# Patient Record
Sex: FEMALE | Race: WHITE | NOT HISPANIC OR LATINO | Employment: FULL TIME | URBAN - NONMETROPOLITAN AREA
[De-identification: names, ages, dates, MRNs, and addresses within clinical notes are randomized per-mention and may not be internally consistent; named-entity substitution may affect disease eponyms.]

---

## 2018-12-06 ENCOUNTER — TRANSCRIBE ORDERS (OUTPATIENT)
Dept: ADMINISTRATIVE | Facility: HOSPITAL | Age: 48
End: 2018-12-06

## 2018-12-06 DIAGNOSIS — M25.511 RIGHT SHOULDER PAIN, UNSPECIFIED CHRONICITY: Primary | ICD-10-CM

## 2018-12-06 DIAGNOSIS — Z12.39 SCREENING BREAST EXAMINATION: Primary | ICD-10-CM

## 2018-12-10 ENCOUNTER — APPOINTMENT (OUTPATIENT)
Dept: MRI IMAGING | Facility: HOSPITAL | Age: 48
End: 2018-12-10

## 2019-01-07 DIAGNOSIS — M25.511 ARTHRALGIA OF RIGHT SHOULDER REGION: Primary | ICD-10-CM

## 2019-01-08 ENCOUNTER — OFFICE VISIT (OUTPATIENT)
Dept: ORTHOPEDIC SURGERY | Facility: CLINIC | Age: 49
End: 2019-01-08

## 2019-01-08 VITALS — WEIGHT: 192 LBS | BODY MASS INDEX: 35.33 KG/M2 | HEIGHT: 62 IN | RESPIRATION RATE: 18 BRPM

## 2019-01-08 DIAGNOSIS — IMO0002 BURSITIS/TENDONITIS, SHOULDER: Primary | ICD-10-CM

## 2019-01-08 PROCEDURE — 99203 OFFICE O/P NEW LOW 30 MIN: CPT | Performed by: ORTHOPAEDIC SURGERY

## 2019-01-08 PROCEDURE — 20610 DRAIN/INJ JOINT/BURSA W/O US: CPT | Performed by: ORTHOPAEDIC SURGERY

## 2019-01-08 RX ORDER — METHYLPREDNISOLONE ACETATE 40 MG/ML
40 INJECTION, SUSPENSION INTRA-ARTICULAR; INTRALESIONAL; INTRAMUSCULAR; SOFT TISSUE
Status: COMPLETED | OUTPATIENT
Start: 2019-01-08 | End: 2019-01-08

## 2019-01-08 RX ORDER — LIDOCAINE HYDROCHLORIDE 10 MG/ML
2 INJECTION, SOLUTION EPIDURAL; INFILTRATION; INTRACAUDAL; PERINEURAL
Status: COMPLETED | OUTPATIENT
Start: 2019-01-08 | End: 2019-01-08

## 2019-01-08 RX ADMIN — LIDOCAINE HYDROCHLORIDE 2 ML: 10 INJECTION, SOLUTION EPIDURAL; INFILTRATION; INTRACAUDAL; PERINEURAL at 13:44

## 2019-01-08 RX ADMIN — METHYLPREDNISOLONE ACETATE 40 MG: 40 INJECTION, SUSPENSION INTRA-ARTICULAR; INTRALESIONAL; INTRAMUSCULAR; SOFT TISSUE at 13:44

## 2019-01-08 NOTE — PROGRESS NOTES
Subjective   Patient ID: Amber Feng is a 48 y.o. female  Pain of the Right Shoulder (Patient denies any injury to the right shoulder, she states her  had a fall in October and she was helping pull to him then her shoulder started hurting.)             History of Present Illness  Right dominant shoulder pain since October when her  fell off a roof she had to help him with repetitive reaching lifting activities, has pain anterolateral right shoulder limited motion pain laying on the side at night denies neck pain paresthesias, has no history of prior shoulder injections or injuries, not improved with chiropractic care.  Has taken Naprosyn for more than a month with no improvement.      Review of Systems   Constitutional: Negative for fever.   HENT: Negative for voice change.    Eyes: Negative for visual disturbance.   Respiratory: Negative for shortness of breath.    Cardiovascular: Negative for chest pain.   Gastrointestinal: Negative for abdominal distention and abdominal pain.   Genitourinary: Negative for dysuria.   Musculoskeletal: Positive for arthralgias. Negative for gait problem and joint swelling.   Skin: Negative for rash.   Neurological: Negative for speech difficulty.   Hematological: Does not bruise/bleed easily.   Psychiatric/Behavioral: Negative for confusion.       History reviewed. No pertinent past medical history.     History reviewed. No pertinent surgical history.    History reviewed. No pertinent family history.    Social History     Socioeconomic History   • Marital status:      Spouse name: Not on file   • Number of children: Not on file   • Years of education: Not on file   • Highest education level: Not on file   Social Needs   • Financial resource strain: Not on file   • Food insecurity - worry: Not on file   • Food insecurity - inability: Not on file   • Transportation needs - medical: Not on file   • Transportation needs - non-medical: Not on file  "  Occupational History   • Not on file   Tobacco Use   • Smoking status: Never Smoker   • Smokeless tobacco: Never Used   Substance and Sexual Activity   • Alcohol use: Yes     Frequency: Never   • Drug use: Defer   • Sexual activity: Defer   Other Topics Concern   • Not on file   Social History Narrative   • Not on file       I have reviewed all of the above social hx, family hx, surgical hx, medications, allergies & ROS and confirm that it is accurate.    No Known Allergies    No current outpatient medications on file.    Objective   Resp 18   Ht 157.5 cm (62\")   Wt 87.1 kg (192 lb)   BMI 35.12 kg/m²    Physical Exam  Constitutional: Patient is oriented to person, place, and time. Patient appears well-developed and well-nourished.   HENT:Head: Normocephalic and atraumatic.   Eyes: EOM are normal. Pupils are equal, round, and reactive to light.   Neck: Normal range of motion. Neck supple.   Cardiovascular: Normal rate.    Pulmonary/Chest: Effort normal and breath sounds normal.   Abdominal: Soft.   Neurological: Patient is alert and oriented to person, place, and time.   Skin: Skin is warm and dry.   Psychiatric: Patient has a normal mood and affect.   Nursing note and vitals reviewed.       [unfilled]   Right shoulder with positive impingement sign, no atrophy skin appears normal, forward elevation limited to 120, abduction limited to 130, internal rotation limited to thumb to greater trochanter with pain.  Positive full can sign and with anterior shoulder pain.    Assessment/Plan   Review of Radiographic Studies:    Radiographic images today of affected area I personally viewed and showed no sign of acute fracture or dislocation.      Large Joint Arthrocentesis: R subacromial bursa  Date/Time: 1/8/2019 1:44 PM  Consent given by: patient  Site marked: site marked  Timeout: Immediately prior to procedure a time out was called to verify the correct patient, procedure, equipment, support staff and site/side " marked as required   Supporting Documentation  Indications: pain   Procedure Details  Location: shoulder - R subacromial bursa  Preparation: Patient was prepped and draped in the usual sterile fashion  Needle size: 22 G  Medications administered: 2 mL lidocaine PF 1% 1 %; 40 mg methylPREDNISolone acetate 40 MG/ML  Patient tolerance: patient tolerated the procedure well with no immediate complications           Amber was seen today for pain.    Diagnoses and all orders for this visit:    Bursitis/tendonitis, shoulder  -     Large Joint Arthrocentesis: R subacromial bursa  -     MRI Shoulder Right Without Contrast; Future       Orthopedic activities reviewed and patient expressed appreciation and Physical therapy referral given      Recommendations/Plan:   Work/Activity Status: May perform usual activities as tolerated    Patient agreeable to call or return sooner for any concerns.             Impression:  Bursitis tendinitis right shoulder with early adhesive capsulitis rule out rotator cuff tear  Plan:  Therapy referral, MRI right shoulder, review MR next visit

## 2019-01-17 ENCOUNTER — TREATMENT (OUTPATIENT)
Dept: PHYSICAL THERAPY | Facility: CLINIC | Age: 49
End: 2019-01-17

## 2019-01-17 DIAGNOSIS — M25.511 CHRONIC RIGHT SHOULDER PAIN: Primary | ICD-10-CM

## 2019-01-17 DIAGNOSIS — G89.29 CHRONIC RIGHT SHOULDER PAIN: Primary | ICD-10-CM

## 2019-01-17 PROCEDURE — 97162 PT EVAL MOD COMPLEX 30 MIN: CPT | Performed by: PHYSICAL THERAPIST

## 2019-01-17 NOTE — PROGRESS NOTES
"  Physical Therapy Initial Evaluation and Plan of Care      Patient: Amber eFng   : 1970  Diagnosis/ICD-10 Code:  Acute pain of right shoulder [M25.511]  Referring practitioner: Saleem Lyman MD    Subjective Evaluation    History of Present Illness  Mechanism of injury: Patient states her right shoulder has gradually been hurting more and more since her  fell off the roof in early October and was unable to walk. States she has had to pull and tug on him. Reports catching in shoulder, sometimes trouble lifting, pain going into the arm, trouble gripping. Pain is intermittent.     Has been going to the chiropractor which has helped some, mostly did estim.     Had a cortisone injection but it may have made it worse.     Work lets her take her time and she has not been lifting at work.       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   Quality: sharp, throbbing, dull ache and burning (\"catches\")  Aggravating factors: sleeping, lifting, overhead activity, movement and outstretched reach    Hand dominance: right    Treatments  Previous treatment: chiropractic  Patient Goals  Patient goals for therapy: decreased edema, improved balance, decreased pain, increased motion, return to sport/leisure activities, increased strength and independence with ADLs/IADLs  Patient goal: Hair, makeup            Objective       Postural Observations  Seated posture: poor  Standing posture: poor        Palpation     Right Tenderness of the anterior deltoid, biceps, infraspinatus, levator scapulae, pectoralis major, pectoralis minor, posterior deltoid, rhomboids, supraspinatus, teres major, teres minor and upper trapezius.     Tenderness     Right Shoulder  Tenderness in the acromion, biceps tendon (proximal), " bicipital groove, coracoid process, infraspinatus tendon, medial scapula, scapular spine and supraspinatus tendon.     Cervical/Thoracic Screen   Cervical range of motion within normal limits    Neurological Testing     Reflexes   Left   Biceps (C5/C6): normal (2+)  Brachioradialis (C6): normal (2+)  Triceps (C7): normal (2+)    Right   Biceps (C5/C6): normal (2+)  Brachioradialis (C6): normal (2+)  Triceps (C7): normal (2+)    Active Range of Motion   Left Shoulder   Flexion: 145 degrees   Abduction: 175 degrees     Right Shoulder   Flexion: 90 degrees with pain  Abduction: 75 degrees with pain    Passive Range of Motion     Right Shoulder   Flexion: 90 degrees with pain  Abduction: 85 degrees with pain  External rotation 45°: 40 degrees with pain  Internal rotation 45°: 50 degrees with pain    Scapular Mobility     Right Shoulder   Scapular mobility: poor    Strength/Myotome Testing     Left Shoulder   Normal muscle strength    Right Shoulder     Planes of Motion   Flexion: 2   Abduction: 2   External rotation at 0°: 2   Internal rotation at 0°: 2     Left Wrist/Hand      (2nd hand position)     Trial 1: 49.4 lbs    Right Wrist/Hand      (2nd hand position)     Trial 1: 22.7 lbs    Tests     Right Shoulder   Positive crossover, drop arm, empty can, full can, Hawkin's and Neer's.          Assessment & Plan     Assessment  Impairments: abnormal coordination, abnormal muscle firing, abnormal muscle tone, abnormal or restricted ROM, activity intolerance, impaired physical strength, lacks appropriate home exercise program and pain with function  Assessment details: Patient is a 48 year old female presenting with right shoulder pain Since early October 2018 following an injury of her  which required her to lift and pull him repeatedly since then. Patient states pain has been progressive and intense and has trouble lifting pulling, holding items, and raising arm. Patient presents with severely decreased  shoulder mobility actively and passively, decreased  strength, gross tenderness throughout shoulder and scapular complex, and occasional numbness down the arm past elbow. Cannot rule out bursitis, impingement, or rotator cuff tear at this time. Patient is appropriate for further imaging to address tissue structure. Patient is appropriate for physical therapy to address the above issues.   Prognosis: fair  Prognosis details: Short Term Goals (3 weeks):  1. Patient will be independent with home exercise program.  2. Patient will demonstrate improved shoulder mobility by 50%.  3. Patient will demonstrate improved  strength by 50%    Long Term Goals (8 weeks):  1. Patient will be able to lift at least 5 lbs overhead with shoulder pain no greater than 2/10.   2. Patient will demonstrate shoulder mobility of WFL.   3. Patient will be able to return to full work duty with shoulder pain no greater than 2/10.    Functional Limitations: carrying objects, lifting, sleeping, pulling, pushing, uncomfortable because of pain, reaching behind back and reaching overhead  Plan  Therapy options: will be seen for skilled physical therapy services  Planned modality interventions: ultrasound, traction, thermotherapy (hydrocollator packs), TENS, iontophoresis, high voltage pulsed current (spasm management), high voltage pulsed current (pain management), electrical stimulation/Russian stimulation and cryotherapy  Planned therapy interventions: therapeutic activities, stretching, strengthening, soft tissue mobilization, spinal/joint mobilization, joint mobilization, postural training, neuromuscular re-education, motor coordination training, manual therapy, abdominal trunk stabilization, ADL retraining, balance/weight-bearing training, body mechanics training, fine motor coordination training, flexibility, functional ROM exercises, home exercise program and IADL retraining  Frequency: 2-3x/week.  Duration in visits: 18  Treatment  plan discussed with: patient        Manual Therapy:         mins  57099;  Therapeutic Exercise:    25nc     mins  54291;     Neuromuscular Cecily:        mins  79220;    Therapeutic Activity:          mins  10215;     Gait Training:           mins  28593;     Ultrasound:         mins  05180;    Electrical Stimulation:    15 nc      mins  83385 ( );  Dry Needling          mins self-pay    Timed Treatment:   25 nc   mins   Total Treatment:      62    mins    PT SIGNATURE: Onelia Chacon, PT   DATE TREATMENT INITIATED: 1/17/2019    Initial Certification  Certification Period: 4/17/2019  I certify that the therapy services are furnished while this patient is under my care.  The services outlined above are required by this patient, and will be reviewed every 90 days.     PHYSICIAN: Saleem Lyman MD      DATE:     Please sign and return via fax to [phone].. Thank you, [facility].

## 2019-01-21 ENCOUNTER — TREATMENT (OUTPATIENT)
Dept: PHYSICAL THERAPY | Facility: CLINIC | Age: 49
End: 2019-01-21

## 2019-01-21 DIAGNOSIS — M25.511 CHRONIC RIGHT SHOULDER PAIN: Primary | ICD-10-CM

## 2019-01-21 DIAGNOSIS — G89.29 CHRONIC RIGHT SHOULDER PAIN: Primary | ICD-10-CM

## 2019-01-21 PROCEDURE — 97014 ELECTRIC STIMULATION THERAPY: CPT | Performed by: PHYSICAL THERAPIST

## 2019-01-21 PROCEDURE — 97140 MANUAL THERAPY 1/> REGIONS: CPT | Performed by: PHYSICAL THERAPIST

## 2019-01-21 PROCEDURE — 97110 THERAPEUTIC EXERCISES: CPT | Performed by: PHYSICAL THERAPIST

## 2019-01-21 NOTE — PROGRESS NOTES
Subjective   Amber Feng reports: 8-9/10 pain at rest. States the shoulder and her sinuses felt bad all weekend and she did not do exercises. States this morning in made a loud and painful pop, not sure what she was doing.     Objective   Pain with passive flexion at approx. 80 deg, patient demo's muscle guarding.     See Exercise, Manual, and Modality Logs for complete treatment.       Assessment/Plan  Will continue working on PROM and pain free ranges of motion. Will continue estim for pain relief at this time.   Progress per Plan of Care           Manual Therapy:    9     mins  38665;  Therapeutic Exercise:    30     mins  79385;     Neuromuscular Cecily:        mins  70370;    Therapeutic Activity:          mins  10938;     Gait Training:           mins  49418;     Ultrasound:          mins  91599;   Iontophoresis          mins  29346   Electrical Stimulation:    20     mins  12454 ( );  Dry Needling          mins self-pay  Fluidotherapy          mins [id number]    Timed Treatment:   39   mins   Total Treatment:     59   mins    Onelia Chacon, PT  Physical Therapist

## 2019-01-23 ENCOUNTER — TREATMENT (OUTPATIENT)
Dept: PHYSICAL THERAPY | Facility: CLINIC | Age: 49
End: 2019-01-23

## 2019-01-23 DIAGNOSIS — M25.511 CHRONIC RIGHT SHOULDER PAIN: Primary | ICD-10-CM

## 2019-01-23 DIAGNOSIS — G89.29 CHRONIC RIGHT SHOULDER PAIN: Primary | ICD-10-CM

## 2019-01-23 PROCEDURE — 97140 MANUAL THERAPY 1/> REGIONS: CPT | Performed by: PHYSICAL THERAPIST

## 2019-01-23 PROCEDURE — 97014 ELECTRIC STIMULATION THERAPY: CPT | Performed by: PHYSICAL THERAPIST

## 2019-01-23 PROCEDURE — 97110 THERAPEUTIC EXERCISES: CPT | Performed by: PHYSICAL THERAPIST

## 2019-01-23 NOTE — PROGRESS NOTES
"        Subjective   Amber Feng reports: Reports \"12\"/10 pain, denies need to go to urgent care. States arm will drop when trying to lift it to tie up hair.     Objective   See Exercise, Manual, and Modality Logs for complete treatment.       Assessment/Plan  Great amount of pain today with PROM, patient had difficulty relaxing arm. Will continue trial with therapy into next week but if no improvements will likely refer for imaging with suspicion of rotator cuff tear.   Progress per Plan of Care           Manual Therapy:    8     mins  91441;  Therapeutic Exercise:    26     mins  92818;     Neuromuscular Cecily:        mins  03525;    Therapeutic Activity:          mins  89867;     Gait Training:           mins  62974;     Ultrasound:          mins  53638;   Iontophoresis          mins  21180   Electrical Stimulation:    20     mins  75336 ( );  Dry Needling          mins self-pay  Fluidotherapy          mins 35665    Timed Treatment:   34   mins   Total Treatment:     54   mins    Onelia Chacon, PT  Physical Therapist  "

## 2019-01-28 ENCOUNTER — TREATMENT (OUTPATIENT)
Dept: PHYSICAL THERAPY | Facility: CLINIC | Age: 49
End: 2019-01-28

## 2019-01-28 PROCEDURE — 97014 ELECTRIC STIMULATION THERAPY: CPT | Performed by: PHYSICAL THERAPIST

## 2019-01-28 PROCEDURE — 97140 MANUAL THERAPY 1/> REGIONS: CPT | Performed by: PHYSICAL THERAPIST

## 2019-01-28 NOTE — PROGRESS NOTES
Physical Therapy Daily Progress Note        Amber Feng reports 8-9/10 pain today at rest.  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 Pt reports that she sleeps on L shoulder but rolls onto right during the night which wakes her up.         Objective Pt present to PT today with no distress at rest.     Pt with increased pain with PROM of shoulder and could not reach 90 degrees with flexion or extension due to pain.     Pt with some relief with estim and ice on shoulder.       See Exercise, Manual, and Modality Logs for complete treatment.     Assessment/Plan  Pt continues to have pain with active movement of shoulder. Pt shoulder seems to lock up and is very painful when trying to elevate arm even passively.       Progress per Plan of Care  Assess pt pain           Manual Therapy:    5     mins  92994;  Therapeutic Exercise:         mins  51556;     Neuromuscular Cecily:        mins  18144;    Therapeutic Activity:          mins  10178;     Gait Training:        ___  mins  80178;     Ultrasound:          mins  92323;    Electrical Stimulation:    20     mins  92959 ( );  Dry Needling          mins self-pay    Timed Treatment:   25   mins   Total Treatment:     59   mins    David Will, PT  Physical Therapist

## 2019-01-30 ENCOUNTER — TREATMENT (OUTPATIENT)
Dept: PHYSICAL THERAPY | Facility: CLINIC | Age: 49
End: 2019-01-30

## 2019-01-30 PROCEDURE — 97110 THERAPEUTIC EXERCISES: CPT | Performed by: PHYSICAL THERAPIST

## 2019-01-30 PROCEDURE — 97014 ELECTRIC STIMULATION THERAPY: CPT | Performed by: PHYSICAL THERAPIST

## 2019-01-30 NOTE — PROGRESS NOTES
Subjective   Amber Feng reports: 8-9/10 pain at rest. States the cold has made it a little worse feeling.     Objective   Spasms down the arm today after using heat.     AAROM unable to raise arm with left handed assist about approx. 85 deg    See Exercise, Manual, and Modality Logs for complete treatment.       Assessment/Plan  Patient has not made any improvements so far in symptoms, mobility, or strength so far. Still highly suspecting rotator cuff tear. Likely refer back to physician next visit if no improvements made.   Progress per Plan of Care           Manual Therapy:         mins  43691;  Therapeutic Exercise:    35     mins  41711;     Neuromuscular Cecily:        mins  91032;    Therapeutic Activity:          mins  33340;     Gait Training:           mins  68354;     Ultrasound:          mins  43666;   Iontophoresis          mins  31964   Electrical Stimulation:    20     mins  15695 ( );  Dry Needling          mins self-pay  Fluidotherapy          mins 08979    Timed Treatment:   55   mins   Total Treatment:     55   mins    Onelia Chacon, PT  Physical Therapist

## 2019-02-05 ENCOUNTER — TREATMENT (OUTPATIENT)
Dept: PHYSICAL THERAPY | Facility: CLINIC | Age: 49
End: 2019-02-05

## 2019-02-05 DIAGNOSIS — G89.29 CHRONIC RIGHT SHOULDER PAIN: Primary | ICD-10-CM

## 2019-02-05 DIAGNOSIS — M25.511 CHRONIC RIGHT SHOULDER PAIN: Primary | ICD-10-CM

## 2019-02-05 PROCEDURE — 97140 MANUAL THERAPY 1/> REGIONS: CPT | Performed by: PHYSICAL THERAPIST

## 2019-02-05 PROCEDURE — 97014 ELECTRIC STIMULATION THERAPY: CPT | Performed by: PHYSICAL THERAPIST

## 2019-02-05 PROCEDURE — 97110 THERAPEUTIC EXERCISES: CPT | Performed by: PHYSICAL THERAPIST

## 2019-02-05 NOTE — PROGRESS NOTES
Subjective   Amber Feng reports: 8-9/10 shoulder pain at rest. Still cannot reach or lift arm without excruciating pain.     Objective       Active Range of Motion     Right Shoulder   Flexion: 86 degrees with pain  Abduction: 62 degrees with pain  External rotation 0°: 10 degrees with pain  Internal rotation 0°: 38 degrees with pain    Passive Range of Motion     Right Shoulder   Flexion: 123 degrees with pain  Abduction: 120 degrees with pain  External rotation 0°: 20 degrees with pain  Internal rotation 0°: 40 degrees with pain    Strength/Myotome Testing     Right Shoulder     Planes of Motion   Flexion: 2   Extension: 2+   Abduction: 2   External rotation at 0°: 2   Internal rotation at 0°: 2     Left Wrist/Hand      (2nd hand position)     Trial 1: 53.9 lbs    Right Wrist/Hand      (2nd hand position)     Trial 1: 36.2 lbs    Tests     Right Shoulder   Positive crossover, drop arm, full can and Hawkin's.      See Exercise, Manual, and Modality Logs for complete treatment.       Assessment/Plan  Patient has made limited progress with  strength, has not made progress with active or passive shoulder mobility. Patient will be put on hold for therapy at this time due to lack of progress. Further imaging is suggested at this time to assess for suspected rotator cuff tear. Discussed continuing home exercises to maintain current shoulder mobility.   Other  Therapy on hold until further testing or imaging can be acquired.          Manual Therapy:    10     mins  64865;  Therapeutic Exercise:    31     mins  56201;     Neuromuscular Cecily:        mins  13674;    Therapeutic Activity:          mins  04101;     Gait Training:           mins  63417;     Ultrasound:          mins  83375;   Iontophoresis          mins  51723   Electrical Stimulation:    20     mins  75079 ( );  Dry Needling          mins self-pay  Fluidotherapy          mins 93738    Timed Treatment:   41   mins   Total Treatment:      61   mins    Onelia Chacon, PT  Physical Therapist

## 2022-09-02 ENCOUNTER — HOSPITAL ENCOUNTER (OUTPATIENT)
Dept: GENERAL RADIOLOGY | Facility: HOSPITAL | Age: 52
Discharge: HOME | End: 2022-09-02
Admitting: CHIROPRACTOR

## 2022-09-02 ENCOUNTER — TRANSCRIBE ORDERS (OUTPATIENT)
Dept: GENERAL RADIOLOGY | Facility: HOSPITAL | Age: 52
End: 2022-09-02

## 2022-09-02 DIAGNOSIS — R06.89 ACUTE RESPIRATORY INSUFFICIENCY: Primary | ICD-10-CM

## 2022-09-02 DIAGNOSIS — R06.89 ACUTE RESPIRATORY INSUFFICIENCY: ICD-10-CM

## 2022-09-02 PROCEDURE — 71101 X-RAY EXAM UNILAT RIBS/CHEST: CPT

## 2022-10-17 ENCOUNTER — APPOINTMENT (OUTPATIENT)
Dept: CT IMAGING | Facility: HOSPITAL | Age: 52
End: 2022-10-17

## 2022-10-17 ENCOUNTER — HOSPITAL ENCOUNTER (EMERGENCY)
Facility: HOSPITAL | Age: 52
Discharge: HOME | End: 2022-10-17
Attending: EMERGENCY MEDICINE | Admitting: EMERGENCY MEDICINE

## 2022-10-17 VITALS
SYSTOLIC BLOOD PRESSURE: 153 MMHG | TEMPERATURE: 97.8 F | RESPIRATION RATE: 19 BRPM | DIASTOLIC BLOOD PRESSURE: 93 MMHG | HEART RATE: 101 BPM | WEIGHT: 158.2 LBS | HEIGHT: 62 IN | BODY MASS INDEX: 29.11 KG/M2 | OXYGEN SATURATION: 98 %

## 2022-10-17 DIAGNOSIS — K80.80 BILIARY CALCULUS OF OTHER SITE WITHOUT OBSTRUCTION: Primary | ICD-10-CM

## 2022-10-17 LAB
ALBUMIN SERPL-MCNC: 4 G/DL (ref 3.5–5.2)
ALBUMIN/GLOB SERPL: 0.8 G/DL
ALP SERPL-CCNC: 258 U/L (ref 39–117)
ALT SERPL W P-5'-P-CCNC: 45 U/L (ref 1–33)
ANION GAP SERPL CALCULATED.3IONS-SCNC: 12.9 MMOL/L (ref 5–15)
AST SERPL-CCNC: 69 U/L (ref 1–32)
BACTERIA UR QL AUTO: ABNORMAL /HPF
BASOPHILS # BLD AUTO: 0.11 10*3/MM3 (ref 0–0.2)
BASOPHILS NFR BLD AUTO: 1.2 % (ref 0–1.5)
BILIRUB SERPL-MCNC: 0.7 MG/DL (ref 0–1.2)
BILIRUB UR QL STRIP: NEGATIVE
BUN SERPL-MCNC: 6 MG/DL (ref 6–20)
BUN/CREAT SERPL: 7.5 (ref 7–25)
CALCIUM SPEC-SCNC: 9.5 MG/DL (ref 8.6–10.5)
CHLORIDE SERPL-SCNC: 99 MMOL/L (ref 98–107)
CLARITY UR: ABNORMAL
CO2 SERPL-SCNC: 27.1 MMOL/L (ref 22–29)
COLOR UR: ABNORMAL
CREAT SERPL-MCNC: 0.8 MG/DL (ref 0.57–1)
D-LACTATE SERPL-SCNC: 0.8 MMOL/L (ref 0.5–2)
DEPRECATED RDW RBC AUTO: 42.5 FL (ref 37–54)
EGFRCR SERPLBLD CKD-EPI 2021: 88.8 ML/MIN/1.73
EOSINOPHIL # BLD AUTO: 0.93 10*3/MM3 (ref 0–0.4)
EOSINOPHIL NFR BLD AUTO: 9.8 % (ref 0.3–6.2)
ERYTHROCYTE [DISTWIDTH] IN BLOOD BY AUTOMATED COUNT: 14.6 % (ref 12.3–15.4)
GLOBULIN UR ELPH-MCNC: 4.9 GM/DL
GLUCOSE SERPL-MCNC: 97 MG/DL (ref 65–99)
GLUCOSE UR STRIP-MCNC: NEGATIVE MG/DL
HCT VFR BLD AUTO: 36.7 % (ref 34–46.6)
HGB BLD-MCNC: 11.6 G/DL (ref 12–15.9)
HGB UR QL STRIP.AUTO: NEGATIVE
HOLD SPECIMEN: NORMAL
HOLD SPECIMEN: NORMAL
HYALINE CASTS UR QL AUTO: ABNORMAL /LPF
IMM GRANULOCYTES # BLD AUTO: 0.02 10*3/MM3 (ref 0–0.05)
IMM GRANULOCYTES NFR BLD AUTO: 0.2 % (ref 0–0.5)
KETONES UR QL STRIP: NEGATIVE
LEUKOCYTE ESTERASE UR QL STRIP.AUTO: ABNORMAL
LIPASE SERPL-CCNC: 23 U/L (ref 13–60)
LYMPHOCYTES # BLD AUTO: 1.97 10*3/MM3 (ref 0.7–3.1)
LYMPHOCYTES NFR BLD AUTO: 20.8 % (ref 19.6–45.3)
MCH RBC QN AUTO: 25.6 PG (ref 26.6–33)
MCHC RBC AUTO-ENTMCNC: 31.6 G/DL (ref 31.5–35.7)
MCV RBC AUTO: 80.8 FL (ref 79–97)
MONOCYTES # BLD AUTO: 0.77 10*3/MM3 (ref 0.1–0.9)
MONOCYTES NFR BLD AUTO: 8.1 % (ref 5–12)
MUCOUS THREADS URNS QL MICRO: ABNORMAL /HPF
NEUTROPHILS NFR BLD AUTO: 5.69 10*3/MM3 (ref 1.7–7)
NEUTROPHILS NFR BLD AUTO: 59.9 % (ref 42.7–76)
NITRITE UR QL STRIP: NEGATIVE
NRBC BLD AUTO-RTO: 0 /100 WBC (ref 0–0.2)
PH UR STRIP.AUTO: 6 [PH] (ref 5–8)
PLATELET # BLD AUTO: 498 10*3/MM3 (ref 140–450)
PMV BLD AUTO: 10 FL (ref 6–12)
POTASSIUM SERPL-SCNC: 3.2 MMOL/L (ref 3.5–5.2)
PROT SERPL-MCNC: 8.9 G/DL (ref 6–8.5)
PROT UR QL STRIP: ABNORMAL
RBC # BLD AUTO: 4.54 10*6/MM3 (ref 3.77–5.28)
RBC # UR STRIP: ABNORMAL /HPF
REF LAB TEST METHOD: ABNORMAL
SODIUM SERPL-SCNC: 139 MMOL/L (ref 136–145)
SP GR UR STRIP: 1.02 (ref 1–1.03)
SQUAMOUS #/AREA URNS HPF: ABNORMAL /HPF
UROBILINOGEN UR QL STRIP: ABNORMAL
WBC # UR STRIP: ABNORMAL /HPF
WBC CASTS #/AREA URNS LPF: ABNORMAL /LPF
WBC NRBC COR # BLD: 9.49 10*3/MM3 (ref 3.4–10.8)
WHOLE BLOOD HOLD COAG: NORMAL
WHOLE BLOOD HOLD SPECIMEN: NORMAL

## 2022-10-17 PROCEDURE — 99283 EMERGENCY DEPT VISIT LOW MDM: CPT

## 2022-10-17 PROCEDURE — 81001 URINALYSIS AUTO W/SCOPE: CPT

## 2022-10-17 PROCEDURE — 25010000002 ONDANSETRON PER 1 MG: Performed by: EMERGENCY MEDICINE

## 2022-10-17 PROCEDURE — 85025 COMPLETE CBC W/AUTO DIFF WBC: CPT

## 2022-10-17 PROCEDURE — 74176 CT ABD & PELVIS W/O CONTRAST: CPT

## 2022-10-17 PROCEDURE — 80053 COMPREHEN METABOLIC PANEL: CPT

## 2022-10-17 PROCEDURE — 96374 THER/PROPH/DIAG INJ IV PUSH: CPT

## 2022-10-17 PROCEDURE — 83690 ASSAY OF LIPASE: CPT

## 2022-10-17 PROCEDURE — 83605 ASSAY OF LACTIC ACID: CPT

## 2022-10-17 PROCEDURE — 96375 TX/PRO/DX INJ NEW DRUG ADDON: CPT

## 2022-10-17 PROCEDURE — 25010000002 KETOROLAC TROMETHAMINE PER 15 MG: Performed by: EMERGENCY MEDICINE

## 2022-10-17 RX ORDER — KETOROLAC TROMETHAMINE 10 MG/1
10 TABLET, FILM COATED ORAL EVERY 6 HOURS PRN
Qty: 15 TABLET | Refills: 0 | Status: SHIPPED | OUTPATIENT
Start: 2022-10-17 | End: 2022-12-12

## 2022-10-17 RX ORDER — KETOROLAC TROMETHAMINE 30 MG/ML
30 INJECTION, SOLUTION INTRAMUSCULAR; INTRAVENOUS ONCE
Status: COMPLETED | OUTPATIENT
Start: 2022-10-17 | End: 2022-10-17

## 2022-10-17 RX ORDER — ONDANSETRON 4 MG/1
4 TABLET, ORALLY DISINTEGRATING ORAL 4 TIMES DAILY PRN
Qty: 20 TABLET | Refills: 0 | Status: SHIPPED | OUTPATIENT
Start: 2022-10-17 | End: 2022-10-28 | Stop reason: SDUPTHER

## 2022-10-17 RX ORDER — SODIUM CHLORIDE 0.9 % (FLUSH) 0.9 %
10 SYRINGE (ML) INJECTION AS NEEDED
Status: DISCONTINUED | OUTPATIENT
Start: 2022-10-17 | End: 2022-10-17 | Stop reason: HOSPADM

## 2022-10-17 RX ORDER — ONDANSETRON 2 MG/ML
4 INJECTION INTRAMUSCULAR; INTRAVENOUS ONCE
Status: COMPLETED | OUTPATIENT
Start: 2022-10-17 | End: 2022-10-17

## 2022-10-17 RX ADMIN — KETOROLAC TROMETHAMINE 30 MG: 30 INJECTION, SOLUTION INTRAMUSCULAR; INTRAVENOUS at 15:28

## 2022-10-17 RX ADMIN — ONDANSETRON 4 MG: 2 INJECTION INTRAMUSCULAR; INTRAVENOUS at 15:27

## 2022-10-17 NOTE — ED PROVIDER NOTES
Subjective   History of Present Illness  52-year-old female presents to the ED with a chief complaint of abdominal pain.  The patient is complaining of epigastric and right upper quadrant abdominal pain that radiates around to the right side of her back.  She states that the pain has been pretty constant but worse over the last few weeks.  She states that the pain is worse with eating.  She states that sometimes she has nausea and vomiting associated with the pain.  She denies fever or chills.  No chest pain or shortness of breath.  No cough or wheeze.  No prior treatments or limiting factors.  No prior evaluations.  No other complaints at this time.        Review of Systems   Constitutional: Negative for fatigue and fever.   Gastrointestinal: Positive for abdominal pain, nausea and vomiting.   All other systems reviewed and are negative.      Past Medical History:   Diagnosis Date   • Anxiety        No Known Allergies    Past Surgical History:   Procedure Laterality Date   • POSTPARTUM TUBAL LIGATION         History reviewed. No pertinent family history.    Social History     Socioeconomic History   • Marital status:    Tobacco Use   • Smoking status: Never   • Smokeless tobacco: Never   Vaping Use   • Vaping Use: Never used   Substance and Sexual Activity   • Alcohol use: Yes   • Drug use: Defer   • Sexual activity: Defer           Objective   Physical Exam  Vitals and nursing note reviewed.   Constitutional:       General: She is not in acute distress.     Appearance: She is well-developed. She is not diaphoretic.   HENT:      Head: Normocephalic and atraumatic.      Nose: Nose normal.   Eyes:      Conjunctiva/sclera: Conjunctivae normal.   Cardiovascular:      Rate and Rhythm: Normal rate and regular rhythm.   Pulmonary:      Effort: Pulmonary effort is normal. No respiratory distress.      Breath sounds: Normal breath sounds.   Abdominal:      General: There is no distension.      Palpations: Abdomen is  soft.      Tenderness: There is abdominal tenderness in the right upper quadrant and epigastric area. There is no guarding.   Musculoskeletal:         General: No deformity.   Neurological:      Mental Status: She is alert and oriented to person, place, and time.      Cranial Nerves: No cranial nerve deficit.         Procedures           ED Course                                           MDM  CT Abdomen Pelvis Without Contrast    Result Date: 10/17/2022  PROCEDURE: CT ABDOMEN PELVIS WO CONTRAST-  HISTORY: epigastric, RUQ abd pain  COMPARISON:None  Note: Noncontrast exam is less sensitive for assessment of the bowel and solid abdominal organs  TECHNIQUE: Noncontrast exam  CT ABDOMEN:  Liver has a somewhat heterogeneous appearance. Liver is also mildly enlarged. This could be due to focal fatty infiltration although underlying liver disease including infiltrative mass or metastatic disease is not excluded. Remaining solid organs are unremarkable. The bowel shows no evidence of obstruction. There is no free air or free fluid within the abdomen.  Cholelithiasis is present without acute cholecystitis. There is no evidence of biliary obstruction.  CT PELVIS: No bowel dilatation is seen. There is no free fluid. Appendix is normal. Minimal sigmoid diverticulosis is noted. Uterus and ovaries are unremarkable.      Impression: 1. Heterogeneous appearance of mildly enlarged liver. Recommend further assessment with a contrast enhanced study to evaluate for possible underlying mass or infiltrative process. 2. No evidence of bowel obstruction 3. No inflammatory changes. 4. Cholelithiasis without evidence of acute cholecystitis or biliary obstruction.  This study was performed with techniques to keep radiation doses as low as reasonably achievable (ALARA). Individualized dose reduction techniques using automated exposure control or adjustment of vA and/or kV according to the patient size were employed.  This report was signed and  finalized on 10/17/2022 4:17 PM by Davi Weiss MD.    Lab Results (last 24 hours)     Procedure Component Value Units Date/Time    CBC & Differential [[id number]]  (Abnormal) Collected: 10/17/22 1503    Specimen: Blood Updated: 10/17/22 1514    Narrative:      The following orders were created for panel order CBC & Differential.  Procedure                               Abnormality         Status                     ---------                               -----------         ------                     CBC Auto Differential[[id number]]        Abnormal            Final result                 Please view results for these tests on the individual orders.    Comprehensive Metabolic Panel [[id number]]  (Abnormal) Collected: 10/17/22 1503    Specimen: Blood Updated: 10/17/22 1535     Glucose 97 mg/dL      BUN 6 mg/dL      Creatinine 0.80 mg/dL      Sodium 139 mmol/L      Potassium 3.2 mmol/L      Chloride 99 mmol/L      CO2 27.1 mmol/L      Calcium 9.5 mg/dL      Total Protein 8.9 g/dL      Albumin 4.00 g/dL      ALT (SGPT) 45 U/L      AST (SGOT) 69 U/L      Alkaline Phosphatase 258 U/L      Total Bilirubin 0.7 mg/dL      Globulin 4.9 gm/dL      A/G Ratio 0.8 g/dL      BUN/Creatinine Ratio 7.5     Anion Gap 12.9 mmol/L      eGFR 88.8 mL/min/1.73      Comment: National Kidney Foundation and American Society of Nephrology (ASN) Task Force recommended calculation based on the Chronic Kidney Disease Epidemiology Collaboration (CKD-EPI) equation refit without adjustment for race.       Narrative:      GFR Normal >60  Chronic Kidney Disease <60  Kidney Failure <15      Lipase [[id number]]  (Normal) Collected: 10/17/22 1503    Specimen: Blood Updated: 10/17/22 1535     Lipase 23 U/L     Urinalysis With Microscopic If Indicated (No Culture) - Urine, Clean Catch [[id number]]  (Abnormal) Collected: 10/17/22 1503    Specimen: Urine, Clean Catch Updated: 10/17/22 1522     Color, UA Dark Yellow     Appearance, UA Cloudy     pH, UA 6.0      Specific Gravity, UA 1.016     Glucose, UA Negative     Ketones, UA Negative     Bilirubin, UA Negative     Blood, UA Negative     Protein, UA 30 mg/dL (1+)     Leuk Esterase, UA Small (1+)     Nitrite, UA Negative     Urobilinogen, UA 1.0 E.U./dL    Lactic Acid, Plasma [[id number]]  (Normal) Collected: 10/17/22 1503    Specimen: Blood Updated: 10/17/22 1533     Lactate 0.8 mmol/L     CBC Auto Differential [[id number]]  (Abnormal) Collected: 10/17/22 1503    Specimen: Blood Updated: 10/17/22 1514     WBC 9.49 10*3/mm3      RBC 4.54 10*6/mm3      Hemoglobin 11.6 g/dL      Hematocrit 36.7 %      MCV 80.8 fL      MCH 25.6 pg      MCHC 31.6 g/dL      RDW 14.6 %      RDW-SD 42.5 fl      MPV 10.0 fL      Platelets 498 10*3/mm3      Neutrophil % 59.9 %      Lymphocyte % 20.8 %      Monocyte % 8.1 %      Eosinophil % 9.8 %      Basophil % 1.2 %      Immature Grans % 0.2 %      Neutrophils, Absolute 5.69 10*3/mm3      Lymphocytes, Absolute 1.97 10*3/mm3      Monocytes, Absolute 0.77 10*3/mm3      Eosinophils, Absolute 0.93 10*3/mm3      Basophils, Absolute 0.11 10*3/mm3      Immature Grans, Absolute 0.02 10*3/mm3      nRBC 0.0 /100 WBC     Urinalysis, Microscopic Only - Urine, Clean Catch [[id number]]  (Abnormal) Collected: 10/17/22 1503    Specimen: Urine, Clean Catch Updated: 10/17/22 1530     RBC, UA None Seen /HPF      WBC, UA 6-12 /HPF      Bacteria, UA 1+ /HPF      Squamous Epithelial Cells, UA 3-6 /HPF      Hyaline Casts, UA 3-6 /LPF      WBC Casts 0-2 /LPF      Mucus, UA Small/1+ /HPF      Methodology Manual Light Microscopy            52-year-old female presents to the ED with abdominal pain for approximately 2 months.  Worse with eating.  Epigastric and right upper quadrant radiates around to her back.  Labs are reassuring.  Minimal elevation in LFTs.  CT confirms cholelithiasis without signs of obstruction or acute cholecystitis.  Patient is appropriate for discharge and follow-up with general surgery.  Patient is  agreeable to this plan.        Final diagnoses:   Biliary calculus of other site without obstruction       ED Disposition  ED Disposition     ED Disposition   Discharge    Condition   Stable    Comment   --             Devon Freire MD  [address] [place] [address] [phone]          Sea Valentin MD  [address] [address]  [place] [id number] [phone]    Schedule an appointment as soon as possible for a visit            Medication List      New Prescriptions    ketorolac 10 MG tablet  Commonly known as: TORADOL  Take 1 tablet by mouth Every 6 (Six) Hours As Needed for Moderate Pain.     ondansetron ODT 4 MG disintegrating tablet  Commonly known as: ZOFRAN-ODT  Place 1 tablet on the tongue 4 (Four) Times a Day As Needed for Nausea.           Where to Get Your Medications      These medications were sent to [facility] - [place], [place] - [address] [facility] - [phone]  - [phone]   [address] [facility] [address]    Phone: [phone]   · ketorolac 10 MG tablet  · ondansetron ODT 4 MG disintegrating tablet          Peter Roa,   10/17/22 1800

## 2022-10-27 ENCOUNTER — HOSPITAL ENCOUNTER (EMERGENCY)
Facility: HOSPITAL | Age: 52
Discharge: HOME | End: 2022-10-28
Attending: EMERGENCY MEDICINE | Admitting: EMERGENCY MEDICINE

## 2022-10-27 DIAGNOSIS — K80.20 CALCULUS OF GALLBLADDER WITHOUT CHOLECYSTITIS WITHOUT OBSTRUCTION: ICD-10-CM

## 2022-10-27 DIAGNOSIS — K80.50 BILIARY COLIC: Primary | ICD-10-CM

## 2022-10-27 LAB
ALBUMIN SERPL-MCNC: 3.6 G/DL (ref 3.5–5.2)
ALBUMIN/GLOB SERPL: 0.8 G/DL
ALP SERPL-CCNC: 299 U/L (ref 39–117)
ALT SERPL W P-5'-P-CCNC: 48 U/L (ref 1–33)
AMORPH URATE CRY URNS QL MICRO: ABNORMAL /HPF
ANION GAP SERPL CALCULATED.3IONS-SCNC: 9.4 MMOL/L (ref 5–15)
AST SERPL-CCNC: 80 U/L (ref 1–32)
BACTERIA UR QL AUTO: ABNORMAL /HPF
BASOPHILS # BLD AUTO: 0.1 10*3/MM3 (ref 0–0.2)
BASOPHILS NFR BLD AUTO: 1.1 % (ref 0–1.5)
BILIRUB SERPL-MCNC: 0.7 MG/DL (ref 0–1.2)
BILIRUB UR QL STRIP: NEGATIVE
BUN SERPL-MCNC: 6 MG/DL (ref 6–20)
BUN/CREAT SERPL: 6.8 (ref 7–25)
CALCIUM SPEC-SCNC: 8.4 MG/DL (ref 8.6–10.5)
CHLORIDE SERPL-SCNC: 102 MMOL/L (ref 98–107)
CLARITY UR: CLEAR
CO2 SERPL-SCNC: 26.6 MMOL/L (ref 22–29)
COLOR UR: YELLOW
CREAT SERPL-MCNC: 0.88 MG/DL (ref 0.57–1)
DEPRECATED RDW RBC AUTO: 43.8 FL (ref 37–54)
EGFRCR SERPLBLD CKD-EPI 2021: 79.2 ML/MIN/1.73
EOSINOPHIL # BLD AUTO: 1.31 10*3/MM3 (ref 0–0.4)
EOSINOPHIL NFR BLD AUTO: 14.3 % (ref 0.3–6.2)
ERYTHROCYTE [DISTWIDTH] IN BLOOD BY AUTOMATED COUNT: 15 % (ref 12.3–15.4)
GLOBULIN UR ELPH-MCNC: 4.4 GM/DL
GLUCOSE SERPL-MCNC: 137 MG/DL (ref 65–99)
GLUCOSE UR STRIP-MCNC: NEGATIVE MG/DL
HCT VFR BLD AUTO: 31.8 % (ref 34–46.6)
HGB BLD-MCNC: 10.2 G/DL (ref 12–15.9)
HGB UR QL STRIP.AUTO: NEGATIVE
HYALINE CASTS UR QL AUTO: ABNORMAL /LPF
IMM GRANULOCYTES # BLD AUTO: 0.03 10*3/MM3 (ref 0–0.05)
IMM GRANULOCYTES NFR BLD AUTO: 0.3 % (ref 0–0.5)
KETONES UR QL STRIP: NEGATIVE
LEUKOCYTE ESTERASE UR QL STRIP.AUTO: ABNORMAL
LIPASE SERPL-CCNC: 41 U/L (ref 13–60)
LYMPHOCYTES # BLD AUTO: 1.71 10*3/MM3 (ref 0.7–3.1)
LYMPHOCYTES NFR BLD AUTO: 18.6 % (ref 19.6–45.3)
MCH RBC QN AUTO: 25.7 PG (ref 26.6–33)
MCHC RBC AUTO-ENTMCNC: 32.1 G/DL (ref 31.5–35.7)
MCV RBC AUTO: 80.1 FL (ref 79–97)
MONOCYTES # BLD AUTO: 0.86 10*3/MM3 (ref 0.1–0.9)
MONOCYTES NFR BLD AUTO: 9.4 % (ref 5–12)
NEUTROPHILS NFR BLD AUTO: 5.18 10*3/MM3 (ref 1.7–7)
NEUTROPHILS NFR BLD AUTO: 56.3 % (ref 42.7–76)
NITRITE UR QL STRIP: NEGATIVE
NRBC BLD AUTO-RTO: 0 /100 WBC (ref 0–0.2)
PH UR STRIP.AUTO: 6.5 [PH] (ref 5–8)
PLATELET # BLD AUTO: 417 10*3/MM3 (ref 140–450)
PMV BLD AUTO: 10.1 FL (ref 6–12)
POTASSIUM SERPL-SCNC: 3.3 MMOL/L (ref 3.5–5.2)
PROT SERPL-MCNC: 8 G/DL (ref 6–8.5)
PROT UR QL STRIP: NEGATIVE
RBC # BLD AUTO: 3.97 10*6/MM3 (ref 3.77–5.28)
RBC # UR STRIP: ABNORMAL /HPF
REF LAB TEST METHOD: ABNORMAL
SODIUM SERPL-SCNC: 138 MMOL/L (ref 136–145)
SP GR UR STRIP: <=1.005 (ref 1–1.03)
SQUAMOUS #/AREA URNS HPF: ABNORMAL /HPF
UROBILINOGEN UR QL STRIP: ABNORMAL
WBC # UR STRIP: ABNORMAL /HPF
WBC NRBC COR # BLD: 9.19 10*3/MM3 (ref 3.4–10.8)

## 2022-10-27 PROCEDURE — 25010000002 ONDANSETRON PER 1 MG: Performed by: EMERGENCY MEDICINE

## 2022-10-27 PROCEDURE — 83690 ASSAY OF LIPASE: CPT | Performed by: EMERGENCY MEDICINE

## 2022-10-27 PROCEDURE — 96361 HYDRATE IV INFUSION ADD-ON: CPT

## 2022-10-27 PROCEDURE — 96374 THER/PROPH/DIAG INJ IV PUSH: CPT

## 2022-10-27 PROCEDURE — 80053 COMPREHEN METABOLIC PANEL: CPT | Performed by: EMERGENCY MEDICINE

## 2022-10-27 PROCEDURE — 99283 EMERGENCY DEPT VISIT LOW MDM: CPT

## 2022-10-27 PROCEDURE — 25010000002 MORPHINE PER 10 MG: Performed by: EMERGENCY MEDICINE

## 2022-10-27 PROCEDURE — 96375 TX/PRO/DX INJ NEW DRUG ADDON: CPT

## 2022-10-27 PROCEDURE — 85025 COMPLETE CBC W/AUTO DIFF WBC: CPT | Performed by: EMERGENCY MEDICINE

## 2022-10-27 PROCEDURE — 81001 URINALYSIS AUTO W/SCOPE: CPT | Performed by: EMERGENCY MEDICINE

## 2022-10-27 RX ORDER — SODIUM CHLORIDE 0.9 % (FLUSH) 0.9 %
10 SYRINGE (ML) INJECTION AS NEEDED
Status: DISCONTINUED | OUTPATIENT
Start: 2022-10-27 | End: 2022-10-28 | Stop reason: HOSPADM

## 2022-10-27 RX ORDER — ONDANSETRON 2 MG/ML
4 INJECTION INTRAMUSCULAR; INTRAVENOUS ONCE
Status: COMPLETED | OUTPATIENT
Start: 2022-10-27 | End: 2022-10-27

## 2022-10-27 RX ADMIN — SODIUM CHLORIDE 1000 ML: 9 INJECTION, SOLUTION INTRAVENOUS at 23:20

## 2022-10-27 RX ADMIN — MORPHINE SULFATE 4 MG: 4 INJECTION, SOLUTION INTRAMUSCULAR; INTRAVENOUS at 23:20

## 2022-10-27 RX ADMIN — ONDANSETRON HYDROCHLORIDE 4 MG: 2 INJECTION, SOLUTION INTRAMUSCULAR; INTRAVENOUS at 23:19

## 2022-10-28 VITALS
DIASTOLIC BLOOD PRESSURE: 87 MMHG | HEIGHT: 62 IN | WEIGHT: 156.2 LBS | RESPIRATION RATE: 16 BRPM | HEART RATE: 88 BPM | OXYGEN SATURATION: 99 % | BODY MASS INDEX: 28.74 KG/M2 | SYSTOLIC BLOOD PRESSURE: 112 MMHG | TEMPERATURE: 98.3 F

## 2022-10-28 LAB
HOLD SPECIMEN: NORMAL
HOLD SPECIMEN: NORMAL
WHOLE BLOOD HOLD COAG: NORMAL
WHOLE BLOOD HOLD SPECIMEN: NORMAL

## 2022-10-28 PROCEDURE — 25010000002 KETOROLAC TROMETHAMINE PER 15 MG: Performed by: EMERGENCY MEDICINE

## 2022-10-28 PROCEDURE — 96375 TX/PRO/DX INJ NEW DRUG ADDON: CPT

## 2022-10-28 RX ORDER — ONDANSETRON 4 MG/1
4 TABLET, ORALLY DISINTEGRATING ORAL 4 TIMES DAILY PRN
Qty: 20 TABLET | Refills: 0 | Status: SHIPPED | OUTPATIENT
Start: 2022-10-28 | End: 2022-11-03 | Stop reason: SDUPTHER

## 2022-10-28 RX ORDER — HYDROCODONE BITARTRATE AND ACETAMINOPHEN 10; 325 MG/1; MG/1
1 TABLET ORAL ONCE
Status: COMPLETED | OUTPATIENT
Start: 2022-10-28 | End: 2022-10-28

## 2022-10-28 RX ORDER — HYDROCODONE BITARTRATE AND ACETAMINOPHEN 5; 325 MG/1; MG/1
1 TABLET ORAL EVERY 6 HOURS PRN
Qty: 12 TABLET | Refills: 0 | Status: SHIPPED | OUTPATIENT
Start: 2022-10-28 | End: 2022-12-12

## 2022-10-28 RX ORDER — KETOROLAC TROMETHAMINE 30 MG/ML
30 INJECTION, SOLUTION INTRAMUSCULAR; INTRAVENOUS ONCE
Status: COMPLETED | OUTPATIENT
Start: 2022-10-28 | End: 2022-10-28

## 2022-10-28 RX ADMIN — HYDROCODONE BITARTRATE AND ACETAMINOPHEN 1 TABLET: 10; 325 TABLET ORAL at 01:29

## 2022-10-28 RX ADMIN — KETOROLAC TROMETHAMINE 30 MG: 30 INJECTION, SOLUTION INTRAMUSCULAR; INTRAVENOUS at 01:16

## 2022-11-01 NOTE — PROGRESS NOTES
Patient: Amber Feng    YOB: 1970    Date: 11/03/2022    Primary Care Provider: Evon Bryant APRN    Chief Complaint   Patient presents with   • Abdominal Pain     Abdominal pain/ [facility] on 10/17/2022 and on 10/27/2022       SUBJECTIVE:    History of present illness: Patient states that ever since August she has had worsening upper abdominal pain that involves the epigastrium and right upper quadrant.  Associated with nausea and vomiting.  Pain now is daily as is the nausea and vomiting.  No lower GI complaints.  She was found to have gallstones on CT scan in the emergency room.  She states that she has had significant weight loss during this time.     The following portions of the patient's history were reviewed and updated as appropriate: allergies, current medications, past family history, past medical history, past social history, past surgical history and problem list.      Review of Systems   Constitutional: Positive for appetite change. Negative for fatigue and fever.   HENT: Negative for congestion, nosebleeds and postnasal drip.    Eyes: Negative for photophobia.   Respiratory: Negative for cough, choking, chest tightness and shortness of breath.    Cardiovascular: Negative for chest pain, palpitations and leg swelling.   Gastrointestinal: Positive for abdominal pain (right upper quadrant / epigastric pain), nausea and vomiting.   Endocrine: Negative for cold intolerance and heat intolerance.   Genitourinary: Negative for flank pain and frequency.   Musculoskeletal: Negative for arthralgias.   Neurological: Negative for seizures, light-headedness, numbness and headaches.   Psychiatric/Behavioral: Negative for agitation, behavioral problems, confusion and hallucinations.       Allergies:  Allergies   Allergen Reactions   • Valium [Diazepam] Anaphylaxis       Medications:    Current Outpatient Medications:   •  HYDROcodone-acetaminophen (NORCO) 5-325 MG per tablet, Take 1 tablet by  "mouth Every 6 (Six) Hours As Needed for Severe Pain., Disp: 12 tablet, Rfl: 0  •  ondansetron ODT (ZOFRAN-ODT) 4 MG disintegrating tablet, Place 1 tablet on the tongue 4 (Four) Times a Day As Needed for Nausea., Disp: 20 tablet, Rfl: 0  •  promethazine-dextromethorphan (PROMETHAZINE-DM) 6.25-15 MG/5ML syrup, Take 5 mL by mouth At Night As Needed for Cough., Disp: 118 mL, Rfl: 0  •  albuterol sulfate  (90 Base) MCG/ACT inhaler, Inhale 2 puffs Every 4 (Four) Hours As Needed for Shortness of Air (chest tightness)., Disp: 8 g, Rfl: 0  •  fluticasone (FLONASE) 50 MCG/ACT nasal spray, 2 sprays into the nostril(s) as directed by provider Daily for 7 days., Disp: 9.9 mL, Rfl: 0  •  ketorolac (TORADOL) 10 MG tablet, Take 1 tablet by mouth Every 6 (Six) Hours As Needed for Moderate Pain., Disp: 15 tablet, Rfl: 0    History:  Past Medical History:   Diagnosis Date   • Anxiety        Past Surgical History:   Procedure Laterality Date   • POSTPARTUM TUBAL LIGATION         History reviewed. No pertinent family history.    Social History     Tobacco Use   • Smoking status: Never   • Smokeless tobacco: Never   Vaping Use   • Vaping Use: Never used   Substance Use Topics   • Alcohol use: Yes   • Drug use: Defer        OBJECTIVE:    Vital Signs:   Vitals:    11/03/22 1506   BP: 132/74   Pulse: 104   SpO2: 99%   Weight: 68 kg (150 lb)   Height: 157.5 cm (62\")       Physical Exam:   General Appearance:    Alert, cooperative, in no acute distress   Head:    Normocephalic, without obvious abnormality, atraumatic   Eyes:            Lids and lashes normal, conjunctivae and sclerae normal, no   icterus, no pallor, corneas clear, PERRLA   Ears:    Ears appear intact with no abnormalities noted   Throat:   No oral lesions, no thrush, oral mucosa moist   Neck:   No adenopathy, supple, trachea midline, no thyromegaly, no   carotid bruit, no JVD   Lungs:     Clear to auscultation,respirations regular, even and                  unlabored "    Heart:    Regular rhythm and normal rate, normal S1 and S2, no            murmur, no gallop, no rub, no click   Chest Wall:    No abnormalities observed   Abdomen:     Normal bowel sounds, no masses, no organomegaly, soft        non-tender, non-distended, no guarding, no rebound                tenderness   Extremities:   Moves all extremities well, no edema, no cyanosis, no             redness   Pulses:   Pulses palpable and equal bilaterally   Skin:   No bleeding, bruising or rash   Lymph nodes:   No palpable adenopathy   Neurologic:   Cranial nerves 2 - 12 grossly intact, sensation intact, DTR       present and equal bilaterally     Results Review:   I reviewed the patient's new clinical results.  I reviewed the CT scan including films personally and I agree with the interpretation.    Review of Systems was reviewed and confirmed as accurate as documented by the MA.    ASSESSMENT/PLAN:    1. Cholelithiasis without cholecystitis    2. Abnormal CT of liver        Unfortunately on ultrasound today there are suspicious findings involving the liver as evidenced on CT scan.  Although gallstones certainly may be the cause of her symptoms liver metastasis can be causing her these issues and would also explain her weight loss.    If the CT scan with IV contrast is normal we can consider proceeding with a laparoscopic cholecystectomy.    I explained this procedure to them in detail and they understand the procedure.  They also understand the risks of bleeding, infection, bile leak, ductal injury, bowel injury, the possibility of converting to an open procedure etc. They understand all of these risks and I have answered all of their questions and they wish to proceed pending the findings of the CT.    Addendum: I reviewed the CT scan with the radiologist.  Differential is listed and includes benign causes as well as carcinoma.  Although we will proceed with MRI this will still not definitively give us a diagnosis and I  recommend a percutaneous liver biopsy.  If this is a malignancy that can be the cause of her complaints.  I discussed this with her.    Electronically signed by Toni Suarez MD  11/03/22

## 2022-11-03 ENCOUNTER — OFFICE VISIT (OUTPATIENT)
Dept: SURGERY | Facility: CLINIC | Age: 52
End: 2022-11-03

## 2022-11-03 ENCOUNTER — HOSPITAL ENCOUNTER (OUTPATIENT)
Dept: CT IMAGING | Facility: HOSPITAL | Age: 52
Discharge: HOME | End: 2022-11-03
Admitting: SURGERY

## 2022-11-03 VITALS
HEART RATE: 104 BPM | BODY MASS INDEX: 27.6 KG/M2 | HEIGHT: 62 IN | DIASTOLIC BLOOD PRESSURE: 74 MMHG | SYSTOLIC BLOOD PRESSURE: 132 MMHG | OXYGEN SATURATION: 99 % | WEIGHT: 150 LBS

## 2022-11-03 DIAGNOSIS — R93.2 ABNORMAL CT OF LIVER: ICD-10-CM

## 2022-11-03 DIAGNOSIS — K80.20 CHOLELITHIASIS WITHOUT CHOLECYSTITIS: Primary | ICD-10-CM

## 2022-11-03 DIAGNOSIS — K80.20 CHOLELITHIASIS WITHOUT CHOLECYSTITIS: ICD-10-CM

## 2022-11-03 PROCEDURE — 25010000002 IOPAMIDOL 61 % SOLUTION: Performed by: SURGERY

## 2022-11-03 PROCEDURE — 74178 CT ABD&PLV WO CNTR FLWD CNTR: CPT

## 2022-11-03 PROCEDURE — 99204 OFFICE O/P NEW MOD 45 MIN: CPT | Performed by: SURGERY

## 2022-11-03 RX ORDER — SODIUM CHLORIDE 9 MG/ML
100 INJECTION, SOLUTION INTRAVENOUS CONTINUOUS
Status: CANCELLED | OUTPATIENT
Start: 2022-11-03

## 2022-11-03 RX ORDER — ONDANSETRON 4 MG/1
4 TABLET, ORALLY DISINTEGRATING ORAL 4 TIMES DAILY PRN
Qty: 20 TABLET | Refills: 0 | Status: SHIPPED | OUTPATIENT
Start: 2022-11-03 | End: 2022-11-25 | Stop reason: SDUPTHER

## 2022-11-03 RX ADMIN — IOPAMIDOL 100 ML: 612 INJECTION, SOLUTION INTRAVENOUS at 17:21

## 2022-11-04 ENCOUNTER — TRANSCRIBE ORDERS (OUTPATIENT)
Dept: GENERAL RADIOLOGY | Facility: HOSPITAL | Age: 52
End: 2022-11-04

## 2022-11-04 DIAGNOSIS — R16.0 LIVER MASS: Primary | ICD-10-CM

## 2022-11-07 ENCOUNTER — OFFICE VISIT (OUTPATIENT)
Dept: INTERNAL MEDICINE | Facility: CLINIC | Age: 52
End: 2022-11-07

## 2022-11-07 VITALS
WEIGHT: 151 LBS | TEMPERATURE: 98.2 F | HEIGHT: 62 IN | OXYGEN SATURATION: 98 % | HEART RATE: 94 BPM | DIASTOLIC BLOOD PRESSURE: 70 MMHG | BODY MASS INDEX: 27.79 KG/M2 | SYSTOLIC BLOOD PRESSURE: 124 MMHG

## 2022-11-07 DIAGNOSIS — R93.2 ABNORMAL CT OF LIVER: ICD-10-CM

## 2022-11-07 DIAGNOSIS — R63.1 EXCESSIVE THIRST: ICD-10-CM

## 2022-11-07 DIAGNOSIS — I10 PRIMARY HYPERTENSION: ICD-10-CM

## 2022-11-07 DIAGNOSIS — K80.20 CHOLELITHIASIS WITHOUT CHOLECYSTITIS: ICD-10-CM

## 2022-11-07 DIAGNOSIS — Z23 INFLUENZA VACCINE NEEDED: ICD-10-CM

## 2022-11-07 DIAGNOSIS — R63.4 UNINTENTIONAL WEIGHT LOSS: ICD-10-CM

## 2022-11-07 DIAGNOSIS — Z76.89 ENCOUNTER TO ESTABLISH CARE: Primary | ICD-10-CM

## 2022-11-07 LAB
EXPIRATION DATE: NORMAL
HBA1C MFR BLD: 5.3 %
Lab: NORMAL

## 2022-11-07 PROCEDURE — 90471 IMMUNIZATION ADMIN: CPT | Performed by: NURSE PRACTITIONER

## 2022-11-07 PROCEDURE — 90686 IIV4 VACC NO PRSV 0.5 ML IM: CPT | Performed by: NURSE PRACTITIONER

## 2022-11-07 PROCEDURE — 83036 HEMOGLOBIN GLYCOSYLATED A1C: CPT | Performed by: NURSE PRACTITIONER

## 2022-11-07 PROCEDURE — 99214 OFFICE O/P EST MOD 30 MIN: CPT | Performed by: NURSE PRACTITIONER

## 2022-11-07 RX ORDER — LOSARTAN POTASSIUM 25 MG/1
25 TABLET ORAL DAILY
Qty: 30 TABLET | Refills: 0 | Status: SHIPPED | OUTPATIENT
Start: 2022-11-07 | End: 2022-12-12

## 2022-11-07 NOTE — PROGRESS NOTES
Chief Complaint   Patient presents with   • Establish Care     Abdominal pain RUQ; dry heaves, vomiting, has been to general surgery, liver biopsy scheduled; elevated BP     Subjective   Amber Feng is a 52 y.o. female.     History of Present Illness      Patient presents to [facility].  She was recently diagnosed with gallstones and solid masses on her liver via ultrasound/CT scans.  She is following general surgery for this. Pending biopsy of liver with general surgeon on 11/22/2022.  She is also dealing with unintentional weight loss, so there are concerns for malignancy.  She has nausea, vomiting, dry heaving depending on the foods that she eats.  She does okay on low-fat foods.  She has been prescribed Zofran and Norco to take as needed.  Patient states her blood pressure has been very elevated in the mornings.  She brought her machine in today that was checked, it was only mildly elevated compared to in office manual reading when checked for accuracy.  Blood pressure this morning was 170s over 110.  States blood pressure readings improve as the day goes on, but does increase with pain and anxiety.  Reviewed patient's readings on her blood pressure machine and the majority are greater than 140/90.  Patient states when her blood pressure is elevated, she can notice it is hard to focus with her eyes and she will have a headache at times.  Denies needing treatment for anxiety.  Patient states at times she will have excessive thirst, she eats ice a lot and constantly feels thirsty.  She had an elevated glucose in the hospital.  She has checked it at home and its been in the 90s.    The following portions of the patient's history were reviewed and updated as appropriate: allergies, current medications, past family history, past medical history, past social history, past surgical history and problem list.    Review of Systems   Constitutional: Negative for chills and fever.   Eyes: Positive for visual  "disturbance.   Respiratory: Negative.    Cardiovascular: Negative.    Gastrointestinal: Positive for abdominal pain, nausea and vomiting. Negative for abdominal distention, blood in stool and diarrhea.   Musculoskeletal: Negative.    Neurological: Positive for headaches. Negative for dizziness, syncope, facial asymmetry, speech difficulty, weakness, light-headedness and numbness.   All other systems reviewed and are negative.      Objective   /70   Pulse 94   Temp 98.2 °F (36.8 °C) (Temporal)   Ht 157.5 cm (62\")   Wt 68.5 kg (151 lb)   SpO2 98%   BMI 27.62 kg/m²   Body mass index is 27.62 kg/m².  Physical Exam  Vitals and nursing note reviewed.   Constitutional:       General: She is not in acute distress.     Appearance: Normal appearance. She is not diaphoretic.   HENT:      Head: Normocephalic and atraumatic.      Right Ear: External ear normal.      Left Ear: External ear normal.      Nose: Nose normal.      Mouth/Throat:      Mouth: Mucous membranes are moist.   Eyes:      Extraocular Movements: Extraocular movements intact.      Conjunctiva/sclera: Conjunctivae normal.      Pupils: Pupils are equal, round, and reactive to light.   Cardiovascular:      Rate and Rhythm: Normal rate and regular rhythm.   Pulmonary:      Effort: Pulmonary effort is normal.      Breath sounds: Normal breath sounds.   Abdominal:      General: Abdomen is flat. Bowel sounds are normal. There is no distension.      Palpations: Abdomen is soft.   Musculoskeletal:         General: Normal range of motion.      Cervical back: Normal range of motion and neck supple.   Lymphadenopathy:      Cervical: No cervical adenopathy.   Skin:     General: Skin is dry.   Neurological:      Mental Status: She is alert and oriented to person, place, and time.   Psychiatric:         Mood and Affect: Mood normal.         Behavior: Behavior normal.         Labs:  Results for orders placed or performed in visit on 11/07/22   POC Glycosylated " Hemoglobin (Hb A1C)    Specimen: Blood   Result Value Ref Range    Hemoglobin A1C 5.3 %    Lot Number [id number]     Expiration Date 3,807,381        Assessment & Plan   Amber Feng is here today and the following problems have been addressed:      Diagnoses and all orders for this visit:    1. Encounter to establish care (Primary)    2. Cholelithiasis without cholecystitis    3. Abnormal CT of liver    4. Unintentional weight loss    5. Excessive thirst  -     POC Glycosylated Hemoglobin (Hb A1C)    6. Primary hypertension  -     losartan (Cozaar) 25 MG tablet; Take 1 tablet by mouth Daily.  Dispense: 30 tablet; Refill: 0    7. Influenza vaccine needed  -     FluLaval/Fluzone >6 mos ([id number])    Continue following general surgery related to cholelithiasis without cholecystitis and abnormal CT of liver.  Biopsy of liver scheduled on 11/22/2022.  Continue bland diet, Zofran as needed, push fluids.  To ER with any severe abdominal pain, or if unable to keep down food or drink.  A1c is 5.3%, this rules out diabetes as a cause for excessive thirst.  Patient drinks soda and not enough water.  Encouraged to drink plenty of fluids daily.  Blood pressure in office today is normal.  Blood pressure machine was checked for accuracy compared to manual and was less than 10 points of each other on systolic/diastolic.  Home blood pressure readings are averaging greater than 140/90s and more elevated in mornings. Will initiate losartan 25 mg daily for blood pressure.  If readings are higher in the morning, take at night before bed.  DASH diet, less than 1500 mg of sodium daily, light walking 30 minutes 5 days a week.  To ER if blood pressures ever 180/110 with or without symptoms.  Red flag symptoms are severe headache, visual disturbance, numbness or tingling on one side of the body, slurred speech.    Patient request flu vaccination.  Administered, tolerated well.  VIS given.  Patient is going to return in 4 weeks for  physical and to get up-to-date on other cancer screenings due to unintentional weight loss.  Currently under the process of ruling malignancy of the liver.      I spent 50 minutes caring for Amber Feng on this date of service. This time includes time spent by me in the following activities: preparing for the visit, reviewing tests, performing a medically appropriate examination and/or evaluation, counseling and educating the patient/family/caregiver, ordering medications, tests, or procedures and documenting information in the medical record.      Follow Up   Return in about 4 weeks (around 12/5/2022) for Annual.  Patient was given instructions and counseling regarding her condition or for health maintenance advice. Please see specific information pulled into the AVS if appropriate.     Evon FOSS  [facility] [place]

## 2022-11-14 RX ORDER — ONDANSETRON 4 MG/1
4 TABLET, ORALLY DISINTEGRATING ORAL 4 TIMES DAILY PRN
Qty: 20 TABLET | Refills: 0 | OUTPATIENT
Start: 2022-11-14

## 2022-11-14 NOTE — TELEPHONE ENCOUNTER
Caller: Amber Feng    Relationship: Self    Best call back number: [phone]    Requested Prescriptions:   Requested Prescriptions     Pending Prescriptions Disp Refills   • ondansetron ODT (ZOFRAN-ODT) 4 MG disintegrating tablet 20 tablet 0     Sig: Place 1 tablet on the tongue 4 (Four) Times a Day As Needed for Nausea.      Pharmacy where request should be sent: [facility] [address] [place] [address] [phone] [facility] [phone]      Additional details provided by patient: 3 PILLS ON HAND SO CAN'T TAKE NONE TODAY IF WE CAN'T FILL THE REQUEST     Does the patient have less than a 3 day supply:  [x] Yes  [] No    Fred Rodriguez Rep   11/14/22 10:20 EST

## 2022-11-22 ENCOUNTER — HOSPITAL ENCOUNTER (OUTPATIENT)
Dept: ULTRASOUND IMAGING | Facility: HOSPITAL | Age: 52
Discharge: HOME | End: 2022-11-22

## 2022-11-22 ENCOUNTER — HOSPITAL ENCOUNTER (OUTPATIENT)
Dept: CT IMAGING | Facility: HOSPITAL | Age: 52
Discharge: HOME | End: 2022-11-22

## 2022-11-22 VITALS
RESPIRATION RATE: 16 BRPM | DIASTOLIC BLOOD PRESSURE: 94 MMHG | OXYGEN SATURATION: 98 % | TEMPERATURE: 98.5 F | SYSTOLIC BLOOD PRESSURE: 159 MMHG | HEART RATE: 80 BPM

## 2022-11-22 VITALS
DIASTOLIC BLOOD PRESSURE: 94 MMHG | OXYGEN SATURATION: 98 % | HEART RATE: 85 BPM | SYSTOLIC BLOOD PRESSURE: 148 MMHG | RESPIRATION RATE: 18 BRPM | TEMPERATURE: 97 F

## 2022-11-22 DIAGNOSIS — R16.0 LIVER MASS: ICD-10-CM

## 2022-11-22 LAB
APTT PPP: 28.1 SECONDS (ref 23.5–35.5)
DEPRECATED RDW RBC AUTO: 52.2 FL (ref 37–54)
ERYTHROCYTE [DISTWIDTH] IN BLOOD BY AUTOMATED COUNT: 18.6 % (ref 12.3–15.4)
HCT VFR BLD AUTO: 33.6 % (ref 34–46.6)
HGB BLD-MCNC: 10.6 G/DL (ref 12–15.9)
INR PPP: 0.98 (ref 0.9–1.1)
MCH RBC QN AUTO: 25.3 PG (ref 26.6–33)
MCHC RBC AUTO-ENTMCNC: 31.5 G/DL (ref 31.5–35.7)
MCV RBC AUTO: 80.2 FL (ref 79–97)
PLATELET # BLD AUTO: 466 10*3/MM3 (ref 140–450)
PMV BLD AUTO: 10.2 FL (ref 6–12)
PROTHROMBIN TIME: 13.3 SECONDS (ref 12.5–14.5)
RBC # BLD AUTO: 4.19 10*6/MM3 (ref 3.77–5.28)
WBC NRBC COR # BLD: 10.94 10*3/MM3 (ref 3.4–10.8)

## 2022-11-22 PROCEDURE — 25010000002 FENTANYL CITRATE (PF) 50 MCG/ML SOLUTION: Performed by: RADIOLOGY

## 2022-11-22 PROCEDURE — 76942 ECHO GUIDE FOR BIOPSY: CPT

## 2022-11-22 PROCEDURE — 85027 COMPLETE CBC AUTOMATED: CPT | Performed by: RADIOLOGY

## 2022-11-22 PROCEDURE — 85610 PROTHROMBIN TIME: CPT | Performed by: RADIOLOGY

## 2022-11-22 PROCEDURE — 85730 THROMBOPLASTIN TIME PARTIAL: CPT | Performed by: RADIOLOGY

## 2022-11-22 RX ORDER — FENTANYL CITRATE 50 UG/ML
INJECTION, SOLUTION INTRAMUSCULAR; INTRAVENOUS AS NEEDED
Status: COMPLETED | OUTPATIENT
Start: 2022-11-22 | End: 2022-11-22

## 2022-11-22 RX ORDER — SODIUM CHLORIDE 9 MG/ML
30 INJECTION, SOLUTION INTRAVENOUS CONTINUOUS
Status: DISCONTINUED | OUTPATIENT
Start: 2022-11-22 | End: 2022-11-23 | Stop reason: HOSPADM

## 2022-11-22 RX ADMIN — FENTANYL CITRATE 50 MCG: 50 INJECTION INTRAMUSCULAR; INTRAVENOUS at 09:08

## 2022-11-22 RX ADMIN — SODIUM CHLORIDE 30 ML/HR: 9 INJECTION, SOLUTION INTRAVENOUS at 07:33

## 2022-11-22 NOTE — DISCHARGE INSTRUCTIONS

## 2022-11-24 ENCOUNTER — HOSPITAL ENCOUNTER (EMERGENCY)
Facility: HOSPITAL | Age: 52
Discharge: HOME | End: 2022-11-25
Attending: EMERGENCY MEDICINE | Admitting: EMERGENCY MEDICINE

## 2022-11-24 ENCOUNTER — APPOINTMENT (OUTPATIENT)
Dept: CT IMAGING | Facility: HOSPITAL | Age: 52
End: 2022-11-24

## 2022-11-24 DIAGNOSIS — G89.18 POSTOPERATIVE PAIN: Primary | ICD-10-CM

## 2022-11-24 LAB
ALBUMIN SERPL-MCNC: 3.1 G/DL (ref 3.5–5.2)
ALBUMIN/GLOB SERPL: 0.7 G/DL
ALP SERPL-CCNC: 444 U/L (ref 39–117)
ALT SERPL W P-5'-P-CCNC: 47 U/L (ref 1–33)
ANION GAP SERPL CALCULATED.3IONS-SCNC: 11.9 MMOL/L (ref 5–15)
AST SERPL-CCNC: 90 U/L (ref 1–32)
BASOPHILS # BLD AUTO: 0.08 10*3/MM3 (ref 0–0.2)
BASOPHILS NFR BLD AUTO: 0.8 % (ref 0–1.5)
BILIRUB SERPL-MCNC: 3.6 MG/DL (ref 0–1.2)
BUN SERPL-MCNC: 6 MG/DL (ref 6–20)
BUN/CREAT SERPL: 8.5 (ref 7–25)
CALCIUM SPEC-SCNC: 8.7 MG/DL (ref 8.6–10.5)
CHLORIDE SERPL-SCNC: 96 MMOL/L (ref 98–107)
CO2 SERPL-SCNC: 25.1 MMOL/L (ref 22–29)
CREAT SERPL-MCNC: 0.71 MG/DL (ref 0.57–1)
DEPRECATED RDW RBC AUTO: 52.5 FL (ref 37–54)
EGFRCR SERPLBLD CKD-EPI 2021: 102.5 ML/MIN/1.73
EOSINOPHIL # BLD AUTO: 1.08 10*3/MM3 (ref 0–0.4)
EOSINOPHIL NFR BLD AUTO: 10.2 % (ref 0.3–6.2)
ERYTHROCYTE [DISTWIDTH] IN BLOOD BY AUTOMATED COUNT: 18.6 % (ref 12.3–15.4)
GLOBULIN UR ELPH-MCNC: 4.5 GM/DL
GLUCOSE SERPL-MCNC: 116 MG/DL (ref 65–99)
HCT VFR BLD AUTO: 30.9 % (ref 34–46.6)
HGB BLD-MCNC: 9.9 G/DL (ref 12–15.9)
IMM GRANULOCYTES # BLD AUTO: 0.06 10*3/MM3 (ref 0–0.05)
IMM GRANULOCYTES NFR BLD AUTO: 0.6 % (ref 0–0.5)
LIPASE SERPL-CCNC: 32 U/L (ref 13–60)
LYMPHOCYTES # BLD AUTO: 1.65 10*3/MM3 (ref 0.7–3.1)
LYMPHOCYTES NFR BLD AUTO: 15.5 % (ref 19.6–45.3)
MAGNESIUM SERPL-MCNC: 2.1 MG/DL (ref 1.6–2.6)
MCH RBC QN AUTO: 25.1 PG (ref 26.6–33)
MCHC RBC AUTO-ENTMCNC: 32 G/DL (ref 31.5–35.7)
MCV RBC AUTO: 78.4 FL (ref 79–97)
MONOCYTES # BLD AUTO: 1.11 10*3/MM3 (ref 0.1–0.9)
MONOCYTES NFR BLD AUTO: 10.5 % (ref 5–12)
NEUTROPHILS NFR BLD AUTO: 6.64 10*3/MM3 (ref 1.7–7)
NEUTROPHILS NFR BLD AUTO: 62.4 % (ref 42.7–76)
NRBC BLD AUTO-RTO: 0 /100 WBC (ref 0–0.2)
PLATELET # BLD AUTO: 451 10*3/MM3 (ref 140–450)
PMV BLD AUTO: 10.8 FL (ref 6–12)
POTASSIUM SERPL-SCNC: 3 MMOL/L (ref 3.5–5.2)
PROT SERPL-MCNC: 7.6 G/DL (ref 6–8.5)
RBC # BLD AUTO: 3.94 10*6/MM3 (ref 3.77–5.28)
SODIUM SERPL-SCNC: 133 MMOL/L (ref 136–145)
WBC NRBC COR # BLD: 10.62 10*3/MM3 (ref 3.4–10.8)

## 2022-11-24 PROCEDURE — 99284 EMERGENCY DEPT VISIT MOD MDM: CPT

## 2022-11-24 PROCEDURE — 25010000002 ONDANSETRON PER 1 MG: Performed by: EMERGENCY MEDICINE

## 2022-11-24 PROCEDURE — 80053 COMPREHEN METABOLIC PANEL: CPT | Performed by: EMERGENCY MEDICINE

## 2022-11-24 PROCEDURE — 83690 ASSAY OF LIPASE: CPT | Performed by: EMERGENCY MEDICINE

## 2022-11-24 PROCEDURE — 96374 THER/PROPH/DIAG INJ IV PUSH: CPT

## 2022-11-24 PROCEDURE — 85025 COMPLETE CBC W/AUTO DIFF WBC: CPT | Performed by: EMERGENCY MEDICINE

## 2022-11-24 PROCEDURE — 96375 TX/PRO/DX INJ NEW DRUG ADDON: CPT

## 2022-11-24 PROCEDURE — 83735 ASSAY OF MAGNESIUM: CPT | Performed by: EMERGENCY MEDICINE

## 2022-11-24 PROCEDURE — 25010000002 HYDROMORPHONE 1 MG/ML SOLUTION: Performed by: EMERGENCY MEDICINE

## 2022-11-24 PROCEDURE — 74177 CT ABD & PELVIS W/CONTRAST: CPT

## 2022-11-24 PROCEDURE — 25010000002 IOPAMIDOL 61 % SOLUTION: Performed by: EMERGENCY MEDICINE

## 2022-11-24 RX ORDER — ONDANSETRON 2 MG/ML
4 INJECTION INTRAMUSCULAR; INTRAVENOUS ONCE
Status: COMPLETED | OUTPATIENT
Start: 2022-11-24 | End: 2022-11-24

## 2022-11-24 RX ORDER — POTASSIUM CHLORIDE 750 MG/1
40 CAPSULE, EXTENDED RELEASE ORAL ONCE
Status: COMPLETED | OUTPATIENT
Start: 2022-11-24 | End: 2022-11-24

## 2022-11-24 RX ADMIN — POTASSIUM CHLORIDE 40 MEQ: 10 CAPSULE, COATED, EXTENDED RELEASE ORAL at 23:09

## 2022-11-24 RX ADMIN — HYDROMORPHONE HYDROCHLORIDE 1 MG: 1 INJECTION, SOLUTION INTRAMUSCULAR; INTRAVENOUS; SUBCUTANEOUS at 21:52

## 2022-11-24 RX ADMIN — ONDANSETRON 4 MG: 2 INJECTION INTRAMUSCULAR; INTRAVENOUS at 21:52

## 2022-11-24 RX ADMIN — IOPAMIDOL 100 ML: 612 INJECTION, SOLUTION INTRAVENOUS at 22:43

## 2022-11-24 RX ADMIN — SODIUM CHLORIDE 1000 ML: 9 INJECTION, SOLUTION INTRAVENOUS at 21:51

## 2022-11-25 VITALS
WEIGHT: 152 LBS | BODY MASS INDEX: 27.97 KG/M2 | RESPIRATION RATE: 20 BRPM | TEMPERATURE: 98.1 F | DIASTOLIC BLOOD PRESSURE: 98 MMHG | HEART RATE: 91 BPM | OXYGEN SATURATION: 96 % | SYSTOLIC BLOOD PRESSURE: 156 MMHG | HEIGHT: 62 IN

## 2022-11-25 RX ORDER — ONDANSETRON 4 MG/1
4 TABLET, ORALLY DISINTEGRATING ORAL 4 TIMES DAILY PRN
Qty: 20 TABLET | Refills: 0 | Status: SHIPPED | OUTPATIENT
Start: 2022-11-25 | End: 2022-12-05 | Stop reason: DRUGHIGH

## 2022-11-25 RX ORDER — OXYCODONE HYDROCHLORIDE 5 MG/1
5 TABLET ORAL EVERY 4 HOURS PRN
Qty: 16 TABLET | Refills: 0 | Status: SHIPPED | OUTPATIENT
Start: 2022-11-25 | End: 2022-12-05 | Stop reason: DRUGHIGH

## 2022-11-25 NOTE — ED PROVIDER NOTES
Subjective   History of Present Illness  52-year-old female presenting with abdominal pain.  She states that 2 days ago she had CT-guided biopsy of her liver due to a mass.  This was arranged by her general surgeon who is working her up for cholecystectomy.  Since being home she has had increasing pain in the right upper quadrant.  This is now severe.  There are no alleviating or aggravating factors.  It does not radiate.  It is associated with nausea, vomiting and dry heaves.  She denies any fevers or other complaints.  She notes that she is currently out of pain medication and nausea medication.        Review of Systems   Constitutional: Negative.    HENT: Negative.    Eyes: Negative.    Respiratory: Negative.    Cardiovascular: Negative.    Gastrointestinal: Positive for abdominal pain, nausea and vomiting.   Genitourinary: Negative.    Musculoskeletal: Negative.    Skin: Negative.    Neurological: Negative.    Psychiatric/Behavioral: Negative.        Past Medical History:   Diagnosis Date   • Anxiety        Allergies   Allergen Reactions   • Valium [Diazepam] Anaphylaxis       Past Surgical History:   Procedure Laterality Date   • POSTPARTUM TUBAL LIGATION         History reviewed. No pertinent family history.    Social History     Socioeconomic History   • Marital status:    Tobacco Use   • Smoking status: Never   • Smokeless tobacco: Never   Vaping Use   • Vaping Use: Never used   Substance and Sexual Activity   • Alcohol use: Yes   • Drug use: Defer   • Sexual activity: Defer           Objective   Physical Exam  Constitutional:       General: She is not in acute distress.     Appearance: Normal appearance. She is not ill-appearing, toxic-appearing or diaphoretic.   HENT:      Head: Normocephalic and atraumatic.      Right Ear: External ear normal.      Left Ear: External ear normal.      Nose: Nose normal.   Eyes:      Extraocular Movements: Extraocular movements intact.   Cardiovascular:      Rate and  Rhythm: Normal rate and regular rhythm.      Pulses: Normal pulses.      Heart sounds: Normal heart sounds.   Pulmonary:      Effort: Pulmonary effort is normal. No respiratory distress.      Breath sounds: Normal breath sounds.   Abdominal:      General: Bowel sounds are normal. There is no distension.      Comments: Mild diffuse tenderness worse in the right upper quadrant   Musculoskeletal:         General: No swelling, tenderness or deformity. Normal range of motion.      Cervical back: Normal range of motion.   Skin:     General: Skin is warm and dry.      Capillary Refill: Capillary refill takes less than 2 seconds.      Findings: No rash.      Comments: Tiny incision to the right side with no surrounding erythema, drainage or tenderness   Neurological:      General: No focal deficit present.      Mental Status: She is alert and oriented to person, place, and time.   Psychiatric:         Mood and Affect: Mood normal.         Behavior: Behavior normal.         Procedures           ED Course                                           MDM  Number of Diagnoses or Management Options  Postoperative pain  Diagnosis management comments: 52 year old female with abdominal pain and nausea/vomiting after liver biopsy.  Well-developed, well-nourished, nontoxic lady in no distress with exam as above.  Her vital signs are normal.  Her exam is notable for some right upper quadrant tenderness.  Will obtain labs and repeat imaging.  Will give symptomatic treatment.  Disposition pending.    DDx: Postoperative complication, biliary disease    Labs reveal no significant abnormality.  CT scan reveals no acute findings.  She is resting comfortably.  I do feel she is safe for discharge home at this time.  We discussed outpatient follow-up and return precautions.  Patient and significant other are comfortable with and understand the plan.       Amount and/or Complexity of Data Reviewed  Decide to obtain previous medical records or to  obtain history from someone other than the patient: yes        Final diagnoses:   Postoperative pain          Ezequiel Frazier MD  11/25/22 [id number]

## 2022-11-29 DIAGNOSIS — C18.9 METASTATIC COLON CANCER IN FEMALE: Primary | ICD-10-CM

## 2022-12-05 ENCOUNTER — PATIENT OUTREACH (OUTPATIENT)
Dept: ONCOLOGY | Facility: CLINIC | Age: 52
End: 2022-12-05

## 2022-12-05 ENCOUNTER — NURSE NAVIGATOR (OUTPATIENT)
Dept: ONCOLOGY | Facility: CLINIC | Age: 52
End: 2022-12-05

## 2022-12-05 ENCOUNTER — LAB (OUTPATIENT)
Dept: LAB | Facility: HOSPITAL | Age: 52
End: 2022-12-05

## 2022-12-05 ENCOUNTER — CONSULT (OUTPATIENT)
Dept: ONCOLOGY | Facility: CLINIC | Age: 52
End: 2022-12-05

## 2022-12-05 VITALS
SYSTOLIC BLOOD PRESSURE: 169 MMHG | OXYGEN SATURATION: 98 % | BODY MASS INDEX: 27.42 KG/M2 | TEMPERATURE: 97.7 F | WEIGHT: 149 LBS | RESPIRATION RATE: 16 BRPM | HEART RATE: 103 BPM | HEIGHT: 62 IN | DIASTOLIC BLOOD PRESSURE: 85 MMHG

## 2022-12-05 DIAGNOSIS — C78.7 METASTATIC COLON CANCER TO LIVER: Primary | ICD-10-CM

## 2022-12-05 DIAGNOSIS — C78.7 METASTATIC COLON CANCER TO LIVER: ICD-10-CM

## 2022-12-05 DIAGNOSIS — C18.9 METASTATIC COLON CANCER TO LIVER: ICD-10-CM

## 2022-12-05 DIAGNOSIS — C18.9 METASTATIC COLON CANCER TO LIVER: Primary | ICD-10-CM

## 2022-12-05 DIAGNOSIS — C18.2 CANCER OF RIGHT COLON: Primary | ICD-10-CM

## 2022-12-05 DIAGNOSIS — C18.2 CANCER OF RIGHT COLON: ICD-10-CM

## 2022-12-05 LAB
ALBUMIN SERPL-MCNC: 3.1 G/DL (ref 3.5–5.2)
ALBUMIN/GLOB SERPL: 0.6 G/DL
ALP SERPL-CCNC: 585 U/L (ref 39–117)
ALT SERPL W P-5'-P-CCNC: 59 U/L (ref 1–33)
ANION GAP SERPL CALCULATED.3IONS-SCNC: 11 MMOL/L (ref 5–15)
AST SERPL-CCNC: 149 U/L (ref 1–32)
BASOPHILS # BLD AUTO: 0.05 10*3/MM3 (ref 0–0.2)
BASOPHILS NFR BLD AUTO: 0.4 % (ref 0–1.5)
BILIRUB SERPL-MCNC: 7.5 MG/DL (ref 0–1.2)
BUN SERPL-MCNC: 6 MG/DL (ref 6–20)
BUN/CREAT SERPL: 9.1 (ref 7–25)
CALCIUM SPEC-SCNC: 9.2 MG/DL (ref 8.6–10.5)
CEA SERPL-MCNC: 353 NG/ML
CHLORIDE SERPL-SCNC: 97 MMOL/L (ref 98–107)
CO2 SERPL-SCNC: 23 MMOL/L (ref 22–29)
CREAT SERPL-MCNC: 0.66 MG/DL (ref 0.57–1)
DEPRECATED RDW RBC AUTO: 60 FL (ref 37–54)
EGFRCR SERPLBLD CKD-EPI 2021: 105.7 ML/MIN/1.73
EOSINOPHIL # BLD AUTO: 1.23 10*3/MM3 (ref 0–0.4)
EOSINOPHIL NFR BLD AUTO: 9.9 % (ref 0.3–6.2)
ERYTHROCYTE [DISTWIDTH] IN BLOOD BY AUTOMATED COUNT: 21 % (ref 12.3–15.4)
FERRITIN SERPL-MCNC: 917.3 NG/ML (ref 13–150)
GLOBULIN UR ELPH-MCNC: 4.9 GM/DL
GLUCOSE SERPL-MCNC: 95 MG/DL (ref 65–99)
HCT VFR BLD AUTO: 31.7 % (ref 34–46.6)
HGB BLD-MCNC: 10.2 G/DL (ref 12–15.9)
IMM GRANULOCYTES # BLD AUTO: 0.05 10*3/MM3 (ref 0–0.05)
IMM GRANULOCYTES NFR BLD AUTO: 0.4 % (ref 0–0.5)
IRON 24H UR-MRATE: 41 MCG/DL (ref 37–145)
IRON SATN MFR SERPL: 15 % (ref 20–50)
LYMPHOCYTES # BLD AUTO: 1.67 10*3/MM3 (ref 0.7–3.1)
LYMPHOCYTES NFR BLD AUTO: 13.4 % (ref 19.6–45.3)
MCH RBC QN AUTO: 25.4 PG (ref 26.6–33)
MCHC RBC AUTO-ENTMCNC: 32.2 G/DL (ref 31.5–35.7)
MCV RBC AUTO: 78.9 FL (ref 79–97)
MONOCYTES # BLD AUTO: 1.08 10*3/MM3 (ref 0.1–0.9)
MONOCYTES NFR BLD AUTO: 8.7 % (ref 5–12)
NEUTROPHILS NFR BLD AUTO: 67.2 % (ref 42.7–76)
NEUTROPHILS NFR BLD AUTO: 8.36 10*3/MM3 (ref 1.7–7)
PLATELET # BLD AUTO: 539 10*3/MM3 (ref 140–450)
PMV BLD AUTO: 9.8 FL (ref 6–12)
POTASSIUM SERPL-SCNC: 3.2 MMOL/L (ref 3.5–5.2)
PROT SERPL-MCNC: 8 G/DL (ref 6–8.5)
RBC # BLD AUTO: 4.02 10*6/MM3 (ref 3.77–5.28)
SODIUM SERPL-SCNC: 131 MMOL/L (ref 136–145)
TIBC SERPL-MCNC: 270 MCG/DL (ref 298–536)
TRANSFERRIN SERPL-MCNC: 181 MG/DL (ref 200–360)
WBC NRBC COR # BLD: 12.44 10*3/MM3 (ref 3.4–10.8)

## 2022-12-05 PROCEDURE — 82378 CARCINOEMBRYONIC ANTIGEN: CPT

## 2022-12-05 PROCEDURE — 99205 OFFICE O/P NEW HI 60 MIN: CPT | Performed by: INTERNAL MEDICINE

## 2022-12-05 PROCEDURE — 82728 ASSAY OF FERRITIN: CPT

## 2022-12-05 PROCEDURE — 83540 ASSAY OF IRON: CPT

## 2022-12-05 PROCEDURE — 85025 COMPLETE CBC W/AUTO DIFF WBC: CPT

## 2022-12-05 PROCEDURE — 80053 COMPREHEN METABOLIC PANEL: CPT

## 2022-12-05 PROCEDURE — 84466 ASSAY OF TRANSFERRIN: CPT

## 2022-12-05 PROCEDURE — 36415 COLL VENOUS BLD VENIPUNCTURE: CPT

## 2022-12-05 RX ORDER — LIDOCAINE AND PRILOCAINE 25; 25 MG/G; MG/G
1 CREAM TOPICAL AS NEEDED
Qty: 30 G | Refills: 3 | Status: SHIPPED | OUTPATIENT
Start: 2022-12-05

## 2022-12-05 RX ORDER — ONDANSETRON 8 MG/1
8 TABLET, ORALLY DISINTEGRATING ORAL EVERY 8 HOURS PRN
Qty: 30 TABLET | Refills: 3 | Status: SHIPPED | OUTPATIENT
Start: 2022-12-05 | End: 2022-12-12

## 2022-12-05 RX ORDER — OXYCODONE HYDROCHLORIDE 5 MG/1
5 TABLET ORAL 2 TIMES DAILY PRN
Qty: 60 TABLET | Refills: 0 | Status: SHIPPED | OUTPATIENT
Start: 2022-12-05

## 2022-12-05 RX ORDER — LORAZEPAM 1 MG/1
1 TABLET ORAL
Qty: 30 TABLET | Refills: 0 | Status: SHIPPED | OUTPATIENT
Start: 2022-12-05

## 2022-12-05 NOTE — PROGRESS NOTES
Met with patient during her consultation with Dr. Lambert today. Explained my role in her care and provided her with resources in the [facility] along with a folder that contained a booklet regarding Colorectal Cancer and my contact card. Walked patient/spouse out to the lab. They will call for navigational needs. CRISTHIAN

## 2022-12-05 NOTE — PROGRESS NOTES
DATE OF CONSULTATION: 2022    REFERRING PHYSICIAN: Evon Bryant APRN    Dear Evon Lima, PIPO  Thank you for asking for my medical advice on this patient. I saw her in the  [place] office on 2022    REASON FOR CONSULTATION: Metastatic colon cancer TX NX M1 a stage Perry    PROBLEM LIST:   1.  Metastatic colon cancer TX NX M1 a stage Perry:  A.  Presented with abdominal pain and jaundice  B.  CT abdomen pelvis done 2022 confirmed diffuse liver metastases.  C.  Diagnosed after CT-guided liver biopsy done on 2022 confirming adenocarcinoma CK20 positive CK7 negative.  2.  Elevated liver enzymes:  A.  Induced by metastatic disease  3.  Cancer-related pain  4.  Anxiety  5.  Hypertension    HISTORY OF PRESENT ILLNESS: The patient is a very pleasant 52 y.o.  female   who was in her usual state of health until 1 month ago.  Patient presented with abdominal pain.  Right upper quadrant.  Radiates to the back.  4-6 out of 10 severity.  Get worse with food improves with bowel rest.  Comes and goes.  Has been getting gradually worse.  The patient has been noted that his wife has started to turn yellow.  The patient went to the emergency room 2022.  CT abdomen pelvis confirmed bulky liver metastases without any other abnormalities.  CT-guided biopsy done on 2022 confirmed adenocarcinoma of colon primary.  The patient was referred to me for further recommendations.    SUBJECTIVE: When I saw the patient today she is here with her .  She is anxious at today's visit.  Never had a colonoscopy before but never had cancer before.  No family history of malignancy except her mom  of GBM.  The patient does not smoke.  She drinks alcohol very randomly.    Review of Systems   Constitutional: Positive for fatigue.   HENT: Negative.    Gastrointestinal: Positive for abdominal pain.   Endocrine: Negative.    Genitourinary: Negative.    Musculoskeletal: Negative.   "  Skin: Positive for color change.   Allergic/Immunologic: Negative.    Neurological: Negative.    Psychiatric/Behavioral: The patient is nervous/anxious.        Past Medical History:   Diagnosis Date   • Anxiety        Social History     Socioeconomic History   • Marital status:    Tobacco Use   • Smoking status: Never   • Smokeless tobacco: Never   Vaping Use   • Vaping Use: Never used   Substance and Sexual Activity   • Alcohol use: Yes   • Drug use: Defer   • Sexual activity: Defer       No family history on file.    Past Surgical History:   Procedure Laterality Date   • POSTPARTUM TUBAL LIGATION         Allergies   Allergen Reactions   • Valium [Diazepam] Anaphylaxis          Current Outpatient Medications:   •  albuterol sulfate  (90 Base) MCG/ACT inhaler, Inhale 2 puffs Every 4 (Four) Hours As Needed for Shortness of Air (chest tightness)., Disp: 8 g, Rfl: 0  •  HYDROcodone-acetaminophen (NORCO) 5-325 MG per tablet, Take 1 tablet by mouth Every 6 (Six) Hours As Needed for Severe Pain., Disp: 12 tablet, Rfl: 0  •  ketorolac (TORADOL) 10 MG tablet, Take 1 tablet by mouth Every 6 (Six) Hours As Needed for Moderate Pain., Disp: 15 tablet, Rfl: 0  •  losartan (Cozaar) 25 MG tablet, Take 1 tablet by mouth Daily., Disp: 30 tablet, Rfl: 0  •  promethazine-dextromethorphan (PROMETHAZINE-DM) 6.25-15 MG/5ML syrup, Take 5 mL by mouth At Night As Needed for Cough., Disp: 118 mL, Rfl: 0    PHYSICAL EXAMINATION:   /85   Pulse 103   Temp 97.7 °F (36.5 °C) (Infrared)   Resp 16   Ht 157.5 cm (62.01\")   Wt 67.6 kg (149 lb)   SpO2 98%   BMI 27.25 kg/m²   Pain Score    12/05/22 0816   PainSc:   8   PainLoc: Back      ECOG score: 0            ECOG Performance Status: 1 - Symptomatic but completely ambulatory  General Appearance:  alert, cooperative, no apparent distress and appears stated age   Neurologic/Psychiatric: A&O x 3, gait steady, appropriate affect, strength 5/5 in all muscle groups   HEENT:  " Normocephalic, without obvious abnormality, mucous membranes moist   Neck: Supple, symmetrical, trachea midline, no adenopathy;  No thyromegaly, masses, or tenderness   Lungs:   Clear to auscultation bilaterally; respirations regular, even, and unlabored bilaterally   Heart:  Regular rate and rhythm, no murmurs appreciated   Abdomen:   Soft, non-tender, non-distended and no organomegaly   Lymph nodes: No cervical, supraclavicular, inguinal or axillary adenopathy noted   Extremities: Normal, atraumatic; no clubbing, cyanosis, or edema    Skin: No rashes, ulcers, or suspicious lesions noted       Admission on 11/24/2022, Discharged on 11/25/2022   Component Date Value Ref Range Status   • Glucose 11/24/2022 116 (H)  65 - 99 mg/dL Final   • BUN 11/24/2022 6  6 - 20 mg/dL Final   • Creatinine 11/24/2022 0.71  0.57 - 1.00 mg/dL Final   • Sodium 11/24/2022 133 (L)  136 - 145 mmol/L Final   • Potassium 11/24/2022 3.0 (L)  3.5 - 5.2 mmol/L Final    Slight hemolysis detected by analyzer. Results may be affected.   • Chloride 11/24/2022 96 (L)  98 - 107 mmol/L Final   • CO2 11/24/2022 25.1  22.0 - 29.0 mmol/L Final   • Calcium 11/24/2022 8.7  8.6 - 10.5 mg/dL Final   • Total Protein 11/24/2022 7.6  6.0 - 8.5 g/dL Final   • Albumin 11/24/2022 3.10 (L)  3.50 - 5.20 g/dL Final   • ALT (SGPT) 11/24/2022 47 (H)  1 - 33 U/L Final   • AST (SGOT) 11/24/2022 90 (H)  1 - 32 U/L Final    Slight hemolysis detected by analyzer. Results may be affected.   • Alkaline Phosphatase 11/24/2022 444 (H)  39 - 117 U/L Final   • Total Bilirubin 11/24/2022 3.6 (H)  0.0 - 1.2 mg/dL Final   • Globulin 11/24/2022 4.5  gm/dL Final   • A/G Ratio 11/24/2022 0.7  g/dL Final   • BUN/Creatinine Ratio 11/24/2022 8.5  7.0 - 25.0 Final   • Anion Gap 11/24/2022 11.9  5.0 - 15.0 mmol/L Final   • eGFR 11/24/2022 102.5  >60.0 mL/min/1.73 Final    National Kidney Foundation and American Society of Nephrology (ASN) Task Force recommended calculation based on the  Chronic Kidney Disease Epidemiology Collaboration (CKD-EPI) equation refit without adjustment for race.   • Lipase 11/24/2022 32  13 - 60 U/L Final   • WBC 11/24/2022 10.62  3.40 - 10.80 10*3/mm3 Final   • RBC 11/24/2022 3.94  3.77 - 5.28 10*6/mm3 Final   • Hemoglobin 11/24/2022 9.9 (L)  12.0 - 15.9 g/dL Final   • Hematocrit 11/24/2022 30.9 (L)  34.0 - 46.6 % Final   • MCV 11/24/2022 78.4 (L)  79.0 - 97.0 fL Final   • MCH 11/24/2022 25.1 (L)  26.6 - 33.0 pg Final   • MCHC 11/24/2022 32.0  31.5 - 35.7 g/dL Final   • RDW 11/24/2022 18.6 (H)  12.3 - 15.4 % Final   • RDW-SD 11/24/2022 52.5  37.0 - 54.0 fl Final   • MPV 11/24/2022 10.8  6.0 - 12.0 fL Final   • Platelets 11/24/2022 451 (H)  140 - 450 10*3/mm3 Final   • Neutrophil % 11/24/2022 62.4  42.7 - 76.0 % Final   • Lymphocyte % 11/24/2022 15.5 (L)  19.6 - 45.3 % Final   • Monocyte % 11/24/2022 10.5  5.0 - 12.0 % Final   • Eosinophil % 11/24/2022 10.2 (H)  0.3 - 6.2 % Final   • Basophil % 11/24/2022 0.8  0.0 - 1.5 % Final   • Immature Grans % 11/24/2022 0.6 (H)  0.0 - 0.5 % Final   • Neutrophils, Absolute 11/24/2022 6.64  1.70 - 7.00 10*3/mm3 Final   • Lymphocytes, Absolute 11/24/2022 1.65  0.70 - 3.10 10*3/mm3 Final   • Monocytes, Absolute 11/24/2022 1.11 (H)  0.10 - 0.90 10*3/mm3 Final   • Eosinophils, Absolute 11/24/2022 1.08 (H)  0.00 - 0.40 10*3/mm3 Final   • Basophils, Absolute 11/24/2022 0.08  0.00 - 0.20 10*3/mm3 Final   • Immature Grans, Absolute 11/24/2022 0.06 (H)  0.00 - 0.05 10*3/mm3 Final   • nRBC 11/24/2022 0.0  0.0 - 0.2 /100 WBC Final   • Magnesium 11/24/2022 2.1  1.6 - 2.6 mg/dL Final   Hospital Outpatient Visit on 11/22/2022   Component Date Value Ref Range Status   • PTT 11/22/2022 28.1  23.5 - 35.5 seconds Final   • Protime 11/22/2022 13.3  12.5 - 14.5 Seconds Final   • INR 11/22/2022 0.98  0.90 - 1.10 Final   • WBC 11/22/2022 10.94 (H)  3.40 - 10.80 10*3/mm3 Final   • RBC 11/22/2022 4.19  3.77 - 5.28 10*6/mm3 Final   • Hemoglobin 11/22/2022  10.6 (L)  12.0 - 15.9 g/dL Final   • Hematocrit 11/22/2022 33.6 (L)  34.0 - 46.6 % Final   • MCV 11/22/2022 80.2  79.0 - 97.0 fL Final   • MCH 11/22/2022 25.3 (L)  26.6 - 33.0 pg Final   • MCHC 11/22/2022 31.5  31.5 - 35.7 g/dL Final   • RDW 11/22/2022 18.6 (H)  12.3 - 15.4 % Final   • RDW-SD 11/22/2022 52.2  37.0 - 54.0 fl Final   • MPV 11/22/2022 10.2  6.0 - 12.0 fL Final   • Platelets 11/22/2022 466 (H)  140 - 450 10*3/mm3 Final        CT Abdomen Pelvis With Contrast    Result Date: 11/24/2022  Narrative: FINAL REPORT TECHNIQUE: Routine axial images through the abdomen and pelvis were obtained following IV contrast administration. CLINICAL HISTORY: RUQ pain, s/p CT guided liver biopsy two days ago COMPARISON: November 3, 2022 FINDINGS: Abdomen: Again seen are several large metastases in both the right and left lobes of the liver, unchanged from the most recent examination.  The solid abdominal organs and ureters are otherwise unremarkable.  The GI tract is unremarkable.  Pelvis: The uterus, ovaries and urinary bladder are normal.  There is a small amount of ascites in the abdomen and pelvis.There is no pelvic or abdominal adenopathy or acute osseous abnormality.     Impression: No change in large liver metastases of uncertain primary.  No acute abnormality otherwise. Authenticated and Electronically Signed by Raman Bill M.D. on 11/24/2022 11:24:18 PM    US Guided Liver Biopsy    Result Date: 11/22/2022  Narrative: ULTRASOUND GUIDED BIOPSY OF RIGHT HEMILIVER LESION.  CLINICAL HISTORY/INDICATION: 52-year-old female with multiple liver lesions, uncertain etiology, concerning for metastasis, unknown primary, consulted us for imaged guided biopsy.  MEDICATION: IV fentanyl 50 mcg once. 1% lidocaine administered subcutaneously for local anesthetic.  Continuous vital sign monitoring was used throughout the procedure. Continuous cardiopulmonary/physiologic activity was monitored by a highly qualified health care  professional/registered nurse during the entire procedure under direct supervision.  TECHNIQUE: After discussion of risks and benefits, informed written consent was obtained.  Appropriate time out was performed to confirm patient identity and planned procedure and side. Limited right upper quadrant ultrasound was performed to identify the planned biopsy site.  The right upper quadrant was prepped in a routine sterile fashion and locally anesthetized with 1% lidocaine. Intravenous fentanyl 50 mcg once was administered by nursing under direct supervision. Using real-time ultrasound guidance, a 19-gauge guide needle was directed toward the right hemiliver lesion. The needle was positioned within the outer periphery of the lesion. Coaxial technique was utilized and a biopsy was performed with the 18-gauge biopsy needle. A total of 7 passes were made through the lesion. Tract embolization was performed with Gelfoam. Positive cytological material was confirmed by the cytopathologist present.  Limited postbiopsy ultrasound showed no immediate complications. Procedure was well tolerated.      Impression: Technically successful ultrasound-guided biopsy of right hemiliver lesion.  Pathology result is pending.    Images personally reviewed, interpreted and dictated by WILMA Forbes.  This report was signed and finalized on 11/22/2022 11:32 AM by WILMA Forbes.        DIAGNOSTIC DATA:   1. Radiology: As above  2. Dr. Suarez's note from #2022 reviewed by me and documented in the  chart.   3. Pathology report: Liver biopsy positive for colon adenocarcinoma  4. Laboratory data: As above    ASSESSMENT: The patient is a very pleasant 52 y.o.  female  with metastatic colon cancer    PLAN:     1.  Metastatic colon cancer stage Perry:  A.  I had a long discussion today with the patient and her  about her  new diagnosis of colon cancer. I did go over the final pathology report in  detail with both of them. I  reviewed the patient's documents including refereing provider's notes, lab results, imaging, and path report.  B.  I reviewed the patient's CT scan films myself and I went over the pictures with her and her .  C.  I discussed the case with Dr. Suarez today over the phone to coordinate patient's care.  D.  I will go ahead and order colonoscopy to see if we can find a primary site.  I explained to the patient treatment is different for right-sided versus left-sided colon cancers.  E.  I will arrange for port placement with Dr. Suarez.  F.  I will order CT chest with contrast to complete her staging work-up.  G.  I will check blood work today including CBC CMP iron profile and I will get a baseline serum CEA.  H.  I will check biopsy for  mutations.  I.  We will send baseline circulating tumor DNA down the road.  J.  I explained to the patient goal of treatment is palliative.  I think her diffuse and bulky liver metastases might exclude her from resection however we will consider sending the patient down the road to Dr. Green at  if she has good response to chemotherapy.  K.  The patient will be treated with FOLFIRINOX if she has no  mutations on the other hand if she has MMR she will be a candidate for chemotherapy plus immunotherapy on a clinical trial also if she has HER2/nazario amplification she will also be a candidate for first-line treatment with the addition of anti-HER2 treatment.    2.  Cancer-related pain:  A.  I will start the patient oxycodone 5 mg twice a day.  I will give the patient prescription today.    3.  Anxiety:  A.  I will start the patient on Ativan 1 mg nightly as needed.  I will give a new prescription today.    4.  Hypertension:  A.  She will continue losartan.  B.  I explained to the patient this will interfere with our management since chemotherapy typically can cause hypotension we will have to monitor blood pressure closely.    5.  Elevated liver enzymes:  A.  Induced by  liver metastases.  B.  We may have to adjust her chemotherapy doses at least for cycle #1 according to her liver function tests.      FOLLOW UP: 2 weeks to go over the results or sooner if we have results available.    Iban Lambert MD  12/5/2022

## 2022-12-06 ENCOUNTER — PREP FOR SURGERY (OUTPATIENT)
Dept: OTHER | Facility: HOSPITAL | Age: 52
End: 2022-12-06

## 2022-12-06 ENCOUNTER — DOCUMENTATION (OUTPATIENT)
Dept: NUTRITION | Facility: HOSPITAL | Age: 52
End: 2022-12-06

## 2022-12-06 DIAGNOSIS — C79.9 METASTATIC CANCER: Primary | ICD-10-CM

## 2022-12-06 RX ORDER — CEFAZOLIN SODIUM 2 G/50ML
2 SOLUTION INTRAVENOUS ONCE
Status: CANCELLED | OUTPATIENT
Start: 2022-12-06 | End: 2022-12-06

## 2022-12-06 NOTE — PROGRESS NOTES
"Outpatient Oncology Nutrition     Reason for Visit:     Oncology Nutrition Screening and Patient Education    Patient Name:  Amber Feng    :  1970    MRN:  [id number]    Date of Encounter: 2022    Nutrition Assessment     Diagnosis:   Metastatic colon cancer TX NX M1 a stage Zoe / CT abdomen pelvis done 2022 confirmed diffuse liver metastases / diagnosed after CT-guided liver biopsy done on 2022 confirming adenocarcinoma CK20 positive CK7 negative    Palliative Chemotherapy: 6 cycles OP COLORECTAL FOLFOXIRI (Fluorouracil Continuous Infusion / Leucovorin / OXALIplatin / Irinotecan)    Patient Active Problem List   Diagnosis   • Cancer of right colon (HCC)   • Metastatic colon cancer to liver (HCC)       Food / Nutrition Related History       Hydration Status     Goal: 75 ounces    Enteral Feeding     NA  Anthropometric Measurements     Height:    Ht Readings from Last 1 Encounters:   22 157.5 cm (62.01\")       Weight:    Wt Readings from Last 1 Encounters:   22 67.6 kg (149 lb)       BMI:  27.25 / overweight    Weight Change: Decrease of 9 lbs from standing scale weight of 158 lbs 10/17/22 - 5.6% weight loss    Review of Lab Data (Time Frame - 1 month / 2 month)     Component   Ref Range & Units 1 d ago   (22) 12 d ago   (22) 1 mo ago   (10/27/22) 1 mo ago   (10/17/22)   Glucose   65 - 99 mg/dL 95  116 High   137 High   97    BUN   6 - 20 mg/dL 6  6  6  6    Creatinine   0.57 - 1.00 mg/dL 0.66  0.71  0.88  0.80    Sodium   136 - 145 mmol/L 131 Low   133 Low   138  139    Potassium   3.5 - 5.2 mmol/L 3.2 Low   3.0 Low  CM  3.3 Low   3.2 Low     Chloride   98 - 107 mmol/L 97 Low   96 Low   102  99    CO2   22.0 - 29.0 mmol/L 23.0  25.1  26.6  27.1    Calcium   8.6 - 10.5 mg/dL 9.2  8.7  8.4 Low   9.5    Total Protein   6.0 - 8.5 g/dL 8.0  7.6  8.0  8.9 High     Albumin   3.50 - 5.20 g/dL 3.10 Low   3.10 Low   3.60  4.00    ALT (SGPT)   1 - 33 U/L " 59 High   47 High   48 High   45 High     AST (SGOT)   1 - 32 U/L 149 High   90 High  CM  80 High   69 High     Alkaline Phosphatase   39 - 117 U/L 585 High   444 High   299 High   258 High     Total Bilirubin   0.0 - 1.2 mg/dL 7.5 High   3.6 High   0.7  0.7    Globulin   gm/dL 4.9  4.5  4.4  4.9    Comment: Calculated Result   A/G Ratio   g/dL 0.6  0.7  0.8  0.8    BUN/Creatinine Ratio   7.0 - 25.0 9.1  8.5  6.8 Low   7.5    Anion Gap   5.0 - 15.0 mmol/L 11.0  11.9  9.4  12.9    eGFR   >60.0 mL/min/1.73         Component   Ref Range & Units 1 d ago    CEA   ng/mL 353.00          Medication Review   Reviewed noting pain medication    Nutrition Focused Physical Findings       Nutrition Impact Symptoms   Fatigue      Physical Activity      Not my normal self, but able to be up and about with fairly normal activities    Current Nutritional Intake     Oral diet:  Regular    Oral nutritional supplements:    Intake:    Malnutrition Risk Assessment     Recent weight loss over the past 6 months:  Yes    How much weight loss:  1 = 2-13 lbs    Eating poorly because of a decreased appetite:  1 = Yes    Malnutrition Screening Score:     MST = 2 more Patient at risk for malnutrition     Nutrition Diagnosis     Problem    Etiology    Signs / Symptoms      Nutrition Intervention     Briefly talked with patient via phone.  She is scheduled to have a CT scan, colonoscopy and port placed tomorrow.  All future appointments are scheduled in [place]; informed patient that I would reach out to the RD staff in [place] and transfer the referral to their staff as it would provide for continuity of care in [place].      Patient appreciated the phone call; she has my name and phone contact for any supportive needs.  Goal       Monitoring / Evaluation

## 2022-12-07 ENCOUNTER — NURSE NAVIGATOR (OUTPATIENT)
Dept: ONCOLOGY | Facility: CLINIC | Age: 52
End: 2022-12-07

## 2022-12-07 DIAGNOSIS — C18.9 METASTATIC COLON CANCER TO LIVER: Primary | ICD-10-CM

## 2022-12-07 DIAGNOSIS — C78.7 METASTATIC COLON CANCER TO LIVER: Primary | ICD-10-CM

## 2022-12-07 RX ORDER — POLYETHYLENE GLYCOL 3350 17 G/17G
POWDER, FOR SOLUTION ORAL
Qty: 238 G | Refills: 0 | Status: SHIPPED | OUTPATIENT
Start: 2022-12-07 | End: 2022-12-12

## 2022-12-07 RX ORDER — DEXTROSE MONOHYDRATE 50 MG/ML
250 INJECTION, SOLUTION INTRAVENOUS ONCE
Status: CANCELLED | OUTPATIENT
Start: 2022-12-27

## 2022-12-07 RX ORDER — PALONOSETRON 0.05 MG/ML
0.25 INJECTION, SOLUTION INTRAVENOUS ONCE
Status: CANCELLED | OUTPATIENT
Start: 2022-12-27

## 2022-12-07 RX ORDER — ATROPINE SULFATE 1 MG/ML
0.25 INJECTION, SOLUTION INTRAMUSCULAR; INTRAVENOUS; SUBCUTANEOUS
Status: CANCELLED | OUTPATIENT
Start: 2022-12-27

## 2022-12-07 RX ORDER — FAMOTIDINE 10 MG/ML
20 INJECTION, SOLUTION INTRAVENOUS AS NEEDED
Status: CANCELLED | OUTPATIENT
Start: 2022-12-27

## 2022-12-07 RX ORDER — DEXTROSE MONOHYDRATE 50 MG/ML
250 INJECTION, SOLUTION INTRAVENOUS ONCE
Status: CANCELLED | OUTPATIENT
Start: 2022-12-13

## 2022-12-07 RX ORDER — DIPHENHYDRAMINE HYDROCHLORIDE 50 MG/ML
50 INJECTION INTRAMUSCULAR; INTRAVENOUS AS NEEDED
Status: CANCELLED | OUTPATIENT
Start: 2022-12-27

## 2022-12-07 RX ORDER — BISACODYL 5 MG
TABLET, DELAYED RELEASE (ENTERIC COATED) ORAL
Qty: 4 TABLET | Refills: 0 | Status: SHIPPED | OUTPATIENT
Start: 2022-12-07 | End: 2022-12-12

## 2022-12-07 RX ORDER — PALONOSETRON 0.05 MG/ML
0.25 INJECTION, SOLUTION INTRAVENOUS ONCE
Status: CANCELLED | OUTPATIENT
Start: 2022-12-13

## 2022-12-07 RX ORDER — DIPHENHYDRAMINE HYDROCHLORIDE 50 MG/ML
50 INJECTION INTRAMUSCULAR; INTRAVENOUS AS NEEDED
Status: CANCELLED | OUTPATIENT
Start: 2022-12-13

## 2022-12-07 RX ORDER — IBUPROFEN 200 MG
600 TABLET ORAL AS NEEDED
COMMUNITY
End: 2022-12-12

## 2022-12-07 RX ORDER — FAMOTIDINE 10 MG/ML
20 INJECTION, SOLUTION INTRAVENOUS AS NEEDED
Status: CANCELLED | OUTPATIENT
Start: 2022-12-13

## 2022-12-07 RX ORDER — MULTIPLE VITAMINS W/ MINERALS TAB 9MG-400MCG
1 TAB ORAL DAILY
COMMUNITY

## 2022-12-07 RX ORDER — ATROPINE SULFATE 1 MG/ML
0.25 INJECTION, SOLUTION INTRAMUSCULAR; INTRAVENOUS; SUBCUTANEOUS
Status: CANCELLED | OUTPATIENT
Start: 2022-12-13

## 2022-12-07 NOTE — PRE-PROCEDURE INSTRUCTIONS
PAT phone history completed with pt for upcoming procedure on  12/8/22. Pt instructed to follow up with Dr. Valentin's office regarding bowel prep and pre-procedure medications.     PAT PASS GIVEN/REVIEWED WITH PT.  VERBALIZED UNDERSTANDING OF THE FOLLOWING:  DO NOT EAT, DRINK, SMOKE, USE SMOKELESS TOBACCO OR CHEW GUM AFTER MIDNIGHT THE NIGHT BEFORE SURGERY.  THIS ALSO INCLUDES HARD CANDIES AND MINTS.    DO NOT SHAVE THE AREA TO BE OPERATED ON AT LEAST 48 HOURS PRIOR TO THE PROCEDURE.  DO NOT WEAR MAKE UP OR NAIL POLISH.  DO NOT LEAVE IN ANY PIERCING OR WEAR JEWELRY THE DAY OF SURGERY.      DO NOT USE ADHESIVES IF YOU WEAR DENTURES.    DO NOT WEAR EYE CONTACTS; BRING IN YOUR GLASSES.    ONLY TAKE MEDICATION THE MORNING OF YOUR PROCEDURE IF INSTRUCTED BY YOUR SURGEON WITH ENOUGH WATER TO SWALLOW THE MEDICATION.  IF YOUR SURGEON DID NOT SPECIFY WHICH MEDICATIONS TO TAKE, YOU WILL NEED TO CALL THEIR OFFICE FOR FURTHER INSTRUCTIONS AND DO AS THEY INSTRUCT.    LEAVE ANYTHING YOU CONSIDER VALUABLE AT HOME.    YOU WILL NEED TO ARRANGE FOR SOMEONE TO DRIVE YOU HOME AFTER SURGERY.  IT IS RECOMMENDED THAT YOU DO NOT DRIVE, WORK, DRINK ALCOHOL OR MAKE MAJOR DECISIONS FOR AT LEAST 24 HOURS AFTER YOUR PROCEDURE IS COMPLETE.      THE DAY OF YOUR PROCEDURE, BRING IN THE FOLLOWING IF APPLICABLE:   PICTURE ID AND INSURANCE/MEDICARE OR MEDICAID CARDS/ANY CO-PAY THAT MAY BE DUE   COPY OF ADVANCED DIRECTIVE/LIVING WILL/POWER OR    CPAP/BIPAP/INHALERS   SKIN PREP SHEET   YOUR PREADMISSION TESTING PASS (IF NOT A PHONE HISTORY)

## 2022-12-07 NOTE — PROGRESS NOTES
Monse Rodriguez, Oncology Dietitian, came to me and asked if patient could get her CT before her port and colonoscopy tomorrow. I called CT at [facility] and they said that patient could not have the CT before the colonoscopy due to patient having contrast before the colonoscopy. I called both Monse and the patient to let them know. Patient verbalized understanding. Patient will call for navigational needs. AG

## 2022-12-08 ENCOUNTER — ANESTHESIA (OUTPATIENT)
Dept: PERIOP | Facility: HOSPITAL | Age: 52
End: 2022-12-08

## 2022-12-08 ENCOUNTER — APPOINTMENT (OUTPATIENT)
Dept: GENERAL RADIOLOGY | Facility: HOSPITAL | Age: 52
End: 2022-12-08

## 2022-12-08 ENCOUNTER — HOSPITAL ENCOUNTER (OUTPATIENT)
Facility: HOSPITAL | Age: 52
Setting detail: HOSPITAL OUTPATIENT SURGERY
Discharge: HOME | End: 2022-12-08
Attending: SURGERY | Admitting: SURGERY

## 2022-12-08 ENCOUNTER — ANESTHESIA EVENT (OUTPATIENT)
Dept: PERIOP | Facility: HOSPITAL | Age: 52
End: 2022-12-08

## 2022-12-08 ENCOUNTER — HOSPITAL ENCOUNTER (OUTPATIENT)
Dept: CT IMAGING | Facility: HOSPITAL | Age: 52
Discharge: HOME | End: 2022-12-08
Admitting: INTERNAL MEDICINE

## 2022-12-08 VITALS
RESPIRATION RATE: 16 BRPM | DIASTOLIC BLOOD PRESSURE: 93 MMHG | TEMPERATURE: 97 F | HEART RATE: 90 BPM | HEIGHT: 62 IN | BODY MASS INDEX: 27.42 KG/M2 | WEIGHT: 149 LBS | OXYGEN SATURATION: 97 % | SYSTOLIC BLOOD PRESSURE: 142 MMHG

## 2022-12-08 DIAGNOSIS — C79.9 METASTATIC CANCER: ICD-10-CM

## 2022-12-08 DIAGNOSIS — C18.9 METASTATIC COLON CANCER TO LIVER: ICD-10-CM

## 2022-12-08 DIAGNOSIS — C18.9 METASTATIC COLON CANCER TO LIVER: Primary | ICD-10-CM

## 2022-12-08 DIAGNOSIS — C78.7 METASTATIC COLON CANCER TO LIVER: ICD-10-CM

## 2022-12-08 DIAGNOSIS — C78.7 METASTATIC COLON CANCER TO LIVER: Primary | ICD-10-CM

## 2022-12-08 LAB
B-HCG UR QL: NEGATIVE
EXPIRATION DATE: NORMAL
INTERNAL NEGATIVE CONTROL: NORMAL
INTERNAL POSITIVE CONTROL: NORMAL
Lab: NORMAL

## 2022-12-08 PROCEDURE — 25010000002 FENTANYL CITRATE (PF) 100 MCG/2ML SOLUTION: Performed by: NURSE ANESTHETIST, CERTIFIED REGISTERED

## 2022-12-08 PROCEDURE — 25010000002 MIDAZOLAM PER 1MG: Performed by: NURSE ANESTHETIST, CERTIFIED REGISTERED

## 2022-12-08 PROCEDURE — 71045 X-RAY EXAM CHEST 1 VIEW: CPT

## 2022-12-08 PROCEDURE — 0 LIDOCAINE 1 % SOLUTION: Performed by: SURGERY

## 2022-12-08 PROCEDURE — 81025 URINE PREGNANCY TEST: CPT | Performed by: SURGERY

## 2022-12-08 PROCEDURE — 25010000002 PROPOFOL 1000 MG/100ML EMULSION: Performed by: NURSE ANESTHETIST, CERTIFIED REGISTERED

## 2022-12-08 PROCEDURE — 77001 FLUOROGUIDE FOR VEIN DEVICE: CPT | Performed by: SURGERY

## 2022-12-08 PROCEDURE — 76000 FLUOROSCOPY <1 HR PHYS/QHP: CPT

## 2022-12-08 PROCEDURE — S0260 H&P FOR SURGERY: HCPCS | Performed by: SURGERY

## 2022-12-08 PROCEDURE — 36561 INSERT TUNNELED CV CATH: CPT | Performed by: SURGERY

## 2022-12-08 PROCEDURE — 0 CEFAZOLIN SODIUM-DEXTROSE 2-3 GM-%(50ML) RECONSTITUTED SOLUTION: Performed by: SURGERY

## 2022-12-08 PROCEDURE — C1788 PORT, INDWELLING, IMP: HCPCS | Performed by: SURGERY

## 2022-12-08 PROCEDURE — 25010000002 IOPAMIDOL 61 % SOLUTION: Performed by: INTERNAL MEDICINE

## 2022-12-08 PROCEDURE — 71260 CT THORAX DX C+: CPT

## 2022-12-08 DEVICE — PRT INTRO VASC/INTERV VORTEX FILL/HL DETACH/POLYURET/CATH 8F: Type: IMPLANTABLE DEVICE | Site: CHEST | Status: FUNCTIONAL

## 2022-12-08 RX ORDER — PROPOFOL 10 MG/ML
INJECTION, EMULSION INTRAVENOUS AS NEEDED
Status: DISCONTINUED | OUTPATIENT
Start: 2022-12-08 | End: 2022-12-08 | Stop reason: SURG

## 2022-12-08 RX ORDER — CEFAZOLIN SODIUM 2 G/50ML
2 SOLUTION INTRAVENOUS ONCE
Status: COMPLETED | OUTPATIENT
Start: 2022-12-08 | End: 2022-12-08

## 2022-12-08 RX ORDER — FENTANYL CITRATE 50 UG/ML
INJECTION, SOLUTION INTRAMUSCULAR; INTRAVENOUS AS NEEDED
Status: DISCONTINUED | OUTPATIENT
Start: 2022-12-08 | End: 2022-12-08 | Stop reason: SURG

## 2022-12-08 RX ORDER — LIDOCAINE HYDROCHLORIDE 20 MG/ML
INJECTION, SOLUTION INTRAVENOUS AS NEEDED
Status: DISCONTINUED | OUTPATIENT
Start: 2022-12-08 | End: 2022-12-08 | Stop reason: SURG

## 2022-12-08 RX ORDER — HYDROCODONE BITARTRATE AND ACETAMINOPHEN 5; 325 MG/1; MG/1
1-2 TABLET ORAL EVERY 4 HOURS PRN
Qty: 12 TABLET | Refills: 0 | Status: SHIPPED | OUTPATIENT
Start: 2022-12-08

## 2022-12-08 RX ORDER — MAGNESIUM HYDROXIDE 1200 MG/15ML
LIQUID ORAL AS NEEDED
Status: DISCONTINUED | OUTPATIENT
Start: 2022-12-08 | End: 2022-12-08 | Stop reason: HOSPADM

## 2022-12-08 RX ORDER — LIDOCAINE HYDROCHLORIDE 10 MG/ML
INJECTION, SOLUTION INFILTRATION; PERINEURAL AS NEEDED
Status: DISCONTINUED | OUTPATIENT
Start: 2022-12-08 | End: 2022-12-08 | Stop reason: HOSPADM

## 2022-12-08 RX ORDER — MIDAZOLAM HYDROCHLORIDE 2 MG/2ML
INJECTION, SOLUTION INTRAMUSCULAR; INTRAVENOUS AS NEEDED
Status: DISCONTINUED | OUTPATIENT
Start: 2022-12-08 | End: 2022-12-08 | Stop reason: SURG

## 2022-12-08 RX ORDER — SODIUM CHLORIDE, SODIUM LACTATE, POTASSIUM CHLORIDE, CALCIUM CHLORIDE 600; 310; 30; 20 MG/100ML; MG/100ML; MG/100ML; MG/100ML
1000 INJECTION, SOLUTION INTRAVENOUS CONTINUOUS
Status: DISCONTINUED | OUTPATIENT
Start: 2022-12-08 | End: 2022-12-08 | Stop reason: HOSPADM

## 2022-12-08 RX ADMIN — IOPAMIDOL 95 ML: 612 INJECTION, SOLUTION INTRAVENOUS at 16:13

## 2022-12-08 RX ADMIN — FENTANYL CITRATE 100 MCG: 50 INJECTION INTRAMUSCULAR; INTRAVENOUS at 12:21

## 2022-12-08 RX ADMIN — MIDAZOLAM HYDROCHLORIDE 2 MG: 1 INJECTION, SOLUTION INTRAMUSCULAR; INTRAVENOUS at 12:21

## 2022-12-08 RX ADMIN — PROPOFOL 140 MCG/KG/MIN: 10 INJECTION, EMULSION INTRAVENOUS at 12:21

## 2022-12-08 RX ADMIN — SODIUM CHLORIDE, POTASSIUM CHLORIDE, SODIUM LACTATE AND CALCIUM CHLORIDE 1000 ML: 600; 310; 30; 20 INJECTION, SOLUTION INTRAVENOUS at 11:44

## 2022-12-08 RX ADMIN — LIDOCAINE HYDROCHLORIDE 60 MG: 20 INJECTION, SOLUTION INTRAVENOUS at 12:21

## 2022-12-08 RX ADMIN — CEFAZOLIN SODIUM 2 G: 2 SOLUTION INTRAVENOUS at 12:20

## 2022-12-08 NOTE — ANESTHESIA PREPROCEDURE EVALUATION
Anesthesia Evaluation     Patient summary reviewed and Nursing notes reviewed   NPO Solid Status: > 8 hours  NPO Liquid Status: > 8 hours           Airway   Mallampati: II  TM distance: >3 FB  Possible difficult intubation  Dental      Pulmonary    Cardiovascular     (+) hypertension,       Neuro/Psych  (+) psychiatric history,    GI/Hepatic/Renal/Endo    (+)   liver disease,     Musculoskeletal     Abdominal    Substance History      OB/GYN          Other      history of cancer                    Anesthesia Plan    ASA 2     MAC     intravenous induction     Anesthetic plan, risks, benefits, and alternatives have been provided, discussed and informed consent has been obtained with: patient.  Pre-procedure education provided      CODE STATUS:

## 2022-12-08 NOTE — OP NOTE
PATIENT:    Amber Feng    DATE OF SURGERY:  12/8/2022    PHYSICIAN:    Sea Valentin MD    REFERRING PHYSICIAN:  Sea Valentin MD    YOB: 1970    PREOPERATIVE DIAGNOSIS:  Need for chemotherapy    POSTOPERATIVE DIAGNOSIS:  Need for chemotherapy    PROCEDURE:  Right subclavian port-a-cath placement    EBL:  Less than 50 cc    COMPLICATIONS:  None    Specimen-none    OPERATIVE PROCEDURE:  The patient was taken to the operating room in the normal manner.  I did speak with the patient today and marked them accordingly.  An appropriate timeout was performed by the nursing staff intraoperatively prior to the incision.  Preoperative IV antibiotics were given.  The patient was prepped and draped in a normal sterile fashion after being placed in the prone position.     The subclavian vein on the corresponding side was accessed via Seldinger technique after the use of lidocaine for anesthesia.  The wire was guided under fluoroscopy and then a pocket was created.  The dilator was placed and then the catheter was placed via the dilator sheath in good position utilizing fluoroscopy.  The catheter was then connected to the port itself which was then sutured in the pocket with a 3-0 silk suture.    Fluoroscopy was then used to confirm that the catheter was in good position.  3-0 Vicryl was used to close the wound and then 4-0 Vicryl was used to close the skin.  Steri-Strips were placed without difficulty.  The port was accessed via Martinez needle and flushed with heparinized saline, final flush was performed without difficulty.    The patient was stable at this point in time and transferred to the recovery room in stable condition where CXR will be obtained.     Sea Valentin MD  12/8/2022  13:21 EST

## 2022-12-08 NOTE — H&P
[facility]   HISTORY AND PHYSICAL      Name:  Amber Feng   Age:  52 y.o.  Sex:  female  :  1970  MRN:  [id number]   Visit Number:  [id number]  Admission Date:  2022  Date Of Service:  22  Primary Care Physician:  Evon Bryant APRN    Chief Complaint:     Metastatic colon cancer    History Of Presenting Illness:      Patient here for evaluation of liver metastasis and possibility of colon cancer.  She is here for placement of the Port-A-Cath and evaluation of colon to be sure the diagnosis correct that she had a primary in the colon.  Patient with anemia occasional jaundice and discomfort in right upper quadrant.    Review Of Systems:     General ROS: Patient denies any fevers, chills or loss of consciousness.  No complaints of generalized weakness  Psychological ROS: Denies any hallucinations and delusions.  Ophthalmic ROS: no transient loss of vision.  ENT ROS: Denies sore throat, nasal congestion or ear pain.   Allergy and Immunology ROS: Denies rash or itching.  Hematological and Lymphatic ROS: Denies neck swelling or easy bleeding.  Endocrine ROS: Denies any recent unintentional weight gain or loss.  Breast ROS: Denies any pain or swelling.  Respiratory ROS: Denies cough or shortness of breath.   Cardiovascular ROS: Denies chest pain or palpitations. No history of exertional chest pain.   Gastrointestinal ROS: Denies nausea and vomiting. Denies any abdominal pain. No diarrhea.   Genito-Urinary ROS: Denies dysuria or hematuria.  Musculoskeletal ROS: no back pain. No muscle pain. No calf pain.   Neurological ROS: Denies any focal weakness. No loss of consciousness. Denies any numbness.   Dermatological ROS: Denies any redness or pruritis.     Past Medical History:    Past Medical History:   Diagnosis Date   • Anxiety    • Ear piercing    • Gallstones    • History of irritable bowel syndrome    • Hypertension     pt states secondary to pain   • Seasonal  "allergies    • Tattoos    • Wears glasses        Past Surgical history:    Past Surgical History:   Procedure Laterality Date   • DILATATION AND CURETTAGE  2013   • POSTPARTUM TUBAL LIGATION     • TUMOR REMOVAL  2013    pt reports \"tumor removed from uterus\" - states non-cancerous; unsure if fibroid; unsure if removed laparoscopically       Social History:    Social History     Socioeconomic History   • Marital status:    Tobacco Use   • Smoking status: Never   • Smokeless tobacco: Never   Vaping Use   • Vaping Use: Never used   Substance and Sexual Activity   • Alcohol use: Yes     Comment: rarely   • Drug use: Never   • Sexual activity: Defer       Family History:    History reviewed. No pertinent family history.    Allergies:      Losartan, Valium [diazepam], and Citrus    Home Medications:    Prior to Admission Medications     Prescriptions Last Dose Informant Patient Reported? Taking?    albuterol sulfate  (90 Base) MCG/ACT inhaler Not Taking  No No    Inhale 2 puffs Every 4 (Four) Hours As Needed for Shortness of Air (chest tightness).    Patient not taking:  Reported on 12/7/2022    bisacodyl 5 MG EC tablet 12/7/2022  No Yes    Use as directed.    HYDROcodone-acetaminophen (NORCO) 5-325 MG per tablet Not Taking Self No No    Take 1 tablet by mouth Every 6 (Six) Hours As Needed for Severe Pain.    Patient not taking:  Reported on 12/7/2022    ibuprofen (ADVIL,MOTRIN) 200 MG tablet Past Week Self Yes Yes    Take 600 mg by mouth As Needed for Mild Pain (pt states usually takes 400-600mg).    ketorolac (TORADOL) 10 MG tablet Not Taking Self No No    Take 1 tablet by mouth Every 6 (Six) Hours As Needed for Moderate Pain.    Patient not taking:  Reported on 12/7/2022    lidocaine-prilocaine (EMLA) 2.5-2.5 % cream Past Week  No Yes    Apply 1 application topically to the appropriate area as directed As Needed (45-60 minutes prior to port access.  Cover with saran/plastic wrap.).    LORazepam (Ativan) 1 " MG tablet Not Taking Self No No    Take 1 tablet by mouth every night at bedtime.    Patient not taking:  Reported on 12/7/2022    losartan (Cozaar) 25 MG tablet Not Taking Self No No    Take 1 tablet by mouth Daily.    Patient not taking:  Reported on 12/7/2022    multivitamin with minerals tablet tablet Past Week Self Yes Yes    Take 1 tablet by mouth Daily.    ondansetron ODT (ZOFRAN-ODT) 8 MG disintegrating tablet Not Taking Self No No    Place 1 tablet on the tongue Every 8 (Eight) Hours As Needed for Nausea or Vomiting.    Patient not taking:  Reported on 12/7/2022    oxyCODONE (ROXICODONE) 5 MG immediate release tablet Not Taking Self No No    Take 1 tablet by mouth 2 (Two) Times a Day As Needed for Moderate Pain.    Patient not taking:  Reported on 12/7/2022    polyethylene glycol (MIRALAX) 17 GM/SCOOP powder 12/7/2022  No Yes    Mix 238g powder with 64 oz of clear liquid. Use as directed.    promethazine-dextromethorphan (PROMETHAZINE-DM) 6.25-15 MG/5ML syrup Not Taking Self No No    Take 5 mL by mouth At Night As Needed for Cough.    Patient not taking:  Reported on 12/7/2022             ED Medications:    Medications   lactated ringers infusion 1,000 mL (1,000 mL Intravenous New Bag 12/8/22 1144)   ceFAZolin Sodium-Dextrose (ANCEF) IVPB (duplex) 2 g (has no administration in time range)       Vital Signs:    Temp:  [97.8 °F (36.6 °C)] 97.8 °F (36.6 °C)  Heart Rate:  [] 97  Resp:  [18] 18  BP: (134-154)/() 134/84        12/07/22  1008   Weight: 67.6 kg (149 lb)       Body mass index is 27.25 kg/m².    Physical Exam:      General Appearance:  Alert and cooperative, not in any acute distress.   Head:  Atraumatic and normocephalic, without obvious abnormality.   Eyes:          PERRLA, conjunctivae and sclerae normal, no Icterus. No pallor. Extraocular movements are within normal limits.   Ears:  Ears appear intact with no abnormalities noted.   Throat: No oral lesions, no thrush, oral mucosa  moist.   Neck: Supple, trachea midline, no thyromegaly, no carotid bruit.   Back:   No kyphoscoliosis present. No tenderness to palpation,   range of motion normal.   Respiratory/Lungs:   Breath sounds heard bilaterally equally.  No crackles or wheezing. No Pleural rub or bronchial breathing. Normal respiratory effort.    Cardiovascular/Heart:  Normal S1 and S2, no murmur. No edema   GI/Abdomen:   No masses, no hepatosplenomegaly. Soft, non-tender, non-distended, no hernia                 Musculoskeletal/ Extremities:   Moves all extremities well   Pulses: Pulses palpable and equal bilaterally   Skin: No bleeding, bruising or rash, no induration   Lymph nodes: No palpable adenopathy   Psychiatric : Alert and oriented ×3.  No depression or anxiety    Neurologic: Cranial nerves 2 - 12 grossly intact, sensation intact, Motor power is normal and equal bilaterally.       EKG:      Negative    Labs:    Lab Results (last 24 hours)     Procedure Component Value Units Date/Time    POC Pregnancy, Urine [[id number]] Collected: 12/08/22 1152    Specimen: Urine Updated: 12/08/22 1153     HCG, Urine, QL Negative     Lot Number [id number]     Internal Positive Control Passed     Internal Negative Control Passed     Expiration Date 03/31/2024          Radiology:    Imaging Results (Last 72 Hours)     ** No results found for the last 72 hours. **          Assessment:    Metastatic cancer of the liver, possibility of colon primary.    Plan:     Plans for colonoscopy and Port-A-Cath placement.  Patient bleeding, infection and pneumothorax discussed and patient and  agreeable.    Sea Valentin MD  12/08/22  12:04 EST

## 2022-12-08 NOTE — ANESTHESIA POSTPROCEDURE EVALUATION
Patient: Amber Feng    Procedure Summary     Date: 12/08/22 [facility] / [facility]:  [facility]    Anesthesia Start: 1219 Anesthesia Stop: 1320    Procedures:       INSERTION OF PORTACATH      COLONOSCOPY (Anus) Diagnosis:       Metastatic cancer (HCC)      (Metastatic cancer (HCC) [C79.9])    Surgeons: Sea Valentin MD Provider: Deandre Easton CRNA    Anesthesia Type: MAC ASA Status: 2          Anesthesia Type: MAC    Vitals  No vitals data found for the desired time range.          Post Anesthesia Care and Evaluation    Patient location during evaluation: bedside  Patient participation: complete - patient participated  Level of consciousness: awake and alert  Pain score: 0  Pain management: adequate    Airway patency: patent  Anesthetic complications: No anesthetic complications  PONV Status: none  Cardiovascular status: acceptable  Respiratory status: acceptable  Hydration status: acceptable

## 2022-12-08 NOTE — DISCHARGE INSTRUCTIONS
No pushing, pulling, tugging,  heavy lifting, or strenuous activity.  No major decision making, driving, or drinking alcoholic beverages for 24 hours. ( due to the medications you have  received)  Always use good hand hygiene/washing techniques.  NO driving while taking pain medications.    * if you have an incision:  Check your incision area every day for signs of infection.   Check for:  * more redness, swelling, or pain  *more fluid or blood  *warmth  *pus or bad smell To assist you in voiding:  Drink plenty of fluids  Listen to running water while attempting to void.    If you are unable to urinate and you have an uncomfortable urge to void or it has been   6 hours since you were discharged, return to the Emergency Room

## 2022-12-08 NOTE — NURSING NOTE
I called and spoke with radiology at [facility] about patients I.V. They asked that I leave the I.V in for them to use. See is going striaght to have a CT scan. They said, they would pull the I.v.

## 2022-12-09 ENCOUNTER — PATIENT ROUNDING (BHMG ONLY) (OUTPATIENT)
Dept: GYNECOLOGIC ONCOLOGY | Facility: CLINIC | Age: 52
End: 2022-12-09

## 2022-12-09 DIAGNOSIS — C18.9 METASTATIC COLON CANCER TO LIVER: Primary | ICD-10-CM

## 2022-12-09 DIAGNOSIS — C78.7 METASTATIC COLON CANCER TO LIVER: Primary | ICD-10-CM

## 2022-12-09 RX ORDER — ONDANSETRON HYDROCHLORIDE 8 MG/1
8 TABLET, FILM COATED ORAL 3 TIMES DAILY PRN
Qty: 30 TABLET | Refills: 5 | Status: SHIPPED | OUTPATIENT
Start: 2022-12-09 | End: 2023-02-22

## 2022-12-09 NOTE — PROGRESS NOTES
A My-Chart message has been sent to the patient for [place] with [facility]

## 2022-12-10 LAB — REF LAB TEST METHOD: NORMAL

## 2022-12-12 ENCOUNTER — OFFICE VISIT (OUTPATIENT)
Dept: ONCOLOGY | Facility: CLINIC | Age: 52
End: 2022-12-12

## 2022-12-12 VITALS
HEART RATE: 106 BPM | DIASTOLIC BLOOD PRESSURE: 75 MMHG | WEIGHT: 146 LBS | SYSTOLIC BLOOD PRESSURE: 139 MMHG | BODY MASS INDEX: 26.87 KG/M2 | HEIGHT: 62 IN | TEMPERATURE: 97.7 F | RESPIRATION RATE: 16 BRPM | OXYGEN SATURATION: 99 %

## 2022-12-12 DIAGNOSIS — C18.2 CANCER OF RIGHT COLON: Primary | ICD-10-CM

## 2022-12-12 DIAGNOSIS — C78.7 METASTATIC COLON CANCER TO LIVER: ICD-10-CM

## 2022-12-12 DIAGNOSIS — C18.9 METASTATIC COLON CANCER TO LIVER: ICD-10-CM

## 2022-12-12 PROCEDURE — 99215 OFFICE O/P EST HI 40 MIN: CPT | Performed by: NURSE PRACTITIONER

## 2022-12-12 NOTE — PROGRESS NOTES
CHEMOTHERAPY PREPARATION    Amber Feng  [id number]  1970    Subjective   Chief Complaint: Treatment Preparation and Needs Assessment    History of present illness:  Amber Feng is a 52 y.o. year old female who is here today for chemotherapy preparation and needs assessment. The patient has been diagnosed with stage Perry metastatic colon cancer and is scheduled to begin  IV treatment with oxaliplatin, irinotecan, leucovorin, and 5-FU.     Oncology History:    Oncology/Hematology History   Cancer of right colon (HCC)   11/22/2022 Cancer Staged    Staging form: Colon And Rectum, AJCC 8th Edition  - Clinical stage from 11/22/2022: Stage PERRY (cTX, cNX, cM1a) - Signed by Iban Lambert MD on 12/5/2022 12/5/2022 Initial Diagnosis    Cancer of right colon (HCC)     Metastatic colon cancer to liver (HCC)   12/5/2022 Initial Diagnosis    Metastatic colon cancer to liver (HCC)     12/13/2022 -  Chemotherapy    OP COLORECTAL FOLFOXIRI (Fluorouracil Continuous Infusion / Leucovorin / OXALIplatin / Irinotecan)         The current medication list and allergy list were reviewed and reconciled.     Past Medical History, Past Surgical History, Social History, Family History have been reviewed and are without significant changes except as mentioned.      Review of Systems   Constitutional: Positive for fatigue.   HENT: Negative.    Gastrointestinal: Positive for constipation.   Genitourinary: Negative.    Musculoskeletal: Positive for arthralgias.   Neurological: Negative.    Hematological: Negative.    Psychiatric/Behavioral: Positive for sleep disturbance.       Objective   Physical Exam  Vital Signs: LMP  (LMP Unknown) Comment: pt has not had a period following d&c 2013 but unsure if she is postmenopausal   General Appearance:  alert, cooperative, no apparent distress and appears stated age   Neurologic/Psychiatric: A&O x 3, gait steady, appropriate affect   HEENT:  Normocephalic, mucous membranes moist,  scleral icterus   Lungs:   Clear to auscultation bilaterally; respirations regular, even, and unlabored bilaterally   Heart:  Regular rate and rhythm, no murmurs appreciated   Extremities: Normal, atraumatic; no clubbing, cyanosis, or edema    Skin: No rashes, lesions, or abnormal coloration noted     ECOG Performance Status: 1 - Symptomatic but completely ambulatory            NEEDS ASSESSMENTS    Genetics  The patient's new diagnosis and family history have been reviewed for genetic counseling needs. A genetic referral is not recommended.     Psychosocial  The patient has completed a PHQ-9 Depression Screening and the Distress Thermometer (DT) today.   PHQ-9 results show 5-9 (Mild Depression). The patient scored their distress today as 1 on a scale of 0-10 with 0 being no distress and 10 being extreme distress.   Problems marked as being an issue for her within the last week include physical problems.   Results were reviewed along with psychosocial resources offered by our cancer center. Our oncology social worker will be flagged for a DT score of 4 or above, and a same day call will be made for a score of 9 or 10. A mental health referral is not recommended at this time. The patient is not accepting of a referral to [facility].   Copies of patient's questionnaires will be scanned into EMR for details and further reference.    Barriers to care  A barriers form was also completed by the patient today. We discussed services offered by our facility to help her have adequate access to care. The patient was given the name and card for our Oncology Social Worker. Based upon barriers assessment today, the patient will not require a follow-up call from the  to further discuss needs.   A copy of the barriers form will also be scanned into EMR for details and further reference.     VAD Assessment  The patient and I discussed planned intervenous chemotherapy as well as other IV treatments that are often  "needed throughout the course of treatment. These may include, but are not limited to blood transfusions, antibiotics, and IV hydration. The patient's vasculature does not appear to be adequate for multiple peripheral IVs throughout their treatment course. Discussed risks and benefits of VADs. The patient would like to pursue Port-A-Cath insertion prior to initiation of treatment.       Advance Care Planning   The patient and I discussed advanced care planning, \"Conversations that Matter\".   This service was offered, free of charge, for development of advance directives with a certified ACP facilitator.  The patient does not have an up-to-date advanced directive. This document is not on file with our office. The patient is not interested in an appointment with one of our facilitators to create or update their advanced directives.         Palliative Care  The patient and I discussed palliative care services. Palliative care is not the same as Hospice care. This is specialized medical care for people living with serious illness with the goal of improving quality of life for the patient and their family. [facility] has partnered with [facility]ators to offer our patients outpatient palliative care early along with their treatment to assist in coordination of care, symptom management, pain management, and medical decision making.  Oncology criteria for palliative care referral is met at this time. The patient is not interested in a palliative care consultation.     Additional Referral needs  Nutrition      IV CHEMOTHERAPY EDUCATION    Booklets Given: Chemotherapy and You [x]  Eating Hints [x]    Sexuality/Fertility Books []      Chemotherapy/Biotherapy Education Sheets: (list all that apply)  nausea management, acid reflux management, diarrhea management, Cancer resourse contacts information, skin and mouth care and vaccination information                                                                             "                                                                                     Chemotherapy Regimen:   Treatment Plans     Name Type Plan Dates Plan Provider         Active    OP COLORECTAL FOLFOXIRI (Fluorouracil Continuous Infusion / Leucovorin / OXALIplatin / Irinotecan) ONCOLOGY TREATMENT  12/9/2022 - Present Iban Lambert MD                    Chemotherapy education comprehension reviewed. Questions answered and additional information discussed on topics including:  Anemia, Thrombocytopenia, Neutropenia, Nutrition and appetite changes, Constipation, Diarrhea, Nausea & vomiting, Mouth sores, Alopecia, Infertility & sexuality, Nervous system changes, Pain, Skin & nail changes, Organ toxicities, Survivorship, Home care and Vaccinations       TOPICS COMMENTS   Anemia: role of RBC, cause, s/s, ways to manage, role of transfusion Reviewed the role of RBC and the use of transfusions if hemoglobin decreases too much.  Patient to notify us if they experience shortness of breath, dizziness, or palpitations.  Also let patient know they could feel more tired than usual and to try to stay active, but rest if they need to.    Thrombocytopenia: role of platelet, cause, s/s, ways to prevent bleeding, things to avoid, when to seek help Reviewed the role of platelets in blood clotting and when to call clinic (bloody nose that bleeds for 5 mins despite pressure, a cut that won't stop bleeding despite pressure, gums that bleed excessively with brushing or flossing). Recommended using an electric razor, soft bristle toothbrush, and blowing your nose gently.    Neutropenia: role of WBC, cause, infection precautions, s/s of infection, when to call MD Reviewed the role of WBC, good infection prevention practices, and when to call the clinic (temperature 100.4F, sore throat, burning urination, etc)  · COVID Vaccines: Unknown  · Flu Vaccine: Unknown   Nutrition and Appetite Changes:  importance of maintaining healthy diet &  weight, ways to manage to improve intake, dietary consult, exercise regimen Discussed risk of decreased appetite, reviewed plan for small more frequent meals.    Diarrhea: causes, s/s of dehydration, ways to manage, dietary changes, when to call MD Chemotherapy - Discussed risk of diarrhea. Instructed patient that they can use OTC loperamide at first presentation of diarrhea, but call MD if 4-6 episodes in 24 hours not relieved by OTC loperamide.   Constipation: causes, ways to manage, dietary changes, when to call MD Provided supplementary handout with instructions for use of docusate and other OTC therapies to manage constipation.  Instructed to call us if medications aren't working.   Nausea/Vomiting: cause, use of antiemetics, dietary changes, when to call MD · Emetic risk: Moderate  · Scheduled meds: none  · PRN home meds: Ondansetron  · Pharmacy home meds sent to: Patient has at home.     Instructed the patient to take a dose of the PRN medication at the first onset of nausea and if it's not working to call us for additional medications.  Also provided non-drug measures to mitigate nausea.   Mouth Sores: causes, oral care, ways to manage Mouth sores can be prevented by making a mouth wash mixture of salt, baking soda, and water. The patient was instructed to swish and spit four times daily after meals and before bedtime.  Use of a soft bristle toothbrush was recommended.  The patient was instructed to avoid alcohol-containing OTC mouthwashes.    Alopecia: cause, ways to manage, resources Discussed the possibility of hair loss with the patient. Informed patient that they could request a prescription for a wig if desired and most of the cost is usually covered by insurance.   Infertility and Sexuality:  causes, fertility preservation options, sexuality changes, ways to manage, importance of birth control Discussed safe sex practices.   Nervous System Changes: causes, s/s, neuropathies, cognitive changes, ways to  manage discussed the adverse effect of peripheral neuropathy and signs/symptoms associated with this adverse effect. Instructed patient to call if symptoms become more frequent or worsen.  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   Pain: causes, ways to manage Chemo - Discussed muscle and joint aches/pains with chemotherapy, and recommended the use of OTC pain relief with ibuprofen or acetaminophen if needed.      Patient has a port. Discussed how to use Emla cream.   Skin/Nail Changes: cause, s/s, ways to manage  s/s, prevention measures, and treatment measures. A supplementary handout with this information was also given to the patient.          Organ Toxicities: cause, s/s, need for diagnostic tests, labs, when to notify MD Discussed potential effects on organ systems, monitoring, diagnostic tests, labs, and when to notify their MD. Discussed the signs/symptoms of the following: hepatotoxicity, nephrotoxicity, cardiotoxicity, eye changes and skin changes   Survivorship: distress, distress assessment, secondary malignancies, early/late effects, follow-up, social issues, social support     Home Care: how to manage bodily fluids Counseled on management of soiled linens and proper flush technique.  Discussed how to manage all the side effects at home and advised when to contact the MD office   Infusion-Related Reactions: Cause, s/s, anaphylaxis, vesicant, etc. Discussed risks of infusion related reactions, s/s, management plan   Miscellaneous · Lab Draws: Patient will have labs with Day 1 of each cycle              Assessment and Plan:    There are no diagnoses linked to this encounter.    This was a 60 minute face-to-face visit with 55 minutes spent in  counseling and coordination of care as documented above.   The patient and I have reviewed their new cancer diagnosis and scheduled treatment plan. Needs assessment was completed including genetics, psychosocial needs, barriers to  care, VAD evaluation, advanced care planning, and palliative care services. Referrals have been ordered as appropriate based upon our evaluation and patient desires.     Port placed by Dr. Valentin on 12/8/2022.    Nutrition will see patient on day 1 of treatment.  Declines referral to mental health provider, social work, and financial counseling.  Patient will notify us if she changes her mind.    Zofran was sent to pharmacy.  We discussed she can take 1 tablet by mouth every 8 hours as needed for nausea and vomiting.  Emla cream directions discussed with patient.    Patient is experiencing some constipation.  I asked the patient to start Colace 1 tablet twice a day and can increase to 2 tablets twice a day as needed.    IV chemotherapy teaching was also completed today as documented above. Adequate time was given to answer all questions to her satisfaction. Patient and family are aware of their care team members and contact information if they have questions or problems throughout the treatment course. Needs assessments and education has been completed. The patient is adequately prepared to begin treatment as scheduled.     Electronically signed by PIPO Maurer on 12/12/22

## 2022-12-13 NOTE — PROGRESS NOTES
Patient: Amber Feng    YOB: 1970    Date: 12/16/2022    Primary Care Provider: Evon Bryant APRN    Reason for Consultation: Follow-up colonoscopy    Chief Complaint   Patient presents with   • Post-op     Follow-up colonoscopy        History of present illness:  I saw the patient in the office today as a followup from their recent colonoscopy with polypectomy, the pathology report did show tubular adenoma and prominent underlying medium-sized vessels suggestive of vascular malformation. She also had a port placement. They state that they have done well and are having no complaints.  Patient had an obvious carcinoma the distal transverse colon.  Biopsy did not verify that.  Patient appears to have metastatic disease and elevated CEA level consistent with a metastatic colon cancer to the liver.  Patient started chemotherapy and tolerating well.    The following portions of the patient's history were reviewed and updated as appropriate: allergies, current medications, past family history, past medical history, past social history, past surgical history and problem list.    Review of Systems   Constitutional: Negative for chills, fever and unexpected weight change.   HENT: Negative for hearing loss, trouble swallowing and voice change.    Eyes: Negative for visual disturbance.   Respiratory: Negative for apnea, cough, chest tightness, shortness of breath and wheezing.    Cardiovascular: Negative for chest pain, palpitations and leg swelling.   Gastrointestinal: Negative for abdominal distention, abdominal pain, anal bleeding, blood in stool, constipation, diarrhea, nausea, rectal pain and vomiting.   Endocrine: Negative for cold intolerance and heat intolerance.   Genitourinary: Negative for difficulty urinating, dysuria and flank pain.   Musculoskeletal: Negative for back pain and gait problem.   Skin: Negative for color change, rash and wound.   Neurological: Negative for dizziness,  "syncope, speech difficulty, weakness, light-headedness, numbness and headaches.   Hematological: Negative for adenopathy. Does not bruise/bleed easily.   Psychiatric/Behavioral: Negative for confusion. The patient is not nervous/anxious.        Allergies:  Allergies   Allergen Reactions   • Losartan Nausea Only, Other (See Comments) and Headache     Pt states within an hour after use blood pressure would \"skyrocket\" also nausea - states diastolic pressures in 100's   • Valium [Diazepam] Anaphylaxis   • Citrus GI Intolerance     Especially oranges       Medications:    Current Outpatient Medications:   •  albuterol sulfate  (90 Base) MCG/ACT inhaler, Inhale 2 puffs Every 4 (Four) Hours As Needed for Shortness of Air (chest tightness)., Disp: 8 g, Rfl: 0  •  HYDROcodone-acetaminophen (NORCO) 5-325 MG per tablet, Take 1-2 tablets by mouth Every 4 (Four) Hours As Needed For Pain, Disp: 12 tablet, Rfl: 0  •  lidocaine-prilocaine (EMLA) 2.5-2.5 % cream, Apply 1 application topically to the appropriate area as directed As Needed (45-60 minutes prior to port access.  Cover with saran/plastic wrap.)., Disp: 30 g, Rfl: 3  •  LORazepam (Ativan) 1 MG tablet, Take 1 tablet by mouth every night at bedtime., Disp: 30 tablet, Rfl: 0  •  multivitamin with minerals tablet tablet, Take 1 tablet by mouth Daily., Disp: , Rfl:   •  ondansetron (ZOFRAN) 8 MG tablet, Take 1 tablet by mouth 3 (Three) Times a Day As Needed for Nausea or Vomiting., Disp: 30 tablet, Rfl: 5  •  oxyCODONE (ROXICODONE) 5 MG immediate release tablet, Take 1 tablet by mouth 2 (Two) Times a Day As Needed for Moderate Pain., Disp: 60 tablet, Rfl: 0  •  prochlorperazine (COMPAZINE) 10 MG tablet, Take 1 tablet by mouth Every 6 (Six) Hours As Needed for Nausea or Vomiting., Disp: 30 tablet, Rfl: 2  No current facility-administered medications for this visit.    Facility-Administered Medications Ordered in Other Visits:   •  heparin injection 500 Units, 500 Units, " "Intravenous, PRN, Iban Lambert MD, 500 Units at 12/16/22 1427  •  sodium chloride 0.9 % flush 10 mL, 10 mL, Intravenous, CAMERONN, Iban Lambert MD, 10 mL at 12/16/22 1427    Vital Signs:  Vitals:    12/16/22 1441   BP: 110/70   Pulse: 112   SpO2: 97%   Weight: 65.3 kg (144 lb)   Height: 157.5 cm (62\")       Physical Exam:   General Appearance:    Alert, cooperative, in no acute distress   Abdomen:     no masses, no organomegaly, soft and tender right upper quadrant.  No rebound or guarding.  Abdominal distention enlarged liver.   Chest:      Clear to ausculation            Cor:     Regular rate and rhythm    Results Review:   I reviewed the patient's new clinical results.    Assessment / Plan:    1. Malignant neoplasm of splenic flexure (HCC)    2. Adenomatous polyp of sigmoid colon        I did discuss the situation with the patient today in the office and they have done well from their recent colonoscopy with polypectomy.  Patient appears to have metastatic colon cancer to liver.  She is undergoing chemotherapy.  Patient will follow with me after completion of therapy if she needs resection of the colon cancer.    Electronically signed by Sea Valentin MD  12/16/22                "

## 2022-12-14 ENCOUNTER — NURSE NAVIGATOR (OUTPATIENT)
Dept: ONCOLOGY | Facility: HOSPITAL | Age: 52
End: 2022-12-14

## 2022-12-14 ENCOUNTER — INFUSION (OUTPATIENT)
Dept: ONCOLOGY | Facility: HOSPITAL | Age: 52
End: 2022-12-14

## 2022-12-14 VITALS
DIASTOLIC BLOOD PRESSURE: 83 MMHG | WEIGHT: 149.1 LBS | TEMPERATURE: 97.8 F | HEART RATE: 87 BPM | BODY MASS INDEX: 27.27 KG/M2 | SYSTOLIC BLOOD PRESSURE: 133 MMHG

## 2022-12-14 DIAGNOSIS — C18.9 METASTATIC COLON CANCER TO LIVER: Primary | ICD-10-CM

## 2022-12-14 DIAGNOSIS — C78.7 METASTATIC COLON CANCER TO LIVER: Primary | ICD-10-CM

## 2022-12-14 LAB
ALBUMIN SERPL-MCNC: 2.8 G/DL (ref 3.5–5.2)
ALBUMIN/GLOB SERPL: 0.6 G/DL
ALP SERPL-CCNC: 663 U/L (ref 39–117)
ALT SERPL W P-5'-P-CCNC: 63 U/L (ref 1–33)
ANION GAP SERPL CALCULATED.3IONS-SCNC: 8.3 MMOL/L (ref 5–15)
ANISOCYTOSIS BLD QL: NORMAL
AST SERPL-CCNC: 184 U/L (ref 1–32)
BASOPHILS # BLD AUTO: 0.08 10*3/MM3 (ref 0–0.2)
BASOPHILS NFR BLD AUTO: 0.7 % (ref 0–1.5)
BILIRUB SERPL-MCNC: 7.6 MG/DL (ref 0–1.2)
BUN SERPL-MCNC: 6 MG/DL (ref 6–20)
BUN/CREAT SERPL: 9.5 (ref 7–25)
CALCIUM SPEC-SCNC: 8.4 MG/DL (ref 8.6–10.5)
CEA SERPL-MCNC: 242 NG/ML
CHLORIDE SERPL-SCNC: 102 MMOL/L (ref 98–107)
CO2 SERPL-SCNC: 24.7 MMOL/L (ref 22–29)
CREAT SERPL-MCNC: 0.63 MG/DL (ref 0.57–1)
DEPRECATED RDW RBC AUTO: 66.2 FL (ref 37–54)
EGFRCR SERPLBLD CKD-EPI 2021: 106.9 ML/MIN/1.73
EOSINOPHIL # BLD AUTO: 0.95 10*3/MM3 (ref 0–0.4)
EOSINOPHIL NFR BLD AUTO: 8.6 % (ref 0.3–6.2)
ERYTHROCYTE [DISTWIDTH] IN BLOOD BY AUTOMATED COUNT: 23.5 % (ref 12.3–15.4)
GLOBULIN UR ELPH-MCNC: 4.5 GM/DL
GLUCOSE SERPL-MCNC: 123 MG/DL (ref 65–99)
HCT VFR BLD AUTO: 26.7 % (ref 34–46.6)
HGB BLD-MCNC: 8.7 G/DL (ref 12–15.9)
HYPOCHROMIA BLD QL: NORMAL
IMM GRANULOCYTES # BLD AUTO: 0.05 10*3/MM3 (ref 0–0.05)
IMM GRANULOCYTES NFR BLD AUTO: 0.5 % (ref 0–0.5)
LYMPHOCYTES # BLD AUTO: 1.68 10*3/MM3 (ref 0.7–3.1)
LYMPHOCYTES NFR BLD AUTO: 15.2 % (ref 19.6–45.3)
MCH RBC QN AUTO: 25.7 PG (ref 26.6–33)
MCHC RBC AUTO-ENTMCNC: 32.6 G/DL (ref 31.5–35.7)
MCV RBC AUTO: 79 FL (ref 79–97)
MICROCYTES BLD QL: NORMAL
MONOCYTES # BLD AUTO: 0.99 10*3/MM3 (ref 0.1–0.9)
MONOCYTES NFR BLD AUTO: 8.9 % (ref 5–12)
NEUTROPHILS NFR BLD AUTO: 66.1 % (ref 42.7–76)
NEUTROPHILS NFR BLD AUTO: 7.33 10*3/MM3 (ref 1.7–7)
NRBC BLD AUTO-RTO: 0 /100 WBC (ref 0–0.2)
PLATELET # BLD AUTO: 484 10*3/MM3 (ref 140–450)
PMV BLD AUTO: 9.7 FL (ref 6–12)
POIKILOCYTOSIS BLD QL SMEAR: NORMAL
POTASSIUM SERPL-SCNC: 3.4 MMOL/L (ref 3.5–5.2)
PROT SERPL-MCNC: 7.3 G/DL (ref 6–8.5)
RBC # BLD AUTO: 3.38 10*6/MM3 (ref 3.77–5.28)
SMALL PLATELETS BLD QL SMEAR: NORMAL
SODIUM SERPL-SCNC: 135 MMOL/L (ref 136–145)
WBC MORPH BLD: NORMAL
WBC NRBC COR # BLD: 11.08 10*3/MM3 (ref 3.4–10.8)

## 2022-12-14 PROCEDURE — 82378 CARCINOEMBRYONIC ANTIGEN: CPT

## 2022-12-14 PROCEDURE — 85007 BL SMEAR W/DIFF WBC COUNT: CPT

## 2022-12-14 PROCEDURE — 96417 CHEMO IV INFUS EACH ADDL SEQ: CPT

## 2022-12-14 PROCEDURE — 25010000002 OXALIPLATIN PER 0.5 MG: Performed by: INTERNAL MEDICINE

## 2022-12-14 PROCEDURE — 25010000002 LEUCOVORIN CALCIUM PER 50MG: Performed by: INTERNAL MEDICINE

## 2022-12-14 PROCEDURE — 96416 CHEMO PROLONG INFUSE W/PUMP: CPT

## 2022-12-14 PROCEDURE — 96375 TX/PRO/DX INJ NEW DRUG ADDON: CPT

## 2022-12-14 PROCEDURE — 96376 TX/PRO/DX INJ SAME DRUG ADON: CPT

## 2022-12-14 PROCEDURE — 96415 CHEMO IV INFUSION ADDL HR: CPT

## 2022-12-14 PROCEDURE — 25010000002 DEXAMETHASONE SODIUM PHOSPHATE 20 MG/5ML SOLUTION: Performed by: INTERNAL MEDICINE

## 2022-12-14 PROCEDURE — 96413 CHEMO IV INFUSION 1 HR: CPT

## 2022-12-14 PROCEDURE — 25010000002 PALONOSETRON 0.25 MG/5ML SOLUTION PREFILLED SYRINGE: Performed by: INTERNAL MEDICINE

## 2022-12-14 PROCEDURE — 85025 COMPLETE CBC W/AUTO DIFF WBC: CPT

## 2022-12-14 PROCEDURE — 96368 THER/DIAG CONCURRENT INF: CPT

## 2022-12-14 PROCEDURE — G0498 CHEMO EXTEND IV INFUS W/PUMP: HCPCS

## 2022-12-14 PROCEDURE — 96367 TX/PROPH/DG ADDL SEQ IV INF: CPT

## 2022-12-14 PROCEDURE — 25010000002 IRINOTECAN PER 20 MG: Performed by: INTERNAL MEDICINE

## 2022-12-14 PROCEDURE — 80053 COMPREHEN METABOLIC PANEL: CPT

## 2022-12-14 PROCEDURE — 25010000002 FLUOROURACIL PER 500 MG: Performed by: INTERNAL MEDICINE

## 2022-12-14 RX ORDER — DIPHENHYDRAMINE HYDROCHLORIDE 50 MG/ML
50 INJECTION INTRAMUSCULAR; INTRAVENOUS AS NEEDED
Status: DISCONTINUED | OUTPATIENT
Start: 2022-12-14 | End: 2022-12-14 | Stop reason: HOSPADM

## 2022-12-14 RX ORDER — DEXTROSE MONOHYDRATE 50 MG/ML
250 INJECTION, SOLUTION INTRAVENOUS ONCE
Status: DISCONTINUED | OUTPATIENT
Start: 2022-12-14 | End: 2022-12-14 | Stop reason: HOSPADM

## 2022-12-14 RX ORDER — SODIUM CHLORIDE 0.9 % (FLUSH) 0.9 %
10 SYRINGE (ML) INJECTION AS NEEDED
Status: CANCELLED | OUTPATIENT
Start: 2022-12-14

## 2022-12-14 RX ORDER — FAMOTIDINE 10 MG/ML
20 INJECTION, SOLUTION INTRAVENOUS AS NEEDED
Status: DISCONTINUED | OUTPATIENT
Start: 2022-12-14 | End: 2022-12-14 | Stop reason: HOSPADM

## 2022-12-14 RX ORDER — HEPARIN SODIUM (PORCINE) LOCK FLUSH IV SOLN 100 UNIT/ML 100 UNIT/ML
500 SOLUTION INTRAVENOUS AS NEEDED
Status: CANCELLED | OUTPATIENT
Start: 2022-12-14

## 2022-12-14 RX ORDER — PALONOSETRON 0.05 MG/ML
0.25 INJECTION, SOLUTION INTRAVENOUS ONCE
Status: COMPLETED | OUTPATIENT
Start: 2022-12-14 | End: 2022-12-14

## 2022-12-14 RX ADMIN — FLUOROURACIL 3000 MG: 50 INJECTION, SOLUTION INTRAVENOUS at 13:50

## 2022-12-14 RX ADMIN — ATROPINE SULFATE 0.25 MG: 0.4 INJECTION, SOLUTION INTRAVENOUS at 10:06

## 2022-12-14 RX ADMIN — LEUCOVORIN CALCIUM 500 MG: 10 INJECTION INTRAMUSCULAR; INTRAVENOUS at 10:10

## 2022-12-14 RX ADMIN — DEXAMETHASONE SODIUM PHOSPHATE 12 MG: 4 INJECTION, SOLUTION INTRAMUSCULAR; INTRAVENOUS at 09:48

## 2022-12-14 RX ADMIN — OXALIPLATIN 110 MG: 5 INJECTION, SOLUTION INTRAVENOUS at 11:49

## 2022-12-14 RX ADMIN — ATROPINE SULFATE 0.25 MG: 0.4 INJECTION, SOLUTION INTRAVENOUS at 11:47

## 2022-12-14 RX ADMIN — PALONOSETRON 0.25 MG: 0.25 INJECTION, SOLUTION INTRAVENOUS at 09:47

## 2022-12-14 RX ADMIN — IRINOTECAN HYDROCHLORIDE 210 MG: 20 INJECTION, SOLUTION INTRAVENOUS at 10:10

## 2022-12-14 NOTE — PROGRESS NOTES
Met with patient today to introduce nurse navigator role. Patient denies any needs at this time. Patient was provided a quilt.

## 2022-12-15 ENCOUNTER — TELEPHONE (OUTPATIENT)
Dept: ONCOLOGY | Facility: CLINIC | Age: 52
End: 2022-12-15

## 2022-12-15 DIAGNOSIS — C78.7 METASTATIC COLON CANCER TO LIVER: Primary | ICD-10-CM

## 2022-12-15 DIAGNOSIS — C18.9 METASTATIC COLON CANCER TO LIVER: Primary | ICD-10-CM

## 2022-12-15 RX ORDER — ONDANSETRON 2 MG/ML
8 INJECTION INTRAMUSCULAR; INTRAVENOUS EVERY 6 HOURS PRN
Status: CANCELLED
Start: 2022-12-15

## 2022-12-15 RX ORDER — PROCHLORPERAZINE MALEATE 10 MG
10 TABLET ORAL EVERY 6 HOURS PRN
Qty: 30 TABLET | Refills: 2 | Status: SHIPPED | OUTPATIENT
Start: 2022-12-15

## 2022-12-15 NOTE — TELEPHONE ENCOUNTER
Returned call and spoke with patient and Aranza and she advised patient started shaking in her hands late last night.  It has happened off and on.  She is staying nauseated and not helping with just the zofran. She is also having dizziness when going from sitting to standing position. Last bowel movement was 2 days ago and starting to have abdominal discomfort.  Discussed with PIPO Reardon who advised to send in compazine for her to alternate with zofran and explained to patient to alternate the two.  Advised to monitor the shaking, but we will give her iv fluids, zofran/dex tomorrow with her unhook. Advised to take the colace she is using twice a day, add in miralax daily and go to the drug store to get milk of mag, do it now and repeat in a few hours if still not having a bowel movement.  I advised to let us know tomorrow if still no bowel movement when she comes in for infusion.  Advised to go to ER for severe abdominal pain.  Discussed on phone with PIPO Reardon who agreed with all.

## 2022-12-15 NOTE — TELEPHONE ENCOUNTER
Caller: Aranza Khan    Relationship: Emergency Contact    Best call back number: [phone]    What is the best time to reach you: ANYTIME      Who are you requesting to speak with (clinical staff, provider,  specific staff member): CLINICAL      What was the call regarding: DAUGHTER CALLED, HER MOM STARTED CHEMO 12/14 AND HAS NAUSEA, CONSTIPATION, SHAKING IN HER HANDS,   WAS PRESCRIBED ZOFRAN AND STILL HAVING NAUSEA, ALSO WANTS TO KNOW IF SHE CAN HAVE A FULL DOSE OF MIRALAX FOR CONSTIPATION    Do you require a callback: YES

## 2022-12-16 ENCOUNTER — INFUSION (OUTPATIENT)
Dept: ONCOLOGY | Facility: HOSPITAL | Age: 52
End: 2022-12-16

## 2022-12-16 ENCOUNTER — OFFICE VISIT (OUTPATIENT)
Dept: SURGERY | Facility: CLINIC | Age: 52
End: 2022-12-16

## 2022-12-16 VITALS
BODY MASS INDEX: 26.5 KG/M2 | HEART RATE: 112 BPM | SYSTOLIC BLOOD PRESSURE: 110 MMHG | DIASTOLIC BLOOD PRESSURE: 70 MMHG | HEIGHT: 62 IN | WEIGHT: 144 LBS | OXYGEN SATURATION: 97 %

## 2022-12-16 VITALS
HEART RATE: 100 BPM | RESPIRATION RATE: 14 BRPM | DIASTOLIC BLOOD PRESSURE: 61 MMHG | SYSTOLIC BLOOD PRESSURE: 139 MMHG | WEIGHT: 144.2 LBS | TEMPERATURE: 98.7 F | BODY MASS INDEX: 26.37 KG/M2

## 2022-12-16 DIAGNOSIS — C78.7 METASTATIC COLON CANCER TO LIVER: ICD-10-CM

## 2022-12-16 DIAGNOSIS — C18.9 METASTATIC COLON CANCER TO LIVER: ICD-10-CM

## 2022-12-16 DIAGNOSIS — C18.5 MALIGNANT NEOPLASM OF SPLENIC FLEXURE: Primary | ICD-10-CM

## 2022-12-16 DIAGNOSIS — C18.2 CANCER OF RIGHT COLON: Primary | ICD-10-CM

## 2022-12-16 DIAGNOSIS — D12.5 ADENOMATOUS POLYP OF SIGMOID COLON: ICD-10-CM

## 2022-12-16 PROCEDURE — 25010000002 ONDANSETRON PER 1 MG: Performed by: NURSE PRACTITIONER

## 2022-12-16 PROCEDURE — 96361 HYDRATE IV INFUSION ADD-ON: CPT

## 2022-12-16 PROCEDURE — 25010000002 DEXAMETHASONE PER 1 MG: Performed by: NURSE PRACTITIONER

## 2022-12-16 PROCEDURE — 96360 HYDRATION IV INFUSION INIT: CPT

## 2022-12-16 PROCEDURE — 96375 TX/PRO/DX INJ NEW DRUG ADDON: CPT

## 2022-12-16 PROCEDURE — 96374 THER/PROPH/DIAG INJ IV PUSH: CPT

## 2022-12-16 PROCEDURE — 25010000002 HEPARIN LOCK FLUSH PER 10 UNITS: Performed by: INTERNAL MEDICINE

## 2022-12-16 PROCEDURE — 99212 OFFICE O/P EST SF 10 MIN: CPT | Performed by: SURGERY

## 2022-12-16 RX ORDER — DEXAMETHASONE SODIUM PHOSPHATE 4 MG/ML
4 INJECTION, SOLUTION INTRA-ARTICULAR; INTRALESIONAL; INTRAMUSCULAR; INTRAVENOUS; SOFT TISSUE ONCE
Status: DISCONTINUED | OUTPATIENT
Start: 2022-12-16 | End: 2022-12-16

## 2022-12-16 RX ORDER — SODIUM CHLORIDE 0.9 % (FLUSH) 0.9 %
10 SYRINGE (ML) INJECTION AS NEEDED
Status: CANCELLED | OUTPATIENT
Start: 2022-12-16

## 2022-12-16 RX ORDER — SODIUM CHLORIDE 0.9 % (FLUSH) 0.9 %
10 SYRINGE (ML) INJECTION AS NEEDED
Status: DISCONTINUED | OUTPATIENT
Start: 2022-12-16 | End: 2022-12-16 | Stop reason: HOSPADM

## 2022-12-16 RX ORDER — HEPARIN SODIUM (PORCINE) LOCK FLUSH IV SOLN 100 UNIT/ML 100 UNIT/ML
500 SOLUTION INTRAVENOUS AS NEEDED
Status: CANCELLED | OUTPATIENT
Start: 2022-12-16

## 2022-12-16 RX ORDER — HEPARIN SODIUM (PORCINE) LOCK FLUSH IV SOLN 100 UNIT/ML 100 UNIT/ML
500 SOLUTION INTRAVENOUS AS NEEDED
Status: DISCONTINUED | OUTPATIENT
Start: 2022-12-16 | End: 2022-12-16 | Stop reason: HOSPADM

## 2022-12-16 RX ORDER — ONDANSETRON 2 MG/ML
8 INJECTION INTRAMUSCULAR; INTRAVENOUS EVERY 6 HOURS PRN
Status: DISCONTINUED | OUTPATIENT
Start: 2022-12-16 | End: 2022-12-16

## 2022-12-16 RX ADMIN — SODIUM CHLORIDE 1000 ML: 9 INJECTION, SOLUTION INTRAVENOUS at 13:18

## 2022-12-16 RX ADMIN — DEXAMETHASONE SODIUM PHOSPHATE: 4 INJECTION, SOLUTION INTRA-ARTICULAR; INTRALESIONAL; INTRAMUSCULAR; INTRAVENOUS; SOFT TISSUE at 13:55

## 2022-12-16 RX ADMIN — Medication 10 ML: at 14:27

## 2022-12-16 RX ADMIN — HEPARIN 500 UNITS: 100 SYRINGE at 14:27

## 2022-12-21 ENCOUNTER — APPOINTMENT (OUTPATIENT)
Dept: INFUSION THERAPY | Facility: HOSPITAL | Age: 52
End: 2022-12-21

## 2022-12-21 ENCOUNTER — OFFICE VISIT (OUTPATIENT)
Dept: ONCOLOGY | Facility: CLINIC | Age: 52
End: 2022-12-21

## 2022-12-21 VITALS
DIASTOLIC BLOOD PRESSURE: 83 MMHG | OXYGEN SATURATION: 10 % | TEMPERATURE: 97.3 F | BODY MASS INDEX: 24.66 KG/M2 | HEIGHT: 62 IN | WEIGHT: 134 LBS | RESPIRATION RATE: 12 BRPM | SYSTOLIC BLOOD PRESSURE: 133 MMHG | HEART RATE: 106 BPM

## 2022-12-21 DIAGNOSIS — C78.7 METASTATIC COLON CANCER TO LIVER: ICD-10-CM

## 2022-12-21 DIAGNOSIS — C18.9 METASTATIC COLON CANCER TO LIVER: ICD-10-CM

## 2022-12-21 DIAGNOSIS — C18.2 CANCER OF RIGHT COLON: Primary | ICD-10-CM

## 2022-12-21 PROCEDURE — 99215 OFFICE O/P EST HI 40 MIN: CPT | Performed by: INTERNAL MEDICINE

## 2022-12-21 RX ORDER — PALONOSETRON 0.05 MG/ML
0.25 INJECTION, SOLUTION INTRAVENOUS ONCE
Status: CANCELLED | OUTPATIENT
Start: 2023-01-10

## 2022-12-21 RX ORDER — DIPHENHYDRAMINE HYDROCHLORIDE 50 MG/ML
50 INJECTION INTRAMUSCULAR; INTRAVENOUS AS NEEDED
Status: CANCELLED | OUTPATIENT
Start: 2023-01-10

## 2022-12-21 RX ORDER — DEXTROSE MONOHYDRATE 50 MG/ML
250 INJECTION, SOLUTION INTRAVENOUS ONCE
Status: CANCELLED | OUTPATIENT
Start: 2023-01-10

## 2022-12-21 RX ORDER — FAMOTIDINE 10 MG/ML
20 INJECTION, SOLUTION INTRAVENOUS AS NEEDED
Status: CANCELLED | OUTPATIENT
Start: 2023-01-10

## 2022-12-21 RX ORDER — ATROPINE SULFATE 1 MG/ML
0.25 INJECTION, SOLUTION INTRAMUSCULAR; INTRAVENOUS; SUBCUTANEOUS
Status: CANCELLED | OUTPATIENT
Start: 2023-01-10

## 2022-12-21 NOTE — PROGRESS NOTES
DATE OF VISIT: 12/21/2022    REASON FOR VISIT: Followup for metastatic right-sided colon cancer     PROBLEM LIST:  1. Metastatic colon cancer TX NX M1 a stage Perry:  A.  Presented with abdominal pain and jaundice  B.  CT abdomen pelvis done November 04, 2022 confirmed diffuse liver metastases.  C.  Diagnosed after CT-guided liver biopsy done on November 22, 2022 confirming adenocarcinoma CK20 positive CK7 negative.  D.  Colonoscopy done December 08, 2020 to confirm transverse colon mass however biopsy revealed adenoma.  E.  Started palliative chemotherapy with dose adjusted FOLFOXIRI December 14, 2022, status post 1 cycle  2.  Elevated liver enzymes:  A.  Induced by metastatic disease  3.  Cancer-related pain  4.  Anxiety  5.  Hypertension    HISTORY OF PRESENT ILLNESS: The patient is a very pleasant 52 y.o. female  with past medical history significant for metastatic right-sided colon cancer diagnosed November 22, 2022.  Started on palliative chemotherapy with dose adjusted FOLFOXIRI. The patient is here today for scheduled follow-up visit.    SUBJECTIVE: Amber is here today with her .  Her pain is significantly improved after starting chemotherapy.  She is having diarrhea.  She is also having nausea.  She was started for 3 days.    Past History:  Medical History: has a past medical history of Anxiety, Cancer (HCC), Ear piercing, Gallstones, History of irritable bowel syndrome, Hypertension, Seasonal allergies, Tattoos, and Wears glasses.   Surgical History: has a past surgical history that includes postpartum tubal ligation; Dilation and curettage of uterus (2013); Tumor removal (2013); Portacath placement (N/A, 12/8/2022); and Colonoscopy (N/A, 12/8/2022).   Family History: family history is not on file.   Social History: reports that she has never smoked. She has never used smokeless tobacco. She reports current alcohol use. She reports that she does not use drugs.    (Not in a hospital admission)    "  Allergies: Losartan, Valium [diazepam], and Citrus     Review of Systems   Constitutional: Positive for fatigue.   Respiratory: Negative.    Gastrointestinal: Positive for abdominal pain, diarrhea and nausea.   Musculoskeletal: Positive for arthralgias.   Psychiatric/Behavioral: The patient is nervous/anxious.        PHYSICAL EXAMINATION:   /83   Pulse 106   Temp 97.3 °F (36.3 °C) (Temporal)   Resp 12   Ht 157.5 cm (62\")   Wt 60.8 kg (134 lb)   LMP  (LMP Unknown) Comment: pt has not had a period following d&c 2013 but unsure if she is postmenopausal  SpO2 (!) 10%   BMI 24.51 kg/m²    Pain Score    12/21/22 1421   PainSc: 0-No pain                     ECOG Performance Status: 1 - Symptomatic but completely ambulatory      General Appearance:      alert, cooperative, no apparent distress and appears stated age   Lungs:   Clear to auscultation bilaterally; respirations regular, even, and unlabored bilaterally   Heart:  Regular rate and rhythm, no murmurs appreciated   Abdomen:   Soft, non-tender, non-distended and no organomegaly                 Infusion on 12/14/2022   Component Date Value Ref Range Status   • CEA 12/14/2022 242.00  ng/mL Final   • Glucose 12/14/2022 123 (H)  65 - 99 mg/dL Final   • BUN 12/14/2022 6  6 - 20 mg/dL Final   • Creatinine 12/14/2022 0.63  0.57 - 1.00 mg/dL Final   • Sodium 12/14/2022 135 (L)  136 - 145 mmol/L Final   • Potassium 12/14/2022 3.4 (L)  3.5 - 5.2 mmol/L Final   • Chloride 12/14/2022 102  98 - 107 mmol/L Final   • CO2 12/14/2022 24.7  22.0 - 29.0 mmol/L Final   • Calcium 12/14/2022 8.4 (L)  8.6 - 10.5 mg/dL Final   • Total Protein 12/14/2022 7.3  6.0 - 8.5 g/dL Final   • Albumin 12/14/2022 2.80 (L)  3.50 - 5.20 g/dL Final   • ALT (SGPT) 12/14/2022 63 (H)  1 - 33 U/L Final   • AST (SGOT) 12/14/2022 184 (H)  1 - 32 U/L Final   • Alkaline Phosphatase 12/14/2022 663 (H)  39 - 117 U/L Final   • Total Bilirubin 12/14/2022 7.6 (H)  0.0 - 1.2 mg/dL Final   • Globulin " 12/14/2022 4.5  gm/dL Final   • A/G Ratio 12/14/2022 0.6  g/dL Final   • BUN/Creatinine Ratio 12/14/2022 9.5  7.0 - 25.0 Final   • Anion Gap 12/14/2022 8.3  5.0 - 15.0 mmol/L Final   • eGFR 12/14/2022 106.9  >60.0 mL/min/1.73 Final    National Kidney Foundation and American Society of Nephrology (ASN) Task Force recommended calculation based on the Chronic Kidney Disease Epidemiology Collaboration (CKD-EPI) equation refit without adjustment for race.   • WBC 12/14/2022 11.08 (H)  3.40 - 10.80 10*3/mm3 Final   • RBC 12/14/2022 3.38 (L)  3.77 - 5.28 10*6/mm3 Final   • Hemoglobin 12/14/2022 8.7 (L)  12.0 - 15.9 g/dL Final   • Hematocrit 12/14/2022 26.7 (L)  34.0 - 46.6 % Final   • MCV 12/14/2022 79.0  79.0 - 97.0 fL Final   • MCH 12/14/2022 25.7 (L)  26.6 - 33.0 pg Final   • MCHC 12/14/2022 32.6  31.5 - 35.7 g/dL Final   • RDW 12/14/2022 23.5 (H)  12.3 - 15.4 % Final   • RDW-SD 12/14/2022 66.2 (H)  37.0 - 54.0 fl Final   • MPV 12/14/2022 9.7  6.0 - 12.0 fL Final   • Platelets 12/14/2022 484 (H)  140 - 450 10*3/mm3 Final   • Neutrophil % 12/14/2022 66.1  42.7 - 76.0 % Final   • Lymphocyte % 12/14/2022 15.2 (L)  19.6 - 45.3 % Final   • Monocyte % 12/14/2022 8.9  5.0 - 12.0 % Final   • Eosinophil % 12/14/2022 8.6 (H)  0.3 - 6.2 % Final   • Basophil % 12/14/2022 0.7  0.0 - 1.5 % Final   • Immature Grans % 12/14/2022 0.5  0.0 - 0.5 % Final   • Neutrophils, Absolute 12/14/2022 7.33 (H)  1.70 - 7.00 10*3/mm3 Final   • Lymphocytes, Absolute 12/14/2022 1.68  0.70 - 3.10 10*3/mm3 Final   • Monocytes, Absolute 12/14/2022 0.99 (H)  0.10 - 0.90 10*3/mm3 Final   • Eosinophils, Absolute 12/14/2022 0.95 (H)  0.00 - 0.40 10*3/mm3 Final   • Basophils, Absolute 12/14/2022 0.08  0.00 - 0.20 10*3/mm3 Final   • Immature Grans, Absolute 12/14/2022 0.05  0.00 - 0.05 10*3/mm3 Final   • nRBC 12/14/2022 0.0  0.0 - 0.2 /100 WBC Final   • Anisocytosis 12/14/2022 Slight/1+  None Seen Final   • Hypochromia 12/14/2022 Slight/1+  None Seen Final   •  Microcytes 2022 Slight/1+  None Seen Final   • Poikilocytes 2022 Slight/1+  None Seen Final   • WBC Morphology 2022 Normal  Normal Final   • Platelet Estimate 2022 Increased  Normal Final   Admission on 2022, Discharged on 2022   Component Date Value Ref Range Status   • HCG, Urine, QL 2022 Negative  Negative Final   • Lot Number 2022 [id number]   Final   • Internal Positive Control 2022 Passed  Positive, Passed Final   • Internal Negative Control 2022 Passed  Negative, Passed Final   • Expiration Date 2022   Final   • Reference Lab Report 2022    Final                    Value:[facility]  [address] [place] [address]  Phone: [phone] or [phone]  Fax: [phone]  Don Stone M.D., Medical Director    PATIENT NAME                           LABORATORY NO.  DEANDRE RODRIGUEZ.                   [id number]  [id number]                         AGE              SEX  SSN           CLIENT REF #  [place] [id number]      1970  F    [id number]   [id number]    [address]                REQUESTING M.D.     ATTENDING M.D.     COPY TO.  [address]                        [place] [address]                 DATE COLLECTED      DATE RECEIVED      DATE REPORTED  2022          2022         12/10/2022    DIAGNOSIS:  A.   MID TRANSVERSE COLON, BIOPSY:  Tubular adenoma  Prominent underlying medium-sized vessels suggestive of vascular malformation  B.   SIGMOID COLON, POLYPS, BIOPSIES:  Tubular adenomas    FLP    CLINICAL HISTORY:  Metastatic                           cancer    SPECIMENS RECEIVED:  A.  MID TRANSVERSE COLON, BIOPSY  B.  SIGMOID COLON, POLYPS    MICROSCOPIC DESCRIPTION:  Sections of the first specimen labeled mid transverse colon biopsy show  fragments of colonic mucosa with architectural distortion in  "the form of tortuous  branching and budding crypts within a fibrotic lamina propria with admixed  fascicles of smooth muscle and reduced but normal complement of mononuclear  cells.  The submucosa cells show prominent thick-walled focally ectatic vessels  which extend into the lamina propria.  Focally the mucosa exhibits a tubular  proliferation of crypts lined by adenomatous epithelium.  There is no evidence of  high-grade dysplasia or carcinoma.    Sections of the second specimen labeled as sigmoid colon polyp show multiple  fragments of colonic mucosa exhibiting a tubular proliferation of crypts lined by  adenomatous epithelium.  There is no evidence of high-grade dysplasia or  carcinoma.    Professional interpretation rendered by                           SHANE Cifuentes Jr., M.D., JOHNACHIQUITA at  [facility], [address] [place] [address].    PREVIOUS PATIENT HISTORY IN FILES:  [id number], DateCollected: 11/22/2022  LIVER MASS, NEEDLE CORE BIOPSIES: Malignant; metastatic  adenocarcinoma of colorectal primary    GROSS DESCRIPTION:  A.  Specimen is received in 1 formalin filled container \"mid transverse colon  biopsy\" and consists of a single piece of tan soft tissue measuring 0.8 x 0.5  x 0.4 cm.  Specimen is trisected and submitted sequentially entirely in 1  cassette.  B.  Specimen is received in 1 formalin filled container \"polypectomy x5\" and  consists of multiple pieces of tan-pink soft tissue measuring 2.7 x 1.7 x 0.5  cm.  Specimen is submitted entirely in 1 cassette.  MM    REVIEWED, DIAGNOSED AND ELECTRONICALLY  SIGNED BY:    SHANE Cifuentes Jr., M.D., F.C.A.P.  CPT CODES:  88305x2          CT Chest With Contrast Diagnostic    Result Date: 12/9/2022  Narrative: DATE OF EXAM: 12/8/2022 4:05 PM  PROCEDURE: CT CHEST W CONTRAST DIAGNOSTIC-  INDICATIONS: staging scans, new colon cancer; C18.9-Malignant neoplasm of colon, unspecified; C78.7-Secondary malignant neoplasm of liver and intrahepatic " bile duct  COMPARISON: No comparisons available.  TECHNIQUE: Routine transaxial slices were obtained through the chest after the intravenous administration of 95 mL of Isovue 300. Reconstructed coronal and sagittal images were also obtained. Automated exposure control and iterative construction methods were used.  The radiation dose reduction device was turned on for each scan per the ALARA (As Low as Reasonably Achievable) protocol.  FINDINGS: There is no pathologic axillary adenopathy or other worrisome body wall soft tissue finding in the chest. Findings are present in the upper abdomen consistent with provided history, including inability characterize extensive hepatic lesions, reportedly suspected metastatic. There is no pleural or pericardial effusion. There is no pathologic mediastinal adenopathy. Nonaneurysmal thoracic aorta. Evaluation of the osseous structures demonstrates no evidence of acute fracture or aggressive osseous lesion, with multilevel thoracic spondylosis change present. Extensive pulmonary micronodularity is present in a pattern which appears centrilobular and somewhat atypical predominant. No suspicious focal pulmonary nodules are otherwise present. There is otherwise no evidence of acute infectious or inflammatory process.       Impression: Diffuse pattern of somewhat atypical predominant centrilobular groundglass pulmonary nodules noted. Appearance would be atypical for metastatic involvement and is otherwise somewhat nonspecific, potentially reflecting sequela of hypersensitivity pneumonitis. There is otherwise no evidence of metastatic disease in the chest.  This report was finalized on 12/9/2022 1:47 PM by Wilberto Davis.      CT Abdomen Pelvis With Contrast    Result Date: 11/24/2022  Narrative: FINAL REPORT TECHNIQUE: Routine axial images through the abdomen and pelvis were obtained following IV contrast administration. CLINICAL HISTORY: RUQ pain, s/p CT guided liver biopsy two days  ago COMPARISON: November 3, 2022 FINDINGS: Abdomen: Again seen are several large metastases in both the right and left lobes of the liver, unchanged from the most recent examination.  The solid abdominal organs and ureters are otherwise unremarkable.  The GI tract is unremarkable.  Pelvis: The uterus, ovaries and urinary bladder are normal.  There is a small amount of ascites in the abdomen and pelvis.There is no pelvic or abdominal adenopathy or acute osseous abnormality.     Impression: No change in large liver metastases of uncertain primary.  No acute abnormality otherwise. Authenticated and Electronically Signed by Raman Bill M.D. on 11/24/2022 11:24:18 PM    US Guided Liver Biopsy    Result Date: 11/22/2022  Narrative: ULTRASOUND GUIDED BIOPSY OF RIGHT HEMILIVER LESION.  CLINICAL HISTORY/INDICATION: 52-year-old female with multiple liver lesions, uncertain etiology, concerning for metastasis, unknown primary, consulted us for imaged guided biopsy.  MEDICATION: IV fentanyl 50 mcg once. 1% lidocaine administered subcutaneously for local anesthetic.  Continuous vital sign monitoring was used throughout the procedure. Continuous cardiopulmonary/physiologic activity was monitored by a highly qualified health care professional/registered nurse during the entire procedure under direct supervision.  TECHNIQUE: After discussion of risks and benefits, informed written consent was obtained.  Appropriate time out was performed to confirm patient identity and planned procedure and side. Limited right upper quadrant ultrasound was performed to identify the planned biopsy site.  The right upper quadrant was prepped in a routine sterile fashion and locally anesthetized with 1% lidocaine. Intravenous fentanyl 50 mcg once was administered by nursing under direct supervision. Using real-time ultrasound guidance, a 19-gauge guide needle was directed toward the right hemiliver lesion. The needle was positioned within the outer  periphery of the lesion. Coaxial technique was utilized and a biopsy was performed with the 18-gauge biopsy needle. A total of 7 passes were made through the lesion. Tract embolization was performed with Gelfoam. Positive cytological material was confirmed by the cytopathologist present.  Limited postbiopsy ultrasound showed no immediate complications. Procedure was well tolerated.      Impression: Technically successful ultrasound-guided biopsy of right hemiliver lesion.  Pathology result is pending.    Images personally reviewed, interpreted and dictated by WILMA Forbes.  This report was signed and finalized on 11/22/2022 11:32 AM by WILMA Forbes.    XR Chest 1 View    Result Date: 12/8/2022  Narrative: PROCEDURE: XR CHEST 1 VW-  HISTORY: port a cath placement; C18.9-Malignant neoplasm of colon, unspecified; C78.7-Secondary malignant neoplasm of liver and intrahepatic bile duct; C79.9-Secondary malignant neoplasm of unspecified site  COMPARISON:  09/02/2022  FINDINGS:  Portable view of the chest demonstrates the lungs to be grossly clear. There is no evidence of effusion, pneumothorax or other significant pleural disease. The mediastinum is unremarkable.  The heart size is normal.  Right chest port is noted with tip in the SVC.      Impression: Right Port-A-Cath placement without evidence of pneumothorax   This report was signed and finalized on 12/8/2022 3:10 PM by Davi Weiss MD.    FL C Arm During Surgery    Result Date: 12/8/2022  Narrative: This procedure was auto-finalized with no dictation required.      ASSESSMENT: The patient is a very pleasant 52 y.o. female  with right-sided metastatic colon cancer      PLAN:    1.  Right-sided metastatic colon cancer:  A.  I will proceed with chemotherapy as scheduled FOLFOXIRI cycle #2.  B.  The patient will follow-up with us for cycle #3.  C.  I will repeat the patient's scans after cycle #6 per  D.  I will continue 25% dose reduction secondary to  elevated baseline liver enzymes.  E.  Today I discussed with the patient her blood results from December 14, 2022.  Her CEA decreased to 242.  I will continue to monitor this prior to each infusion.  F.  I did go over her molecular analysis results with the patient and explained to her her K-glenroy came back wild type, no NRAS or BRAF mutations, TMB low and microsatellite stable pattern.  While the patient may not benefit from immunotherapy she will be a candidate for EGFR inhibitors down the road.    2.  Elevated liver enzymes:  A.  Induced by liver metastases  B.  I will continue 25% dose reduction of chemo drugs per  C.  I will repeat labs before next infusion.    3.  Chemotherapy-induced nausea:  A.  I will start the patient on Zofran as needed for    4.  Chemotherapy-induced diarrhea:  A.  I will start the patient on Imodium as needed    5. Cancer-related pain:  A.  I will continue the patient oxycodone 5 mg twice a day.  I will give the patient prescription today.     6.  Anxiety:  A.  I will continue the patient on Ativan 1 mg nightly as needed.  I will give a new prescription today.     7.  Hypertension:  A.  She will continue losartan.  B.  I explained to the patient this will interfere with our management since chemotherapy typically can cause hypotension we will have to monitor blood pressure closely.    8.  Dehydration:  A.  I will add 1 L of IV fluid on day 3 of each chemotherapy cycle.    FOLLOW UP: With cycle #3.    Iban Lambert MD  12/21/2022

## 2022-12-27 ENCOUNTER — INFUSION (OUTPATIENT)
Dept: ONCOLOGY | Facility: HOSPITAL | Age: 52
End: 2022-12-27

## 2022-12-27 VITALS
WEIGHT: 133 LBS | TEMPERATURE: 98.4 F | RESPIRATION RATE: 16 BRPM | SYSTOLIC BLOOD PRESSURE: 133 MMHG | HEART RATE: 88 BPM | BODY MASS INDEX: 24.33 KG/M2 | DIASTOLIC BLOOD PRESSURE: 72 MMHG

## 2022-12-27 DIAGNOSIS — C18.9 METASTATIC COLON CANCER TO LIVER: Primary | ICD-10-CM

## 2022-12-27 DIAGNOSIS — K21.9 GASTROESOPHAGEAL REFLUX DISEASE WITHOUT ESOPHAGITIS: Primary | ICD-10-CM

## 2022-12-27 DIAGNOSIS — C78.7 METASTATIC COLON CANCER TO LIVER: Primary | ICD-10-CM

## 2022-12-27 LAB
ALBUMIN SERPL-MCNC: 3.1 G/DL (ref 3.5–5.2)
ALBUMIN/GLOB SERPL: 0.7 G/DL
ALP SERPL-CCNC: 439 U/L (ref 39–117)
ALT SERPL W P-5'-P-CCNC: 48 U/L (ref 1–33)
ANION GAP SERPL CALCULATED.3IONS-SCNC: 12.8 MMOL/L (ref 5–15)
ANISOCYTOSIS BLD QL: ABNORMAL
AST SERPL-CCNC: 90 U/L (ref 1–32)
BASOPHILS # BLD MANUAL: 0.04 10*3/MM3 (ref 0–0.2)
BASOPHILS NFR BLD MANUAL: 1 % (ref 0–1.5)
BILIRUB SERPL-MCNC: 3.5 MG/DL (ref 0–1.2)
BUN SERPL-MCNC: 9 MG/DL (ref 6–20)
BUN/CREAT SERPL: 11.5 (ref 7–25)
CALCIUM SPEC-SCNC: 9.4 MG/DL (ref 8.6–10.5)
CEA SERPL-MCNC: 251 NG/ML
CHLORIDE SERPL-SCNC: 102 MMOL/L (ref 98–107)
CLUMPED PLATELETS: PRESENT
CO2 SERPL-SCNC: 21.2 MMOL/L (ref 22–29)
CREAT SERPL-MCNC: 0.78 MG/DL (ref 0.57–1)
DEPRECATED RDW RBC AUTO: 59.3 FL (ref 37–54)
EGFRCR SERPLBLD CKD-EPI 2021: 91.5 ML/MIN/1.73
EOSINOPHIL # BLD MANUAL: 0.51 10*3/MM3 (ref 0–0.4)
EOSINOPHIL NFR BLD MANUAL: 12 % (ref 0.3–6.2)
ERYTHROCYTE [DISTWIDTH] IN BLOOD BY AUTOMATED COUNT: 20 % (ref 12.3–15.4)
GLOBULIN UR ELPH-MCNC: 4.6 GM/DL
GLUCOSE SERPL-MCNC: 97 MG/DL (ref 65–99)
HCT VFR BLD AUTO: 25.8 % (ref 34–46.6)
HGB BLD-MCNC: 8.3 G/DL (ref 12–15.9)
HYPOCHROMIA BLD QL: ABNORMAL
LYMPHOCYTES # BLD MANUAL: 1.48 10*3/MM3 (ref 0.7–3.1)
LYMPHOCYTES NFR BLD MANUAL: 20 % (ref 5–12)
MCH RBC QN AUTO: 26.9 PG (ref 26.6–33)
MCHC RBC AUTO-ENTMCNC: 32.2 G/DL (ref 31.5–35.7)
MCV RBC AUTO: 83.5 FL (ref 79–97)
MONOCYTES # BLD: 0.85 10*3/MM3 (ref 0.1–0.9)
NEUTROPHILS # BLD AUTO: 1.35 10*3/MM3 (ref 1.7–7)
NEUTROPHILS NFR BLD MANUAL: 32 % (ref 42.7–76)
PLATELET # BLD AUTO: 793 10*3/MM3 (ref 140–450)
PMV BLD AUTO: 9.2 FL (ref 6–12)
POTASSIUM SERPL-SCNC: 3.3 MMOL/L (ref 3.5–5.2)
PROT SERPL-MCNC: 7.7 G/DL (ref 6–8.5)
RBC # BLD AUTO: 3.09 10*6/MM3 (ref 3.77–5.28)
SCAN SLIDE: NORMAL
SMALL PLATELETS BLD QL SMEAR: ABNORMAL
SODIUM SERPL-SCNC: 136 MMOL/L (ref 136–145)
VARIANT LYMPHS NFR BLD MANUAL: 35 % (ref 19.6–45.3)
WBC MORPH BLD: NORMAL
WBC NRBC COR # BLD: 4.23 10*3/MM3 (ref 3.4–10.8)

## 2022-12-27 PROCEDURE — 85007 BL SMEAR W/DIFF WBC COUNT: CPT

## 2022-12-27 PROCEDURE — 96375 TX/PRO/DX INJ NEW DRUG ADDON: CPT

## 2022-12-27 PROCEDURE — 85025 COMPLETE CBC W/AUTO DIFF WBC: CPT

## 2022-12-27 PROCEDURE — G0498 CHEMO EXTEND IV INFUS W/PUMP: HCPCS

## 2022-12-27 PROCEDURE — 96417 CHEMO IV INFUS EACH ADDL SEQ: CPT

## 2022-12-27 PROCEDURE — 80053 COMPREHEN METABOLIC PANEL: CPT

## 2022-12-27 PROCEDURE — 25010000002 DEXAMETHASONE SODIUM PHOSPHATE 20 MG/5ML SOLUTION: Performed by: INTERNAL MEDICINE

## 2022-12-27 PROCEDURE — 96376 TX/PRO/DX INJ SAME DRUG ADON: CPT

## 2022-12-27 PROCEDURE — 25010000002 PALONOSETRON 0.25 MG/5ML SOLUTION PREFILLED SYRINGE: Performed by: INTERNAL MEDICINE

## 2022-12-27 PROCEDURE — 25010000002 FLUOROURACIL PER 500 MG: Performed by: INTERNAL MEDICINE

## 2022-12-27 PROCEDURE — 96365 THER/PROPH/DIAG IV INF INIT: CPT

## 2022-12-27 PROCEDURE — 96415 CHEMO IV INFUSION ADDL HR: CPT

## 2022-12-27 PROCEDURE — 82378 CARCINOEMBRYONIC ANTIGEN: CPT

## 2022-12-27 PROCEDURE — 25010000002 LEUCOVORIN CALCIUM PER 50MG: Performed by: INTERNAL MEDICINE

## 2022-12-27 PROCEDURE — 25010000002 IRINOTECAN PER 20 MG: Performed by: INTERNAL MEDICINE

## 2022-12-27 PROCEDURE — 96413 CHEMO IV INFUSION 1 HR: CPT

## 2022-12-27 PROCEDURE — 96416 CHEMO PROLONG INFUSE W/PUMP: CPT

## 2022-12-27 PROCEDURE — 96367 TX/PROPH/DG ADDL SEQ IV INF: CPT

## 2022-12-27 PROCEDURE — 96411 CHEMO IV PUSH ADDL DRUG: CPT

## 2022-12-27 PROCEDURE — 25010000002 OXALIPLATIN PER 0.5 MG: Performed by: INTERNAL MEDICINE

## 2022-12-27 PROCEDURE — 96368 THER/DIAG CONCURRENT INF: CPT

## 2022-12-27 RX ORDER — PALONOSETRON 0.05 MG/ML
0.25 INJECTION, SOLUTION INTRAVENOUS ONCE
Status: COMPLETED | OUTPATIENT
Start: 2022-12-27 | End: 2022-12-27

## 2022-12-27 RX ORDER — DEXTROSE MONOHYDRATE 50 MG/ML
250 INJECTION, SOLUTION INTRAVENOUS ONCE
Status: DISCONTINUED | OUTPATIENT
Start: 2022-12-27 | End: 2022-12-27 | Stop reason: HOSPADM

## 2022-12-27 RX ORDER — FAMOTIDINE 10 MG/ML
20 INJECTION, SOLUTION INTRAVENOUS AS NEEDED
Status: DISCONTINUED | OUTPATIENT
Start: 2022-12-27 | End: 2022-12-27 | Stop reason: HOSPADM

## 2022-12-27 RX ORDER — DIPHENHYDRAMINE HYDROCHLORIDE 50 MG/ML
50 INJECTION INTRAMUSCULAR; INTRAVENOUS AS NEEDED
Status: DISCONTINUED | OUTPATIENT
Start: 2022-12-27 | End: 2022-12-27 | Stop reason: HOSPADM

## 2022-12-27 RX ORDER — OMEPRAZOLE 40 MG/1
40 CAPSULE, DELAYED RELEASE ORAL DAILY
Qty: 30 CAPSULE | Refills: 2 | Status: SHIPPED | OUTPATIENT
Start: 2022-12-27 | End: 2023-02-09

## 2022-12-27 RX ORDER — SODIUM CHLORIDE 0.9 % (FLUSH) 0.9 %
10 SYRINGE (ML) INJECTION AS NEEDED
Status: CANCELLED | OUTPATIENT
Start: 2022-12-27

## 2022-12-27 RX ORDER — HEPARIN SODIUM (PORCINE) LOCK FLUSH IV SOLN 100 UNIT/ML 100 UNIT/ML
500 SOLUTION INTRAVENOUS AS NEEDED
Status: CANCELLED | OUTPATIENT
Start: 2022-12-27

## 2022-12-27 RX ADMIN — DEXTROSE MONOHYDRATE 200 MG: 50 INJECTION, SOLUTION INTRAVENOUS at 10:45

## 2022-12-27 RX ADMIN — OXALIPLATIN 105 MG: 5 INJECTION, SOLUTION INTRAVENOUS at 12:21

## 2022-12-27 RX ADMIN — ATROPINE SULFATE 0.25 MG: 0.4 INJECTION, SOLUTION INTRAVENOUS at 12:22

## 2022-12-27 RX ADMIN — FLUOROURACIL 2900 MG: 50 INJECTION, SOLUTION INTRAVENOUS at 14:31

## 2022-12-27 RX ADMIN — LEUCOVORIN CALCIUM 480 MG: 10 INJECTION INTRAMUSCULAR; INTRAVENOUS at 12:21

## 2022-12-27 RX ADMIN — ATROPINE SULFATE 0.25 MG: 0.4 INJECTION, SOLUTION INTRAVENOUS at 10:43

## 2022-12-27 RX ADMIN — PALONOSETRON 0.25 MG: 0.25 INJECTION, SOLUTION INTRAVENOUS at 10:08

## 2022-12-27 RX ADMIN — DEXAMETHASONE SODIUM PHOSPHATE 12 MG: 4 INJECTION, SOLUTION INTRAMUSCULAR; INTRAVENOUS at 09:44

## 2022-12-27 NOTE — TELEPHONE ENCOUNTER
----- Message from Wili Campbell RN sent at 12/27/2022 12:27 PM EST -----  Regarding: Heartburn  Pt reports frequent heartburn since beginning treatment OTC meds are not helping. Can she have something called in to [facility] in [place]?

## 2022-12-27 NOTE — TELEPHONE ENCOUNTER
Discussed with Dr. Lambert.  Patient is already taking pepcid over the counter daily.  Discussed with Dr. Lambert who advised to send her in prilosec 40mg daily for patient. Notified Wili in infusion.

## 2022-12-29 ENCOUNTER — INFUSION (OUTPATIENT)
Dept: ONCOLOGY | Facility: HOSPITAL | Age: 52
End: 2022-12-29

## 2022-12-29 VITALS
RESPIRATION RATE: 16 BRPM | TEMPERATURE: 97.8 F | HEART RATE: 84 BPM | DIASTOLIC BLOOD PRESSURE: 70 MMHG | SYSTOLIC BLOOD PRESSURE: 121 MMHG | OXYGEN SATURATION: 97 %

## 2022-12-29 DIAGNOSIS — C18.2 CANCER OF RIGHT COLON: Primary | ICD-10-CM

## 2022-12-29 DIAGNOSIS — C18.9 METASTATIC COLON CANCER TO LIVER: ICD-10-CM

## 2022-12-29 DIAGNOSIS — C78.7 METASTATIC COLON CANCER TO LIVER: ICD-10-CM

## 2022-12-29 PROCEDURE — 25010000002 HEPARIN LOCK FLUSH PER 10 UNITS: Performed by: INTERNAL MEDICINE

## 2022-12-29 RX ORDER — HEPARIN SODIUM (PORCINE) LOCK FLUSH IV SOLN 100 UNIT/ML 100 UNIT/ML
500 SOLUTION INTRAVENOUS AS NEEDED
Status: DISCONTINUED | OUTPATIENT
Start: 2022-12-29 | End: 2022-12-29 | Stop reason: HOSPADM

## 2022-12-29 RX ORDER — SODIUM CHLORIDE 0.9 % (FLUSH) 0.9 %
10 SYRINGE (ML) INJECTION AS NEEDED
Status: DISCONTINUED | OUTPATIENT
Start: 2022-12-29 | End: 2022-12-29 | Stop reason: HOSPADM

## 2022-12-29 RX ORDER — HEPARIN SODIUM (PORCINE) LOCK FLUSH IV SOLN 100 UNIT/ML 100 UNIT/ML
500 SOLUTION INTRAVENOUS AS NEEDED
Status: CANCELLED | OUTPATIENT
Start: 2022-12-29

## 2022-12-29 RX ORDER — SODIUM CHLORIDE 0.9 % (FLUSH) 0.9 %
10 SYRINGE (ML) INJECTION AS NEEDED
Status: CANCELLED | OUTPATIENT
Start: 2022-12-29

## 2022-12-29 RX ADMIN — Medication 10 ML: at 13:31

## 2022-12-29 RX ADMIN — HEPARIN 500 UNITS: 100 SYRINGE at 13:31

## 2022-12-30 ENCOUNTER — INFUSION (OUTPATIENT)
Dept: ONCOLOGY | Facility: HOSPITAL | Age: 52
End: 2022-12-30

## 2022-12-30 VITALS
BODY MASS INDEX: 24.05 KG/M2 | WEIGHT: 131.5 LBS | HEART RATE: 81 BPM | SYSTOLIC BLOOD PRESSURE: 106 MMHG | DIASTOLIC BLOOD PRESSURE: 69 MMHG | TEMPERATURE: 98.4 F

## 2022-12-30 DIAGNOSIS — K21.9 GASTROESOPHAGEAL REFLUX DISEASE WITHOUT ESOPHAGITIS: ICD-10-CM

## 2022-12-30 DIAGNOSIS — C18.2 CANCER OF RIGHT COLON: Primary | ICD-10-CM

## 2022-12-30 PROCEDURE — 96374 THER/PROPH/DIAG INJ IV PUSH: CPT

## 2022-12-30 PROCEDURE — 96361 HYDRATE IV INFUSION ADD-ON: CPT

## 2022-12-30 PROCEDURE — 25010000002 HEPARIN LOCK FLUSH PER 10 UNITS: Performed by: INTERNAL MEDICINE

## 2022-12-30 PROCEDURE — 96375 TX/PRO/DX INJ NEW DRUG ADDON: CPT

## 2022-12-30 PROCEDURE — 96360 HYDRATION IV INFUSION INIT: CPT

## 2022-12-30 RX ORDER — PANTOPRAZOLE SODIUM 40 MG/10ML
40 INJECTION, POWDER, LYOPHILIZED, FOR SOLUTION INTRAVENOUS ONCE
Status: CANCELLED
Start: 2022-12-30 | End: 2022-12-30

## 2022-12-30 RX ORDER — SODIUM CHLORIDE 0.9 % (FLUSH) 0.9 %
10 SYRINGE (ML) INJECTION AS NEEDED
Status: DISCONTINUED | OUTPATIENT
Start: 2022-12-30 | End: 2022-12-30 | Stop reason: HOSPADM

## 2022-12-30 RX ORDER — SUCRALFATE ORAL 1 G/10ML
1 SUSPENSION ORAL 4 TIMES DAILY
Qty: 414 ML | Refills: 0 | Status: SHIPPED | OUTPATIENT
Start: 2022-12-30

## 2022-12-30 RX ORDER — HEPARIN SODIUM (PORCINE) LOCK FLUSH IV SOLN 100 UNIT/ML 100 UNIT/ML
500 SOLUTION INTRAVENOUS AS NEEDED
Status: DISCONTINUED | OUTPATIENT
Start: 2022-12-30 | End: 2022-12-30 | Stop reason: HOSPADM

## 2022-12-30 RX ORDER — HEPARIN SODIUM (PORCINE) LOCK FLUSH IV SOLN 100 UNIT/ML 100 UNIT/ML
500 SOLUTION INTRAVENOUS AS NEEDED
Status: CANCELLED | OUTPATIENT
Start: 2022-12-30

## 2022-12-30 RX ORDER — PANTOPRAZOLE SODIUM 40 MG/10ML
40 INJECTION, POWDER, LYOPHILIZED, FOR SOLUTION INTRAVENOUS ONCE
Status: COMPLETED | OUTPATIENT
Start: 2022-12-30 | End: 2022-12-30

## 2022-12-30 RX ORDER — SODIUM CHLORIDE 0.9 % (FLUSH) 0.9 %
10 SYRINGE (ML) INJECTION AS NEEDED
Status: CANCELLED | OUTPATIENT
Start: 2022-12-30

## 2022-12-30 RX ADMIN — SODIUM CHLORIDE 1000 ML: 9 INJECTION, SOLUTION INTRAVENOUS at 13:14

## 2022-12-30 RX ADMIN — Medication 10 ML: at 14:12

## 2022-12-30 RX ADMIN — PANTOPRAZOLE SODIUM 40 MG: 40 INJECTION, POWDER, FOR SOLUTION INTRAVENOUS at 13:15

## 2022-12-30 RX ADMIN — HEPARIN 500 UNITS: 100 SYRINGE at 14:12

## 2022-12-30 NOTE — TELEPHONE ENCOUNTER
Caller: Aranza Khan    Relationship: Emergency Contact    Best call back number: [phone]    Who are you requesting to speak with (clinical staff, provider,  specific staff member): CLINICAL      What was the call regarding:PATIENT IS HAVING OVER WHELMING HEARTBURN AND HAVING DIFFICULTY EATING OR DRINKING ANYTHING.    REQUESTING POSSIBLE TREATMENT/ MEDICATION FOR RELIEF.     Do you require a callback: YES

## 2022-12-30 NOTE — TELEPHONE ENCOUNTER
I returned Aranza's phone call.  She advised that patients acid reflux is unbearable and she is crying because of it.  She has been taking the omeprazole 40mg daily and it only lasts for a few hours.  She is not having mouth sores, but can't eat or drink at all.  Discussed with Dr. Lambert who advised to send in Carafate for her 1gm 4 x day and bring her in for 1 liter NS and 40mg iv protonix.

## 2023-01-10 NOTE — PROGRESS NOTES
DATE OF VISIT: 1/11/2023    REASON FOR VISIT: Followup for metastatic right-sided colon cancer     PROBLEM LIST:  1. Metastatic colon cancer TX NX M1 a stage Perry:  A.  Presented with abdominal pain and jaundice  B.  CT abdomen pelvis done November 04, 2022 confirmed diffuse liver metastases.  C.  Diagnosed after CT-guided liver biopsy done on November 22, 2022 confirming adenocarcinoma CK20 positive CK7 negative.  D.  Colonoscopy done December 08, 2020 to confirm transverse colon mass however biopsy revealed adenoma.  E.  Started palliative chemotherapy with dose adjusted FOLFOXIRI December 14, 2022, status post 2 cycles  2.  Elevated liver enzymes:  A.  Induced by metastatic disease  3.  Cancer-related pain  4.  Anxiety  5.  Hypertension    HISTORY OF PRESENT ILLNESS: The patient is a very pleasant 52 y.o. female  with past medical history significant for metastatic right-sided colon cancer diagnosed November 22, 2022.  Started on palliative chemotherapy with dose adjusted FOLFOXIRI. The patient is here today for scheduled follow-up visit with treatment cycle number 3.    SUBJECTIVE: Amber is here today with her .  Her abdominal pain has significantly improved.  She is complaining of severe heartburn.  Prilosec is not helping.  She is having nausea that gets better with Zofran.    Past History:  Medical History: has a past medical history of Anxiety, Cancer (HCC), Ear piercing, Gallstones, History of irritable bowel syndrome, Hypertension, Seasonal allergies, Tattoos, and Wears glasses.   Surgical History: has a past surgical history that includes postpartum tubal ligation; Dilation and curettage of uterus (2013); Tumor removal (2013); Portacath placement (N/A, 12/8/2022); and Colonoscopy (N/A, 12/8/2022).   Family History: family history is not on file.   Social History: reports that she has never smoked. She has never used smokeless tobacco. She reports current alcohol use. She reports that she does not  "use drugs.    (Not in a hospital admission)     Allergies: Losartan, Valium [diazepam], and Citrus     Review of Systems   Constitutional: Positive for fatigue.   Respiratory: Negative.    Gastrointestinal: Positive for abdominal pain, diarrhea and nausea.   Genitourinary: Negative.    Musculoskeletal: Positive for arthralgias.   Psychiatric/Behavioral: The patient is nervous/anxious.        PHYSICAL EXAMINATION:   /73   Pulse 85   Temp 97.9 °F (36.6 °C) (Temporal)   Resp 12   Ht 157.5 cm (62\")   Wt 59.4 kg (131 lb)   SpO2 94%   BMI 23.96 kg/m²    Pain Score    01/11/23 0837   PainSc: 0-No pain                     ECOG Performance Status: 1 - Symptomatic but completely ambulatory      General Appearance:      alert, cooperative, no apparent distress and appears stated age   Lungs:   Clear to auscultation bilaterally; respirations regular, even, and unlabored bilaterally   Heart:  Regular rate and rhythm, no murmurs appreciated   Abdomen:   Soft, non-tender, non-distended and no organomegaly                 No visits with results within 2 Week(s) from this visit.   Latest known visit with results is:   Infusion on 12/27/2022   Component Date Value Ref Range Status   • CEA 12/27/2022 251.00  ng/mL Final   • Glucose 12/27/2022 97  65 - 99 mg/dL Final   • BUN 12/27/2022 9  6 - 20 mg/dL Final   • Creatinine 12/27/2022 0.78  0.57 - 1.00 mg/dL Final   • Sodium 12/27/2022 136  136 - 145 mmol/L Final   • Potassium 12/27/2022 3.3 (L)  3.5 - 5.2 mmol/L Final   • Chloride 12/27/2022 102  98 - 107 mmol/L Final   • CO2 12/27/2022 21.2 (L)  22.0 - 29.0 mmol/L Final   • Calcium 12/27/2022 9.4  8.6 - 10.5 mg/dL Final   • Total Protein 12/27/2022 7.7  6.0 - 8.5 g/dL Final   • Albumin 12/27/2022 3.1 (L)  3.5 - 5.2 g/dL Final   • ALT (SGPT) 12/27/2022 48 (H)  1 - 33 U/L Final   • AST (SGOT) 12/27/2022 90 (H)  1 - 32 U/L Final   • Alkaline Phosphatase 12/27/2022 439 (H)  39 - 117 U/L Final   • Total Bilirubin 12/27/2022 3.5 " (H)  0.0 - 1.2 mg/dL Final   • Globulin 12/27/2022 4.6  gm/dL Final   • A/G Ratio 12/27/2022 0.7  g/dL Final   • BUN/Creatinine Ratio 12/27/2022 11.5  7.0 - 25.0 Final   • Anion Gap 12/27/2022 12.8  5.0 - 15.0 mmol/L Final   • eGFR 12/27/2022 91.5  >60.0 mL/min/1.73 Final    National Kidney Foundation and American Society of Nephrology (ASN) Task Force recommended calculation based on the Chronic Kidney Disease Epidemiology Collaboration (CKD-EPI) equation refit without adjustment for race.   • WBC 12/27/2022 4.23  3.40 - 10.80 10*3/mm3 Final   • RBC 12/27/2022 3.09 (L)  3.77 - 5.28 10*6/mm3 Final   • Hemoglobin 12/27/2022 8.3 (L)  12.0 - 15.9 g/dL Final   • Hematocrit 12/27/2022 25.8 (L)  34.0 - 46.6 % Final   • MCV 12/27/2022 83.5  79.0 - 97.0 fL Final   • MCH 12/27/2022 26.9  26.6 - 33.0 pg Final   • MCHC 12/27/2022 32.2  31.5 - 35.7 g/dL Final   • RDW 12/27/2022 20.0 (H)  12.3 - 15.4 % Final   • RDW-SD 12/27/2022 59.3 (H)  37.0 - 54.0 fl Final   • MPV 12/27/2022 9.2  6.0 - 12.0 fL Final   • Platelets 12/27/2022 793 (H)  140 - 450 10*3/mm3 Final   • Scan Slide 12/27/2022    Final    See Manual Differential Results   • Neutrophil % 12/27/2022 32.0 (L)  42.7 - 76.0 % Final   • Lymphocyte % 12/27/2022 35.0  19.6 - 45.3 % Final   • Monocyte % 12/27/2022 20.0 (H)  5.0 - 12.0 % Final   • Eosinophil % 12/27/2022 12.0 (H)  0.3 - 6.2 % Final   • Basophil % 12/27/2022 1.0  0.0 - 1.5 % Final   • Neutrophils Absolute 12/27/2022 1.35 (L)  1.70 - 7.00 10*3/mm3 Final   • Lymphocytes Absolute 12/27/2022 1.48  0.70 - 3.10 10*3/mm3 Final   • Monocytes Absolute 12/27/2022 0.85  0.10 - 0.90 10*3/mm3 Final   • Eosinophils Absolute 12/27/2022 0.51 (H)  0.00 - 0.40 10*3/mm3 Final   • Basophils Absolute 12/27/2022 0.04  0.00 - 0.20 10*3/mm3 Final   • Anisocytosis 12/27/2022 Mod/2+  None Seen Final   • Hypochromia 12/27/2022 Slight/1+  None Seen Final   • WBC Morphology 12/27/2022 Normal  Normal Final   • Platelet Estimate 12/27/2022  Increased  Normal Final   • Clumped Platelets 12/27/2022 Present  None Seen Final        No results found.    ASSESSMENT: The patient is a very pleasant 52 y.o. female  with right-sided metastatic colon cancer      PLAN:    1.  Right-sided metastatic colon cancer:  A.  I will proceed with chemotherapy as scheduled FOLFOXIRI cycle #3.  B.  The patient will follow-up with us for cycle #3.  C.  I will repeat the patient's scans after cycle #6.  D.  I will continue 25% dose reduction secondary to elevated baseline liver enzymes.  E.  I will continue to monitor the patient blood work including blood counts kidney function liver function and electrolytes.  F.  I did go over her molecular analysis results with the patient and explained to her her K-glenroy came back wild type, no NRAS or BRAF mutations, TMB low and microsatellite stable pattern.  While the patient may not benefit from immunotherapy she will be a candidate for EGFR inhibitors down the road.  G.  Today I discussed with the patient her blood work results from December 27, 2022.  I reassured the patient her liver enzymes had improved significantly her total bilirubin has dropped from 7.6-3.5.  She has multiple chemotherapy related toxicities including anemia hemoglobin down to 8.3 potassium down 3.3.    2.  Elevated liver enzymes:  A.  Induced by liver metastases  B.  I will continue 25% dose reduction of chemo drugs per  C.  I will repeat labs before next infusion.    3.  Chemotherapy-induced nausea:  A.  I will start the patient on Zofran as needed for    4.  Chemotherapy-induced diarrhea:  A.  I will start the patient on Imodium as needed    5. Cancer-related pain:  A.  I will continue the patient oxycodone 5 mg twice a day.  I will give the patient prescription today.     6.  Anxiety:  A.  I will continue the patient on Ativan 1 mg nightly as needed.  I will give a new prescription today.     7.  Hypertension:  A.  She will continue losartan.  B.  I explained  to the patient this will interfere with our management since chemotherapy typically can cause hypotension we will have to monitor blood pressure closely.    8.  Dehydration:  A.  I will add 1 L of IV fluid on day 3 of each chemotherapy cycle.    9.  Chemotherapy-induced anemia:  A.  I explained the patient hemoglobin is 8.3 on December 27, 2022.  B.  I will continue monitor her CBC prior to each infusion.  CP we will transfuse as needed for hemoglobin less than 8.    10.  Chemotherapy-induced heartburn:  A.  Continue Prilosec 40 mg twice a day  B.  I will add Carafate 1 g every 6 hours    FOLLOW UP: 2 weeks with cycle #4.    Iban Lambert MD  1/11/2023

## 2023-01-11 ENCOUNTER — OFFICE VISIT (OUTPATIENT)
Dept: ONCOLOGY | Facility: CLINIC | Age: 53
End: 2023-01-11
Payer: MEDICAID

## 2023-01-11 ENCOUNTER — INFUSION (OUTPATIENT)
Dept: ONCOLOGY | Facility: HOSPITAL | Age: 53
End: 2023-01-11
Payer: MEDICAID

## 2023-01-11 VITALS
OXYGEN SATURATION: 94 % | HEART RATE: 85 BPM | BODY MASS INDEX: 24.11 KG/M2 | HEIGHT: 62 IN | TEMPERATURE: 97.9 F | RESPIRATION RATE: 12 BRPM | WEIGHT: 131 LBS | SYSTOLIC BLOOD PRESSURE: 158 MMHG | DIASTOLIC BLOOD PRESSURE: 73 MMHG

## 2023-01-11 DIAGNOSIS — C18.9 METASTATIC COLON CANCER TO LIVER: ICD-10-CM

## 2023-01-11 DIAGNOSIS — C18.2 CANCER OF RIGHT COLON: ICD-10-CM

## 2023-01-11 DIAGNOSIS — C78.7 METASTATIC COLON CANCER TO LIVER: Primary | ICD-10-CM

## 2023-01-11 DIAGNOSIS — C18.2 CANCER OF RIGHT COLON: Primary | ICD-10-CM

## 2023-01-11 DIAGNOSIS — C78.7 METASTATIC COLON CANCER TO LIVER: ICD-10-CM

## 2023-01-11 DIAGNOSIS — C18.9 METASTATIC COLON CANCER TO LIVER: Primary | ICD-10-CM

## 2023-01-11 LAB
ALBUMIN SERPL-MCNC: 3.4 G/DL (ref 3.5–5.2)
ALBUMIN/GLOB SERPL: 0.9 G/DL
ALP SERPL-CCNC: 232 U/L (ref 39–117)
ALT SERPL W P-5'-P-CCNC: 46 U/L (ref 1–33)
ANION GAP SERPL CALCULATED.3IONS-SCNC: 9.9 MMOL/L (ref 5–15)
AST SERPL-CCNC: 75 U/L (ref 1–32)
BASOPHILS # BLD AUTO: 0.04 10*3/MM3 (ref 0–0.2)
BASOPHILS NFR BLD AUTO: 1.3 % (ref 0–1.5)
BILIRUB SERPL-MCNC: 1.6 MG/DL (ref 0–1.2)
BUN SERPL-MCNC: 8 MG/DL (ref 6–20)
BUN/CREAT SERPL: 13.3 (ref 7–25)
CALCIUM SPEC-SCNC: 9.2 MG/DL (ref 8.6–10.5)
CEA SERPL-MCNC: 21.9 NG/ML
CHLORIDE SERPL-SCNC: 106 MMOL/L (ref 98–107)
CO2 SERPL-SCNC: 25.1 MMOL/L (ref 22–29)
CREAT SERPL-MCNC: 0.6 MG/DL (ref 0.57–1)
DEPRECATED RDW RBC AUTO: 58.4 FL (ref 37–54)
EGFRCR SERPLBLD CKD-EPI 2021: 108.2 ML/MIN/1.73
EOSINOPHIL # BLD AUTO: 0.35 10*3/MM3 (ref 0–0.4)
EOSINOPHIL NFR BLD AUTO: 11.1 % (ref 0.3–6.2)
ERYTHROCYTE [DISTWIDTH] IN BLOOD BY AUTOMATED COUNT: 19 % (ref 12.3–15.4)
GLOBULIN UR ELPH-MCNC: 3.9 GM/DL
GLUCOSE SERPL-MCNC: 94 MG/DL (ref 65–99)
HCT VFR BLD AUTO: 29.9 % (ref 34–46.6)
HGB BLD-MCNC: 9.6 G/DL (ref 12–15.9)
IMM GRANULOCYTES # BLD AUTO: 0.01 10*3/MM3 (ref 0–0.05)
IMM GRANULOCYTES NFR BLD AUTO: 0.3 % (ref 0–0.5)
LYMPHOCYTES # BLD AUTO: 1.34 10*3/MM3 (ref 0.7–3.1)
LYMPHOCYTES NFR BLD AUTO: 42.4 % (ref 19.6–45.3)
MCH RBC QN AUTO: 27.1 PG (ref 26.6–33)
MCHC RBC AUTO-ENTMCNC: 32.1 G/DL (ref 31.5–35.7)
MCV RBC AUTO: 84.5 FL (ref 79–97)
MONOCYTES # BLD AUTO: 0.51 10*3/MM3 (ref 0.1–0.9)
MONOCYTES NFR BLD AUTO: 16.1 % (ref 5–12)
NEUTROPHILS NFR BLD AUTO: 0.91 10*3/MM3 (ref 1.7–7)
NEUTROPHILS NFR BLD AUTO: 28.8 % (ref 42.7–76)
NRBC BLD AUTO-RTO: 0 /100 WBC (ref 0–0.2)
PLATELET # BLD AUTO: 268 10*3/MM3 (ref 140–450)
PMV BLD AUTO: 9.1 FL (ref 6–12)
POTASSIUM SERPL-SCNC: 3.3 MMOL/L (ref 3.5–5.2)
PROT SERPL-MCNC: 7.3 G/DL (ref 6–8.5)
RBC # BLD AUTO: 3.54 10*6/MM3 (ref 3.77–5.28)
SODIUM SERPL-SCNC: 141 MMOL/L (ref 136–145)
WBC NRBC COR # BLD: 3.16 10*3/MM3 (ref 3.4–10.8)

## 2023-01-11 PROCEDURE — 96417 CHEMO IV INFUS EACH ADDL SEQ: CPT

## 2023-01-11 PROCEDURE — 25010000002 DEXAMETHASONE SODIUM PHOSPHATE 20 MG/5ML SOLUTION: Performed by: INTERNAL MEDICINE

## 2023-01-11 PROCEDURE — 25010000002 PALONOSETRON 0.25 MG/5ML SOLUTION PREFILLED SYRINGE: Performed by: INTERNAL MEDICINE

## 2023-01-11 PROCEDURE — 25010000002 FLUOROURACIL PER 500 MG: Performed by: INTERNAL MEDICINE

## 2023-01-11 PROCEDURE — 25010000002 IRINOTECAN PER 20 MG: Performed by: INTERNAL MEDICINE

## 2023-01-11 PROCEDURE — 80053 COMPREHEN METABOLIC PANEL: CPT

## 2023-01-11 PROCEDURE — 96368 THER/DIAG CONCURRENT INF: CPT

## 2023-01-11 PROCEDURE — 96416 CHEMO PROLONG INFUSE W/PUMP: CPT

## 2023-01-11 PROCEDURE — 25010000002 OXALIPLATIN PER 0.5 MG: Performed by: INTERNAL MEDICINE

## 2023-01-11 PROCEDURE — 99215 OFFICE O/P EST HI 40 MIN: CPT | Performed by: INTERNAL MEDICINE

## 2023-01-11 PROCEDURE — G0498 CHEMO EXTEND IV INFUS W/PUMP: HCPCS

## 2023-01-11 PROCEDURE — 82378 CARCINOEMBRYONIC ANTIGEN: CPT

## 2023-01-11 PROCEDURE — 96413 CHEMO IV INFUSION 1 HR: CPT

## 2023-01-11 PROCEDURE — 96415 CHEMO IV INFUSION ADDL HR: CPT

## 2023-01-11 PROCEDURE — 25010000002 LEUCOVORIN 500 MG RECONSTITUTED SOLUTION 1 EACH VIAL: Performed by: INTERNAL MEDICINE

## 2023-01-11 PROCEDURE — 85025 COMPLETE CBC W/AUTO DIFF WBC: CPT

## 2023-01-11 PROCEDURE — 96376 TX/PRO/DX INJ SAME DRUG ADON: CPT

## 2023-01-11 PROCEDURE — 96549 UNLISTED CHEMOTHERAPY PX: CPT

## 2023-01-11 PROCEDURE — 96375 TX/PRO/DX INJ NEW DRUG ADDON: CPT

## 2023-01-11 RX ORDER — DIPHENHYDRAMINE HYDROCHLORIDE 50 MG/ML
50 INJECTION INTRAMUSCULAR; INTRAVENOUS AS NEEDED
Status: CANCELLED | OUTPATIENT
Start: 2023-01-24

## 2023-01-11 RX ORDER — ATROPINE SULFATE 1 MG/ML
0.25 INJECTION, SOLUTION INTRAMUSCULAR; INTRAVENOUS; SUBCUTANEOUS
Status: CANCELLED | OUTPATIENT
Start: 2023-01-24

## 2023-01-11 RX ORDER — DEXTROSE MONOHYDRATE 50 MG/ML
250 INJECTION, SOLUTION INTRAVENOUS ONCE
Status: DISCONTINUED | OUTPATIENT
Start: 2023-01-11 | End: 2023-01-11 | Stop reason: HOSPADM

## 2023-01-11 RX ORDER — HEPARIN SODIUM (PORCINE) LOCK FLUSH IV SOLN 100 UNIT/ML 100 UNIT/ML
500 SOLUTION INTRAVENOUS AS NEEDED
Status: DISCONTINUED | OUTPATIENT
Start: 2023-01-11 | End: 2023-01-11 | Stop reason: HOSPADM

## 2023-01-11 RX ORDER — SODIUM CHLORIDE 0.9 % (FLUSH) 0.9 %
10 SYRINGE (ML) INJECTION AS NEEDED
Status: DISCONTINUED | OUTPATIENT
Start: 2023-01-11 | End: 2023-01-11 | Stop reason: HOSPADM

## 2023-01-11 RX ORDER — PALONOSETRON 0.05 MG/ML
0.25 INJECTION, SOLUTION INTRAVENOUS ONCE
Status: COMPLETED | OUTPATIENT
Start: 2023-01-11 | End: 2023-01-11

## 2023-01-11 RX ORDER — HEPARIN SODIUM (PORCINE) LOCK FLUSH IV SOLN 100 UNIT/ML 100 UNIT/ML
500 SOLUTION INTRAVENOUS AS NEEDED
Status: CANCELLED | OUTPATIENT
Start: 2023-01-11

## 2023-01-11 RX ORDER — PALONOSETRON 0.05 MG/ML
0.25 INJECTION, SOLUTION INTRAVENOUS ONCE
Status: CANCELLED | OUTPATIENT
Start: 2023-01-24

## 2023-01-11 RX ORDER — FAMOTIDINE 10 MG/ML
20 INJECTION, SOLUTION INTRAVENOUS AS NEEDED
Status: CANCELLED | OUTPATIENT
Start: 2023-01-24

## 2023-01-11 RX ORDER — DEXTROSE MONOHYDRATE 50 MG/ML
250 INJECTION, SOLUTION INTRAVENOUS ONCE
Status: CANCELLED | OUTPATIENT
Start: 2023-01-24

## 2023-01-11 RX ORDER — SODIUM CHLORIDE 0.9 % (FLUSH) 0.9 %
10 SYRINGE (ML) INJECTION AS NEEDED
Status: CANCELLED | OUTPATIENT
Start: 2023-01-11

## 2023-01-11 RX ADMIN — ATROPINE SULFATE 0.25 MG: 0.4 INJECTION, SOLUTION INTRAVENOUS at 11:02

## 2023-01-11 RX ADMIN — OXALIPLATIN 105 MG: 5 INJECTION, SOLUTION INTRAVENOUS at 12:41

## 2023-01-11 RX ADMIN — ATROPINE SULFATE 0.25 MG: 0.4 INJECTION, SOLUTION INTRAVENOUS at 12:39

## 2023-01-11 RX ADMIN — PALONOSETRON 0.25 MG: 0.25 INJECTION, SOLUTION INTRAVENOUS at 10:41

## 2023-01-11 RX ADMIN — DEXAMETHASONE SODIUM PHOSPHATE 12 MG: 4 INJECTION, SOLUTION INTRAMUSCULAR; INTRAVENOUS at 10:41

## 2023-01-11 RX ADMIN — FLUOROURACIL 2900 MG: 50 INJECTION, SOLUTION INTRAVENOUS at 14:46

## 2023-01-11 RX ADMIN — LEUCOVORIN CALCIUM 480 MG: 500 INJECTION, POWDER, LYOPHILIZED, FOR SOLUTION INTRAMUSCULAR; INTRAVENOUS at 12:41

## 2023-01-11 RX ADMIN — DEXTROSE MONOHYDRATE 200 MG: 50 INJECTION, SOLUTION INTRAVENOUS at 11:05

## 2023-01-13 ENCOUNTER — INFUSION (OUTPATIENT)
Dept: ONCOLOGY | Facility: HOSPITAL | Age: 53
End: 2023-01-13
Payer: MEDICAID

## 2023-01-13 DIAGNOSIS — C18.2 CANCER OF RIGHT COLON: ICD-10-CM

## 2023-01-13 DIAGNOSIS — C78.7 METASTATIC COLON CANCER TO LIVER: Primary | ICD-10-CM

## 2023-01-13 DIAGNOSIS — C18.9 METASTATIC COLON CANCER TO LIVER: Primary | ICD-10-CM

## 2023-01-13 PROCEDURE — 96374 THER/PROPH/DIAG INJ IV PUSH: CPT

## 2023-01-13 PROCEDURE — 25010000002 HEPARIN LOCK FLUSH PER 10 UNITS: Performed by: INTERNAL MEDICINE

## 2023-01-13 PROCEDURE — 96375 TX/PRO/DX INJ NEW DRUG ADDON: CPT

## 2023-01-13 PROCEDURE — 96360 HYDRATION IV INFUSION INIT: CPT

## 2023-01-13 PROCEDURE — 96361 HYDRATE IV INFUSION ADD-ON: CPT

## 2023-01-13 RX ORDER — SODIUM CHLORIDE 0.9 % (FLUSH) 0.9 %
10 SYRINGE (ML) INJECTION AS NEEDED
Status: DISCONTINUED | OUTPATIENT
Start: 2023-01-13 | End: 2023-01-13 | Stop reason: HOSPADM

## 2023-01-13 RX ORDER — HEPARIN SODIUM (PORCINE) LOCK FLUSH IV SOLN 100 UNIT/ML 100 UNIT/ML
500 SOLUTION INTRAVENOUS AS NEEDED
Status: DISCONTINUED | OUTPATIENT
Start: 2023-01-13 | End: 2023-01-13 | Stop reason: HOSPADM

## 2023-01-13 RX ORDER — HEPARIN SODIUM (PORCINE) LOCK FLUSH IV SOLN 100 UNIT/ML 100 UNIT/ML
500 SOLUTION INTRAVENOUS AS NEEDED
Status: CANCELLED | OUTPATIENT
Start: 2023-01-13

## 2023-01-13 RX ORDER — SODIUM CHLORIDE 0.9 % (FLUSH) 0.9 %
10 SYRINGE (ML) INJECTION AS NEEDED
Status: CANCELLED | OUTPATIENT
Start: 2023-01-13

## 2023-01-13 RX ORDER — FAMOTIDINE 10 MG/ML
20 INJECTION, SOLUTION INTRAVENOUS 2 TIMES DAILY
Status: DISCONTINUED | OUTPATIENT
Start: 2023-01-13 | End: 2023-01-13 | Stop reason: HOSPADM

## 2023-01-13 RX ADMIN — SODIUM CHLORIDE 1000 ML: 9 INJECTION, SOLUTION INTRAVENOUS at 13:53

## 2023-01-13 RX ADMIN — HEPARIN 500 UNITS: 100 SYRINGE at 15:00

## 2023-01-13 RX ADMIN — FAMOTIDINE 20 MG: 10 INJECTION INTRAVENOUS at 14:21

## 2023-01-13 RX ADMIN — Medication 10 ML: at 15:00

## 2023-01-24 NOTE — PROGRESS NOTES
DATE OF VISIT: 1/25/2023    REASON FOR VISIT: Followup for metastatic right-sided colon cancer     PROBLEM LIST:  1. Metastatic colon cancer TX NX M1 a stage Perry:  A.  Presented with abdominal pain and jaundice  B.  CT abdomen pelvis done November 04, 2022 confirmed diffuse liver metastases.  C.  Diagnosed after CT-guided liver biopsy done on November 22, 2022 confirming adenocarcinoma CK20 positive CK7 negative.  D.  Colonoscopy done December 08, 2020 to confirm transverse colon mass however biopsy revealed adenoma.  E.  Started palliative chemotherapy with dose adjusted FOLFOXIRI December 14, 2022, status post 3 cycles  2.  Elevated liver enzymes:  A.  Induced by metastatic disease  3.  Cancer-related pain  4.  Anxiety  5.  Hypertension    HISTORY OF PRESENT ILLNESS: The patient is a very pleasant 52 y.o. female  with past medical history significant for metastatic right-sided colon cancer diagnosed November 22, 2022.  Started on palliative chemotherapy with dose adjusted FOLFOXIRI. The patient is here today for scheduled follow-up visit with treatment cycle number 4.    SUBJECTIVE: Amber is here today with her .  Her abdominal pain has improved.  She is doing fairly well.  She said her energy level is pretty good.  She says she can notice a difference in her jaundice in her eyes and skin.  She had some heartburn last cycle but it was better than the first cycle.  She is using Prilosec and Carafate.  She uses Zofran and Compazine for nausea and that is managing her nausea.        Past History:  Medical History: has a past medical history of Anxiety, Cancer (HCC), Ear piercing, Gallstones, History of irritable bowel syndrome, Hypertension, Seasonal allergies, Tattoos, and Wears glasses.   Surgical History: has a past surgical history that includes postpartum tubal ligation; Dilation and curettage of uterus (2013); Tumor removal (2013); Portacath placement (N/A, 12/8/2022); and Colonoscopy (N/A, 12/8/2022).  "  Family History: family history is not on file.   Social History: reports that she has never smoked. She has never used smokeless tobacco. She reports current alcohol use. She reports that she does not use drugs.    (Not in a hospital admission)     Allergies: Losartan, Valium [diazepam], and Citrus     Review of Systems   Constitutional: Positive for fatigue.   Respiratory: Negative.    Gastrointestinal: Positive for abdominal pain, diarrhea and nausea.   Genitourinary: Negative.    Musculoskeletal: Positive for arthralgias.   Psychiatric/Behavioral: The patient is nervous/anxious.        PHYSICAL EXAMINATION:   /67   Pulse 77   Temp 97.1 °F (36.2 °C) (Temporal)   Resp 12   Ht 157.5 cm (62\")   Wt 59.4 kg (131 lb)   SpO2 99%   BMI 23.96 kg/m²    Pain Score    01/25/23 0831   PainSc: 0-No pain                     ECOG Performance Status: 1 - Symptomatic but completely ambulatory      General Appearance:      alert, cooperative, no apparent distress and appears stated age   Lungs:   Clear to auscultation bilaterally; respirations regular, even, and unlabored bilaterally   Heart:  Regular rate and rhythm, no murmurs appreciated   Abdomen:   Soft, non-tender, non-distended and no organomegaly                 No visits with results within 2 Week(s) from this visit.   Latest known visit with results is:   Infusion on 01/11/2023   Component Date Value Ref Range Status   • CEA 01/11/2023 21.90  ng/mL Final   • Glucose 01/11/2023 94  65 - 99 mg/dL Final   • BUN 01/11/2023 8  6 - 20 mg/dL Final   • Creatinine 01/11/2023 0.60  0.57 - 1.00 mg/dL Final   • Sodium 01/11/2023 141  136 - 145 mmol/L Final   • Potassium 01/11/2023 3.3 (L)  3.5 - 5.2 mmol/L Final   • Chloride 01/11/2023 106  98 - 107 mmol/L Final   • CO2 01/11/2023 25.1  22.0 - 29.0 mmol/L Final   • Calcium 01/11/2023 9.2  8.6 - 10.5 mg/dL Final   • Total Protein 01/11/2023 7.3  6.0 - 8.5 g/dL Final   • Albumin 01/11/2023 3.4 (L)  3.5 - 5.2 g/dL Final "   • ALT (SGPT) 01/11/2023 46 (H)  1 - 33 U/L Final   • AST (SGOT) 01/11/2023 75 (H)  1 - 32 U/L Final   • Alkaline Phosphatase 01/11/2023 232 (H)  39 - 117 U/L Final   • Total Bilirubin 01/11/2023 1.6 (H)  0.0 - 1.2 mg/dL Final   • Globulin 01/11/2023 3.9  gm/dL Final   • A/G Ratio 01/11/2023 0.9  g/dL Final   • BUN/Creatinine Ratio 01/11/2023 13.3  7.0 - 25.0 Final   • Anion Gap 01/11/2023 9.9  5.0 - 15.0 mmol/L Final   • eGFR 01/11/2023 108.2  >60.0 mL/min/1.73 Final    National Kidney Foundation and American Society of Nephrology (ASN) Task Force recommended calculation based on the Chronic Kidney Disease Epidemiology Collaboration (CKD-EPI) equation refit without adjustment for race.   • WBC 01/11/2023 3.16 (L)  3.40 - 10.80 10*3/mm3 Final   • RBC 01/11/2023 3.54 (L)  3.77 - 5.28 10*6/mm3 Final   • Hemoglobin 01/11/2023 9.6 (L)  12.0 - 15.9 g/dL Final   • Hematocrit 01/11/2023 29.9 (L)  34.0 - 46.6 % Final   • MCV 01/11/2023 84.5  79.0 - 97.0 fL Final   • MCH 01/11/2023 27.1  26.6 - 33.0 pg Final   • MCHC 01/11/2023 32.1  31.5 - 35.7 g/dL Final   • RDW 01/11/2023 19.0 (H)  12.3 - 15.4 % Final   • RDW-SD 01/11/2023 58.4 (H)  37.0 - 54.0 fl Final   • MPV 01/11/2023 9.1  6.0 - 12.0 fL Final   • Platelets 01/11/2023 268  140 - 450 10*3/mm3 Final   • Neutrophil % 01/11/2023 28.8 (L)  42.7 - 76.0 % Final   • Lymphocyte % 01/11/2023 42.4  19.6 - 45.3 % Final   • Monocyte % 01/11/2023 16.1 (H)  5.0 - 12.0 % Final   • Eosinophil % 01/11/2023 11.1 (H)  0.3 - 6.2 % Final   • Basophil % 01/11/2023 1.3  0.0 - 1.5 % Final   • Immature Grans % 01/11/2023 0.3  0.0 - 0.5 % Final   • Neutrophils, Absolute 01/11/2023 0.91 (L)  1.70 - 7.00 10*3/mm3 Final   • Lymphocytes, Absolute 01/11/2023 1.34  0.70 - 3.10 10*3/mm3 Final   • Monocytes, Absolute 01/11/2023 0.51  0.10 - 0.90 10*3/mm3 Final   • Eosinophils, Absolute 01/11/2023 0.35  0.00 - 0.40 10*3/mm3 Final   • Basophils, Absolute 01/11/2023 0.04  0.00 - 0.20 10*3/mm3 Final   •  Immature Grans, Absolute 01/11/2023 0.01  0.00 - 0.05 10*3/mm3 Final   • nRBC 01/11/2023 0.0  0.0 - 0.2 /100 WBC Final        No results found.    ASSESSMENT: The patient is a very pleasant 52 y.o. female  with right-sided metastatic colon cancer      PLAN:    1.  Right-sided metastatic colon cancer:  A.  I will proceed with chemotherapy as scheduled FOLFOXIRI cycle #4.  B.  The patient will follow-up with us for cycle #4.  C.  I will repeat the patient's scans after cycle #6.  D.  I will continue 25% dose reduction secondary to elevated baseline liver enzymes.  E.  I will continue to monitor the patient blood work including blood counts kidney function liver function and electrolytes.  F.  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  While the patient may not benefit from immunotherapy she will be a candidate for EGFR inhibitors down the road.  G.  Today I discussed with the patient her blood work results from January 11, 2023.  I reassured the patient her liver enzymes had improved significantly her total bilirubin has dropped from 7.6-1.6.  She has multiple chemotherapy related toxicities including anemia hemoglobin down to 8.3 potassium down 3.3.    2.  Elevated liver enzymes:  A.  Induced by liver metastases  B.  I will continue 25% dose reduction of chemo drugs per  C.  I will repeat labs before next infusion.    3.  Chemotherapy-induced nausea:  A.  I will continue the patient on Zofran as needed for    4.  Chemotherapy-induced diarrhea:  A.  I will continue the patient on Imodium as needed    5. Cancer-related pain:  A.  I will continue the patient oxycodone 5 mg twice a day.  I will give the patient prescription today.     6.  Anxiety:  A.  I will continue the patient on Ativan 1 mg nightly as needed.       7.  Hypertension:  A.  She will continue losartan.  B.  I reviewed with the patient this will interfere with our  management since chemotherapy typically can cause hypotension we will have to monitor blood pressure closely.    8.  Dehydration:  A.  I will continue 1 L of IV fluid on day 3 of each chemotherapy cycle.    9.  Chemotherapy-induced anemia:  A.  I explained the patient hemoglobin is 9.6 on January 11, 2023.  B.  I will continue monitor her CBC prior to each infusion.  CP we will transfuse as needed for hemoglobin less than 8.    10.  Chemotherapy-induced heartburn:  A.  Continue Prilosec 40 mg twice a day  B.  I will continue Carafate 1 g every 6 hours    FOLLOW UP: 2 weeks with cycle #5.    Ana Cristina Cano, APRN  1/25/2023

## 2023-01-25 ENCOUNTER — INFUSION (OUTPATIENT)
Dept: ONCOLOGY | Facility: HOSPITAL | Age: 53
End: 2023-01-25
Payer: MEDICAID

## 2023-01-25 ENCOUNTER — OFFICE VISIT (OUTPATIENT)
Dept: ONCOLOGY | Facility: CLINIC | Age: 53
End: 2023-01-25
Payer: MEDICAID

## 2023-01-25 VITALS
HEIGHT: 62 IN | RESPIRATION RATE: 12 BRPM | TEMPERATURE: 97.1 F | BODY MASS INDEX: 24.11 KG/M2 | HEART RATE: 77 BPM | WEIGHT: 131 LBS | OXYGEN SATURATION: 99 % | SYSTOLIC BLOOD PRESSURE: 146 MMHG | DIASTOLIC BLOOD PRESSURE: 67 MMHG

## 2023-01-25 DIAGNOSIS — C78.7 METASTATIC COLON CANCER TO LIVER: Primary | ICD-10-CM

## 2023-01-25 DIAGNOSIS — C18.9 METASTATIC COLON CANCER TO LIVER: Primary | ICD-10-CM

## 2023-01-25 LAB
ALBUMIN SERPL-MCNC: 3.7 G/DL (ref 3.5–5.2)
ALBUMIN/GLOB SERPL: 1 G/DL
ALP SERPL-CCNC: 257 U/L (ref 39–117)
ALT SERPL W P-5'-P-CCNC: 51 U/L (ref 1–33)
ANION GAP SERPL CALCULATED.3IONS-SCNC: 8.2 MMOL/L (ref 5–15)
AST SERPL-CCNC: 75 U/L (ref 1–32)
BASOPHILS # BLD AUTO: 0.01 10*3/MM3 (ref 0–0.2)
BASOPHILS NFR BLD AUTO: 0.2 % (ref 0–1.5)
BILIRUB SERPL-MCNC: 1.2 MG/DL (ref 0–1.2)
BUN SERPL-MCNC: 7 MG/DL (ref 6–20)
BUN/CREAT SERPL: 10.3 (ref 7–25)
CALCIUM SPEC-SCNC: 9.5 MG/DL (ref 8.6–10.5)
CEA SERPL-MCNC: 8.42 NG/ML
CHLORIDE SERPL-SCNC: 105 MMOL/L (ref 98–107)
CO2 SERPL-SCNC: 27.8 MMOL/L (ref 22–29)
CREAT SERPL-MCNC: 0.68 MG/DL (ref 0.57–1)
DEPRECATED RDW RBC AUTO: 58.6 FL (ref 37–54)
EGFRCR SERPLBLD CKD-EPI 2021: 104.9 ML/MIN/1.73
EOSINOPHIL # BLD AUTO: 0.27 10*3/MM3 (ref 0–0.4)
EOSINOPHIL NFR BLD AUTO: 6.6 % (ref 0.3–6.2)
ERYTHROCYTE [DISTWIDTH] IN BLOOD BY AUTOMATED COUNT: 19.8 % (ref 12.3–15.4)
GLOBULIN UR ELPH-MCNC: 3.7 GM/DL
GLUCOSE SERPL-MCNC: 94 MG/DL (ref 65–99)
HCT VFR BLD AUTO: 29.2 % (ref 34–46.6)
HGB BLD-MCNC: 9.5 G/DL (ref 12–15.9)
IMM GRANULOCYTES # BLD AUTO: 0 10*3/MM3 (ref 0–0.05)
IMM GRANULOCYTES NFR BLD AUTO: 0 % (ref 0–0.5)
LYMPHOCYTES # BLD AUTO: 1.59 10*3/MM3 (ref 0.7–3.1)
LYMPHOCYTES NFR BLD AUTO: 38.9 % (ref 19.6–45.3)
MCH RBC QN AUTO: 27.5 PG (ref 26.6–33)
MCHC RBC AUTO-ENTMCNC: 32.5 G/DL (ref 31.5–35.7)
MCV RBC AUTO: 84.6 FL (ref 79–97)
MONOCYTES # BLD AUTO: 0.46 10*3/MM3 (ref 0.1–0.9)
MONOCYTES NFR BLD AUTO: 11.2 % (ref 5–12)
NEUTROPHILS NFR BLD AUTO: 1.76 10*3/MM3 (ref 1.7–7)
NEUTROPHILS NFR BLD AUTO: 43.1 % (ref 42.7–76)
NRBC BLD AUTO-RTO: 0 /100 WBC (ref 0–0.2)
PLATELET # BLD AUTO: 217 10*3/MM3 (ref 140–450)
PMV BLD AUTO: 9.8 FL (ref 6–12)
POTASSIUM SERPL-SCNC: 3.4 MMOL/L (ref 3.5–5.2)
PROT SERPL-MCNC: 7.4 G/DL (ref 6–8.5)
RBC # BLD AUTO: 3.45 10*6/MM3 (ref 3.77–5.28)
SODIUM SERPL-SCNC: 141 MMOL/L (ref 136–145)
WBC NRBC COR # BLD: 4.09 10*3/MM3 (ref 3.4–10.8)

## 2023-01-25 PROCEDURE — 80053 COMPREHEN METABOLIC PANEL: CPT

## 2023-01-25 PROCEDURE — G0498 CHEMO EXTEND IV INFUS W/PUMP: HCPCS

## 2023-01-25 PROCEDURE — 25010000002 DEXAMETHASONE SODIUM PHOSPHATE 20 MG/5ML SOLUTION: Performed by: INTERNAL MEDICINE

## 2023-01-25 PROCEDURE — 96417 CHEMO IV INFUS EACH ADDL SEQ: CPT

## 2023-01-25 PROCEDURE — 96415 CHEMO IV INFUSION ADDL HR: CPT

## 2023-01-25 PROCEDURE — 96376 TX/PRO/DX INJ SAME DRUG ADON: CPT

## 2023-01-25 PROCEDURE — 25010000002 FLUOROURACIL PER 500 MG: Performed by: INTERNAL MEDICINE

## 2023-01-25 PROCEDURE — 85025 COMPLETE CBC W/AUTO DIFF WBC: CPT

## 2023-01-25 PROCEDURE — 25010000002 PALONOSETRON 0.25 MG/5ML SOLUTION PREFILLED SYRINGE: Performed by: INTERNAL MEDICINE

## 2023-01-25 PROCEDURE — 25010000002 OXALIPLATIN PER 0.5 MG: Performed by: INTERNAL MEDICINE

## 2023-01-25 PROCEDURE — 82378 CARCINOEMBRYONIC ANTIGEN: CPT

## 2023-01-25 PROCEDURE — 96375 TX/PRO/DX INJ NEW DRUG ADDON: CPT

## 2023-01-25 PROCEDURE — 96368 THER/DIAG CONCURRENT INF: CPT

## 2023-01-25 PROCEDURE — 96416 CHEMO PROLONG INFUSE W/PUMP: CPT

## 2023-01-25 PROCEDURE — 25010000002 IRINOTECAN PER 20 MG: Performed by: INTERNAL MEDICINE

## 2023-01-25 PROCEDURE — 99214 OFFICE O/P EST MOD 30 MIN: CPT | Performed by: NURSE PRACTITIONER

## 2023-01-25 PROCEDURE — 25010000002 LEUCOVORIN CALCIUM PER 50MG: Performed by: INTERNAL MEDICINE

## 2023-01-25 PROCEDURE — 96413 CHEMO IV INFUSION 1 HR: CPT

## 2023-01-25 RX ORDER — PALONOSETRON 0.05 MG/ML
0.25 INJECTION, SOLUTION INTRAVENOUS ONCE
Status: COMPLETED | OUTPATIENT
Start: 2023-01-25 | End: 2023-01-25

## 2023-01-25 RX ORDER — ONDANSETRON 8 MG/1
TABLET, ORALLY DISINTEGRATING ORAL
COMMUNITY
Start: 2023-01-23 | End: 2023-02-22 | Stop reason: SDUPTHER

## 2023-01-25 RX ORDER — DEXTROSE MONOHYDRATE 50 MG/ML
250 INJECTION, SOLUTION INTRAVENOUS ONCE
Status: DISCONTINUED | OUTPATIENT
Start: 2023-01-25 | End: 2023-01-25 | Stop reason: HOSPADM

## 2023-01-25 RX ADMIN — ATROPINE SULFATE 0.25 MG: 0.4 INJECTION, SOLUTION INTRAVENOUS at 10:36

## 2023-01-25 RX ADMIN — ATROPINE SULFATE 0.25 MG: 0.4 INJECTION, SOLUTION INTRAVENOUS at 11:27

## 2023-01-25 RX ADMIN — DEXTROSE MONOHYDRATE 200 MG: 50 INJECTION, SOLUTION INTRAVENOUS at 10:39

## 2023-01-25 RX ADMIN — OXALIPLATIN 105 MG: 5 INJECTION, SOLUTION INTRAVENOUS at 12:11

## 2023-01-25 RX ADMIN — ATROPINE SULFATE 0.25 MG: 0.4 INJECTION, SOLUTION INTRAVENOUS at 14:12

## 2023-01-25 RX ADMIN — LEUCOVORIN CALCIUM 480 MG: 10 INJECTION INTRAMUSCULAR; INTRAVENOUS at 12:11

## 2023-01-25 RX ADMIN — PALONOSETRON 0.25 MG: 0.25 INJECTION, SOLUTION INTRAVENOUS at 10:19

## 2023-01-25 RX ADMIN — FLUOROURACIL 2900 MG: 50 INJECTION, SOLUTION INTRAVENOUS at 14:17

## 2023-01-25 RX ADMIN — DEXAMETHASONE SODIUM PHOSPHATE 12 MG: 4 INJECTION, SOLUTION INTRAMUSCULAR; INTRAVENOUS at 10:21

## 2023-01-27 ENCOUNTER — INFUSION (OUTPATIENT)
Dept: ONCOLOGY | Facility: HOSPITAL | Age: 53
End: 2023-01-27
Payer: MEDICAID

## 2023-01-27 DIAGNOSIS — C78.7 METASTATIC COLON CANCER TO LIVER: ICD-10-CM

## 2023-01-27 DIAGNOSIS — C18.9 METASTATIC COLON CANCER TO LIVER: ICD-10-CM

## 2023-01-27 DIAGNOSIS — C18.2 CANCER OF RIGHT COLON: Primary | ICD-10-CM

## 2023-01-27 PROCEDURE — 96360 HYDRATION IV INFUSION INIT: CPT

## 2023-01-27 PROCEDURE — 96361 HYDRATE IV INFUSION ADD-ON: CPT

## 2023-01-27 PROCEDURE — 25010000002 HEPARIN LOCK FLUSH PER 10 UNITS: Performed by: INTERNAL MEDICINE

## 2023-01-27 PROCEDURE — 96374 THER/PROPH/DIAG INJ IV PUSH: CPT

## 2023-01-27 RX ORDER — HEPARIN SODIUM (PORCINE) LOCK FLUSH IV SOLN 100 UNIT/ML 100 UNIT/ML
500 SOLUTION INTRAVENOUS AS NEEDED
Status: CANCELLED | OUTPATIENT
Start: 2023-01-27

## 2023-01-27 RX ORDER — SODIUM CHLORIDE 0.9 % (FLUSH) 0.9 %
10 SYRINGE (ML) INJECTION AS NEEDED
Status: CANCELLED | OUTPATIENT
Start: 2023-01-27

## 2023-01-27 RX ORDER — SODIUM CHLORIDE 0.9 % (FLUSH) 0.9 %
10 SYRINGE (ML) INJECTION AS NEEDED
Status: DISCONTINUED | OUTPATIENT
Start: 2023-01-27 | End: 2023-01-27 | Stop reason: HOSPADM

## 2023-01-27 RX ORDER — FAMOTIDINE 10 MG/ML
20 INJECTION, SOLUTION INTRAVENOUS ONCE
Status: COMPLETED | OUTPATIENT
Start: 2023-01-27 | End: 2023-01-27

## 2023-01-27 RX ORDER — HEPARIN SODIUM (PORCINE) LOCK FLUSH IV SOLN 100 UNIT/ML 100 UNIT/ML
500 SOLUTION INTRAVENOUS AS NEEDED
Status: DISCONTINUED | OUTPATIENT
Start: 2023-01-27 | End: 2023-01-27 | Stop reason: HOSPADM

## 2023-01-27 RX ADMIN — Medication 500 UNITS: at 14:55

## 2023-01-27 RX ADMIN — FAMOTIDINE 20 MG: 10 INJECTION INTRAVENOUS at 14:01

## 2023-01-27 RX ADMIN — SODIUM CHLORIDE 1000 ML: 9 INJECTION, SOLUTION INTRAVENOUS at 13:55

## 2023-01-27 RX ADMIN — Medication 10 ML: at 14:55

## 2023-01-30 ENCOUNTER — TELEPHONE (OUTPATIENT)
Dept: ONCOLOGY | Facility: CLINIC | Age: 53
End: 2023-01-30
Payer: MEDICAID

## 2023-01-30 NOTE — TELEPHONE ENCOUNTER
Received FMLA form from [employer] via fax.  Completed and faxed with records to:  [facility] [phone]  Case #[id number]

## 2023-02-08 ENCOUNTER — OFFICE VISIT (OUTPATIENT)
Dept: ONCOLOGY | Facility: CLINIC | Age: 53
End: 2023-02-08
Payer: MEDICAID

## 2023-02-08 ENCOUNTER — INFUSION (OUTPATIENT)
Dept: ONCOLOGY | Facility: HOSPITAL | Age: 53
End: 2023-02-08
Payer: MEDICAID

## 2023-02-08 VITALS
DIASTOLIC BLOOD PRESSURE: 85 MMHG | OXYGEN SATURATION: 99 % | HEIGHT: 62 IN | TEMPERATURE: 97.8 F | RESPIRATION RATE: 16 BRPM | BODY MASS INDEX: 23.55 KG/M2 | HEART RATE: 80 BPM | WEIGHT: 128 LBS | SYSTOLIC BLOOD PRESSURE: 180 MMHG

## 2023-02-08 DIAGNOSIS — C78.7 METASTATIC COLON CANCER TO LIVER: Primary | ICD-10-CM

## 2023-02-08 DIAGNOSIS — C18.2 CANCER OF RIGHT COLON: Primary | ICD-10-CM

## 2023-02-08 DIAGNOSIS — E87.6 HYPOKALEMIA: Primary | ICD-10-CM

## 2023-02-08 DIAGNOSIS — C18.9 METASTATIC COLON CANCER TO LIVER: ICD-10-CM

## 2023-02-08 DIAGNOSIS — C18.9 METASTATIC COLON CANCER TO LIVER: Primary | ICD-10-CM

## 2023-02-08 DIAGNOSIS — C78.7 METASTATIC COLON CANCER TO LIVER: ICD-10-CM

## 2023-02-08 LAB
ALBUMIN SERPL-MCNC: 3.7 G/DL (ref 3.5–5.2)
ALBUMIN/GLOB SERPL: 1.1 G/DL
ALP SERPL-CCNC: 249 U/L (ref 39–117)
ALT SERPL W P-5'-P-CCNC: 44 U/L (ref 1–33)
ANION GAP SERPL CALCULATED.3IONS-SCNC: 10.6 MMOL/L (ref 5–15)
AST SERPL-CCNC: 63 U/L (ref 1–32)
BASOPHILS # BLD AUTO: 0.02 10*3/MM3 (ref 0–0.2)
BASOPHILS NFR BLD AUTO: 0.5 % (ref 0–1.5)
BILIRUB SERPL-MCNC: 0.8 MG/DL (ref 0–1.2)
BUN SERPL-MCNC: 6 MG/DL (ref 6–20)
BUN/CREAT SERPL: 9.7 (ref 7–25)
CALCIUM SPEC-SCNC: 9.2 MG/DL (ref 8.6–10.5)
CEA SERPL-MCNC: 5.94 NG/ML
CHLORIDE SERPL-SCNC: 105 MMOL/L (ref 98–107)
CO2 SERPL-SCNC: 24.4 MMOL/L (ref 22–29)
CREAT SERPL-MCNC: 0.62 MG/DL (ref 0.57–1)
DEPRECATED RDW RBC AUTO: 56.7 FL (ref 37–54)
EGFRCR SERPLBLD CKD-EPI 2021: 107.3 ML/MIN/1.73
EOSINOPHIL # BLD AUTO: 0.1 10*3/MM3 (ref 0–0.4)
EOSINOPHIL NFR BLD AUTO: 2.7 % (ref 0.3–6.2)
ERYTHROCYTE [DISTWIDTH] IN BLOOD BY AUTOMATED COUNT: 18.8 % (ref 12.3–15.4)
GLOBULIN UR ELPH-MCNC: 3.3 GM/DL
GLUCOSE SERPL-MCNC: 108 MG/DL (ref 65–99)
HCT VFR BLD AUTO: 30.7 % (ref 34–46.6)
HGB BLD-MCNC: 10.3 G/DL (ref 12–15.9)
IMM GRANULOCYTES # BLD AUTO: 0.01 10*3/MM3 (ref 0–0.05)
IMM GRANULOCYTES NFR BLD AUTO: 0.3 % (ref 0–0.5)
LYMPHOCYTES # BLD AUTO: 1.01 10*3/MM3 (ref 0.7–3.1)
LYMPHOCYTES NFR BLD AUTO: 26.9 % (ref 19.6–45.3)
MCH RBC QN AUTO: 28.1 PG (ref 26.6–33)
MCHC RBC AUTO-ENTMCNC: 33.6 G/DL (ref 31.5–35.7)
MCV RBC AUTO: 83.9 FL (ref 79–97)
MONOCYTES # BLD AUTO: 0.4 10*3/MM3 (ref 0.1–0.9)
MONOCYTES NFR BLD AUTO: 10.6 % (ref 5–12)
NEUTROPHILS NFR BLD AUTO: 2.22 10*3/MM3 (ref 1.7–7)
NEUTROPHILS NFR BLD AUTO: 59 % (ref 42.7–76)
NRBC BLD AUTO-RTO: 0 /100 WBC (ref 0–0.2)
PLATELET # BLD AUTO: 203 10*3/MM3 (ref 140–450)
PMV BLD AUTO: 9.4 FL (ref 6–12)
POTASSIUM SERPL-SCNC: 3.2 MMOL/L (ref 3.5–5.2)
PROT SERPL-MCNC: 7 G/DL (ref 6–8.5)
RBC # BLD AUTO: 3.66 10*6/MM3 (ref 3.77–5.28)
SODIUM SERPL-SCNC: 140 MMOL/L (ref 136–145)
WBC NRBC COR # BLD: 3.76 10*3/MM3 (ref 3.4–10.8)

## 2023-02-08 PROCEDURE — 85025 COMPLETE CBC W/AUTO DIFF WBC: CPT

## 2023-02-08 PROCEDURE — 96415 CHEMO IV INFUSION ADDL HR: CPT

## 2023-02-08 PROCEDURE — 96375 TX/PRO/DX INJ NEW DRUG ADDON: CPT

## 2023-02-08 PROCEDURE — 80053 COMPREHEN METABOLIC PANEL: CPT

## 2023-02-08 PROCEDURE — 25010000002 LEUCOVORIN 500 MG RECONSTITUTED SOLUTION 1 EACH VIAL: Performed by: INTERNAL MEDICINE

## 2023-02-08 PROCEDURE — 25010000002 PROMETHAZINE PER 50 MG: Performed by: INTERNAL MEDICINE

## 2023-02-08 PROCEDURE — 96413 CHEMO IV INFUSION 1 HR: CPT

## 2023-02-08 PROCEDURE — 25010000002 IRINOTECAN PER 20 MG: Performed by: INTERNAL MEDICINE

## 2023-02-08 PROCEDURE — 25010000002 PALONOSETRON 0.25 MG/5ML SOLUTION PREFILLED SYRINGE: Performed by: INTERNAL MEDICINE

## 2023-02-08 PROCEDURE — 25010000002 OXALIPLATIN PER 0.5 MG: Performed by: INTERNAL MEDICINE

## 2023-02-08 PROCEDURE — 25010000002 DEXAMETHASONE SODIUM PHOSPHATE 20 MG/5ML SOLUTION: Performed by: INTERNAL MEDICINE

## 2023-02-08 PROCEDURE — 99215 OFFICE O/P EST HI 40 MIN: CPT | Performed by: INTERNAL MEDICINE

## 2023-02-08 PROCEDURE — 96549 UNLISTED CHEMOTHERAPY PX: CPT

## 2023-02-08 PROCEDURE — 82378 CARCINOEMBRYONIC ANTIGEN: CPT

## 2023-02-08 PROCEDURE — 96376 TX/PRO/DX INJ SAME DRUG ADON: CPT

## 2023-02-08 PROCEDURE — G0498 CHEMO EXTEND IV INFUS W/PUMP: HCPCS

## 2023-02-08 PROCEDURE — 25010000002 FLUOROURACIL PER 500 MG: Performed by: INTERNAL MEDICINE

## 2023-02-08 PROCEDURE — 96416 CHEMO PROLONG INFUSE W/PUMP: CPT

## 2023-02-08 PROCEDURE — 96417 CHEMO IV INFUS EACH ADDL SEQ: CPT

## 2023-02-08 RX ORDER — DEXTROSE MONOHYDRATE 50 MG/ML
250 INJECTION, SOLUTION INTRAVENOUS ONCE
Status: CANCELLED | OUTPATIENT
Start: 2023-02-08

## 2023-02-08 RX ORDER — ATROPINE SULFATE 1 MG/ML
0.25 INJECTION, SOLUTION INTRAMUSCULAR; INTRAVENOUS; SUBCUTANEOUS
Status: CANCELLED | OUTPATIENT
Start: 2023-02-08

## 2023-02-08 RX ORDER — PALONOSETRON 0.05 MG/ML
0.25 INJECTION, SOLUTION INTRAVENOUS ONCE
Status: CANCELLED | OUTPATIENT
Start: 2023-02-08

## 2023-02-08 RX ORDER — FAMOTIDINE 10 MG/ML
20 INJECTION, SOLUTION INTRAVENOUS AS NEEDED
Status: CANCELLED | OUTPATIENT
Start: 2023-02-08

## 2023-02-08 RX ORDER — PROMETHAZINE HYDROCHLORIDE 25 MG/ML
12.5 INJECTION, SOLUTION INTRAMUSCULAR; INTRAVENOUS ONCE
Status: DISCONTINUED | OUTPATIENT
Start: 2023-02-08 | End: 2023-02-08

## 2023-02-08 RX ORDER — DIPHENHYDRAMINE HYDROCHLORIDE 50 MG/ML
50 INJECTION INTRAMUSCULAR; INTRAVENOUS AS NEEDED
Status: CANCELLED | OUTPATIENT
Start: 2023-02-08

## 2023-02-08 RX ORDER — POTASSIUM CHLORIDE 750 MG/1
20 TABLET, EXTENDED RELEASE ORAL DAILY
Qty: 60 TABLET | Refills: 0 | Status: SHIPPED | OUTPATIENT
Start: 2023-02-08

## 2023-02-08 RX ORDER — PALONOSETRON 0.05 MG/ML
0.25 INJECTION, SOLUTION INTRAVENOUS ONCE
Status: COMPLETED | OUTPATIENT
Start: 2023-02-08 | End: 2023-02-08

## 2023-02-08 RX ORDER — DEXTROSE MONOHYDRATE 50 MG/ML
250 INJECTION, SOLUTION INTRAVENOUS ONCE
Status: DISCONTINUED | OUTPATIENT
Start: 2023-02-08 | End: 2023-02-08 | Stop reason: HOSPADM

## 2023-02-08 RX ADMIN — DEXTROSE MONOHYDRATE 200 MG: 50 INJECTION, SOLUTION INTRAVENOUS at 11:34

## 2023-02-08 RX ADMIN — FLUOROURACIL 2900 MG: 50 INJECTION, SOLUTION INTRAVENOUS at 15:35

## 2023-02-08 RX ADMIN — ATROPINE SULFATE 0.25 MG: 0.4 INJECTION, SOLUTION INTRAVENOUS at 13:10

## 2023-02-08 RX ADMIN — ATROPINE SULFATE 0.25 MG: 0.4 INJECTION, SOLUTION INTRAVENOUS at 11:31

## 2023-02-08 RX ADMIN — LEUCOVORIN CALCIUM 480 MG: 500 INJECTION, POWDER, LYOPHILIZED, FOR SOLUTION INTRAMUSCULAR; INTRAVENOUS at 13:11

## 2023-02-08 RX ADMIN — DEXAMETHASONE SODIUM PHOSPHATE 12 MG: 4 INJECTION, SOLUTION INTRAMUSCULAR; INTRAVENOUS at 11:12

## 2023-02-08 RX ADMIN — PROMETHAZINE HYDROCHLORIDE 12.5 MG: 25 INJECTION INTRAMUSCULAR; INTRAVENOUS at 15:19

## 2023-02-08 RX ADMIN — PALONOSETRON 0.25 MG: 0.25 INJECTION, SOLUTION INTRAVENOUS at 11:10

## 2023-02-08 RX ADMIN — OXALIPLATIN 105 MG: 5 INJECTION, SOLUTION INTRAVENOUS at 13:11

## 2023-02-08 NOTE — PROGRESS NOTES
DATE OF VISIT: 2/8/2023    REASON FOR VISIT: Followup for metastatic right-sided colon cancer     PROBLEM LIST:  1. Metastatic colon cancer TX NX M1 a stage Perry:  A.  Presented with abdominal pain and jaundice  B.  CT abdomen pelvis done November 04, 2022 confirmed diffuse liver metastases.  C.  Diagnosed after CT-guided liver biopsy done on November 22, 2022 confirming adenocarcinoma CK20 positive CK7 negative.  D.  Colonoscopy done December 08, 2020 to confirm transverse colon mass however biopsy revealed adenoma.  E.  Started palliative chemotherapy with dose adjusted FOLFOXIRI December 14, 2022, status post 4 cycles  2.  Elevated liver enzymes:  A.  Induced by metastatic disease  3.  Cancer-related pain  4.  Anxiety  5.  Hypertension    HISTORY OF PRESENT ILLNESS: The patient is a very pleasant 52 y.o. female  with past medical history significant for metastatic right-sided colon cancer diagnosed November 22, 2022.  Started on palliative chemotherapy with dose adjusted FOLFOXIRI. The patient is here today for scheduled follow-up visit with treatment cycle number 5.    SUBJECTIVE: Amber is here today with her .  She is able to start chemotherapy with multiple side effects which is complaining of heartburn.  It does improve with Carafate as well as Protonix twice a day.  She is having poor appetite usually last for a week after each infusion.  IV fluid on day 3 has been helping.    Past History:  Medical History: has a past medical history of Anxiety, Cancer (HCC), Ear piercing, Gallstones, History of irritable bowel syndrome, Hypertension, Seasonal allergies, Tattoos, and Wears glasses.   Surgical History: has a past surgical history that includes postpartum tubal ligation; Dilation and curettage of uterus (2013); Tumor removal (2013); Portacath placement (N/A, 12/8/2022); and Colonoscopy (N/A, 12/8/2022).   Family History: family history is not on file.   Social History: reports that she has never smoked.  She has never used smokeless tobacco. She reports current alcohol use. She reports that she does not use drugs.    (Not in a hospital admission)     Allergies: Losartan, Valium [diazepam], and Citrus     Review of Systems   Constitutional: Positive for fatigue.   Respiratory: Negative.    Gastrointestinal: Positive for abdominal pain, diarrhea and nausea.   Musculoskeletal: Positive for arthralgias.   Psychiatric/Behavioral: The patient is nervous/anxious.        PHYSICAL EXAMINATION:   There were no vitals taken for this visit.   There were no vitals filed for this visit.                  ECOG Performance Status: 1 - Symptomatic but completely ambulatory      General Appearance:      alert, cooperative, no apparent distress and appears stated age   Lungs:   Clear to auscultation bilaterally; respirations regular, even, and unlabored bilaterally   Heart:  Regular rate and rhythm, no murmurs appreciated   Abdomen:   Soft, non-tender, non-distended and no organomegaly                 No visits with results within 2 Week(s) from this visit.   Latest known visit with results is:   Infusion on 01/25/2023   Component Date Value Ref Range Status   • CEA 01/25/2023 8.42  ng/mL Final   • Glucose 01/25/2023 94  65 - 99 mg/dL Final   • BUN 01/25/2023 7  6 - 20 mg/dL Final   • Creatinine 01/25/2023 0.68  0.57 - 1.00 mg/dL Final   • Sodium 01/25/2023 141  136 - 145 mmol/L Final   • Potassium 01/25/2023 3.4 (L)  3.5 - 5.2 mmol/L Final   • Chloride 01/25/2023 105  98 - 107 mmol/L Final   • CO2 01/25/2023 27.8  22.0 - 29.0 mmol/L Final   • Calcium 01/25/2023 9.5  8.6 - 10.5 mg/dL Final   • Total Protein 01/25/2023 7.4  6.0 - 8.5 g/dL Final   • Albumin 01/25/2023 3.7  3.5 - 5.2 g/dL Final   • ALT (SGPT) 01/25/2023 51 (H)  1 - 33 U/L Final   • AST (SGOT) 01/25/2023 75 (H)  1 - 32 U/L Final   • Alkaline Phosphatase 01/25/2023 257 (H)  39 - 117 U/L Final   • Total Bilirubin 01/25/2023 1.2  0.0 - 1.2 mg/dL Final   • Globulin 01/25/2023  3.7  gm/dL Final   • A/G Ratio 01/25/2023 1.0  g/dL Final   • BUN/Creatinine Ratio 01/25/2023 10.3  7.0 - 25.0 Final   • Anion Gap 01/25/2023 8.2  5.0 - 15.0 mmol/L Final   • eGFR 01/25/2023 104.9  >60.0 mL/min/1.73 Final   • WBC 01/25/2023 4.09  3.40 - 10.80 10*3/mm3 Final   • RBC 01/25/2023 3.45 (L)  3.77 - 5.28 10*6/mm3 Final   • Hemoglobin 01/25/2023 9.5 (L)  12.0 - 15.9 g/dL Final   • Hematocrit 01/25/2023 29.2 (L)  34.0 - 46.6 % Final   • MCV 01/25/2023 84.6  79.0 - 97.0 fL Final   • MCH 01/25/2023 27.5  26.6 - 33.0 pg Final   • MCHC 01/25/2023 32.5  31.5 - 35.7 g/dL Final   • RDW 01/25/2023 19.8 (H)  12.3 - 15.4 % Final   • RDW-SD 01/25/2023 58.6 (H)  37.0 - 54.0 fl Final   • MPV 01/25/2023 9.8  6.0 - 12.0 fL Final   • Platelets 01/25/2023 217  140 - 450 10*3/mm3 Final   • Neutrophil % 01/25/2023 43.1  42.7 - 76.0 % Final   • Lymphocyte % 01/25/2023 38.9  19.6 - 45.3 % Final   • Monocyte % 01/25/2023 11.2  5.0 - 12.0 % Final   • Eosinophil % 01/25/2023 6.6 (H)  0.3 - 6.2 % Final   • Basophil % 01/25/2023 0.2  0.0 - 1.5 % Final   • Immature Grans % 01/25/2023 0.0  0.0 - 0.5 % Final   • Neutrophils, Absolute 01/25/2023 1.76  1.70 - 7.00 10*3/mm3 Final   • Lymphocytes, Absolute 01/25/2023 1.59  0.70 - 3.10 10*3/mm3 Final   • Monocytes, Absolute 01/25/2023 0.46  0.10 - 0.90 10*3/mm3 Final   • Eosinophils, Absolute 01/25/2023 0.27  0.00 - 0.40 10*3/mm3 Final   • Basophils, Absolute 01/25/2023 0.01  0.00 - 0.20 10*3/mm3 Final   • Immature Grans, Absolute 01/25/2023 0.00  0.00 - 0.05 10*3/mm3 Final   • nRBC 01/25/2023 0.0  0.0 - 0.2 /100 WBC Final        No results found.    ASSESSMENT: The patient is a very pleasant 52 y.o. female  with right-sided metastatic colon cancer      PLAN:    1.  Right-sided metastatic colon cancer:  A.  I will proceed with chemotherapy as scheduled FOLFOXIRI cycle #5.  B.  The patient will follow-up with us for cycle #6.  C.  I will repeat the patient's scans after cycle #6.  D.  I will  continue 25% dose reduction secondary to elevated baseline liver enzymes.  E.  I will continue to monitor the patient blood work including blood counts kidney function liver function and electrolytes.  F.  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  While the patient may not benefit from immunotherapy however she will be a candidate for EGFR inhibitors down the road.  JENNIFER  I did go over the blood the results with the patient from January 25, 2023.  I explained to her that she is having multiple treatment-related toxicities.  Her hemoglobin is down to 9.5.  Her liver enzymes slightly worsened ALT up to 51 AST of 75 and alkaline phosphatase 257.  Her CEA continues to improve level of 8.4.  H.  I reassured the patient her circulating tumor DNA did improve significantly.  We will continue to monitor every other cycle.    2.  Elevated liver enzymes:  A.  Induced by liver metastases  B.  I will continue 25% dose reduction of chemo drugs per  C.  I will repeat labs before next infusion.    3.  Chemotherapy-induced nausea:  A.  I will continue the patient on Zofran as needed for    4.  Chemotherapy-induced diarrhea:  A.  I will continue the patient on Imodium as needed    5. Cancer-related pain:  A.  I will continue the patient oxycodone 5 mg twice a day.  I will give the patient prescription today.     6.  Anxiety:  A.  I will continue the patient on Ativan 1 mg nightly as needed.       7.  Hypertension:  A.  She will continue losartan.  B.  I reviewed with the patient this will interfere with our management since chemotherapy typically can cause hypotension we will have to monitor blood pressure closely.    8.  Dehydration:  A.  I will continue 1 L of IV fluid on day 3 of each chemotherapy cycle.    9.  Chemotherapy-induced anemia:  A.  I reassured the patient her hemoglobin is down but stable level of 9.5 on July 25, 2023.  B.  I will  continue monitor her CBC prior to each infusion.  C.  I will transfuse as needed for hemoglobin less than 8.  D.  I will continue 25% dose reduction on chemotherapy.    10.  Chemotherapy-induced heartburn:  A.  Continue Prilosec 40 mg twice a day  B.  I will continue Carafate 1 g every 6 hours    FOLLOW UP: 2 weeks with cycle #6.    Total time of patient care including preparation of patient chart prior to arrival, face-to-face with patient and following visit spent in reviewing records, lab results, imaging studies, discussion with patient, and documentation/charting was 40 minutes       Iban Lambert MD  2/8/2023

## 2023-02-09 ENCOUNTER — HOSPITAL ENCOUNTER (EMERGENCY)
Facility: HOSPITAL | Age: 53
Discharge: HOME | End: 2023-02-10
Attending: EMERGENCY MEDICINE | Admitting: EMERGENCY MEDICINE
Payer: MEDICAID

## 2023-02-09 ENCOUNTER — APPOINTMENT (OUTPATIENT)
Dept: CT IMAGING | Facility: HOSPITAL | Age: 53
End: 2023-02-09
Payer: MEDICAID

## 2023-02-09 DIAGNOSIS — R10.84 GENERALIZED ABDOMINAL PAIN: Primary | ICD-10-CM

## 2023-02-09 LAB
ALBUMIN SERPL-MCNC: 3.7 G/DL (ref 3.5–5.2)
ALBUMIN/GLOB SERPL: 1 G/DL
ALP SERPL-CCNC: 221 U/L (ref 39–117)
ALT SERPL W P-5'-P-CCNC: 50 U/L (ref 1–33)
ANION GAP SERPL CALCULATED.3IONS-SCNC: 12.8 MMOL/L (ref 5–15)
AST SERPL-CCNC: 86 U/L (ref 1–32)
BASOPHILS # BLD AUTO: 0.01 10*3/MM3 (ref 0–0.2)
BASOPHILS NFR BLD AUTO: 0.2 % (ref 0–1.5)
BILIRUB SERPL-MCNC: 1.1 MG/DL (ref 0–1.2)
BUN SERPL-MCNC: 10 MG/DL (ref 6–20)
BUN/CREAT SERPL: 13.7 (ref 7–25)
CALCIUM SPEC-SCNC: 9.2 MG/DL (ref 8.6–10.5)
CHLORIDE SERPL-SCNC: 104 MMOL/L (ref 98–107)
CO2 SERPL-SCNC: 25.2 MMOL/L (ref 22–29)
CREAT SERPL-MCNC: 0.73 MG/DL (ref 0.57–1)
DEPRECATED RDW RBC AUTO: 55.8 FL (ref 37–54)
EGFRCR SERPLBLD CKD-EPI 2021: 99.1 ML/MIN/1.73
EOSINOPHIL # BLD AUTO: 0 10*3/MM3 (ref 0–0.4)
EOSINOPHIL NFR BLD AUTO: 0 % (ref 0.3–6.2)
ERYTHROCYTE [DISTWIDTH] IN BLOOD BY AUTOMATED COUNT: 18.5 % (ref 12.3–15.4)
GLOBULIN UR ELPH-MCNC: 3.7 GM/DL
GLUCOSE SERPL-MCNC: 115 MG/DL (ref 65–99)
HCT VFR BLD AUTO: 33.6 % (ref 34–46.6)
HGB BLD-MCNC: 11.2 G/DL (ref 12–15.9)
IMM GRANULOCYTES # BLD AUTO: 0.02 10*3/MM3 (ref 0–0.05)
IMM GRANULOCYTES NFR BLD AUTO: 0.4 % (ref 0–0.5)
LIPASE SERPL-CCNC: 69 U/L (ref 13–60)
LYMPHOCYTES # BLD AUTO: 1.33 10*3/MM3 (ref 0.7–3.1)
LYMPHOCYTES NFR BLD AUTO: 28.6 % (ref 19.6–45.3)
MCH RBC QN AUTO: 27.5 PG (ref 26.6–33)
MCHC RBC AUTO-ENTMCNC: 33.3 G/DL (ref 31.5–35.7)
MCV RBC AUTO: 82.6 FL (ref 79–97)
MONOCYTES # BLD AUTO: 0.51 10*3/MM3 (ref 0.1–0.9)
MONOCYTES NFR BLD AUTO: 11 % (ref 5–12)
NEUTROPHILS NFR BLD AUTO: 2.78 10*3/MM3 (ref 1.7–7)
NEUTROPHILS NFR BLD AUTO: 59.8 % (ref 42.7–76)
NRBC BLD AUTO-RTO: 0 /100 WBC (ref 0–0.2)
PLATELET # BLD AUTO: 268 10*3/MM3 (ref 140–450)
PMV BLD AUTO: 10.5 FL (ref 6–12)
POTASSIUM SERPL-SCNC: 3.4 MMOL/L (ref 3.5–5.2)
PROT SERPL-MCNC: 7.4 G/DL (ref 6–8.5)
RBC # BLD AUTO: 4.07 10*6/MM3 (ref 3.77–5.28)
SODIUM SERPL-SCNC: 142 MMOL/L (ref 136–145)
WBC NRBC COR # BLD: 4.65 10*3/MM3 (ref 3.4–10.8)

## 2023-02-09 PROCEDURE — 99283 EMERGENCY DEPT VISIT LOW MDM: CPT

## 2023-02-09 PROCEDURE — 96375 TX/PRO/DX INJ NEW DRUG ADDON: CPT

## 2023-02-09 PROCEDURE — 25010000002 MORPHINE PER 10 MG: Performed by: EMERGENCY MEDICINE

## 2023-02-09 PROCEDURE — 96374 THER/PROPH/DIAG INJ IV PUSH: CPT

## 2023-02-09 PROCEDURE — 83690 ASSAY OF LIPASE: CPT | Performed by: EMERGENCY MEDICINE

## 2023-02-09 PROCEDURE — 25010000002 ONDANSETRON PER 1 MG: Performed by: EMERGENCY MEDICINE

## 2023-02-09 PROCEDURE — 85025 COMPLETE CBC W/AUTO DIFF WBC: CPT | Performed by: EMERGENCY MEDICINE

## 2023-02-09 PROCEDURE — 74176 CT ABD & PELVIS W/O CONTRAST: CPT

## 2023-02-09 PROCEDURE — 80053 COMPREHEN METABOLIC PANEL: CPT | Performed by: EMERGENCY MEDICINE

## 2023-02-09 RX ORDER — ONDANSETRON 2 MG/ML
INJECTION INTRAMUSCULAR; INTRAVENOUS
Status: DISPENSED
Start: 2023-02-09 | End: 2023-02-10

## 2023-02-09 RX ORDER — SODIUM CHLORIDE 0.9 % (FLUSH) 0.9 %
10 SYRINGE (ML) INJECTION AS NEEDED
Status: DISCONTINUED | OUTPATIENT
Start: 2023-02-09 | End: 2023-02-10 | Stop reason: HOSPADM

## 2023-02-09 RX ORDER — PANTOPRAZOLE SODIUM 40 MG/10ML
80 INJECTION, POWDER, LYOPHILIZED, FOR SOLUTION INTRAVENOUS ONCE
Status: COMPLETED | OUTPATIENT
Start: 2023-02-09 | End: 2023-02-09

## 2023-02-09 RX ORDER — PANTOPRAZOLE SODIUM 40 MG/1
40 TABLET, DELAYED RELEASE ORAL DAILY
Qty: 30 TABLET | Refills: 0 | Status: SHIPPED | OUTPATIENT
Start: 2023-02-09 | End: 2023-03-08 | Stop reason: SDUPTHER

## 2023-02-09 RX ORDER — ONDANSETRON 2 MG/ML
4 INJECTION INTRAMUSCULAR; INTRAVENOUS ONCE
Status: COMPLETED | OUTPATIENT
Start: 2023-02-09 | End: 2023-02-09

## 2023-02-09 RX ADMIN — MORPHINE SULFATE 4 MG: 4 INJECTION, SOLUTION INTRAMUSCULAR; INTRAVENOUS at 22:41

## 2023-02-09 RX ADMIN — PANTOPRAZOLE SODIUM 80 MG: 40 INJECTION, POWDER, FOR SOLUTION INTRAVENOUS at 22:41

## 2023-02-09 RX ADMIN — SODIUM CHLORIDE 1000 ML: 9 INJECTION, SOLUTION INTRAVENOUS at 22:59

## 2023-02-09 RX ADMIN — ONDANSETRON 4 MG: 2 INJECTION INTRAMUSCULAR; INTRAVENOUS at 22:58

## 2023-02-10 ENCOUNTER — INFUSION (OUTPATIENT)
Dept: ONCOLOGY | Facility: HOSPITAL | Age: 53
End: 2023-02-10
Payer: MEDICAID

## 2023-02-10 VITALS
WEIGHT: 128.4 LBS | DIASTOLIC BLOOD PRESSURE: 78 MMHG | SYSTOLIC BLOOD PRESSURE: 135 MMHG | OXYGEN SATURATION: 98 % | RESPIRATION RATE: 16 BRPM | BODY MASS INDEX: 23.48 KG/M2 | HEART RATE: 106 BPM

## 2023-02-10 VITALS
SYSTOLIC BLOOD PRESSURE: 159 MMHG | BODY MASS INDEX: 23.55 KG/M2 | OXYGEN SATURATION: 97 % | RESPIRATION RATE: 18 BRPM | HEART RATE: 102 BPM | HEIGHT: 62 IN | DIASTOLIC BLOOD PRESSURE: 90 MMHG | TEMPERATURE: 98.4 F | WEIGHT: 128 LBS

## 2023-02-10 DIAGNOSIS — C18.9 METASTATIC COLON CANCER TO LIVER: Primary | ICD-10-CM

## 2023-02-10 DIAGNOSIS — C18.2 CANCER OF RIGHT COLON: ICD-10-CM

## 2023-02-10 DIAGNOSIS — C78.7 METASTATIC COLON CANCER TO LIVER: Primary | ICD-10-CM

## 2023-02-10 PROCEDURE — 25010000002 HEPARIN LOCK FLUSH PER 10 UNITS: Performed by: INTERNAL MEDICINE

## 2023-02-10 PROCEDURE — 96360 HYDRATION IV INFUSION INIT: CPT

## 2023-02-10 RX ORDER — HEPARIN SODIUM (PORCINE) LOCK FLUSH IV SOLN 100 UNIT/ML 100 UNIT/ML
500 SOLUTION INTRAVENOUS AS NEEDED
Status: DISCONTINUED | OUTPATIENT
Start: 2023-02-10 | End: 2023-02-10 | Stop reason: HOSPADM

## 2023-02-10 RX ORDER — SODIUM CHLORIDE 0.9 % (FLUSH) 0.9 %
10 SYRINGE (ML) INJECTION AS NEEDED
Status: DISCONTINUED | OUTPATIENT
Start: 2023-02-10 | End: 2023-02-10 | Stop reason: HOSPADM

## 2023-02-10 RX ORDER — SODIUM CHLORIDE 0.9 % (FLUSH) 0.9 %
10 SYRINGE (ML) INJECTION AS NEEDED
Status: CANCELLED | OUTPATIENT
Start: 2023-02-10

## 2023-02-10 RX ORDER — HEPARIN SODIUM (PORCINE) LOCK FLUSH IV SOLN 100 UNIT/ML 100 UNIT/ML
500 SOLUTION INTRAVENOUS AS NEEDED
Status: CANCELLED | OUTPATIENT
Start: 2023-02-10

## 2023-02-10 RX ADMIN — SODIUM CHLORIDE 1000 ML: 9 INJECTION, SOLUTION INTRAVENOUS at 13:45

## 2023-02-10 RX ADMIN — HEPARIN 500 UNITS: 100 SYRINGE at 14:45

## 2023-02-10 RX ADMIN — ALUMINUM HYDROXIDE, MAGNESIUM HYDROXIDE, AND DIMETHICONE: 400; 400; 40 SUSPENSION ORAL at 00:22

## 2023-02-10 RX ADMIN — Medication 10 ML: at 14:45

## 2023-02-10 NOTE — ED PROVIDER NOTES
"Subjective   History of Present Illness  52-year-old female presents to ED with chief complaint of abdominal pain.  Patient complaining of epigastric abdominal pain and discomfort that started earlier today.  Patient is currently undergoing treatment for metastatic colon cancer.  She has some mild nausea.  No vomiting or diarrhea.  No fever or chills.  No other complaints at this time.        Review of Systems   Constitutional: Negative for fatigue and fever.   Respiratory: Negative for shortness of breath.    Gastrointestinal: Positive for abdominal pain and nausea. Negative for blood in stool, diarrhea and vomiting.   All other systems reviewed and are negative.      Past Medical History:   Diagnosis Date   • Anxiety    • Cancer (HCC)    • Ear piercing    • Gallstones    • History of irritable bowel syndrome    • Hypertension     pt states secondary to pain   • Seasonal allergies    • Tattoos    • Wears glasses        Allergies   Allergen Reactions   • Losartan Nausea Only, Other (See Comments) and Headache     Pt states within an hour after use blood pressure would \"skyrocket\" also nausea - states diastolic pressures in 100's   • Valium [Diazepam] Anaphylaxis   • Citrus GI Intolerance     Especially oranges       Past Surgical History:   Procedure Laterality Date   • COLONOSCOPY N/A 12/8/2022    Procedure: COLONOSCOPY WITH BIOPSY AND POLYPECTOMY;  Surgeon: Sea Valentin MD;  Location: [facility] OR;  Service: General;  Laterality: N/A;   • DILATATION AND CURETTAGE  2013   • PORTACATH PLACEMENT N/A 12/8/2022    Procedure: INSERTION OF PORTACATH;  Surgeon: Sea Valentin MD;  Location: [facility] OR;  Service: General;  Laterality: N/A;   • POSTPARTUM TUBAL LIGATION     • TUMOR REMOVAL  2013    pt reports \"tumor removed from uterus\" - states non-cancerous; unsure if fibroid; unsure if removed laparoscopically       History reviewed. No pertinent family history.    Social History     Socioeconomic History   • Marital " status:    Tobacco Use   • Smoking status: Never   • Smokeless tobacco: Never   Vaping Use   • Vaping Use: Never used   Substance and Sexual Activity   • Alcohol use: Yes     Comment: rarely   • Drug use: Never   • Sexual activity: Defer           Objective   Physical Exam  Vitals and nursing note reviewed.   Constitutional:       General: She is not in acute distress.     Appearance: She is well-developed. She is not diaphoretic.   HENT:      Head: Normocephalic and atraumatic.      Nose: Nose normal.   Eyes:      Conjunctiva/sclera: Conjunctivae normal.   Cardiovascular:      Rate and Rhythm: Normal rate and regular rhythm.   Pulmonary:      Effort: Pulmonary effort is normal. No respiratory distress.      Breath sounds: Normal breath sounds.   Abdominal:      General: There is no distension.      Palpations: Abdomen is soft.      Tenderness: There is abdominal tenderness in the epigastric area. There is no guarding.      Comments: Minimal epigastric tenderness to palpation   Musculoskeletal:         General: No deformity.   Neurological:      Mental Status: She is alert and oriented to person, place, and time.      Cranial Nerves: No cranial nerve deficit.         Procedures           ED Course                                           MDM  Chief complaint: Abdominal pain    Initial impression of presenting illness: 52-year-old female with history of metastatic colon cancer presenting with epigastric abdominal pain.    Comorbidities requiring consideration and/or management:    Differential diagnosis includes but not limited to: Pancreatitis, diverticulitis, gastritis, GERD, bowel obstruction, spread of cancer,    Patient arrives hemodynamically stable, afebrile, without respiratory distress with initial vitals interpreted by myself.  Pertinent initial vitals include temp of 98.4, /109, heart rate 102, pulse ox 99% on room air    Pertinent features from physical exam: Minimal epigastric tenderness to  palpation, no guarding or rigidity, no distention    Initial diagnostic plan: CBC, CMP, lipase, urinalysis, CT abdomen/pelvis    Results from initial plan were reviewed and interpreted by myself and include: Labs are reassuring.      Diagnostic information from other sources includes: Review of previous visits, prior labs, prior imaging, available notes from prior evaluations or visits with specialists, medication list, allergies, past medical history, past surgical history    Interventions in the ED included: Morphine, Protonix    Reevaluation: Resting comfortably, pain improved    Results/clinical rationale were discussed with patient and  at bedside    Consultations and discussions of results with other physicians: N/AA    Discussion of results/management/plan: Suspect likely gastritis given chemotherapy/immunotherapy.  Resting comfortably after symptomatic treatment.  Appropriate for discharge follow-up outpatient as needed.  Patient is agreeable to this plan.    Disposition plan: Discharged with Rx for Protonix.      Final diagnoses:   Generalized abdominal pain       ED Disposition  ED Disposition     ED Disposition   Discharge    Condition   Stable    Comment   --             Evon Bryant, APRN  [address]  [facility] [address] [phone]               Medication List      New Prescriptions    pantoprazole 40 MG EC tablet  Commonly known as: PROTONIX  Take 1 tablet by mouth Daily.           Where to Get Your Medications      These medications [facility] to [facility] [address] [place] [address] - [address] [facility] - [phone]  - [phone]   [address] [facility] [address]    Phone: [phone]   · pantoprazole 40 MG EC tablet          Peter Roa,   02/13/23 0886

## 2023-02-20 NOTE — PROGRESS NOTES
DATE OF VISIT: 2/22/2023    REASON FOR VISIT: Followup for metastatic right-sided colon cancer     PROBLEM LIST:  1. Metastatic colon cancer TX NX M1 a stage [place]:  A.  Presented with abdominal pain and jaundice  B.  CT abdomen pelvis done November 04, 2022 confirmed diffuse liver metastases.  C.  Diagnosed after CT-guided liver biopsy done on November 22, 2022 confirming adenocarcinoma CK20 positive CK7 negative.  D.  Colonoscopy done December 08, 2020 to confirm transverse colon mass however biopsy revealed adenoma.  E.  Started palliative chemotherapy with dose adjusted FOLFOXIRI December 14, 2022, status post 4 cycles  2.  Elevated liver enzymes:  A.  Induced by metastatic disease  3.  Cancer-related pain  4.  Anxiety  5.  Hypertension    HISTORY OF PRESENT ILLNESS: The patient is a very pleasant 52 y.o. female  with past medical history significant for metastatic right-sided colon cancer diagnosed November 22, 2022.  Started on palliative chemotherapy with dose adjusted FOLFOXIRI. The patient is here today for scheduled follow-up visit with treatment cycle number 6.    SUBJECTIVE: Amber is here today with her .  She continues to tolerate chemotherapy with multiple side effects.  She does report some heartburn but that has continued to improve with Carafate as well as Protonix twice daily.  She continues to have poor appetite that last for about a week after each treatment.  Day 3 IV fluids has been making a big difference.  She reports some treatment related diarrhea she reports that she is also been taking Imodium as directed.  She said this is not helping.      Past History:  Medical History: has a past medical history of Anxiety, Cancer (HCC), Ear piercing, Gallstones, History of irritable bowel syndrome, Hypertension, Seasonal allergies, Tattoos, and Wears glasses.   Surgical History: has a past surgical history that includes postpartum tubal ligation; Dilation and curettage of uterus (2013); Tumor  "removal (2013); Portacath placement (N/A, 12/8/2022); and Colonoscopy (N/A, 12/8/2022).   Family History: family history is not on file.   Social History: reports that she has never smoked. She has never used smokeless tobacco. She reports current alcohol use. She reports that she does not use drugs.    (Not in a hospital admission)     Allergies: Losartan, Valium [diazepam], and Citrus     Review of Systems   Constitutional: Positive for fatigue.   Respiratory: Negative.    Gastrointestinal: Positive for abdominal pain, diarrhea and nausea.   Genitourinary: Negative.    Musculoskeletal: Positive for arthralgias.   Neurological: Negative.    Psychiatric/Behavioral: The patient is nervous/anxious.    All other systems reviewed and are negative.      PHYSICAL EXAMINATION:   /74   Pulse 81   Temp 97.3 °F (36.3 °C) (Temporal)   Resp 16   Ht 157.5 cm (62\")   Wt 59 kg (130 lb)   SpO2 98%   BMI 23.78 kg/m²    Pain Score    02/22/23 0906   PainSc: 0-No pain                     ECOG Performance Status: 1 - Symptomatic but completely ambulatory      General Appearance:      alert, cooperative, no apparent distress and appears stated age   Lungs:   Clear to auscultation bilaterally; respirations regular, even, and unlabored bilaterally   Heart:  Regular rate and rhythm, no murmurs appreciated   Abdomen:   Soft, non-tender, non-distended and no organomegaly                 Admission on 02/09/2023, Discharged on 02/10/2023   Component Date Value Ref Range Status   • Glucose 02/09/2023 115 (H)  65 - 99 mg/dL Final   • BUN 02/09/2023 10  6 - 20 mg/dL Final   • Creatinine 02/09/2023 0.73  0.57 - 1.00 mg/dL Final   • Sodium 02/09/2023 142  136 - 145 mmol/L Final   • Potassium 02/09/2023 3.4 (L)  3.5 - 5.2 mmol/L Final    Slight hemolysis detected by analyzer. Results may be affected.   • Chloride 02/09/2023 104  98 - 107 mmol/L Final   • CO2 02/09/2023 25.2  22.0 - 29.0 mmol/L Final   • Calcium 02/09/2023 9.2  8.6 - " 10.5 mg/dL Final   • Total Protein 02/09/2023 7.4  6.0 - 8.5 g/dL Final   • Albumin 02/09/2023 3.7  3.5 - 5.2 g/dL Final   • ALT (SGPT) 02/09/2023 50 (H)  1 - 33 U/L Final   • AST (SGOT) 02/09/2023 86 (H)  1 - 32 U/L Final    Slight hemolysis detected by analyzer. Results may be affected.   • Alkaline Phosphatase 02/09/2023 221 (H)  39 - 117 U/L Final   • Total Bilirubin 02/09/2023 1.1  0.0 - 1.2 mg/dL Final   • Globulin 02/09/2023 3.7  gm/dL Final   • A/G Ratio 02/09/2023 1.0  g/dL Final   • BUN/Creatinine Ratio 02/09/2023 13.7  7.0 - 25.0 Final   • Anion Gap 02/09/2023 12.8  5.0 - 15.0 mmol/L Final   • eGFR 02/09/2023 99.1  >60.0 mL/min/1.73 Final   • Lipase 02/09/2023 69 (H)  13 - 60 U/L Final   • WBC 02/09/2023 4.65  3.40 - 10.80 10*3/mm3 Final   • RBC 02/09/2023 4.07  3.77 - 5.28 10*6/mm3 Final   • Hemoglobin 02/09/2023 11.2 (L)  12.0 - 15.9 g/dL Final   • Hematocrit 02/09/2023 33.6 (L)  34.0 - 46.6 % Final   • MCV 02/09/2023 82.6  79.0 - 97.0 fL Final   • MCH 02/09/2023 27.5  26.6 - 33.0 pg Final   • MCHC 02/09/2023 33.3  31.5 - 35.7 g/dL Final   • RDW 02/09/2023 18.5 (H)  12.3 - 15.4 % Final   • RDW-SD 02/09/2023 55.8 (H)  37.0 - 54.0 fl Final   • MPV 02/09/2023 10.5  6.0 - 12.0 fL Final   • Platelets 02/09/2023 268  140 - 450 10*3/mm3 Final   • Neutrophil % 02/09/2023 59.8  42.7 - 76.0 % Final   • Lymphocyte % 02/09/2023 28.6  19.6 - 45.3 % Final   • Monocyte % 02/09/2023 11.0  5.0 - 12.0 % Final   • Eosinophil % 02/09/2023 0.0 (L)  0.3 - 6.2 % Final   • Basophil % 02/09/2023 0.2  0.0 - 1.5 % Final   • Immature Grans % 02/09/2023 0.4  0.0 - 0.5 % Final   • Neutrophils, Absolute 02/09/2023 2.78  1.70 - 7.00 10*3/mm3 Final   • Lymphocytes, Absolute 02/09/2023 1.33  0.70 - 3.10 10*3/mm3 Final   • Monocytes, Absolute 02/09/2023 0.51  0.10 - 0.90 10*3/mm3 Final   • Eosinophils, Absolute 02/09/2023 0.00  0.00 - 0.40 10*3/mm3 Final   • Basophils, Absolute 02/09/2023 0.01  0.00 - 0.20 10*3/mm3 Final   • Immature  Grans, Absolute 02/09/2023 0.02  0.00 - 0.05 10*3/mm3 Final   • nRBC 02/09/2023 0.0  0.0 - 0.2 /100 WBC Final        CT Abdomen Pelvis Without Contrast    Result Date: 2/9/2023  Narrative: FINAL REPORT TECHNIQUE: Axial CT images were performed from the lung bases through the symphysis pubis without IV contrast.  This study was performed with techniques to keep radiation doses as low as reasonably achievable (ALARA). Individualized dose reduction techniques using automated exposure control or adjustment of mA and/or kV according to the patient's size were employed. CLINICAL HISTORY: Epigastric abdominal pain, hx of liver and colon ca. currently in treatment. COMPARISON: 11/24/2022 FINDINGS: Lack of intravenous contrast limits evaluation of solid abdominal and pelvic organs.  LOWER CHEST: The heart is normal size.  The lung bases are clear.  ABDOMEN/PELVIS:  Liver, gallbladder and bile ducts: Multiple hepatic metastases have significantly decreased in size compared to the prior exam of 11/24/2022 and now demonstrate internal calcification likely related to treatment response.  The gallbladder is distended and the gallbladder wall is thickened.  There are gallstones present within the gallbladder and there appears to be a stone in the cystic duct.  There is no definite biliary duct dilatation. Adrenal glands: The adrenal glands are morphologically unremarkable without suspicious lesion.  Kidneys, ureter and urinary bladder: No nephrolithiasis.  No hydronephrosis. Urinary bladder is unremarkable.  Spleen: The spleen is normal size.  Pancreas: The pancreas is grossly unremarkable.  GI systems and mesentery: No evidence of bowel obstruction.  The appendix is visualized and unremarkable in appearance.  No significant mesenteric inflammation.  There is liquid stool throughout the colon with mild colonic wall thickening.  Lymph nodes: No definite pathologically enlarged abdominal or pelvic lymph nodes present within the  limits of this noncontrast enhanced exam.  Vessels: The abdominal aorta is normal in caliber.  The inferior vena cava is unremarkable.  Peritoneum: No free intraperitoneal fluid or pneumoperitoneum.  Pelvic viscera: No acute findings.  Body wall: No body wall contusion. Bones: No acute fracture.     Impression: No hydronephrosis or nephrolithiasis. No acute findings in the abdomen and pelvis to account for patient's symptoms. Authenticated and Electronically Signed by Callum Galicia MD on 02/09/2023 11:24:07 PM      ASSESSMENT: The patient is a very pleasant 52 y.o. female  with right-sided metastatic colon cancer      PLAN:    1.  Right-sided metastatic colon cancer:  A.  I will proceed with chemotherapy as scheduled FOLFOXIRI cycle #6.  B.  The patient will follow-up with us for cycle #7.  C.  I will repeat the patient's scans after cycle #6.  I will order scans prior to return  D.  I will continue 25% dose reduction secondary to elevated baseline liver enzymes.  E.  I will continue to monitor the patient blood work including blood counts kidney function liver function and electrolytes.  F.  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  While the patient may not benefit from immunotherapy however she will be a candidate for EGFR inhibitors down the road.  G.  I did go over the blood the results with the patient from February 9 , 2023.  I explained to her that she is having multiple treatment-related toxicities.  Her hemoglobin has improved to 11.2.  Her liver enzymes slightly worsened ALT up to 50 AST of 86 and alkaline phosphatase 221.  Her CEA continues to improve level of 5.94.  H.  I reassured the patient her circulating tumor DNA did improve significantly.  We will continue to monitor every other cycle.    2.  Elevated liver enzymes:  A.  Induced by liver metastases  B.  I will continue 25% dose reduction of chemo drugs  per  C.  I will repeat labs before next infusion.    3.  Chemotherapy-induced nausea:  A.  I will continue the patient on Zofran as needed for    4.  Chemotherapy-induced diarrhea:  A.  I will continue the patient on Imodium as needed    5. Cancer-related pain:  A.  I will continue the patient oxycodone 5 mg twice a day.  I will give the patient prescription today.     6.  Anxiety:  A.  I will continue the patient on Ativan 1 mg nightly as needed.       7.  Hypertension:  A.  She will continue losartan.  B.  I reviewed with the patient this will interfere with our management since chemotherapy typically can cause hypotension we will have to monitor blood pressure closely.    8.  Dehydration:  A.  I will continue 1 L of IV fluid on day 3 of each chemotherapy cycle.    9.  Chemotherapy-induced anemia:  A.  I reassured the patient her hemoglobin has improved to 11.2 on February 9, 2023   B.  I will continue monitor her CBC prior to each infusion.  C.  I will transfuse as needed for hemoglobin less than 8.  D.  I will continue 25% dose reduction on chemotherapy.    10.  Chemotherapy-induced heartburn:  A.  Continue Prilosec 40 mg twice a day  B.  I will continue Carafate 1 g every 6 hours    11.  Treatment related diarrhea  A.  Continue Imodium as directed.  We will also add Lomotil to her regimen.  Education sheet was provided to the patient.    FOLLOW UP: 2 weeks with cycle #7 and scans.    Total time of patient care including time prior to, face to face with patient, and following visit spent in reviewing records, lab results, imaging studies, discussion with patient, and documentation/charting was > 40 minutes      Ana Cristina Cano, APRN  2/22/2023

## 2023-02-22 ENCOUNTER — INFUSION (OUTPATIENT)
Dept: ONCOLOGY | Facility: HOSPITAL | Age: 53
End: 2023-02-22
Payer: MEDICAID

## 2023-02-22 ENCOUNTER — OFFICE VISIT (OUTPATIENT)
Dept: ONCOLOGY | Facility: CLINIC | Age: 53
End: 2023-02-22
Payer: MEDICAID

## 2023-02-22 VITALS
SYSTOLIC BLOOD PRESSURE: 172 MMHG | RESPIRATION RATE: 16 BRPM | WEIGHT: 130 LBS | TEMPERATURE: 97.3 F | DIASTOLIC BLOOD PRESSURE: 74 MMHG | HEART RATE: 81 BPM | BODY MASS INDEX: 23.92 KG/M2 | HEIGHT: 62 IN | OXYGEN SATURATION: 98 %

## 2023-02-22 DIAGNOSIS — K52.1 DIARRHEA DUE TO DRUG: ICD-10-CM

## 2023-02-22 DIAGNOSIS — C18.9 METASTATIC COLON CANCER TO LIVER: Primary | ICD-10-CM

## 2023-02-22 DIAGNOSIS — C18.9 METASTATIC COLON CANCER TO LIVER: ICD-10-CM

## 2023-02-22 DIAGNOSIS — C78.7 METASTATIC COLON CANCER TO LIVER: ICD-10-CM

## 2023-02-22 DIAGNOSIS — R11.2 NAUSEA AND VOMITING, UNSPECIFIED VOMITING TYPE: Primary | ICD-10-CM

## 2023-02-22 DIAGNOSIS — C18.2 CANCER OF RIGHT COLON: ICD-10-CM

## 2023-02-22 DIAGNOSIS — C78.7 METASTATIC COLON CANCER TO LIVER: Primary | ICD-10-CM

## 2023-02-22 LAB
ALBUMIN SERPL-MCNC: 3.6 G/DL (ref 3.5–5.2)
ALBUMIN/GLOB SERPL: 1.1 G/DL
ALP SERPL-CCNC: 294 U/L (ref 39–117)
ALT SERPL W P-5'-P-CCNC: 52 U/L (ref 1–33)
ANION GAP SERPL CALCULATED.3IONS-SCNC: 8.8 MMOL/L (ref 5–15)
AST SERPL-CCNC: 78 U/L (ref 1–32)
BASOPHILS # BLD AUTO: 0.01 10*3/MM3 (ref 0–0.2)
BASOPHILS NFR BLD AUTO: 0.2 % (ref 0–1.5)
BILIRUB SERPL-MCNC: 0.6 MG/DL (ref 0–1.2)
BUN SERPL-MCNC: 7 MG/DL (ref 6–20)
BUN/CREAT SERPL: 9.1 (ref 7–25)
CALCIUM SPEC-SCNC: 9.3 MG/DL (ref 8.6–10.5)
CEA SERPL-MCNC: 5.49 NG/ML
CHLORIDE SERPL-SCNC: 105 MMOL/L (ref 98–107)
CO2 SERPL-SCNC: 25.2 MMOL/L (ref 22–29)
CREAT SERPL-MCNC: 0.77 MG/DL (ref 0.57–1)
DEPRECATED RDW RBC AUTO: 55.3 FL (ref 37–54)
EGFRCR SERPLBLD CKD-EPI 2021: 92.9 ML/MIN/1.73
EOSINOPHIL # BLD AUTO: 0.13 10*3/MM3 (ref 0–0.4)
EOSINOPHIL NFR BLD AUTO: 3.1 % (ref 0.3–6.2)
ERYTHROCYTE [DISTWIDTH] IN BLOOD BY AUTOMATED COUNT: 18.3 % (ref 12.3–15.4)
GLOBULIN UR ELPH-MCNC: 3.2 GM/DL
GLUCOSE SERPL-MCNC: 103 MG/DL (ref 65–99)
HCT VFR BLD AUTO: 29.3 % (ref 34–46.6)
HGB BLD-MCNC: 9.5 G/DL (ref 12–15.9)
IMM GRANULOCYTES # BLD AUTO: 0.01 10*3/MM3 (ref 0–0.05)
IMM GRANULOCYTES NFR BLD AUTO: 0.2 % (ref 0–0.5)
LYMPHOCYTES # BLD AUTO: 1.52 10*3/MM3 (ref 0.7–3.1)
LYMPHOCYTES NFR BLD AUTO: 36.7 % (ref 19.6–45.3)
MCH RBC QN AUTO: 27.7 PG (ref 26.6–33)
MCHC RBC AUTO-ENTMCNC: 32.4 G/DL (ref 31.5–35.7)
MCV RBC AUTO: 85.4 FL (ref 79–97)
MONOCYTES # BLD AUTO: 0.68 10*3/MM3 (ref 0.1–0.9)
MONOCYTES NFR BLD AUTO: 16.4 % (ref 5–12)
NEUTROPHILS NFR BLD AUTO: 1.79 10*3/MM3 (ref 1.7–7)
NEUTROPHILS NFR BLD AUTO: 43.4 % (ref 42.7–76)
NRBC BLD AUTO-RTO: 0 /100 WBC (ref 0–0.2)
PLATELET # BLD AUTO: 247 10*3/MM3 (ref 140–450)
PMV BLD AUTO: 9.4 FL (ref 6–12)
POTASSIUM SERPL-SCNC: 3.4 MMOL/L (ref 3.5–5.2)
PROT SERPL-MCNC: 6.8 G/DL (ref 6–8.5)
RBC # BLD AUTO: 3.43 10*6/MM3 (ref 3.77–5.28)
SODIUM SERPL-SCNC: 139 MMOL/L (ref 136–145)
WBC NRBC COR # BLD: 4.14 10*3/MM3 (ref 3.4–10.8)

## 2023-02-22 PROCEDURE — 25010000002 OXALIPLATIN PER 0.5 MG: Performed by: NURSE PRACTITIONER

## 2023-02-22 PROCEDURE — 25010000002 DEXAMETHASONE SODIUM PHOSPHATE 20 MG/5ML SOLUTION: Performed by: NURSE PRACTITIONER

## 2023-02-22 PROCEDURE — 96417 CHEMO IV INFUS EACH ADDL SEQ: CPT

## 2023-02-22 PROCEDURE — 96375 TX/PRO/DX INJ NEW DRUG ADDON: CPT

## 2023-02-22 PROCEDURE — 25010000002 LEUCOVORIN 500 MG RECONSTITUTED SOLUTION 1 EACH VIAL: Performed by: NURSE PRACTITIONER

## 2023-02-22 PROCEDURE — 96416 CHEMO PROLONG INFUSE W/PUMP: CPT

## 2023-02-22 PROCEDURE — 25010000002 PALONOSETRON 0.25 MG/5ML SOLUTION PREFILLED SYRINGE: Performed by: NURSE PRACTITIONER

## 2023-02-22 PROCEDURE — 96413 CHEMO IV INFUSION 1 HR: CPT

## 2023-02-22 PROCEDURE — 25010000002 FLUOROURACIL PER 500 MG: Performed by: NURSE PRACTITIONER

## 2023-02-22 PROCEDURE — 80053 COMPREHEN METABOLIC PANEL: CPT

## 2023-02-22 PROCEDURE — 96415 CHEMO IV INFUSION ADDL HR: CPT

## 2023-02-22 PROCEDURE — 96376 TX/PRO/DX INJ SAME DRUG ADON: CPT

## 2023-02-22 PROCEDURE — 25010000002 IRINOTECAN PER 20 MG: Performed by: NURSE PRACTITIONER

## 2023-02-22 PROCEDURE — 96368 THER/DIAG CONCURRENT INF: CPT

## 2023-02-22 PROCEDURE — 82378 CARCINOEMBRYONIC ANTIGEN: CPT

## 2023-02-22 PROCEDURE — 99215 OFFICE O/P EST HI 40 MIN: CPT | Performed by: NURSE PRACTITIONER

## 2023-02-22 PROCEDURE — G0498 CHEMO EXTEND IV INFUS W/PUMP: HCPCS

## 2023-02-22 PROCEDURE — 85025 COMPLETE CBC W/AUTO DIFF WBC: CPT

## 2023-02-22 RX ORDER — ATROPINE SULFATE 1 MG/ML
0.25 INJECTION, SOLUTION INTRAMUSCULAR; INTRAVENOUS; SUBCUTANEOUS
Status: CANCELLED | OUTPATIENT
Start: 2023-02-22

## 2023-02-22 RX ORDER — DIPHENHYDRAMINE HYDROCHLORIDE 50 MG/ML
50 INJECTION INTRAMUSCULAR; INTRAVENOUS AS NEEDED
Status: CANCELLED | OUTPATIENT
Start: 2023-02-22

## 2023-02-22 RX ORDER — DEXTROSE MONOHYDRATE 50 MG/ML
250 INJECTION, SOLUTION INTRAVENOUS ONCE
Status: CANCELLED | OUTPATIENT
Start: 2023-02-22

## 2023-02-22 RX ORDER — FAMOTIDINE 10 MG/ML
20 INJECTION, SOLUTION INTRAVENOUS AS NEEDED
Status: CANCELLED | OUTPATIENT
Start: 2023-02-22

## 2023-02-22 RX ORDER — ONDANSETRON 8 MG/1
8 TABLET, ORALLY DISINTEGRATING ORAL EVERY 8 HOURS PRN
Qty: 30 TABLET | Refills: 3 | Status: SHIPPED | OUTPATIENT
Start: 2023-02-22

## 2023-02-22 RX ORDER — PALONOSETRON 0.05 MG/ML
0.25 INJECTION, SOLUTION INTRAVENOUS ONCE
Status: COMPLETED | OUTPATIENT
Start: 2023-02-22 | End: 2023-02-22

## 2023-02-22 RX ORDER — PALONOSETRON 0.05 MG/ML
0.25 INJECTION, SOLUTION INTRAVENOUS ONCE
Status: CANCELLED | OUTPATIENT
Start: 2023-02-22

## 2023-02-22 RX ORDER — DIPHENOXYLATE HYDROCHLORIDE AND ATROPINE SULFATE 2.5; .025 MG/1; MG/1
1 TABLET ORAL 4 TIMES DAILY PRN
Qty: 30 TABLET | Refills: 3 | Status: SHIPPED | OUTPATIENT
Start: 2023-02-22

## 2023-02-22 RX ORDER — DEXTROSE MONOHYDRATE 50 MG/ML
250 INJECTION, SOLUTION INTRAVENOUS ONCE
Status: DISCONTINUED | OUTPATIENT
Start: 2023-02-22 | End: 2023-02-22 | Stop reason: HOSPADM

## 2023-02-22 RX ADMIN — FLUOROURACIL 2900 MG: 50 INJECTION, SOLUTION INTRAVENOUS at 14:49

## 2023-02-22 RX ADMIN — ATROPINE SULFATE 0.25 MG: 0.4 INJECTION, SOLUTION INTRAVENOUS at 11:13

## 2023-02-22 RX ADMIN — DEXAMETHASONE SODIUM PHOSPHATE 12 MG: 4 INJECTION, SOLUTION INTRAMUSCULAR; INTRAVENOUS at 10:55

## 2023-02-22 RX ADMIN — DEXTROSE MONOHYDRATE 200 MG: 50 INJECTION, SOLUTION INTRAVENOUS at 11:13

## 2023-02-22 RX ADMIN — OXALIPLATIN 105 MG: 5 INJECTION, SOLUTION INTRAVENOUS at 12:46

## 2023-02-22 RX ADMIN — PALONOSETRON 0.25 MG: 0.25 INJECTION, SOLUTION INTRAVENOUS at 10:54

## 2023-02-22 RX ADMIN — ATROPINE SULFATE 0.25 MG: 0.4 INJECTION, SOLUTION INTRAVENOUS at 12:45

## 2023-02-22 RX ADMIN — LEUCOVORIN CALCIUM 480 MG: 500 INJECTION, POWDER, LYOPHILIZED, FOR SOLUTION INTRAMUSCULAR; INTRAVENOUS at 12:47

## 2023-02-24 ENCOUNTER — INFUSION (OUTPATIENT)
Dept: ONCOLOGY | Facility: HOSPITAL | Age: 53
End: 2023-02-24
Payer: MEDICAID

## 2023-02-24 DIAGNOSIS — C18.9 METASTATIC COLON CANCER TO LIVER: ICD-10-CM

## 2023-02-24 DIAGNOSIS — C18.2 CANCER OF RIGHT COLON: Primary | ICD-10-CM

## 2023-02-24 DIAGNOSIS — C78.7 METASTATIC COLON CANCER TO LIVER: ICD-10-CM

## 2023-02-24 PROCEDURE — 25010000002 HEPARIN LOCK FLUSH PER 10 UNITS: Performed by: INTERNAL MEDICINE

## 2023-02-24 PROCEDURE — 96360 HYDRATION IV INFUSION INIT: CPT

## 2023-02-24 RX ORDER — HEPARIN SODIUM (PORCINE) LOCK FLUSH IV SOLN 100 UNIT/ML 100 UNIT/ML
500 SOLUTION INTRAVENOUS AS NEEDED
Status: CANCELLED | OUTPATIENT
Start: 2023-02-24

## 2023-02-24 RX ORDER — SODIUM CHLORIDE 0.9 % (FLUSH) 0.9 %
10 SYRINGE (ML) INJECTION AS NEEDED
Status: CANCELLED | OUTPATIENT
Start: 2023-02-24

## 2023-02-24 RX ORDER — SODIUM CHLORIDE 0.9 % (FLUSH) 0.9 %
10 SYRINGE (ML) INJECTION AS NEEDED
Status: DISCONTINUED | OUTPATIENT
Start: 2023-02-24 | End: 2023-02-24 | Stop reason: HOSPADM

## 2023-02-24 RX ORDER — HEPARIN SODIUM (PORCINE) LOCK FLUSH IV SOLN 100 UNIT/ML 100 UNIT/ML
500 SOLUTION INTRAVENOUS AS NEEDED
Status: DISCONTINUED | OUTPATIENT
Start: 2023-02-24 | End: 2023-02-24 | Stop reason: HOSPADM

## 2023-02-24 RX ADMIN — Medication 500 UNITS: at 14:34

## 2023-02-24 RX ADMIN — Medication 10 ML: at 14:34

## 2023-02-24 RX ADMIN — SODIUM CHLORIDE 1000 ML: 9 INJECTION, SOLUTION INTRAVENOUS at 13:29

## 2023-03-06 ENCOUNTER — TELEPHONE (OUTPATIENT)
Dept: ONCOLOGY | Facility: CLINIC | Age: 53
End: 2023-03-06

## 2023-03-06 ENCOUNTER — HOSPITAL ENCOUNTER (OUTPATIENT)
Dept: CT IMAGING | Facility: HOSPITAL | Age: 53
Discharge: HOME | End: 2023-03-06
Payer: MEDICAID

## 2023-03-06 DIAGNOSIS — C18.2 CANCER OF RIGHT COLON: ICD-10-CM

## 2023-03-06 DIAGNOSIS — C78.7 METASTATIC COLON CANCER TO LIVER: ICD-10-CM

## 2023-03-06 DIAGNOSIS — C18.9 METASTATIC COLON CANCER TO LIVER: ICD-10-CM

## 2023-03-06 PROCEDURE — 74177 CT ABD & PELVIS W/CONTRAST: CPT

## 2023-03-06 PROCEDURE — 71260 CT THORAX DX C+: CPT

## 2023-03-06 PROCEDURE — 25510000001 IOPAMIDOL 61 % SOLUTION: Performed by: NURSE PRACTITIONER

## 2023-03-06 RX ADMIN — IOPAMIDOL 100 ML: 612 INJECTION, SOLUTION INTRAVENOUS at 09:27

## 2023-03-06 NOTE — TELEPHONE ENCOUNTER
[facility] received a fax that requires your attention. The document has been indexed to the patient’s chart for your review.      Reason for sending: POSITIVE GINA     Documents Description: COLON PATH     Name of Sender: [employer]     Date Indexed: 2.22.23    Notes (if needed): INDEXED UNDER PATH 2.22.23. PATIENT SEES DR ORTA ON 3.8.23  THANK YOU

## 2023-03-07 NOTE — PROGRESS NOTES
DATE OF VISIT: 3/8/2023    REASON FOR VISIT: Followup for metastatic right-sided colon cancer     PROBLEM LIST:  1. Metastatic colon cancer TX NX M1 a stage Perry:  A.  Presented with abdominal pain and jaundice  B.  CT abdomen pelvis done November 04, 2022 confirmed diffuse liver metastases.  C.  Diagnosed after CT-guided liver biopsy done on November 22, 2022 confirming adenocarcinoma CK20 positive CK7 negative.  D.  Colonoscopy done December 08, 2020 to confirm transverse colon mass however biopsy revealed adenoma.  E.  Started palliative chemotherapy with dose adjusted FOLFOXIRI December 14, 2022, status post 6 cycles  2.  Elevated liver enzymes:  A.  Induced by metastatic disease  3.  Cancer-related pain  4.  Anxiety  5.  Hypertension    HISTORY OF PRESENT ILLNESS: The patient is a very pleasant 52 y.o. female  with past medical history significant for metastatic right-sided colon cancer diagnosed November 22, 2022.  Started on palliative chemotherapy with dose adjusted FOLFOXIRI. The patient is here today for scheduled follow-up visit with treatment cycle number 7.    SUBJECTIVE: Amber is here today with her .  She is complaining of heartburn.  Pantoprazole has been helping.  She requesting refill on it.  She is anxious about the scan results.    Past History:  Medical History: has a past medical history of Anxiety, Cancer (HCC), Ear piercing, Gallstones, History of irritable bowel syndrome, Hypertension, Seasonal allergies, Tattoos, and Wears glasses.   Surgical History: has a past surgical history that includes postpartum tubal ligation; Dilation and curettage of uterus (2013); Tumor removal (2013); Portacath placement (N/A, 12/8/2022); and Colonoscopy (N/A, 12/8/2022).   Family History: family history is not on file.   Social History: reports that she has never smoked. She has never used smokeless tobacco. She reports current alcohol use. She reports that she does not use drugs.    (Not in a hospital  "admission)     Allergies: Losartan, Valium [diazepam], and Citrus     Review of Systems   Constitutional: Positive for fatigue.   Respiratory: Negative.    Gastrointestinal: Positive for diarrhea and nausea.   Musculoskeletal: Positive for arthralgias.   Psychiatric/Behavioral: The patient is nervous/anxious.        PHYSICAL EXAMINATION:   /77   Pulse 77   Temp 98 °F (36.7 °C) (Infrared)   Resp 16   Ht 157.5 cm (62.01\")   Wt 59.4 kg (131 lb)   SpO2 99%   BMI 23.95 kg/m²    Pain Score    03/08/23 1013   PainSc: 0-No pain        ECOG score: 0            ECOG Performance Status: 1 - Symptomatic but completely ambulatory      General Appearance:      alert, cooperative, no apparent distress and appears stated age   Lungs:   Clear to auscultation bilaterally; respirations regular, even, and unlabored bilaterally   Heart:  Regular rate and rhythm, no murmurs appreciated   Abdomen:   Soft, non-tender, non-distended and no organomegaly                 Office Visit on 03/08/2023   Component Date Value Ref Range Status   • Glucose 03/08/2023 101 (H)  65 - 99 mg/dL Final   • BUN 03/08/2023 9  6 - 20 mg/dL Final   • Creatinine 03/08/2023 0.65  0.57 - 1.00 mg/dL Final   • Sodium 03/08/2023 138  136 - 145 mmol/L Final   • Potassium 03/08/2023 4.0  3.5 - 5.2 mmol/L Final   • Chloride 03/08/2023 102  98 - 107 mmol/L Final   • CO2 03/08/2023 26.3  22.0 - 29.0 mmol/L Final   • Calcium 03/08/2023 9.0  8.6 - 10.5 mg/dL Final   • Total Protein 03/08/2023 6.8  6.0 - 8.5 g/dL Final   • Albumin 03/08/2023 3.7  3.5 - 5.2 g/dL Final   • ALT (SGPT) 03/08/2023 93 (H)  1 - 33 U/L Final   • AST (SGOT) 03/08/2023 137 (H)  1 - 32 U/L Final   • Alkaline Phosphatase 03/08/2023 336 (H)  39 - 117 U/L Final   • Total Bilirubin 03/08/2023 0.5  0.0 - 1.2 mg/dL Final   • Globulin 03/08/2023 3.1  gm/dL Final   • A/G Ratio 03/08/2023 1.2  g/dL Final   • BUN/Creatinine Ratio 03/08/2023 13.8  7.0 - 25.0 Final   • Anion Gap 03/08/2023 9.7  5.0 - " 15.0 mmol/L Final   • eGFR 03/08/2023 106.1  >60.0 mL/min/1.73 Final   • WBC 03/08/2023 4.20  3.40 - 10.80 10*3/mm3 Final   • RBC 03/08/2023 3.33 (L)  3.77 - 5.28 10*6/mm3 Final   • Hemoglobin 03/08/2023 9.6 (L)  12.0 - 15.9 g/dL Final   • Hematocrit 03/08/2023 29.3 (L)  34.0 - 46.6 % Final   • MCV 03/08/2023 88.0  79.0 - 97.0 fL Final   • MCH 03/08/2023 28.8  26.6 - 33.0 pg Final   • MCHC 03/08/2023 32.8  31.5 - 35.7 g/dL Final   • RDW 03/08/2023 18.4 (H)  12.3 - 15.4 % Final   • RDW-SD 03/08/2023 59.6 (H)  37.0 - 54.0 fl Final   • MPV 03/08/2023 10.7  6.0 - 12.0 fL Final   • Platelets 03/08/2023 160  140 - 450 10*3/mm3 Final   • Neutrophil % 03/08/2023 43.9  42.7 - 76.0 % Final   • Lymphocyte % 03/08/2023 36.0  19.6 - 45.3 % Final   • Monocyte % 03/08/2023 15.7 (H)  5.0 - 12.0 % Final   • Eosinophil % 03/08/2023 4.0  0.3 - 6.2 % Final   • Basophil % 03/08/2023 0.2  0.0 - 1.5 % Final   • Immature Grans % 03/08/2023 0.2  0.0 - 0.5 % Final   • Neutrophils, Absolute 03/08/2023 1.84  1.70 - 7.00 10*3/mm3 Final   • Lymphocytes, Absolute 03/08/2023 1.51  0.70 - 3.10 10*3/mm3 Final   • Monocytes, Absolute 03/08/2023 0.66  0.10 - 0.90 10*3/mm3 Final   • Eosinophils, Absolute 03/08/2023 0.17  0.00 - 0.40 10*3/mm3 Final   • Basophils, Absolute 03/08/2023 0.01  0.00 - 0.20 10*3/mm3 Final   • Immature Grans, Absolute 03/08/2023 0.01  0.00 - 0.05 10*3/mm3 Final   • nRBC 03/08/2023 0.0  0.0 - 0.2 /100 WBC Final        CT Abdomen Pelvis Without Contrast    Result Date: 2/9/2023  Narrative: FINAL REPORT TECHNIQUE: Axial CT images were performed from the lung bases through the symphysis pubis without IV contrast.  This study was performed with techniques to keep radiation doses as low as reasonably achievable (ALARA). Individualized dose reduction techniques using automated exposure control or adjustment of mA and/or kV according to the patient's size were employed. CLINICAL HISTORY: Epigastric abdominal pain, hx of liver and colon  ca. currently in treatment. COMPARISON: 11/24/2022 FINDINGS: Lack of intravenous contrast limits evaluation of solid abdominal and pelvic organs.  LOWER CHEST: The heart is normal size.  The lung bases are clear.  ABDOMEN/PELVIS:  Liver, gallbladder and bile ducts: Multiple hepatic metastases have significantly decreased in size compared to the prior exam of 11/24/2022 and now demonstrate internal calcification likely related to treatment response.  The gallbladder is distended and the gallbladder wall is thickened.  There are gallstones present within the gallbladder and there appears to be a stone in the cystic duct.  There is no definite biliary duct dilatation. Adrenal glands: The adrenal glands are morphologically unremarkable without suspicious lesion.  Kidneys, ureter and urinary bladder: No nephrolithiasis.  No hydronephrosis. Urinary bladder is unremarkable.  Spleen: The spleen is normal size.  Pancreas: The pancreas is grossly unremarkable.  GI systems and mesentery: No evidence of bowel obstruction.  The appendix is visualized and unremarkable in appearance.  No significant mesenteric inflammation.  There is liquid stool throughout the colon with mild colonic wall thickening.  Lymph nodes: No definite pathologically enlarged abdominal or pelvic lymph nodes present within the limits of this noncontrast enhanced exam.  Vessels: The abdominal aorta is normal in caliber.  The inferior vena cava is unremarkable.  Peritoneum: No free intraperitoneal fluid or pneumoperitoneum.  Pelvic viscera: No acute findings.  Body wall: No body wall contusion. Bones: No acute fracture.     Impression: No hydronephrosis or nephrolithiasis. No acute findings in the abdomen and pelvis to account for patient's symptoms. Authenticated and Electronically Signed by Callum Galicia MD on 02/09/2023 11:24:07 PM    CT Abdomen Pelvis With Contrast, CT Chest With Contrast Diagnostic    Result Date: 3/6/2023  Narrative: PROCEDURE: CT CHEST  W CONTRAST DIAGNOSTIC-, CT ABDOMEN PELVIS W CONTRAST-  HISTORY: Follow-up metastatic colon cancer; C18.9-Malignant neoplasm of colon, unspecified; C78.7-Secondary malignant neoplasm of liver and intrahepatic bile duct; C18.2-Malignant neoplasm of ascending colon  COMPARISON: February 9, 2023, and November 24, 2022 comparison for size is based on the November 2022 exam.  PROCEDURE: Axial images were obtained from the thoracic inlet through the pubic symphysis following the administration of Isovue 300 contrast.   FINDINGS:  CHEST: There is no evidence of mediastinal or axillary adenopathy. Heart size is normal. There are no pleural or pericardial effusions. Lung windows reveal no focal opacities or suspicious pulmonary nodules. Bone windows reveal no lytic or destructive lesions.  ABDOMEN: Multiple liver lesions are again noted now associated with high density calcification from treatment response or high density material associated with transarterial chemoembolization if patient has received such procedure. Overall, liver lesions show marked improvement in size. A lesion in the anterior right lobe of the liver measures 3.7 x 3.8 cm, previously 8.3 x 7.0 cm. A second lesion measures 5.1 x 5.2 cm in the posterior right liver, previously 8.8 x 9.4 cm. No new liver lesions are identified. Remaining solid abdominal organs appear unremarkable. There is mild nonspecific lymphadenopathy along the celiac axis which is new from the prior exam. Abdominal bowel loops appear unremarkable.  PELVIS: The appendix is normal. The uterus and ovaries appear unremarkable. The urinary bladder is unremarkable. There is no pelvic lymphadenopathy or free fluid.      Impression: 1. Marked improvement in liver metastases. 2. interval development of mild nonspecific celiac axis lymphadenopathy. 3. Unremarkable appearance of the pelvis.  This study was performed with techniques to keep radiation doses as low as reasonably achievable (ALARA).  Individualized dose reduction techniques using automated exposure control or adjustment of mA and/or kV according to the patient size were employed.   690.90 mGy.cm     Images were reviewed, interpreted, and dictated by Dr. Davi Weiss MD Transcribed by Alee Fischer PA-C.  This report was signed and finalized on 3/6/2023 11:37 AM by Davi Weiss MD.      ASSESSMENT: The patient is a very pleasant 52 y.o. female  with right-sided metastatic colon cancer      PLAN:    1.  Right-sided metastatic colon cancer:  A.  I will proceed with chemotherapy as scheduled FOLFOXIRI cycle #7.  B.  The patient will follow-up with us for cycle #8.  C.  I did go over the scan results with the patient from March 6, 2023 and reassured she is having excellent response to treatment with significant decrease in her disease burden.  Her liver masses headstrong by more than 50%.  She does have nonspecific para-aortic lymphadenopathy which we will watch carefully.  I will repeat her scans after cycle #12.  I will consider maintenance treatment at that point.  D.  I will continue 25% dose reduction secondary to elevated baseline liver enzymes.  E.  I will continue to monitor the patient blood work including blood counts kidney function liver function and electrolytes.  F.  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  While the patient may not benefit from immunotherapy however she will be a candidate for EGFR inhibitors down the road.  G.  I did go over the blood work results with the patient from February 22, 2023.  Her CMP revealed elevated liver enzymes alkaline phosphatase is high at 300.  ALT and AST are elevated but essentially stable.  Total bilirubin is back to normal.  H.  I reassured the patient her circulating tumor DNA did improve significantly.  We will continue to monitor every other cycle.    2.  Elevated liver enzymes:  A.  Induced by  liver metastases  B.  I will continue 25% dose reduction of chemo drugs per  C.  I will repeat labs before next infusion.    3.  Chemotherapy-induced nausea:  A.  I will continue the patient on Zofran as needed for    4.  Chemotherapy-induced diarrhea:  A.  I will continue the patient on Imodium as needed    5. Cancer-related pain:  A.  I will continue the patient oxycodone 5 mg twice a day.  I will give the patient prescription today.     6.  Anxiety:  A.  I will continue the patient on Ativan 1 mg nightly as needed.       7.  Hypertension:  A.  She will continue losartan.  B.  I reviewed with the patient this will interfere with our management since chemotherapy typically can cause hypotension we will have to monitor blood pressure closely.    8.  Dehydration:  A.  I will continue 1 L of IV fluid on day 3 of each chemotherapy cycle.    9.  Chemotherapy-induced anemia:  A.  I explained to the patient her hemoglobin is dropped to 9.5 on February 22, 2023 which is direct treatment related toxicity.  B.  I will continue monitor her CBC prior to each infusion.  C.  I will transfuse as needed for hemoglobin less than 8.  D.  I will continue 25% dose reduction on chemotherapy.    10.  Chemotherapy-induced heartburn:  A.  I will change her to pantoprazole 40 mg twice a day.  I will give new prescription today.  B.  I will continue Carafate 1 g every 6 hours    11.  Treatment related diarrhea  A.  Continue Imodium as directed.  We will also add Lomotil to her regimen.  Education sheet was provided to the patient.    FOLLOW UP: 2 weeks with cycle #8.    Total time of patient care including preparation of patient chart prior to arrival, face-to-face with patient and following visit spent in reviewing records, lab results, personally reviewing and interpreting imaging studies, discussion with patient, and documentation/charting was 40 minutes           Iban Lambert MD  3/8/2023

## 2023-03-08 ENCOUNTER — INFUSION (OUTPATIENT)
Dept: ONCOLOGY | Facility: HOSPITAL | Age: 53
End: 2023-03-08
Payer: MEDICAID

## 2023-03-08 ENCOUNTER — OFFICE VISIT (OUTPATIENT)
Dept: ONCOLOGY | Facility: CLINIC | Age: 53
End: 2023-03-08
Payer: MEDICAID

## 2023-03-08 VITALS
RESPIRATION RATE: 16 BRPM | TEMPERATURE: 98 F | SYSTOLIC BLOOD PRESSURE: 158 MMHG | WEIGHT: 131 LBS | BODY MASS INDEX: 24.11 KG/M2 | DIASTOLIC BLOOD PRESSURE: 77 MMHG | OXYGEN SATURATION: 99 % | HEART RATE: 77 BPM | HEIGHT: 62 IN

## 2023-03-08 DIAGNOSIS — C78.7 METASTATIC COLON CANCER TO LIVER: ICD-10-CM

## 2023-03-08 DIAGNOSIS — C18.9 METASTATIC COLON CANCER TO LIVER: ICD-10-CM

## 2023-03-08 DIAGNOSIS — C78.7 METASTATIC COLON CANCER TO LIVER: Primary | ICD-10-CM

## 2023-03-08 DIAGNOSIS — C18.2 CANCER OF RIGHT COLON: Primary | ICD-10-CM

## 2023-03-08 DIAGNOSIS — K21.9 GASTROESOPHAGEAL REFLUX DISEASE WITHOUT ESOPHAGITIS: ICD-10-CM

## 2023-03-08 DIAGNOSIS — C18.9 METASTATIC COLON CANCER TO LIVER: Primary | ICD-10-CM

## 2023-03-08 LAB
ALBUMIN SERPL-MCNC: 3.7 G/DL (ref 3.5–5.2)
ALBUMIN/GLOB SERPL: 1.2 G/DL
ALP SERPL-CCNC: 336 U/L (ref 39–117)
ALT SERPL W P-5'-P-CCNC: 93 U/L (ref 1–33)
ANION GAP SERPL CALCULATED.3IONS-SCNC: 9.7 MMOL/L (ref 5–15)
AST SERPL-CCNC: 137 U/L (ref 1–32)
BASOPHILS # BLD AUTO: 0.01 10*3/MM3 (ref 0–0.2)
BASOPHILS NFR BLD AUTO: 0.2 % (ref 0–1.5)
BILIRUB SERPL-MCNC: 0.5 MG/DL (ref 0–1.2)
BUN SERPL-MCNC: 9 MG/DL (ref 6–20)
BUN/CREAT SERPL: 13.8 (ref 7–25)
CALCIUM SPEC-SCNC: 9 MG/DL (ref 8.6–10.5)
CEA SERPL-MCNC: 5.88 NG/ML
CHLORIDE SERPL-SCNC: 102 MMOL/L (ref 98–107)
CO2 SERPL-SCNC: 26.3 MMOL/L (ref 22–29)
CREAT SERPL-MCNC: 0.65 MG/DL (ref 0.57–1)
DEPRECATED RDW RBC AUTO: 59.6 FL (ref 37–54)
EGFRCR SERPLBLD CKD-EPI 2021: 106.1 ML/MIN/1.73
EOSINOPHIL # BLD AUTO: 0.17 10*3/MM3 (ref 0–0.4)
EOSINOPHIL NFR BLD AUTO: 4 % (ref 0.3–6.2)
ERYTHROCYTE [DISTWIDTH] IN BLOOD BY AUTOMATED COUNT: 18.4 % (ref 12.3–15.4)
GLOBULIN UR ELPH-MCNC: 3.1 GM/DL
GLUCOSE SERPL-MCNC: 101 MG/DL (ref 65–99)
HCT VFR BLD AUTO: 29.3 % (ref 34–46.6)
HGB BLD-MCNC: 9.6 G/DL (ref 12–15.9)
IMM GRANULOCYTES # BLD AUTO: 0.01 10*3/MM3 (ref 0–0.05)
IMM GRANULOCYTES NFR BLD AUTO: 0.2 % (ref 0–0.5)
LYMPHOCYTES # BLD AUTO: 1.51 10*3/MM3 (ref 0.7–3.1)
LYMPHOCYTES NFR BLD AUTO: 36 % (ref 19.6–45.3)
MCH RBC QN AUTO: 28.8 PG (ref 26.6–33)
MCHC RBC AUTO-ENTMCNC: 32.8 G/DL (ref 31.5–35.7)
MCV RBC AUTO: 88 FL (ref 79–97)
MONOCYTES # BLD AUTO: 0.66 10*3/MM3 (ref 0.1–0.9)
MONOCYTES NFR BLD AUTO: 15.7 % (ref 5–12)
NEUTROPHILS NFR BLD AUTO: 1.84 10*3/MM3 (ref 1.7–7)
NEUTROPHILS NFR BLD AUTO: 43.9 % (ref 42.7–76)
NRBC BLD AUTO-RTO: 0 /100 WBC (ref 0–0.2)
PLATELET # BLD AUTO: 160 10*3/MM3 (ref 140–450)
PMV BLD AUTO: 10.7 FL (ref 6–12)
POTASSIUM SERPL-SCNC: 4 MMOL/L (ref 3.5–5.2)
PROT SERPL-MCNC: 6.8 G/DL (ref 6–8.5)
RBC # BLD AUTO: 3.33 10*6/MM3 (ref 3.77–5.28)
SODIUM SERPL-SCNC: 138 MMOL/L (ref 136–145)
WBC NRBC COR # BLD: 4.2 10*3/MM3 (ref 3.4–10.8)

## 2023-03-08 PROCEDURE — G0498 CHEMO EXTEND IV INFUS W/PUMP: HCPCS

## 2023-03-08 PROCEDURE — 0 ATROPINE PER 0.01 MG: Performed by: INTERNAL MEDICINE

## 2023-03-08 PROCEDURE — 96549 UNLISTED CHEMOTHERAPY PX: CPT

## 2023-03-08 PROCEDURE — 25010000002 LEUCOVORIN 500 MG RECONSTITUTED SOLUTION 1 EACH VIAL: Performed by: INTERNAL MEDICINE

## 2023-03-08 PROCEDURE — 80053 COMPREHEN METABOLIC PANEL: CPT | Performed by: NURSE PRACTITIONER

## 2023-03-08 PROCEDURE — 96368 THER/DIAG CONCURRENT INF: CPT

## 2023-03-08 PROCEDURE — 96413 CHEMO IV INFUSION 1 HR: CPT

## 2023-03-08 PROCEDURE — 25010000002 IRINOTECAN PER 20 MG: Performed by: INTERNAL MEDICINE

## 2023-03-08 PROCEDURE — 96415 CHEMO IV INFUSION ADDL HR: CPT

## 2023-03-08 PROCEDURE — 1126F AMNT PAIN NOTED NONE PRSNT: CPT | Performed by: INTERNAL MEDICINE

## 2023-03-08 PROCEDURE — 96416 CHEMO PROLONG INFUSE W/PUMP: CPT

## 2023-03-08 PROCEDURE — 99215 OFFICE O/P EST HI 40 MIN: CPT | Performed by: INTERNAL MEDICINE

## 2023-03-08 PROCEDURE — 96417 CHEMO IV INFUS EACH ADDL SEQ: CPT

## 2023-03-08 PROCEDURE — 82378 CARCINOEMBRYONIC ANTIGEN: CPT | Performed by: NURSE PRACTITIONER

## 2023-03-08 PROCEDURE — 96375 TX/PRO/DX INJ NEW DRUG ADDON: CPT

## 2023-03-08 PROCEDURE — 85025 COMPLETE CBC W/AUTO DIFF WBC: CPT | Performed by: NURSE PRACTITIONER

## 2023-03-08 PROCEDURE — 96376 TX/PRO/DX INJ SAME DRUG ADON: CPT

## 2023-03-08 PROCEDURE — 25010000002 OXALIPLATIN PER 0.5 MG: Performed by: INTERNAL MEDICINE

## 2023-03-08 PROCEDURE — 25010000002 DEXAMETHASONE SODIUM PHOSPHATE 20 MG/5ML SOLUTION: Performed by: INTERNAL MEDICINE

## 2023-03-08 PROCEDURE — 25010000002 FLUOROURACIL PER 500 MG: Performed by: INTERNAL MEDICINE

## 2023-03-08 PROCEDURE — 25010000002 PALONOSETRON 0.25 MG/5ML SOLUTION PREFILLED SYRINGE: Performed by: INTERNAL MEDICINE

## 2023-03-08 RX ORDER — DEXTROSE MONOHYDRATE 50 MG/ML
250 INJECTION, SOLUTION INTRAVENOUS ONCE
Status: DISCONTINUED | OUTPATIENT
Start: 2023-03-08 | End: 2023-03-08 | Stop reason: HOSPADM

## 2023-03-08 RX ORDER — DIPHENHYDRAMINE HYDROCHLORIDE 50 MG/ML
50 INJECTION INTRAMUSCULAR; INTRAVENOUS AS NEEDED
Status: CANCELLED | OUTPATIENT
Start: 2023-03-08

## 2023-03-08 RX ORDER — PANTOPRAZOLE SODIUM 40 MG/1
40 TABLET, DELAYED RELEASE ORAL DAILY
Qty: 30 TABLET | Refills: 0 | Status: SHIPPED | OUTPATIENT
Start: 2023-03-08

## 2023-03-08 RX ORDER — PALONOSETRON 0.05 MG/ML
0.25 INJECTION, SOLUTION INTRAVENOUS ONCE
Status: COMPLETED | OUTPATIENT
Start: 2023-03-08 | End: 2023-03-08

## 2023-03-08 RX ORDER — DEXTROSE MONOHYDRATE 50 MG/ML
250 INJECTION, SOLUTION INTRAVENOUS ONCE
Status: CANCELLED | OUTPATIENT
Start: 2023-03-08

## 2023-03-08 RX ORDER — FAMOTIDINE 10 MG/ML
20 INJECTION, SOLUTION INTRAVENOUS AS NEEDED
Status: CANCELLED | OUTPATIENT
Start: 2023-03-08

## 2023-03-08 RX ORDER — PALONOSETRON 0.05 MG/ML
0.25 INJECTION, SOLUTION INTRAVENOUS ONCE
Status: CANCELLED | OUTPATIENT
Start: 2023-03-08

## 2023-03-08 RX ADMIN — ATROPINE SULFATE 0.25 MG: 0.4 INJECTION, SOLUTION INTRAMUSCULAR; INTRAVENOUS; SUBCUTANEOUS at 12:53

## 2023-03-08 RX ADMIN — ATROPINE SULFATE 0.25 MG: 0.4 INJECTION, SOLUTION INTRAMUSCULAR; INTRAVENOUS; SUBCUTANEOUS at 11:15

## 2023-03-08 RX ADMIN — DEXAMETHASONE SODIUM PHOSPHATE 12 MG: 4 INJECTION, SOLUTION INTRAMUSCULAR; INTRAVENOUS at 10:59

## 2023-03-08 RX ADMIN — PALONOSETRON 0.25 MG: 0.25 INJECTION, SOLUTION INTRAVENOUS at 10:57

## 2023-03-08 RX ADMIN — OXALIPLATIN 105 MG: 5 INJECTION, SOLUTION INTRAVENOUS at 12:54

## 2023-03-08 RX ADMIN — FLUOROURACIL 2900 MG: 50 INJECTION, SOLUTION INTRAVENOUS at 14:58

## 2023-03-08 RX ADMIN — LEUCOVORIN CALCIUM 480 MG: 500 INJECTION, POWDER, LYOPHILIZED, FOR SOLUTION INTRAMUSCULAR; INTRAVENOUS at 12:54

## 2023-03-08 RX ADMIN — DEXTROSE MONOHYDRATE 200 MG: 50 INJECTION, SOLUTION INTRAVENOUS at 11:16

## 2023-03-10 ENCOUNTER — INFUSION (OUTPATIENT)
Dept: ONCOLOGY | Facility: HOSPITAL | Age: 53
End: 2023-03-10
Payer: MEDICAID

## 2023-03-10 VITALS — SYSTOLIC BLOOD PRESSURE: 135 MMHG | HEART RATE: 80 BPM | TEMPERATURE: 98.4 F | DIASTOLIC BLOOD PRESSURE: 85 MMHG

## 2023-03-10 DIAGNOSIS — C18.2 CANCER OF RIGHT COLON: ICD-10-CM

## 2023-03-10 DIAGNOSIS — C78.7 METASTATIC COLON CANCER TO LIVER: Primary | ICD-10-CM

## 2023-03-10 DIAGNOSIS — C18.9 METASTATIC COLON CANCER TO LIVER: Primary | ICD-10-CM

## 2023-03-10 PROCEDURE — 96360 HYDRATION IV INFUSION INIT: CPT

## 2023-03-10 PROCEDURE — 25010000002 HEPARIN LOCK FLUSH PER 10 UNITS: Performed by: INTERNAL MEDICINE

## 2023-03-10 PROCEDURE — 96361 HYDRATE IV INFUSION ADD-ON: CPT

## 2023-03-10 RX ORDER — HEPARIN SODIUM (PORCINE) LOCK FLUSH IV SOLN 100 UNIT/ML 100 UNIT/ML
500 SOLUTION INTRAVENOUS AS NEEDED
Status: CANCELLED | OUTPATIENT
Start: 2023-03-10

## 2023-03-10 RX ORDER — SODIUM CHLORIDE 0.9 % (FLUSH) 0.9 %
10 SYRINGE (ML) INJECTION AS NEEDED
Status: CANCELLED | OUTPATIENT
Start: 2023-03-10

## 2023-03-10 RX ORDER — SODIUM CHLORIDE 0.9 % (FLUSH) 0.9 %
10 SYRINGE (ML) INJECTION AS NEEDED
Status: DISCONTINUED | OUTPATIENT
Start: 2023-03-10 | End: 2023-03-10 | Stop reason: HOSPADM

## 2023-03-10 RX ORDER — HEPARIN SODIUM (PORCINE) LOCK FLUSH IV SOLN 100 UNIT/ML 100 UNIT/ML
500 SOLUTION INTRAVENOUS AS NEEDED
Status: DISCONTINUED | OUTPATIENT
Start: 2023-03-10 | End: 2023-03-10 | Stop reason: HOSPADM

## 2023-03-10 RX ADMIN — SODIUM CHLORIDE 1000 ML: 9 INJECTION, SOLUTION INTRAVENOUS at 13:50

## 2023-03-10 RX ADMIN — Medication 500 UNITS: at 14:54

## 2023-03-10 RX ADMIN — Medication 10 ML: at 14:54

## 2023-03-20 NOTE — PROGRESS NOTES
DATE OF VISIT: 3/21/2023    REASON FOR VISIT: Followup for metastatic right-sided colon cancer     PROBLEM LIST:  1. Metastatic colon cancer TX NX M1 a stage Perry:  A.  Presented with abdominal pain and jaundice  B.  CT abdomen pelvis done November 04, 2022 confirmed diffuse liver metastases.  C.  Diagnosed after CT-guided liver biopsy done on November 22, 2022 confirming adenocarcinoma CK20 positive CK7 negative.  D.  Colonoscopy done December 08, 2020 to confirm transverse colon mass however biopsy revealed adenoma.  E.  Started palliative chemotherapy with dose adjusted FOLFOXIRI December 14, 2022, status post 6 cycles  2.  Elevated liver enzymes:  A.  Induced by metastatic disease  3.  Cancer-related pain  4.  Anxiety  5.  Hypertension    HISTORY OF PRESENT ILLNESS: The patient is a very pleasant 52 y.o. female  with past medical history significant for metastatic right-sided colon cancer diagnosed November 22, 2022.  Started on palliative chemotherapy with dose adjusted FOLFOXIRI. The patient is here today for scheduled follow-up visit with treatment cycle number 8.    SUBJECTIVE: Amber is here today with her .  Heartburn is stable overall. She continues on protonix. Overall, she is feeling fairly well. She is tolerating treatment well with a few side effects. She has some fatigue that is stable overall. Diarrhea and nausea is controlled.  She has noted some hot flashes.  She is wondering if this could be menopause.    Past History:  Medical History: has a past medical history of Anxiety, Cancer (HCC), Ear piercing, Gallstones, History of irritable bowel syndrome, Hypertension, Seasonal allergies, Tattoos, and Wears glasses.   Surgical History: has a past surgical history that includes postpartum tubal ligation; Dilation and curettage of uterus (2013); Tumor removal (2013); Portacath placement (N/A, 12/8/2022); and Colonoscopy (N/A, 12/8/2022).   Family History: family history is not on file.   Social  "History: reports that she has never smoked. She has never used smokeless tobacco. She reports current alcohol use. She reports that she does not use drugs.    (Not in a hospital admission)     Allergies: Losartan, Valium [diazepam], and Citrus     Review of Systems   Constitutional: Positive for fatigue.   Respiratory: Negative.    Gastrointestinal: Positive for diarrhea and nausea.   Musculoskeletal: Positive for arthralgias.   Psychiatric/Behavioral: The patient is nervous/anxious.        PHYSICAL EXAMINATION:   /77   Pulse 80   Temp 97.8 °F (36.6 °C) (Temporal)   Resp 16   Ht 157.5 cm (62\")   Wt 59.4 kg (131 lb)   SpO2 99%   BMI 23.96 kg/m²    Pain Score    03/21/23 0826   PainSc: 0-No pain                     ECOG Performance Status: 1 - Symptomatic but completely ambulatory      General Appearance:      alert, cooperative, no apparent distress and appears stated age   Lungs:   Clear to auscultation bilaterally; respirations regular, even, and unlabored bilaterally   Heart:  Regular rate and rhythm, no murmurs appreciated   Abdomen:   Soft, non-tender, non-distended and no organomegaly                 Office Visit on 03/08/2023   Component Date Value Ref Range Status   • Glucose 03/08/2023 101 (H)  65 - 99 mg/dL Final   • BUN 03/08/2023 9  6 - 20 mg/dL Final   • Creatinine 03/08/2023 0.65  0.57 - 1.00 mg/dL Final   • Sodium 03/08/2023 138  136 - 145 mmol/L Final   • Potassium 03/08/2023 4.0  3.5 - 5.2 mmol/L Final   • Chloride 03/08/2023 102  98 - 107 mmol/L Final   • CO2 03/08/2023 26.3  22.0 - 29.0 mmol/L Final   • Calcium 03/08/2023 9.0  8.6 - 10.5 mg/dL Final   • Total Protein 03/08/2023 6.8  6.0 - 8.5 g/dL Final   • Albumin 03/08/2023 3.7  3.5 - 5.2 g/dL Final   • ALT (SGPT) 03/08/2023 93 (H)  1 - 33 U/L Final   • AST (SGOT) 03/08/2023 137 (H)  1 - 32 U/L Final   • Alkaline Phosphatase 03/08/2023 336 (H)  39 - 117 U/L Final   • Total Bilirubin 03/08/2023 0.5  0.0 - 1.2 mg/dL Final   • Globulin " 03/08/2023 3.1  gm/dL Final   • A/G Ratio 03/08/2023 1.2  g/dL Final   • BUN/Creatinine Ratio 03/08/2023 13.8  7.0 - 25.0 Final   • Anion Gap 03/08/2023 9.7  5.0 - 15.0 mmol/L Final   • eGFR 03/08/2023 106.1  >60.0 mL/min/1.73 Final   • CEA 03/08/2023 5.88  ng/mL Final   • WBC 03/08/2023 4.20  3.40 - 10.80 10*3/mm3 Final   • RBC 03/08/2023 3.33 (L)  3.77 - 5.28 10*6/mm3 Final   • Hemoglobin 03/08/2023 9.6 (L)  12.0 - 15.9 g/dL Final   • Hematocrit 03/08/2023 29.3 (L)  34.0 - 46.6 % Final   • MCV 03/08/2023 88.0  79.0 - 97.0 fL Final   • MCH 03/08/2023 28.8  26.6 - 33.0 pg Final   • MCHC 03/08/2023 32.8  31.5 - 35.7 g/dL Final   • RDW 03/08/2023 18.4 (H)  12.3 - 15.4 % Final   • RDW-SD 03/08/2023 59.6 (H)  37.0 - 54.0 fl Final   • MPV 03/08/2023 10.7  6.0 - 12.0 fL Final   • Platelets 03/08/2023 160  140 - 450 10*3/mm3 Final   • Neutrophil % 03/08/2023 43.9  42.7 - 76.0 % Final   • Lymphocyte % 03/08/2023 36.0  19.6 - 45.3 % Final   • Monocyte % 03/08/2023 15.7 (H)  5.0 - 12.0 % Final   • Eosinophil % 03/08/2023 4.0  0.3 - 6.2 % Final   • Basophil % 03/08/2023 0.2  0.0 - 1.5 % Final   • Immature Grans % 03/08/2023 0.2  0.0 - 0.5 % Final   • Neutrophils, Absolute 03/08/2023 1.84  1.70 - 7.00 10*3/mm3 Final   • Lymphocytes, Absolute 03/08/2023 1.51  0.70 - 3.10 10*3/mm3 Final   • Monocytes, Absolute 03/08/2023 0.66  0.10 - 0.90 10*3/mm3 Final   • Eosinophils, Absolute 03/08/2023 0.17  0.00 - 0.40 10*3/mm3 Final   • Basophils, Absolute 03/08/2023 0.01  0.00 - 0.20 10*3/mm3 Final   • Immature Grans, Absolute 03/08/2023 0.01  0.00 - 0.05 10*3/mm3 Final   • nRBC 03/08/2023 0.0  0.0 - 0.2 /100 WBC Final        CT Abdomen Pelvis With Contrast, CT Chest With Contrast Diagnostic    Result Date: 3/6/2023  Narrative: PROCEDURE: CT CHEST W CONTRAST DIAGNOSTIC-, CT ABDOMEN PELVIS W CONTRAST-  HISTORY: Follow-up metastatic colon cancer; C18.9-Malignant neoplasm of colon, unspecified; C78.7-Secondary malignant neoplasm of liver and  intrahepatic bile duct; C18.2-Malignant neoplasm of ascending colon  COMPARISON: February 9, 2023, and November 24, 2022 comparison for size is based on the November 2022 exam.  PROCEDURE: Axial images were obtained from the thoracic inlet through the pubic symphysis following the administration of Isovue 300 contrast.   FINDINGS:  CHEST: There is no evidence of mediastinal or axillary adenopathy. Heart size is normal. There are no pleural or pericardial effusions. Lung windows reveal no focal opacities or suspicious pulmonary nodules. Bone windows reveal no lytic or destructive lesions.  ABDOMEN: Multiple liver lesions are again noted now associated with high density calcification from treatment response or high density material associated with transarterial chemoembolization if patient has received such procedure. Overall, liver lesions show marked improvement in size. A lesion in the anterior right lobe of the liver measures 3.7 x 3.8 cm, previously 8.3 x 7.0 cm. A second lesion measures 5.1 x 5.2 cm in the posterior right liver, previously 8.8 x 9.4 cm. No new liver lesions are identified. Remaining solid abdominal organs appear unremarkable. There is mild nonspecific lymphadenopathy along the celiac axis which is new from the prior exam. Abdominal bowel loops appear unremarkable.  PELVIS: The appendix is normal. The uterus and ovaries appear unremarkable. The urinary bladder is unremarkable. There is no pelvic lymphadenopathy or free fluid.      Impression: 1. Marked improvement in liver metastases. 2. interval development of mild nonspecific celiac axis lymphadenopathy. 3. Unremarkable appearance of the pelvis.  This study was performed with techniques to keep radiation doses as low as reasonably achievable (ALARA). Individualized dose reduction techniques using automated exposure control or adjustment of mA and/or kV according to the patient size were employed.   690.90 mGy.cm     Images were reviewed,  interpreted, and dictated by Dr. Davi Weiss MD Transcribed by Aele Fischer PA-C.  This report was signed and finalized on 3/6/2023 11:37 AM by Davi Weiss MD.      ASSESSMENT: The patient is a very pleasant 52 y.o. female  with right-sided metastatic colon cancer      PLAN:    1.  Right-sided metastatic colon cancer:  A.  I will proceed with chemotherapy as scheduled FOLFOXIRI cycle #8.  B.  The patient will follow-up with us for cycle #9.  C. We will repeat her scans after cycle #12.  I will consider maintenance treatment at that point.  D.  I will continue 25% dose reduction secondary to elevated baseline liver enzymes.  E.  I will continue to monitor the patient blood work including blood counts kidney function liver function and electrolytes.  F.  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  While the patient may not benefit from immunotherapy however she will be a candidate for EGFR inhibitors down the road.  G.  I did go over the blood work results with the patient from March 8, 2023.  Her CMP revealed elevated liver enzymes alkaline phosphatase is high at 336.  ALT and AST are elevated at ALT 93 .  Total bilirubin is back to normal.  H.  At last check, her circulating tumor DNA did improve significantly.  We will continue to monitor every other cycle. This is due today.     2.  Elevated liver enzymes:  A.  Induced by liver metastases  B.  I will continue 25% dose reduction of chemo drugs per  C.  I will repeat labs before next infusion.    3.  Chemotherapy-induced nausea:  A.  I will continue the patient on Zofran as needed for    4.  Chemotherapy-induced diarrhea:  A.  I will continue the patient on Imodium as needed    5. Cancer-related pain:  A.  I will continue the patient oxycodone 5 mg twice a day.     6.  Anxiety:  A.  I will continue the patient on Ativan 1 mg nightly as needed.       7.   Hypertension:  A.  She will continue losartan.  B.  I reviewed with the patient this will interfere with our management since chemotherapy typically can cause hypotension we will have to monitor blood pressure closely.    8.  Dehydration:  A.  I will continue 1 L of IV fluid on day 3 of each chemotherapy cycle.    9.  Chemotherapy-induced anemia:  A.  I explained to the patient hemoglobin stable at 9.6 on March 8 , 2023 which is direct treatment related toxicity.  B.  I will continue monitor her CBC prior to each infusion.  C.  I will transfuse as needed for hemoglobin less than 8.  D.  I will continue 25% dose reduction on chemotherapy.    10.  Chemotherapy-induced heartburn:  A.  I will change her to pantoprazole 40 mg twice a day.  I will give new prescription today.  B.  I will continue Carafate 1 g every 6 hours    11.  Treatment related diarrhea  A.  Continue Imodium as directed.  We will also add Lomotil to her regimen.  Education sheet was provided to the patient.    12.  Hot flashes  A.  We discussed this can cause menopause in women.  Patient reports she has never been through menopause up to this point.  We will continue to monitor for now.  Hot flashes are manageable.    FOLLOW UP: 2 weeks with cycle #9.    Total time of patient care including time prior to, face to face with patient, and following visit spent in reviewing records, lab results, imaging studies, discussion with patient, and documentation/charting was > 35 minutes            Ana Cristina Cano, APRN  3/21/2023

## 2023-03-21 ENCOUNTER — INFUSION (OUTPATIENT)
Dept: ONCOLOGY | Facility: HOSPITAL | Age: 53
End: 2023-03-21
Payer: MEDICAID

## 2023-03-21 ENCOUNTER — OFFICE VISIT (OUTPATIENT)
Dept: ONCOLOGY | Facility: CLINIC | Age: 53
End: 2023-03-21
Payer: MEDICAID

## 2023-03-21 VITALS
TEMPERATURE: 97.8 F | OXYGEN SATURATION: 99 % | RESPIRATION RATE: 16 BRPM | BODY MASS INDEX: 24.11 KG/M2 | WEIGHT: 131 LBS | DIASTOLIC BLOOD PRESSURE: 77 MMHG | SYSTOLIC BLOOD PRESSURE: 130 MMHG | HEIGHT: 62 IN | HEART RATE: 80 BPM

## 2023-03-21 DIAGNOSIS — C18.9 METASTATIC COLON CANCER TO LIVER: Primary | ICD-10-CM

## 2023-03-21 DIAGNOSIS — C78.7 METASTATIC COLON CANCER TO LIVER: Primary | ICD-10-CM

## 2023-03-21 LAB
ALBUMIN SERPL-MCNC: 3.3 G/DL (ref 3.5–5.2)
ALBUMIN/GLOB SERPL: 1 G/DL
ALP SERPL-CCNC: 316 U/L (ref 39–117)
ALT SERPL W P-5'-P-CCNC: 83 U/L (ref 1–33)
ANION GAP SERPL CALCULATED.3IONS-SCNC: 7.7 MMOL/L (ref 5–15)
AST SERPL-CCNC: 83 U/L (ref 1–32)
BASOPHILS # BLD AUTO: 0.01 10*3/MM3 (ref 0–0.2)
BASOPHILS NFR BLD AUTO: 0.3 % (ref 0–1.5)
BILIRUB SERPL-MCNC: 0.4 MG/DL (ref 0–1.2)
BUN SERPL-MCNC: 10 MG/DL (ref 6–20)
BUN/CREAT SERPL: 15.6 (ref 7–25)
CALCIUM SPEC-SCNC: 9.5 MG/DL (ref 8.6–10.5)
CEA SERPL-MCNC: 4.85 NG/ML
CHLORIDE SERPL-SCNC: 106 MMOL/L (ref 98–107)
CO2 SERPL-SCNC: 25.3 MMOL/L (ref 22–29)
CREAT SERPL-MCNC: 0.64 MG/DL (ref 0.57–1)
DEPRECATED RDW RBC AUTO: 57.1 FL (ref 37–54)
EGFRCR SERPLBLD CKD-EPI 2021: 106.5 ML/MIN/1.73
EOSINOPHIL # BLD AUTO: 0.26 10*3/MM3 (ref 0–0.4)
EOSINOPHIL NFR BLD AUTO: 8 % (ref 0.3–6.2)
ERYTHROCYTE [DISTWIDTH] IN BLOOD BY AUTOMATED COUNT: 17.9 % (ref 12.3–15.4)
GLOBULIN UR ELPH-MCNC: 3.4 GM/DL
GLUCOSE SERPL-MCNC: 96 MG/DL (ref 65–99)
HCT VFR BLD AUTO: 29.4 % (ref 34–46.6)
HGB BLD-MCNC: 9.6 G/DL (ref 12–15.9)
IMM GRANULOCYTES # BLD AUTO: 0.01 10*3/MM3 (ref 0–0.05)
IMM GRANULOCYTES NFR BLD AUTO: 0.3 % (ref 0–0.5)
LYMPHOCYTES # BLD AUTO: 1.27 10*3/MM3 (ref 0.7–3.1)
LYMPHOCYTES NFR BLD AUTO: 39.3 % (ref 19.6–45.3)
MCH RBC QN AUTO: 28.9 PG (ref 26.6–33)
MCHC RBC AUTO-ENTMCNC: 32.7 G/DL (ref 31.5–35.7)
MCV RBC AUTO: 88.6 FL (ref 79–97)
MONOCYTES # BLD AUTO: 0.38 10*3/MM3 (ref 0.1–0.9)
MONOCYTES NFR BLD AUTO: 11.8 % (ref 5–12)
NEUTROPHILS NFR BLD AUTO: 1.3 10*3/MM3 (ref 1.7–7)
NEUTROPHILS NFR BLD AUTO: 40.3 % (ref 42.7–76)
NRBC BLD AUTO-RTO: 0 /100 WBC (ref 0–0.2)
PLATELET # BLD AUTO: 141 10*3/MM3 (ref 140–450)
PMV BLD AUTO: 10.3 FL (ref 6–12)
POTASSIUM SERPL-SCNC: 3.8 MMOL/L (ref 3.5–5.2)
PROT SERPL-MCNC: 6.7 G/DL (ref 6–8.5)
RBC # BLD AUTO: 3.32 10*6/MM3 (ref 3.77–5.28)
SODIUM SERPL-SCNC: 139 MMOL/L (ref 136–145)
WBC NRBC COR # BLD: 3.23 10*3/MM3 (ref 3.4–10.8)

## 2023-03-21 PROCEDURE — 25010000002 DEXAMETHASONE SODIUM PHOSPHATE 20 MG/5ML SOLUTION: Performed by: NURSE PRACTITIONER

## 2023-03-21 PROCEDURE — 96375 TX/PRO/DX INJ NEW DRUG ADDON: CPT

## 2023-03-21 PROCEDURE — 1160F RVW MEDS BY RX/DR IN RCRD: CPT | Performed by: NURSE PRACTITIONER

## 2023-03-21 PROCEDURE — 85025 COMPLETE CBC W/AUTO DIFF WBC: CPT

## 2023-03-21 PROCEDURE — 25010000002 LEUCOVORIN CALCIUM PER 50MG: Performed by: NURSE PRACTITIONER

## 2023-03-21 PROCEDURE — 96416 CHEMO PROLONG INFUSE W/PUMP: CPT

## 2023-03-21 PROCEDURE — 96368 THER/DIAG CONCURRENT INF: CPT

## 2023-03-21 PROCEDURE — 25010000002 IRINOTECAN PER 20 MG: Performed by: NURSE PRACTITIONER

## 2023-03-21 PROCEDURE — 25010000002 OXALIPLATIN PER 0.5 MG: Performed by: NURSE PRACTITIONER

## 2023-03-21 PROCEDURE — 25010000002 FLUOROURACIL PER 500 MG: Performed by: NURSE PRACTITIONER

## 2023-03-21 PROCEDURE — 82378 CARCINOEMBRYONIC ANTIGEN: CPT

## 2023-03-21 PROCEDURE — G0498 CHEMO EXTEND IV INFUS W/PUMP: HCPCS

## 2023-03-21 PROCEDURE — 80053 COMPREHEN METABOLIC PANEL: CPT

## 2023-03-21 PROCEDURE — 25010000002 PALONOSETRON 0.25 MG/5ML SOLUTION PREFILLED SYRINGE: Performed by: NURSE PRACTITIONER

## 2023-03-21 PROCEDURE — 1126F AMNT PAIN NOTED NONE PRSNT: CPT | Performed by: NURSE PRACTITIONER

## 2023-03-21 PROCEDURE — 96413 CHEMO IV INFUSION 1 HR: CPT

## 2023-03-21 PROCEDURE — 99214 OFFICE O/P EST MOD 30 MIN: CPT | Performed by: NURSE PRACTITIONER

## 2023-03-21 PROCEDURE — 96415 CHEMO IV INFUSION ADDL HR: CPT

## 2023-03-21 PROCEDURE — 1159F MED LIST DOCD IN RCRD: CPT | Performed by: NURSE PRACTITIONER

## 2023-03-21 PROCEDURE — 96417 CHEMO IV INFUS EACH ADDL SEQ: CPT

## 2023-03-21 RX ORDER — DEXTROSE MONOHYDRATE 50 MG/ML
250 INJECTION, SOLUTION INTRAVENOUS ONCE
Status: DISCONTINUED | OUTPATIENT
Start: 2023-03-21 | End: 2023-03-21 | Stop reason: HOSPADM

## 2023-03-21 RX ORDER — PALONOSETRON 0.05 MG/ML
0.25 INJECTION, SOLUTION INTRAVENOUS ONCE
Status: CANCELLED | OUTPATIENT
Start: 2023-03-21

## 2023-03-21 RX ORDER — PALONOSETRON 0.05 MG/ML
0.25 INJECTION, SOLUTION INTRAVENOUS ONCE
Status: COMPLETED | OUTPATIENT
Start: 2023-03-21 | End: 2023-03-21

## 2023-03-21 RX ORDER — DEXTROSE MONOHYDRATE 50 MG/ML
250 INJECTION, SOLUTION INTRAVENOUS ONCE
Status: CANCELLED | OUTPATIENT
Start: 2023-03-21

## 2023-03-21 RX ORDER — DIPHENHYDRAMINE HYDROCHLORIDE 50 MG/ML
50 INJECTION INTRAMUSCULAR; INTRAVENOUS AS NEEDED
Status: CANCELLED | OUTPATIENT
Start: 2023-03-21

## 2023-03-21 RX ORDER — FAMOTIDINE 10 MG/ML
20 INJECTION, SOLUTION INTRAVENOUS AS NEEDED
Status: CANCELLED | OUTPATIENT
Start: 2023-03-21

## 2023-03-21 RX ADMIN — ATROPINE SULFATE 0.25 MG: 0.4 INJECTION, SOLUTION INTRAVENOUS at 10:55

## 2023-03-21 RX ADMIN — IRINOTECAN HYDROCHLORIDE 200 MG: 20 INJECTION, SOLUTION INTRAVENOUS at 10:57

## 2023-03-21 RX ADMIN — OXALIPLATIN 105 MG: 5 INJECTION, SOLUTION INTRAVENOUS at 12:27

## 2023-03-21 RX ADMIN — DEXAMETHASONE SODIUM PHOSPHATE 12 MG: 4 INJECTION, SOLUTION INTRAMUSCULAR; INTRAVENOUS at 10:19

## 2023-03-21 RX ADMIN — FLUOROURACIL 2900 MG: 50 INJECTION, SOLUTION INTRAVENOUS at 14:34

## 2023-03-21 RX ADMIN — LEUCOVORIN CALCIUM 480 MG: 10 INJECTION INTRAMUSCULAR; INTRAVENOUS at 12:28

## 2023-03-21 RX ADMIN — PALONOSETRON 0.25 MG: 0.25 INJECTION, SOLUTION INTRAVENOUS at 10:36

## 2023-03-23 ENCOUNTER — INFUSION (OUTPATIENT)
Dept: ONCOLOGY | Facility: HOSPITAL | Age: 53
End: 2023-03-23
Payer: MEDICAID

## 2023-03-23 VITALS
TEMPERATURE: 97.3 F | HEART RATE: 76 BPM | RESPIRATION RATE: 12 BRPM | SYSTOLIC BLOOD PRESSURE: 149 MMHG | DIASTOLIC BLOOD PRESSURE: 71 MMHG

## 2023-03-23 DIAGNOSIS — C18.2 CANCER OF RIGHT COLON: ICD-10-CM

## 2023-03-23 DIAGNOSIS — C78.7 METASTATIC COLON CANCER TO LIVER: Primary | ICD-10-CM

## 2023-03-23 DIAGNOSIS — C18.9 METASTATIC COLON CANCER TO LIVER: Primary | ICD-10-CM

## 2023-03-23 PROCEDURE — 25010000002 HEPARIN LOCK FLUSH PER 10 UNITS: Performed by: INTERNAL MEDICINE

## 2023-03-23 PROCEDURE — 96360 HYDRATION IV INFUSION INIT: CPT

## 2023-03-23 RX ORDER — SODIUM CHLORIDE 0.9 % (FLUSH) 0.9 %
10 SYRINGE (ML) INJECTION AS NEEDED
Status: CANCELLED | OUTPATIENT
Start: 2023-03-23

## 2023-03-23 RX ORDER — HEPARIN SODIUM (PORCINE) LOCK FLUSH IV SOLN 100 UNIT/ML 100 UNIT/ML
500 SOLUTION INTRAVENOUS AS NEEDED
Status: DISCONTINUED | OUTPATIENT
Start: 2023-03-23 | End: 2023-03-23 | Stop reason: HOSPADM

## 2023-03-23 RX ORDER — SODIUM CHLORIDE 0.9 % (FLUSH) 0.9 %
10 SYRINGE (ML) INJECTION AS NEEDED
Status: DISCONTINUED | OUTPATIENT
Start: 2023-03-23 | End: 2023-03-23 | Stop reason: HOSPADM

## 2023-03-23 RX ORDER — HEPARIN SODIUM (PORCINE) LOCK FLUSH IV SOLN 100 UNIT/ML 100 UNIT/ML
500 SOLUTION INTRAVENOUS AS NEEDED
Status: CANCELLED | OUTPATIENT
Start: 2023-03-23

## 2023-03-23 RX ADMIN — Medication 500 UNITS: at 15:15

## 2023-03-23 RX ADMIN — Medication 10 ML: at 15:15

## 2023-03-23 RX ADMIN — SODIUM CHLORIDE 1000 ML: 9 INJECTION, SOLUTION INTRAVENOUS at 14:08

## 2023-04-05 ENCOUNTER — INFUSION (OUTPATIENT)
Dept: ONCOLOGY | Facility: HOSPITAL | Age: 53
End: 2023-04-05
Payer: MEDICAID

## 2023-04-05 ENCOUNTER — OFFICE VISIT (OUTPATIENT)
Dept: ONCOLOGY | Facility: CLINIC | Age: 53
End: 2023-04-05
Payer: MEDICAID

## 2023-04-05 VITALS
TEMPERATURE: 97.3 F | RESPIRATION RATE: 12 BRPM | HEART RATE: 82 BPM | WEIGHT: 130 LBS | OXYGEN SATURATION: 99 % | DIASTOLIC BLOOD PRESSURE: 74 MMHG | SYSTOLIC BLOOD PRESSURE: 121 MMHG | BODY MASS INDEX: 23.92 KG/M2 | HEIGHT: 62 IN

## 2023-04-05 DIAGNOSIS — C18.9 METASTATIC COLON CANCER TO LIVER: Primary | ICD-10-CM

## 2023-04-05 DIAGNOSIS — C78.7 METASTATIC COLON CANCER TO LIVER: Primary | ICD-10-CM

## 2023-04-05 DIAGNOSIS — C18.9 METASTATIC COLON CANCER TO LIVER: ICD-10-CM

## 2023-04-05 DIAGNOSIS — C78.7 METASTATIC COLON CANCER TO LIVER: ICD-10-CM

## 2023-04-05 DIAGNOSIS — C18.2 CANCER OF RIGHT COLON: Primary | ICD-10-CM

## 2023-04-05 LAB
ALBUMIN SERPL-MCNC: 3.3 G/DL (ref 3.5–5.2)
ALBUMIN/GLOB SERPL: 1.1 G/DL
ALP SERPL-CCNC: 287 U/L (ref 39–117)
ALT SERPL W P-5'-P-CCNC: 57 U/L (ref 1–33)
ANION GAP SERPL CALCULATED.3IONS-SCNC: 11.1 MMOL/L (ref 5–15)
AST SERPL-CCNC: 72 U/L (ref 1–32)
BASOPHILS # BLD AUTO: 0.02 10*3/MM3 (ref 0–0.2)
BASOPHILS NFR BLD AUTO: 0.7 % (ref 0–1.5)
BILIRUB SERPL-MCNC: 0.5 MG/DL (ref 0–1.2)
BUN SERPL-MCNC: 6 MG/DL (ref 6–20)
BUN/CREAT SERPL: 9 (ref 7–25)
CALCIUM SPEC-SCNC: 8.9 MG/DL (ref 8.6–10.5)
CEA SERPL-MCNC: 4.27 NG/ML
CHLORIDE SERPL-SCNC: 106 MMOL/L (ref 98–107)
CO2 SERPL-SCNC: 21.9 MMOL/L (ref 22–29)
CREAT SERPL-MCNC: 0.67 MG/DL (ref 0.57–1)
DEPRECATED RDW RBC AUTO: 59.3 FL (ref 37–54)
EGFRCR SERPLBLD CKD-EPI 2021: 105.3 ML/MIN/1.73
EOSINOPHIL # BLD AUTO: 0.17 10*3/MM3 (ref 0–0.4)
EOSINOPHIL NFR BLD AUTO: 5.8 % (ref 0.3–6.2)
ERYTHROCYTE [DISTWIDTH] IN BLOOD BY AUTOMATED COUNT: 17.7 % (ref 12.3–15.4)
GLOBULIN UR ELPH-MCNC: 3.1 GM/DL
GLUCOSE SERPL-MCNC: 118 MG/DL (ref 65–99)
HCT VFR BLD AUTO: 29 % (ref 34–46.6)
HGB BLD-MCNC: 9.4 G/DL (ref 12–15.9)
IMM GRANULOCYTES # BLD AUTO: 0.01 10*3/MM3 (ref 0–0.05)
IMM GRANULOCYTES NFR BLD AUTO: 0.3 % (ref 0–0.5)
LYMPHOCYTES # BLD AUTO: 1.33 10*3/MM3 (ref 0.7–3.1)
LYMPHOCYTES NFR BLD AUTO: 45.2 % (ref 19.6–45.3)
MCH RBC QN AUTO: 29.7 PG (ref 26.6–33)
MCHC RBC AUTO-ENTMCNC: 32.4 G/DL (ref 31.5–35.7)
MCV RBC AUTO: 91.5 FL (ref 79–97)
MONOCYTES # BLD AUTO: 0.48 10*3/MM3 (ref 0.1–0.9)
MONOCYTES NFR BLD AUTO: 16.3 % (ref 5–12)
NEUTROPHILS NFR BLD AUTO: 0.93 10*3/MM3 (ref 1.7–7)
NEUTROPHILS NFR BLD AUTO: 31.7 % (ref 42.7–76)
NRBC BLD AUTO-RTO: 0 /100 WBC (ref 0–0.2)
PLATELET # BLD AUTO: 149 10*3/MM3 (ref 140–450)
PMV BLD AUTO: 10.4 FL (ref 6–12)
POTASSIUM SERPL-SCNC: 3.6 MMOL/L (ref 3.5–5.2)
PROT SERPL-MCNC: 6.4 G/DL (ref 6–8.5)
RBC # BLD AUTO: 3.17 10*6/MM3 (ref 3.77–5.28)
SODIUM SERPL-SCNC: 139 MMOL/L (ref 136–145)
WBC NRBC COR # BLD: 2.94 10*3/MM3 (ref 3.4–10.8)

## 2023-04-05 PROCEDURE — 1126F AMNT PAIN NOTED NONE PRSNT: CPT | Performed by: INTERNAL MEDICINE

## 2023-04-05 PROCEDURE — 25010000002 LEUCOVORIN 500 MG RECONSTITUTED SOLUTION 1 EACH VIAL: Performed by: INTERNAL MEDICINE

## 2023-04-05 PROCEDURE — 85025 COMPLETE CBC W/AUTO DIFF WBC: CPT

## 2023-04-05 PROCEDURE — 25010000002 IRINOTECAN PER 20 MG: Performed by: INTERNAL MEDICINE

## 2023-04-05 PROCEDURE — 96416 CHEMO PROLONG INFUSE W/PUMP: CPT

## 2023-04-05 PROCEDURE — 96417 CHEMO IV INFUS EACH ADDL SEQ: CPT

## 2023-04-05 PROCEDURE — 96415 CHEMO IV INFUSION ADDL HR: CPT

## 2023-04-05 PROCEDURE — 82378 CARCINOEMBRYONIC ANTIGEN: CPT

## 2023-04-05 PROCEDURE — 96368 THER/DIAG CONCURRENT INF: CPT

## 2023-04-05 PROCEDURE — 96375 TX/PRO/DX INJ NEW DRUG ADDON: CPT

## 2023-04-05 PROCEDURE — 25010000002 FLUOROURACIL PER 500 MG: Performed by: INTERNAL MEDICINE

## 2023-04-05 PROCEDURE — 80053 COMPREHEN METABOLIC PANEL: CPT

## 2023-04-05 PROCEDURE — 96549 UNLISTED CHEMOTHERAPY PX: CPT

## 2023-04-05 PROCEDURE — 25010000002 OXALIPLATIN PER 0.5 MG: Performed by: INTERNAL MEDICINE

## 2023-04-05 PROCEDURE — 99215 OFFICE O/P EST HI 40 MIN: CPT | Performed by: INTERNAL MEDICINE

## 2023-04-05 PROCEDURE — 96413 CHEMO IV INFUSION 1 HR: CPT

## 2023-04-05 PROCEDURE — G0498 CHEMO EXTEND IV INFUS W/PUMP: HCPCS

## 2023-04-05 PROCEDURE — 25010000002 DEXAMETHASONE SODIUM PHOSPHATE 20 MG/5ML SOLUTION: Performed by: INTERNAL MEDICINE

## 2023-04-05 PROCEDURE — 25010000002 PALONOSETRON PER 25 MCG: Performed by: INTERNAL MEDICINE

## 2023-04-05 RX ORDER — DEXTROSE MONOHYDRATE 50 MG/ML
250 INJECTION, SOLUTION INTRAVENOUS ONCE
Status: CANCELLED | OUTPATIENT
Start: 2023-04-05

## 2023-04-05 RX ORDER — PALONOSETRON 0.05 MG/ML
0.25 INJECTION, SOLUTION INTRAVENOUS ONCE
Status: COMPLETED | OUTPATIENT
Start: 2023-04-05 | End: 2023-04-05

## 2023-04-05 RX ORDER — OMEPRAZOLE 40 MG/1
40 CAPSULE, DELAYED RELEASE ORAL DAILY
COMMUNITY

## 2023-04-05 RX ORDER — DIPHENHYDRAMINE HYDROCHLORIDE 50 MG/ML
50 INJECTION INTRAMUSCULAR; INTRAVENOUS AS NEEDED
Status: CANCELLED | OUTPATIENT
Start: 2023-04-05

## 2023-04-05 RX ORDER — FAMOTIDINE 10 MG/ML
20 INJECTION, SOLUTION INTRAVENOUS AS NEEDED
Status: CANCELLED | OUTPATIENT
Start: 2023-04-05

## 2023-04-05 RX ORDER — DEXTROSE MONOHYDRATE 50 MG/ML
250 INJECTION, SOLUTION INTRAVENOUS ONCE
Status: DISCONTINUED | OUTPATIENT
Start: 2023-04-05 | End: 2023-04-05 | Stop reason: HOSPADM

## 2023-04-05 RX ORDER — PALONOSETRON 0.05 MG/ML
0.25 INJECTION, SOLUTION INTRAVENOUS ONCE
Status: CANCELLED | OUTPATIENT
Start: 2023-04-05

## 2023-04-05 RX ADMIN — OXALIPLATIN 105 MG: 5 INJECTION, SOLUTION INTRAVENOUS at 13:20

## 2023-04-05 RX ADMIN — DEXTROSE MONOHYDRATE 200 MG: 50 INJECTION, SOLUTION INTRAVENOUS at 11:46

## 2023-04-05 RX ADMIN — ATROPINE SULFATE 0.25 MG: 0.4 INJECTION, SOLUTION INTRAVENOUS at 11:42

## 2023-04-05 RX ADMIN — PALONOSETRON 0.25 MG: 0.25 INJECTION, SOLUTION INTRAVENOUS at 11:14

## 2023-04-05 RX ADMIN — FLUOROURACIL 2900 MG: 50 INJECTION, SOLUTION INTRAVENOUS at 15:22

## 2023-04-05 RX ADMIN — LEUCOVORIN CALCIUM 480 MG: 500 INJECTION, POWDER, LYOPHILIZED, FOR SOLUTION INTRAMUSCULAR; INTRAVENOUS at 13:20

## 2023-04-05 RX ADMIN — DEXAMETHASONE SODIUM PHOSPHATE 12 MG: 4 INJECTION, SOLUTION INTRAMUSCULAR; INTRAVENOUS at 11:16

## 2023-04-05 NOTE — PROGRESS NOTES
DATE OF VISIT: 4/5/2023    REASON FOR VISIT: Followup for metastatic right-sided colon cancer     PROBLEM LIST:  1. Metastatic colon cancer TX NX M1 a stage Perry:  A.  Presented with abdominal pain and jaundice  B.  CT abdomen pelvis done November 04, 2022 confirmed diffuse liver metastases.  C.  Diagnosed after CT-guided liver biopsy done on November 22, 2022 confirming adenocarcinoma CK20 positive CK7 negative.  D.  Colonoscopy done December 08, 2020 to confirm transverse colon mass however biopsy revealed adenoma.  E.  Started palliative chemotherapy with dose adjusted FOLFOXIRI December 14, 2022, status post 8 cycles  2.  Elevated liver enzymes:  A.  Induced by metastatic disease  3.  Cancer-related pain  4.  Anxiety  5.  Hypertension    HISTORY OF PRESENT ILLNESS: The patient is a very pleasant 52 y.o. female  with past medical history significant for metastatic right-sided colon cancer diagnosed November 22, 2022.  Started on palliative chemotherapy with dose adjusted FOLFOXIRI. The patient is here today for scheduled follow-up visit with treatment cycle number 9.    SUBJECTIVE: Amber is here today with her .  She is complaining of cold sensitivity.  She is anxious to be done with chemotherapy.  She still has diarrhea.  Heartburn is better.    Past History:  Medical History: has a past medical history of Anxiety, Cancer, Ear piercing, Gallstones, History of irritable bowel syndrome, Hypertension, Seasonal allergies, Tattoos, and Wears glasses.   Surgical History: has a past surgical history that includes postpartum tubal ligation; Dilation and curettage of uterus (2013); Tumor removal (2013); Portacath placement (N/A, 12/8/2022); and Colonoscopy (N/A, 12/8/2022).   Family History: family history is not on file.   Social History: reports that she has never smoked. She has never used smokeless tobacco. She reports current alcohol use. She reports that she does not use drugs.    (Not in a hospital  "admission)     Allergies: Losartan, Valium [diazepam], and Citrus     Review of Systems   Constitutional: Positive for fatigue.   Respiratory: Negative.    Gastrointestinal: Positive for diarrhea and nausea.   Musculoskeletal: Positive for arthralgias.   Psychiatric/Behavioral: The patient is nervous/anxious.        PHYSICAL EXAMINATION:   /74   Pulse 82   Temp 97.3 °F (36.3 °C) (Temporal)   Resp 12   Ht 157.5 cm (62\")   Wt 59 kg (130 lb)   SpO2 99%   BMI 23.78 kg/m²    Pain Score    04/05/23 0934   PainSc: 0-No pain                     ECOG Performance Status: 1 - Symptomatic but completely ambulatory      General Appearance:      alert, cooperative, no apparent distress and appears stated age   Lungs:   Clear to auscultation bilaterally; respirations regular, even, and unlabored bilaterally   Heart:  Regular rate and rhythm, no murmurs appreciated   Abdomen:   Soft, non-tender, non-distended and no organomegaly                 No visits with results within 2 Week(s) from this visit.   Latest known visit with results is:   Infusion on 03/21/2023   Component Date Value Ref Range Status   • CEA 03/21/2023 4.85  ng/mL Final   • Glucose 03/21/2023 96  65 - 99 mg/dL Final   • BUN 03/21/2023 10  6 - 20 mg/dL Final   • Creatinine 03/21/2023 0.64  0.57 - 1.00 mg/dL Final   • Sodium 03/21/2023 139  136 - 145 mmol/L Final   • Potassium 03/21/2023 3.8  3.5 - 5.2 mmol/L Final   • Chloride 03/21/2023 106  98 - 107 mmol/L Final   • CO2 03/21/2023 25.3  22.0 - 29.0 mmol/L Final   • Calcium 03/21/2023 9.5  8.6 - 10.5 mg/dL Final   • Total Protein 03/21/2023 6.7  6.0 - 8.5 g/dL Final   • Albumin 03/21/2023 3.3 (L)  3.5 - 5.2 g/dL Final   • ALT (SGPT) 03/21/2023 83 (H)  1 - 33 U/L Final   • AST (SGOT) 03/21/2023 83 (H)  1 - 32 U/L Final   • Alkaline Phosphatase 03/21/2023 316 (H)  39 - 117 U/L Final   • Total Bilirubin 03/21/2023 0.4  0.0 - 1.2 mg/dL Final   • Globulin 03/21/2023 3.4  gm/dL Final   • A/G Ratio " 03/21/2023 1.0  g/dL Final   • BUN/Creatinine Ratio 03/21/2023 15.6  7.0 - 25.0 Final   • Anion Gap 03/21/2023 7.7  5.0 - 15.0 mmol/L Final   • eGFR 03/21/2023 106.5  >60.0 mL/min/1.73 Final   • WBC 03/21/2023 3.23 (L)  3.40 - 10.80 10*3/mm3 Final   • RBC 03/21/2023 3.32 (L)  3.77 - 5.28 10*6/mm3 Final   • Hemoglobin 03/21/2023 9.6 (L)  12.0 - 15.9 g/dL Final   • Hematocrit 03/21/2023 29.4 (L)  34.0 - 46.6 % Final   • MCV 03/21/2023 88.6  79.0 - 97.0 fL Final   • MCH 03/21/2023 28.9  26.6 - 33.0 pg Final   • MCHC 03/21/2023 32.7  31.5 - 35.7 g/dL Final   • RDW 03/21/2023 17.9 (H)  12.3 - 15.4 % Final   • RDW-SD 03/21/2023 57.1 (H)  37.0 - 54.0 fl Final   • MPV 03/21/2023 10.3  6.0 - 12.0 fL Final   • Platelets 03/21/2023 141  140 - 450 10*3/mm3 Final   • Neutrophil % 03/21/2023 40.3 (L)  42.7 - 76.0 % Final   • Lymphocyte % 03/21/2023 39.3  19.6 - 45.3 % Final   • Monocyte % 03/21/2023 11.8  5.0 - 12.0 % Final   • Eosinophil % 03/21/2023 8.0 (H)  0.3 - 6.2 % Final   • Basophil % 03/21/2023 0.3  0.0 - 1.5 % Final   • Immature Grans % 03/21/2023 0.3  0.0 - 0.5 % Final   • Neutrophils, Absolute 03/21/2023 1.30 (L)  1.70 - 7.00 10*3/mm3 Final   • Lymphocytes, Absolute 03/21/2023 1.27  0.70 - 3.10 10*3/mm3 Final   • Monocytes, Absolute 03/21/2023 0.38  0.10 - 0.90 10*3/mm3 Final   • Eosinophils, Absolute 03/21/2023 0.26  0.00 - 0.40 10*3/mm3 Final   • Basophils, Absolute 03/21/2023 0.01  0.00 - 0.20 10*3/mm3 Final   • Immature Grans, Absolute 03/21/2023 0.01  0.00 - 0.05 10*3/mm3 Final   • nRBC 03/21/2023 0.0  0.0 - 0.2 /100 WBC Final        No results found.    ASSESSMENT: The patient is a very pleasant 52 y.o. female  with right-sided metastatic colon cancer      PLAN:    1.  Right-sided metastatic colon cancer:  A.  I will proceed with chemotherapy as scheduled FOLFOXIRI cycle # 9.  B.  The patient will follow-up with us for cycle # 10.  C. We will repeat her scans after cycle #12.  I will consider maintenance  treatment at that point.  D.  I will continue 25% dose reduction secondary to multiple side effects including elevated liver enzymes diarrhea and heartburn.  E.  I will continue to monitor the patient blood work including blood counts kidney function liver function and electrolytes.  F.  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  While the patient may not benefit from immunotherapy however she will be a candidate for EGFR inhibitors down the road.  G.  I did go over her cellular tumor DNA assay from March 21, 2023 and reassured the level dropped to 1.65.  H.  I did go over the blood the results with the patient from March 21, 2023.  She is having multiple chemotherapy toxicities.  White cells and hemoglobin are down.  Alkaline phosphatase elevated at 316.    2.  Elevated liver enzymes:  A.  Induced by liver metastases  B.  I will continue 25% dose reduction of chemo drugs per  C.  I will repeat labs before next infusion.    3.  Chemotherapy-induced nausea:  A.  I will continue the patient on Zofran as needed for    4.  Chemotherapy-induced diarrhea:  A.  I will continue the patient on Imodium as needed    5. Cancer-related pain:  A.  I will continue the patient oxycodone 5 mg twice a day.     6.  Anxiety:  A.  I will continue the patient on Ativan 1 mg nightly as needed.       7.  Hypertension:  A.  She will continue losartan.  B.  I reviewed with the patient this will interfere with our management since chemotherapy typically can cause hypotension we will have to monitor blood pressure closely.    8.  Dehydration:  A.  I will continue 1 L of IV fluid on day 3 of each chemotherapy cycle.    9.  Chemotherapy-induced anemia:  A.  I explained the patient hemoglobin down to 9.6 on March 21, 2023.  B.  I will continue monitor her CBC prior to each infusion.  C.  I will transfuse as needed for hemoglobin less than 8.  D.  I will  continue 25% dose reduction on chemotherapy.    10.  Chemotherapy-induced heartburn:  A.  I will change her to pantoprazole 40 mg twice a day.  I will give new prescription today.  B.  I will continue Carafate 1 g every 6 hours    11.  Treatment related diarrhea  A.  Continue Imodium as directed.  We will also add Lomotil to her regimen.  Education sheet was provided to the patient.    12.  Hot flashes  A.  We discussed this can cause menopause in women.  Patient reports she has never been through menopause up to this point.  We will continue to monitor for now.  Hot flashes are manageable.    FOLLOW UP: 2 weeks with cycle # 10.    Total time of patient care including preparation of patient chart prior to arrival, face-to-face with patient and following visit spent in reviewing records, lab results, imaging studies, discussion with patient, and documentation/charting was 40 minutes          Iban Lambert MD  4/5/2023

## 2023-04-07 ENCOUNTER — INFUSION (OUTPATIENT)
Dept: ONCOLOGY | Facility: HOSPITAL | Age: 53
End: 2023-04-07
Payer: MEDICAID

## 2023-04-07 VITALS
TEMPERATURE: 97.2 F | WEIGHT: 131.4 LBS | BODY MASS INDEX: 24.03 KG/M2 | DIASTOLIC BLOOD PRESSURE: 87 MMHG | SYSTOLIC BLOOD PRESSURE: 158 MMHG | HEART RATE: 91 BPM

## 2023-04-07 DIAGNOSIS — C18.2 CANCER OF RIGHT COLON: ICD-10-CM

## 2023-04-07 DIAGNOSIS — C18.9 METASTATIC COLON CANCER TO LIVER: Primary | ICD-10-CM

## 2023-04-07 DIAGNOSIS — C78.7 METASTATIC COLON CANCER TO LIVER: Primary | ICD-10-CM

## 2023-04-07 PROCEDURE — 25010000002 HEPARIN LOCK FLUSH PER 10 UNITS: Performed by: INTERNAL MEDICINE

## 2023-04-07 PROCEDURE — 96360 HYDRATION IV INFUSION INIT: CPT

## 2023-04-07 RX ORDER — HEPARIN SODIUM (PORCINE) LOCK FLUSH IV SOLN 100 UNIT/ML 100 UNIT/ML
500 SOLUTION INTRAVENOUS AS NEEDED
Status: DISCONTINUED | OUTPATIENT
Start: 2023-04-07 | End: 2023-04-07 | Stop reason: HOSPADM

## 2023-04-07 RX ORDER — HEPARIN SODIUM (PORCINE) LOCK FLUSH IV SOLN 100 UNIT/ML 100 UNIT/ML
500 SOLUTION INTRAVENOUS AS NEEDED
Status: CANCELLED | OUTPATIENT
Start: 2023-04-07

## 2023-04-07 RX ORDER — SODIUM CHLORIDE 0.9 % (FLUSH) 0.9 %
10 SYRINGE (ML) INJECTION AS NEEDED
Status: CANCELLED | OUTPATIENT
Start: 2023-04-07

## 2023-04-07 RX ORDER — SODIUM CHLORIDE 0.9 % (FLUSH) 0.9 %
10 SYRINGE (ML) INJECTION AS NEEDED
Status: DISCONTINUED | OUTPATIENT
Start: 2023-04-07 | End: 2023-04-07 | Stop reason: HOSPADM

## 2023-04-07 RX ADMIN — Medication 500 UNITS: at 14:02

## 2023-04-07 RX ADMIN — Medication 10 ML: at 14:02

## 2023-04-07 RX ADMIN — SODIUM CHLORIDE 1000 ML: 9 INJECTION, SOLUTION INTRAVENOUS at 12:50

## 2023-04-17 NOTE — PROGRESS NOTES
DATE OF VISIT: 4/18/2023    REASON FOR VISIT: Followup for metastatic right-sided colon cancer     PROBLEM LIST:  1. Metastatic colon cancer TX NX M1 a stage Perry:  A.  Presented with abdominal pain and jaundice  B.  CT abdomen pelvis done November 04, 2022 confirmed diffuse liver metastases.  C.  Diagnosed after CT-guided liver biopsy done on November 22, 2022 confirming adenocarcinoma CK20 positive CK7 negative.  D.  Colonoscopy done December 08, 2020 to confirm transverse colon mass however biopsy revealed adenoma.  E.  Started palliative chemotherapy with dose adjusted FOLFOXIRI December 14, 2022, status post 9 cycles  2.  Elevated liver enzymes:  A.  Induced by metastatic disease  3.  Cancer-related pain  4.  Anxiety  5.  Hypertension    HISTORY OF PRESENT ILLNESS: The patient is a very pleasant 52 y.o. female with past medical history significant for metastatic right-sided colon cancer diagnosed November 22, 2022.  Started on palliative chemotherapy with dose adjusted FOLFOXIRI. The patient is here today for scheduled follow-up visit with treatment cycle number 10.    SUBJECTIVE: Amber is here today with her .  She continues to have cold sensitivity.  She also reports that her tongue feels sore on 1 side.  And also she feels like it is thicker than normal.  She does have a new tremor in her right leg.  She continues to be anxious concerning completing chemotherapy and hopefully onto maintenance therapy.  Continues to have diarrhea that is controlled.  Heartburn is better with Protonix and is requesting a refill today.         Past History:  Medical History: has a past medical history of Anxiety, Cancer, Ear piercing, Gallstones, History of irritable bowel syndrome, Hypertension, Seasonal allergies, Tattoos, and Wears glasses.   Surgical History: has a past surgical history that includes postpartum tubal ligation; Dilation and curettage of uterus (2013); Tumor removal (2013); Portacath placement (N/A,  "12/8/2022); and Colonoscopy (N/A, 12/8/2022).   Family History: family history is not on file.   Social History: reports that she has never smoked. She has never used smokeless tobacco. She reports current alcohol use. She reports that she does not use drugs.    (Not in a hospital admission)     Allergies: Losartan, Valium [diazepam], and Citrus     Review of Systems   Constitutional: Positive for fatigue.   HENT: Positive for mouth sores.    Respiratory: Negative.    Gastrointestinal: Positive for diarrhea and nausea.   Musculoskeletal: Positive for arthralgias.   Neurological: Positive for weakness.        Right foot tremor   Psychiatric/Behavioral: The patient is nervous/anxious.        PHYSICAL EXAMINATION:   /85   Pulse 77   Temp 97.8 °F (36.6 °C)   Ht 157.5 cm (62\")   Wt 59.9 kg (132 lb 1.6 oz)   SpO2 99%   BMI 24.16 kg/m²    Pain Score    04/18/23 0846   PainSc: 0-No pain                     ECOG Performance Status: 1 - Symptomatic but completely ambulatory      General Appearance:      alert, cooperative, no apparent distress and appears stated age   Lungs:   Clear to auscultation bilaterally; respirations regular, even, and unlabored bilaterally   Heart:  Regular rate and rhythm, no murmurs appreciated   Abdomen:   Soft, non-tender, non-distended and no organomegaly                 Infusion on 04/18/2023   Component Date Value Ref Range Status   • Glucose 04/18/2023 99  65 - 99 mg/dL Final   • BUN 04/18/2023 5 (L)  6 - 20 mg/dL Final   • Creatinine 04/18/2023 0.61  0.57 - 1.00 mg/dL Final   • Sodium 04/18/2023 141  136 - 145 mmol/L Final   • Potassium 04/18/2023 3.8  3.5 - 5.2 mmol/L Final   • Chloride 04/18/2023 107  98 - 107 mmol/L Final   • CO2 04/18/2023 23.6  22.0 - 29.0 mmol/L Final   • Calcium 04/18/2023 9.2  8.6 - 10.5 mg/dL Final   • Total Protein 04/18/2023 6.9  6.0 - 8.5 g/dL Final   • Albumin 04/18/2023 3.8  3.5 - 5.2 g/dL Final   • ALT (SGPT) 04/18/2023 60 (H)  1 - 33 U/L Final   • " AST (SGOT) 04/18/2023 83 (H)  1 - 32 U/L Final   • Alkaline Phosphatase 04/18/2023 328 (H)  39 - 117 U/L Final   • Total Bilirubin 04/18/2023 0.6  0.0 - 1.2 mg/dL Final   • Globulin 04/18/2023 3.1  gm/dL Final   • A/G Ratio 04/18/2023 1.2  g/dL Final   • BUN/Creatinine Ratio 04/18/2023 8.2  7.0 - 25.0 Final   • Anion Gap 04/18/2023 10.4  5.0 - 15.0 mmol/L Final   • eGFR 04/18/2023 107.7  >60.0 mL/min/1.73 Final   • WBC 04/18/2023 3.44  3.40 - 10.80 10*3/mm3 Final   • RBC 04/18/2023 3.16 (L)  3.77 - 5.28 10*6/mm3 Final   • Hemoglobin 04/18/2023 9.5 (L)  12.0 - 15.9 g/dL Final   • Hematocrit 04/18/2023 28.8 (L)  34.0 - 46.6 % Final   • MCV 04/18/2023 91.1  79.0 - 97.0 fL Final   • MCH 04/18/2023 30.1  26.6 - 33.0 pg Final   • MCHC 04/18/2023 33.0  31.5 - 35.7 g/dL Final   • RDW 04/18/2023 17.7 (H)  12.3 - 15.4 % Final   • RDW-SD 04/18/2023 58.4 (H)  37.0 - 54.0 fl Final   • MPV 04/18/2023 10.2  6.0 - 12.0 fL Final   • Platelets 04/18/2023 157  140 - 450 10*3/mm3 Final   • Neutrophil % 04/18/2023 38.9 (L)  42.7 - 76.0 % Final   • Lymphocyte % 04/18/2023 41.9  19.6 - 45.3 % Final   • Monocyte % 04/18/2023 13.1 (H)  5.0 - 12.0 % Final   • Eosinophil % 04/18/2023 5.5  0.3 - 6.2 % Final   • Basophil % 04/18/2023 0.6  0.0 - 1.5 % Final   • Immature Grans % 04/18/2023 0.0  0.0 - 0.5 % Final   • Neutrophils, Absolute 04/18/2023 1.34 (L)  1.70 - 7.00 10*3/mm3 Final   • Lymphocytes, Absolute 04/18/2023 1.44  0.70 - 3.10 10*3/mm3 Final   • Monocytes, Absolute 04/18/2023 0.45  0.10 - 0.90 10*3/mm3 Final   • Eosinophils, Absolute 04/18/2023 0.19  0.00 - 0.40 10*3/mm3 Final   • Basophils, Absolute 04/18/2023 0.02  0.00 - 0.20 10*3/mm3 Final   • Immature Grans, Absolute 04/18/2023 0.00  0.00 - 0.05 10*3/mm3 Final   • nRBC 04/18/2023 0.0  0.0 - 0.2 /100 WBC Final   Infusion on 04/05/2023   Component Date Value Ref Range Status   • CEA 04/05/2023 4.27  ng/mL Final   • Glucose 04/05/2023 118 (H)  65 - 99 mg/dL Final   • BUN 04/05/2023  6  6 - 20 mg/dL Final   • Creatinine 04/05/2023 0.67  0.57 - 1.00 mg/dL Final   • Sodium 04/05/2023 139  136 - 145 mmol/L Final   • Potassium 04/05/2023 3.6  3.5 - 5.2 mmol/L Final   • Chloride 04/05/2023 106  98 - 107 mmol/L Final   • CO2 04/05/2023 21.9 (L)  22.0 - 29.0 mmol/L Final   • Calcium 04/05/2023 8.9  8.6 - 10.5 mg/dL Final   • Total Protein 04/05/2023 6.4  6.0 - 8.5 g/dL Final   • Albumin 04/05/2023 3.3 (L)  3.5 - 5.2 g/dL Final   • ALT (SGPT) 04/05/2023 57 (H)  1 - 33 U/L Final   • AST (SGOT) 04/05/2023 72 (H)  1 - 32 U/L Final   • Alkaline Phosphatase 04/05/2023 287 (H)  39 - 117 U/L Final   • Total Bilirubin 04/05/2023 0.5  0.0 - 1.2 mg/dL Final   • Globulin 04/05/2023 3.1  gm/dL Final   • A/G Ratio 04/05/2023 1.1  g/dL Final   • BUN/Creatinine Ratio 04/05/2023 9.0  7.0 - 25.0 Final   • Anion Gap 04/05/2023 11.1  5.0 - 15.0 mmol/L Final   • eGFR 04/05/2023 105.3  >60.0 mL/min/1.73 Final   • WBC 04/05/2023 2.94 (L)  3.40 - 10.80 10*3/mm3 Final   • RBC 04/05/2023 3.17 (L)  3.77 - 5.28 10*6/mm3 Final   • Hemoglobin 04/05/2023 9.4 (L)  12.0 - 15.9 g/dL Final   • Hematocrit 04/05/2023 29.0 (L)  34.0 - 46.6 % Final   • MCV 04/05/2023 91.5  79.0 - 97.0 fL Final   • MCH 04/05/2023 29.7  26.6 - 33.0 pg Final   • MCHC 04/05/2023 32.4  31.5 - 35.7 g/dL Final   • RDW 04/05/2023 17.7 (H)  12.3 - 15.4 % Final   • RDW-SD 04/05/2023 59.3 (H)  37.0 - 54.0 fl Final   • MPV 04/05/2023 10.4  6.0 - 12.0 fL Final   • Platelets 04/05/2023 149  140 - 450 10*3/mm3 Final   • Neutrophil % 04/05/2023 31.7 (L)  42.7 - 76.0 % Final   • Lymphocyte % 04/05/2023 45.2  19.6 - 45.3 % Final   • Monocyte % 04/05/2023 16.3 (H)  5.0 - 12.0 % Final   • Eosinophil % 04/05/2023 5.8  0.3 - 6.2 % Final   • Basophil % 04/05/2023 0.7  0.0 - 1.5 % Final   • Immature Grans % 04/05/2023 0.3  0.0 - 0.5 % Final   • Neutrophils, Absolute 04/05/2023 0.93 (L)  1.70 - 7.00 10*3/mm3 Final   • Lymphocytes, Absolute 04/05/2023 1.33  0.70 - 3.10 10*3/mm3  Final   • Monocytes, Absolute 04/05/2023 0.48  0.10 - 0.90 10*3/mm3 Final   • Eosinophils, Absolute 04/05/2023 0.17  0.00 - 0.40 10*3/mm3 Final   • Basophils, Absolute 04/05/2023 0.02  0.00 - 0.20 10*3/mm3 Final   • Immature Grans, Absolute 04/05/2023 0.01  0.00 - 0.05 10*3/mm3 Final   • nRBC 04/05/2023 0.0  0.0 - 0.2 /100 WBC Final        No results found.    ASSESSMENT: The patient is a very pleasant 52 y.o. female  with right-sided metastatic colon cancer      PLAN:    1.  Right-sided metastatic colon cancer:  A.  I will proceed with chemotherapy as scheduled FOLFOXIRI cycle # 10.  B.  The patient will follow-up with us for cycle # 11.  C. We will repeat her scans after cycle #12.  I will consider maintenance treatment at that point.  D.  I will continue 25% dose reduction secondary to multiple side effects including elevated liver enzymes diarrhea and heartburn.  E.  I will continue to monitor the patient blood work including blood counts kidney function liver function and electrolytes.  F.  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  While the patient may not benefit from immunotherapy however she will be a candidate for EGFR inhibitors down the road.  G. Cellular tumor DNA assay from March 21, 2023 level dropped to 1.65.  We will repeat today.  H.  I did go over the blood the results with the patient from April 5 , 2023.  She continues to have multiple chemotherapy toxicities.  White cells and hemoglobin are down.  Alkaline phosphatase elevated but decreased at 287.     2.  Elevated liver enzymes:  A.  Induced by liver metastases  B.  I will continue 25% dose reduction of chemo drugs per  C.  I will repeat labs before next infusion.    3.  Chemotherapy-induced nausea:  A.  I will continue the patient on Zofran as needed for    4.  Chemotherapy-induced diarrhea:  A.  I will continue the patient on Imodium as needed    5.  Cancer-related pain:  A.  I will continue the patient oxycodone 5 mg twice a day.     6.  Anxiety:  A.  I will continue the patient on Ativan 1 mg nightly as needed.       7.  Hypertension:  A.  She will continue losartan.  B.  I reviewed with the patient this will interfere with our management since chemotherapy typically can cause hypotension we will have to monitor blood pressure closely.    8.  Dehydration:  A.  I will continue 1 L of IV fluid on day 3 of each chemotherapy cycle.    9.  Chemotherapy-induced anemia:  A.  I explained the patient hemoglobin down to 9.4 on April for , 2023.  B.  I will continue monitor her CBC prior to each infusion.  C.  I will transfuse as needed for hemoglobin less than 8.  D.  I will continue 25% dose reduction on chemotherapy.    10.  Chemotherapy-induced heartburn:  A.  I will continue pantoprazole 40 mg twice a day.  I will refill today.  B.  I will continue Carafate 1 g every 6 hours    11.  Treatment related diarrhea  A.  Continue Imodium as directed.  We will also add Lomotil to her regimen.  Education sheet was provided to the patient.    12.  Hot flashes  A.  We discussed this can cause menopause in women.  Patient reports she has never been through menopause up to this point.  We will continue to monitor for now.  Hot flashes are manageable.    13.  Vaginal dryness  A.  We discussed that she can try over-the-counter lubricants.  She has not tried this at this point.  She will notify us for any further recommendations.    14.  Tongue erythema.  A.  I will send in Magic mouthwash for management.    FOLLOW UP: 2 weeks with cycle # 11.    Total time of patient care including time prior to, face to face with patient, and following visit spent in reviewing records, lab results, imaging studies, discussion with patient, and documentation/charting was > 45 minutes           Ana Cristina Cano, APRN  4/18/2023

## 2023-04-18 ENCOUNTER — INFUSION (OUTPATIENT)
Dept: ONCOLOGY | Facility: HOSPITAL | Age: 53
End: 2023-04-18
Payer: MEDICAID

## 2023-04-18 ENCOUNTER — OFFICE VISIT (OUTPATIENT)
Dept: ONCOLOGY | Facility: CLINIC | Age: 53
End: 2023-04-18
Payer: MEDICAID

## 2023-04-18 VITALS
TEMPERATURE: 97.8 F | SYSTOLIC BLOOD PRESSURE: 162 MMHG | WEIGHT: 132.1 LBS | DIASTOLIC BLOOD PRESSURE: 85 MMHG | BODY MASS INDEX: 24.31 KG/M2 | HEART RATE: 77 BPM | OXYGEN SATURATION: 99 % | HEIGHT: 62 IN

## 2023-04-18 DIAGNOSIS — C18.9 METASTATIC COLON CANCER TO LIVER: Primary | ICD-10-CM

## 2023-04-18 DIAGNOSIS — C78.7 METASTATIC COLON CANCER TO LIVER: Primary | ICD-10-CM

## 2023-04-18 DIAGNOSIS — C18.2 CANCER OF RIGHT COLON: ICD-10-CM

## 2023-04-18 DIAGNOSIS — K21.9 GASTROESOPHAGEAL REFLUX DISEASE WITHOUT ESOPHAGITIS: ICD-10-CM

## 2023-04-18 LAB
ALBUMIN SERPL-MCNC: 3.8 G/DL (ref 3.5–5.2)
ALBUMIN/GLOB SERPL: 1.2 G/DL
ALP SERPL-CCNC: 328 U/L (ref 39–117)
ALT SERPL W P-5'-P-CCNC: 60 U/L (ref 1–33)
ANION GAP SERPL CALCULATED.3IONS-SCNC: 10.4 MMOL/L (ref 5–15)
AST SERPL-CCNC: 83 U/L (ref 1–32)
BASOPHILS # BLD AUTO: 0.02 10*3/MM3 (ref 0–0.2)
BASOPHILS NFR BLD AUTO: 0.6 % (ref 0–1.5)
BILIRUB SERPL-MCNC: 0.6 MG/DL (ref 0–1.2)
BUN SERPL-MCNC: 5 MG/DL (ref 6–20)
BUN/CREAT SERPL: 8.2 (ref 7–25)
CALCIUM SPEC-SCNC: 9.2 MG/DL (ref 8.6–10.5)
CEA SERPL-MCNC: 4.47 NG/ML
CHLORIDE SERPL-SCNC: 107 MMOL/L (ref 98–107)
CO2 SERPL-SCNC: 23.6 MMOL/L (ref 22–29)
CREAT SERPL-MCNC: 0.61 MG/DL (ref 0.57–1)
DEPRECATED RDW RBC AUTO: 58.4 FL (ref 37–54)
EGFRCR SERPLBLD CKD-EPI 2021: 107.7 ML/MIN/1.73
EOSINOPHIL # BLD AUTO: 0.19 10*3/MM3 (ref 0–0.4)
EOSINOPHIL NFR BLD AUTO: 5.5 % (ref 0.3–6.2)
ERYTHROCYTE [DISTWIDTH] IN BLOOD BY AUTOMATED COUNT: 17.7 % (ref 12.3–15.4)
GLOBULIN UR ELPH-MCNC: 3.1 GM/DL
GLUCOSE SERPL-MCNC: 99 MG/DL (ref 65–99)
HCT VFR BLD AUTO: 28.8 % (ref 34–46.6)
HGB BLD-MCNC: 9.5 G/DL (ref 12–15.9)
IMM GRANULOCYTES # BLD AUTO: 0 10*3/MM3 (ref 0–0.05)
IMM GRANULOCYTES NFR BLD AUTO: 0 % (ref 0–0.5)
LYMPHOCYTES # BLD AUTO: 1.44 10*3/MM3 (ref 0.7–3.1)
LYMPHOCYTES NFR BLD AUTO: 41.9 % (ref 19.6–45.3)
MCH RBC QN AUTO: 30.1 PG (ref 26.6–33)
MCHC RBC AUTO-ENTMCNC: 33 G/DL (ref 31.5–35.7)
MCV RBC AUTO: 91.1 FL (ref 79–97)
MONOCYTES # BLD AUTO: 0.45 10*3/MM3 (ref 0.1–0.9)
MONOCYTES NFR BLD AUTO: 13.1 % (ref 5–12)
NEUTROPHILS NFR BLD AUTO: 1.34 10*3/MM3 (ref 1.7–7)
NEUTROPHILS NFR BLD AUTO: 38.9 % (ref 42.7–76)
NRBC BLD AUTO-RTO: 0 /100 WBC (ref 0–0.2)
PLATELET # BLD AUTO: 157 10*3/MM3 (ref 140–450)
PMV BLD AUTO: 10.2 FL (ref 6–12)
POTASSIUM SERPL-SCNC: 3.8 MMOL/L (ref 3.5–5.2)
PROT SERPL-MCNC: 6.9 G/DL (ref 6–8.5)
RBC # BLD AUTO: 3.16 10*6/MM3 (ref 3.77–5.28)
SODIUM SERPL-SCNC: 141 MMOL/L (ref 136–145)
WBC NRBC COR # BLD: 3.44 10*3/MM3 (ref 3.4–10.8)

## 2023-04-18 PROCEDURE — 96375 TX/PRO/DX INJ NEW DRUG ADDON: CPT

## 2023-04-18 PROCEDURE — 25010000002 PALONOSETRON PER 25 MCG: Performed by: NURSE PRACTITIONER

## 2023-04-18 PROCEDURE — G0498 CHEMO EXTEND IV INFUS W/PUMP: HCPCS

## 2023-04-18 PROCEDURE — 96415 CHEMO IV INFUSION ADDL HR: CPT

## 2023-04-18 PROCEDURE — 25010000002 LEUCOVORIN CALCIUM PER 50MG: Performed by: NURSE PRACTITIONER

## 2023-04-18 PROCEDURE — 96416 CHEMO PROLONG INFUSE W/PUMP: CPT

## 2023-04-18 PROCEDURE — 96368 THER/DIAG CONCURRENT INF: CPT

## 2023-04-18 PROCEDURE — 25010000002 DEXAMETHASONE SODIUM PHOSPHATE 20 MG/5ML SOLUTION: Performed by: NURSE PRACTITIONER

## 2023-04-18 PROCEDURE — 85025 COMPLETE CBC W/AUTO DIFF WBC: CPT

## 2023-04-18 PROCEDURE — 96376 TX/PRO/DX INJ SAME DRUG ADON: CPT

## 2023-04-18 PROCEDURE — 25010000002 IRINOTECAN PER 20 MG: Performed by: NURSE PRACTITIONER

## 2023-04-18 PROCEDURE — 25010000002 OXALIPLATIN PER 0.5 MG: Performed by: NURSE PRACTITIONER

## 2023-04-18 PROCEDURE — 80053 COMPREHEN METABOLIC PANEL: CPT

## 2023-04-18 PROCEDURE — 25010000002 FLUOROURACIL PER 500 MG: Performed by: NURSE PRACTITIONER

## 2023-04-18 PROCEDURE — 96549 UNLISTED CHEMOTHERAPY PX: CPT

## 2023-04-18 PROCEDURE — 96417 CHEMO IV INFUS EACH ADDL SEQ: CPT

## 2023-04-18 PROCEDURE — 82378 CARCINOEMBRYONIC ANTIGEN: CPT

## 2023-04-18 PROCEDURE — 96413 CHEMO IV INFUSION 1 HR: CPT

## 2023-04-18 RX ORDER — PALONOSETRON 0.05 MG/ML
0.25 INJECTION, SOLUTION INTRAVENOUS ONCE
Status: CANCELLED | OUTPATIENT
Start: 2023-04-18

## 2023-04-18 RX ORDER — PANTOPRAZOLE SODIUM 40 MG/1
40 TABLET, DELAYED RELEASE ORAL DAILY
Qty: 30 TABLET | Refills: 0 | Status: SHIPPED | OUTPATIENT
Start: 2023-04-18

## 2023-04-18 RX ORDER — DIPHENHYDRAMINE HYDROCHLORIDE 50 MG/ML
50 INJECTION INTRAMUSCULAR; INTRAVENOUS AS NEEDED
Status: CANCELLED | OUTPATIENT
Start: 2023-04-18

## 2023-04-18 RX ORDER — DEXTROSE MONOHYDRATE 50 MG/ML
250 INJECTION, SOLUTION INTRAVENOUS ONCE
Status: CANCELLED | OUTPATIENT
Start: 2023-04-18

## 2023-04-18 RX ORDER — PALONOSETRON 0.05 MG/ML
0.25 INJECTION, SOLUTION INTRAVENOUS ONCE
Status: COMPLETED | OUTPATIENT
Start: 2023-04-18 | End: 2023-04-18

## 2023-04-18 RX ORDER — FAMOTIDINE 10 MG/ML
20 INJECTION, SOLUTION INTRAVENOUS AS NEEDED
Status: CANCELLED | OUTPATIENT
Start: 2023-04-18

## 2023-04-18 RX ORDER — DEXTROSE MONOHYDRATE 50 MG/ML
250 INJECTION, SOLUTION INTRAVENOUS ONCE
Status: DISCONTINUED | OUTPATIENT
Start: 2023-04-18 | End: 2023-04-18 | Stop reason: HOSPADM

## 2023-04-18 RX ADMIN — DEXAMETHASONE SODIUM PHOSPHATE 12 MG: 4 INJECTION, SOLUTION INTRAMUSCULAR; INTRAVENOUS at 11:01

## 2023-04-18 RX ADMIN — PALONOSETRON 0.25 MG: 0.25 INJECTION, SOLUTION INTRAVENOUS at 10:58

## 2023-04-18 RX ADMIN — OXALIPLATIN 105 MG: 5 INJECTION, SOLUTION INTRAVENOUS at 12:58

## 2023-04-18 RX ADMIN — ATROPINE SULFATE 0.25 MG: 0.4 INJECTION, SOLUTION INTRAVENOUS at 11:16

## 2023-04-18 RX ADMIN — DEXTROSE MONOHYDRATE 200 MG: 50 INJECTION, SOLUTION INTRAVENOUS at 11:18

## 2023-04-18 RX ADMIN — LEUCOVORIN CALCIUM 480 MG: 10 INJECTION INTRAMUSCULAR; INTRAVENOUS at 12:58

## 2023-04-18 RX ADMIN — FLUOROURACIL 2900 MG: 50 INJECTION, SOLUTION INTRAVENOUS at 14:56

## 2023-04-18 RX ADMIN — ATROPINE SULFATE 0.25 MG: 0.4 INJECTION, SOLUTION INTRAVENOUS at 12:44

## 2023-04-19 DIAGNOSIS — E87.6 HYPOKALEMIA: ICD-10-CM

## 2023-04-19 RX ORDER — POTASSIUM CHLORIDE 750 MG/1
TABLET, EXTENDED RELEASE ORAL
Qty: 60 TABLET | Refills: 0 | Status: SHIPPED | OUTPATIENT
Start: 2023-04-19

## 2023-04-20 ENCOUNTER — INFUSION (OUTPATIENT)
Dept: ONCOLOGY | Facility: HOSPITAL | Age: 53
End: 2023-04-20
Payer: MEDICAID

## 2023-04-20 VITALS — DIASTOLIC BLOOD PRESSURE: 84 MMHG | TEMPERATURE: 98.1 F | SYSTOLIC BLOOD PRESSURE: 132 MMHG | HEART RATE: 93 BPM

## 2023-04-20 DIAGNOSIS — C18.2 CANCER OF RIGHT COLON: ICD-10-CM

## 2023-04-20 DIAGNOSIS — C78.7 METASTATIC COLON CANCER TO LIVER: Primary | ICD-10-CM

## 2023-04-20 DIAGNOSIS — C18.9 METASTATIC COLON CANCER TO LIVER: Primary | ICD-10-CM

## 2023-04-20 PROCEDURE — 96361 HYDRATE IV INFUSION ADD-ON: CPT

## 2023-04-20 PROCEDURE — 96360 HYDRATION IV INFUSION INIT: CPT

## 2023-04-20 PROCEDURE — 25010000002 HEPARIN LOCK FLUSH PER 10 UNITS: Performed by: INTERNAL MEDICINE

## 2023-04-20 RX ORDER — HEPARIN SODIUM (PORCINE) LOCK FLUSH IV SOLN 100 UNIT/ML 100 UNIT/ML
500 SOLUTION INTRAVENOUS AS NEEDED
Status: DISCONTINUED | OUTPATIENT
Start: 2023-04-20 | End: 2023-04-20 | Stop reason: HOSPADM

## 2023-04-20 RX ORDER — HEPARIN SODIUM (PORCINE) LOCK FLUSH IV SOLN 100 UNIT/ML 100 UNIT/ML
500 SOLUTION INTRAVENOUS AS NEEDED
OUTPATIENT
Start: 2023-04-20

## 2023-04-20 RX ORDER — SODIUM CHLORIDE 0.9 % (FLUSH) 0.9 %
10 SYRINGE (ML) INJECTION AS NEEDED
OUTPATIENT
Start: 2023-04-20

## 2023-04-20 RX ORDER — SODIUM CHLORIDE 0.9 % (FLUSH) 0.9 %
10 SYRINGE (ML) INJECTION AS NEEDED
Status: DISCONTINUED | OUTPATIENT
Start: 2023-04-20 | End: 2023-04-20 | Stop reason: HOSPADM

## 2023-04-20 RX ADMIN — SODIUM CHLORIDE 1000 ML: 9 INJECTION, SOLUTION INTRAVENOUS at 14:56

## 2023-04-20 RX ADMIN — Medication 500 UNITS: at 16:04

## 2023-04-20 RX ADMIN — Medication 10 ML: at 16:04

## 2023-04-27 ENCOUNTER — TELEPHONE (OUTPATIENT)
Dept: ONCOLOGY | Facility: CLINIC | Age: 53
End: 2023-04-27

## 2023-04-27 NOTE — TELEPHONE ENCOUNTER
[facility] received a fax that requires your attention. The document has been indexed to the patient’s chart for your review.      Reason for sending: POSITIVE GINA     Documents Description: COLON PATH     Name of Sender: SIGNATERA     Date Indexed: 04-18-23    Notes (if needed): INDEXED UNDER PATH 4.18.23. NEXT APPT WITH DR. ORTA IS 5.3.23.   THANK YOU

## 2023-05-03 ENCOUNTER — OFFICE VISIT (OUTPATIENT)
Dept: ONCOLOGY | Facility: CLINIC | Age: 53
End: 2023-05-03
Payer: MEDICAID

## 2023-05-03 ENCOUNTER — INFUSION (OUTPATIENT)
Dept: ONCOLOGY | Facility: HOSPITAL | Age: 53
End: 2023-05-03
Payer: MEDICAID

## 2023-05-03 VITALS
DIASTOLIC BLOOD PRESSURE: 68 MMHG | HEIGHT: 62 IN | WEIGHT: 129 LBS | OXYGEN SATURATION: 99 % | HEART RATE: 81 BPM | RESPIRATION RATE: 12 BRPM | SYSTOLIC BLOOD PRESSURE: 141 MMHG | BODY MASS INDEX: 23.74 KG/M2 | TEMPERATURE: 97.3 F

## 2023-05-03 DIAGNOSIS — R42 DIZZINESS: ICD-10-CM

## 2023-05-03 DIAGNOSIS — C18.9 METASTATIC COLON CANCER TO LIVER: ICD-10-CM

## 2023-05-03 DIAGNOSIS — C18.9 METASTATIC COLON CANCER TO LIVER: Primary | ICD-10-CM

## 2023-05-03 DIAGNOSIS — C78.7 METASTATIC COLON CANCER TO LIVER: ICD-10-CM

## 2023-05-03 DIAGNOSIS — R51.9 ACUTE NONINTRACTABLE HEADACHE, UNSPECIFIED HEADACHE TYPE: ICD-10-CM

## 2023-05-03 DIAGNOSIS — R25.1 TREMORS OF NERVOUS SYSTEM: ICD-10-CM

## 2023-05-03 DIAGNOSIS — C18.2 CANCER OF RIGHT COLON: Primary | ICD-10-CM

## 2023-05-03 DIAGNOSIS — C78.7 METASTATIC COLON CANCER TO LIVER: Primary | ICD-10-CM

## 2023-05-03 LAB
ALBUMIN SERPL-MCNC: 3.5 G/DL (ref 3.5–5.2)
ALBUMIN/GLOB SERPL: 1.1 G/DL
ALP SERPL-CCNC: 349 U/L (ref 39–117)
ALT SERPL W P-5'-P-CCNC: 79 U/L (ref 1–33)
ANION GAP SERPL CALCULATED.3IONS-SCNC: 8.7 MMOL/L (ref 5–15)
AST SERPL-CCNC: 112 U/L (ref 1–32)
BASOPHILS # BLD AUTO: 0.01 10*3/MM3 (ref 0–0.2)
BASOPHILS NFR BLD AUTO: 0.3 % (ref 0–1.5)
BILIRUB SERPL-MCNC: 0.6 MG/DL (ref 0–1.2)
BUN SERPL-MCNC: 9 MG/DL (ref 6–20)
BUN/CREAT SERPL: 13.2 (ref 7–25)
CALCIUM SPEC-SCNC: 9 MG/DL (ref 8.6–10.5)
CEA SERPL-MCNC: 4.74 NG/ML
CHLORIDE SERPL-SCNC: 108 MMOL/L (ref 98–107)
CO2 SERPL-SCNC: 24.3 MMOL/L (ref 22–29)
CREAT SERPL-MCNC: 0.68 MG/DL (ref 0.57–1)
DEPRECATED RDW RBC AUTO: 61.8 FL (ref 37–54)
EGFRCR SERPLBLD CKD-EPI 2021: 104.9 ML/MIN/1.73
EOSINOPHIL # BLD AUTO: 0.25 10*3/MM3 (ref 0–0.4)
EOSINOPHIL NFR BLD AUTO: 8.7 % (ref 0.3–6.2)
ERYTHROCYTE [DISTWIDTH] IN BLOOD BY AUTOMATED COUNT: 17.9 % (ref 12.3–15.4)
GLOBULIN UR ELPH-MCNC: 3.1 GM/DL
GLUCOSE SERPL-MCNC: 108 MG/DL (ref 65–99)
HCT VFR BLD AUTO: 28.1 % (ref 34–46.6)
HGB BLD-MCNC: 9.1 G/DL (ref 12–15.9)
IMM GRANULOCYTES # BLD AUTO: 0.02 10*3/MM3 (ref 0–0.05)
IMM GRANULOCYTES NFR BLD AUTO: 0.7 % (ref 0–0.5)
LYMPHOCYTES # BLD AUTO: 1.19 10*3/MM3 (ref 0.7–3.1)
LYMPHOCYTES NFR BLD AUTO: 41.2 % (ref 19.6–45.3)
MCH RBC QN AUTO: 30.5 PG (ref 26.6–33)
MCHC RBC AUTO-ENTMCNC: 32.4 G/DL (ref 31.5–35.7)
MCV RBC AUTO: 94.3 FL (ref 79–97)
MONOCYTES # BLD AUTO: 0.41 10*3/MM3 (ref 0.1–0.9)
MONOCYTES NFR BLD AUTO: 14.2 % (ref 5–12)
NEUTROPHILS NFR BLD AUTO: 1.01 10*3/MM3 (ref 1.7–7)
NEUTROPHILS NFR BLD AUTO: 34.9 % (ref 42.7–76)
NRBC BLD AUTO-RTO: 0 /100 WBC (ref 0–0.2)
PLATELET # BLD AUTO: 148 10*3/MM3 (ref 140–450)
PMV BLD AUTO: 10.5 FL (ref 6–12)
POTASSIUM SERPL-SCNC: 3.9 MMOL/L (ref 3.5–5.2)
PROT SERPL-MCNC: 6.6 G/DL (ref 6–8.5)
RBC # BLD AUTO: 2.98 10*6/MM3 (ref 3.77–5.28)
SODIUM SERPL-SCNC: 141 MMOL/L (ref 136–145)
TSH SERPL DL<=0.05 MIU/L-ACNC: 0.57 UIU/ML (ref 0.27–4.2)
WBC NRBC COR # BLD: 2.89 10*3/MM3 (ref 3.4–10.8)

## 2023-05-03 PROCEDURE — 25010000002 LEUCOVORIN CALCIUM PER 50MG: Performed by: INTERNAL MEDICINE

## 2023-05-03 PROCEDURE — 25010000002 OXALIPLATIN PER 0.5 MG: Performed by: INTERNAL MEDICINE

## 2023-05-03 PROCEDURE — G0498 CHEMO EXTEND IV INFUS W/PUMP: HCPCS

## 2023-05-03 PROCEDURE — 99214 OFFICE O/P EST MOD 30 MIN: CPT | Performed by: INTERNAL MEDICINE

## 2023-05-03 PROCEDURE — 80050 GENERAL HEALTH PANEL: CPT

## 2023-05-03 PROCEDURE — 1126F AMNT PAIN NOTED NONE PRSNT: CPT | Performed by: INTERNAL MEDICINE

## 2023-05-03 PROCEDURE — 96415 CHEMO IV INFUSION ADDL HR: CPT

## 2023-05-03 PROCEDURE — 96375 TX/PRO/DX INJ NEW DRUG ADDON: CPT

## 2023-05-03 PROCEDURE — 96413 CHEMO IV INFUSION 1 HR: CPT

## 2023-05-03 PROCEDURE — 25010000002 FLUOROURACIL PER 500 MG: Performed by: INTERNAL MEDICINE

## 2023-05-03 PROCEDURE — 82378 CARCINOEMBRYONIC ANTIGEN: CPT

## 2023-05-03 PROCEDURE — 25010000002 DEXAMETHASONE SODIUM PHOSPHATE 20 MG/5ML SOLUTION: Performed by: INTERNAL MEDICINE

## 2023-05-03 PROCEDURE — 96416 CHEMO PROLONG INFUSE W/PUMP: CPT

## 2023-05-03 PROCEDURE — 96417 CHEMO IV INFUS EACH ADDL SEQ: CPT

## 2023-05-03 PROCEDURE — 25010000002 IRINOTECAN PER 20 MG: Performed by: INTERNAL MEDICINE

## 2023-05-03 PROCEDURE — 25010000002 PALONOSETRON PER 25 MCG: Performed by: INTERNAL MEDICINE

## 2023-05-03 PROCEDURE — 96368 THER/DIAG CONCURRENT INF: CPT

## 2023-05-03 RX ORDER — PALONOSETRON 0.05 MG/ML
0.25 INJECTION, SOLUTION INTRAVENOUS ONCE
Status: CANCELLED | OUTPATIENT
Start: 2023-05-03

## 2023-05-03 RX ORDER — PALONOSETRON 0.05 MG/ML
0.25 INJECTION, SOLUTION INTRAVENOUS ONCE
OUTPATIENT
Start: 2023-05-16

## 2023-05-03 RX ORDER — FAMOTIDINE 10 MG/ML
20 INJECTION, SOLUTION INTRAVENOUS AS NEEDED
OUTPATIENT
Start: 2023-05-16

## 2023-05-03 RX ORDER — DEXTROSE MONOHYDRATE 50 MG/ML
250 INJECTION, SOLUTION INTRAVENOUS ONCE
Status: DISCONTINUED | OUTPATIENT
Start: 2023-05-03 | End: 2023-05-03 | Stop reason: HOSPADM

## 2023-05-03 RX ORDER — DEXTROSE MONOHYDRATE 50 MG/ML
250 INJECTION, SOLUTION INTRAVENOUS ONCE
Status: CANCELLED | OUTPATIENT
Start: 2023-05-03

## 2023-05-03 RX ORDER — PALONOSETRON 0.05 MG/ML
0.25 INJECTION, SOLUTION INTRAVENOUS ONCE
Status: COMPLETED | OUTPATIENT
Start: 2023-05-03 | End: 2023-05-03

## 2023-05-03 RX ORDER — DIPHENHYDRAMINE HYDROCHLORIDE 50 MG/ML
50 INJECTION INTRAMUSCULAR; INTRAVENOUS AS NEEDED
OUTPATIENT
Start: 2023-05-16

## 2023-05-03 RX ORDER — ATROPINE SULFATE 0.4 MG/ML
0.25 AMPUL (ML) INJECTION ONCE
Status: COMPLETED | OUTPATIENT
Start: 2023-05-03 | End: 2023-05-03

## 2023-05-03 RX ORDER — DEXTROSE MONOHYDRATE 50 MG/ML
250 INJECTION, SOLUTION INTRAVENOUS ONCE
OUTPATIENT
Start: 2023-05-16

## 2023-05-03 RX ORDER — FAMOTIDINE 10 MG/ML
20 INJECTION, SOLUTION INTRAVENOUS AS NEEDED
Status: CANCELLED | OUTPATIENT
Start: 2023-05-03

## 2023-05-03 RX ORDER — DIPHENHYDRAMINE HYDROCHLORIDE 50 MG/ML
50 INJECTION INTRAMUSCULAR; INTRAVENOUS AS NEEDED
Status: CANCELLED | OUTPATIENT
Start: 2023-05-03

## 2023-05-03 RX ADMIN — ATROPINE SULFATE 0.25 MG: 0.4 INJECTION, SOLUTION INTRAVENOUS at 14:39

## 2023-05-03 RX ADMIN — ATROPINE SULFATE 0.25 MG: 0.4 INJECTION, SOLUTION INTRAVENOUS at 10:49

## 2023-05-03 RX ADMIN — LEUCOVORIN CALCIUM 480 MG: 10 INJECTION INTRAMUSCULAR; INTRAVENOUS at 12:38

## 2023-05-03 RX ADMIN — ATROPINE SULFATE 0.25 MG: 0.4 INJECTION, SOLUTION INTRAVENOUS at 12:37

## 2023-05-03 RX ADMIN — FLUOROURACIL 2900 MG: 50 INJECTION, SOLUTION INTRAVENOUS at 14:41

## 2023-05-03 RX ADMIN — DEXAMETHASONE SODIUM PHOSPHATE 12 MG: 4 INJECTION, SOLUTION INTRAMUSCULAR; INTRAVENOUS at 10:32

## 2023-05-03 RX ADMIN — OXALIPLATIN 105 MG: 5 INJECTION, SOLUTION INTRAVENOUS at 12:38

## 2023-05-03 RX ADMIN — PALONOSETRON 0.25 MG: 0.05 INJECTION, SOLUTION INTRAVENOUS at 10:17

## 2023-05-03 RX ADMIN — DEXTROSE MONOHYDRATE 200 MG: 50 INJECTION, SOLUTION INTRAVENOUS at 10:52

## 2023-05-03 NOTE — PROGRESS NOTES
DATE OF VISIT: 5/3/2023    REASON FOR VISIT: Followup for metastatic right-sided colon cancer     PROBLEM LIST:  1. Metastatic colon cancer [place] NX M1 a stage Perry:  A.  Presented with abdominal pain and jaundice  B.  CT abdomen pelvis done November 04, 2022 confirmed diffuse liver metastases.  C.  Diagnosed after CT-guided liver biopsy done on November 22, 2022 confirming adenocarcinoma CK20 positive CK7 negative.  D.  Colonoscopy done December 08, 2020 to confirm transverse colon mass however biopsy revealed adenoma.  E.  Started palliative chemotherapy with dose adjusted FOLFOXIRI December 14, 2022, status post 10 cycles  2.  Elevated liver enzymes:  A.  Induced by metastatic disease  3.  Cancer-related pain  4.  Anxiety  5.  Hypertension    HISTORY OF PRESENT ILLNESS: The patient is a very pleasant 52 y.o. female with past medical history significant for metastatic right-sided colon cancer diagnosed November 22, 2022.  Started on palliative chemotherapy with dose adjusted FOLFOXIRI. The patient is here today for scheduled follow-up visit with treatment cycle number 11.    SUBJECTIVE: Amber is here today with her .  She is complaining of right leg weakness which has been having some involuntary jerks.  No headaches.  She still has fatigue.  It is taking longer to recover between treatments for    Past History:  Medical History: has a past medical history of Anxiety, Cancer, Ear piercing, Gallstones, History of irritable bowel syndrome, Hypertension, Seasonal allergies, Tattoos, and Wears glasses.   Surgical History: has a past surgical history that includes postpartum tubal ligation; Dilation and curettage of uterus (2013); Tumor removal (2013); Portacath placement (N/A, 12/8/2022); and Colonoscopy (N/A, 12/8/2022).   Family History: family history is not on file.   Social History: reports that she has never smoked. She has never used smokeless tobacco. She reports current alcohol use. She reports that she  "does not use drugs.    (Not in a hospital admission)     Allergies: Losartan, Valium [diazepam], and Citrus     Review of Systems   Constitutional: Positive for fatigue.   Gastrointestinal: Positive for diarrhea and nausea.   Musculoskeletal: Positive for arthralgias.   Neurological: Positive for tremors.   Psychiatric/Behavioral: The patient is nervous/anxious.        PHYSICAL EXAMINATION:   /68   Pulse 81   Temp 97.3 °F (36.3 °C) (Temporal)   Resp 12   Ht 157.5 cm (62\")   Wt 58.5 kg (129 lb)   SpO2 99%   BMI 23.59 kg/m²    Pain Score    05/03/23 0843   PainSc: 0-No pain                     ECOG Performance Status: 1 - Symptomatic but completely ambulatory      General Appearance:      alert, cooperative, no apparent distress and appears stated age   Lungs:   Clear to auscultation bilaterally; respirations regular, even, and unlabored bilaterally   Heart:  Regular rate and rhythm, no murmurs appreciated   Abdomen:   Soft, non-tender, non-distended and no organomegaly                 No visits with results within 2 Week(s) from this visit.   Latest known visit with results is:   Infusion on 04/18/2023   Component Date Value Ref Range Status   • CEA 04/18/2023 4.47  ng/mL Final   • Glucose 04/18/2023 99  65 - 99 mg/dL Final   • BUN 04/18/2023 5 (L)  6 - 20 mg/dL Final   • Creatinine 04/18/2023 0.61  0.57 - 1.00 mg/dL Final   • Sodium 04/18/2023 141  136 - 145 mmol/L Final   • Potassium 04/18/2023 3.8  3.5 - 5.2 mmol/L Final   • Chloride 04/18/2023 107  98 - 107 mmol/L Final   • CO2 04/18/2023 23.6  22.0 - 29.0 mmol/L Final   • Calcium 04/18/2023 9.2  8.6 - 10.5 mg/dL Final   • Total Protein 04/18/2023 6.9  6.0 - 8.5 g/dL Final   • Albumin 04/18/2023 3.8  3.5 - 5.2 g/dL Final   • ALT (SGPT) 04/18/2023 60 (H)  1 - 33 U/L Final   • AST (SGOT) 04/18/2023 83 (H)  1 - 32 U/L Final   • Alkaline Phosphatase 04/18/2023 328 (H)  39 - 117 U/L Final   • Total Bilirubin 04/18/2023 0.6  0.0 - 1.2 mg/dL Final   • " Globulin 04/18/2023 3.1  gm/dL Final   • A/G Ratio 04/18/2023 1.2  g/dL Final   • BUN/Creatinine Ratio 04/18/2023 8.2  7.0 - 25.0 Final   • Anion Gap 04/18/2023 10.4  5.0 - 15.0 mmol/L Final   • eGFR 04/18/2023 107.7  >60.0 mL/min/1.73 Final   • WBC 04/18/2023 3.44  3.40 - 10.80 10*3/mm3 Final   • RBC 04/18/2023 3.16 (L)  3.77 - 5.28 10*6/mm3 Final   • Hemoglobin 04/18/2023 9.5 (L)  12.0 - 15.9 g/dL Final   • Hematocrit 04/18/2023 28.8 (L)  34.0 - 46.6 % Final   • MCV 04/18/2023 91.1  79.0 - 97.0 fL Final   • MCH 04/18/2023 30.1  26.6 - 33.0 pg Final   • MCHC 04/18/2023 33.0  31.5 - 35.7 g/dL Final   • RDW 04/18/2023 17.7 (H)  12.3 - 15.4 % Final   • RDW-SD 04/18/2023 58.4 (H)  37.0 - 54.0 fl Final   • MPV 04/18/2023 10.2  6.0 - 12.0 fL Final   • Platelets 04/18/2023 157  140 - 450 10*3/mm3 Final   • Neutrophil % 04/18/2023 38.9 (L)  42.7 - 76.0 % Final   • Lymphocyte % 04/18/2023 41.9  19.6 - 45.3 % Final   • Monocyte % 04/18/2023 13.1 (H)  5.0 - 12.0 % Final   • Eosinophil % 04/18/2023 5.5  0.3 - 6.2 % Final   • Basophil % 04/18/2023 0.6  0.0 - 1.5 % Final   • Immature Grans % 04/18/2023 0.0  0.0 - 0.5 % Final   • Neutrophils, Absolute 04/18/2023 1.34 (L)  1.70 - 7.00 10*3/mm3 Final   • Lymphocytes, Absolute 04/18/2023 1.44  0.70 - 3.10 10*3/mm3 Final   • Monocytes, Absolute 04/18/2023 0.45  0.10 - 0.90 10*3/mm3 Final   • Eosinophils, Absolute 04/18/2023 0.19  0.00 - 0.40 10*3/mm3 Final   • Basophils, Absolute 04/18/2023 0.02  0.00 - 0.20 10*3/mm3 Final   • Immature Grans, Absolute 04/18/2023 0.00  0.00 - 0.05 10*3/mm3 Final   • nRBC 04/18/2023 0.0  0.0 - 0.2 /100 WBC Final        No results found.    ASSESSMENT: The patient is a very pleasant 52 y.o. female  with right-sided metastatic colon cancer      PLAN:    1.  Right-sided metastatic colon cancer:  A.  I will proceed with chemotherapy as scheduled FOLFOXIRI cycle # 11.  B.  The patient will follow-up with us for cycle # 12.  C. We will repeat her scans after  cycle #12.  I will consider maintenance treatment at that point.  D.  I will continue 25% dose reduction secondary to multiple side effects including elevated liver enzymes diarrhea and heartburn.  E.  I will continue to monitor the patient blood work including blood counts kidney function liver function and electrolytes.  F.  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  While the patient may not benefit from immunotherapy however she will be a candidate for EGFR inhibitors down the road.  G.  I did go over her cervical tumor DNA assay from April 18, 2023.  Continues to decline now its down to 0.79.  This should be repeated again after cycle #12.  H.  I did go over the blood the results with the patient from April 5 , 2023.  She continues to have multiple chemotherapy toxicities.  White cells and hemoglobin are down.  Alkaline phosphatase elevated but decreased at 287.     2.  Elevated liver enzymes:  A.  Induced by liver metastases  B.  I will continue 25% dose reduction of chemo drugs per  C.  I will repeat labs before next infusion.    3.  Chemotherapy-induced nausea:  A.  I will continue the patient on Zofran as needed for    4.  Chemotherapy-induced diarrhea:  A.  I will continue the patient on Imodium as needed    5. Cancer-related pain:  A.  I will continue the patient oxycodone 5 mg twice a day.     6.  Anxiety:  A.  I will continue the patient on Ativan 1 mg nightly as needed.       7.  Hypertension:  A.  She will continue losartan.  B.  I reviewed with the patient this will interfere with our management since chemotherapy typically can cause hypotension we will have to monitor blood pressure closely.    8.  Dehydration:  A.  I will continue 1 L of IV fluid on day 3 of each chemotherapy cycle.    9.  Chemotherapy-induced anemia:  A.  I explained the patient hemoglobin down to 9.5 on April 18, 2023.  B.  I will continue  monitor her CBC prior to each infusion.  C.  I will transfuse as needed for hemoglobin less than 8.  D.  I will continue 25% dose reduction on chemotherapy.    10.  Chemotherapy-induced heartburn:  A.  I will continue pantoprazole 40 mg twice a day.  I will refill today.  B.  I will continue Carafate 1 g every 6 hours    11.  Treatment related diarrhea  A.  Continue Imodium as directed.  We will also add Lomotil to her regimen.  Education sheet was provided to the patient.    12.  Hot flashes  A.  We discussed this can cause menopause in women.  Patient reports she has never been through menopause up to this point.  We will continue to monitor for now.  Hot flashes are manageable.    13.  Vaginal dryness  A.  We discussed that she can try over-the-counter lubricants.  She has not tried this at this point.  She will notify us for any further recommendations.    14.  Tongue erythema.  A.  I will continue Magic mouthwash for management.    15.  Right leg weakness with tremor:  A.  I will order MRI brain prior to return.    FOLLOW UP: 2 weeks with cycle # 12.    Iban Lambert MD  5/3/2023

## 2023-05-05 ENCOUNTER — HOSPITAL ENCOUNTER (OUTPATIENT)
Dept: MRI IMAGING | Facility: HOSPITAL | Age: 53
Discharge: HOME | End: 2023-05-05
Payer: MEDICAID

## 2023-05-05 ENCOUNTER — INFUSION (OUTPATIENT)
Dept: ONCOLOGY | Facility: HOSPITAL | Age: 53
End: 2023-05-05
Payer: MEDICAID

## 2023-05-05 DIAGNOSIS — C18.9 METASTATIC COLON CANCER TO LIVER: Primary | ICD-10-CM

## 2023-05-05 DIAGNOSIS — C18.2 CANCER OF RIGHT COLON: ICD-10-CM

## 2023-05-05 DIAGNOSIS — R25.1 TREMORS OF NERVOUS SYSTEM: ICD-10-CM

## 2023-05-05 DIAGNOSIS — R42 DIZZINESS: ICD-10-CM

## 2023-05-05 DIAGNOSIS — C78.7 METASTATIC COLON CANCER TO LIVER: Primary | ICD-10-CM

## 2023-05-05 DIAGNOSIS — R51.9 ACUTE NONINTRACTABLE HEADACHE, UNSPECIFIED HEADACHE TYPE: ICD-10-CM

## 2023-05-05 DIAGNOSIS — C18.9 METASTATIC COLON CANCER TO LIVER: ICD-10-CM

## 2023-05-05 DIAGNOSIS — C78.7 METASTATIC COLON CANCER TO LIVER: ICD-10-CM

## 2023-05-05 PROCEDURE — 96360 HYDRATION IV INFUSION INIT: CPT

## 2023-05-05 PROCEDURE — 70553 MRI BRAIN STEM W/O & W/DYE: CPT

## 2023-05-05 PROCEDURE — 0 GADOBENATE DIMEGLUMINE 529 MG/ML SOLUTION: Performed by: INTERNAL MEDICINE

## 2023-05-05 PROCEDURE — A9577 INJ MULTIHANCE: HCPCS | Performed by: INTERNAL MEDICINE

## 2023-05-05 PROCEDURE — 25010000002 HEPARIN LOCK FLUSH PER 10 UNITS: Performed by: RADIOLOGY

## 2023-05-05 RX ORDER — SODIUM CHLORIDE 0.9 % (FLUSH) 0.9 %
10 SYRINGE (ML) INJECTION EVERY 12 HOURS SCHEDULED
Status: DISCONTINUED | OUTPATIENT
Start: 2023-05-05 | End: 2023-05-06 | Stop reason: HOSPADM

## 2023-05-05 RX ORDER — HEPARIN SODIUM (PORCINE) LOCK FLUSH IV SOLN 100 UNIT/ML 100 UNIT/ML
500 SOLUTION INTRAVENOUS AS NEEDED
Status: DISCONTINUED | OUTPATIENT
Start: 2023-05-05 | End: 2023-05-05 | Stop reason: HOSPADM

## 2023-05-05 RX ORDER — SODIUM CHLORIDE 0.9 % (FLUSH) 0.9 %
10 SYRINGE (ML) INJECTION AS NEEDED
OUTPATIENT
Start: 2023-05-05

## 2023-05-05 RX ORDER — HEPARIN SODIUM (PORCINE) LOCK FLUSH IV SOLN 100 UNIT/ML 100 UNIT/ML
5 SOLUTION INTRAVENOUS AS NEEDED
Status: DISCONTINUED | OUTPATIENT
Start: 2023-05-05 | End: 2023-05-06 | Stop reason: HOSPADM

## 2023-05-05 RX ORDER — SODIUM CHLORIDE 0.9 % (FLUSH) 0.9 %
10 SYRINGE (ML) INJECTION AS NEEDED
Status: DISCONTINUED | OUTPATIENT
Start: 2023-05-05 | End: 2023-05-06 | Stop reason: HOSPADM

## 2023-05-05 RX ORDER — SODIUM CHLORIDE 0.9 % (FLUSH) 0.9 %
10 SYRINGE (ML) INJECTION AS NEEDED
Status: DISCONTINUED | OUTPATIENT
Start: 2023-05-05 | End: 2023-05-05 | Stop reason: HOSPADM

## 2023-05-05 RX ORDER — HEPARIN SODIUM (PORCINE) LOCK FLUSH IV SOLN 100 UNIT/ML 100 UNIT/ML
500 SOLUTION INTRAVENOUS AS NEEDED
OUTPATIENT
Start: 2023-05-05

## 2023-05-05 RX ADMIN — SODIUM CHLORIDE 1000 ML: 9 INJECTION, SOLUTION INTRAVENOUS at 15:15

## 2023-05-05 RX ADMIN — GADOBENATE DIMEGLUMINE 11 ML: 529 INJECTION, SOLUTION INTRAVENOUS at 17:29

## 2023-05-05 RX ADMIN — SODIUM CHLORIDE, PRESERVATIVE FREE 500 UNITS: 5 INJECTION INTRAVENOUS at 18:19

## 2023-05-09 NOTE — PROGRESS NOTES
DATE OF VISIT: 5/17/2023    REASON FOR VISIT: Followup for metastatic right-sided colon cancer     PROBLEM LIST:  1. Metastatic colon cancer TX NX M1 a stage Perry:  A.  Presented with abdominal pain and jaundice  B.  CT abdomen pelvis done November 04, 2022 confirmed diffuse liver metastases.  C.  Diagnosed after CT-guided liver biopsy done on November 22, 2022 confirming adenocarcinoma CK20 positive CK7 negative.  D.  Colonoscopy done December 08, 2020 to confirm transverse colon mass however biopsy revealed adenoma.  E.  Started palliative chemotherapy with dose adjusted FOLFOXIRI December 14, 2022, status post 10 cycles  2.  Elevated liver enzymes:  A.  Induced by metastatic disease  3.  Cancer-related pain  4.  Anxiety  5.  Hypertension    HISTORY OF PRESENT ILLNESS: The patient is a very pleasant 52 y.o. female with past medical history significant for metastatic right-sided colon cancer diagnosed November 22, 2022.  Started on palliative chemotherapy with dose adjusted FOLFOXIRI. The patient is here today for scheduled follow-up visit with treatment cycle number 12.    SUBJECTIVE: Amber is here today with her .  Overall, she has been doing fairly well.  She continues to have involuntary tremors in her right leg with some weakness.  This is unchanged compared to last visit.  MRI was unremarkable which made her feel a lot better she said.  Denies any headaches.  She continues to have fatigue but is stable overall.  She stopped taking her vitamins and that has actually made her feel much better.  She says she has had more energy.       Past History:  Medical History: has a past medical history of Anxiety, Cancer, Ear piercing, Gallstones, History of irritable bowel syndrome, Hypertension, Seasonal allergies, Tattoos, and Wears glasses.   Surgical History: has a past surgical history that includes postpartum tubal ligation; Dilation and curettage of uterus (2013); Tumor removal (2013); Portacath placement  "(N/A, 12/8/2022); and Colonoscopy (N/A, 12/8/2022).   Family History: family history is not on file.   Social History: reports that she has never smoked. She has never used smokeless tobacco. She reports current alcohol use. She reports that she does not use drugs.    (Not in a hospital admission)     Allergies: Losartan, Valium [diazepam], and Citrus     Review of Systems   Constitutional: Positive for fatigue.   Gastrointestinal: Positive for diarrhea.   Musculoskeletal: Positive for arthralgias.   Neurological: Positive for tremors.   Psychiatric/Behavioral: The patient is nervous/anxious.    All other systems reviewed and are negative.      PHYSICAL EXAMINATION:   /70   Pulse 75   Temp 97.3 °F (36.3 °C) (Temporal)   Resp 16   Ht 157.5 cm (62\")   Wt 58.5 kg (129 lb)   SpO2 99%   BMI 23.59 kg/m²    Pain Score    05/17/23 0822   PainSc: 0-No pain                     ECOG Performance Status: 1 - Symptomatic but completely ambulatory      General Appearance:      alert, cooperative, no apparent distress and appears stated age   Lungs:   Clear to auscultation bilaterally; respirations regular, even, and unlabored bilaterally   Heart:  Regular rate and rhythm, no murmurs appreciated   Abdomen:   Soft, non-tender, non-distended and no organomegaly                 Infusion on 05/17/2023   Component Date Value Ref Range Status   • Glucose 05/17/2023 102 (H)  65 - 99 mg/dL Final   • BUN 05/17/2023 6  6 - 20 mg/dL Final   • Creatinine 05/17/2023 0.60  0.57 - 1.00 mg/dL Final   • Sodium 05/17/2023 141  136 - 145 mmol/L Final   • Potassium 05/17/2023 3.4 (L)  3.5 - 5.2 mmol/L Final   • Chloride 05/17/2023 107  98 - 107 mmol/L Final   • CO2 05/17/2023 23.6  22.0 - 29.0 mmol/L Final   • Calcium 05/17/2023 9.1  8.6 - 10.5 mg/dL Final   • Total Protein 05/17/2023 6.8  6.0 - 8.5 g/dL Final   • Albumin 05/17/2023 3.5  3.5 - 5.2 g/dL Final   • ALT (SGPT) 05/17/2023 72 (H)  1 - 33 U/L Final   • AST (SGOT) 05/17/2023 98 " (H)  1 - 32 U/L Final   • Alkaline Phosphatase 05/17/2023 369 (H)  39 - 117 U/L Final   • Total Bilirubin 05/17/2023 0.6  0.0 - 1.2 mg/dL Final   • Globulin 05/17/2023 3.3  gm/dL Final   • A/G Ratio 05/17/2023 1.1  g/dL Final   • BUN/Creatinine Ratio 05/17/2023 10.0  7.0 - 25.0 Final   • Anion Gap 05/17/2023 10.4  5.0 - 15.0 mmol/L Final   • eGFR 05/17/2023 108.2  >60.0 mL/min/1.73 Final   • WBC 05/17/2023 2.53 (L)  3.40 - 10.80 10*3/mm3 Final   • RBC 05/17/2023 2.93 (L)  3.77 - 5.28 10*6/mm3 Final   • Hemoglobin 05/17/2023 9.2 (L)  12.0 - 15.9 g/dL Final   • Hematocrit 05/17/2023 28.1 (L)  34.0 - 46.6 % Final   • MCV 05/17/2023 95.9  79.0 - 97.0 fL Final   • MCH 05/17/2023 31.4  26.6 - 33.0 pg Final   • MCHC 05/17/2023 32.7  31.5 - 35.7 g/dL Final   • RDW 05/17/2023 17.4 (H)  12.3 - 15.4 % Final   • RDW-SD 05/17/2023 60.9 (H)  37.0 - 54.0 fl Final   • MPV 05/17/2023 10.5  6.0 - 12.0 fL Final   • Platelets 05/17/2023 116 (L)  140 - 450 10*3/mm3 Final   • Neutrophil % 05/17/2023 33.7 (L)  42.7 - 76.0 % Final   • Lymphocyte % 05/17/2023 45.8 (H)  19.6 - 45.3 % Final   • Monocyte % 05/17/2023 15.0 (H)  5.0 - 12.0 % Final   • Eosinophil % 05/17/2023 5.1  0.3 - 6.2 % Final   • Basophil % 05/17/2023 0.0  0.0 - 1.5 % Final   • Immature Grans % 05/17/2023 0.4  0.0 - 0.5 % Final   • Neutrophils, Absolute 05/17/2023 0.85 (L)  1.70 - 7.00 10*3/mm3 Final   • Lymphocytes, Absolute 05/17/2023 1.16  0.70 - 3.10 10*3/mm3 Final   • Monocytes, Absolute 05/17/2023 0.38  0.10 - 0.90 10*3/mm3 Final   • Eosinophils, Absolute 05/17/2023 0.13  0.00 - 0.40 10*3/mm3 Final   • Basophils, Absolute 05/17/2023 0.00  0.00 - 0.20 10*3/mm3 Final   • Immature Grans, Absolute 05/17/2023 0.01  0.00 - 0.05 10*3/mm3 Final   • nRBC 05/17/2023 0.0  0.0 - 0.2 /100 WBC Final        MRI Brain With & Without Contrast    Result Date: 5/5/2023  Narrative: FINAL REPORT TECHNIQUE: Multiplanar imaging was performed of the brain with and without gadolinium  infusion. CLINICAL HISTORY: new onset tremors, HAS HX OF COLON CANCER CURRENTLY TAKING CHEMO PER PT STARTED HAVING RIGHT LEG TREMORS AND UNCONTROLABLE SHAKING. FINDINGS: Diffusion-weighted images show no evidence of acute infarction. The ventricles are normal. There is normal brain signal. There is no mass or shift of midline structures. There is no evidence of intracranial hemorrhage. Appropriate signal flow voids are seen in the major intracranial vessels on T2-weighted images. No abnormal enhancement is seen on postcontrast images.     Impression: Unremarkable. Authenticated and Electronically Signed by Raman Bill M.D. on 05/05/2023 06:51:23 PM      ASSESSMENT: The patient is a very pleasant 52 y.o. female  with right-sided metastatic colon cancer      PLAN:    1.  Right-sided metastatic colon cancer:  A.  I will proceed with chemotherapy as scheduled FOLFOXIRI cycle # 12.  B.  The patient will follow-up with us in 2 weeks for possible maintenance therapy. .  C. We will repeat her scans after cycle #12. I will order prior to return.  I will consider maintenance treatment at that point.  D.  I will continue 25% dose reduction secondary to multiple side effects including elevated liver enzymes diarrhea and heartburn.  E.  I will continue to monitor the patient blood work including blood counts kidney function liver function and electrolytes.  F.  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  While the patient may not benefit from immunotherapy however she will be a candidate for EGFR inhibitors down the road.  G.  I did go over her cervical tumor DNA assay from April 18, 2023.  Continues to decline now its down to 0.79.  We will repeat today.   H.  I did go over the blood the results with the patient from May 3 , 2023.  She continues to have multiple chemotherapy toxicities.  White cells and hemoglobin are down.  Alkaline  phosphatase elevated at 349.     2.  Elevated liver enzymes:  A.  Induced by liver metastases  B.  I will continue 25% dose reduction of chemo drugs per  C.  I will repeat labs before next infusion.    3.  Chemotherapy-induced nausea:  A.  I will continue the patient on Zofran as needed for    4.  Chemotherapy-induced diarrhea:  A.  I will continue the patient on Imodium as needed    5. Cancer-related pain:  A.  I will continue the patient oxycodone 5 mg twice a day.     6.  Anxiety:  A.  I will continue the patient on Ativan 1 mg nightly as needed.       7.  Hypertension:  A.  She will continue losartan.  B.  I reviewed with the patient this will interfere with our management since chemotherapy typically can cause hypotension we will have to monitor blood pressure closely.    8.  Dehydration:  A.  I will continue 1 L of IV fluid on day 3 of each chemotherapy cycle.    9.  Chemotherapy-induced anemia:  A.  I explained the patient hemoglobin down to 9.1 on May 3, 2023.  B.  I will continue monitor her CBC prior to each infusion.  C.  I will transfuse as needed for hemoglobin less than 8.  D.  I will continue 25% dose reduction on chemotherapy.    10.  Chemotherapy-induced heartburn:  A.  I will continue pantoprazole 40 mg twice a day.  I will refill today.  B.  I will continue Carafate 1 g every 6 hours    11.  Treatment related diarrhea  A.  Continue Imodium as directed.  We will also add Lomotil to her regimen.  Education sheet was provided to the patient.    12.  Hot flashes  A.  We discussed this can cause menopause in women.  Patient reports she has never been through menopause up to this point.  We will continue to monitor for now.  Hot flashes are manageable.    13.  Vaginal dryness  A.  We discussed that she can try over-the-counter lubricants.  She has not tried this at this point.  She will notify us for any further recommendations.    14.  Tongue erythema.  A.  Improved.  I will continue Magic mouthwash  for management.    15.  Right leg weakness with tremor:  A.   MRI of the brain was unremarkable.  B.  I did offer a referral to neurology for further evaluation.  Patient will let us know if she changes her mind.    FOLLOW UP: 2 weeks with maintenance and scan results.    Addendum.  Patient ANC is 0.85 today.  I discussed the case with Dr. Lambert.  We will dose reduce her by another 25% which would be a total of 50% reduction.  We will have her follow-up as scheduled in 2 weeks for scan results, DNA assay, and possible maintenance therapy.    Ana Cristina Cano, APRN  5/17/2023

## 2023-05-12 DIAGNOSIS — C18.9 METASTATIC COLON CANCER TO LIVER: ICD-10-CM

## 2023-05-12 DIAGNOSIS — C78.7 METASTATIC COLON CANCER TO LIVER: ICD-10-CM

## 2023-05-12 RX ORDER — LIDOCAINE AND PRILOCAINE 25; 25 MG/G; MG/G
1 CREAM TOPICAL AS NEEDED
Qty: 30 G | Refills: 3 | Status: SHIPPED | OUTPATIENT
Start: 2023-05-12

## 2023-05-12 NOTE — TELEPHONE ENCOUNTER
Caller: Amber Feng    Relationship: Self    Best call back number: [phone]    Requested Prescriptions:   Requested Prescriptions     Pending Prescriptions Disp Refills   • lidocaine-prilocaine (EMLA) 2.5-2.5 % cream 30 g 3     Sig: Apply 1 application topically to the appropriate area as directed As Needed (45-60 minutes prior to port access.  Cover with saran/plastic wrap.).        Pharmacy where request should be sent:  [facility]    Last office visit with prescribing clinician: 5/3/2023   Last telemedicine visit with prescribing clinician: 4/27/2023   Next office visit with prescribing clinician: Visit date not found     Additional details provided by patient: THE CREAMS SHE HAD WAS ON RECALL AND SHE HAD TO RETURN THAT MEDICINE, SHE IS NEEDING REFILL SINCE SHE HAS NO MORE REFILLS ON RX    Does the patient have less than a 3 day supply:  [x] Yes  [] No    Would you like a call back once the refill request has been completed: [x] Yes [] No    If the office needs to give you a call back, can they leave a voicemail: [x] Yes [] No    Fred Goodman Rep   05/12/23 14:10 EDT

## 2023-05-17 ENCOUNTER — INFUSION (OUTPATIENT)
Dept: ONCOLOGY | Facility: HOSPITAL | Age: 53
End: 2023-05-17
Payer: MEDICAID

## 2023-05-17 ENCOUNTER — OFFICE VISIT (OUTPATIENT)
Dept: ONCOLOGY | Facility: CLINIC | Age: 53
End: 2023-05-17
Payer: MEDICAID

## 2023-05-17 VITALS
DIASTOLIC BLOOD PRESSURE: 70 MMHG | RESPIRATION RATE: 16 BRPM | WEIGHT: 129 LBS | SYSTOLIC BLOOD PRESSURE: 133 MMHG | HEIGHT: 62 IN | BODY MASS INDEX: 23.74 KG/M2 | TEMPERATURE: 97.3 F | OXYGEN SATURATION: 99 % | HEART RATE: 75 BPM

## 2023-05-17 DIAGNOSIS — C78.7 METASTATIC COLON CANCER TO LIVER: Primary | ICD-10-CM

## 2023-05-17 DIAGNOSIS — C18.9 METASTATIC COLON CANCER TO LIVER: Primary | ICD-10-CM

## 2023-05-17 LAB
ALBUMIN SERPL-MCNC: 3.5 G/DL (ref 3.5–5.2)
ALBUMIN/GLOB SERPL: 1.1 G/DL
ALP SERPL-CCNC: 369 U/L (ref 39–117)
ALT SERPL W P-5'-P-CCNC: 72 U/L (ref 1–33)
ANION GAP SERPL CALCULATED.3IONS-SCNC: 10.4 MMOL/L (ref 5–15)
AST SERPL-CCNC: 98 U/L (ref 1–32)
BASOPHILS # BLD AUTO: 0 10*3/MM3 (ref 0–0.2)
BASOPHILS NFR BLD AUTO: 0 % (ref 0–1.5)
BILIRUB SERPL-MCNC: 0.6 MG/DL (ref 0–1.2)
BUN SERPL-MCNC: 6 MG/DL (ref 6–20)
BUN/CREAT SERPL: 10 (ref 7–25)
CALCIUM SPEC-SCNC: 9.1 MG/DL (ref 8.6–10.5)
CEA SERPL-MCNC: 4.15 NG/ML
CHLORIDE SERPL-SCNC: 107 MMOL/L (ref 98–107)
CO2 SERPL-SCNC: 23.6 MMOL/L (ref 22–29)
CREAT SERPL-MCNC: 0.6 MG/DL (ref 0.57–1)
DEPRECATED RDW RBC AUTO: 60.9 FL (ref 37–54)
EGFRCR SERPLBLD CKD-EPI 2021: 108.2 ML/MIN/1.73
EOSINOPHIL # BLD AUTO: 0.13 10*3/MM3 (ref 0–0.4)
EOSINOPHIL NFR BLD AUTO: 5.1 % (ref 0.3–6.2)
ERYTHROCYTE [DISTWIDTH] IN BLOOD BY AUTOMATED COUNT: 17.4 % (ref 12.3–15.4)
GLOBULIN UR ELPH-MCNC: 3.3 GM/DL
GLUCOSE SERPL-MCNC: 102 MG/DL (ref 65–99)
HCT VFR BLD AUTO: 28.1 % (ref 34–46.6)
HGB BLD-MCNC: 9.2 G/DL (ref 12–15.9)
IMM GRANULOCYTES # BLD AUTO: 0.01 10*3/MM3 (ref 0–0.05)
IMM GRANULOCYTES NFR BLD AUTO: 0.4 % (ref 0–0.5)
LYMPHOCYTES # BLD AUTO: 1.16 10*3/MM3 (ref 0.7–3.1)
LYMPHOCYTES NFR BLD AUTO: 45.8 % (ref 19.6–45.3)
MCH RBC QN AUTO: 31.4 PG (ref 26.6–33)
MCHC RBC AUTO-ENTMCNC: 32.7 G/DL (ref 31.5–35.7)
MCV RBC AUTO: 95.9 FL (ref 79–97)
MONOCYTES # BLD AUTO: 0.38 10*3/MM3 (ref 0.1–0.9)
MONOCYTES NFR BLD AUTO: 15 % (ref 5–12)
NEUTROPHILS NFR BLD AUTO: 0.85 10*3/MM3 (ref 1.7–7)
NEUTROPHILS NFR BLD AUTO: 33.7 % (ref 42.7–76)
NRBC BLD AUTO-RTO: 0 /100 WBC (ref 0–0.2)
PLATELET # BLD AUTO: 116 10*3/MM3 (ref 140–450)
PMV BLD AUTO: 10.5 FL (ref 6–12)
POTASSIUM SERPL-SCNC: 3.4 MMOL/L (ref 3.5–5.2)
PROT SERPL-MCNC: 6.8 G/DL (ref 6–8.5)
RBC # BLD AUTO: 2.93 10*6/MM3 (ref 3.77–5.28)
SODIUM SERPL-SCNC: 141 MMOL/L (ref 136–145)
WBC NRBC COR # BLD: 2.53 10*3/MM3 (ref 3.4–10.8)

## 2023-05-17 PROCEDURE — 25010000002 OXALIPLATIN PER 0.5 MG: Performed by: NURSE PRACTITIONER

## 2023-05-17 PROCEDURE — 96416 CHEMO PROLONG INFUSE W/PUMP: CPT

## 2023-05-17 PROCEDURE — 1160F RVW MEDS BY RX/DR IN RCRD: CPT | Performed by: NURSE PRACTITIONER

## 2023-05-17 PROCEDURE — 80053 COMPREHEN METABOLIC PANEL: CPT

## 2023-05-17 PROCEDURE — 25010000002 FLUOROURACIL PER 500 MG: Performed by: NURSE PRACTITIONER

## 2023-05-17 PROCEDURE — 96413 CHEMO IV INFUSION 1 HR: CPT

## 2023-05-17 PROCEDURE — 1159F MED LIST DOCD IN RCRD: CPT | Performed by: NURSE PRACTITIONER

## 2023-05-17 PROCEDURE — 25010000002 LEUCOVORIN 200 MG RECONSTITUTED SOLUTION 200 MG VIAL: Performed by: NURSE PRACTITIONER

## 2023-05-17 PROCEDURE — 96368 THER/DIAG CONCURRENT INF: CPT

## 2023-05-17 PROCEDURE — 96375 TX/PRO/DX INJ NEW DRUG ADDON: CPT

## 2023-05-17 PROCEDURE — 99214 OFFICE O/P EST MOD 30 MIN: CPT | Performed by: NURSE PRACTITIONER

## 2023-05-17 PROCEDURE — 96376 TX/PRO/DX INJ SAME DRUG ADON: CPT

## 2023-05-17 PROCEDURE — 85025 COMPLETE CBC W/AUTO DIFF WBC: CPT

## 2023-05-17 PROCEDURE — 25010000002 PALONOSETRON PER 25 MCG: Performed by: INTERNAL MEDICINE

## 2023-05-17 PROCEDURE — 25010000002 DEXAMETHASONE SODIUM PHOSPHATE 20 MG/5ML SOLUTION: Performed by: INTERNAL MEDICINE

## 2023-05-17 PROCEDURE — 82378 CARCINOEMBRYONIC ANTIGEN: CPT

## 2023-05-17 PROCEDURE — 1126F AMNT PAIN NOTED NONE PRSNT: CPT | Performed by: NURSE PRACTITIONER

## 2023-05-17 PROCEDURE — G0498 CHEMO EXTEND IV INFUS W/PUMP: HCPCS

## 2023-05-17 PROCEDURE — 96415 CHEMO IV INFUSION ADDL HR: CPT

## 2023-05-17 PROCEDURE — 25010000002 IRINOTECAN PER 20 MG: Performed by: NURSE PRACTITIONER

## 2023-05-17 PROCEDURE — 25010000002 LEUCOVORIN CALCIUM PER 50 MG: Performed by: NURSE PRACTITIONER

## 2023-05-17 PROCEDURE — 96417 CHEMO IV INFUS EACH ADDL SEQ: CPT

## 2023-05-17 RX ORDER — DEXTROSE MONOHYDRATE 50 MG/ML
250 INJECTION, SOLUTION INTRAVENOUS ONCE
Status: DISCONTINUED | OUTPATIENT
Start: 2023-05-17 | End: 2023-05-17 | Stop reason: HOSPADM

## 2023-05-17 RX ORDER — PALONOSETRON 0.05 MG/ML
0.25 INJECTION, SOLUTION INTRAVENOUS ONCE
Status: COMPLETED | OUTPATIENT
Start: 2023-05-17 | End: 2023-05-17

## 2023-05-17 RX ADMIN — LEUCOVORIN CALCIUM 240 MG: 200 INJECTION, POWDER, LYOPHILIZED, FOR SOLUTION INTRAMUSCULAR; INTRAVENOUS at 12:36

## 2023-05-17 RX ADMIN — OXALIPLATIN 70 MG: 5 INJECTION, SOLUTION INTRAVENOUS at 12:36

## 2023-05-17 RX ADMIN — PALONOSETRON 0.25 MG: 0.05 INJECTION, SOLUTION INTRAVENOUS at 10:49

## 2023-05-17 RX ADMIN — ATROPINE SULFATE 0.25 MG: 0.4 INJECTION, SOLUTION INTRAVENOUS at 12:53

## 2023-05-17 RX ADMIN — DEXAMETHASONE SODIUM PHOSPHATE 12 MG: 4 INJECTION, SOLUTION INTRAMUSCULAR; INTRAVENOUS at 10:29

## 2023-05-17 RX ADMIN — ATROPINE SULFATE 0.25 MG: 0.4 INJECTION, SOLUTION INTRAVENOUS at 10:49

## 2023-05-17 RX ADMIN — IRINOTECAN HYDROCHLORIDE 135 MG: 20 INJECTION, SOLUTION INTRAVENOUS at 10:56

## 2023-05-17 RX ADMIN — FLUOROURACIL 1930 MG: 50 INJECTION, SOLUTION INTRAVENOUS at 14:45

## 2023-05-19 ENCOUNTER — INFUSION (OUTPATIENT)
Dept: ONCOLOGY | Facility: HOSPITAL | Age: 53
End: 2023-05-19
Payer: MEDICAID

## 2023-05-19 VITALS
RESPIRATION RATE: 18 BRPM | TEMPERATURE: 98.4 F | SYSTOLIC BLOOD PRESSURE: 146 MMHG | DIASTOLIC BLOOD PRESSURE: 75 MMHG | HEART RATE: 79 BPM

## 2023-05-19 DIAGNOSIS — C78.7 METASTATIC COLON CANCER TO LIVER: Primary | ICD-10-CM

## 2023-05-19 DIAGNOSIS — C18.9 METASTATIC COLON CANCER TO LIVER: Primary | ICD-10-CM

## 2023-05-19 DIAGNOSIS — C18.2 CANCER OF RIGHT COLON: ICD-10-CM

## 2023-05-19 PROCEDURE — 25010000002 HEPARIN LOCK FLUSH PER 10 UNITS: Performed by: INTERNAL MEDICINE

## 2023-05-19 PROCEDURE — 96365 THER/PROPH/DIAG IV INF INIT: CPT

## 2023-05-19 RX ORDER — SODIUM CHLORIDE 0.9 % (FLUSH) 0.9 %
10 SYRINGE (ML) INJECTION AS NEEDED
OUTPATIENT
Start: 2023-05-19

## 2023-05-19 RX ORDER — HEPARIN SODIUM (PORCINE) LOCK FLUSH IV SOLN 100 UNIT/ML 100 UNIT/ML
500 SOLUTION INTRAVENOUS AS NEEDED
OUTPATIENT
Start: 2023-05-19

## 2023-05-19 RX ORDER — SODIUM CHLORIDE 0.9 % (FLUSH) 0.9 %
10 SYRINGE (ML) INJECTION AS NEEDED
Status: DISCONTINUED | OUTPATIENT
Start: 2023-05-19 | End: 2023-05-19 | Stop reason: HOSPADM

## 2023-05-19 RX ORDER — HEPARIN SODIUM (PORCINE) LOCK FLUSH IV SOLN 100 UNIT/ML 100 UNIT/ML
500 SOLUTION INTRAVENOUS AS NEEDED
Status: DISCONTINUED | OUTPATIENT
Start: 2023-05-19 | End: 2023-05-19 | Stop reason: HOSPADM

## 2023-05-19 RX ADMIN — Medication 10 ML: at 14:57

## 2023-05-19 RX ADMIN — SODIUM CHLORIDE 1000 ML: 9 INJECTION, SOLUTION INTRAVENOUS at 13:51

## 2023-05-19 RX ADMIN — HEPARIN 500 UNITS: 100 SYRINGE at 14:58

## 2023-05-25 ENCOUNTER — TELEPHONE (OUTPATIENT)
Dept: ONCOLOGY | Facility: CLINIC | Age: 53
End: 2023-05-25

## 2023-05-25 NOTE — TELEPHONE ENCOUNTER
Caller: Amber Feng    Relationship: Self    Best call back number: [phone]    What is the best time to reach you: ASAP    Who are you requesting to speak with (clinical staff, provider,  specific staff member): SCHEDULING    What was the call regarding: PT NEEDS TO RESCHEDULE THE FOLLOW UP WITH DR ORTA FOR AFTER THE CT SCAN WHICH IS NOW June 1ST.  ALSO, IS THE INFUSION APPT NEEDED AS WELL?  THE PT THOUGHT SHE WAS FINISHED WITH THOSE.    Do you require a callback: YES PLEASE CALL PT BACK TO ADVISE AND RESCHEDULE.

## 2023-05-26 ENCOUNTER — TELEPHONE (OUTPATIENT)
Dept: ONCOLOGY | Facility: CLINIC | Age: 53
End: 2023-05-26

## 2023-05-26 NOTE — TELEPHONE ENCOUNTER
Caller: Amber Feng    Relationship: Self    Best call back number: [phone]    What is the best time to reach you: ASAP    Who are you requesting to speak with (clinical staff, provider,  specific staff member): SCHEDULING    What was the call regarding: PT CALLED AND IS REQUESTING A CALL BACK TO DISCUSS HER APPOINTMENTS, SHE STATES SHE WAS ONLY TO HAVE 12 INFUSIONS AND HER 12TH ONE WAS ON 5-17, SHE IS SCHEDULED AGAIN FOR INFUSION ON 5-31 AND CT SCAN ON 6-1 PLEASE CALL PT TO CONFIRM IF SHE HAS TO HAVE THE 13TH INFUSION OR IS THIS A PLACE HAY    Do you require a callback: YES

## 2023-05-30 ENCOUNTER — TELEPHONE (OUTPATIENT)
Dept: ONCOLOGY | Facility: CLINIC | Age: 53
End: 2023-05-30

## 2023-05-30 NOTE — TELEPHONE ENCOUNTER
[facility] received a fax that requires your attention. The document has been indexed to the patient’s chart for your review.      Reason for sending: POSITIVE COLON PATH       Name of Sender: SIGNATERA     Date Indexed :05-17-23    Notes (if needed):INDEXED UNDER PATH 5-17-23.    THANK YOU

## 2023-06-01 ENCOUNTER — HOSPITAL ENCOUNTER (OUTPATIENT)
Dept: CT IMAGING | Facility: HOSPITAL | Age: 53
Discharge: HOME | End: 2023-06-01

## 2023-06-01 DIAGNOSIS — C18.9 METASTATIC COLON CANCER TO LIVER: ICD-10-CM

## 2023-06-01 DIAGNOSIS — C78.7 METASTATIC COLON CANCER TO LIVER: ICD-10-CM

## 2023-06-01 PROCEDURE — 71260 CT THORAX DX C+: CPT

## 2023-06-01 PROCEDURE — 25510000001 IOPAMIDOL 61 % SOLUTION: Performed by: NURSE PRACTITIONER

## 2023-06-01 PROCEDURE — 74177 CT ABD & PELVIS W/CONTRAST: CPT

## 2023-06-01 RX ADMIN — IOPAMIDOL 100 ML: 612 INJECTION, SOLUTION INTRAVENOUS at 10:20

## 2023-06-06 NOTE — PROGRESS NOTES
DATE OF VISIT: 6/7/2023    REASON FOR VISIT: Followup for metastatic right-sided colon cancer     PROBLEM LIST:  1. Metastatic colon cancer TX NX M1 a stage [place]:  A.  Presented with abdominal pain and jaundice  B.  CT abdomen pelvis done November 04, 2022 confirmed diffuse liver metastases.  C.  Diagnosed after CT-guided liver biopsy done on November 22, 2022 confirming adenocarcinoma CK20 positive CK7 negative.  D.  Colonoscopy done December 08, 2020 to confirm transverse colon mass however biopsy revealed adenoma.  E.  Started palliative chemotherapy with dose adjusted FOLFOXIRI December 14, 2022, status post 12 cycles completed May 2023.  F.  Started maintenance Xeloda 1000 mg twice daily 1 week on 1 week off June 2023  2.  Elevated liver enzymes:  A.  Induced by metastatic disease  3.  Cancer-related pain  4.  Anxiety  5.  Hypertension    HISTORY OF PRESENT ILLNESS: The patient is a very pleasant 52 y.o. female with past medical history significant for metastatic right-sided colon cancer diagnosed November 22, 2022.  Started on palliative chemotherapy with dose adjusted FOLFOXIRI.  She completed 12 cycles May 2023.  She was started on maintenance Xeloda 1000 mg twice daily June 2023.  The patient is here today for scheduled follow-up visit with maintenance treatment cycle number 1.    SUBJECTIVE: Amber is here today with her .  She is excited to be done with chemotherapy.  She is anxious about starting maintenance treatment.  She is having heartburn.      Past History:  Medical History: has a past medical history of Anxiety, Cancer, Ear piercing, Gallstones, History of irritable bowel syndrome, Hypertension, Seasonal allergies, Tattoos, and Wears glasses.   Surgical History: has a past surgical history that includes postpartum tubal ligation; Dilation and curettage of uterus (2013); Tumor removal (2013); Portacath placement (N/A, 12/8/2022); and Colonoscopy (N/A, 12/8/2022).   Family History: family  "history is not on file.   Social History: reports that she has never smoked. She has never used smokeless tobacco. She reports current alcohol use. She reports that she does not use drugs.    (Not in a hospital admission)     Allergies: Losartan, Valium [diazepam], and Citrus     Review of Systems   Constitutional:  Positive for fatigue.   Gastrointestinal:  Positive for diarrhea.   Musculoskeletal:  Positive for arthralgias.   Neurological:  Positive for tremors.   Psychiatric/Behavioral:  The patient is nervous/anxious.    All other systems reviewed and are negative.    PHYSICAL EXAMINATION:   /80   Pulse 85   Temp 98.9 °F (37.2 °C) (Infrared)   Resp 16   Ht 157.5 cm (62.01\")   Wt 58.1 kg (128 lb)   SpO2 99%   BMI 23.41 kg/m²    Pain Score    06/07/23 1430   PainSc: 0-No pain        ECOG score: 0            ECOG Performance Status: 1 - Symptomatic but completely ambulatory      General Appearance:      alert, cooperative, no apparent distress and appears stated age   Lungs:   Clear to auscultation bilaterally; respirations regular, even, and unlabored bilaterally   Heart:  Regular rate and rhythm, no murmurs appreciated   Abdomen:   Soft, non-tender, non-distended, and no organomegaly                 No visits with results within 2 Week(s) from this visit.   Latest known visit with results is:   Infusion on 05/17/2023   Component Date Value Ref Range Status    CEA 05/17/2023 4.15  ng/mL Final    Glucose 05/17/2023 102 (H)  65 - 99 mg/dL Final    BUN 05/17/2023 6  6 - 20 mg/dL Final    Creatinine 05/17/2023 0.60  0.57 - 1.00 mg/dL Final    Sodium 05/17/2023 141  136 - 145 mmol/L Final    Potassium 05/17/2023 3.4 (L)  3.5 - 5.2 mmol/L Final    Chloride 05/17/2023 107  98 - 107 mmol/L Final    CO2 05/17/2023 23.6  22.0 - 29.0 mmol/L Final    Calcium 05/17/2023 9.1  8.6 - 10.5 mg/dL Final    Total Protein 05/17/2023 6.8  6.0 - 8.5 g/dL Final    Albumin 05/17/2023 3.5  3.5 - 5.2 g/dL Final    ALT (SGPT) " 05/17/2023 72 (H)  1 - 33 U/L Final    AST (SGOT) 05/17/2023 98 (H)  1 - 32 U/L Final    Alkaline Phosphatase 05/17/2023 369 (H)  39 - 117 U/L Final    Total Bilirubin 05/17/2023 0.6  0.0 - 1.2 mg/dL Final    Globulin 05/17/2023 3.3  gm/dL Final    A/G Ratio 05/17/2023 1.1  g/dL Final    BUN/Creatinine Ratio 05/17/2023 10.0  7.0 - 25.0 Final    Anion Gap 05/17/2023 10.4  5.0 - 15.0 mmol/L Final    eGFR 05/17/2023 108.2  >60.0 mL/min/1.73 Final    WBC 05/17/2023 2.53 (L)  3.40 - 10.80 10*3/mm3 Final    RBC 05/17/2023 2.93 (L)  3.77 - 5.28 10*6/mm3 Final    Hemoglobin 05/17/2023 9.2 (L)  12.0 - 15.9 g/dL Final    Hematocrit 05/17/2023 28.1 (L)  34.0 - 46.6 % Final    MCV 05/17/2023 95.9  79.0 - 97.0 fL Final    MCH 05/17/2023 31.4  26.6 - 33.0 pg Final    MCHC 05/17/2023 32.7  31.5 - 35.7 g/dL Final    RDW 05/17/2023 17.4 (H)  12.3 - 15.4 % Final    RDW-SD 05/17/2023 60.9 (H)  37.0 - 54.0 fl Final    MPV 05/17/2023 10.5  6.0 - 12.0 fL Final    Platelets 05/17/2023 116 (L)  140 - 450 10*3/mm3 Final    Neutrophil % 05/17/2023 33.7 (L)  42.7 - 76.0 % Final    Lymphocyte % 05/17/2023 45.8 (H)  19.6 - 45.3 % Final    Monocyte % 05/17/2023 15.0 (H)  5.0 - 12.0 % Final    Eosinophil % 05/17/2023 5.1  0.3 - 6.2 % Final    Basophil % 05/17/2023 0.0  0.0 - 1.5 % Final    Immature Grans % 05/17/2023 0.4  0.0 - 0.5 % Final    Neutrophils, Absolute 05/17/2023 0.85 (L)  1.70 - 7.00 10*3/mm3 Final    Lymphocytes, Absolute 05/17/2023 1.16  0.70 - 3.10 10*3/mm3 Final    Monocytes, Absolute 05/17/2023 0.38  0.10 - 0.90 10*3/mm3 Final    Eosinophils, Absolute 05/17/2023 0.13  0.00 - 0.40 10*3/mm3 Final    Basophils, Absolute 05/17/2023 0.00  0.00 - 0.20 10*3/mm3 Final    Immature Grans, Absolute 05/17/2023 0.01  0.00 - 0.05 10*3/mm3 Final    nRBC 05/17/2023 0.0  0.0 - 0.2 /100 WBC Final        CT Chest With Contrast Diagnostic    Result Date: 6/1/2023  Narrative: PROCEDURE: CT CHEST W CONTRAST DIAGNOSTIC-  HISTORY: f/u scans;  C18.9-Malignant neoplasm of colon, unspecified; C78.7-Secondary malignant neoplasm of liver and intrahepatic bile duct  COMPARISON: None.  PROCEDURE: The patient was injected with IV contrast.  Axial images were obtained from the lung apex to the mid abdomen by computed tomography. This study was performed with techniques to keep radiation doses as low as reasonably achievable, (ALARA). Individualized dose reduction techniques using automated exposure control or adjustment of mA and/or kV according to the patient size were employed.  FINDINGS:  CHEST: There is no suspicious axillary adenopathy. Right subclavian port appears stable from prior exam. There is no suspicious hilar or mediastinal adenopathy.  The heart is proper size. There is no pericardial or pleural effusion.  No suspicious infiltrate or nodule identified.  Limited images of the upper abdomen partially demonstrate hepatic metastases which appear mildly decreased in size compared to the prior.      Impression: No evidence of metastatic disease in chest.    This report was signed and finalized on 6/1/2023 3:43 PM by Christine Dale MD.    CT Abdomen Pelvis With Contrast    Result Date: 6/1/2023  Narrative: PROCEDURE: CT ABDOMEN PELVIS W CONTRAST-  HISTORY: f/u scans; C18.9-Malignant neoplasm of colon, unspecified; C78.7-Secondary malignant neoplasm of liver and intrahepatic bile duct  COMPARISON: 03/06/2023.  PROCEDURE: The patient was injected with IV contrast. Oral contrast was administered. Axial images were obtained from the lung bases to the pubic symphysis by computed tomography. This study was performed with techniques to keep radiation doses as low as reasonably achievable, (ALARA). Individualized dose reduction techniques using automated exposure control or adjustment of mA and/or kV according to the patient size were employed.  FINDINGS:  ABDOMEN: The lung bases are clear. The heart is proper size. The liver again demonstrates lesions of mixed  attenuation. Overall these are mildly decreased in size compared to the prior exam. Gallbladder present with mild wall thickening and infiltration of the surrounding fat, mildly worsened from prior exam. Mild wall thickening of the adjacent duodenum and distal stomach again noted. There is also wall thickening of the adjacent hepatic flexure, new from the prior study. This may represent posttreatment change. The spleen enlarged at 14.3 cm, not significantly changed from the prior exam. No adrenal mass is present. The pancreas is unremarkable. The kidneys are unremarkable, without evidence of mass or hydronephrosis. The aorta is proper caliber. Mildly increased attenuation of the periaortic and central mesenteric fat similar to the prior exam. There is no free fluid or adenopathy.  PELVIS: The GI tract demonstrates no obstruction.  The appendix is identified and appears normal. Uterus is midline. There is a small amount of adjacent free fluid, new from the prior exam. Wall thickening of the sigmoid colon is similar to prior. There is new wall thickening of the descending transverse and distal half of the ascending colon. No suspicious mesenteric adenopathy seen. No suspicious pelvic or groin adenopathy identified. The urinary bladder is unremarkable. There is no free fluid, adenopathy, or inflammatory process. No bony blastic or destructive lesion seen.      Impression: Mild interval improvement in hepatic metastasis compared to the prior.  Stable wall thickening of the sigmoid colon but new involvement of the descending, transverse and distal ascending colon suggesting colitis.  New, minimal pelvic ascites, etiology unclear.  This report was signed and finalized on 6/1/2023 4:33 PM by Christine Dale MD.       ASSESSMENT: The patient is a very pleasant 52 y.o. female  with right-sided metastatic colon cancer      PLAN:    1.  Right-sided metastatic colon cancer:  A.  I we will start maintenance treatment using Xeloda  1000 mg twice a day 1 week on 1 week off.  B.  She will follow-up with us in 4 weeks to evaluate for treatment tolerance.  C.  I will continue to monitor the patient blood work including blood counts kidney function liver function and electrolytes.  D.  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  While the patient may not benefit from immunotherapy however she will be a candidate for EGFR inhibitors down the road.  E.  I did go over her circulating tumor DNA assay from May 17, 2023 and reassured the patient it continues to decline down to 0.44.  I will check this on monthly basis at this point.  F.  I did go over her scan results from June 1, 2023.  I reassured the patient no evidence of new or progressive disease.  Continue to improve liver metastases.  We will do 3 months follow-up study which will be due early September 2023.     2.  Elevated liver enzymes:  A.  Induced by liver metastases  B.  I will continue 25% dose reduction of chemo drugs per  C.  I will repeat labs before next infusion.    3.  Chemotherapy-induced nausea:  A.  I will continue the patient on Zofran as needed for    4.  Chemotherapy-induced diarrhea:  A.  I will continue the patient on Imodium as needed    5. Cancer-related pain:  A.  I will continue the patient oxycodone 5 mg twice a day.     6.  Anxiety:  A.  I will continue the patient on Ativan 1 mg nightly as needed.       7.  Hypertension:  A.  She will continue losartan.  B.  I reviewed with the patient this will interfere with our management since chemotherapy typically can cause hypotension we will have to monitor blood pressure closely.    8.  Dehydration:  A.  I will continue 1 L of IV fluid on day 3 of each chemotherapy cycle.    9.  Chemotherapy-induced anemia:  A.  I explained the patient hemoglobin down to 9.1 on May 3, 2023.  B.  I will continue monitor her CBC prior to each infusion.  C.   I will transfuse as needed for hemoglobin less than 8.  D.  I will continue 25% dose reduction on chemotherapy.    10.  Chemotherapy-induced heartburn:  A.  I will continue pantoprazole 40 mg twice a day.  I will refill today.  B.  I will continue Carafate 1 g every 6 hours    11.  Treatment related diarrhea  A.  Continue Imodium as directed.  We will also add Lomotil to her regimen.  Education sheet was provided to the patient.      FOLLOW UP: 4 weeks with port maintenance and labs.    Total time of patient care including preparation of patient chart prior to arrival, face-to-face with patient and following visit spent in reviewing records, lab results, imaging studies, discussion with patient, and documentation/charting was 40 minutes         Iban Lambert MD  6/7/2023

## 2023-06-07 ENCOUNTER — LAB (OUTPATIENT)
Dept: ONCOLOGY | Facility: HOSPITAL | Age: 53
End: 2023-06-07
Payer: MEDICAID

## 2023-06-07 ENCOUNTER — OFFICE VISIT (OUTPATIENT)
Dept: ONCOLOGY | Facility: CLINIC | Age: 53
End: 2023-06-07
Payer: MEDICAID

## 2023-06-07 VITALS
OXYGEN SATURATION: 99 % | WEIGHT: 128 LBS | RESPIRATION RATE: 16 BRPM | BODY MASS INDEX: 23.55 KG/M2 | SYSTOLIC BLOOD PRESSURE: 172 MMHG | TEMPERATURE: 98.9 F | DIASTOLIC BLOOD PRESSURE: 80 MMHG | HEART RATE: 85 BPM | HEIGHT: 62 IN

## 2023-06-07 DIAGNOSIS — C18.9 METASTATIC COLON CANCER TO LIVER: ICD-10-CM

## 2023-06-07 DIAGNOSIS — C18.2 CANCER OF RIGHT COLON: Primary | ICD-10-CM

## 2023-06-07 DIAGNOSIS — C78.7 METASTATIC COLON CANCER TO LIVER: ICD-10-CM

## 2023-06-07 LAB
ALBUMIN SERPL-MCNC: 3.6 G/DL (ref 3.5–5.2)
ALBUMIN/GLOB SERPL: 1.1 G/DL
ALP SERPL-CCNC: 389 U/L (ref 39–117)
ALT SERPL W P-5'-P-CCNC: 90 U/L (ref 1–33)
ANION GAP SERPL CALCULATED.3IONS-SCNC: 9.3 MMOL/L (ref 5–15)
AST SERPL-CCNC: 124 U/L (ref 1–32)
BASOPHILS # BLD AUTO: 0.02 10*3/MM3 (ref 0–0.2)
BASOPHILS NFR BLD AUTO: 0.6 % (ref 0–1.5)
BILIRUB SERPL-MCNC: 0.6 MG/DL (ref 0–1.2)
BUN SERPL-MCNC: 6 MG/DL (ref 6–20)
BUN/CREAT SERPL: 10.2 (ref 7–25)
CALCIUM SPEC-SCNC: 9.4 MG/DL (ref 8.6–10.5)
CHLORIDE SERPL-SCNC: 106 MMOL/L (ref 98–107)
CO2 SERPL-SCNC: 25.7 MMOL/L (ref 22–29)
CREAT SERPL-MCNC: 0.59 MG/DL (ref 0.57–1)
DEPRECATED RDW RBC AUTO: 54.8 FL (ref 37–54)
EGFRCR SERPLBLD CKD-EPI 2021: 108.6 ML/MIN/1.73
EOSINOPHIL # BLD AUTO: 0.13 10*3/MM3 (ref 0–0.4)
EOSINOPHIL NFR BLD AUTO: 4 % (ref 0.3–6.2)
ERYTHROCYTE [DISTWIDTH] IN BLOOD BY AUTOMATED COUNT: 15.9 % (ref 12.3–15.4)
GLOBULIN UR ELPH-MCNC: 3.4 GM/DL
GLUCOSE SERPL-MCNC: 107 MG/DL (ref 65–99)
HCT VFR BLD AUTO: 28.5 % (ref 34–46.6)
HGB BLD-MCNC: 9.3 G/DL (ref 12–15.9)
IMM GRANULOCYTES # BLD AUTO: 0.01 10*3/MM3 (ref 0–0.05)
IMM GRANULOCYTES NFR BLD AUTO: 0.3 % (ref 0–0.5)
LYMPHOCYTES # BLD AUTO: 1.32 10*3/MM3 (ref 0.7–3.1)
LYMPHOCYTES NFR BLD AUTO: 40.9 % (ref 19.6–45.3)
MCH RBC QN AUTO: 30.4 PG (ref 26.6–33)
MCHC RBC AUTO-ENTMCNC: 32.6 G/DL (ref 31.5–35.7)
MCV RBC AUTO: 93.1 FL (ref 79–97)
MONOCYTES # BLD AUTO: 0.45 10*3/MM3 (ref 0.1–0.9)
MONOCYTES NFR BLD AUTO: 13.9 % (ref 5–12)
NEUTROPHILS NFR BLD AUTO: 1.3 10*3/MM3 (ref 1.7–7)
NEUTROPHILS NFR BLD AUTO: 40.3 % (ref 42.7–76)
NRBC BLD AUTO-RTO: 0 /100 WBC (ref 0–0.2)
PLATELET # BLD AUTO: 165 10*3/MM3 (ref 140–450)
PMV BLD AUTO: 9.2 FL (ref 6–12)
POTASSIUM SERPL-SCNC: 3.1 MMOL/L (ref 3.5–5.2)
PROT SERPL-MCNC: 7 G/DL (ref 6–8.5)
RBC # BLD AUTO: 3.06 10*6/MM3 (ref 3.77–5.28)
SODIUM SERPL-SCNC: 141 MMOL/L (ref 136–145)
WBC NRBC COR # BLD: 3.23 10*3/MM3 (ref 3.4–10.8)

## 2023-06-07 PROCEDURE — 85025 COMPLETE CBC W/AUTO DIFF WBC: CPT

## 2023-06-07 PROCEDURE — 36591 DRAW BLOOD OFF VENOUS DEVICE: CPT

## 2023-06-07 PROCEDURE — 82378 CARCINOEMBRYONIC ANTIGEN: CPT

## 2023-06-07 PROCEDURE — 80053 COMPREHEN METABOLIC PANEL: CPT

## 2023-06-07 PROCEDURE — 25010000002 HEPARIN LOCK FLUSH PER 10 UNITS: Performed by: INTERNAL MEDICINE

## 2023-06-07 RX ORDER — HEPARIN SODIUM (PORCINE) LOCK FLUSH IV SOLN 100 UNIT/ML 100 UNIT/ML
500 SOLUTION INTRAVENOUS AS NEEDED
OUTPATIENT
Start: 2023-06-07

## 2023-06-07 RX ORDER — SODIUM CHLORIDE 0.9 % (FLUSH) 0.9 %
10 SYRINGE (ML) INJECTION AS NEEDED
Status: DISCONTINUED | OUTPATIENT
Start: 2023-06-07 | End: 2023-06-07 | Stop reason: HOSPADM

## 2023-06-07 RX ORDER — ONDANSETRON HYDROCHLORIDE 8 MG/1
8 TABLET, FILM COATED ORAL 3 TIMES DAILY PRN
Qty: 30 TABLET | Refills: 5 | Status: SHIPPED | OUTPATIENT
Start: 2023-06-07

## 2023-06-07 RX ORDER — HEPARIN SODIUM (PORCINE) LOCK FLUSH IV SOLN 100 UNIT/ML 100 UNIT/ML
500 SOLUTION INTRAVENOUS AS NEEDED
Status: DISCONTINUED | OUTPATIENT
Start: 2023-06-07 | End: 2023-06-07 | Stop reason: HOSPADM

## 2023-06-07 RX ORDER — SODIUM CHLORIDE 0.9 % (FLUSH) 0.9 %
10 SYRINGE (ML) INJECTION AS NEEDED
OUTPATIENT
Start: 2023-06-07

## 2023-06-07 RX ADMIN — Medication 10 ML: at 15:25

## 2023-06-07 RX ADMIN — HEPARIN 500 UNITS: 100 SYRINGE at 15:25

## 2023-06-08 ENCOUNTER — SPECIALTY PHARMACY (OUTPATIENT)
Dept: ONCOLOGY | Facility: HOSPITAL | Age: 53
End: 2023-06-08
Payer: MEDICAID

## 2023-06-08 DIAGNOSIS — C78.7 METASTATIC COLON CANCER TO LIVER: Primary | ICD-10-CM

## 2023-06-08 DIAGNOSIS — C18.9 METASTATIC COLON CANCER TO LIVER: Primary | ICD-10-CM

## 2023-06-08 LAB — CEA SERPL-MCNC: 3.45 NG/ML

## 2023-06-08 RX ORDER — CAPECITABINE 500 MG/1
1000 TABLET, FILM COATED ORAL 2 TIMES DAILY
Qty: 56 TABLET | Refills: 5 | Status: SHIPPED | OUTPATIENT
Start: 2023-06-13

## 2023-06-08 NOTE — PROGRESS NOTES
Specialty Pharmacy Note - Double Check    Dose calculation checked.    Drug: capecitabine (Xeloda)  Strength: 500 mg  Directions: Take 2 tablets by mouth twice daily for 7 days on, then 7 days off, then 7 days on, then 7 days off on a 28 day cycle  QTY: 56  RF:5    Released to pharmacy: [facility] - [place]      Completed independent double check on medication order/RX.

## 2023-06-08 NOTE — PROGRESS NOTES
Oral Chemotherapy - New Referral    Received a referral from Dr. Lambert    Treatment Plan: Xeloda (capecitabine)  Start date of treatment planned for: As soon as oral specialty medication is available.  Indication: metastatic colon cancer  Relevant past treatments: FOLFOXIRI x 12 cycles (12/2022 - present)  Is the therapy appropriate based on treatment guidelines and FDA labeling?: yes  Therapeutic Goals: Continue treatment until progression or intolerable toxicity  Patient can self-administer oral medications: Yes    Drug-Drug Interactions: The current medication list was reviewed and there are some relevant drug-drug interactions with the oral specialty medication, including Protonix and Prilosec may decrease therapeutic effect of capecitabine. Patient has been experiencing severe heartburn.  This will be discussed during education.  Medication Allergies: The patient has no relevant allergies as it relates to their oral specialty medication  Review of Labs/Dose Adjustments: The patient's most recent labs were reviewed and all are WNL to start treatment at this dose.   Capecitabine 850mg/m2 x 1.58 = 1343 mg (rounding down to 1000mg)    A prescription was released to [facility] for   Drug: Xeloda (capecitabine)  Strength: 500 mg  Directions: Take 2 tablets by mouth twice a day one week on, one week off, one week on, one week off for a 28-day cycle  Quantity: 56  Refills: 5    Pharmacy education is scheduled for 6/12/23., [facility] and consent will be signed at that time.    Stephani Ortega PharmD, [facility]  Clinical Oncology Pharmacy Specialist  Phone: [phone]    6/8/2023  10:07 EDT

## 2023-06-12 ENCOUNTER — HOSPITAL ENCOUNTER (OUTPATIENT)
Dept: ONCOLOGY | Facility: HOSPITAL | Age: 53
Discharge: HOME | End: 2023-06-12
Payer: MEDICAID

## 2023-06-12 ENCOUNTER — SPECIALTY PHARMACY (OUTPATIENT)
Dept: ONCOLOGY | Facility: HOSPITAL | Age: 53
End: 2023-06-12
Payer: MEDICAID

## 2023-06-12 NOTE — PROGRESS NOTES
Specialty Pharmacy Patient Management Program  Oncology Initial Assessment       Amber Feng is a 52 y.o. female with colon cancer seen by an Oncology provider and enrolled in the Oncology Patient Management program offered by [facility].  An initial outreach was conducted, including assessment of therapy appropriateness and specialty medication education for capecitabine. The patient was introduced to services offered by [facility], including: regular assessments, refill coordination, curbside pick-up or mail order delivery options, prior authorization maintenance, and financial assistance programs as applicable. The patient was also provided with contact information for the pharmacy team.     Regimen: Capecitabine 1000mg PO BID 1 week on, 1 week off, 1 week on, 1 week off for a 28-day cycle    Start date of oral specialty medication:  6/13/2023    Relevant Past Medical History, Comorbidities, and Vaccines  Relevant medical history and concomitant health conditions were discussed with the patient. The patient's chart has been reviewed for relevant past medical history and comorbid health conditions and updated as necessary.  Vaccines are coordinated by the patient's oncologist and primary care provider.  Past Medical History:   Diagnosis Date    Anxiety     Cancer     Ear piercing     Gallstones     History of irritable bowel syndrome     Hypertension     pt states secondary to pain    Seasonal allergies     Tattoos     Wears glasses      Social History     Socioeconomic History    Marital status:    Tobacco Use    Smoking status: Never    Smokeless tobacco: Never   Vaping Use    Vaping Use: Never used   Substance and Sexual Activity    Alcohol use: Yes     Comment: rarely    Drug use: Never    Sexual activity: Defer       Allergies  Known allergies and reactions were discussed with the patient. The patient's chart has been reviewed for allergy  "information and updated as necessary.   Allergies   Allergen Reactions    Losartan Nausea Only, Other (See Comments) and Headache     Pt states within an hour after use blood pressure would \"skyrocket\" also nausea - states diastolic pressures in 100's    Valium [Diazepam] Anaphylaxis    Citrus GI Intolerance     Especially oranges        Current Medication List  This medication list has been reviewed with the patient and evaluated for any interactions or necessary modifications/recommendations, and updated to include all prescription medications, OTC medications, and supplements the patient is currently taking.  This list reflects what is contained in the patient's profile, which has also been marked as reviewed to communicate to other providers it is the most up to date version of the patient's current medication therapy.   Prior to Admission medications    Medication Sig Start Date End Date Taking? Authorizing Provider   albuterol sulfate  (90 Base) MCG/ACT inhaler Inhale 2 puffs Every 4 (Four) Hours As Needed for Shortness of Air (chest tightness). 2/3/22   Kayleigh Mooney PA   capecitabine (XELODA) 500 MG chemo tablet Take 2 tablets by mouth 2 times daily for: 1 week on, 1 week off, 1 week on, 1 week off as directed for a 28-day cycle 6/13/23   Iban Lambert MD   diphenoxylate-atropine (LOMOTIL) 2.5-0.025 MG per tablet Take 1 tablet by mouth 4 (Four) Times a Day As Needed for Diarrhea. 2/22/23   Ana Cristina Cano APRN   HYDROcodone-acetaminophen (NORCO) 5-325 MG per tablet Take 1-2 tablets by mouth Every 4 (Four) Hours As Needed For Pain 12/8/22   Sea Valentin MD   lidocaine-prilocaine (EMLA) 2.5-2.5 % cream Apply 1 application topically to the appropriate area as directed As Needed (45-60 minutes prior to port access.  Cover with saran/plastic wrap.). 5/12/23   Iban Lambert MD   LORazepam (Ativan) 1 MG tablet Take 1 tablet by mouth every night at bedtime. 12/5/22   Iban Lambert MD "   multivitamin with minerals tablet tablet Take 1 tablet by mouth Daily.    ProviderJamari MD   magic mouthwash oral suspension Swish and Spit or Swallow 5-10 mL 4 (Four) Times a Day As Needed. 4/18/23   Ana Cristina Cano APRN   omeprazole (priLOSEC) 40 MG capsule Take 1 capsule by mouth Daily.  Patient not taking: Reported on 4/18/2023    ProviderJamari MD   ondansetron (ZOFRAN) 8 MG tablet Take 1 tablet by mouth 3 (Three) Times a Day As Needed for Nausea or Vomiting. 6/7/23   Iban Lambert MD   ondansetron ODT (ZOFRAN-ODT) 8 MG disintegrating tablet Place 1 tablet on the tongue Every 8 (Eight) Hours As Needed for Nausea or Vomiting. 2/22/23   Ana Cristina Cano APRN   oxyCODONE (ROXICODONE) 5 MG immediate release tablet Take 1 tablet by mouth 2 (Two) Times a Day As Needed for Moderate Pain. 12/5/22   Iban Lambert MD   pantoprazole (PROTONIX) 40 MG EC tablet Take 1 tablet by mouth Daily. 4/18/23   Ana Cristina Cano APRN   potassium chloride (K-DUR,KLOR-CON) 10 MEQ CR tablet Take 2 tablets by mouth once daily 4/19/23   Ana Cristina Cano APRN   prochlorperazine (COMPAZINE) 10 MG tablet Take 1 tablet by mouth Every 6 (Six) Hours As Needed for Nausea or Vomiting. 12/15/22   Ana Cristina Cano APRN   sucralfate (Carafate) 1 GM/10ML suspension Take 10 mL by mouth 4 (Four) Times a Day. 12/30/22   Iban Lambert MD       Drug Interactions  Reviewed concomitant medications, allergies, labs, comorbidities/medical history, quality of life, and immunization history.   Drug-drug interactions noted and discussed during education: no significant drug interactions noted. . Reminded the patient to let us know before making any changes or starting any new prescription or OTC medications so we can first assess drug interactions.  Drug-food interactions noted and discussed during education.     Relevant Laboratory Values  Lab Results   Component Value Date    GLUCOSE 107 (H) 06/07/2023    CALCIUM 9.4 06/07/2023      06/07/2023    K 3.1 (L) 06/07/2023    CO2 25.7 06/07/2023     06/07/2023    BUN 6 06/07/2023    CREATININE 0.59 06/07/2023    BCR 10.2 06/07/2023    ANIONGAP 9.3 06/07/2023     Lab Results   Component Value Date    WBC 3.23 (L) 06/07/2023    RBC 3.06 (L) 06/07/2023    HGB 9.3 (L) 06/07/2023    HCT 28.5 (L) 06/07/2023    MCV 93.1 06/07/2023    MCH 30.4 06/07/2023    MCHC 32.6 06/07/2023    RDW 15.9 (H) 06/07/2023    RDWSD 54.8 (H) 06/07/2023    MPV 9.2 06/07/2023     06/07/2023    NEUTRORELPCT 40.3 (L) 06/07/2023    LYMPHORELPCT 40.9 06/07/2023    MONORELPCT 13.9 (H) 06/07/2023    EOSRELPCT 4.0 06/07/2023    BASORELPCT 0.6 06/07/2023    AUTOIGPER 0.3 06/07/2023    NEUTROABS 1.30 (L) 06/07/2023    LYMPHSABS 1.32 06/07/2023    MONOSABS 0.45 06/07/2023    EOSABS 0.13 06/07/2023    BASOSABS 0.02 06/07/2023    AUTOIGNUM 0.01 06/07/2023    NRBC 0.0 06/07/2023       The above labs have been reviewed. No dose adjustments are needed for the oral specialty medication(s) based on the labs.    Initial Education Provided for Specialty Medication  The patient has been provided with the following education. All questions and concerns have been addressed prior to the patient receiving the medication, and the patient has verbalized understanding of the education and any materials provided.  Additional patient education shall be provided and documented upon request by the patient, provider or payer.      Provided patient with:   Chemo calendar to help improve adherence., Education sheets about the medication, 24-hour clinic phone number and my contact information and instructions to call should additional questions arise.     Medication Education Sheets Provided: (select all that apply)  Oral Specialty Medication: Xeloda (capecitabine)  IV:  none  Steroid: None    Other Education Sheets Provided: (select all that apply)  Adherence, CINV, Diarrhea, Hand-Foot Syndrome, and Symptom Tracker Sheet and CHARLA Information    TOPICS  COMMENTS   Storage and Handling of Oral Specialty Medication Store in the original container, in a dry location out of direct sunlight, and out of reach of children or pets. and Store at room temperature.  Discussed safe handling and what to do with any unused medication.   Administration of Oral Specialty Medication Take with food at the same time(s) each day., Take with a full glass of water., and Do not crush or chew tablets.   Adherence to Oral Specialty Regimen and Handling Missed Doses Patient is likely to have good treatment adherence; reinforced the importance of adherence. Reviewed how to address missed doses and encouraged the patient to let us know of any missed doses.   Anemia: role of RBC, cause, s/s, ways to manage, role of transfusion Reviewed the role of RBC and the use of transfusions if hemoglobin decreases too much.  Patient to notify us if she experiences shortness of breath, dizziness, or palpitations.  Also let patient know that she could feel more tired than usual and to try to stay active, but rest if she needs to.    Thrombocytopenia: role of platelet, cause, s/s, ways to prevent bleeding, things to avoid, when to seek help Reviewed the role of platelets in blood clotting and when to call clinic (bloody nose that bleeds for 5 minutes despite pressure, a cut that won't stop bleeding despite pressure, gums that bleed excessively with brushing or flossing). Recommended using an electric razor, soft bristle toothbrush, and blowing your nose gently.    Neutropenia: role of WBC, cause, infection precautions, s/s of infection, when to call MD Reviewed the role of WBC, good infection prevention practices, and when to call the clinic (temperature 100.4F, sore throat, burning urination, etc).  COVID Vaccines: No doses received  Flu Vaccine: 2022 flu vaccine received   Nutrition and Appetite Changes:  importance of maintaining healthy diet & weight, ways to manage to improve intake, dietary consult,  exercise regimen, electrolyte and/or blood glucose abnormalities Decreased Appetite: Discussed the risk of decreased appetite. Recommended eating smaller, more frequent meals. Instructed the patient to contact the clinic if losing weight or having difficulty eating enough to maintain energy level.   Diarrhea: causes, s/s of dehydration, ways to manage, dietary changes, when to call MD Chemotherapy : Discussed the risk of diarrhea. Instructed patient on use OTC loperamide with diarrhea onset, but emphasized the importance of calling the clinic if 4-6 episodes in 24 hours not relieved by OTC loperamide.   Constipation: causes, ways to manage, dietary changes, when to call MD Provided supplementary handout with instructions for use of docusate and other OTC therapies to manage constipation.  Patient was instructed to call us if medications aren't working.   Nausea/Vomiting: cause, use of antiemetics, dietary changes, when to call MD Emetic risk: Low-Minimal  Premeds: none  Scheduled meds: none  PRN home meds: Ondansetron  Pharmacy home meds sent to: Patient picked up ondansetron from [facility] in [place]    Instructed the patient to take a dose of the PRN medication at the first onset of nausea and if it's not working to call us for additional medications.  Also provided non-drug measures to mitigate nausea.   Mouth Sores: causes, oral care, ways to manage Mouth sores can be prevented by making a mouth wash mixture of salt, baking soda, and water. The patient was instructed to swish and spit four times daily after meals and before bedtime.  Use of a soft bristle toothbrush was recommended.  The patient was instructed to avoid alcohol-containing OTC mouthwashes.    Alopecia: cause, ways to manage, resources Discussed the possibility of hair loss with the patient. Informed patient that she could request a prescription for a wig if desired and most of the cost is usually covered by insurance. Recommended covering the head  with a hat and/or protecting the skin on the head with SPF 30 or higher.    Nervous System Changes: causes, s/s, neuropathies, cognitive changes, ways to manage    Pain: causes, ways to manage Chemo: Discussed muscle and joint aches/pains with chemotherapy, and recommended the use of OTC pain relief with ibuprofen or acetaminophen if needed.   Skin/Nail Changes: cause, s/s, ways to manage Hand-foot syndrome was discussed, including the s/s, prevention measures, and treatment measures. A supplementary handout with this information was also given to the patient.    Organ Toxicities: cause, s/s, need for diagnostic tests, labs, when to notify MD Discussed potential effects on organ systems, monitoring, diagnostic tests, labs, and when to notify the MD. Discussed the signs/symptoms of the following: cardiotoxicity, hepatotoxicity, nephrotoxicity, and skin changes.   Miscellaneous Financial Issues: Patient has a copay of $0 from [employer]. Patient will  after pharmacy education session. Herminia Smith, Patient Care Coordinator, met with patient today.  Lab Draws: On or before day 1 of each cycle, no sooner than 3 days early.   Infertility and Sexuality:  causes, fertility preservation options, sexuality changes, ways to manage, importance of birth control Oral Oncology Therapy: Reviewed safe sex practices and the importance of minimizing exposure to body fluids while on oral oncology therapy., The patient is not of childbearing potential.   Home Care: how to manage bodily fluids Counseled on management of soiled linens and proper flush technique.  Discussed how to manage all the side effects at home and advised when to contact the MD office   Survivorship: distress, distress assessment, secondary malignancies, early/late effects, follow-up, social issues, social support      Adherence and Self-Administration  Barriers to Patient Adherence and/or Self-Administration: no barriers identified  Methods for Supporting  Patient Adherence and/or Self-Administration: dosing calendar  Expected duration of therapy: Until disease progression or intolerable toxicity    Goals of Therapy  Patient Goals of Therapy:   Consistently take medications as prescribed  Patient will adhere to medication regimen  Patient will report any medication side effects to healthcare provider  Clinical Goals:    Goals         Specialty Pharmacy General Goal (pt-stated)       Clinical goal/therapeutic target: disease control              Support patient understanding of medication regimen  Ensure patient knows the pharmacy contact information  Schedule regular follow-up to monitor the treatment serious adverse events  Schedule regular follow-up to confirm medication adherence  Schedule regular follow-up to monitor disease progression or stability    Reassessment Plan & Follow-Up  Pharmacist to perform regular reassessments no more than (6) months from the previous assessment.  Welcome information and patient satisfaction survey to be sent by retail team with patient's initial fill.  Care Coordinator to set up future refill outreaches, coordinate prescription delivery, and escalate clinical questions to pharmacist.     Additional Plans, Therapy Recommendations or Therapy Problems to Be Addressed: no further concerns     Attestation  I attest that the initiated specialty medication(s) are appropriate for the patient based on my assessment.  If the prescribed therapy is at any point deemed not appropriate based on the current or future assessments, a consultation will be initiated with the patient's specialty care provider to determine the best course of action. The revised plan of therapy will be documented along with any additional patient education provided.     Stephani Ortega PharmD, BCOP  Clinical Oncology Pharmacy Specialist  Phone: [phone]      Date and Time: 6/12/2023  09:54 EDT

## 2023-07-25 ENCOUNTER — SPECIALTY PHARMACY (OUTPATIENT)
Dept: ONCOLOGY | Facility: HOSPITAL | Age: 53
End: 2023-07-25
Payer: MEDICAID

## 2023-07-25 NOTE — PROGRESS NOTES
Specialty Pharmacy Refill Coordination Note     Amber is a 52 y.o. female contacted today regarding refills of  capecitabine 1000mg PO BID for 1 week on, then 1 week off, then 1 week on and 1 week off of 28 day cycle  specialty medication(s).      Specialty medication(s) and dose(s) confirmed: yes    Refill Questions      Flowsheet Row Most Recent Value   Changes to allergies? No   Changes to medications? No   New conditions since last clinic visit No   Unplanned office visit, urgent care, ED, or hospital admission in the last 4 weeks  No   How does patient/caregiver feel medication is working? Good   Financial problems or insurance changes  No   Since the previous refill, were any specialty medication doses or scheduled injections missed or delayed?  No   Does this patient require a clinical escalation to a pharmacist? No            Delivery Questions      Flowsheet Row Most Recent Value   Delivery method [employer]   Delivery address correct? Yes   Delivery phone number [phone]   Preferred delivery time? Anytime   Number of medications in delivery 1   Medication being filled and delivered capecitabine   Doses left of specialty medications 3 days left and then off week   Is there any medication that is due not being filled? No   Supplies needed? No supplies needed   Cooler needed? No   Do any medications need mixed or dated? No   Additional comments no copay   Questions or concerns for the pharmacist? No   Explain any questions or concerns for the pharmacist n/a   Are any medications first time fills? No                   Follow-up: 28 day(s)     Herminia Smith, Pharmacy Technician  Specialty Pharmacy Technician

## 2023-08-17 ENCOUNTER — SPECIALTY PHARMACY (OUTPATIENT)
Dept: ONCOLOGY | Facility: HOSPITAL | Age: 53
End: 2023-08-17
Payer: MEDICAID

## 2023-08-17 ENCOUNTER — OFFICE VISIT (OUTPATIENT)
Dept: ONCOLOGY | Facility: CLINIC | Age: 53
End: 2023-08-17
Payer: MEDICAID

## 2023-08-17 ENCOUNTER — LAB (OUTPATIENT)
Dept: ONCOLOGY | Facility: HOSPITAL | Age: 53
End: 2023-08-17
Payer: MEDICAID

## 2023-08-17 VITALS
BODY MASS INDEX: 25.76 KG/M2 | TEMPERATURE: 98.2 F | HEIGHT: 62 IN | SYSTOLIC BLOOD PRESSURE: 145 MMHG | HEART RATE: 80 BPM | WEIGHT: 140 LBS | OXYGEN SATURATION: 99 % | DIASTOLIC BLOOD PRESSURE: 70 MMHG | RESPIRATION RATE: 16 BRPM

## 2023-08-17 DIAGNOSIS — C18.2 CANCER OF RIGHT COLON: Primary | ICD-10-CM

## 2023-08-17 DIAGNOSIS — C78.7 METASTATIC COLON CANCER TO LIVER: Primary | ICD-10-CM

## 2023-08-17 DIAGNOSIS — C18.9 METASTATIC COLON CANCER TO LIVER: Primary | ICD-10-CM

## 2023-08-17 LAB
ALBUMIN SERPL-MCNC: 4.2 G/DL (ref 3.5–5.2)
ALBUMIN/GLOB SERPL: 1.4 G/DL
ALP SERPL-CCNC: 195 U/L (ref 39–117)
ALT SERPL W P-5'-P-CCNC: 24 U/L (ref 1–33)
ANION GAP SERPL CALCULATED.3IONS-SCNC: 9.8 MMOL/L (ref 5–15)
AST SERPL-CCNC: 39 U/L (ref 1–32)
BASOPHILS # BLD AUTO: 0.03 10*3/MM3 (ref 0–0.2)
BASOPHILS NFR BLD AUTO: 0.6 % (ref 0–1.5)
BILIRUB SERPL-MCNC: 0.6 MG/DL (ref 0–1.2)
BUN SERPL-MCNC: 12 MG/DL (ref 6–20)
BUN/CREAT SERPL: 15.8 (ref 7–25)
CALCIUM SPEC-SCNC: 9.4 MG/DL (ref 8.6–10.5)
CHLORIDE SERPL-SCNC: 105 MMOL/L (ref 98–107)
CO2 SERPL-SCNC: 25.2 MMOL/L (ref 22–29)
CREAT SERPL-MCNC: 0.76 MG/DL (ref 0.57–1)
DEPRECATED RDW RBC AUTO: 55.3 FL (ref 37–54)
EGFRCR SERPLBLD CKD-EPI 2021: 94.4 ML/MIN/1.73
EOSINOPHIL # BLD AUTO: 0.14 10*3/MM3 (ref 0–0.4)
EOSINOPHIL NFR BLD AUTO: 2.8 % (ref 0.3–6.2)
ERYTHROCYTE [DISTWIDTH] IN BLOOD BY AUTOMATED COUNT: 16.3 % (ref 12.3–15.4)
GLOBULIN UR ELPH-MCNC: 3.1 GM/DL
GLUCOSE SERPL-MCNC: 93 MG/DL (ref 65–99)
HCT VFR BLD AUTO: 33.2 % (ref 34–46.6)
HGB BLD-MCNC: 11 G/DL (ref 12–15.9)
IMM GRANULOCYTES # BLD AUTO: 0.01 10*3/MM3 (ref 0–0.05)
IMM GRANULOCYTES NFR BLD AUTO: 0.2 % (ref 0–0.5)
LYMPHOCYTES # BLD AUTO: 1.93 10*3/MM3 (ref 0.7–3.1)
LYMPHOCYTES NFR BLD AUTO: 38.6 % (ref 19.6–45.3)
MCH RBC QN AUTO: 31 PG (ref 26.6–33)
MCHC RBC AUTO-ENTMCNC: 33.1 G/DL (ref 31.5–35.7)
MCV RBC AUTO: 93.5 FL (ref 79–97)
MONOCYTES # BLD AUTO: 0.48 10*3/MM3 (ref 0.1–0.9)
MONOCYTES NFR BLD AUTO: 9.6 % (ref 5–12)
NEUTROPHILS NFR BLD AUTO: 2.41 10*3/MM3 (ref 1.7–7)
NEUTROPHILS NFR BLD AUTO: 48.2 % (ref 42.7–76)
NRBC BLD AUTO-RTO: 0 /100 WBC (ref 0–0.2)
PLATELET # BLD AUTO: 146 10*3/MM3 (ref 140–450)
PMV BLD AUTO: 8.7 FL (ref 6–12)
POTASSIUM SERPL-SCNC: 4 MMOL/L (ref 3.5–5.2)
PROT SERPL-MCNC: 7.3 G/DL (ref 6–8.5)
RBC # BLD AUTO: 3.55 10*6/MM3 (ref 3.77–5.28)
SODIUM SERPL-SCNC: 140 MMOL/L (ref 136–145)
WBC NRBC COR # BLD: 5 10*3/MM3 (ref 3.4–10.8)

## 2023-08-17 PROCEDURE — 82378 CARCINOEMBRYONIC ANTIGEN: CPT

## 2023-08-17 PROCEDURE — 1126F AMNT PAIN NOTED NONE PRSNT: CPT | Performed by: NURSE PRACTITIONER

## 2023-08-17 PROCEDURE — 25010000002 HEPARIN LOCK FLUSH PER 10 UNITS: Performed by: INTERNAL MEDICINE

## 2023-08-17 PROCEDURE — 99214 OFFICE O/P EST MOD 30 MIN: CPT | Performed by: NURSE PRACTITIONER

## 2023-08-17 PROCEDURE — 1159F MED LIST DOCD IN RCRD: CPT | Performed by: NURSE PRACTITIONER

## 2023-08-17 PROCEDURE — 1160F RVW MEDS BY RX/DR IN RCRD: CPT | Performed by: NURSE PRACTITIONER

## 2023-08-17 PROCEDURE — 36416 COLLJ CAPILLARY BLOOD SPEC: CPT

## 2023-08-17 PROCEDURE — 80053 COMPREHEN METABOLIC PANEL: CPT | Performed by: NURSE PRACTITIONER

## 2023-08-17 PROCEDURE — 85025 COMPLETE CBC W/AUTO DIFF WBC: CPT | Performed by: NURSE PRACTITIONER

## 2023-08-17 PROCEDURE — 36591 DRAW BLOOD OFF VENOUS DEVICE: CPT

## 2023-08-17 RX ORDER — HEPARIN SODIUM (PORCINE) LOCK FLUSH IV SOLN 100 UNIT/ML 100 UNIT/ML
500 SOLUTION INTRAVENOUS AS NEEDED
OUTPATIENT
Start: 2023-08-17

## 2023-08-17 RX ORDER — HEPARIN SODIUM (PORCINE) LOCK FLUSH IV SOLN 100 UNIT/ML 100 UNIT/ML
500 SOLUTION INTRAVENOUS AS NEEDED
Status: DISCONTINUED | OUTPATIENT
Start: 2023-08-17 | End: 2023-08-17 | Stop reason: HOSPADM

## 2023-08-17 RX ORDER — SODIUM CHLORIDE 0.9 % (FLUSH) 0.9 %
10 SYRINGE (ML) INJECTION AS NEEDED
Status: DISCONTINUED | OUTPATIENT
Start: 2023-08-17 | End: 2023-08-17 | Stop reason: HOSPADM

## 2023-08-17 RX ORDER — SODIUM CHLORIDE 0.9 % (FLUSH) 0.9 %
10 SYRINGE (ML) INJECTION AS NEEDED
OUTPATIENT
Start: 2023-08-17

## 2023-08-17 RX ADMIN — HEPARIN 500 UNITS: 100 SYRINGE at 14:02

## 2023-08-17 RX ADMIN — Medication 10 ML: at 14:02

## 2023-08-17 NOTE — PROGRESS NOTES
DATE OF VISIT: 8/17/2023    REASON FOR VISIT: Followup for metastatic right-sided colon cancer     PROBLEM LIST:  1. Metastatic colon cancer TX NX M1 a stage Perry:  A.  Presented with abdominal pain and jaundice  B.  CT abdomen pelvis done November 04, 2022 confirmed diffuse liver metastases.  C.  Diagnosed after CT-guided liver biopsy done on November 22, 2022 confirming adenocarcinoma CK20 positive CK7 negative.  D.  Colonoscopy done December 08, 2020 to confirm transverse colon mass however biopsy revealed adenoma.  E.  Started palliative chemotherapy with dose adjusted FOLFOXIRI December 14, 2022, status post 12 cycles completed May 2023.  F.  Started maintenance Xeloda 1000 mg twice daily 1 week on 1 week off June 2023  2.  Elevated liver enzymes:  A.  Induced by metastatic disease  3.  Cancer-related pain  4.  Anxiety  5.  Hypertension    HISTORY OF PRESENT ILLNESS: The patient is a very pleasant 52 y.o. female with past medical history significant for metastatic right-sided colon cancer diagnosed November 22, 2022.  Started on palliative chemotherapy with dose adjusted FOLFOXIRI.  She completed 12 cycles May 2023.  She was started on maintenance Xeloda 1000 mg twice daily June 2023.  The patient is here today for scheduled follow-up visit with maintenance treatment cycle number 2.    SUBJECTIVE: Amber is here today with her .  She has been feeling well.  She has become more active.  She continues to have some mild pain in her fingertips and her feet.  Gabapentin has improved the pain in her fingers and it comes and goes at this point.  It is worse with cold.  Her feet are about the same.      Past History:  Medical History: has a past medical history of Anxiety, Cancer, Ear piercing, Gallstones, History of irritable bowel syndrome, Hypertension, Seasonal allergies, Tattoos, and Wears glasses.   Surgical History: has a past surgical history that includes postpartum tubal ligation; Dilation and curettage  "of uterus (2013); Tumor removal (2013); Portacath placement (N/A, 12/8/2022); and Colonoscopy (N/A, 12/8/2022).   Family History: family history is not on file.   Social History: reports that she has never smoked. She has never used smokeless tobacco. She reports current alcohol use. She reports that she does not use drugs.    (Not in a hospital admission)     Allergies: Losartan, Valium [diazepam], and Citrus     Review of Systems   Constitutional:  Positive for fatigue.   Gastrointestinal:  Positive for diarrhea.   Musculoskeletal:  Positive for arthralgias.   Neurological:  Positive for tremors.   Psychiatric/Behavioral:  The patient is nervous/anxious.    All other systems reviewed and are negative.    PHYSICAL EXAMINATION:   /70   Pulse 80   Temp 98.2 øF (36.8 øC) (Temporal)   Resp 16   Ht 157.5 cm (62\")   Wt 63.5 kg (140 lb)   SpO2 99%   BMI 25.61 kg/mý    Pain Score    08/17/23 1307   PainSc: 0-No pain                     ECOG Performance Status: 1 - Symptomatic but completely ambulatory      General Appearance:      alert, cooperative, no apparent distress and appears stated age   Lungs:   Clear to auscultation bilaterally; respirations regular, even, and unlabored bilaterally   Heart:  Regular rate and rhythm, no murmurs appreciated   Abdomen:   Soft, non-tender, and non-distended                 No visits with results within 2 Week(s) from this visit.   Latest known visit with results is:   Lab on 07/19/2023   Component Date Value Ref Range Status    Glucose 07/19/2023 116 (H)  65 - 99 mg/dL Final    BUN 07/19/2023 13  6 - 20 mg/dL Final    Creatinine 07/19/2023 0.78  0.57 - 1.00 mg/dL Final    Sodium 07/19/2023 140  136 - 145 mmol/L Final    Potassium 07/19/2023 3.9  3.5 - 5.2 mmol/L Final    Chloride 07/19/2023 105  98 - 107 mmol/L Final    CO2 07/19/2023 28.3  22.0 - 29.0 mmol/L Final    Calcium 07/19/2023 9.2  8.6 - 10.5 mg/dL Final    Total Protein 07/19/2023 7.0  6.0 - 8.5 g/dL Final    " Albumin 07/19/2023 3.8  3.5 - 5.2 g/dL Final    ALT (SGPT) 07/19/2023 73 (H)  1 - 33 U/L Final    AST (SGOT) 07/19/2023 94 (H)  1 - 32 U/L Final    Alkaline Phosphatase 07/19/2023 277 (H)  39 - 117 U/L Final    Total Bilirubin 07/19/2023 0.6  0.0 - 1.2 mg/dL Final    Globulin 07/19/2023 3.2  gm/dL Final    A/G Ratio 07/19/2023 1.2  g/dL Final    BUN/Creatinine Ratio 07/19/2023 16.7  7.0 - 25.0 Final    Anion Gap 07/19/2023 6.7  5.0 - 15.0 mmol/L Final    eGFR 07/19/2023 91.5  >60.0 mL/min/1.73 Final    CEA 07/19/2023 2.22  ng/mL Final    WBC 07/19/2023 3.86  3.40 - 10.80 10*3/mm3 Final    RBC 07/19/2023 3.35 (L)  3.77 - 5.28 10*6/mm3 Final    Hemoglobin 07/19/2023 10.6 (L)  12.0 - 15.9 g/dL Final    Hematocrit 07/19/2023 31.8 (L)  34.0 - 46.6 % Final    MCV 07/19/2023 94.9  79.0 - 97.0 fL Final    MCH 07/19/2023 31.6  26.6 - 33.0 pg Final    MCHC 07/19/2023 33.3  31.5 - 35.7 g/dL Final    RDW 07/19/2023 15.6 (H)  12.3 - 15.4 % Final    RDW-SD 07/19/2023 51.8  37.0 - 54.0 fl Final    MPV 07/19/2023 10.2  6.0 - 12.0 fL Final    Platelets 07/19/2023 133 (L)  140 - 450 10*3/mm3 Final    Neutrophil % 07/19/2023 49.2  42.7 - 76.0 % Final    Lymphocyte % 07/19/2023 38.6  19.6 - 45.3 % Final    Monocyte % 07/19/2023 8.0  5.0 - 12.0 % Final    Eosinophil % 07/19/2023 3.6  0.3 - 6.2 % Final    Basophil % 07/19/2023 0.3  0.0 - 1.5 % Final    Immature Grans % 07/19/2023 0.3  0.0 - 0.5 % Final    Neutrophils, Absolute 07/19/2023 1.90  1.70 - 7.00 10*3/mm3 Final    Lymphocytes, Absolute 07/19/2023 1.49  0.70 - 3.10 10*3/mm3 Final    Monocytes, Absolute 07/19/2023 0.31  0.10 - 0.90 10*3/mm3 Final    Eosinophils, Absolute 07/19/2023 0.14  0.00 - 0.40 10*3/mm3 Final    Basophils, Absolute 07/19/2023 0.01  0.00 - 0.20 10*3/mm3 Final    Immature Grans, Absolute 07/19/2023 0.01  0.00 - 0.05 10*3/mm3 Final    nRBC 07/19/2023 0.0  0.0 - 0.2 /100 WBC Final        No results found.      ASSESSMENT: The patient is a very pleasant 52 y.o.  female  with right-sided metastatic colon cancer      PLAN:    1.  Right-sided metastatic colon cancer:  A.  I will continue maintenance treatment using Xeloda 1000 mg twice a day 1 week on 1 week off.  B.  She will follow-up with us in 4 weeks to evaluate for treatment tolerance.  C.  I will continue to monitor the patient blood work including blood counts kidney function liver function and electrolytes.  D.  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  While the patient may not benefit from immunotherapy however she will be a candidate for EGFR inhibitors down the road.  E.  I did go over her circulating tumor DNA assay from July 19 , 2023 and discussed with the patient that this is not detected as the number is at 0.  This will be checked again today.  I will check this on monthly basis at this point.  F.  We will do 3 months follow-up study which will be due early September 2023.  I will order scans prior to return.    2.  Elevated liver enzymes:  A.  Induced by liver metastases  B.  I will continue 25% dose reduction of chemo drugs per  C.  I will repeat labs before next infusion.    3.  Chemotherapy-induced nausea:  A.  I will continue the patient on Zofran as needed for    4.  Chemotherapy-induced diarrhea:  A.  I will continue the patient on Imodium as needed    5. Cancer-related pain:  A.  I will continue the patient oxycodone 5 mg twice a day.     6.  Anxiety:  A.  I will continue the patient on Ativan 1 mg nightly as needed.       7.  Hypertension:  A.  She will continue losartan.  B.  I reviewed with the patient this will interfere with our management since chemotherapy typically can cause hypotension we will have to monitor blood pressure closely.    8.  Dehydration:  A.  I will continue 1 L of IV fluid on day 3 of each chemotherapy cycle.    9.  Chemotherapy-induced anemia:  A.  I explained to the patient hemoglobin  has improved to 10.6 on 7/19/2023   B.  I will continue monitor her CBC prior to each infusion.  C.  I will transfuse as needed for hemoglobin less than 8.  D.  I will continue 25% dose reduction on chemotherapy.    10.  Chemotherapy-induced heartburn:  A.  I will continue pantoprazole 40 mg twice a day.    B.  I will continue Carafate 1 g every 6 hours    11.  Treatment related diarrhea  A.  Continue Imodium as directed.  We will also add Lomotil to her regimen.  Education sheet was provided to the patient.      12.  Treatment induced peripheral neuropathy:   A.  We will continue gabapentin 100 mg 3 times daily.    FOLLOW UP: 4 weeks with port maintenance and labs.        Ana Cristina Cano, APRN  8/17/2023

## 2023-08-17 NOTE — PROGRESS NOTES
Patient here for russell labs and monthly lab draw. Patients port accessed using sterile technique with 10cc blood wasted, labs drawn including russell, russell given to ROBERT Taylor, port flushed and heparin locked. Patient discharged and given AVS.  Patient stable at discharge.

## 2023-08-18 ENCOUNTER — SPECIALTY PHARMACY (OUTPATIENT)
Dept: ONCOLOGY | Facility: HOSPITAL | Age: 53
End: 2023-08-18
Payer: MEDICAID

## 2023-08-18 LAB — CEA SERPL-MCNC: 2.09 NG/ML

## 2023-08-18 NOTE — PROGRESS NOTES
Specialty Pharmacy Refill Coordination Note     Amber is a 52 y.o. female contacted today regarding refills of  capecitabine 1000mg PO BID for 1 week, then off 1 week, then on for 1 week, then off 1 week specialty medication(s).    Medication scheduled for delivery on 8/22/23 because of the weekend.     Specialty medication(s) and dose(s) confirmed: yes    Refill Questions      Flowsheet Row Most Recent Value   Changes to allergies? No   Changes to medications? No   New conditions since last clinic visit No   Unplanned office visit, urgent care, ED, or hospital admission in the last 4 weeks  No   How does patient/caregiver feel medication is working? Good   Financial problems or insurance changes  No   Since the previous refill, were any specialty medication doses or scheduled injections missed or delayed?  No   Does this patient require a clinical escalation to a pharmacist? No            Delivery Questions      Flowsheet Row Most Recent Value   Delivery method FedEx   Delivery address correct? Yes   Delivery phone number [phone]   Preferred delivery time? Anytime   Number of medications in delivery 1   Medication being filled and delivered capecitabine   Doses left of specialty medications patient on off week of cycle   Is there any medication that is due not being filled? No   Supplies needed? No supplies needed   Cooler needed? No   Do any medications need mixed or dated? No   Additional comments no copay   Questions or concerns for the pharmacist? No   Explain any questions or concerns for the pharmacist n/a   Are any medications first time fills? No                   Follow-up: 28 day(s)     Herminia Smith, Pharmacy Technician  Specialty Pharmacy Technician

## 2023-09-11 ENCOUNTER — HOSPITAL ENCOUNTER (OUTPATIENT)
Dept: CT IMAGING | Facility: HOSPITAL | Age: 53
Discharge: HOME | End: 2023-09-11
Payer: MEDICAID

## 2023-09-11 DIAGNOSIS — C78.7 METASTATIC COLON CANCER TO LIVER: ICD-10-CM

## 2023-09-11 DIAGNOSIS — C18.9 METASTATIC COLON CANCER TO LIVER: ICD-10-CM

## 2023-09-11 PROCEDURE — 25510000001 IOPAMIDOL 61 % SOLUTION: Performed by: NURSE PRACTITIONER

## 2023-09-11 PROCEDURE — 71260 CT THORAX DX C+: CPT

## 2023-09-11 PROCEDURE — 74177 CT ABD & PELVIS W/CONTRAST: CPT

## 2023-09-11 RX ADMIN — IOPAMIDOL 100 ML: 612 INJECTION, SOLUTION INTRAVENOUS at 14:05

## 2023-09-14 ENCOUNTER — OFFICE VISIT (OUTPATIENT)
Dept: ONCOLOGY | Facility: CLINIC | Age: 53
End: 2023-09-14
Payer: MEDICAID

## 2023-09-14 ENCOUNTER — LAB (OUTPATIENT)
Dept: ONCOLOGY | Facility: HOSPITAL | Age: 53
End: 2023-09-14
Payer: MEDICAID

## 2023-09-14 VITALS
DIASTOLIC BLOOD PRESSURE: 67 MMHG | OXYGEN SATURATION: 99 % | TEMPERATURE: 97.5 F | RESPIRATION RATE: 16 BRPM | HEART RATE: 80 BPM | WEIGHT: 148 LBS | HEIGHT: 62 IN | SYSTOLIC BLOOD PRESSURE: 137 MMHG | BODY MASS INDEX: 27.23 KG/M2

## 2023-09-14 DIAGNOSIS — C78.7 METASTATIC COLON CANCER TO LIVER: ICD-10-CM

## 2023-09-14 DIAGNOSIS — C18.2 CANCER OF RIGHT COLON: Primary | ICD-10-CM

## 2023-09-14 DIAGNOSIS — C18.9 METASTATIC COLON CANCER TO LIVER: ICD-10-CM

## 2023-09-14 LAB
ALBUMIN SERPL-MCNC: 4.3 G/DL (ref 3.5–5.2)
ALBUMIN/GLOB SERPL: 1.4 G/DL
ALP SERPL-CCNC: 196 U/L (ref 39–117)
ALT SERPL W P-5'-P-CCNC: 37 U/L (ref 1–33)
ANION GAP SERPL CALCULATED.3IONS-SCNC: 10.7 MMOL/L (ref 5–15)
AST SERPL-CCNC: 41 U/L (ref 1–32)
BASOPHILS # BLD AUTO: 0.02 10*3/MM3 (ref 0–0.2)
BASOPHILS NFR BLD AUTO: 0.4 % (ref 0–1.5)
BILIRUB SERPL-MCNC: 0.5 MG/DL (ref 0–1.2)
BUN SERPL-MCNC: 13 MG/DL (ref 6–20)
BUN/CREAT SERPL: 17.6 (ref 7–25)
CALCIUM SPEC-SCNC: 9.6 MG/DL (ref 8.6–10.5)
CEA SERPL-MCNC: 2.15 NG/ML
CHLORIDE SERPL-SCNC: 103 MMOL/L (ref 98–107)
CO2 SERPL-SCNC: 25.3 MMOL/L (ref 22–29)
CREAT SERPL-MCNC: 0.74 MG/DL (ref 0.57–1)
DEPRECATED RDW RBC AUTO: 54.9 FL (ref 37–54)
EGFRCR SERPLBLD CKD-EPI 2021: 96.9 ML/MIN/1.73
EOSINOPHIL # BLD AUTO: 0.14 10*3/MM3 (ref 0–0.4)
EOSINOPHIL NFR BLD AUTO: 2.7 % (ref 0.3–6.2)
ERYTHROCYTE [DISTWIDTH] IN BLOOD BY AUTOMATED COUNT: 16.3 % (ref 12.3–15.4)
GLOBULIN UR ELPH-MCNC: 3 GM/DL
GLUCOSE SERPL-MCNC: 92 MG/DL (ref 65–99)
HCT VFR BLD AUTO: 33.3 % (ref 34–46.6)
HGB BLD-MCNC: 11.4 G/DL (ref 12–15.9)
IMM GRANULOCYTES # BLD AUTO: 0.01 10*3/MM3 (ref 0–0.05)
IMM GRANULOCYTES NFR BLD AUTO: 0.2 % (ref 0–0.5)
LYMPHOCYTES # BLD AUTO: 2.02 10*3/MM3 (ref 0.7–3.1)
LYMPHOCYTES NFR BLD AUTO: 38.3 % (ref 19.6–45.3)
MCH RBC QN AUTO: 32.2 PG (ref 26.6–33)
MCHC RBC AUTO-ENTMCNC: 34.2 G/DL (ref 31.5–35.7)
MCV RBC AUTO: 94.1 FL (ref 79–97)
MONOCYTES # BLD AUTO: 0.45 10*3/MM3 (ref 0.1–0.9)
MONOCYTES NFR BLD AUTO: 8.5 % (ref 5–12)
NEUTROPHILS NFR BLD AUTO: 2.63 10*3/MM3 (ref 1.7–7)
NEUTROPHILS NFR BLD AUTO: 49.9 % (ref 42.7–76)
NRBC BLD AUTO-RTO: 0 /100 WBC (ref 0–0.2)
PLATELET # BLD AUTO: 155 10*3/MM3 (ref 140–450)
PMV BLD AUTO: 9.7 FL (ref 6–12)
POTASSIUM SERPL-SCNC: 4.1 MMOL/L (ref 3.5–5.2)
PROT SERPL-MCNC: 7.3 G/DL (ref 6–8.5)
RBC # BLD AUTO: 3.54 10*6/MM3 (ref 3.77–5.28)
SODIUM SERPL-SCNC: 139 MMOL/L (ref 136–145)
WBC NRBC COR # BLD: 5.27 10*3/MM3 (ref 3.4–10.8)

## 2023-09-14 PROCEDURE — 80053 COMPREHEN METABOLIC PANEL: CPT

## 2023-09-14 PROCEDURE — 1126F AMNT PAIN NOTED NONE PRSNT: CPT | Performed by: NURSE PRACTITIONER

## 2023-09-14 PROCEDURE — 1160F RVW MEDS BY RX/DR IN RCRD: CPT | Performed by: NURSE PRACTITIONER

## 2023-09-14 PROCEDURE — 1159F MED LIST DOCD IN RCRD: CPT | Performed by: NURSE PRACTITIONER

## 2023-09-14 PROCEDURE — 82378 CARCINOEMBRYONIC ANTIGEN: CPT

## 2023-09-14 PROCEDURE — 25010000002 HEPARIN LOCK FLUSH PER 10 UNITS: Performed by: INTERNAL MEDICINE

## 2023-09-14 PROCEDURE — 85025 COMPLETE CBC W/AUTO DIFF WBC: CPT

## 2023-09-14 PROCEDURE — 99214 OFFICE O/P EST MOD 30 MIN: CPT | Performed by: NURSE PRACTITIONER

## 2023-09-14 PROCEDURE — 36591 DRAW BLOOD OFF VENOUS DEVICE: CPT

## 2023-09-14 RX ORDER — SODIUM CHLORIDE 0.9 % (FLUSH) 0.9 %
10 SYRINGE (ML) INJECTION AS NEEDED
Status: DISCONTINUED | OUTPATIENT
Start: 2023-09-14 | End: 2023-09-14 | Stop reason: HOSPADM

## 2023-09-14 RX ORDER — SODIUM CHLORIDE 0.9 % (FLUSH) 0.9 %
10 SYRINGE (ML) INJECTION AS NEEDED
OUTPATIENT
Start: 2023-09-14

## 2023-09-14 RX ORDER — HEPARIN SODIUM (PORCINE) LOCK FLUSH IV SOLN 100 UNIT/ML 100 UNIT/ML
500 SOLUTION INTRAVENOUS AS NEEDED
OUTPATIENT
Start: 2023-09-14

## 2023-09-14 RX ORDER — HEPARIN SODIUM (PORCINE) LOCK FLUSH IV SOLN 100 UNIT/ML 100 UNIT/ML
500 SOLUTION INTRAVENOUS AS NEEDED
Status: DISCONTINUED | OUTPATIENT
Start: 2023-09-14 | End: 2023-09-14 | Stop reason: HOSPADM

## 2023-09-14 RX ADMIN — HEPARIN 500 UNITS: 100 SYRINGE at 14:38

## 2023-09-14 RX ADMIN — Medication 10 ML: at 14:38

## 2023-09-14 NOTE — PROGRESS NOTES
DATE OF VISIT: 9/14/2023    REASON FOR VISIT: Followup for metastatic right-sided colon cancer     PROBLEM LIST:  1. Metastatic colon cancer TX NX M1 a stage Perry:  A.  Presented with abdominal pain and jaundice  B.  CT abdomen pelvis done November 04, 2022 confirmed diffuse liver metastases.  C.  Diagnosed after CT-guided liver biopsy done on November 22, 2022 confirming adenocarcinoma CK20 positive CK7 negative.  D.  Colonoscopy done December 08, 2020 to confirm transverse colon mass however biopsy revealed adenoma.  E.  Started palliative chemotherapy with dose adjusted FOLFOXIRI December 14, 2022, status post 12 cycles completed May 2023.  F.  Started maintenance Xeloda 1000 mg twice daily 1 week on 1 week off June 2023  2.  Elevated liver enzymes:  A.  Induced by metastatic disease  3.  Cancer-related pain  4.  Anxiety  5.  Hypertension    HISTORY OF PRESENT ILLNESS: The patient is a very pleasant 53 y.o. female with past medical history significant for metastatic right-sided colon cancer diagnosed November 22, 2022.  Started on palliative chemotherapy with dose adjusted FOLFOXIRI.  She completed 12 cycles May 2023.  She was started on maintenance Xeloda 1000 mg twice daily June 2023.  The patient is here today for scheduled follow-up visit with maintenance treatment cycle number 3.    SUBJECTIVE: Amber is here today with her .  Overall, she is doing fairly well.  She was upset with her recent [facility] numbers.  She was very disappointed.  She is anxious about scans.  She continues to be active.  Numbness and pain in her fingertips and feet have improved.  Gabapentin is helping.      Past History:  Medical History: has a past medical history of Anxiety, Cancer, Ear piercing, Gallstones, History of irritable bowel syndrome, Hypertension, Seasonal allergies, Tattoos, and Wears glasses.   Surgical History: has a past surgical history that includes postpartum tubal ligation; Dilation and curettage of uterus  "(2013); Tumor removal (2013); Portacath placement (N/A, 12/8/2022); and Colonoscopy (N/A, 12/8/2022).   Family History: family history is not on file.   Social History: reports that she has never smoked. She has never used smokeless tobacco. She reports current alcohol use. She reports that she does not use drugs.    (Not in a hospital admission)     Allergies: Losartan, Valium [diazepam], and Citrus     Review of Systems   Constitutional:  Positive for fatigue.   Gastrointestinal:  Positive for diarrhea.   Musculoskeletal:  Positive for arthralgias.   Neurological:  Positive for tremors.   Psychiatric/Behavioral:  The patient is nervous/anxious.    All other systems reviewed and are negative.    PHYSICAL EXAMINATION:   /67   Pulse 80   Temp 97.5 °F (36.4 °C) (Temporal)   Resp 16   Ht 157.5 cm (62\")   Wt 67.1 kg (148 lb)   SpO2 99%   BMI 27.07 kg/m²    Pain Score    09/14/23 1307   PainSc: 0-No pain                     ECOG Performance Status: 1 - Symptomatic but completely ambulatory      General Appearance:      alert, cooperative, no apparent distress and appears stated age   Lungs:   Clear to auscultation bilaterally; respirations regular, even, and unlabored bilaterally   Heart:  Regular rate and rhythm, no murmurs appreciated   Abdomen:   Soft, non-tender, and non-distended                 No visits with results within 2 Week(s) from this visit.   Latest known visit with results is:   Lab on 08/17/2023   Component Date Value Ref Range Status    CEA 08/17/2023 2.09  ng/mL Final        CT Chest With Contrast Diagnostic    Result Date: 9/11/2023  Narrative: PROCEDURE: CT CHEST W CONTRAST DIAGNOSTIC-  HISTORY: Follow-up metastatic colon cancer; C18.9-Malignant neoplasm of colon, unspecified; C78.7-Secondary malignant neoplasm of liver and intrahepatic bile duct  COMPARISON: June 1, 2023  PROCEDURE: The patient was injected with IV contrast.  Axial images were obtained from the lung apex to the mid " abdomen by computed tomography. This study was performed with techniques to keep radiation doses as low as reasonably achievable, (ALARA). Individualized dose reduction techniques using automated exposure control or adjustment of mA and/or kV according to the patient size were employed.  FINDINGS:  CHEST: There is no suspicious axillary adenopathy. There is no suspicious hilar or mediastinal adenopathy.  The heart is proper size. There is no pericardial or pleural effusion.  No suspicious infiltrate or nodule identified.  Limited images of the upper abdomen demonstrate the mixed attenuation hepatic lesions, similar to prior exam. Right subclavian port again identified.      Impression: No metastatic disease identified in the chest.    This report was signed and finalized on 9/11/2023 5:10 PM by Christine Dale MD.      CT Abdomen Pelvis With Contrast    Result Date: 9/11/2023  Narrative: PROCEDURE: CT ABDOMEN PELVIS W CONTRAST-  HISTORY: Follow-up metastatic colon cancer; C18.9-Malignant neoplasm of colon, unspecified; C78.7-Secondary malignant neoplasm of liver and intrahepatic bile duct  COMPARISON: 06/01/2023  PROCEDURE: The patient was injected with IV contrast. Oral contrast was administered. Axial images were obtained from the lung bases to the pubic symphysis by computed tomography. This study was performed with techniques to keep radiation doses as low as reasonably achievable, (ALARA). Individualized dose reduction techniques using automated exposure control or adjustment of mA and/or kV according to the patient size were employed.  FINDINGS:  ABDOMEN: The lung bases are clear. The heart is proper size. The liver again demonstrates several lesions of mixed attenuation consistent with metastases and similar to the prior exam, questionable slight decrease from previous study. Gallbladder is present with mild wall thickening, similar to prior; no CT visible stones identified. Duodenal wall thickening/enhancement  has resolved since the previous study. The spleen is unremarkable. No adrenal mass is present.  The pancreas is unremarkable. The kidneys are unremarkable, without evidence of mass or hydronephrosis. The aorta is proper caliber. There is no free fluid or adenopathy. The increased attenuation seen previously in the central mesenteric fat has resolved.  PELVIS: The GI tract demonstrates wall thickening of the transverse colon and hepatic flexure, mildly improved from prior exam. There is also wall thickening of the descending colon and sigmoid, similar to prior. There is evidence of diverticulosis without evidence of diverticulitis. Minimal ascites seen previously has resolved. Uterus is midline. Small bilateral iliac lymph nodes are stable. No bony destructive lesion seen. The appendix is not identified. The urinary bladder is unremarkable. There is no free fluid, adenopathy, or inflammatory process.      Impression: Stable to minimally decreased hepatic metastasis compared to prior.  Resolution of duodenal wall thickening and enhancement from prior.  Mildly decreased wall thickening of the hepatic flexure and transverse colon with stable wall thickening of the descending and sigmoid colon.  Diverticulosis.  This report was signed and finalized on 9/11/2023 4:19 PM by Christine Dale MD.         ASSESSMENT: The patient is a very pleasant 53 y.o. female  with right-sided metastatic colon cancer      PLAN:    1.  Right-sided metastatic colon cancer:  A.  I will continue maintenance treatment using Xeloda 1000 mg twice a day 1 week on 1 week off.  B.  She will follow-up with us in 4 weeks to evaluate for treatment tolerance.  C.  I will continue to monitor the patient blood work including blood counts kidney function liver function and electrolytes.  D.  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  While the patient may not benefit from  immunotherapy however she will be a candidate for EGFR inhibitors down the road.  E.  I did go over her circulating tumor DNA assay from August 17, 2023 and discussed with the patient that this did show 0.17.  This will be checked again today.  I will check this on monthly basis at this point.  F.  We discussed CT scan showed no evidence of cancer in her chest.  Abdomen pelvis showed stable to minimally decreased hepatic metastasis compared to last scan.  Resolution of duodenal wall thickening and enhancement.  Mildly decreased wall thickening of the hepatic flexure.  We will continue with her current plan.  We will do 3 months follow-up study which will be due mid December 2023.     2.  Elevated liver enzymes:  A.  Induced by liver metastases  B. I will repeat labs before next infusion.    3.  Chemotherapy-induced nausea:  A.  I will continue the patient on Zofran as needed for    4.  Chemotherapy-induced diarrhea:  A.  I will continue the patient on Imodium as needed    5. Cancer-related pain:  A.  I will continue the patient oxycodone 5 mg twice a day.     6.  Anxiety:  A.  I will continue the patient on Ativan 1 mg nightly as needed.       7.  Hypertension:  A.  She will continue losartan.  B.  I reviewed with the patient this will interfere with our management since chemotherapy typically can cause hypotension we will have to monitor blood pressure closely.    8.  Dehydration:  A.  I will continue 1 L of IV fluid as needed    9.  Chemotherapy-induced anemia:  A.  I explained to the patient hemoglobin has improved to 11.4 on 9/14/2023  B.  I will continue monitor her CBC prior to each infusion.  C.  I will transfuse as needed for hemoglobin less than 8.      10.  Chemotherapy-induced heartburn:  A.  I will continue pantoprazole 40 mg twice a day.    B.  I will continue Carafate 1 g every 6 hours    11.  Treatment related diarrhea  A.  Continue Imodium and Lomotil as directed.  Education sheet was provided to the  patient.      12.  Treatment induced peripheral neuropathy:   A.  We will continue gabapentin 100 mg 3 times daily.    FOLLOW UP: 4 weeks with port maintenance and labs.        Ana Cristina Cano, APRN  9/14/2023

## 2023-09-15 ENCOUNTER — SPECIALTY PHARMACY (OUTPATIENT)
Dept: ONCOLOGY | Facility: HOSPITAL | Age: 53
End: 2023-09-15
Payer: MEDICAID

## 2023-09-19 ENCOUNTER — SPECIALTY PHARMACY (OUTPATIENT)
Dept: ONCOLOGY | Facility: HOSPITAL | Age: 53
End: 2023-09-19
Payer: MEDICAID

## 2023-09-19 NOTE — PROGRESS NOTES
Specialty Pharmacy Refill Coordination Note     Amber is a 53 y.o. female contacted today regarding refills of  capecitabine 1000mg PO BID for 7 days, then off 7 days, then on 7 days, then off 7 days for a 28 day cycle specialty medication(s).    Scheduled refill to be mailed out on 9/25/23 per patient request since it will have to be signed for.     Specialty medication(s) and dose(s) confirmed: yes    Refill Questions      Flowsheet Row Most Recent Value   Changes to allergies? No   Changes to medications? No   New conditions since last clinic visit No   Unplanned office visit, urgent care, ED, or hospital admission in the last 4 weeks  No   How does patient/caregiver feel medication is working? Good   Financial problems or insurance changes  No   Since the previous refill, were any specialty medication doses or scheduled injections missed or delayed?  No   Does this patient require a clinical escalation to a pharmacist? No            Delivery Questions      Flowsheet Row Most Recent Value   Delivery method [employer]Ex   Delivery address correct? Yes   Delivery phone number [phone]   Preferred delivery time? Anytime   Number of medications in delivery 1   Medication being filled and delivered capecitabine   Doses left of specialty medications 4 days left   Is there any medication that is due not being filled? No   Supplies needed? No supplies needed   Cooler needed? No   Do any medications need mixed or dated? No   Additional comments no copay   Questions or concerns for the pharmacist? No   Explain any questions or concerns for the pharmacist n/a   Are any medications first time fills? No                   Follow-up: 28 day(s)     Herminia Smith, Pharmacy Technician  Specialty Pharmacy Technician

## 2023-10-16 DIAGNOSIS — C18.9 METASTATIC COLON CANCER TO LIVER: Primary | ICD-10-CM

## 2023-10-16 DIAGNOSIS — C78.7 METASTATIC COLON CANCER TO LIVER: Primary | ICD-10-CM

## 2023-10-18 ENCOUNTER — OFFICE VISIT (OUTPATIENT)
Dept: ONCOLOGY | Facility: CLINIC | Age: 53
End: 2023-10-18
Payer: MEDICAID

## 2023-10-18 ENCOUNTER — SPECIALTY PHARMACY (OUTPATIENT)
Dept: ONCOLOGY | Facility: HOSPITAL | Age: 53
End: 2023-10-18
Payer: MEDICAID

## 2023-10-18 ENCOUNTER — LAB (OUTPATIENT)
Dept: ONCOLOGY | Facility: HOSPITAL | Age: 53
End: 2023-10-18
Payer: MEDICAID

## 2023-10-18 VITALS
DIASTOLIC BLOOD PRESSURE: 90 MMHG | WEIGHT: 158 LBS | HEIGHT: 62 IN | BODY MASS INDEX: 29.08 KG/M2 | RESPIRATION RATE: 16 BRPM | HEART RATE: 91 BPM | OXYGEN SATURATION: 100 % | SYSTOLIC BLOOD PRESSURE: 137 MMHG | TEMPERATURE: 97.3 F

## 2023-10-18 DIAGNOSIS — C18.9 METASTATIC COLON CANCER TO LIVER: ICD-10-CM

## 2023-10-18 DIAGNOSIS — C78.7 METASTATIC COLON CANCER TO LIVER: ICD-10-CM

## 2023-10-18 DIAGNOSIS — C18.2 CANCER OF RIGHT COLON: Primary | ICD-10-CM

## 2023-10-18 LAB
ALBUMIN SERPL-MCNC: 4.1 G/DL (ref 3.5–5.2)
ALBUMIN/GLOB SERPL: 1.2 G/DL
ALP SERPL-CCNC: 234 U/L (ref 39–117)
ALT SERPL W P-5'-P-CCNC: 54 U/L (ref 1–33)
ANION GAP SERPL CALCULATED.3IONS-SCNC: 11.3 MMOL/L (ref 5–15)
AST SERPL-CCNC: 53 U/L (ref 1–32)
BASOPHILS # BLD AUTO: 0.04 10*3/MM3 (ref 0–0.2)
BASOPHILS NFR BLD AUTO: 0.7 % (ref 0–1.5)
BILIRUB SERPL-MCNC: 0.7 MG/DL (ref 0–1.2)
BUN SERPL-MCNC: 22 MG/DL (ref 6–20)
BUN/CREAT SERPL: 26.8 (ref 7–25)
CALCIUM SPEC-SCNC: 9.4 MG/DL (ref 8.6–10.5)
CEA SERPL-MCNC: 2.15 NG/ML
CHLORIDE SERPL-SCNC: 102 MMOL/L (ref 98–107)
CO2 SERPL-SCNC: 23.7 MMOL/L (ref 22–29)
CREAT SERPL-MCNC: 0.82 MG/DL (ref 0.57–1)
DEPRECATED RDW RBC AUTO: 56.7 FL (ref 37–54)
EGFRCR SERPLBLD CKD-EPI 2021: 85.7 ML/MIN/1.73
EOSINOPHIL # BLD AUTO: 0.14 10*3/MM3 (ref 0–0.4)
EOSINOPHIL NFR BLD AUTO: 2.4 % (ref 0.3–6.2)
ERYTHROCYTE [DISTWIDTH] IN BLOOD BY AUTOMATED COUNT: 16.2 % (ref 12.3–15.4)
GLOBULIN UR ELPH-MCNC: 3.5 GM/DL
GLUCOSE SERPL-MCNC: 106 MG/DL (ref 65–99)
HCT VFR BLD AUTO: 35.3 % (ref 34–46.6)
HGB BLD-MCNC: 11.9 G/DL (ref 12–15.9)
IMM GRANULOCYTES # BLD AUTO: 0.02 10*3/MM3 (ref 0–0.05)
IMM GRANULOCYTES NFR BLD AUTO: 0.3 % (ref 0–0.5)
LYMPHOCYTES # BLD AUTO: 1.75 10*3/MM3 (ref 0.7–3.1)
LYMPHOCYTES NFR BLD AUTO: 30.3 % (ref 19.6–45.3)
MCH RBC QN AUTO: 32 PG (ref 26.6–33)
MCHC RBC AUTO-ENTMCNC: 33.7 G/DL (ref 31.5–35.7)
MCV RBC AUTO: 94.9 FL (ref 79–97)
MONOCYTES # BLD AUTO: 0.56 10*3/MM3 (ref 0.1–0.9)
MONOCYTES NFR BLD AUTO: 9.7 % (ref 5–12)
NEUTROPHILS NFR BLD AUTO: 3.26 10*3/MM3 (ref 1.7–7)
NEUTROPHILS NFR BLD AUTO: 56.6 % (ref 42.7–76)
NRBC BLD AUTO-RTO: 0 /100 WBC (ref 0–0.2)
PLATELET # BLD AUTO: 150 10*3/MM3 (ref 140–450)
PMV BLD AUTO: 9.7 FL (ref 6–12)
POTASSIUM SERPL-SCNC: 4.2 MMOL/L (ref 3.5–5.2)
PROT SERPL-MCNC: 7.6 G/DL (ref 6–8.5)
RBC # BLD AUTO: 3.72 10*6/MM3 (ref 3.77–5.28)
SODIUM SERPL-SCNC: 137 MMOL/L (ref 136–145)
WBC NRBC COR # BLD: 5.77 10*3/MM3 (ref 3.4–10.8)

## 2023-10-18 PROCEDURE — 1126F AMNT PAIN NOTED NONE PRSNT: CPT | Performed by: INTERNAL MEDICINE

## 2023-10-18 PROCEDURE — 25010000002 HEPARIN LOCK FLUSH PER 10 UNITS: Performed by: INTERNAL MEDICINE

## 2023-10-18 PROCEDURE — 82378 CARCINOEMBRYONIC ANTIGEN: CPT

## 2023-10-18 PROCEDURE — 80053 COMPREHEN METABOLIC PANEL: CPT

## 2023-10-18 PROCEDURE — 85025 COMPLETE CBC W/AUTO DIFF WBC: CPT

## 2023-10-18 PROCEDURE — 99214 OFFICE O/P EST MOD 30 MIN: CPT | Performed by: INTERNAL MEDICINE

## 2023-10-18 PROCEDURE — 36591 DRAW BLOOD OFF VENOUS DEVICE: CPT

## 2023-10-18 PROCEDURE — 36415 COLL VENOUS BLD VENIPUNCTURE: CPT

## 2023-10-18 RX ORDER — HEPARIN SODIUM (PORCINE) LOCK FLUSH IV SOLN 100 UNIT/ML 100 UNIT/ML
500 SOLUTION INTRAVENOUS AS NEEDED
OUTPATIENT
Start: 2023-10-18

## 2023-10-18 RX ORDER — HEPARIN SODIUM (PORCINE) LOCK FLUSH IV SOLN 100 UNIT/ML 100 UNIT/ML
500 SOLUTION INTRAVENOUS AS NEEDED
Status: DISCONTINUED | OUTPATIENT
Start: 2023-10-18 | End: 2023-10-18 | Stop reason: HOSPADM

## 2023-10-18 RX ORDER — SODIUM CHLORIDE 0.9 % (FLUSH) 0.9 %
10 SYRINGE (ML) INJECTION AS NEEDED
Status: DISCONTINUED | OUTPATIENT
Start: 2023-10-18 | End: 2023-10-18 | Stop reason: HOSPADM

## 2023-10-18 RX ORDER — SODIUM CHLORIDE 0.9 % (FLUSH) 0.9 %
10 SYRINGE (ML) INJECTION AS NEEDED
OUTPATIENT
Start: 2023-10-18

## 2023-10-18 RX ORDER — CAPECITABINE 500 MG/1
1000 TABLET, FILM COATED ORAL 2 TIMES DAILY
Qty: 56 TABLET | Refills: 11 | Status: SHIPPED | OUTPATIENT
Start: 2023-10-18

## 2023-10-18 RX ADMIN — Medication 10 ML: at 09:52

## 2023-10-18 RX ADMIN — HEPARIN 500 UNITS: 100 SYRINGE at 09:52

## 2023-10-18 NOTE — PROGRESS NOTES
Specialty Pharmacy Refill Coordination Note     Amber is a 53 y.o. female contacted today regarding refills of  capecitabine 1000mg PO BID on days 1-14, then off 7 days of a 21 day cycle specialty medication(s).    DOSE CHANGE    Specialty medication(s) and dose(s) confirmed: yes    Refill Questions      Flowsheet Row Most Recent Value   Changes to allergies? No   Changes to medications? No   New conditions since last clinic visit No   Unplanned office visit, urgent care, ED, or hospital admission in the last 4 weeks  No   How does patient/caregiver feel medication is working? Very good   Financial problems or insurance changes  No   Since the previous refill, were any specialty medication doses or scheduled injections missed or delayed?  No   Does this patient require a clinical escalation to a pharmacist? No            Delivery Questions      Flowsheet Row Most Recent Value   Delivery method [employer]Ex   Delivery address correct? Yes   Delivery phone number [phone]   Preferred delivery time? Anytime   Number of medications in delivery 1   Medication(s) being filled and delivered Capecitabine   Doses left of specialty medications patient on OFF WEEK   Is there any medication that is due not being filled? No   Supplies needed? No supplies needed   Cooler needed? No   Do any medications need mixed or dated? No   Questions or concerns for the pharmacist? No   Explain any questions or concerns for the pharmacist n/a   Are any medications first time fills? No                   Follow-up: 21 day(s)     Herminia Smith, Pharmacy Technician  Specialty Pharmacy Technician

## 2023-10-18 NOTE — PROGRESS NOTES
DATE OF VISIT: 10/18/2023    REASON FOR VISIT: Followup for metastatic right-sided colon cancer     PROBLEM LIST:  1. Metastatic colon cancer TX NX M1 a stage [place]:  A.  Presented with abdominal pain and jaundice  B.  CT abdomen pelvis done November 04, 2022 confirmed diffuse liver metastases.  C.  Diagnosed after CT-guided liver biopsy done on November 22, 2022 confirming adenocarcinoma CK20 positive CK7 negative.  D.  Colonoscopy done December 08, 2020 to confirm transverse colon mass however biopsy revealed adenoma.  E.  Started palliative chemotherapy with dose adjusted FOLFOXIRI December 14, 2022, status post 12 cycles completed May 2023.  F.  Started maintenance Xeloda 1000 mg twice daily 1 week on 1 week off June 2023  2.  Elevated liver enzymes:  A.  Induced by metastatic disease  3.  Cancer-related pain  4.  Anxiety  5.  Hypertension    HISTORY OF PRESENT ILLNESS: The patient is a very pleasant 53 y.o. female with past medical history significant for metastatic right-sided colon cancer diagnosed November 22, 2022.  Started on palliative chemotherapy with dose adjusted FOLFOXIRI.  She completed 12 cycles May 2023.  She was started on maintenance Xeloda 1000 mg twice daily June 2023.  The patient is here today for scheduled follow-up visit with maintenance treatment cycle number 3.    SUBJECTIVE: Amber is here today by herself.  Her  joined us over the phone.  She has been doing fairly well.  Denies any fever or chills.  Appetite has improved.    Past History:  Medical History: has a past medical history of Anxiety, Cancer, Ear piercing, Gallstones, History of irritable bowel syndrome, Hypertension, Seasonal allergies, Tattoos, and Wears glasses.   Surgical History: has a past surgical history that includes postpartum tubal ligation; Dilation and curettage of uterus (2013); Tumor removal (2013); Portacath placement (N/A, 12/8/2022); and Colonoscopy (N/A, 12/8/2022).   Family History: family history is  not on file.   Social History: reports that she has never smoked. She has never used smokeless tobacco. She reports current alcohol use. She reports that she does not use drugs.    (Not in a hospital admission)     Allergies: Losartan, Valium [diazepam], and Citrus     Review of Systems   Constitutional:  Positive for fatigue.   Gastrointestinal:  Positive for diarrhea.   Musculoskeletal:  Positive for arthralgias.   Psychiatric/Behavioral:  The patient is nervous/anxious.        PHYSICAL EXAMINATION:   There were no vitals taken for this visit.   There were no vitals filed for this visit.                    ECOG Performance Status: 1 - Symptomatic but completely ambulatory      General Appearance:      alert, cooperative, no apparent distress and appears stated age   Lungs:   Clear to auscultation bilaterally; respirations regular, even, and unlabored bilaterally   Heart:  Regular rate and rhythm, no murmurs appreciated   Abdomen:   Soft, non-tender, and non-distended                 No visits with results within 2 Week(s) from this visit.   Latest known visit with results is:   Lab on 09/14/2023   Component Date Value Ref Range Status    Glucose 09/14/2023 92  65 - 99 mg/dL Final    BUN 09/14/2023 13  6 - 20 mg/dL Final    Creatinine 09/14/2023 0.74  0.57 - 1.00 mg/dL Final    Sodium 09/14/2023 139  136 - 145 mmol/L Final    Potassium 09/14/2023 4.1  3.5 - 5.2 mmol/L Final    Chloride 09/14/2023 103  98 - 107 mmol/L Final    CO2 09/14/2023 25.3  22.0 - 29.0 mmol/L Final    Calcium 09/14/2023 9.6  8.6 - 10.5 mg/dL Final    Total Protein 09/14/2023 7.3  6.0 - 8.5 g/dL Final    Albumin 09/14/2023 4.3  3.5 - 5.2 g/dL Final    ALT (SGPT) 09/14/2023 37 (H)  1 - 33 U/L Final    AST (SGOT) 09/14/2023 41 (H)  1 - 32 U/L Final    Alkaline Phosphatase 09/14/2023 196 (H)  39 - 117 U/L Final    Total Bilirubin 09/14/2023 0.5  0.0 - 1.2 mg/dL Final    Globulin 09/14/2023 3.0  gm/dL Final    A/G Ratio 09/14/2023 1.4  g/dL Final     BUN/Creatinine Ratio 09/14/2023 17.6  7.0 - 25.0 Final    Anion Gap 09/14/2023 10.7  5.0 - 15.0 mmol/L Final    eGFR 09/14/2023 96.9  >60.0 mL/min/1.73 Final    CEA 09/14/2023 2.15  ng/mL Final    WBC 09/14/2023 5.27  3.40 - 10.80 10*3/mm3 Final    RBC 09/14/2023 3.54 (L)  3.77 - 5.28 10*6/mm3 Final    Hemoglobin 09/14/2023 11.4 (L)  12.0 - 15.9 g/dL Final    Hematocrit 09/14/2023 33.3 (L)  34.0 - 46.6 % Final    MCV 09/14/2023 94.1  79.0 - 97.0 fL Final    MCH 09/14/2023 32.2  26.6 - 33.0 pg Final    MCHC 09/14/2023 34.2  31.5 - 35.7 g/dL Final    RDW 09/14/2023 16.3 (H)  12.3 - 15.4 % Final    RDW-SD 09/14/2023 54.9 (H)  37.0 - 54.0 fl Final    MPV 09/14/2023 9.7  6.0 - 12.0 fL Final    Platelets 09/14/2023 155  140 - 450 10*3/mm3 Final    Neutrophil % 09/14/2023 49.9  42.7 - 76.0 % Final    Lymphocyte % 09/14/2023 38.3  19.6 - 45.3 % Final    Monocyte % 09/14/2023 8.5  5.0 - 12.0 % Final    Eosinophil % 09/14/2023 2.7  0.3 - 6.2 % Final    Basophil % 09/14/2023 0.4  0.0 - 1.5 % Final    Immature Grans % 09/14/2023 0.2  0.0 - 0.5 % Final    Neutrophils, Absolute 09/14/2023 2.63  1.70 - 7.00 10*3/mm3 Final    Lymphocytes, Absolute 09/14/2023 2.02  0.70 - 3.10 10*3/mm3 Final    Monocytes, Absolute 09/14/2023 0.45  0.10 - 0.90 10*3/mm3 Final    Eosinophils, Absolute 09/14/2023 0.14  0.00 - 0.40 10*3/mm3 Final    Basophils, Absolute 09/14/2023 0.02  0.00 - 0.20 10*3/mm3 Final    Immature Grans, Absolute 09/14/2023 0.01  0.00 - 0.05 10*3/mm3 Final    nRBC 09/14/2023 0.0  0.0 - 0.2 /100 WBC Final        No results found.      ASSESSMENT: The patient is a very pleasant 53 y.o. female  with right-sided metastatic colon cancer      PLAN:    1.  Right-sided metastatic colon cancer:  A.  I will continue maintenance treatment using Xeloda however I will change the schedule to 1000 mg twice a day 2 weeks on 1 week off.  B.  She will follow-up with us in 5 weeks to evaluate for treatment tolerance.  C.  I will continue to  monitor the patient blood work including blood counts kidney function liver function and electrolytes.  D.  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  While the patient may not benefit from immunotherapy however she will be a candidate for EGFR inhibitors down the road.  BRAYAN.  I did go over her circulating tumor DNA assay from September 14, 2023 unfortunately it did increase slightly level of 0.29.  This was 0.1 7 August 2023.  This will be checked again today.  I will check this on monthly basis at this point.  F.  We will do 3 months follow-up CT scans which will be due mid December 2023.  Those will be ordered prior to return.  G. I did go over the blood work results with the patient from September 14, 2023.  Her CMP revealed normal electrolytes and kidney function with slight elevated liver enzymes alkaline phosphatase 196.    2.  Elevated liver enzymes:  A.  Induced by liver metastases  B. I will repeat labs before next infusion.    3.  Chemotherapy-induced nausea:  A.  I will continue the patient on Zofran as needed for    4.  Chemotherapy-induced diarrhea:  A.  I will continue the patient on Imodium as needed    5. Cancer-related pain:  A.  I will continue the patient oxycodone 5 mg twice a day.     6.  Anxiety:  A.  I will continue the patient on Ativan 1 mg nightly as needed.       7.  Hypertension:  A.  She will continue losartan.  B.  I reviewed with the patient this will interfere with our management since chemotherapy typically can cause hypotension we will have to monitor blood pressure closely.    8.  Dehydration:  A.  I will continue 1 L of IV fluid as needed    9.  Chemotherapy-induced anemia:  A.  I discussed with the patient her CBC result from September 14, 2023 hemoglobin is mild decreased at 11.4.  B.  I will continue monitor her CBC prior to each infusion.  C.  I will transfuse as needed for hemoglobin less  than 8.      10.  Chemotherapy-induced heartburn:  A.  I will continue pantoprazole 40 mg twice a day.    B.  I will continue Carafate 1 g every 6 hours    11.  Treatment related diarrhea  A.  Continue Imodium and Lomotil as directed.  Education sheet was provided to the patient.      12.  Treatment induced peripheral neuropathy:   A.  We will continue gabapentin 100 mg 3 times daily.    FOLLOW UP: 4 weeks with port maintenance and labs.        Iban Lambert MD  10/18/2023

## 2023-10-18 NOTE — PROGRESS NOTES
Specialty Pharmacy Note - Double Check    Drug: Capecitabine  Strength: 500 mg  Directions: Take 2 tablets by mouth twice daily on days 1-14, then off 7 days on a 21 day cycle  QTY: 56  RF:11    Released to pharmacy: [facility] [place]    Completed independent double check on medication order/RX.

## 2023-10-18 NOTE — PROGRESS NOTES
Re: Refills of Oral Specialty Medication - Xeloda (capecitabine)    Drug-Drug Interactions: The current medication list was reviewed and there are no relevant drug-drug interactions with the specialty medication.  Medication Allergies: The patient has no relevant allergies as it relates to their oral specialty medication  Review of Labs/Dose Adjustments: DOSE CHANGE - I reviewed the most recent note and labs. Dr. Lambert is changing the frequency (previously on 1000 mg PO BID 1 week on/1 week off and repeat that in a 28-day cycle).    Drug: Xeloda (capecitabine)  Strength: 500 mg  Directions: Take 2 tablet by mouth twice a day in the AM and PM with food on days 1-14, then off 7 days in a 21-day cycle.  Quantity: 56  Refills: 11  Pharmacy prescription sent to: [facility]    Deepika Diaz, PharmD, [facility]  Oncology Clinical Pharmacist   Phone: [phone]      10/18/2023  12:41 EDT

## 2023-11-03 ENCOUNTER — SPECIALTY PHARMACY (OUTPATIENT)
Dept: ONCOLOGY | Facility: HOSPITAL | Age: 53
End: 2023-11-03
Payer: MEDICAID

## 2023-11-03 NOTE — PROGRESS NOTES
Specialty Pharmacy Refill Coordination Note     Amber is a 53 y.o. female contacted today regarding refills of  capecitabine 1,000 mg PO twice daily for 14 days then off for 7 days (21 day cycle) specialty medication(s).    Patient agreeable to medication being shipped to home address on 11/6 for delivery on 11/7. Signature is required per insurance- patient aware.     Specialty medication(s) and dose(s) confirmed: yes    Refill Questions      Flowsheet Row Most Recent Value   Changes to allergies? No   Changes to medications? No   New conditions since last clinic visit No   Unplanned office visit, urgent care, ED, or hospital admission in the last 4 weeks  No   How does patient/caregiver feel medication is working? Good   Financial problems or insurance changes  No   Since the previous refill, were any specialty medication doses or scheduled injections missed or delayed?  No   Does this patient require a clinical escalation to a pharmacist? No            Delivery Questions      Flowsheet Row Most Recent Value   Delivery method [employer]   Delivery address correct? Yes   Delivery phone number [phone]   Preferred delivery time? Anytime   Number of medications in delivery 1   Medication(s) being filled and delivered Capecitabine   Doses left of specialty medications about 7 days on 11/6   Is there any medication that is due not being filled? No   Supplies needed? No supplies needed   Cooler needed? No   Do any medications need mixed or dated? No   Additional comments verified $0 copay with patient   Questions or concerns for the pharmacist? No   Explain any questions or concerns for the pharmacist n/a   Are any medications first time fills? No                   Follow-up: 21 day(s)     Merlyn Frausto, Pharmacy Technician  Specialty Pharmacy Technician

## 2023-11-14 DIAGNOSIS — R11.2 NAUSEA AND VOMITING, UNSPECIFIED VOMITING TYPE: ICD-10-CM

## 2023-11-14 DIAGNOSIS — C78.7 METASTATIC COLON CANCER TO LIVER: ICD-10-CM

## 2023-11-14 DIAGNOSIS — K52.1 DIARRHEA DUE TO DRUG: ICD-10-CM

## 2023-11-14 DIAGNOSIS — C18.9 METASTATIC COLON CANCER TO LIVER: ICD-10-CM

## 2023-11-14 RX ORDER — ONDANSETRON 8 MG/1
TABLET, ORALLY DISINTEGRATING ORAL
Qty: 30 TABLET | Refills: 5 | Status: SHIPPED | OUTPATIENT
Start: 2023-11-14

## 2023-11-22 ENCOUNTER — HOSPITAL ENCOUNTER (OUTPATIENT)
Dept: CT IMAGING | Facility: HOSPITAL | Age: 53
Discharge: HOME | End: 2023-11-22
Payer: MEDICAID

## 2023-11-22 ENCOUNTER — HOSPITAL ENCOUNTER (OUTPATIENT)
Dept: CT IMAGING | Facility: HOSPITAL | Age: 53
Discharge: HOME | End: 2023-11-22
Admitting: INTERNAL MEDICINE
Payer: MEDICAID

## 2023-11-22 DIAGNOSIS — C18.2 CANCER OF RIGHT COLON: ICD-10-CM

## 2023-11-22 PROCEDURE — 74177 CT ABD & PELVIS W/CONTRAST: CPT

## 2023-11-22 PROCEDURE — 25510000001 IOPAMIDOL 61 % SOLUTION: Performed by: INTERNAL MEDICINE

## 2023-11-22 PROCEDURE — 71260 CT THORAX DX C+: CPT

## 2023-11-22 RX ADMIN — IOPAMIDOL 100 ML: 612 INJECTION, SOLUTION INTRAVENOUS at 17:22

## 2023-11-28 ENCOUNTER — SPECIALTY PHARMACY (OUTPATIENT)
Dept: ONCOLOGY | Facility: HOSPITAL | Age: 53
End: 2023-11-28
Payer: MEDICAID

## 2023-11-28 NOTE — PROGRESS NOTES
Specialty Pharmacy Refill Coordination Note     Amber is a 53 y.o. female contacted today regarding refills of  capecitabine 1000mg PO in the AM and PM with food on days 1-14, then off 7 days of 21 day cycle of specialty medication(s).      Specialty medication(s) and dose(s) confirmed: yes    Refill Questions      Flowsheet Row Most Recent Value   Changes to allergies? No   Changes to medications? No   New conditions since last clinic visit No   Unplanned office visit, urgent care, ED, or hospital admission in the last 4 weeks  No   How does patient/caregiver feel medication is working? Very good   Financial problems or insurance changes  No   Since the previous refill, were any specialty medication doses or scheduled injections missed or delayed?  No   Does this patient require a clinical escalation to a pharmacist? No            Delivery Questions      Flowsheet Row Most Recent Value   Delivery method [employer]   Delivery address correct? Yes   Delivery phone number [phone]   Preferred delivery time? Anytime   Number of medications in delivery 1   Medication(s) being filled and delivered Capecitabine   Doses left of specialty medications 7 days left then off week   Is there any medication that is due not being filled? No   Supplies needed? No supplies needed   Cooler needed? No   Do any medications need mixed or dated? No   Additional comments no copay   Questions or concerns for the pharmacist? No   Explain any questions or concerns for the pharmacist n/a   Are any medications first time fills? No                   Follow-up: 21 day(s)     Herminia Smith, Pharmacy Technician  Specialty Pharmacy Technician         Epidermal Autograft Text: The defect edges were debeveled with a #15 scalpel blade.  Given the location of the defect, shape of the defect and the proximity to free margins an epidermal autograft was deemed most appropriate.  Using a sterile surgical marker, the primary defect shape was transferred to the donor site. The epidermal graft was then harvested.  The skin graft was then placed in the primary defect and oriented appropriately.

## 2023-11-29 ENCOUNTER — OFFICE VISIT (OUTPATIENT)
Dept: ONCOLOGY | Facility: CLINIC | Age: 53
End: 2023-11-29
Payer: MEDICAID

## 2023-11-29 ENCOUNTER — LAB (OUTPATIENT)
Dept: ONCOLOGY | Facility: HOSPITAL | Age: 53
End: 2023-11-29
Payer: MEDICAID

## 2023-11-29 VITALS
SYSTOLIC BLOOD PRESSURE: 135 MMHG | WEIGHT: 161 LBS | HEART RATE: 92 BPM | TEMPERATURE: 97.8 F | HEIGHT: 62 IN | OXYGEN SATURATION: 98 % | BODY MASS INDEX: 29.63 KG/M2 | RESPIRATION RATE: 16 BRPM | DIASTOLIC BLOOD PRESSURE: 86 MMHG

## 2023-11-29 DIAGNOSIS — C18.9 METASTATIC COLON CANCER TO LIVER: ICD-10-CM

## 2023-11-29 DIAGNOSIS — C18.2 CANCER OF RIGHT COLON: Primary | ICD-10-CM

## 2023-11-29 DIAGNOSIS — C78.7 METASTATIC COLON CANCER TO LIVER: ICD-10-CM

## 2023-11-29 LAB
ALBUMIN SERPL-MCNC: 4.1 G/DL (ref 3.5–5.2)
ALBUMIN/GLOB SERPL: 1.2 G/DL
ALP SERPL-CCNC: 211 U/L (ref 39–117)
ALT SERPL W P-5'-P-CCNC: 35 U/L (ref 1–33)
ANION GAP SERPL CALCULATED.3IONS-SCNC: 9.6 MMOL/L (ref 5–15)
AST SERPL-CCNC: 33 U/L (ref 1–32)
BASOPHILS # BLD AUTO: 0.02 10*3/MM3 (ref 0–0.2)
BASOPHILS NFR BLD AUTO: 0.4 % (ref 0–1.5)
BILIRUB SERPL-MCNC: 0.6 MG/DL (ref 0–1.2)
BUN SERPL-MCNC: 11 MG/DL (ref 6–20)
BUN/CREAT SERPL: 12.6 (ref 7–25)
CALCIUM SPEC-SCNC: 9.2 MG/DL (ref 8.6–10.5)
CHLORIDE SERPL-SCNC: 106 MMOL/L (ref 98–107)
CO2 SERPL-SCNC: 25.4 MMOL/L (ref 22–29)
CREAT SERPL-MCNC: 0.87 MG/DL (ref 0.57–1)
DEPRECATED RDW RBC AUTO: 59.2 FL (ref 37–54)
EGFRCR SERPLBLD CKD-EPI 2021: 79.8 ML/MIN/1.73
EOSINOPHIL # BLD AUTO: 0.11 10*3/MM3 (ref 0–0.4)
EOSINOPHIL NFR BLD AUTO: 2.2 % (ref 0.3–6.2)
ERYTHROCYTE [DISTWIDTH] IN BLOOD BY AUTOMATED COUNT: 17.1 % (ref 12.3–15.4)
GLOBULIN UR ELPH-MCNC: 3.4 GM/DL
GLUCOSE SERPL-MCNC: 128 MG/DL (ref 65–99)
HCT VFR BLD AUTO: 35.5 % (ref 34–46.6)
HGB BLD-MCNC: 12 G/DL (ref 12–15.9)
IMM GRANULOCYTES # BLD AUTO: 0.01 10*3/MM3 (ref 0–0.05)
IMM GRANULOCYTES NFR BLD AUTO: 0.2 % (ref 0–0.5)
LYMPHOCYTES # BLD AUTO: 1.67 10*3/MM3 (ref 0.7–3.1)
LYMPHOCYTES NFR BLD AUTO: 33.4 % (ref 19.6–45.3)
MCH RBC QN AUTO: 32 PG (ref 26.6–33)
MCHC RBC AUTO-ENTMCNC: 33.8 G/DL (ref 31.5–35.7)
MCV RBC AUTO: 94.7 FL (ref 79–97)
MONOCYTES # BLD AUTO: 0.49 10*3/MM3 (ref 0.1–0.9)
MONOCYTES NFR BLD AUTO: 9.8 % (ref 5–12)
NEUTROPHILS NFR BLD AUTO: 2.7 10*3/MM3 (ref 1.7–7)
NEUTROPHILS NFR BLD AUTO: 54 % (ref 42.7–76)
NRBC BLD AUTO-RTO: 0 /100 WBC (ref 0–0.2)
PLATELET # BLD AUTO: 154 10*3/MM3 (ref 140–450)
PMV BLD AUTO: 10.3 FL (ref 6–12)
POTASSIUM SERPL-SCNC: 3.8 MMOL/L (ref 3.5–5.2)
PROT SERPL-MCNC: 7.5 G/DL (ref 6–8.5)
RBC # BLD AUTO: 3.75 10*6/MM3 (ref 3.77–5.28)
SODIUM SERPL-SCNC: 141 MMOL/L (ref 136–145)
WBC NRBC COR # BLD AUTO: 5 10*3/MM3 (ref 3.4–10.8)

## 2023-11-29 PROCEDURE — 80053 COMPREHEN METABOLIC PANEL: CPT

## 2023-11-29 PROCEDURE — 96523 IRRIG DRUG DELIVERY DEVICE: CPT

## 2023-11-29 PROCEDURE — 85025 COMPLETE CBC W/AUTO DIFF WBC: CPT

## 2023-11-29 PROCEDURE — 82378 CARCINOEMBRYONIC ANTIGEN: CPT

## 2023-11-29 PROCEDURE — 25010000002 HEPARIN LOCK FLUSH PER 10 UNITS: Performed by: INTERNAL MEDICINE

## 2023-11-29 PROCEDURE — 1126F AMNT PAIN NOTED NONE PRSNT: CPT | Performed by: INTERNAL MEDICINE

## 2023-11-29 PROCEDURE — 99214 OFFICE O/P EST MOD 30 MIN: CPT | Performed by: INTERNAL MEDICINE

## 2023-11-29 PROCEDURE — 36591 DRAW BLOOD OFF VENOUS DEVICE: CPT

## 2023-11-29 RX ORDER — SODIUM CHLORIDE 0.9 % (FLUSH) 0.9 %
10 SYRINGE (ML) INJECTION AS NEEDED
Status: DISCONTINUED | OUTPATIENT
Start: 2023-11-29 | End: 2023-11-29 | Stop reason: HOSPADM

## 2023-11-29 RX ORDER — SODIUM CHLORIDE 0.9 % (FLUSH) 0.9 %
10 SYRINGE (ML) INJECTION AS NEEDED
OUTPATIENT
Start: 2023-11-29

## 2023-11-29 RX ORDER — HEPARIN SODIUM (PORCINE) LOCK FLUSH IV SOLN 100 UNIT/ML 100 UNIT/ML
500 SOLUTION INTRAVENOUS AS NEEDED
Status: DISCONTINUED | OUTPATIENT
Start: 2023-11-29 | End: 2023-11-29 | Stop reason: HOSPADM

## 2023-11-29 RX ORDER — HEPARIN SODIUM (PORCINE) LOCK FLUSH IV SOLN 100 UNIT/ML 100 UNIT/ML
500 SOLUTION INTRAVENOUS AS NEEDED
OUTPATIENT
Start: 2023-11-29

## 2023-11-29 RX ADMIN — HEPARIN 500 UNITS: 100 SYRINGE at 14:47

## 2023-11-29 RX ADMIN — Medication 10 ML: at 14:47

## 2023-11-29 NOTE — PROGRESS NOTES
DATE OF VISIT: 11/29/2023    REASON FOR VISIT: Followup for metastatic right-sided colon cancer     PROBLEM LIST:  1. Metastatic colon cancer TX NX M1 a stage Perry:  A.  Presented with abdominal pain and jaundice  B.  CT abdomen pelvis done November 04, 2022 confirmed diffuse liver metastases.  C.  Diagnosed after CT-guided liver biopsy done on November 22, 2022 confirming adenocarcinoma CK20 positive CK7 negative.  D.  Colonoscopy done December 08, 2020 to confirm transverse colon mass however biopsy revealed adenoma.  E.  Started palliative chemotherapy with dose adjusted FOLFOXIRI December 14, 2022, status post 12 cycles completed May 2023.  F.  Started maintenance Xeloda 1000 mg twice daily 1 week on 1 week off June 2023  2.  Elevated liver enzymes:  A.  Induced by metastatic disease  3.  Cancer-related pain  4.  Anxiety  5.  Hypertension    HISTORY OF PRESENT ILLNESS: The patient is a very pleasant 53 y.o. female with past medical history significant for metastatic right-sided colon cancer diagnosed November 22, 2022.  Started on palliative chemotherapy with dose adjusted FOLFOXIRI.  She completed 12 cycles May 2023.  She was started on maintenance Xeloda 1000 mg twice daily June 2023.  The patient is here today for scheduled follow-up visit with maintenance treatment cycle number 3.    SUBJECTIVE: Amber is here today with her .  She has been able to tolerate her Xeloda fairly well.  She is now on 2 weeks on 1 week off schedule.  She is anxious about the scan result.    Past History:  Medical History: has a past medical history of Anxiety, Cancer, Ear piercing, Gallstones, History of irritable bowel syndrome, Hypertension, Seasonal allergies, Tattoos, and Wears glasses.   Surgical History: has a past surgical history that includes postpartum tubal ligation; Dilation and curettage of uterus (2013); Tumor removal (2013); Portacath placement (N/A, 12/8/2022); and Colonoscopy (N/A, 12/8/2022).   Family  "History: family history is not on file.   Social History: reports that she has never smoked. She has never used smokeless tobacco. She reports current alcohol use. She reports that she does not use drugs.    (Not in a hospital admission)     Allergies: Losartan, Valium [diazepam], and Citrus     Review of Systems   Constitutional:  Positive for fatigue.   Gastrointestinal:  Positive for diarrhea.   Musculoskeletal:  Positive for arthralgias.   Psychiatric/Behavioral:  The patient is nervous/anxious.        PHYSICAL EXAMINATION:   /86   Pulse 92   Temp 97.8 °F (36.6 °C)   Resp 16   Ht 157.5 cm (62\")   Wt 73 kg (161 lb)   SpO2 98%   BMI 29.45 kg/m²    Pain Score    11/29/23 1355   PainSc: 0-No pain                       ECOG Performance Status: 1 - Symptomatic but completely ambulatory      General Appearance:      alert, cooperative, no apparent distress and appears stated age   Lungs:   Clear to auscultation bilaterally; respirations regular, even, and unlabored bilaterally   Heart:  Regular rate and rhythm, no murmurs appreciated   Abdomen:   Soft, non-tender, and non-distended                 Lab on 11/29/2023   Component Date Value Ref Range Status    WBC 11/29/2023 5.00  3.40 - 10.80 10*3/mm3 Final    RBC 11/29/2023 3.75 (L)  3.77 - 5.28 10*6/mm3 Final    Hemoglobin 11/29/2023 12.0  12.0 - 15.9 g/dL Final    Hematocrit 11/29/2023 35.5  34.0 - 46.6 % Final    MCV 11/29/2023 94.7  79.0 - 97.0 fL Final    MCH 11/29/2023 32.0  26.6 - 33.0 pg Final    MCHC 11/29/2023 33.8  31.5 - 35.7 g/dL Final    RDW 11/29/2023 17.1 (H)  12.3 - 15.4 % Final    RDW-SD 11/29/2023 59.2 (H)  37.0 - 54.0 fl Final    MPV 11/29/2023 10.3  6.0 - 12.0 fL Final    Platelets 11/29/2023 154  140 - 450 10*3/mm3 Final    Neutrophil % 11/29/2023 54.0  42.7 - 76.0 % Final    Lymphocyte % 11/29/2023 33.4  19.6 - 45.3 % Final    Monocyte % 11/29/2023 9.8  5.0 - 12.0 % Final    Eosinophil % 11/29/2023 2.2  0.3 - 6.2 % Final    Basophil % " 11/29/2023 0.4  0.0 - 1.5 % Final    Immature Grans % 11/29/2023 0.2  0.0 - 0.5 % Final    Neutrophils, Absolute 11/29/2023 2.70  1.70 - 7.00 10*3/mm3 Final    Lymphocytes, Absolute 11/29/2023 1.67  0.70 - 3.10 10*3/mm3 Final    Monocytes, Absolute 11/29/2023 0.49  0.10 - 0.90 10*3/mm3 Final    Eosinophils, Absolute 11/29/2023 0.11  0.00 - 0.40 10*3/mm3 Final    Basophils, Absolute 11/29/2023 0.02  0.00 - 0.20 10*3/mm3 Final    Immature Grans, Absolute 11/29/2023 0.01  0.00 - 0.05 10*3/mm3 Final    nRBC 11/29/2023 0.0  0.0 - 0.2 /100 WBC Final        CT Chest With Contrast Diagnostic    Result Date: 11/22/2023  Narrative: PROCEDURE: CT CHEST W CONTRAST DIAGNOSTIC-  HISTORY: f/u scans; C18.2-Malignant neoplasm of ascending colon  COMPARISON: September 11, 2023  FINDINGS: Axial CT images of the chest were obtained with contrast. Coronal and sagittal reformatted images were also obtained. This study was performed with techniques to keep radiation doses as low as reasonably achievable, (ALARA). Individualized dose reduction techniques using automated exposure control or adjustment of mA and/or kV according to the patient size were employed.  A right subclavian deep line remains in place. There is no evidence of mediastinal or hilar mass or adenopathy. No axillary mass or adenopathy is identified. On the lung window images, no pulmonary mass or dominant pulmonary nodule is identified. No localized pulmonary inflammatory process is identified. No chest wall abnormality is identified. On the bone window images, stable small sclerotic foci are seen in the T2 vertebral body and mid sternum of uncertain etiology but may represent bone islands. No new bony abnormality is identified.       Impression: Stable appearance of the chest with no new mass or adenopathy identified to suggest neoplastic involvement.      CTDI: 6.97 mGy DLP:478.17 mGy.cm  This report was signed and finalized on 11/22/2023 7:33 PM by Ricardo Menendez MD.       CT Abdomen Pelvis With Contrast    Result Date: 11/22/2023  Narrative: PROCEDURE: CT ABDOMEN PELVIS W CONTRAST-  HISTORY: f/u scans; C18.2-Malignant neoplasm of ascending colon  Comparison: September 11, 2023  FINDINGS: Axial CT images of the abdomen and pelvis were obtained with IV contrast only. Coronal reformatted images were also obtained. This study was performed with techniques to keep radiation doses as low as reasonably achievable, (ALARA). Individualized dose reduction techniques using automated exposure control or adjustment of mA and/or kV according to the patient size were employed.  The lung bases are clear. Several partially calcified hepatic masses are again seen consistent with hepatic metastatic disease. A mass in the anterior segment of the right hepatic lobe measures 3.8 x 3.1 cm in maximum axial dimension and was previously 3.9 x 3.1 cm, stable. A mass in the inferior right hepatic lobe measures approximately 5.7 x 3.9 cm and was previously 5.7 x 3.8 cm. The other masses are visually stable. No new hepatic mass is identified. Probable cholesterol stones are seen in the gallbladder. There is no evidence of biliary ductal dilatation. The pancreas appears normal. The spleen size is within normal limits. There is no evidence of renal mass or hydronephrosis. There is no evidence of adenopathy. No abnormal fluid collection is seen. Stable stranding is seen adjacent to the hepatic flexure of the colon.  Images of the pelvis reveal no evidence of mass or adenopathy. No abnormal fluid collection is seen.      Impression: Stable partially calcified hepatic masses consistent with hepatic metastatic disease.  No new mass or adenopathy identified.  Cholelithiasis.      CTDI: 6.97 mGy DLP:478.17 mGy.cm  This report was signed and finalized on 11/22/2023 7:29 PM by Ricardo Menendez MD.         ASSESSMENT: The patient is a very pleasant 53 y.o. female  with right-sided metastatic colon cancer      PLAN:    1.   Right-sided metastatic colon cancer:  A.  I will continue maintenance treatment using Xeloda however I will change the schedule to 1000 mg twice a day 2 weeks on 1 week off.  B.  She will follow-up with us in 6 weeks to evaluate for treatment tolerance.  C.  I will continue to monitor the patient blood work including blood counts kidney function liver function and electrolytes.  D.  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  While the patient may not benefit from immunotherapy however she will be a candidate for EGFR inhibitors down the road.  E.  I did go over her circulating tumor DNA assay from October 18, 2023 unfortunately it did increase to 0.96.  I will continue monitor this every 6 weeks at this point.  F.  I did go over the scan results with the patient from November 22, 2023 and reassured her no evidence of new or progressive disease but she could have stable liver metastases.  I will do 3 months follow-up scan which should be due end of March 2024.  G. I did go over the blood work results with the patient from September 14, 2023.  Her CMP revealed normal electrolytes and kidney function with slight elevated liver enzymes alkaline phosphatase 196.    2.  Elevated liver enzymes:  A.  Induced by liver metastases  B. I will repeat labs before next infusion.    3.  Chemotherapy-induced nausea:  A.  I will continue the patient on Zofran as needed for    4.  Chemotherapy-induced diarrhea:  A.  I will continue the patient on Imodium as needed    5. Cancer-related pain:  A.  I will continue the patient oxycodone 5 mg twice a day.     6.  Anxiety:  A.  I will continue the patient on Ativan 1 mg nightly as needed.       7.  Hypertension:  A.  She will continue losartan.  B.  I reviewed with the patient this will interfere with our management since chemotherapy typically can cause hypotension we will have to monitor blood  pressure closely.    8.  Dehydration:  A.  I will continue 1 L of IV fluid as needed    9.  Chemotherapy-induced anemia:  A.  I discussed with the patient her CBC result from September 14, 2023 hemoglobin is mild decreased at 11.4.  B.  I will continue monitor her CBC prior to each infusion.  C.  I will transfuse as needed for hemoglobin less than 8.      10.  Chemotherapy-induced heartburn:  A.  I will continue pantoprazole 40 mg twice a day.    B.  I will continue Carafate 1 g every 6 hours    11.  Treatment related diarrhea  A.  Continue Imodium and Lomotil as directed.  Education sheet was provided to the patient.      12.  Treatment induced peripheral neuropathy:   A.  We will continue gabapentin 100 mg 3 times daily.    FOLLOW UP: 6 weeks with port maintenance and labs.        Iban Lambert MD  11/29/2023

## 2023-11-30 ENCOUNTER — SPECIALTY PHARMACY (OUTPATIENT)
Dept: ONCOLOGY | Facility: HOSPITAL | Age: 53
End: 2023-11-30
Payer: MEDICAID

## 2023-11-30 LAB — CEA SERPL-MCNC: 1.9 NG/ML

## 2023-12-01 ENCOUNTER — SPECIALTY PHARMACY (OUTPATIENT)
Dept: ONCOLOGY | Facility: HOSPITAL | Age: 53
End: 2023-12-01
Payer: MEDICAID

## 2023-12-01 NOTE — PROGRESS NOTES
Specialty Pharmacy Patient Management Program  Oncology 6-Month Clinical Assessment       Amber Feng is a 53 y.o. female with metastatic colon cancer seen today to assess adherence and side effects.    Reason for Outreach: Routine medication check-in .    Regimen: Capecitabine 500 mg tablets - Take 2 tablets by mouth twice daily with food on days 1-14, then off 7 days in a 21-day cycle    Specialty Pharmacy Goal-ON TRACK   Goals Addressed Today         Specialty Pharmacy General Goal (pt-stated)       Clinical goal/therapeutic target: stable or decreasing CEA and disease control, per the recent oncology clinic notes and labs. {  CEA   Date Value Ref Range Status   11/29/2023 1.90 ng/mL Final   10/18/2023 2.15 ng/mL Final   09/14/2023 2.15 ng/mL Final   08/17/2023 2.09 ng/mL Final   07/19/2023 2.22 ng/mL Final   06/07/2023 3.45 ng/mL Final   05/17/2023 4.15 ng/mL Final   05/03/2023 4.74 ng/mL Final   04/18/2023 4.47 ng/mL Final   04/05/2023 4.27 ng/mL Final   ]                  Problem List   Problem list reviewed by Suri Diaz [facility] on 12/1/2023 at 12:58 PM  Patient Active Problem List   Diagnosis Code    Cancer of right colon C18.2    Metastatic colon cancer to liver C18.9, C78.7    Metastatic cancer C79.9       Medication Assessment for Specialty Medication(s):  Medication Assessment  Follow Up Clinical Assessment  Linked Medication(s) Assessed: Capecitabine  Therapeutic appropriateness: Appropriate  Medication tolerability: Patient reported common adverse drug reaction  Common ADR(s) experienced: (1) Has diarrhea sometimes (also has IBS) but PRN loperamide works to control this (2) has very persistent nausea but PRN ondansetron and prochlorperazine work to control this (3) she mentions having feelings of gastritis again like she did in the past and forgets what medicine she took that helped (I think it's omeprazole and maybe sucralfate), she wasn't at home to check what medications she had on  hand but [facility] team will F/U with her about this (4) HFS - described her feet as having cracks that go pretty deep (denied bleeding or infection) and were painful.  She notes her feet are still mostly numb d/t prior oxaliplatin, but even despite that she can feel the pain of these cracks.  She said she just cannot wear socks/shoes while at home and has to go barefoot.  I explained that this friction could be contributing to these cracks on her feet.  She's currently using lotion on feet at times when she showers, but I recommended getting a non-scented moisturizing cream and applying gently to hands and feet morning and evening.  I also recommended she watch her feet closely and if the cracks do start bleeding or look infected to call the clinic asap.  She expressed her understanding of this.  Medication plan: Continue therapy with closer monitoring  Quality of Life Improvement Scale: Significantly better  Administration: Patient is taking every day at the same time , with food , and twice daily.   Patient can self administer medications: Yes  Medication Follow-Up Plan: Refill coordination  Lab Review: The labs listed below have been reviewed. No dose adjustments are needed for the oral specialty medication(s) based on the labs.    Lab Results   Component Value Date    GLUCOSE 128 (H) 11/29/2023    CALCIUM 9.2 11/29/2023     11/29/2023    K 3.8 11/29/2023    CO2 25.4 11/29/2023     11/29/2023    BUN 11 11/29/2023    CREATININE 0.87 11/29/2023    BCR 12.6 11/29/2023    ANIONGAP 9.6 11/29/2023     Lab Results   Component Value Date    WBC 5.00 11/29/2023    RBC 3.75 (L) 11/29/2023    HGB 12.0 11/29/2023    HCT 35.5 11/29/2023    MCV 94.7 11/29/2023    MCH 32.0 11/29/2023    MCHC 33.8 11/29/2023    RDW 17.1 (H) 11/29/2023    RDWSD 59.2 (H) 11/29/2023    MPV 10.3 11/29/2023     11/29/2023    NEUTRORELPCT 54.0 11/29/2023    LYMPHORELPCT 33.4 11/29/2023    MONORELPCT 9.8 11/29/2023    EOSRELPCT 2.2  11/29/2023    BASORELPCT 0.4 11/29/2023    AUTOIGPER 0.2 11/29/2023    NEUTROABS 2.70 11/29/2023    LYMPHSABS 1.67 11/29/2023    MONOSABS 0.49 11/29/2023    EOSABS 0.11 11/29/2023    BASOSABS 0.02 11/29/2023    AUTOIGNUM 0.01 11/29/2023    NRBC 0.0 11/29/2023     Drug-drug interactions  Completed medication reconciliation today to assess for drug interactions. Patient denies starting or stopping any medications.    Assessed medication list for interactions, no significant drug interactions noted.   Advised patient to call the clinic if any new medications are started so we can assess for drug-drug interactions.  Drug-food interactions discussed: None  Vaccines are coordinated by the patient's oncologist and primary care provider.    Medications   Medicines reviewed by Suri Diaz [facility] on 12/1/2023 at 12:58 PM  Prior to Admission medications    Medication Sig Start Date End Date Taking? Authorizing Provider   albuterol sulfate  (90 Base) MCG/ACT inhaler Inhale 2 puffs Every 4 (Four) Hours As Needed for Shortness of Air (chest tightness). 2/3/22   Kayleigh Mooney PA   capecitabine (XELODA) 500 MG chemo tablet Take 2 tablets by mouth 2 (Two) Times a Day. in the AM and PM with food on days 1-14, then off 7 days in a 21-day cycle. 10/18/23   Iban Lambert MD   diphenoxylate-atropine (LOMOTIL) 2.5-0.025 MG per tablet Take 1 tablet by mouth 4 (Four) Times a Day As Needed for Diarrhea. 2/22/23   Ana Cristina Cano APRN   gabapentin (NEURONTIN) 100 MG capsule Take 1 capsule by mouth 3 (Three) Times a Day. 7/13/23   Ana Cristina Cano APRN   HYDROcodone-acetaminophen (NORCO) 5-325 MG per tablet Take 1-2 tablets by mouth Every 4 (Four) Hours As Needed For Pain 12/8/22   Sea Valentin MD   lidocaine-prilocaine (EMLA) 2.5-2.5 % cream Apply 1 application topically to the appropriate area as directed As Needed (45-60 minutes prior to port access.  Cover with saran/plastic wrap.). 5/12/23   Iban Lambert MD  "  LORazepam (Ativan) 1 MG tablet Take 1 tablet by mouth every night at bedtime. 12/5/22   Iban Lambert MD   multivitamin with minerals tablet tablet Take 1 tablet by mouth Daily.    Provider, MD Jamari   magic mouthwash oral suspension Swish and Spit or Swallow 5-10 mL 4 (Four) Times a Day As Needed. 4/18/23   Ana Cristina Cano APRN   ondansetron ODT (ZOFRAN-ODT) 8 MG disintegrating tablet DISSOLVE 1 TABLET IN MOUTH EVERY 8 HOURS AS NEEDED FOR NAUSEA FOR VOMITING 11/14/23   Ana Cristina Cano APRN   oxyCODONE (ROXICODONE) 5 MG immediate release tablet Take 1 tablet by mouth 2 (Two) Times a Day As Needed for Moderate Pain. 12/5/22   Iban Lambert MD   potassium chloride (K-DUR,KLOR-CON) 10 MEQ CR tablet Take 2 tablets by mouth once daily 4/19/23   Ana Cristina Cano APRN   prochlorperazine (COMPAZINE) 10 MG tablet Take 1 tablet by mouth Every 6 (Six) Hours As Needed for Nausea or Vomiting. 12/15/22   Ana Cristina Cano APRN   sucralfate (Carafate) 1 GM/10ML suspension Take 10 mL by mouth 4 (Four) Times a Day. 12/30/22   Iban Lambert MD   ondansetron ODT (ZOFRAN-ODT) 8 MG disintegrating tablet Place 1 tablet on the tongue Every 8 (Eight) Hours As Needed for Nausea or Vomiting. 7/13/23 11/14/23  Ana Cristina Cano APRN       Allergies  Known allergies and reactions were discussed with the patient. The patient's chart has been reviewed for allergy information and updated as necessary.   Allergies   Allergen Reactions    Losartan Nausea Only, Other (See Comments) and Headache     Pt states within an hour after use blood pressure would \"skyrocket\" also nausea - states diastolic pressures in 100's    Valium [Diazepam] Anaphylaxis    Citrus GI Intolerance     Especially oranges         Allergies reviewed by Suri Diaz [facility] on 12/1/2023 at 12:58 PM    Hospitalizations and Urgent Care Visits Since Last Assessment:  Unplanned hospitalizations or inpatient admissions: 0  ED Visits: 0  Urgent Office Visits: " 0    Adherence Assessment:  Adherence Questions  Linked Medication(s) Assessed: Capecitabine  On average, how many doses/injections does the patient miss per month?: 0  What are the identified reasons for non-adherence or missed doses? : no problems identified  What is the estimated medication adherence level?: % (%)  Based on the patient/caregiver response and refill history, does this patient require an MTP to track adherence improvements?: no    Quality of Life Assessment:  Quality of Life Assessment  Quality of Life Improvement Scale: Significantly better  -- Quality of Life: 10/10    Financial Assessment:  Medication availability/affordability: Patient has had no issues obtaining medication from pharmacy.    Attestation:  I attest the patient was actively involved in and has agreed to the above plan of care. If the prescribed therapy is at any point deemed not appropriate based on the current or future assessments, a consultation will be initiated with the patient's specialty care provider to determine the best course of action. The revised plan of therapy will be documented along with any required assessments and/or additional patient education provided.       All questions addressed and patient had no additional concerns. Patient has pharmacy contact information.    Deepika Diaz, [place], [facility]  Oncology Clinical Pharmacist   Phone: [phone]      12/1/2023  13:08 EST

## 2023-12-18 ENCOUNTER — SPECIALTY PHARMACY (OUTPATIENT)
Dept: ONCOLOGY | Facility: HOSPITAL | Age: 53
End: 2023-12-18
Payer: MEDICAID

## 2023-12-18 NOTE — PROGRESS NOTES
Specialty Pharmacy Refill Coordination Note     Amber is a 53 y.o. female contacted today regarding refills of  capecitabine 1000mg PO BID in the AM and PM on days 1-14, then off 7 days of 21 days cycle of specialty medication(s).      Specialty medication(s) and dose(s) confirmed: yes    Refill Questions      Flowsheet Row Most Recent Value   Changes to allergies? No   Changes to medications? No   New conditions or infections since last clinic visit No   Unplanned office visit, urgent care, ED, or hospital admission in the last 4 weeks  No   How does patient/caregiver feel medication is working? Good   Financial problems or insurance changes  No   Since the previous refill, were any specialty medication doses or scheduled injections missed or delayed?  No   Does this patient require a clinical escalation to a pharmacist? No            Delivery Questions      Flowsheet Row Most Recent Value   Delivery method [employer]   Delivery address verified with patient/caregiver? Yes   Delivery address Home   Number of medications in delivery 1   Medication(s) being filled and delivered Capecitabine   Doses left of specialty medications Patient on off week of cycle   Copay verified? Yes   Copay amount $0   Copay form of payment No copayment ($0)                   Follow-up: 21 day(s)     Herminia Smith, Pharmacy Technician  Specialty Pharmacy Technician

## 2023-12-21 ENCOUNTER — SPECIALTY PHARMACY (OUTPATIENT)
Dept: ONCOLOGY | Facility: HOSPITAL | Age: 53
End: 2023-12-21
Payer: MEDICAID

## 2023-12-21 ENCOUNTER — TELEPHONE (OUTPATIENT)
Dept: ONCOLOGY | Facility: CLINIC | Age: 53
End: 2023-12-21
Payer: MEDICAID

## 2023-12-21 RX ORDER — CYCLOBENZAPRINE HCL 5 MG
5 TABLET ORAL 3 TIMES DAILY PRN
Qty: 30 TABLET | Refills: 0 | Status: SHIPPED | OUTPATIENT
Start: 2023-12-21 | End: 2024-01-20

## 2023-12-21 NOTE — TELEPHONE ENCOUNTER
Called patient to discuss pain reported.  Patient stated she is having lower back pain on the left side in the kidney area, right about buttock area.  She stated it has been intermittent and has increased and been more intense over the last few days.  She stated she does not like pain medications that she has oxycodone ordered and usually will only take 0.5 tablet at night when needed but yesterday she had to take 0.5 tablet during the day.  She stated it brought her pain from an 8-9 on a 1/10 scale down to a 2-3 on a 0/10 scale.  She stated Dr. Lambert didn't want to prescribe her hydrocodone and she doesn't want it either.  Patient denied fever/chills or any urinary symptoms/UTI symptoms.  Patient just wanted to know what it is that's causing it and wanted it noted.  She stated she doesn't think she lifted anything or twisted any way that would have done it. Dr. Saldaña, on call physician, notified of the above.  Per Dr. Saldaña, patient advised that he reviewed her last scans and don't see anything that would be causing her symptoms, that it may be muscular.  New prescription sent to her pharmacy for Flexeril and she should start with 1 tablet as needed and that it will make her sleepy.  Advised her per MD that she can try this for a couple of day and see if it helps, if not, contact this office. Patient verbalized understanding.

## 2023-12-21 NOTE — TELEPHONE ENCOUNTER
----- Message from Shara Milan RN sent at 12/21/2023  2:05 PM EST -----  Regarding: FW: Please call the patient    ----- Message -----  From: Suri Diaz [facility]  Sent: 12/21/2023   1:28 PM EST  To: Wanda Stringer RN  Subject: Please call the patient                          Dani Muñoz,    She's having some pain and she'd like to discuss this further.    Can you please reach out to her?    Thanks,  Deepika

## 2024-01-03 ENCOUNTER — SPECIALTY PHARMACY (OUTPATIENT)
Dept: ONCOLOGY | Facility: HOSPITAL | Age: 54
End: 2024-01-03
Payer: MEDICAID

## 2024-01-03 NOTE — PROGRESS NOTES
Specialty Pharmacy Refill Coordination Note     Amber is a 53 y.o. female contacted today regarding refills of capecitabine 1,000 mg PO twice daily specialty medication(s).    Signature required upon delivery and patient has been made aware. Delivery to home address scheduled for 1/9/2024 (pt going out of town this weekend).    Specialty medication(s) and dose(s) confirmed: yes    Refill Questions      Flowsheet Row Most Recent Value   Changes to allergies? No   Changes to medications? No   New conditions or infections since last clinic visit No   Unplanned office visit, urgent care, ED, or hospital admission in the last 4 weeks  No   How does patient/caregiver feel medication is working? Good   Financial problems or insurance changes  No   Since the previous refill, were any specialty medication doses or scheduled injections missed or delayed?  No   Does this patient require a clinical escalation to a pharmacist? No            Delivery Questions      Flowsheet Row Most Recent Value   Delivery method [employer]   Delivery address verified with patient/caregiver? Yes   Delivery address Home   Number of medications in delivery 1   Doses left of specialty medications about 8 days left   Copay verified? Yes   Copay amount $0 copay verified with patient   Copay form of payment No copayment ($0)                   Follow-up: 21 day(s)     Merlyn Frausto, Pharmacy Technician  Specialty Pharmacy Technician

## 2024-01-10 ENCOUNTER — OFFICE VISIT (OUTPATIENT)
Dept: ONCOLOGY | Facility: CLINIC | Age: 54
End: 2024-01-10
Payer: MEDICAID

## 2024-01-10 ENCOUNTER — LAB (OUTPATIENT)
Dept: ONCOLOGY | Facility: HOSPITAL | Age: 54
End: 2024-01-10
Payer: MEDICAID

## 2024-01-10 VITALS
WEIGHT: 170 LBS | BODY MASS INDEX: 31.28 KG/M2 | HEIGHT: 62 IN | RESPIRATION RATE: 12 BRPM | DIASTOLIC BLOOD PRESSURE: 80 MMHG | HEART RATE: 82 BPM | SYSTOLIC BLOOD PRESSURE: 132 MMHG | OXYGEN SATURATION: 99 % | TEMPERATURE: 97.5 F

## 2024-01-10 DIAGNOSIS — C18.2 CANCER OF RIGHT COLON: Primary | ICD-10-CM

## 2024-01-10 DIAGNOSIS — C78.7 METASTATIC COLON CANCER TO LIVER: ICD-10-CM

## 2024-01-10 DIAGNOSIS — C18.9 METASTATIC COLON CANCER TO LIVER: ICD-10-CM

## 2024-01-10 LAB
ALBUMIN SERPL-MCNC: 4.2 G/DL (ref 3.5–5.2)
ALBUMIN/GLOB SERPL: 1.1 G/DL
ALP SERPL-CCNC: 204 U/L (ref 39–117)
ALT SERPL W P-5'-P-CCNC: 25 U/L (ref 1–33)
ANION GAP SERPL CALCULATED.3IONS-SCNC: 9.2 MMOL/L (ref 5–15)
AST SERPL-CCNC: 33 U/L (ref 1–32)
BASOPHILS # BLD AUTO: 0.02 10*3/MM3 (ref 0–0.2)
BASOPHILS NFR BLD AUTO: 0.3 % (ref 0–1.5)
BILIRUB SERPL-MCNC: 0.6 MG/DL (ref 0–1.2)
BUN SERPL-MCNC: 16 MG/DL (ref 6–20)
BUN/CREAT SERPL: 20 (ref 7–25)
CALCIUM SPEC-SCNC: 9.2 MG/DL (ref 8.6–10.5)
CHLORIDE SERPL-SCNC: 106 MMOL/L (ref 98–107)
CO2 SERPL-SCNC: 26.8 MMOL/L (ref 22–29)
CREAT SERPL-MCNC: 0.8 MG/DL (ref 0.57–1)
DEPRECATED RDW RBC AUTO: 56.2 FL (ref 37–54)
EGFRCR SERPLBLD CKD-EPI 2021: 88.2 ML/MIN/1.73
EOSINOPHIL # BLD AUTO: 0.11 10*3/MM3 (ref 0–0.4)
EOSINOPHIL NFR BLD AUTO: 1.8 % (ref 0.3–6.2)
ERYTHROCYTE [DISTWIDTH] IN BLOOD BY AUTOMATED COUNT: 16.3 % (ref 12.3–15.4)
GLOBULIN UR ELPH-MCNC: 3.7 GM/DL
GLUCOSE SERPL-MCNC: 98 MG/DL (ref 65–99)
HCT VFR BLD AUTO: 35.3 % (ref 34–46.6)
HGB BLD-MCNC: 12.3 G/DL (ref 12–15.9)
IMM GRANULOCYTES # BLD AUTO: 0.02 10*3/MM3 (ref 0–0.05)
IMM GRANULOCYTES NFR BLD AUTO: 0.3 % (ref 0–0.5)
LYMPHOCYTES # BLD AUTO: 1.86 10*3/MM3 (ref 0.7–3.1)
LYMPHOCYTES NFR BLD AUTO: 30.8 % (ref 19.6–45.3)
MCH RBC QN AUTO: 32.7 PG (ref 26.6–33)
MCHC RBC AUTO-ENTMCNC: 34.8 G/DL (ref 31.5–35.7)
MCV RBC AUTO: 93.9 FL (ref 79–97)
MONOCYTES # BLD AUTO: 0.46 10*3/MM3 (ref 0.1–0.9)
MONOCYTES NFR BLD AUTO: 7.6 % (ref 5–12)
NEUTROPHILS NFR BLD AUTO: 3.57 10*3/MM3 (ref 1.7–7)
NEUTROPHILS NFR BLD AUTO: 59.2 % (ref 42.7–76)
NRBC BLD AUTO-RTO: 0 /100 WBC (ref 0–0.2)
PLATELET # BLD AUTO: 133 10*3/MM3 (ref 140–450)
PMV BLD AUTO: 10.1 FL (ref 6–12)
POTASSIUM SERPL-SCNC: 4.4 MMOL/L (ref 3.5–5.2)
PROT SERPL-MCNC: 7.9 G/DL (ref 6–8.5)
RBC # BLD AUTO: 3.76 10*6/MM3 (ref 3.77–5.28)
SODIUM SERPL-SCNC: 142 MMOL/L (ref 136–145)
WBC NRBC COR # BLD AUTO: 6.04 10*3/MM3 (ref 3.4–10.8)

## 2024-01-10 PROCEDURE — 1160F RVW MEDS BY RX/DR IN RCRD: CPT | Performed by: NURSE PRACTITIONER

## 2024-01-10 PROCEDURE — 1159F MED LIST DOCD IN RCRD: CPT | Performed by: NURSE PRACTITIONER

## 2024-01-10 PROCEDURE — 80053 COMPREHEN METABOLIC PANEL: CPT

## 2024-01-10 PROCEDURE — 25010000002 HEPARIN LOCK FLUSH PER 10 UNITS: Performed by: NURSE PRACTITIONER

## 2024-01-10 PROCEDURE — 99214 OFFICE O/P EST MOD 30 MIN: CPT | Performed by: NURSE PRACTITIONER

## 2024-01-10 PROCEDURE — 1126F AMNT PAIN NOTED NONE PRSNT: CPT | Performed by: NURSE PRACTITIONER

## 2024-01-10 PROCEDURE — 82378 CARCINOEMBRYONIC ANTIGEN: CPT

## 2024-01-10 PROCEDURE — 36415 COLL VENOUS BLD VENIPUNCTURE: CPT

## 2024-01-10 PROCEDURE — 36591 DRAW BLOOD OFF VENOUS DEVICE: CPT

## 2024-01-10 PROCEDURE — 85025 COMPLETE CBC W/AUTO DIFF WBC: CPT

## 2024-01-10 RX ORDER — SODIUM CHLORIDE 0.9 % (FLUSH) 0.9 %
10 SYRINGE (ML) INJECTION AS NEEDED
OUTPATIENT
Start: 2024-01-10

## 2024-01-10 RX ORDER — SODIUM CHLORIDE 0.9 % (FLUSH) 0.9 %
10 SYRINGE (ML) INJECTION AS NEEDED
Status: DISCONTINUED | OUTPATIENT
Start: 2024-01-10 | End: 2024-01-10 | Stop reason: HOSPADM

## 2024-01-10 RX ORDER — HEPARIN SODIUM (PORCINE) LOCK FLUSH IV SOLN 100 UNIT/ML 100 UNIT/ML
500 SOLUTION INTRAVENOUS AS NEEDED
Status: DISCONTINUED | OUTPATIENT
Start: 2024-01-10 | End: 2024-01-10 | Stop reason: HOSPADM

## 2024-01-10 RX ORDER — HEPARIN SODIUM (PORCINE) LOCK FLUSH IV SOLN 100 UNIT/ML 100 UNIT/ML
500 SOLUTION INTRAVENOUS AS NEEDED
OUTPATIENT
Start: 2024-01-10

## 2024-01-10 RX ADMIN — Medication 10 ML: at 15:58

## 2024-01-10 RX ADMIN — HEPARIN 500 UNITS: 100 SYRINGE at 15:57

## 2024-01-10 NOTE — PROGRESS NOTES
DATE OF VISIT: 1/10/2024    REASON FOR VISIT: Followup for metastatic right-sided colon cancer     PROBLEM LIST:  1. Metastatic colon cancer TX NX M1 a stage Perry:  A.  Presented with abdominal pain and jaundice  B.  CT abdomen pelvis done November 04, 2022 confirmed diffuse liver metastases.  C.  Diagnosed after CT-guided liver biopsy done on November 22, 2022 confirming adenocarcinoma CK20 positive CK7 negative.  D.  Colonoscopy done December 08, 2020 to confirm transverse colon mass however biopsy revealed adenoma.  E.  Started palliative chemotherapy with dose adjusted FOLFOXIRI December 14, 2022, status post 12 cycles completed May 2023.  F.  Started maintenance Xeloda 1000 mg twice daily 1 week on 1 week off June 2023  2.  Elevated liver enzymes:  A.  Induced by metastatic disease  3.  Cancer-related pain  4.  Anxiety  5.  Hypertension    HISTORY OF PRESENT ILLNESS: The patient is a very pleasant 53 y.o. female with past medical history significant for metastatic right-sided colon cancer diagnosed November 22, 2022.  Started on palliative chemotherapy with dose adjusted FOLFOXIRI.  She completed 12 cycles May 2023.  She was started on maintenance Xeloda 1000 mg twice daily June 2023.  The patient is here today for scheduled follow-up visit with maintenance treatment cycle number 4.    SUBJECTIVE: Amber is here today with her .  She has been able to tolerate her Xeloda fairly well.  She is now on 2 weeks on 1 week off schedule.  She is anxious about the scan result.    Past History:  Medical History: has a past medical history of Anxiety, Cancer, Ear piercing, Gallstones, History of irritable bowel syndrome, Hypertension, Seasonal allergies, Tattoos, and Wears glasses.   Surgical History: has a past surgical history that includes postpartum tubal ligation; Dilation and curettage of uterus (2013); Tumor removal (2013); Portacath placement (N/A, 12/8/2022); and Colonoscopy (N/A, 12/8/2022).   Family  "History: family history is not on file.   Social History: reports that she has never smoked. She has never used smokeless tobacco. She reports current alcohol use. She reports that she does not use drugs.    (Not in a hospital admission)     Allergies: Losartan, Valium [diazepam], and Citrus     Review of Systems   Constitutional:  Positive for fatigue.   Gastrointestinal:  Positive for diarrhea.   Musculoskeletal:  Positive for arthralgias.   Psychiatric/Behavioral:  The patient is nervous/anxious.        PHYSICAL EXAMINATION:   /80   Pulse 82   Temp 97.5 °F (36.4 °C)   Resp 12   Ht 157.5 cm (62\")   Wt 77.1 kg (170 lb)   SpO2 99%   BMI 31.09 kg/m²    Pain Score    01/10/24 1413   PainSc: 0-No pain                         ECOG Performance Status: 1 - Symptomatic but completely ambulatory      General Appearance:      alert, cooperative, no apparent distress and appears stated age   Lungs:   Clear to auscultation bilaterally; respirations regular, even, and unlabored bilaterally   Heart:  Regular rate and rhythm, no murmurs appreciated   Abdomen:   Soft, non-tender, and non-distended                 No visits with results within 2 Week(s) from this visit.   Latest known visit with results is:   Lab on 11/29/2023   Component Date Value Ref Range Status    Glucose 11/29/2023 128 (H)  65 - 99 mg/dL Final    BUN 11/29/2023 11  6 - 20 mg/dL Final    Creatinine 11/29/2023 0.87  0.57 - 1.00 mg/dL Final    Sodium 11/29/2023 141  136 - 145 mmol/L Final    Potassium 11/29/2023 3.8  3.5 - 5.2 mmol/L Final    Chloride 11/29/2023 106  98 - 107 mmol/L Final    CO2 11/29/2023 25.4  22.0 - 29.0 mmol/L Final    Calcium 11/29/2023 9.2  8.6 - 10.5 mg/dL Final    Total Protein 11/29/2023 7.5  6.0 - 8.5 g/dL Final    Albumin 11/29/2023 4.1  3.5 - 5.2 g/dL Final    ALT (SGPT) 11/29/2023 35 (H)  1 - 33 U/L Final    AST (SGOT) 11/29/2023 33 (H)  1 - 32 U/L Final    Alkaline Phosphatase 11/29/2023 211 (H)  39 - 117 U/L Final    " Total Bilirubin 11/29/2023 0.6  0.0 - 1.2 mg/dL Final    Globulin 11/29/2023 3.4  gm/dL Final    A/G Ratio 11/29/2023 1.2  g/dL Final    BUN/Creatinine Ratio 11/29/2023 12.6  7.0 - 25.0 Final    Anion Gap 11/29/2023 9.6  5.0 - 15.0 mmol/L Final    eGFR 11/29/2023 79.8  >60.0 mL/min/1.73 Final    CEA 11/29/2023 1.90  ng/mL Final    WBC 11/29/2023 5.00  3.40 - 10.80 10*3/mm3 Final    RBC 11/29/2023 3.75 (L)  3.77 - 5.28 10*6/mm3 Final    Hemoglobin 11/29/2023 12.0  12.0 - 15.9 g/dL Final    Hematocrit 11/29/2023 35.5  34.0 - 46.6 % Final    MCV 11/29/2023 94.7  79.0 - 97.0 fL Final    MCH 11/29/2023 32.0  26.6 - 33.0 pg Final    MCHC 11/29/2023 33.8  31.5 - 35.7 g/dL Final    RDW 11/29/2023 17.1 (H)  12.3 - 15.4 % Final    RDW-SD 11/29/2023 59.2 (H)  37.0 - 54.0 fl Final    MPV 11/29/2023 10.3  6.0 - 12.0 fL Final    Platelets 11/29/2023 154  140 - 450 10*3/mm3 Final    Neutrophil % 11/29/2023 54.0  42.7 - 76.0 % Final    Lymphocyte % 11/29/2023 33.4  19.6 - 45.3 % Final    Monocyte % 11/29/2023 9.8  5.0 - 12.0 % Final    Eosinophil % 11/29/2023 2.2  0.3 - 6.2 % Final    Basophil % 11/29/2023 0.4  0.0 - 1.5 % Final    Immature Grans % 11/29/2023 0.2  0.0 - 0.5 % Final    Neutrophils, Absolute 11/29/2023 2.70  1.70 - 7.00 10*3/mm3 Final    Lymphocytes, Absolute 11/29/2023 1.67  0.70 - 3.10 10*3/mm3 Final    Monocytes, Absolute 11/29/2023 0.49  0.10 - 0.90 10*3/mm3 Final    Eosinophils, Absolute 11/29/2023 0.11  0.00 - 0.40 10*3/mm3 Final    Basophils, Absolute 11/29/2023 0.02  0.00 - 0.20 10*3/mm3 Final    Immature Grans, Absolute 11/29/2023 0.01  0.00 - 0.05 10*3/mm3 Final    nRBC 11/29/2023 0.0  0.0 - 0.2 /100 WBC Final        No results found.      ASSESSMENT: The patient is a very pleasant 53 y.o. female  with right-sided metastatic colon cancer      PLAN:    1.  Right-sided metastatic colon cancer:  A.  I will continue maintenance treatment using Xeloda however I will continue with 1000 mg twice a day 2 weeks on 1  week off.  B.  She will follow-up with us in 6 weeks to evaluate for treatment tolerance.  C.  I will continue to monitor the patient blood work including blood counts kidney function liver function and electrolytes.  D.  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  While the patient may not benefit from immunotherapy however she will be a candidate for EGFR inhibitors down the road.  E.  I did go over her circulating tumor DNA assay from November 29 , 2023 unfortunately it did increase to 1.7.  I will continue monitor this every 6 weeks at this point.  F. I will do 3 months follow-up scan which should be due end of February 2024.  I will order prior to return.   G. I did go over the blood work results with the patient from November 29 , 2023.  Her CMP revealed normal electrolytes and kidney function with slight elevated liver enzymes alkaline phosphatase 211.    2.  Elevated liver enzymes:  A.  Induced by liver metastases  B. I will repeat labs before next infusion.    3.  Chemotherapy-induced nausea:  A.  I will continue the patient on Zofran as needed for    4.  Chemotherapy-induced diarrhea:  A.  I will continue the patient on Imodium as needed    5. Cancer-related pain:  A.  I will continue the patient oxycodone 5 mg twice a day.     6.  Anxiety:  A.  I will continue the patient on Ativan 1 mg nightly as needed.       7.  Hypertension:  A.  She will continue losartan.  B.  I reviewed with the patient this will interfere with our management since chemotherapy typically can cause hypotension we will have to monitor blood pressure closely.    8.  Dehydration:  A.  I will continue 1 L of IV fluid as needed    9.  Chemotherapy-induced anemia:  A.  I discussed with the patient her CBC result from November 29 , 2023 hemoglobin is normal at 12  B.  I will continue monitor her CBC prior to each infusion.  C.  I will transfuse as  needed for hemoglobin less than 8.      10.  Chemotherapy-induced heartburn:  A.  I will continue pantoprazole 40 mg twice a day.    B.  I will continue Carafate 1 g every 6 hours    11.  Treatment related diarrhea  A.  Continue Imodium and Lomotil as directed.  Education sheet was provided to the patient.      12.  Treatment induced peripheral neuropathy:   A.  We will continue gabapentin 100 mg 3 times daily.    FOLLOW UP: 6 weeks with port maintenance and labs.        Ana Cristina Cano, APRN  1/10/2024

## 2024-01-11 ENCOUNTER — SPECIALTY PHARMACY (OUTPATIENT)
Dept: ONCOLOGY | Facility: HOSPITAL | Age: 54
End: 2024-01-11
Payer: MEDICAID

## 2024-01-11 LAB — CEA SERPL-MCNC: 2.12 NG/ML

## 2024-01-19 ENCOUNTER — SPECIALTY PHARMACY (OUTPATIENT)
Dept: ONCOLOGY | Facility: HOSPITAL | Age: 54
End: 2024-01-19
Payer: MEDICAID

## 2024-01-19 NOTE — PROGRESS NOTES
Specialty Pharmacy Refill Coordination Note     Amber is a 53 y.o. female contacted today regarding refills of capecitabine 1000mg PO BID in the morning and evening on days 1-14 and off for 7 days of 21 day cycle of  specialty medication(s).      Specialty medication(s) and dose(s) confirmed: yes    Refill Questions      Flowsheet Row Most Recent Value   Changes to allergies? No   Changes to medications? No   New conditions or infections since last clinic visit No   Unplanned office visit, urgent care, ED, or hospital admission in the last 4 weeks  No   How does patient/caregiver feel medication is working? Good   Financial problems or insurance changes  No   Since the previous refill, were any specialty medication doses or scheduled injections missed or delayed?  No   Does this patient require a clinical escalation to a pharmacist? No            Delivery Questions      Flowsheet Row Most Recent Value   Delivery method [employer]   Delivery address verified with patient/caregiver? Yes   Delivery address Home   Number of medications in delivery 1   Medication(s) being filled and delivered Capecitabine   Doses left of specialty medications 10 days left   Copay verified? Yes   Copay amount no copay   Copay form of payment No copayment ($0)                   Follow-up: 21 day(s)     Herminia Smith, Pharmacy Technician  Specialty Pharmacy Technician

## 2024-01-22 ENCOUNTER — TELEPHONE (OUTPATIENT)
Dept: ONCOLOGY | Facility: CLINIC | Age: 54
End: 2024-01-22

## 2024-02-19 ENCOUNTER — SPECIALTY PHARMACY (OUTPATIENT)
Dept: ONCOLOGY | Facility: HOSPITAL | Age: 54
End: 2024-02-19
Payer: MEDICAID

## 2024-02-19 NOTE — PROGRESS NOTES
Specialty Pharmacy Refill Coordination Note     Amber is a 53 y.o. female contacted today regarding refills of capecitabine 1,000 mg PO twice daily specialty medication(s).    Delivery scheduled to home address on file for tomorrow; patient will be home to sign for delivery as required by insurance.     Specialty medication(s) and dose(s) confirmed: yes    Refill Questions      Flowsheet Row Most Recent Value   Changes to allergies? No   Changes to medications? No   New conditions or infections since last clinic visit No   Unplanned office visit, urgent care, ED, or hospital admission in the last 4 weeks  No   How does patient/caregiver feel medication is working? Very good   Financial problems or insurance changes  No   Since the previous refill, were any specialty medication doses or scheduled injections missed or delayed?  No   Does this patient require a clinical escalation to a pharmacist? No            Delivery Questions      Flowsheet Row Most Recent Value   Delivery method [employer]   Delivery address verified with patient/caregiver? Yes   Delivery address Home   Number of medications in delivery 1   Medication(s) being filled and delivered Capecitabine   Doses left of specialty medications about 7-10 days left   Copay verified? Yes   Copay amount $0 copay verified   Copay form of payment No copayment ($0)                   Follow-up: 21 day(s)     Merlyn Frausto, Pharmacy Technician  Specialty Pharmacy Technician

## 2024-02-20 ENCOUNTER — HOSPITAL ENCOUNTER (OUTPATIENT)
Dept: CT IMAGING | Facility: HOSPITAL | Age: 54
Discharge: HOME | End: 2024-02-20
Payer: MEDICAID

## 2024-02-20 DIAGNOSIS — C78.7 METASTATIC COLON CANCER TO LIVER: ICD-10-CM

## 2024-02-20 DIAGNOSIS — C18.9 METASTATIC COLON CANCER TO LIVER: ICD-10-CM

## 2024-02-20 DIAGNOSIS — C18.2 CANCER OF RIGHT COLON: ICD-10-CM

## 2024-02-20 PROCEDURE — 25510000001 IOPAMIDOL 61 % SOLUTION: Performed by: NURSE PRACTITIONER

## 2024-02-20 PROCEDURE — 71260 CT THORAX DX C+: CPT

## 2024-02-20 PROCEDURE — 74177 CT ABD & PELVIS W/CONTRAST: CPT

## 2024-02-20 RX ADMIN — IOPAMIDOL 100 ML: 612 INJECTION, SOLUTION INTRAVENOUS at 08:54

## 2024-02-21 ENCOUNTER — INFUSION (OUTPATIENT)
Dept: ONCOLOGY | Facility: HOSPITAL | Age: 54
End: 2024-02-21
Payer: MEDICAID

## 2024-02-21 ENCOUNTER — OFFICE VISIT (OUTPATIENT)
Dept: ONCOLOGY | Facility: CLINIC | Age: 54
End: 2024-02-21
Payer: MEDICAID

## 2024-02-21 VITALS
WEIGHT: 160 LBS | RESPIRATION RATE: 16 BRPM | BODY MASS INDEX: 29.44 KG/M2 | DIASTOLIC BLOOD PRESSURE: 83 MMHG | OXYGEN SATURATION: 99 % | HEIGHT: 62 IN | TEMPERATURE: 97.8 F | SYSTOLIC BLOOD PRESSURE: 160 MMHG | HEART RATE: 77 BPM

## 2024-02-21 DIAGNOSIS — C18.2 CANCER OF RIGHT COLON: Primary | ICD-10-CM

## 2024-02-21 DIAGNOSIS — C78.7 METASTATIC COLON CANCER TO LIVER: ICD-10-CM

## 2024-02-21 DIAGNOSIS — C18.9 METASTATIC COLON CANCER TO LIVER: ICD-10-CM

## 2024-02-21 DIAGNOSIS — R97.8 ABNORMAL TUMOR MARKERS: ICD-10-CM

## 2024-02-21 PROCEDURE — 36591 DRAW BLOOD OFF VENOUS DEVICE: CPT

## 2024-02-21 PROCEDURE — 25010000002 HEPARIN LOCK FLUSH PER 10 UNITS: Performed by: NURSE PRACTITIONER

## 2024-02-21 RX ORDER — SODIUM CHLORIDE 0.9 % (FLUSH) 0.9 %
10 SYRINGE (ML) INJECTION AS NEEDED
OUTPATIENT
Start: 2024-02-21

## 2024-02-21 RX ORDER — HEPARIN SODIUM (PORCINE) LOCK FLUSH IV SOLN 100 UNIT/ML 100 UNIT/ML
500 SOLUTION INTRAVENOUS AS NEEDED
Status: DISCONTINUED | OUTPATIENT
Start: 2024-02-21 | End: 2024-02-21 | Stop reason: HOSPADM

## 2024-02-21 RX ORDER — SODIUM CHLORIDE 0.9 % (FLUSH) 0.9 %
10 SYRINGE (ML) INJECTION AS NEEDED
Status: DISCONTINUED | OUTPATIENT
Start: 2024-02-21 | End: 2024-02-21 | Stop reason: HOSPADM

## 2024-02-21 RX ORDER — HEPARIN SODIUM (PORCINE) LOCK FLUSH IV SOLN 100 UNIT/ML 100 UNIT/ML
500 SOLUTION INTRAVENOUS AS NEEDED
OUTPATIENT
Start: 2024-02-21

## 2024-02-21 RX ADMIN — HEPARIN 500 UNITS: 100 SYRINGE at 15:15

## 2024-02-21 RX ADMIN — Medication 10 ML: at 15:15

## 2024-02-21 NOTE — PROGRESS NOTES
DATE OF VISIT: 2/21/2024    REASON FOR VISIT: Followup for metastatic right-sided colon cancer     PROBLEM LIST:  1. Metastatic colon cancer TX NX M1 a stage Perry:  A.  Presented with abdominal pain and jaundice  B.  CT abdomen pelvis done November 04, 2022 confirmed diffuse liver metastases.  C.  Diagnosed after CT-guided liver biopsy done on November 22, 2022 confirming adenocarcinoma CK20 positive CK7 negative.  D.  Colonoscopy done December 08, 2020 to confirm transverse colon mass however biopsy revealed adenoma.  E.  Started palliative chemotherapy with dose adjusted FOLFOXIRI December 14, 2022, status post 12 cycles completed May 2023.  F.  Started maintenance Xeloda 1000 mg twice daily 1 week on 1 week off June 2023  2.  Elevated liver enzymes:  A.  Induced by metastatic disease  3.  Cancer-related pain  4.  Anxiety  5.  Hypertension    HISTORY OF PRESENT ILLNESS: The patient is a very pleasant 53 y.o. female with past medical history significant for metastatic right-sided colon cancer diagnosed November 22, 2022.  Started on palliative chemotherapy with dose adjusted FOLFOXIRI.  She completed 12 cycles May 2023.  She was started on maintenance Xeloda 1000 mg twice daily June 2023.  The patient is here today for scheduled follow-up visit with maintenance treatment cycle number 5.    SUBJECTIVE: Amber is here today with her .  She has been able to tolerate Xeloda fairly well.  She has been compliant with her treatment.  She continues on 2 weeks on 1 week off schedule.  They are planning a trip to [facility] in about a month.  She would like to be able to go to on the trip.      Past History:  Medical History: has a past medical history of Anxiety, Cancer, Ear piercing, Gallstones, History of irritable bowel syndrome, Hypertension, Seasonal allergies, Tattoos, and Wears glasses.   Surgical History: has a past surgical history that includes postpartum tubal ligation; Dilation and curettage of uterus  "(2013); Tumor removal (2013); Portacath placement (N/A, 12/8/2022); and Colonoscopy (N/A, 12/8/2022).   Family History: family history is not on file.   Social History: reports that she has never smoked. She has never used smokeless tobacco. She reports current alcohol use. She reports that she does not use drugs.    (Not in a hospital admission)     Allergies: Losartan, Valium [diazepam], and Citrus     Review of Systems   Constitutional:  Positive for fatigue.   Gastrointestinal:  Positive for diarrhea.   Musculoskeletal:  Positive for arthralgias.   Psychiatric/Behavioral:  The patient is nervous/anxious.        PHYSICAL EXAMINATION:   /83   Pulse 77   Temp 97.8 °F (36.6 °C)   Resp 16   Ht 157.5 cm (62\")   Wt 72.6 kg (160 lb)   SpO2 99%   BMI 29.26 kg/m²    Pain Score    02/21/24 1401   PainSc: 0-No pain                         ECOG Performance Status: 1 - Symptomatic but completely ambulatory      General Appearance:      alert, cooperative, no apparent distress and appears stated age   Lungs:   Clear to auscultation bilaterally; respirations regular, even, and unlabored bilaterally   Heart:  Regular rate and rhythm, no murmurs appreciated   Abdomen:   Soft, non-tender, and non-distended                 No visits with results within 2 Week(s) from this visit.   Latest known visit with results is:   Lab on 01/10/2024   Component Date Value Ref Range Status    Glucose 01/10/2024 98  65 - 99 mg/dL Final    BUN 01/10/2024 16  6 - 20 mg/dL Final    Creatinine 01/10/2024 0.80  0.57 - 1.00 mg/dL Final    Sodium 01/10/2024 142  136 - 145 mmol/L Final    Potassium 01/10/2024 4.4  3.5 - 5.2 mmol/L Final    Chloride 01/10/2024 106  98 - 107 mmol/L Final    CO2 01/10/2024 26.8  22.0 - 29.0 mmol/L Final    Calcium 01/10/2024 9.2  8.6 - 10.5 mg/dL Final    Total Protein 01/10/2024 7.9  6.0 - 8.5 g/dL Final    Albumin 01/10/2024 4.2  3.5 - 5.2 g/dL Final    ALT (SGPT) 01/10/2024 25  1 - 33 U/L Final    AST (SGOT) " 01/10/2024 33 (H)  1 - 32 U/L Final    Alkaline Phosphatase 01/10/2024 204 (H)  39 - 117 U/L Final    Total Bilirubin 01/10/2024 0.6  0.0 - 1.2 mg/dL Final    Globulin 01/10/2024 3.7  gm/dL Final    A/G Ratio 01/10/2024 1.1  g/dL Final    BUN/Creatinine Ratio 01/10/2024 20.0  7.0 - 25.0 Final    Anion Gap 01/10/2024 9.2  5.0 - 15.0 mmol/L Final    eGFR 01/10/2024 88.2  >60.0 mL/min/1.73 Final    CEA 01/10/2024 2.12  ng/mL Final    WBC 01/10/2024 6.04  3.40 - 10.80 10*3/mm3 Final    RBC 01/10/2024 3.76 (L)  3.77 - 5.28 10*6/mm3 Final    Hemoglobin 01/10/2024 12.3  12.0 - 15.9 g/dL Final    Hematocrit 01/10/2024 35.3  34.0 - 46.6 % Final    MCV 01/10/2024 93.9  79.0 - 97.0 fL Final    MCH 01/10/2024 32.7  26.6 - 33.0 pg Final    MCHC 01/10/2024 34.8  31.5 - 35.7 g/dL Final    RDW 01/10/2024 16.3 (H)  12.3 - 15.4 % Final    RDW-SD 01/10/2024 56.2 (H)  37.0 - 54.0 fl Final    MPV 01/10/2024 10.1  6.0 - 12.0 fL Final    Platelets 01/10/2024 133 (L)  140 - 450 10*3/mm3 Final    Neutrophil % 01/10/2024 59.2  42.7 - 76.0 % Final    Lymphocyte % 01/10/2024 30.8  19.6 - 45.3 % Final    Monocyte % 01/10/2024 7.6  5.0 - 12.0 % Final    Eosinophil % 01/10/2024 1.8  0.3 - 6.2 % Final    Basophil % 01/10/2024 0.3  0.0 - 1.5 % Final    Immature Grans % 01/10/2024 0.3  0.0 - 0.5 % Final    Neutrophils, Absolute 01/10/2024 3.57  1.70 - 7.00 10*3/mm3 Final    Lymphocytes, Absolute 01/10/2024 1.86  0.70 - 3.10 10*3/mm3 Final    Monocytes, Absolute 01/10/2024 0.46  0.10 - 0.90 10*3/mm3 Final    Eosinophils, Absolute 01/10/2024 0.11  0.00 - 0.40 10*3/mm3 Final    Basophils, Absolute 01/10/2024 0.02  0.00 - 0.20 10*3/mm3 Final    Immature Grans, Absolute 01/10/2024 0.02  0.00 - 0.05 10*3/mm3 Final    nRBC 01/10/2024 0.0  0.0 - 0.2 /100 WBC Final        CT Chest With Contrast Diagnostic    Result Date: 2/20/2024  Narrative: PROCEDURE: CT CHEST W CONTRAST DIAGNOSTIC-  HISTORY: Follow-up metastatic colon cancer; C18.2-Malignant neoplasm of  ascending colon; C18.9-Malignant neoplasm of colon, unspecified; C78.7-Secondary malignant neoplasm of liver and intrahepatic bile duct  COMPARISON: 11/22/2023.  TECHNIQUE: Axial CT with IV contrast administration.  FINDINGS:  No acute lung disease is present.  No pleural or pericardial effusion is seen. No adenopathy or mass lesion is present. Small sclerotic lesions are again seen of the mid sternum and upper thoracic spine vertebral body of questionable significance but overall stable.      Impression: No evidence of metastatic disease.   This study was performed with techniques to keep radiation doses as low as reasonably achievable (ALARA). Individualized dose reduction techniques using automated exposure control or adjustment of vA and/or kV according to the patient size were employed.  This report was signed and finalized on 2/20/2024 11:28 AM by Davi Weiss MD.      CT Abdomen Pelvis With Contrast    Result Date: 2/20/2024  Narrative: PROCEDURE: CT ABDOMEN PELVIS W CONTRAST-  TECHNIQUE: IV contrast enhanced exam  HISTORY: Follow-up metastatic colon cancer; C18.2-Malignant neoplasm of ascending colon; C18.9-Malignant neoplasm of colon, unspecified; C78.7-Secondary malignant neoplasm of liver and intrahepatic bile duct.  COMPARISON: 11/22/2023.  FINDINGS:  ABDOMEN: Multiple liver lesions are again noted. Mixed calcified and hypodense lesion of the anterior liver dome measures 33 x 27 mm, not significantly changed. Densely calcified lesions of the posterior right liver are noted. The more medial lesion measures 29 x 28 mm, unchanged. No new liver lesion is evident. Remaining solid organs are normal. No bowel obstruction is seen. There is no free fluid. No adenopathy is present. No acute gallbladder disease is evident.  PELVIS: There is mild wall thickening of the proximal sigmoid colon over a moderate length segment. Colitis not excluded. Given the lack of focal nature this is considered unlikely to be  neoplastic. Appendix is not visualized. Uterus and ovaries are unremarkable.      Impression: 1. Stable partially calcified liver metastases. 2. Mild/moderate length wall thickening of the proximal sigmoid colon could represent mild colitis.   This study was performed with techniques to keep radiation doses as low as reasonably achievable (ALARA). Individualized dose reduction techniques using automated exposure control or adjustment of vA and/or kV according to the patient size were employed.  This report was signed and finalized on 2/20/2024 11:27 AM by Davi Weiss MD.         ASSESSMENT: The patient is a very pleasant 53 y.o. female  with right-sided metastatic colon cancer      PLAN:    1.  Right-sided metastatic colon cancer:  A.  For now, I will continue maintenance treatment using Xeloda however I will continue with 1000 mg twice a day 2 weeks on 1 week off.  B.  She will follow-up with us in 3 weeks to evaluate for treatment tolerance and PET scan.  C.  I will continue to monitor the patient blood work including blood counts kidney function liver function and electrolytes.  We will repeat blood work today.  D.  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  While the patient may not benefit from immunotherapy however she will be a candidate for EGFR inhibitors down the road.  E.  I did go over her circulating tumor DNA assay from November 29 , 2023 unfortunately it did increase from 1.7-8.66.  I discussed the case with Dr Lambert.  We discussed CT scan showed no evidence of metastatic disease in the chest.  CT abdomen showed stable partially calcified liver metastasis.  She does have some mild colitis.  With the recent increasing Víctor result we will order a PET scan to further evaluate further for recurrent disease.  I will order a PET scan.  I will continue monitor Víctor every  6 weeks at this point.  We will repeat  today.  LALO. I will do 3 months follow-up scan which should be due end of February 2024.  I will order prior to return.   NEW. I did go over the blood work results with the patient from November 29 , 2023.  Her CMP revealed normal electrolytes and kidney function with slight elevated liver enzymes alkaline phosphatase 211.    2.  Elevated liver enzymes:  A.  Induced by liver metastases  B. I will repeat labs today.    3.  Chemotherapy-induced nausea:  A.  I will continue the patient on Zofran as needed for    4.  Chemotherapy-induced diarrhea:  A.  I will continue the patient on Imodium as needed    5. Cancer-related pain:  A.  I will continue the patient oxycodone 5 mg twice a day.     6.  Anxiety:  A.  I will continue the patient on Ativan 1 mg nightly as needed.       7.  Hypertension:  A.  She will continue losartan.  B.  I reviewed with the patient this will interfere with our management since chemotherapy typically can cause hypotension we will have to monitor blood pressure closely.    8.  Dehydration:  A.  I will continue 1 L of IV fluid as needed    9.  Chemotherapy-induced anemia:  A.  I discussed with the patient her CBC result from January 10, 2024 hemoglobin is normal at 12.3  B.  I will continue monitor her CBC prior to each infusion.  C.  I will transfuse as needed for hemoglobin less than 8.      10.  Chemotherapy-induced heartburn:  A.  I will continue pantoprazole 40 mg twice a day.    B.  I will continue Carafate 1 g every 6 hours    11.  Treatment related diarrhea  A.  Continue Imodium and Lomotil as directed.  Education sheet was provided to the patient.      12.  Treatment induced peripheral neuropathy:   A.  We will continue gabapentin 100 mg 3 times daily.    FOLLOW UP: 3 weeks with PET scan        Ana Cristina Cano, PIPO  2/21/2024

## 2024-02-22 ENCOUNTER — SPECIALTY PHARMACY (OUTPATIENT)
Dept: ONCOLOGY | Facility: HOSPITAL | Age: 54
End: 2024-02-22
Payer: MEDICAID

## 2024-02-29 ENCOUNTER — DOCUMENTATION (OUTPATIENT)
Dept: SOCIAL WORK | Facility: HOSPITAL | Age: 54
End: 2024-02-29
Payer: MEDICAID

## 2024-02-29 DIAGNOSIS — C18.9 METASTATIC COLON CANCER TO LIVER: Primary | ICD-10-CM

## 2024-02-29 DIAGNOSIS — Z59.89 INSURANCE COVERAGE PROBLEMS: ICD-10-CM

## 2024-02-29 DIAGNOSIS — C78.7 METASTATIC COLON CANCER TO LIVER: Primary | ICD-10-CM

## 2024-02-29 SDOH — ECONOMIC STABILITY - INCOME SECURITY: OTHER PROBLEMS RELATED TO HOUSING AND ECONOMIC CIRCUMSTANCES: Z59.89

## 2024-02-29 NOTE — PROGRESS NOTES
Case Management/Social Work    Patient Name:  Amber Feng  YOB: 1970  MRN: [id number]  Admit Date:  (Not on file)        NOEL received message from RN at MD Lambert's office regarding pts insurance ending soon. SW contacted pt via telephone. SW provided resources and support during conversation. Pt reports her medicaid is ending at the end of this month due to her  making too much money and them now being over income. Pt recently stopped working and is on disability/social security. She is in the process of trying to see if she is able to be added to her husbands insurance through his employer without it being open enrollment due to hers ending. Pt reports she has been talking with the medicaid office and has a form she is supposed to fill out and send back to office. Pt was tearful on the phone. She is hoping she is able to get assistance to help with chemo treatments. SW mentioned Financial Counseling office for assistance and payment plans in the meantime. SW also provided pt with office number for any other concerns or questions. Pt appreciative.       Electronically signed by:  IRINA Roberto  02/29/24 15:03 EST

## 2024-03-06 ENCOUNTER — SPECIALTY PHARMACY (OUTPATIENT)
Dept: ONCOLOGY | Facility: HOSPITAL | Age: 54
End: 2024-03-06
Payer: MEDICAID

## 2024-03-06 NOTE — PROGRESS NOTES
Specialty Pharmacy Refill Coordination Note     Amber is a 53 y.o. female contacted today regarding refills of  capecitabine 1000mg PO AM and PM with food on days 1-14, then off 7 days in a 21 day cycle of specialty medication(s).    Medication scheduled to be delivered on 3/12/24 per patient request.     Specialty medication(s) and dose(s) confirmed: yes    Refill Questions      Flowsheet Row Most Recent Value   Changes to allergies? No   Changes to medications? No   New conditions or infections since last clinic visit No   Unplanned office visit, urgent care, ED, or hospital admission in the last 4 weeks  No   How does patient/caregiver feel medication is working? Good   Financial problems or insurance changes  No   Since the previous refill, were any specialty medication doses or scheduled injections missed or delayed?  No   Does this patient require a clinical escalation to a pharmacist? No            Delivery Questions      Flowsheet Row Most Recent Value   Delivery method [employer]   Delivery address verified with patient/caregiver? Yes   Delivery address Home   Number of medications in delivery 1   Medication(s) being filled and delivered Capecitabine   Doses left of specialty medications 7 days left   Copay verified? Yes   Copay amount $0   Copay form of payment No copayment ($0)                   Follow-up: 21 day(s)     Herminia Smith, Pharmacy Technician  Specialty Pharmacy Technician

## 2024-03-07 ENCOUNTER — OFFICE VISIT (OUTPATIENT)
Dept: INTERNAL MEDICINE | Facility: CLINIC | Age: 54
End: 2024-03-07
Payer: MEDICAID

## 2024-03-07 ENCOUNTER — HOSPITAL ENCOUNTER (OUTPATIENT)
Dept: GENERAL RADIOLOGY | Facility: HOSPITAL | Age: 54
Discharge: HOME | End: 2024-03-07
Admitting: NURSE PRACTITIONER
Payer: MEDICAID

## 2024-03-07 VITALS
WEIGHT: 161 LBS | BODY MASS INDEX: 29.63 KG/M2 | OXYGEN SATURATION: 99 % | HEART RATE: 88 BPM | SYSTOLIC BLOOD PRESSURE: 134 MMHG | HEIGHT: 62 IN | DIASTOLIC BLOOD PRESSURE: 80 MMHG | TEMPERATURE: 97.7 F

## 2024-03-07 DIAGNOSIS — C78.7 METASTATIC COLON CANCER TO LIVER: ICD-10-CM

## 2024-03-07 DIAGNOSIS — M25.561 ACUTE PAIN OF RIGHT KNEE: Primary | ICD-10-CM

## 2024-03-07 DIAGNOSIS — C18.9 METASTATIC COLON CANCER TO LIVER: ICD-10-CM

## 2024-03-07 DIAGNOSIS — M17.11 ARTHRITIS OF RIGHT KNEE: ICD-10-CM

## 2024-03-07 PROCEDURE — 73560 X-RAY EXAM OF KNEE 1 OR 2: CPT

## 2024-03-07 RX ORDER — MELOXICAM 7.5 MG/1
7.5 TABLET ORAL DAILY PRN
Qty: 20 TABLET | Refills: 1 | Status: SHIPPED | OUTPATIENT
Start: 2024-03-07

## 2024-03-07 NOTE — PROGRESS NOTES
merary    Office Visit      Date: 2024   Patient Name: Amber Feng  : 1970   MRN: [id number]     Chief Complaint:    Chief Complaint   Patient presents with   • Knee Pain     X 1 month, getting worse. Has been using a knee brace    • Joint Swelling     Right knee.        History of Present Illness: Amber Feng is a 53 y.o. female presents for right knee pain. Worse with extension. No known injury. No hx arthritis. Wears sleeve brace. Swelling without redness or warmth. No hx DVT. She has taken ibuprofen without relief. Not been icing it. Trying to rest and elevate it.   Has metastatic colon cancer to liver was told stage 4. Nonoperable. Currently taking oral chemo. Going to lose medicaid and interested in second opinion with [facility] .     Subjective      Review of Systems:   Pertinent ROS noted in HPI.     I have reviewed the patients family history, social history, past medical history, past surgical history and have updated it as appropriate.     Medications:     Current Outpatient Medications:   •  albuterol sulfate  (90 Base) MCG/ACT inhaler, Inhale 2 puffs Every 4 (Four) Hours As Needed for Shortness of Air (chest tightness)., Disp: 8 g, Rfl: 0  •  capecitabine (XELODA) 500 MG chemo tablet, Take 2 tablets by mouth 2 (Two) Times a Day in the AM and PM with food on days 1-14, then off 7 days in a 21-day cycle., Disp: 56 tablet, Rfl: 11  •  diphenoxylate-atropine (LOMOTIL) 2.5-0.025 MG per tablet, Take 1 tablet by mouth 4 (Four) Times a Day As Needed for Diarrhea., Disp: 30 tablet, Rfl: 3  •  gabapentin (NEURONTIN) 100 MG capsule, Take 1 capsule by mouth 3 (Three) Times a Day., Disp: 90 capsule, Rfl: 1  •  lidocaine-prilocaine (EMLA) 2.5-2.5 % cream, Apply 1 application topically to the appropriate area as directed As Needed (45-60 minutes prior to port access.  Cover with saran/plastic wrap.)., Disp: 30 g, Rfl: 3  •  LORazepam (Ativan) 1 MG tablet, Take 1 tablet by  "mouth every night at bedtime., Disp: 30 tablet, Rfl: 0  •  magic mouthwash oral suspension, Swish and Spit or Swallow 5-10 mL 4 (Four) Times a Day As Needed., Disp: 240 mL, Rfl: 3  •  meloxicam (Mobic) 7.5 MG tablet, Take 1 tablet by mouth Daily As Needed for Moderate Pain., Disp: 20 tablet, Rfl: 1  •  multivitamin with minerals tablet tablet, Take 1 tablet by mouth Daily., Disp: , Rfl:   •  ondansetron ODT (ZOFRAN-ODT) 8 MG disintegrating tablet, DISSOLVE 1 TABLET IN MOUTH EVERY 8 HOURS AS NEEDED FOR NAUSEA FOR VOMITING, Disp: 30 tablet, Rfl: 5  •  oxyCODONE (ROXICODONE) 5 MG immediate release tablet, Take 1 tablet by mouth 2 (Two) Times a Day As Needed for Moderate Pain., Disp: 60 tablet, Rfl: 0  •  potassium chloride (K-DUR,KLOR-CON) 10 MEQ CR tablet, Take 2 tablets by mouth once daily, Disp: 60 tablet, Rfl: 0  •  prochlorperazine (COMPAZINE) 10 MG tablet, Take 1 tablet by mouth Every 6 (Six) Hours As Needed for Nausea or Vomiting., Disp: 30 tablet, Rfl: 2  •  sucralfate (Carafate) 1 GM/10ML suspension, Take 10 mL by mouth 4 (Four) Times a Day., Disp: 414 mL, Rfl: 0    Allergies:   Allergies   Allergen Reactions   • Losartan Nausea Only, Other (See Comments) and Headache     Pt states within an hour after use blood pressure would \"skyrocket\" also nausea - states diastolic pressures in 100's   • Valium [Diazepam] Anaphylaxis   • Citrus GI Intolerance     Especially oranges       Objective     Physical Exam  Physical Exam  Vitals and nursing note reviewed.   Constitutional:       General: She is not in acute distress.     Appearance: Normal appearance.   HENT:      Right Ear: External ear normal.      Left Ear: External ear normal.   Eyes:      Extraocular Movements: Extraocular movements intact.   Cardiovascular:      Rate and Rhythm: Normal rate and regular rhythm.   Pulmonary:      Effort: Pulmonary effort is normal.      Breath sounds: Normal breath sounds.   Musculoskeletal:      Cervical back: Normal range of " "motion.      Right knee: Effusion present. No deformity, erythema, ecchymosis, lacerations or crepitus. Decreased range of motion. Tenderness present. No patellar tendon tenderness. Normal pulse.      Comments: Negative kasandra's sign  Pain with flexion   No laxity   Generalized tenderness    Skin:     General: Skin is dry.   Neurological:      Mental Status: She is alert and oriented to person, place, and time.   Psychiatric:         Mood and Affect: Mood normal.       Vital Signs:   Vitals:    03/07/24 0818   BP: 134/80   Pulse: 88   Temp: 97.7 °F (36.5 °C)   SpO2: 99%   Weight: 73 kg (161 lb)   Height: 157.5 cm (62\")   PainSc:   8   PainLoc: Knee     Body mass index is 29.45 kg/m².  BMI is >= 25 and <30. (Overweight) The following options were offered after discussion;: Information on healthy weight added to patient's after visit summary.    Imaging:  XR knee standing right (03/07/2024 09:31)     Assessment / Plan      Assessment/Plan:   Problem List Items Addressed This Visit          Hematology and Neoplasia    Metastatic colon cancer to liver    Relevant Orders    Ambulatory Referral to Oncology (Completed)     Other Visit Diagnoses       Acute pain of right knee    -  Primary    Relevant Medications    meloxicam (Mobic) 7.5 MG tablet    Other Relevant Orders    XR knee standing right    Arthritis of right knee        Relevant Medications    meloxicam (Mobic) 7.5 MG tablet            Knee Pain   Weight bearing XR right knee today   Independently viewed XR as well as consulted with orthopedic provider; appears to have degenerative changes especially in medial compartment, joint space narrowing  Start meloxicam daily PRN x 1-2 weeks, consider PT and/or ortho referral if persists   Recommend ice 15-20 minutes, elevate above heart, rest, compression wrap, voltaren gel prn     Metastatic colon cancer to liver  Patient is requesting second opinion with [facility]  Referral placed to [email or web address]]     Follow Up: "   Return if symptoms worsen or fail to improve.    I spent 40 minutes caring for Amber Feng on this date of service. This time includes time spent by me in the following activities: preparing for the visit, reviewing tests, performing a medically appropriate examination and/or evaluation, counseling and educating the patient/family/caregiver, ordering medications, tests, or procedures and documenting information in the medical record.      Evon FOSS  [facility]

## 2024-03-08 ENCOUNTER — HOSPITAL ENCOUNTER (OUTPATIENT)
Dept: PET IMAGING | Facility: HOSPITAL | Age: 54
Discharge: HOME | End: 2024-03-08
Payer: MEDICAID

## 2024-03-08 DIAGNOSIS — C18.9 METASTATIC COLON CANCER TO LIVER: ICD-10-CM

## 2024-03-08 DIAGNOSIS — R97.8 ABNORMAL TUMOR MARKERS: ICD-10-CM

## 2024-03-08 DIAGNOSIS — C18.2 CANCER OF RIGHT COLON: ICD-10-CM

## 2024-03-08 DIAGNOSIS — C78.7 METASTATIC COLON CANCER TO LIVER: ICD-10-CM

## 2024-03-08 LAB — GLUCOSE BLDC GLUCOMTR-MCNC: 92 MG/DL (ref 70–130)

## 2024-03-08 PROCEDURE — 78815 PET IMAGE W/CT SKULL-THIGH: CPT

## 2024-03-08 PROCEDURE — 0 FLUDEOXYGLUCOSE F18 SOLUTION: Performed by: NURSE PRACTITIONER

## 2024-03-08 PROCEDURE — A9552 F18 FDG: HCPCS | Performed by: NURSE PRACTITIONER

## 2024-03-08 PROCEDURE — 82948 REAGENT STRIP/BLOOD GLUCOSE: CPT

## 2024-03-08 RX ADMIN — FLUDEOXYGLUCOSE F 18 1 DOSE: 200 INJECTION, SOLUTION INTRAVENOUS at 13:33

## 2024-03-13 ENCOUNTER — OFFICE VISIT (OUTPATIENT)
Dept: ONCOLOGY | Facility: CLINIC | Age: 54
End: 2024-03-13
Payer: COMMERCIAL

## 2024-03-13 VITALS
HEART RATE: 78 BPM | RESPIRATION RATE: 16 BRPM | SYSTOLIC BLOOD PRESSURE: 160 MMHG | DIASTOLIC BLOOD PRESSURE: 82 MMHG | TEMPERATURE: 97.9 F | WEIGHT: 183 LBS | BODY MASS INDEX: 33.68 KG/M2 | HEIGHT: 62 IN | OXYGEN SATURATION: 99 %

## 2024-03-13 DIAGNOSIS — C18.2 CANCER OF RIGHT COLON: Primary | ICD-10-CM

## 2024-03-13 PROCEDURE — 99214 OFFICE O/P EST MOD 30 MIN: CPT | Performed by: INTERNAL MEDICINE

## 2024-03-13 NOTE — PROGRESS NOTES
DATE OF VISIT: 3/13/2024    REASON FOR VISIT: Followup for metastatic right-sided colon cancer     PROBLEM LIST:  1. Metastatic colon cancer TX NX M1 a stage Perry:  A.  Presented with abdominal pain and jaundice  B.  CT abdomen pelvis done November 04, 2022 confirmed diffuse liver metastases.  C.  Diagnosed after CT-guided liver biopsy done on November 22, 2022 confirming adenocarcinoma CK20 positive CK7 negative.  D.  Colonoscopy done December 08, 2020 to confirm transverse colon mass however biopsy revealed adenoma.  E.  Started palliative chemotherapy with dose adjusted FOLFOXIRI December 14, 2022, status post 12 cycles completed May 2023.  F.  Started maintenance Xeloda 1000 mg twice daily 1 week on 1 week off June 2023  2.  Elevated liver enzymes:  A.  Induced by metastatic disease  3.  Cancer-related pain  4.  Anxiety  5.  Hypertension    HISTORY OF PRESENT ILLNESS: The patient is a very pleasant 53 y.o. female with past medical history significant for metastatic right-sided colon cancer diagnosed November 22, 2022.  Started on palliative chemotherapy with dose adjusted FOLFOXIRI.  She completed 12 cycles May 2023.  She was started on maintenance Xeloda 1000 mg twice daily June 2023.  The patient is here today for scheduled follow-up visit with maintenance treatment cycle number 5.    SUBJECTIVE: Amber is here today with her .  She is feeling great.  She is excited about a trip to [place] next week.  She is anxious about the PET scan result.      Past History:  Medical History: has a past medical history of Anxiety, Cancer, Ear piercing, Gallstones, History of irritable bowel syndrome, Hypertension, Seasonal allergies, Tattoos, and Wears glasses.   Surgical History: has a past surgical history that includes postpartum tubal ligation; Dilation and curettage of uterus (2013); Tumor removal (2013); Portacath placement (N/A, 12/8/2022); and Colonoscopy (N/A, 12/8/2022).   Family History: family history is  not on file.   Social History: reports that she has never smoked. She has never used smokeless tobacco. She reports current alcohol use. She reports that she does not use drugs.    (Not in a hospital admission)     Allergies: Losartan, Valium [diazepam], and Citrus     Review of Systems   Constitutional:  Positive for fatigue.   Gastrointestinal:  Positive for diarrhea.   Musculoskeletal:  Positive for arthralgias.   Psychiatric/Behavioral:  The patient is nervous/anxious.        PHYSICAL EXAMINATION:   There were no vitals taken for this visit.   There were no vitals filed for this visit.                        ECOG Performance Status: 1 - Symptomatic but completely ambulatory      General Appearance:      alert, cooperative, no apparent distress and appears stated age   Lungs:   Clear to auscultation bilaterally; respirations regular, even, and unlabored bilaterally   Heart:  Regular rate and rhythm, no murmurs appreciated   Abdomen:   Soft, non-tender, and non-distended                 Hospital Outpatient Visit on 03/08/2024   Component Date Value Ref Range Status    Glucose 03/08/2024 92  70 - 130 mg/dL Final        NM PET/CT Skull Base to Mid Thigh    Result Date: 3/11/2024  Narrative: NM PET/CT SKULL BASE TO MID THIGH Date of Exam: 3/8/2024 1:31 PM EST Indication: Restaging colon carcinoma with worsening molecular (russell testing). Comparison: CT of the chest, abdomen, and pelvis performed at [facility] on 2/20/2024. Technique: 12.0 mCi of F-18 FDG was administered intravenously. PET imaging was obtained from skull base to mid-thigh approximately 60 minutes after radiotracer injection. A low dose non contrast CT was obtained for attenuation correction and anatomic localization. Fused PET-CT and 3D MIP reconstructions were utilized for image interpretation.  Fasting blood glucose level: 92 mg/dl. Reference uptake values: Mediastinum: 2.53 SUVmax Liver: 3.35 SUVmax Normalization method: Body Weight  Findings: Head/neck: There is no abnormal metabolic activity. There is physiologic uptake. There are no CT abnormalities. Chest: There is no abnormal metabolic activity. There is physiologic uptake. There is a right-sided port in place. There are no CT abnormalities. Abdomen/pelvis: There are calcified masses consistent with treated hepatic metastatic disease. There are two focal areas of increased uptake within the liver. There is increased metabolic activity in the posterior segment of the right lobe of the liver on image 150 with no corresponding CT abnormality. The maximum SUV measures 5.77. There is increased area of activity at the junction of the right and left lobes of the liver on image 151 with a maximum SUV of 5.54. There is no corresponding CT abnormality. Both metabolically active lesions are suspicious for new hepatic metastatic disease. I would recommend an MRI of the abdomen with and without contrast for further evaluation as these were not readily visible on the outside contrast-enhanced abdominal CT from a few weeks ago. There are no additional areas of abnormal metabolic activity. There is physiologic uptake. There are a few atherosclerotic vascular calcifications in the abdominal aorta. There is colonic diverticulosis. There is suggestion of faint sludge versus gallstones. There is scattered artifact from the patient's navel piercing. Musculoskeletal/extremities: There is no abnormal metabolic activity to indicate osseous metastatic disease. There are no suspicious osteolytic or sclerotic lesions on the CT exam. There are degenerative changes within the spine.     Impression: Impression: 1. Two focal areas of increased metabolic activity in the liver consistent with active metastatic disease. There is no corresponding CT abnormality. An MRI of the abdomen with and without contrast may be of additional value in further characterizing the size and location of the lesions as they were not apparent  on the outside contrast-enhanced CT exam from a few weeks ago. 2. No additional areas of abnormal activity to indicate further metastatic disease. 3. CT findings as noted above. Electronically Signed: Jay Mac MD  3/11/2024 4:40 PM EDT  Workstation ID: [id number]    XR knee standing right    Result Date: 3/8/2024  Narrative: XR KNEE STANDING RIGHT Date of Exam: 3/7/2024 9:26 AM EST Indication: right knee pain x 1 month, swelling Comparison: None available. Findings: There is a small knee joint effusion. No soft tissue swelling. There is joint space narrowing of the medial compartment which otherwise appears grossly unremarkable. The lateral and patellofemoral compartments appear unremarkable. No acute fracture or traumatic malalignment. No focal bony lesion.     Impression: Impression: Moderate joint space narrowing of the medial compartment. Small knee joint effusion. No acute osseous findings. Electronically Signed: José Miguel Ruelas MD  3/8/2024 9:24 AM EST  Workstation ID: [id number]    CT Chest With Contrast Diagnostic    Result Date: 2/20/2024  Narrative: PROCEDURE: CT CHEST W CONTRAST DIAGNOSTIC-  HISTORY: Follow-up metastatic colon cancer; C18.2-Malignant neoplasm of ascending colon; C18.9-Malignant neoplasm of colon, unspecified; C78.7-Secondary malignant neoplasm of liver and intrahepatic bile duct  COMPARISON: 11/22/2023.  TECHNIQUE: Axial CT with IV contrast administration.  FINDINGS:  No acute lung disease is present.  No pleural or pericardial effusion is seen. No adenopathy or mass lesion is present. Small sclerotic lesions are again seen of the mid sternum and upper thoracic spine vertebral body of questionable significance but overall stable.      Impression: No evidence of metastatic disease.   This study was performed with techniques to keep radiation doses as low as reasonably achievable (ALARA). Individualized dose reduction techniques using automated exposure control or adjustment of vA and/or kV  according to the patient size were employed.  This report was signed and finalized on 2/20/2024 11:28 AM by Davi Weiss MD.      CT Abdomen Pelvis With Contrast    Result Date: 2/20/2024  Narrative: PROCEDURE: CT ABDOMEN PELVIS W CONTRAST-  TECHNIQUE: IV contrast enhanced exam  HISTORY: Follow-up metastatic colon cancer; C18.2-Malignant neoplasm of ascending colon; C18.9-Malignant neoplasm of colon, unspecified; C78.7-Secondary malignant neoplasm of liver and intrahepatic bile duct.  COMPARISON: 11/22/2023.  FINDINGS:  ABDOMEN: Multiple liver lesions are again noted. Mixed calcified and hypodense lesion of the anterior liver dome measures 33 x 27 mm, not significantly changed. Densely calcified lesions of the posterior right liver are noted. The more medial lesion measures 29 x 28 mm, unchanged. No new liver lesion is evident. Remaining solid organs are normal. No bowel obstruction is seen. There is no free fluid. No adenopathy is present. No acute gallbladder disease is evident.  PELVIS: There is mild wall thickening of the proximal sigmoid colon over a moderate length segment. Colitis not excluded. Given the lack of focal nature this is considered unlikely to be neoplastic. Appendix is not visualized. Uterus and ovaries are unremarkable.      Impression: 1. Stable partially calcified liver metastases. 2. Mild/moderate length wall thickening of the proximal sigmoid colon could represent mild colitis.   This study was performed with techniques to keep radiation doses as low as reasonably achievable (ALARA). Individualized dose reduction techniques using automated exposure control or adjustment of vA and/or kV according to the patient size were employed.  This report was signed and finalized on 2/20/2024 11:27 AM by Davi Weiss MD.         ASSESSMENT: The patient is a very pleasant 53 y.o. female  with right-sided metastatic colon cancer      PLAN:    1.  Right-sided metastatic colon cancer:  A.  I will continue  maintenance treatment using Xeloda however I will continue with 1000 mg twice a day 2 weeks on 1 week off.  I asked the patient to hold this 2 weeks prior to her planned liver surgery.  B.  I did go over the PET scan result with the patient it did reveal 2 isolated lesions in the liver.  I discussed the case with Dr. Nielson from surgical oncology over the phone who felt lesions are amenable to surgical excision.  I will order MRI abdomen pelvis liver protocol and refer to Dr. Nielson.  C.  I will continue to monitor the patient blood work including blood counts kidney function liver function and electrolytes.  We will repeat blood work today.  D.  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  While the patient may not benefit from immunotherapy however she will be a candidate for EGFR inhibitors down the road.  E.  The patient will follow-up with us 2 weeks after her surgery to go over the final pathology report.    2.  Elevated liver enzymes:  A.  Induced by liver metastases  B. I will repeat labs today.    3.  Chemotherapy-induced nausea:  A.  I will continue the patient on Zofran as needed for    4.  Chemotherapy-induced diarrhea:  A.  I will continue the patient on Imodium as needed    5. Cancer-related pain:  A.  I will continue the patient oxycodone 5 mg twice a day.     6.  Anxiety:  A.  I will continue the patient on Ativan 1 mg nightly as needed.       7.  Hypertension:  A.  She will continue losartan.  B.  I reviewed with the patient this will interfere with our management since chemotherapy typically can cause hypotension we will have to monitor blood pressure closely.    8.  Dehydration:  A.  I will continue 1 L of IV fluid as needed    9.  Chemotherapy-induced anemia:  A.  I discussed with the patient her CBC result from January 10, 2024 hemoglobin is normal at 12.3  B.  I will continue monitor her CBC prior to each  infusion.  C.  I will transfuse as needed for hemoglobin less than 8.      10.  Chemotherapy-induced heartburn:  A.  I will continue pantoprazole 40 mg twice a day.    B.  I will continue Carafate 1 g every 6 hours    11.  Treatment related diarrhea  A.  Continue Imodium and Lomotil as directed.  Education sheet was provided to the patient.      12.  Treatment induced peripheral neuropathy:   A.  We will continue gabapentin 100 mg 3 times daily.    FOLLOW UP: 4 weeks        Iban Lambert MD  3/13/2024

## 2024-03-14 ENCOUNTER — SPECIALTY PHARMACY (OUTPATIENT)
Dept: ONCOLOGY | Facility: HOSPITAL | Age: 54
End: 2024-03-14
Payer: COMMERCIAL

## 2024-03-14 DIAGNOSIS — C18.9 METASTATIC COLON CANCER TO LIVER: ICD-10-CM

## 2024-03-14 DIAGNOSIS — C78.7 METASTATIC COLON CANCER TO LIVER: ICD-10-CM

## 2024-03-14 RX ORDER — PROCHLORPERAZINE MALEATE 10 MG
TABLET ORAL
Qty: 30 TABLET | Refills: 2 | Status: SHIPPED | OUTPATIENT
Start: 2024-03-14

## 2024-03-15 ENCOUNTER — HOSPITAL ENCOUNTER (OUTPATIENT)
Dept: MRI IMAGING | Facility: HOSPITAL | Age: 54
Discharge: HOME | End: 2024-03-15
Admitting: INTERNAL MEDICINE
Payer: COMMERCIAL

## 2024-03-15 PROCEDURE — A9577 INJ MULTIHANCE: HCPCS | Performed by: INTERNAL MEDICINE

## 2024-03-15 PROCEDURE — 0 GADOBENATE DIMEGLUMINE 529 MG/ML SOLUTION: Performed by: INTERNAL MEDICINE

## 2024-03-15 PROCEDURE — 74183 MRI ABD W/O CNTR FLWD CNTR: CPT

## 2024-03-15 RX ADMIN — GADOBENATE DIMEGLUMINE 15 ML: 529 INJECTION, SOLUTION INTRAVENOUS at 17:21

## 2024-03-18 ENCOUNTER — TELEPHONE (OUTPATIENT)
Dept: INTERNAL MEDICINE | Facility: CLINIC | Age: 54
End: 2024-03-18
Payer: COMMERCIAL

## 2024-03-18 DIAGNOSIS — M25.461 PAIN AND SWELLING OF RIGHT KNEE: ICD-10-CM

## 2024-03-18 DIAGNOSIS — M25.561 PAIN AND SWELLING OF RIGHT KNEE: ICD-10-CM

## 2024-03-18 DIAGNOSIS — M17.11 ARTHRITIS OF RIGHT KNEE: Primary | ICD-10-CM

## 2024-03-18 NOTE — TELEPHONE ENCOUNTER
I have placed a referral to orthopedics.  She will be contacted to schedule hopefully this week.  I would like her to stay off of her leg, use a compression wrap, ice 15 to 20 minutes at a time, continue the meloxicam, can actually take 2 daily in the morning with food.  If there is any redness, warmth, severe pain she is to be seen emergently.

## 2024-03-18 NOTE — TELEPHONE ENCOUNTER
Caller: Amber Feng    Relationship: Self    Best call back number: [phone]    What is the best time to reach you: ANYTIME     Who are you requesting to speak with (clinical staff, provider,  specific staff member): CLINICAL STAFF    What was the call regarding: PATIENT IS CALLING TO LET HER PCP KNOW THAT HER KNEE IS SWELLING EVEN MORE. SHE WOULD LIKE TO HAVE A CALL BACK TO DISCUSS WHAT SHE NEEDS TO DO ABOUT THIS.     Is it okay if the provider responds through MyChart: YES

## 2024-03-20 ENCOUNTER — PATIENT OUTREACH (OUTPATIENT)
Dept: ONCOLOGY | Facility: CLINIC | Age: 54
End: 2024-03-20
Payer: COMMERCIAL

## 2024-03-20 DIAGNOSIS — C18.9 METASTATIC COLON CANCER TO LIVER: Primary | ICD-10-CM

## 2024-03-20 DIAGNOSIS — C78.7 METASTATIC COLON CANCER TO LIVER: Primary | ICD-10-CM

## 2024-03-25 ENCOUNTER — SPECIALTY PHARMACY (OUTPATIENT)
Dept: ONCOLOGY | Facility: HOSPITAL | Age: 54
End: 2024-03-25
Payer: COMMERCIAL

## 2024-03-25 NOTE — PROGRESS NOTES
Specialty Pharmacy Refill Coordination Note     Amber is a 53 y.o. female contacted today regarding refills of  capecitabine 1000mg PO AM and PM on days 1-14, then off 7 days of 21 day cycle of specialty medication(s).    Patient currently holding medication until she has surgery on 4/9/2024 per providers recommendations.     Specialty medication(s) and dose(s) confirmed: yes    Refill Questions      Flowsheet Row Most Recent Value   Changes to allergies? No   Changes to medications? No   New conditions or infections since last clinic visit No   Unplanned office visit, urgent care, ED, or hospital admission in the last 4 weeks  No   How does patient/caregiver feel medication is working? Good   Financial problems or insurance changes  No   Since the previous refill, were any specialty medication doses or scheduled injections missed or delayed?  No   Does this patient require a clinical escalation to a pharmacist? Yes  [Patient will be holding medication due to an upcoming surgery]                       Follow-up: 28 day(s)     Herminia Smith, Pharmacy Technician  Specialty Pharmacy Technician

## 2024-03-28 ENCOUNTER — OFFICE VISIT (OUTPATIENT)
Dept: ORTHOPEDIC SURGERY | Facility: CLINIC | Age: 54
End: 2024-03-28
Payer: COMMERCIAL

## 2024-03-28 VITALS — WEIGHT: 178 LBS | BODY MASS INDEX: 32.76 KG/M2 | TEMPERATURE: 98.2 F | HEIGHT: 62 IN

## 2024-03-28 DIAGNOSIS — M17.11 PRIMARY OSTEOARTHRITIS OF RIGHT KNEE: Primary | ICD-10-CM

## 2024-03-28 DIAGNOSIS — M25.561 ARTHRALGIA OF KNEE, RIGHT: ICD-10-CM

## 2024-03-28 PROCEDURE — 99204 OFFICE O/P NEW MOD 45 MIN: CPT | Performed by: PHYSICIAN ASSISTANT

## 2024-03-28 NOTE — PROGRESS NOTES
"Subjective   Patient ID: Amber Feng is a 53 y.o. right hand dominant female is being seen for orthopaedic evaluation today for right knee pain   Pain of the Right Knee (Reports pain for one month with no known injury. Progressively getting worse. Recently the left knee has started hurting) and Pain of the Left Knee         Pain  Associated symptoms include arthralgias (right knee).        Patient presents as a new patient for the evaluation of right knee pain that has been ongoing for at least 1 month.  No injury or trauma.  She has noticed some swelling but seems the pain is worsening.  And recently, she mentions some soreness to the left knee that she believes is due to overcompensation.  Her PCP did order a x-ray of the right knee.  Patient takes meloxicam for pain.      Past Medical History:   Diagnosis Date    Anxiety     Cancer     Ear piercing     Gallstones     History of irritable bowel syndrome     Hypertension     pt states secondary to pain    Seasonal allergies     Tattoos     Wears glasses         Past Surgical History:   Procedure Laterality Date    COLONOSCOPY N/A 12/8/2022    Procedure: COLONOSCOPY WITH BIOPSY AND POLYPECTOMY;  Surgeon: Sea Valentin MD;  Location: [facility] OR;  Service: General;  Laterality: N/A;    DILATATION AND CURETTAGE  2013    PORTACATH PLACEMENT N/A 12/8/2022    Procedure: INSERTION OF PORTACATH;  Surgeon: Sea Valentin MD;  Location: [facility] OR;  Service: General;  Laterality: N/A;    POSTPARTUM TUBAL LIGATION      TUMOR REMOVAL  2013    pt reports \"tumor removed from uterus\" - states non-cancerous; unsure if fibroid; unsure if removed laparoscopically       History reviewed. No pertinent family history.     Social History     Socioeconomic History    Marital status:    Tobacco Use    Smoking status: Never    Smokeless tobacco: Never   Vaping Use    Vaping status: Never Used   Substance and Sexual Activity    Alcohol use: Yes     Comment: rarely    Drug " "use: Never    Sexual activity: Defer       Allergies   Allergen Reactions    Losartan Nausea Only, Other (See Comments) and Headache     Pt states within an hour after use blood pressure would \"skyrocket\" also nausea - states diastolic pressures in 100's    Valium [Diazepam] Anaphylaxis    Citrus GI Intolerance     Especially oranges       Review of Systems   Musculoskeletal:  Positive for arthralgias (right knee).       Objective   Temp 98.2 °F (36.8 °C)   Ht 157.5 cm (62\")   Wt 80.7 kg (178 lb)   BMI 32.56 kg/m²   Physical Exam  Vitals and nursing note reviewed.   Constitutional:       Appearance: Normal appearance.   Pulmonary:      Effort: Pulmonary effort is normal.   Musculoskeletal:      Right knee: Bony tenderness present. No swelling, deformity, erythema or ecchymosis. Tenderness present over the medial joint line and lateral joint line. No MCL or LCL tenderness. No LCL laxity, MCL laxity, ACL laxity or PCL laxity. Normal meniscus.   Neurological:      Mental Status: She is alert and oriented to person, place, and time.       Ortho Exam  Extremity DVT signs are negative on physical exam with negative Evette sign, no calf pain, no palpable cords, and no skin tone change   Neurologic Exam     Mental Status   Oriented to person, place, and time.            Assessment & Plan   Independent Review of Radiographic Studies:    No new imaging done today.  Narrative & Impression   XR KNEE STANDING RIGHT     Date of Exam: 3/7/2024 9:26 AM EST     Indication: right knee pain x 1 month, swelling     Comparison: None available.     Findings:  There is a small knee joint effusion. No soft tissue swelling. There is joint space narrowing of the medial compartment which otherwise appears grossly unremarkable. The lateral and patellofemoral compartments appear unremarkable. No acute fracture or   traumatic malalignment. No focal bony lesion.     IMPRESSION:  Impression:  Moderate joint space narrowing of the medial " compartment. Small knee joint effusion. No acute osseous findings.        Electronically Signed: José Miguel Ruelas MD    3/8/2024 9:24 AM EST    Workstation ID: [id number]     I did review her prior records from her oncologist as well as the plan for her upcoming abdominal laparoscopy    Procedures  [x] No procedures were performed in office today.    Diagnoses and all orders for this visit:    1. Primary osteoarthritis of right knee (Primary)  -     Diclofenac Sodium (VOLTAREN) 1 % gel gel; Apply 4 g topically to the appropriate area as directed 3 (Three) Times a Day.  Dispense: 100 g; Refill: 1    2. Arthralgia of knee, right       Risk, benefits, and merits of treatment alternatives reviewed with the patient and questions answered  Ice, heat, and/or modalities as beneficial    Recommendations/Plan:  Exercise, medications, injections, other patient advice, and return appointment as noted.  Patient is encouraged to call or return for any issues or concerns.  Because she is planning an abdominal surgery on April 9, I do not feel comfortable administering cortisone injection preoperatively.  She will contact our clinic once she has been cleared by her surgeon to proceed with a right knee cortisone injection.    Patient agreeable to call or return sooner for any concerns.    BMI is >= 30 and <35. (Class 1 Obesity). The following options were offered after discussion;: weight loss educational material (shared in after visit summary)      Clinical References    Obesity, Adult  Obesity is having too much body fat. Being obese means that your weight is more than what is healthy for you.   BMI (body mass index) is a number that explains how much body fat you have. If you have a BMI of 30 or more, you are obese.  Obesity can cause serious health problems, such as:  Stroke.  Coronary artery disease (CAD).  Type 2 diabetes.  Some types of cancer.  High blood pressure (hypertension).  High cholesterol.  Gallbladder  stones.  Obesity can also contribute to:  Osteoarthritis.  Sleep apnea.  Infertility problems.  What are the causes?  Eating meals each day that are high in calories, sugar, and fat.  Drinking a lot of drinks that have sugar in them.  Being born with genes that may make you more likely to become obese.  Having a medical condition that causes obesity.  Taking certain medicines.  Sitting a lot (having a sedentary lifestyle).  Not getting enough sleep.  What increases the risk?  Having a family history of obesity.  Living in an area with limited access to:  [place], recreation centers, or sidewalks.  Healthy food choices, such as grocery stores and farmers' markets.  What are the signs or symptoms?  The main sign is having too much body fat.  How is this treated?  Treatment for this condition often includes changing your lifestyle. Treatment may include:  Changing your diet. This may include making a healthy meal plan.  Exercise. This may include activity that causes your heart to beat faster (aerobic exercise) and strength training. Work with your doctor to design a program that works for you.  Medicine to help you lose weight. This may be used if you are not able to lose one pound a week after 6 weeks of healthy eating and more exercise.  Treating conditions that cause the obesity.  Surgery. Options may include gastric banding and gastric bypass. This may be done if:  Other treatments have not helped to improve your condition.  You have a BMI of 40 or higher.  You have life-threatening health problems related to obesity.  Follow these instructions at home:  Eating and drinking  Follow advice from your doctor about what to eat and drink. Your doctor may tell you to:  Limit fast food, sweets, and processed snack foods.  Choose low-fat options. For example, choose low-fat milk instead of whole milk.  Eat five or more servings of fruits or vegetables each day.  Eat at home more often. This gives you more control over what  you eat.  Choose healthy foods when you eat out.  Learn to read food labels. This will help you learn how much food is in one serving.  Keep low-fat snacks available.  Avoid drinks that have a lot of sugar in them. These include soda, fruit juice, iced tea with sugar, and flavored milk.  Drink enough water to keep your pee (urine) pale yellow.  Do not go on fad diets.  Physical activity  Exercise often, as told by your doctor. Most adults should get up to 150 minutes of moderate-intensity exercise every week.Ask your doctor:  What types of exercise are safe for you.  How often you should exercise.  Warm up and stretch before being active.  Do slow stretching after being active (cool down).  Rest between times of being active.  Lifestyle  Work with your doctor and a food expert (dietitian) to set a weight-loss goal that is best for you.  Limit your screen time.  Find ways to reward yourself that do not involve food.  Do not drink alcohol if:  Your doctor tells you not to drink.  You are pregnant, may be pregnant, or are planning to become pregnant.  If you drink alcohol:  Limit how much you have to:  0-1 drink a day for women.  0-2 drinks a day for men.  Know how much alcohol is in your drink. In the [place] one drink equals one 12 oz bottle of beer (355 mL), one 5 oz glass of wine (148 mL), or one 1½ oz glass of hard liquor (44 mL).  General instructions  Keep a weight-loss journal. This can help you keep track of:  The food that you eat.  How much exercise you get.  Take over-the-counter and prescription medicines only as told by your doctor.  Take vitamins and supplements only as told by your doctor.  Think about joining a support group.  Pay attention to your mental health as obesity can lead to depression or self esteem issues.  Keep all follow-up visits.  Contact a doctor if:  You cannot meet your weight-loss goal after you have changed your diet and lifestyle for 6 weeks.  You are having trouble  "breathing.  Summary  Obesity is having too much body fat.  Being obese means that your weight is more than what is healthy for you.  Work with your doctor to set a weight-loss goal.  Get regular exercise as told by your doctor.  This information is not intended to replace advice given to you by your health care provider. Make sure you discuss any questions you have with your health care provider.  Document Revised: 07/26/2022 Document Reviewed: 07/26/2022  [employer] Patient Education © 2023 [employer]     BMI for Adults  Body mass index (BMI) is a number found using a person's weight and height. BMI can help tell how much of a person's weight is made up of fat. BMI does not measure body fat directly. It is used instead of tests that directly measure body fat, which can be difficult and expensive.  What are BMI measurements used for?  BMI is useful to:  Find out if your weight puts you at higher risk for medical problems.  Help recommend changes, such as in diet and exercise. This can help you reach a healthy weight. BMI screening can be done again to see if these changes are working.  How is BMI calculated?  Your height and weight are measured. The BMI is found from those numbers. This can be done with [place] or metric measurements. Note that charts and online BMI calculators are available to help you find your BMI quickly and easily without doing these calculations.  To calculate your BMI in [place] measurements:  Measure your weight in pounds (lb).  Multiply the number of pounds by 703.  So, for an adult who weighs 150 lb, multiply that number by 703: 150 x 703, which equals 105,450.  Measure your height in inches. Then multiply that number by itself to get a measurement called \"inches squared.\"  So, for an adult who is 70 inches tall, the \"inches squared\" measurement is 70 inches x 70 inches, which equals 4,900 inches squared.  Divide the total from step 2 (number of lb x 703) by the total from step 3 (inches " "squared): 105,450 ÷ 4,900 = 21.5. This is your BMI.  To calculate your BMI in metric measurements:  Measure your weight in kilograms (kg).  For this example, the weight is 70 kg.  Measure your height in meters (m). Then multiply that number by itself to get a measurement called \"meters squared.\"  So, for an adult who is 1.75 m tall, the \"meters squared\" measurement is 1.75 m x 1.75 m, which equals 3.1 meters squared.  Divide the number of kilograms (your weight) by the meters squared number. In this example: 70 ÷ 3.1 = 22.6. This is your BMI.  What do the results mean?  BMI charts are used to see if you are underweight, normal weight, overweight, or obese. The following guidelines will be used:  Underweight: BMI less than 18.5.  Normal weight: BMI between 18.5 and 24.9.  Overweight: BMI between 25 and 29.9.  Obese: BMI of 30 or above.  BMI is a tool and cannot diagnose a condition. Talk with your health care provider about what your BMI means for you. Keep these notes in mind:  Weight includes fat and muscle. Someone with a muscular build, such as an athlete, may have a BMI that is higher than 24.9. In cases like these, BMI is not a correct measure of body fat.  If you have a BMI of 25 or higher, your provider may need to do more testing to find out if excess body fat is the cause.  BMI is measured the same way for males and females. Females usually have more body fat than males of the same height and weight.  Where to find more information  For more information about BMI, including tools to quickly find your BMI, go to:  Centers for Disease Control and Prevention: cdc.gov  American Heart Association: heart.org  National Heart, Lung, and Blood [place]: nhlbi.nih.gov  This information is not intended to replace advice given to you by your health care provider. Make sure you discuss any questions you have with your health care provider.  Document Revised: 09/07/2023 Document Reviewed: 08/31/2023  [employer] Patient " Education © 2023 [employer] Inc.

## 2024-04-02 ENCOUNTER — PRE-ADMISSION TESTING (OUTPATIENT)
Dept: PREADMISSION TESTING | Facility: HOSPITAL | Age: 54
End: 2024-04-02
Payer: COMMERCIAL

## 2024-04-02 VITALS — BODY MASS INDEX: 33.19 KG/M2 | HEIGHT: 62 IN | WEIGHT: 180.34 LBS

## 2024-04-02 DIAGNOSIS — M17.11 ARTHRITIS OF RIGHT KNEE: ICD-10-CM

## 2024-04-02 DIAGNOSIS — Z01.89 LABORATORY TEST: Primary | ICD-10-CM

## 2024-04-02 DIAGNOSIS — M25.561 ACUTE PAIN OF RIGHT KNEE: ICD-10-CM

## 2024-04-02 LAB
ABO GROUP BLD: NORMAL
ALBUMIN SERPL-MCNC: 4.3 G/DL (ref 3.5–5.2)
ALBUMIN/GLOB SERPL: 1.3 G/DL
ALP SERPL-CCNC: 199 U/L (ref 39–117)
ALT SERPL W P-5'-P-CCNC: 15 U/L (ref 1–33)
ANION GAP SERPL CALCULATED.3IONS-SCNC: 8 MMOL/L (ref 5–15)
AST SERPL-CCNC: 35 U/L (ref 1–32)
BILIRUB SERPL-MCNC: 0.6 MG/DL (ref 0–1.2)
BUN SERPL-MCNC: 16 MG/DL (ref 6–20)
BUN/CREAT SERPL: 22.5 (ref 7–25)
CALCIUM SPEC-SCNC: 8.7 MG/DL (ref 8.6–10.5)
CHLORIDE SERPL-SCNC: 107 MMOL/L (ref 98–107)
CO2 SERPL-SCNC: 28 MMOL/L (ref 22–29)
CREAT SERPL-MCNC: 0.71 MG/DL (ref 0.57–1)
DEPRECATED RDW RBC AUTO: 55.2 FL (ref 37–54)
EGFRCR SERPLBLD CKD-EPI 2021: 101.8 ML/MIN/1.73
ERYTHROCYTE [DISTWIDTH] IN BLOOD BY AUTOMATED COUNT: 15.7 % (ref 12.3–15.4)
GLOBULIN UR ELPH-MCNC: 3.3 GM/DL
GLUCOSE SERPL-MCNC: 99 MG/DL (ref 65–99)
HBA1C MFR BLD: 4.6 % (ref 4.8–5.6)
HCT VFR BLD AUTO: 34.5 % (ref 34–46.6)
HGB BLD-MCNC: 11.5 G/DL (ref 12–15.9)
INR PPP: 1.08 (ref 0.89–1.12)
MCH RBC QN AUTO: 31.8 PG (ref 26.6–33)
MCHC RBC AUTO-ENTMCNC: 33.3 G/DL (ref 31.5–35.7)
MCV RBC AUTO: 95.3 FL (ref 79–97)
PLATELET # BLD AUTO: 170 10*3/MM3 (ref 140–450)
PMV BLD AUTO: 9.8 FL (ref 6–12)
POTASSIUM SERPL-SCNC: 3.9 MMOL/L (ref 3.5–5.2)
PROT SERPL-MCNC: 7.6 G/DL (ref 6–8.5)
PROTHROMBIN TIME: 14.2 SECONDS (ref 12.2–14.5)
QT INTERVAL: 372 MS
QTC INTERVAL: 398 MS
RBC # BLD AUTO: 3.62 10*6/MM3 (ref 3.77–5.28)
RH BLD: POSITIVE
SODIUM SERPL-SCNC: 143 MMOL/L (ref 136–145)
WBC NRBC COR # BLD AUTO: 4.53 10*3/MM3 (ref 3.4–10.8)

## 2024-04-02 PROCEDURE — 36415 COLL VENOUS BLD VENIPUNCTURE: CPT

## 2024-04-02 PROCEDURE — 83036 HEMOGLOBIN GLYCOSYLATED A1C: CPT

## 2024-04-02 PROCEDURE — 93005 ELECTROCARDIOGRAM TRACING: CPT

## 2024-04-02 PROCEDURE — 85610 PROTHROMBIN TIME: CPT

## 2024-04-02 PROCEDURE — 86901 BLOOD TYPING SEROLOGIC RH(D): CPT

## 2024-04-02 PROCEDURE — 85027 COMPLETE CBC AUTOMATED: CPT

## 2024-04-02 PROCEDURE — 80053 COMPREHEN METABOLIC PANEL: CPT

## 2024-04-02 PROCEDURE — 86900 BLOOD TYPING SEROLOGIC ABO: CPT

## 2024-04-02 NOTE — PAT
An arrival time for procedure was not provided during PAT visit. If patient had any questions or concerns about their arrival time, they were instructed to contact their surgeon/physician.  Additionally, if the patient referred to an arrival time that was acquired from their my chart account, patient was encouraged to verify that time with their surgeon/physician. Arrival times are NOT provided in Pre Admission Testing Department.    Patient to apply Chlorhexadine wipes  to surgical area (as instructed) the night before procedure and the AM of procedure. Wipes provided.    Patient denies any current skin issues.     PATIENT EKG ON CHART AND IN EPIC FROM 04/02/2024    TOO SOON TO OBTAIN PATIENT T&S AT [facility] APPT. PRELIMINARY TUBE COLLETED. ORDERS PLACED TO COLLECT T&S ON DOS.      Patient viewed general PAT education video as instructed in their preoperative information received from their surgeon.  Patient stated the general [facility] education video was viewed in its entirety and survey completed.  Copies of [facility] general education handouts (Incentive Spirometry, Meds to Beds Program, Patient Belongings, Pre-op skin preparation instructions, Blood Glucose testing, Visitor policy, Surgery FAQ, Code H) distributed to patient if not printed. Education related to the PAT pass and skin preparation for surgery (if applicable) completed in PAT as a reinforcement to PAT education video. Patient instructed to return PAT pass provided today as well as completed skin preparation sheet (if applicable) on the day of procedure.     Additionally if patient had not viewed video yet but intended to view it at home or in our waiting area, then referred them to the handout with QR code/link provided during PAT visit.  Instructed patient to complete survey after viewing the video in its entirety.  Encouraged patient/family to read [facility] general education handouts thoroughly and notify PAT staff with any questions or concerns. Patient verbalized  understanding of all information and priority content.

## 2024-04-03 ENCOUNTER — INFUSION (OUTPATIENT)
Dept: ONCOLOGY | Facility: HOSPITAL | Age: 54
End: 2024-04-03
Payer: COMMERCIAL

## 2024-04-03 VITALS — WEIGHT: 180 LBS | BODY MASS INDEX: 32.92 KG/M2

## 2024-04-03 DIAGNOSIS — C18.2 CANCER OF RIGHT COLON: Primary | ICD-10-CM

## 2024-04-03 PROCEDURE — 96523 IRRIG DRUG DELIVERY DEVICE: CPT

## 2024-04-03 PROCEDURE — 25010000002 HEPARIN LOCK FLUSH PER 10 UNITS: Performed by: NURSE PRACTITIONER

## 2024-04-03 RX ORDER — SODIUM CHLORIDE 0.9 % (FLUSH) 0.9 %
10 SYRINGE (ML) INJECTION AS NEEDED
OUTPATIENT
Start: 2024-04-03

## 2024-04-03 RX ORDER — HEPARIN SODIUM (PORCINE) LOCK FLUSH IV SOLN 100 UNIT/ML 100 UNIT/ML
500 SOLUTION INTRAVENOUS AS NEEDED
OUTPATIENT
Start: 2024-04-03

## 2024-04-03 RX ORDER — SODIUM CHLORIDE 0.9 % (FLUSH) 0.9 %
10 SYRINGE (ML) INJECTION AS NEEDED
Status: DISCONTINUED | OUTPATIENT
Start: 2024-04-03 | End: 2024-04-03 | Stop reason: HOSPADM

## 2024-04-03 RX ORDER — HEPARIN SODIUM (PORCINE) LOCK FLUSH IV SOLN 100 UNIT/ML 100 UNIT/ML
500 SOLUTION INTRAVENOUS AS NEEDED
Status: DISCONTINUED | OUTPATIENT
Start: 2024-04-03 | End: 2024-04-03 | Stop reason: HOSPADM

## 2024-04-03 RX ADMIN — Medication 10 ML: at 14:38

## 2024-04-03 RX ADMIN — HEPARIN 500 UNITS: 100 SYRINGE at 14:39

## 2024-04-05 RX ORDER — MELOXICAM 7.5 MG/1
TABLET ORAL
Qty: 20 TABLET | Refills: 0 | OUTPATIENT
Start: 2024-04-05

## 2024-04-07 DIAGNOSIS — C18.9 METASTATIC COLON CANCER TO LIVER: ICD-10-CM

## 2024-04-07 DIAGNOSIS — C78.7 METASTATIC COLON CANCER TO LIVER: ICD-10-CM

## 2024-04-07 DIAGNOSIS — R11.2 NAUSEA AND VOMITING, UNSPECIFIED VOMITING TYPE: ICD-10-CM

## 2024-04-07 DIAGNOSIS — K52.1 DIARRHEA DUE TO DRUG: ICD-10-CM

## 2024-04-08 RX ORDER — ONDANSETRON 8 MG/1
TABLET, ORALLY DISINTEGRATING ORAL
Qty: 30 TABLET | Refills: 0 | Status: SHIPPED | OUTPATIENT
Start: 2024-04-08

## 2024-04-09 ENCOUNTER — HOSPITAL ENCOUNTER (OUTPATIENT)
Facility: HOSPITAL | Age: 54
Setting detail: SURGERY ADMIT
Discharge: HOME | End: 2024-04-09
Attending: STUDENT IN AN ORGANIZED HEALTH CARE EDUCATION/TRAINING PROGRAM | Admitting: STUDENT IN AN ORGANIZED HEALTH CARE EDUCATION/TRAINING PROGRAM
Payer: COMMERCIAL

## 2024-04-09 ENCOUNTER — ANESTHESIA EVENT (OUTPATIENT)
Dept: PERIOP | Facility: HOSPITAL | Age: 54
End: 2024-04-09
Payer: COMMERCIAL

## 2024-04-09 ENCOUNTER — ANESTHESIA (OUTPATIENT)
Dept: PERIOP | Facility: HOSPITAL | Age: 54
End: 2024-04-09
Payer: COMMERCIAL

## 2024-04-09 VITALS
SYSTOLIC BLOOD PRESSURE: 150 MMHG | TEMPERATURE: 98.1 F | BODY MASS INDEX: 33.13 KG/M2 | OXYGEN SATURATION: 98 % | WEIGHT: 180 LBS | HEIGHT: 62 IN | RESPIRATION RATE: 18 BRPM | DIASTOLIC BLOOD PRESSURE: 89 MMHG | HEART RATE: 76 BPM

## 2024-04-09 LAB
ABO GROUP BLD: NORMAL
B-HCG UR QL: NEGATIVE
BLD GP AB SCN SERPL QL: NEGATIVE
EXPIRATION DATE: NORMAL
INTERNAL NEGATIVE CONTROL: NEGATIVE
INTERNAL POSITIVE CONTROL: POSITIVE
Lab: NORMAL
RH BLD: POSITIVE
T&S EXPIRATION DATE: NORMAL

## 2024-04-09 PROCEDURE — 25810000003 LACTATED RINGERS PER 1000 ML: Performed by: ANESTHESIOLOGY

## 2024-04-09 PROCEDURE — 86901 BLOOD TYPING SEROLOGIC RH(D): CPT | Performed by: STUDENT IN AN ORGANIZED HEALTH CARE EDUCATION/TRAINING PROGRAM

## 2024-04-09 PROCEDURE — 81025 URINE PREGNANCY TEST: CPT | Performed by: ANESTHESIOLOGY

## 2024-04-09 PROCEDURE — 86850 RBC ANTIBODY SCREEN: CPT | Performed by: STUDENT IN AN ORGANIZED HEALTH CARE EDUCATION/TRAINING PROGRAM

## 2024-04-09 PROCEDURE — 86923 COMPATIBILITY TEST ELECTRIC: CPT

## 2024-04-09 PROCEDURE — 86900 BLOOD TYPING SEROLOGIC ABO: CPT | Performed by: STUDENT IN AN ORGANIZED HEALTH CARE EDUCATION/TRAINING PROGRAM

## 2024-04-09 RX ORDER — FAMOTIDINE 20 MG/1
20 TABLET, FILM COATED ORAL ONCE
Status: COMPLETED | OUTPATIENT
Start: 2024-04-09 | End: 2024-04-09

## 2024-04-09 RX ORDER — LIDOCAINE HYDROCHLORIDE 10 MG/ML
0.5 INJECTION, SOLUTION EPIDURAL; INFILTRATION; INTRACAUDAL; PERINEURAL ONCE AS NEEDED
Status: COMPLETED | OUTPATIENT
Start: 2024-04-09 | End: 2024-04-09

## 2024-04-09 RX ORDER — SODIUM CHLORIDE 9 MG/ML
40 INJECTION, SOLUTION INTRAVENOUS AS NEEDED
Status: DISCONTINUED | OUTPATIENT
Start: 2024-04-09 | End: 2024-04-11 | Stop reason: HOSPADM

## 2024-04-09 RX ORDER — SODIUM CHLORIDE 0.9 % (FLUSH) 0.9 %
10 SYRINGE (ML) INJECTION AS NEEDED
Status: DISCONTINUED | OUTPATIENT
Start: 2024-04-09 | End: 2024-04-11 | Stop reason: HOSPADM

## 2024-04-09 RX ORDER — SODIUM CHLORIDE, SODIUM LACTATE, POTASSIUM CHLORIDE, CALCIUM CHLORIDE 600; 310; 30; 20 MG/100ML; MG/100ML; MG/100ML; MG/100ML
9 INJECTION, SOLUTION INTRAVENOUS CONTINUOUS
Status: DISCONTINUED | OUTPATIENT
Start: 2024-04-09 | End: 2024-04-11 | Stop reason: HOSPADM

## 2024-04-09 RX ORDER — FAMOTIDINE 10 MG/ML
20 INJECTION, SOLUTION INTRAVENOUS ONCE
Status: CANCELLED | OUTPATIENT
Start: 2024-04-09 | End: 2024-04-09

## 2024-04-09 RX ORDER — SODIUM CHLORIDE 0.9 % (FLUSH) 0.9 %
10 SYRINGE (ML) INJECTION EVERY 12 HOURS SCHEDULED
Status: DISCONTINUED | OUTPATIENT
Start: 2024-04-09 | End: 2024-04-11 | Stop reason: HOSPADM

## 2024-04-09 RX ORDER — MIDAZOLAM HYDROCHLORIDE 1 MG/ML
1 INJECTION INTRAMUSCULAR; INTRAVENOUS
Status: DISCONTINUED | OUTPATIENT
Start: 2024-04-09 | End: 2024-04-11 | Stop reason: HOSPADM

## 2024-04-09 RX ADMIN — FAMOTIDINE 20 MG: 20 TABLET, FILM COATED ORAL at 10:44

## 2024-04-09 RX ADMIN — SODIUM CHLORIDE, POTASSIUM CHLORIDE, SODIUM LACTATE AND CALCIUM CHLORIDE 9 ML/HR: 600; 310; 30; 20 INJECTION, SOLUTION INTRAVENOUS at 10:41

## 2024-04-09 RX ADMIN — LIDOCAINE HYDROCHLORIDE 0.5 ML: 10 INJECTION, SOLUTION EPIDURAL; INFILTRATION; INTRACAUDAL; PERINEURAL at 10:41

## 2024-04-09 NOTE — PERIOPERATIVE NURSING NOTE
Surgery cancelled per Dr. Nielson due to previous surgery taking longer than expected. Pt discharged home with . Surgery to be rescheduled for April 19th.

## 2024-04-09 NOTE — INTERVAL H&P NOTE
"[facility] Pre-op    Full history and physical note from office is attached.    /89 (BP Location: Right arm, Patient Position: Lying)   Pulse 76   Temp 98.1 °F (36.7 °C) (Temporal)   Resp 18   Ht 157.5 cm (62\")   Wt 81.6 kg (180 lb)   SpO2 98%   BMI 32.92 kg/m²     LAB Results:  Lab Results   Component Value Date    WBC 4.53 04/02/2024    HGB 11.5 (L) 04/02/2024    HCT 34.5 04/02/2024    MCV 95.3 04/02/2024     04/02/2024    NEUTROABS 3.57 01/10/2024    GLUCOSE 99 04/02/2024    BUN 16 04/02/2024    CREATININE 0.71 04/02/2024     04/02/2024    K 3.9 04/02/2024     04/02/2024    CO2 28.0 04/02/2024    MG 2.1 11/24/2022    CALCIUM 8.7 04/02/2024    ALBUMIN 4.3 04/02/2024    AST 35 (H) 04/02/2024    ALT 15 04/02/2024    BILITOT 0.6 04/02/2024    PTT 28.1 11/22/2022    INR 1.08 04/02/2024       Cancer Staging (if applicable)  Cancer Patient: __ yes __no __unknown__N/A; If yes, clinical stage T:__ N:__M:__, stage group or __N/A      Impression: Stage IV metastatic colon cancer to liver (TxNxM1)      Plan: DIAGNOSTIC LAPAROSCOPY EXPLORATORY LAPAROTOMY, INTRAOPERATIVE ULTRASOUND; CHOLECYSTECTOMY LAPAROSCOPIC; POSSIBLE ABLATION; PARTIAL HEPATECTOMY       PIPO Gómez   4/9/2024   10:40 EDT  "

## 2024-04-09 NOTE — ANESTHESIA PREPROCEDURE EVALUATION
Anesthesia Evaluation     Patient summary reviewed and Nursing notes reviewed   no history of anesthetic complications:   NPO Solid Status: > 8 hours  NPO Liquid Status: > 8 hours           Airway   Mallampati: II  TM distance: >3 FB  Neck ROM: full  No difficulty expected  Dental      Pulmonary - normal exam   Cardiovascular - normal exam    (+) hypertension      Neuro/Psych  (+) psychiatric history  GI/Hepatic/Renal/Endo    (+) liver disease    Musculoskeletal     Abdominal    Substance History      OB/GYN          Other                    Anesthesia Plan    ASA 2     general     intravenous induction     Anesthetic plan, risks, benefits, and alternatives have been provided, discussed and informed consent has been obtained with: patient.    Plan discussed with CRNA.    CODE STATUS:

## 2024-04-10 ENCOUNTER — TELEPHONE (OUTPATIENT)
Dept: ONCOLOGY | Facility: CLINIC | Age: 54
End: 2024-04-10
Payer: COMMERCIAL

## 2024-04-10 NOTE — TELEPHONE ENCOUNTER
[facility] received a fax that requires your attention. The document has been indexed to the patient’s chart for your review.      Reason for sending: RECEIVED GINA RESULTS    Documents Description: GINA 04/03/24    Name of Sender: SADE    Date Indexed: 04/03/24    Notes (if needed): THANKS!

## 2024-04-10 NOTE — TELEPHONE ENCOUNTER
Called patient back and let her know that she should stay off of her xeloda until her surgery, and do not go back on it until she sees us again on the 24th. Patient v/u and appreciation.

## 2024-04-10 NOTE — TELEPHONE ENCOUNTER
PATIENT CALLED AND STATED THAT SHE IS CURRENTLY NOT TAKING HER CHEMO PILL AS SHE WAS INSTRUCTED TO STOP TAKING FOR UPCOMING SURGERY. PATIENTS SURGERY ENDED UP GETTING MOVED OUT TO 04/19. PATIENT NEEDED INSTRUCTION ON WHETHER TO CONTINUE OR TO STOP TAKING UNTIL THEN OR IF THERE IS A CERTAIN DATE FOR HER TO QUIT TAKING. PLEASE ADVISE.

## 2024-04-11 ENCOUNTER — SPECIALTY PHARMACY (OUTPATIENT)
Dept: ONCOLOGY | Facility: HOSPITAL | Age: 54
End: 2024-04-11
Payer: COMMERCIAL

## 2024-04-11 NOTE — PROGRESS NOTES
Dr. Lambert is aware of the plan surgery and holding of treatment.    Stephani Ortega, PharmD, [facility]  Clinical Oncology Pharmacy Specialist  Phone: [phone]

## 2024-04-11 NOTE — PROGRESS NOTES
Specialty Pharmacy Refill Coordination Note     Amber is a 53 y.o. female contacted today regarding refills of  capecitabine 1000mg PO in the AM and PM on days 1-14, then off 7 days of a 21 day cycle of specialty medication(s).    Patient is waiting to have surgery and it has been rescheduled for 4/19/24. She has been advised to hold medication until 4/24/24 when she sees her provider.     Specialty medication(s) and dose(s) confirmed: yes    Refill Questions      Flowsheet Row Most Recent Value   Changes to allergies? No   Changes to medications? Yes  [still holding capecitabine]   New conditions or infections since last clinic visit No   Unplanned office visit, urgent care, ED, or hospital admission in the last 4 weeks  No   How does patient/caregiver feel medication is working? Good   Financial problems or insurance changes  No   Since the previous refill, were any specialty medication doses or scheduled injections missed or delayed?  Yes   If yes, please provide the amount several   Why were doses missed? Patient currently holding medication until 4/24/24 appt with provider as advised.   Does this patient require a clinical escalation to a pharmacist? Yes                       Follow-up: 21 day(s)     Herminia Smith, Pharmacy Technician  Specialty Pharmacy Technician

## 2024-04-13 LAB
BH BB BLOOD EXPIRATION DATE: NORMAL
BH BB BLOOD EXPIRATION DATE: NORMAL
BH BB BLOOD TYPE BARCODE: 5100
BH BB BLOOD TYPE BARCODE: 5100
BH BB DISPENSE STATUS: NORMAL
BH BB DISPENSE STATUS: NORMAL
BH BB PRODUCT CODE: NORMAL
BH BB PRODUCT CODE: NORMAL
BH BB UNIT NUMBER: NORMAL
BH BB UNIT NUMBER: NORMAL
CROSSMATCH INTERPRETATION: NORMAL
CROSSMATCH INTERPRETATION: NORMAL
UNIT  ABO: NORMAL
UNIT  ABO: NORMAL
UNIT  RH: NORMAL
UNIT  RH: NORMAL

## 2024-04-18 ENCOUNTER — ANESTHESIA EVENT (OUTPATIENT)
Dept: PERIOP | Facility: HOSPITAL | Age: 54
End: 2024-04-18
Payer: COMMERCIAL

## 2024-04-19 ENCOUNTER — HOSPITAL ENCOUNTER (INPATIENT)
Facility: HOSPITAL | Age: 54
LOS: 5 days | Discharge: HOME | End: 2024-04-24
Attending: STUDENT IN AN ORGANIZED HEALTH CARE EDUCATION/TRAINING PROGRAM | Admitting: STUDENT IN AN ORGANIZED HEALTH CARE EDUCATION/TRAINING PROGRAM
Payer: COMMERCIAL

## 2024-04-19 ENCOUNTER — ANESTHESIA EVENT CONVERTED (OUTPATIENT)
Dept: ANESTHESIOLOGY | Facility: HOSPITAL | Age: 54
End: 2024-04-19
Payer: COMMERCIAL

## 2024-04-19 ENCOUNTER — ANESTHESIA (OUTPATIENT)
Dept: PERIOP | Facility: HOSPITAL | Age: 54
End: 2024-04-19
Payer: COMMERCIAL

## 2024-04-19 ENCOUNTER — OFFICE VISIT (OUTPATIENT)
Dept: PERIOP | Facility: HOSPITAL | Age: 54
End: 2024-04-19
Payer: COMMERCIAL

## 2024-04-19 DIAGNOSIS — C78.7 METASTATIC COLON CANCER TO LIVER: ICD-10-CM

## 2024-04-19 DIAGNOSIS — C18.9 METASTATIC COLON CANCER TO LIVER: ICD-10-CM

## 2024-04-19 DIAGNOSIS — R11.2 NAUSEA AND VOMITING, UNSPECIFIED VOMITING TYPE: ICD-10-CM

## 2024-04-19 DIAGNOSIS — K52.1 DIARRHEA DUE TO DRUG: ICD-10-CM

## 2024-04-19 LAB
ABO GROUP BLD: NORMAL
B-HCG UR QL: NEGATIVE
BASE EXCESS BLDA CALC-SCNC: -4 MMOL/L (ref -5–5)
BLD GP AB SCN SERPL QL: NEGATIVE
CA-I BLDA-SCNC: 1.15 MMOL/L (ref 1.2–1.32)
CO2 BLDA-SCNC: 21 MMOL/L (ref 24–29)
EXPIRATION DATE: NORMAL
GLUCOSE BLDC GLUCOMTR-MCNC: 181 MG/DL (ref 70–130)
HCO3 BLDA-SCNC: 20.2 MMOL/L (ref 22–26)
HCT VFR BLDA CALC: 36 % (ref 38–51)
HGB BLDA-MCNC: 12.2 G/DL (ref 12–17)
INTERNAL NEGATIVE CONTROL: NORMAL
INTERNAL POSITIVE CONTROL: NORMAL
Lab: NORMAL
PCO2 BLDA: 30.5 MM HG (ref 35–45)
PH BLDA: 7.43 PH UNITS (ref 7.35–7.6)
PO2 BLDA: 274 MMHG (ref 80–105)
POTASSIUM BLDA-SCNC: 4.4 MMOL/L (ref 3.5–4.9)
RH BLD: POSITIVE
SAO2 % BLDA: 100 % (ref 95–98)
SODIUM BLD-SCNC: 137 MMOL/L (ref 138–146)
T&S EXPIRATION DATE: NORMAL

## 2024-04-19 PROCEDURE — 84295 ASSAY OF SERUM SODIUM: CPT

## 2024-04-19 PROCEDURE — 81025 URINE PREGNANCY TEST: CPT | Performed by: ANESTHESIOLOGY

## 2024-04-19 PROCEDURE — 88307 TISSUE EXAM BY PATHOLOGIST: CPT | Performed by: STUDENT IN AN ORGANIZED HEALTH CARE EDUCATION/TRAINING PROGRAM

## 2024-04-19 PROCEDURE — 25810000003 LACTATED RINGERS PER 1000 ML: Performed by: ANESTHESIOLOGY

## 2024-04-19 PROCEDURE — 25010000002 ONDANSETRON PER 1 MG

## 2024-04-19 PROCEDURE — 82947 ASSAY GLUCOSE BLOOD QUANT: CPT

## 2024-04-19 PROCEDURE — 88304 TISSUE EXAM BY PATHOLOGIST: CPT | Performed by: STUDENT IN AN ORGANIZED HEALTH CARE EDUCATION/TRAINING PROGRAM

## 2024-04-19 PROCEDURE — 85014 HEMATOCRIT: CPT

## 2024-04-19 PROCEDURE — 25810000003 SODIUM CHLORIDE 0.9 % SOLUTION: Performed by: NURSE ANESTHETIST, CERTIFIED REGISTERED

## 2024-04-19 PROCEDURE — 25010000002 BUPIVACAINE (PF) 0.25 % SOLUTION: Performed by: NURSE ANESTHETIST, CERTIFIED REGISTERED

## 2024-04-19 PROCEDURE — 25010000002 ALBUMIN HUMAN 5% PER 50 ML: Performed by: ANESTHESIOLOGY

## 2024-04-19 PROCEDURE — 86901 BLOOD TYPING SEROLOGIC RH(D): CPT | Performed by: STUDENT IN AN ORGANIZED HEALTH CARE EDUCATION/TRAINING PROGRAM

## 2024-04-19 PROCEDURE — 82330 ASSAY OF CALCIUM: CPT

## 2024-04-19 PROCEDURE — 88313 SPECIAL STAINS GROUP 2: CPT | Performed by: STUDENT IN AN ORGANIZED HEALTH CARE EDUCATION/TRAINING PROGRAM

## 2024-04-19 PROCEDURE — 25010000002 FENTANYL CITRATE (PF) 50 MCG/ML SOLUTION

## 2024-04-19 PROCEDURE — P9041 ALBUMIN (HUMAN),5%, 50ML: HCPCS | Performed by: ANESTHESIOLOGY

## 2024-04-19 PROCEDURE — 0FB10ZZ EXCISION OF RIGHT LOBE LIVER, OPEN APPROACH: ICD-10-PCS | Performed by: STUDENT IN AN ORGANIZED HEALTH CARE EDUCATION/TRAINING PROGRAM

## 2024-04-19 PROCEDURE — 86900 BLOOD TYPING SEROLOGIC ABO: CPT | Performed by: STUDENT IN AN ORGANIZED HEALTH CARE EDUCATION/TRAINING PROGRAM

## 2024-04-19 PROCEDURE — 76999 ECHO EXAMINATION PROCEDURE: CPT

## 2024-04-19 PROCEDURE — 84132 ASSAY OF SERUM POTASSIUM: CPT

## 2024-04-19 PROCEDURE — 86850 RBC ANTIBODY SCREEN: CPT | Performed by: STUDENT IN AN ORGANIZED HEALTH CARE EDUCATION/TRAINING PROGRAM

## 2024-04-19 PROCEDURE — 25010000002 CEFAZOLIN PER 500 MG: Performed by: STUDENT IN AN ORGANIZED HEALTH CARE EDUCATION/TRAINING PROGRAM

## 2024-04-19 PROCEDURE — 25010000002 SUGAMMADEX 200 MG/2ML SOLUTION: Performed by: ANESTHESIOLOGY

## 2024-04-19 PROCEDURE — C1751 CATH, INF, PER/CENT/MIDLINE: HCPCS | Performed by: ANESTHESIOLOGY

## 2024-04-19 PROCEDURE — 25010000002 ONDANSETRON PER 1 MG: Performed by: ANESTHESIOLOGY

## 2024-04-19 PROCEDURE — 25010000002 HYDROMORPHONE PER 4 MG: Performed by: NURSE ANESTHETIST, CERTIFIED REGISTERED

## 2024-04-19 PROCEDURE — 82803 BLOOD GASES ANY COMBINATION: CPT

## 2024-04-19 PROCEDURE — 25010000002 PROPOFOL 10 MG/ML EMULSION: Performed by: NURSE ANESTHETIST, CERTIFIED REGISTERED

## 2024-04-19 PROCEDURE — 25010000002 ESMOLOL 100 MG/10ML SOLUTION: Performed by: NURSE ANESTHETIST, CERTIFIED REGISTERED

## 2024-04-19 PROCEDURE — 25010000002 HYDROMORPHONE 1 MG/ML SOLUTION

## 2024-04-19 PROCEDURE — 25810000003 LACTATED RINGERS PER 1000 ML: Performed by: STUDENT IN AN ORGANIZED HEALTH CARE EDUCATION/TRAINING PROGRAM

## 2024-04-19 PROCEDURE — 25010000002 HYDRALAZINE PER 20 MG

## 2024-04-19 PROCEDURE — 25010000002 FENTANYL CITRATE (PF) 100 MCG/2ML SOLUTION: Performed by: NURSE ANESTHETIST, CERTIFIED REGISTERED

## 2024-04-19 PROCEDURE — 0FT40ZZ RESECTION OF GALLBLADDER, OPEN APPROACH: ICD-10-PCS | Performed by: STUDENT IN AN ORGANIZED HEALTH CARE EDUCATION/TRAINING PROGRAM

## 2024-04-19 PROCEDURE — 25010000002 DROPERIDOL PER 5 MG

## 2024-04-19 PROCEDURE — 25010000002 HYDRALAZINE PER 20 MG: Performed by: NURSE ANESTHETIST, CERTIFIED REGISTERED

## 2024-04-19 PROCEDURE — 25010000002 DEXAMETHASONE SODIUM PHOSPHATE 10 MG/ML SOLUTION: Performed by: NURSE ANESTHETIST, CERTIFIED REGISTERED

## 2024-04-19 RX ORDER — ALBUMIN, HUMAN INJ 5% 5 %
SOLUTION INTRAVENOUS CONTINUOUS PRN
Status: DISCONTINUED | OUTPATIENT
Start: 2024-04-19 | End: 2024-04-19 | Stop reason: SURG

## 2024-04-19 RX ORDER — ONDANSETRON 2 MG/ML
4 INJECTION INTRAMUSCULAR; INTRAVENOUS ONCE AS NEEDED
Status: COMPLETED | OUTPATIENT
Start: 2024-04-19 | End: 2024-04-19

## 2024-04-19 RX ORDER — DROPERIDOL 2.5 MG/ML
0.62 INJECTION, SOLUTION INTRAMUSCULAR; INTRAVENOUS ONCE AS NEEDED
Status: COMPLETED | OUTPATIENT
Start: 2024-04-19 | End: 2024-04-19

## 2024-04-19 RX ORDER — SODIUM CHLORIDE 0.9 % (FLUSH) 0.9 %
3 SYRINGE (ML) INJECTION EVERY 12 HOURS SCHEDULED
Status: DISCONTINUED | OUTPATIENT
Start: 2024-04-19 | End: 2024-04-19 | Stop reason: HOSPADM

## 2024-04-19 RX ORDER — PHENYLEPHRINE HCL IN 0.9% NACL 1 MG/10 ML
SYRINGE (ML) INTRAVENOUS AS NEEDED
Status: DISCONTINUED | OUTPATIENT
Start: 2024-04-19 | End: 2024-04-19 | Stop reason: SURG

## 2024-04-19 RX ORDER — LABETALOL HYDROCHLORIDE 5 MG/ML
20 INJECTION, SOLUTION INTRAVENOUS
Status: DISCONTINUED | OUTPATIENT
Start: 2024-04-19 | End: 2024-04-20

## 2024-04-19 RX ORDER — ONDANSETRON 2 MG/ML
4 INJECTION INTRAMUSCULAR; INTRAVENOUS ONCE
Status: COMPLETED | OUTPATIENT
Start: 2024-04-19 | End: 2024-04-19

## 2024-04-19 RX ORDER — MEPERIDINE HYDROCHLORIDE 25 MG/ML
12.5 INJECTION INTRAMUSCULAR; INTRAVENOUS; SUBCUTANEOUS
Status: DISCONTINUED | OUTPATIENT
Start: 2024-04-19 | End: 2024-04-19 | Stop reason: HOSPADM

## 2024-04-19 RX ORDER — HYDROMORPHONE HYDROCHLORIDE 2 MG/ML
INJECTION, SOLUTION INTRAMUSCULAR; INTRAVENOUS; SUBCUTANEOUS AS NEEDED
Status: DISCONTINUED | OUTPATIENT
Start: 2024-04-19 | End: 2024-04-19 | Stop reason: SURG

## 2024-04-19 RX ORDER — IPRATROPIUM BROMIDE AND ALBUTEROL SULFATE 2.5; .5 MG/3ML; MG/3ML
3 SOLUTION RESPIRATORY (INHALATION) ONCE AS NEEDED
Status: DISCONTINUED | OUTPATIENT
Start: 2024-04-19 | End: 2024-04-19 | Stop reason: HOSPADM

## 2024-04-19 RX ORDER — HYDROCODONE BITARTRATE AND ACETAMINOPHEN 5; 325 MG/1; MG/1
1 TABLET ORAL ONCE AS NEEDED
Status: DISCONTINUED | OUTPATIENT
Start: 2024-04-19 | End: 2024-04-19 | Stop reason: HOSPADM

## 2024-04-19 RX ORDER — FENTANYL CITRATE 50 UG/ML
50 INJECTION, SOLUTION INTRAMUSCULAR; INTRAVENOUS
Status: DISCONTINUED | OUTPATIENT
Start: 2024-04-19 | End: 2024-04-19 | Stop reason: HOSPADM

## 2024-04-19 RX ORDER — DEXAMETHASONE SODIUM PHOSPHATE 10 MG/ML
INJECTION, SOLUTION INTRAMUSCULAR; INTRAVENOUS AS NEEDED
Status: DISCONTINUED | OUTPATIENT
Start: 2024-04-19 | End: 2024-04-19 | Stop reason: SURG

## 2024-04-19 RX ORDER — ONDANSETRON 4 MG/1
4 TABLET, ORALLY DISINTEGRATING ORAL EVERY 6 HOURS PRN
Status: DISCONTINUED | OUTPATIENT
Start: 2024-04-19 | End: 2024-04-24 | Stop reason: HOSPADM

## 2024-04-19 RX ORDER — DROPERIDOL 2.5 MG/ML
INJECTION, SOLUTION INTRAMUSCULAR; INTRAVENOUS
Status: COMPLETED
Start: 2024-04-19 | End: 2024-04-19

## 2024-04-19 RX ORDER — BUPIVACAINE HYDROCHLORIDE 2.5 MG/ML
INJECTION, SOLUTION EPIDURAL; INFILTRATION; INTRACAUDAL
Status: COMPLETED | OUTPATIENT
Start: 2024-04-19 | End: 2024-04-19

## 2024-04-19 RX ORDER — LIDOCAINE HYDROCHLORIDE 10 MG/ML
0.5 INJECTION, SOLUTION EPIDURAL; INFILTRATION; INTRACAUDAL; PERINEURAL ONCE AS NEEDED
Status: COMPLETED | OUTPATIENT
Start: 2024-04-19 | End: 2024-04-19

## 2024-04-19 RX ORDER — ESMOLOL HYDROCHLORIDE 10 MG/ML
INJECTION INTRAVENOUS AS NEEDED
Status: DISCONTINUED | OUTPATIENT
Start: 2024-04-19 | End: 2024-04-19 | Stop reason: SURG

## 2024-04-19 RX ORDER — VECURONIUM BROMIDE 1 MG/ML
INJECTION, POWDER, LYOPHILIZED, FOR SOLUTION INTRAVENOUS AS NEEDED
Status: DISCONTINUED | OUTPATIENT
Start: 2024-04-19 | End: 2024-04-19 | Stop reason: SURG

## 2024-04-19 RX ORDER — PROMETHAZINE HYDROCHLORIDE 25 MG/1
25 SUPPOSITORY RECTAL ONCE AS NEEDED
Status: DISCONTINUED | OUTPATIENT
Start: 2024-04-19 | End: 2024-04-19 | Stop reason: HOSPADM

## 2024-04-19 RX ORDER — DEXAMETHASONE SODIUM PHOSPHATE 10 MG/ML
INJECTION, SOLUTION INTRAMUSCULAR; INTRAVENOUS
Status: COMPLETED | OUTPATIENT
Start: 2024-04-19 | End: 2024-04-19

## 2024-04-19 RX ORDER — DROPERIDOL 2.5 MG/ML
0.62 INJECTION, SOLUTION INTRAMUSCULAR; INTRAVENOUS
Status: COMPLETED | OUTPATIENT
Start: 2024-04-19 | End: 2024-04-19

## 2024-04-19 RX ORDER — NALOXONE HCL 0.4 MG/ML
0.4 VIAL (ML) INJECTION AS NEEDED
Status: DISCONTINUED | OUTPATIENT
Start: 2024-04-19 | End: 2024-04-19 | Stop reason: HOSPADM

## 2024-04-19 RX ORDER — LIDOCAINE HYDROCHLORIDE 10 MG/ML
INJECTION, SOLUTION EPIDURAL; INFILTRATION; INTRACAUDAL; PERINEURAL AS NEEDED
Status: DISCONTINUED | OUTPATIENT
Start: 2024-04-19 | End: 2024-04-19 | Stop reason: SURG

## 2024-04-19 RX ORDER — HYDROMORPHONE HCL/0.9% NACL/PF 10 MG/50ML
PATIENT CONTROLLED ANALGESIA SYRINGE INTRAVENOUS CONTINUOUS
Status: DISCONTINUED | OUTPATIENT
Start: 2024-04-19 | End: 2024-04-21

## 2024-04-19 RX ORDER — SODIUM CHLORIDE 9 MG/ML
40 INJECTION, SOLUTION INTRAVENOUS AS NEEDED
Status: DISCONTINUED | OUTPATIENT
Start: 2024-04-19 | End: 2024-04-19 | Stop reason: HOSPADM

## 2024-04-19 RX ORDER — SODIUM CHLORIDE 0.9 % (FLUSH) 0.9 %
3-10 SYRINGE (ML) INJECTION AS NEEDED
Status: DISCONTINUED | OUTPATIENT
Start: 2024-04-19 | End: 2024-04-19 | Stop reason: HOSPADM

## 2024-04-19 RX ORDER — HYDRALAZINE HYDROCHLORIDE 20 MG/ML
5 INJECTION INTRAMUSCULAR; INTRAVENOUS
Status: DISCONTINUED | OUTPATIENT
Start: 2024-04-19 | End: 2024-04-19 | Stop reason: HOSPADM

## 2024-04-19 RX ORDER — MAGNESIUM HYDROXIDE 1200 MG/15ML
LIQUID ORAL AS NEEDED
Status: DISCONTINUED | OUTPATIENT
Start: 2024-04-19 | End: 2024-04-19 | Stop reason: HOSPADM

## 2024-04-19 RX ORDER — LABETALOL HYDROCHLORIDE 5 MG/ML
5 INJECTION, SOLUTION INTRAVENOUS
Status: DISCONTINUED | OUTPATIENT
Start: 2024-04-19 | End: 2024-04-19 | Stop reason: HOSPADM

## 2024-04-19 RX ORDER — ENOXAPARIN SODIUM 100 MG/ML
40 INJECTION SUBCUTANEOUS DAILY
Status: DISCONTINUED | OUTPATIENT
Start: 2024-04-20 | End: 2024-04-24 | Stop reason: HOSPADM

## 2024-04-19 RX ORDER — SODIUM CHLORIDE 9 MG/ML
30 INJECTION, SOLUTION INTRAVENOUS CONTINUOUS PRN
Status: DISCONTINUED | OUTPATIENT
Start: 2024-04-19 | End: 2024-04-23

## 2024-04-19 RX ORDER — PROPOFOL 10 MG/ML
VIAL (ML) INTRAVENOUS AS NEEDED
Status: DISCONTINUED | OUTPATIENT
Start: 2024-04-19 | End: 2024-04-19 | Stop reason: SURG

## 2024-04-19 RX ORDER — CYCLOBENZAPRINE HCL 10 MG
5 TABLET ORAL 3 TIMES DAILY
Status: DISCONTINUED | OUTPATIENT
Start: 2024-04-19 | End: 2024-04-24 | Stop reason: HOSPADM

## 2024-04-19 RX ORDER — SODIUM CHLORIDE, SODIUM LACTATE, POTASSIUM CHLORIDE, CALCIUM CHLORIDE 600; 310; 30; 20 MG/100ML; MG/100ML; MG/100ML; MG/100ML
50 INJECTION, SOLUTION INTRAVENOUS CONTINUOUS
Status: DISCONTINUED | OUTPATIENT
Start: 2024-04-19 | End: 2024-04-23

## 2024-04-19 RX ORDER — FENTANYL CITRATE 50 UG/ML
INJECTION, SOLUTION INTRAMUSCULAR; INTRAVENOUS
Status: COMPLETED
Start: 2024-04-19 | End: 2024-04-19

## 2024-04-19 RX ORDER — HYDROMORPHONE HYDROCHLORIDE 1 MG/ML
0.5 INJECTION, SOLUTION INTRAMUSCULAR; INTRAVENOUS; SUBCUTANEOUS
Status: DISCONTINUED | OUTPATIENT
Start: 2024-04-19 | End: 2024-04-19 | Stop reason: HOSPADM

## 2024-04-19 RX ORDER — PANTOPRAZOLE SODIUM 40 MG/1
40 TABLET, DELAYED RELEASE ORAL
Status: DISCONTINUED | OUTPATIENT
Start: 2024-04-20 | End: 2024-04-24 | Stop reason: HOSPADM

## 2024-04-19 RX ORDER — FAMOTIDINE 10 MG/ML
20 INJECTION, SOLUTION INTRAVENOUS ONCE
Status: DISCONTINUED | OUTPATIENT
Start: 2024-04-19 | End: 2024-04-19 | Stop reason: HOSPADM

## 2024-04-19 RX ORDER — SODIUM CHLORIDE 0.9 % (FLUSH) 0.9 %
10 SYRINGE (ML) INJECTION EVERY 12 HOURS SCHEDULED
Status: DISCONTINUED | OUTPATIENT
Start: 2024-04-19 | End: 2024-04-19 | Stop reason: HOSPADM

## 2024-04-19 RX ORDER — ONDANSETRON 2 MG/ML
INJECTION INTRAMUSCULAR; INTRAVENOUS AS NEEDED
Status: DISCONTINUED | OUTPATIENT
Start: 2024-04-19 | End: 2024-04-19 | Stop reason: SURG

## 2024-04-19 RX ORDER — HYDRALAZINE HYDROCHLORIDE 20 MG/ML
INJECTION INTRAMUSCULAR; INTRAVENOUS
Status: COMPLETED
Start: 2024-04-19 | End: 2024-04-19

## 2024-04-19 RX ORDER — SODIUM CHLORIDE 0.9 % (FLUSH) 0.9 %
10 SYRINGE (ML) INJECTION AS NEEDED
Status: DISCONTINUED | OUTPATIENT
Start: 2024-04-19 | End: 2024-04-19 | Stop reason: HOSPADM

## 2024-04-19 RX ORDER — NALOXONE HCL 0.4 MG/ML
0.1 VIAL (ML) INJECTION
Status: DISCONTINUED | OUTPATIENT
Start: 2024-04-19 | End: 2024-04-24 | Stop reason: HOSPADM

## 2024-04-19 RX ORDER — FAMOTIDINE 20 MG/1
20 TABLET, FILM COATED ORAL ONCE
Status: COMPLETED | OUTPATIENT
Start: 2024-04-19 | End: 2024-04-19

## 2024-04-19 RX ORDER — HYDROMORPHONE HCL/0.9% NACL/PF 10 MG/50ML
PATIENT CONTROLLED ANALGESIA SYRINGE INTRAVENOUS
Status: COMPLETED
Start: 2024-04-19 | End: 2024-04-19

## 2024-04-19 RX ORDER — SODIUM CHLORIDE, SODIUM LACTATE, POTASSIUM CHLORIDE, CALCIUM CHLORIDE 600; 310; 30; 20 MG/100ML; MG/100ML; MG/100ML; MG/100ML
9 INJECTION, SOLUTION INTRAVENOUS CONTINUOUS
Status: DISCONTINUED | OUTPATIENT
Start: 2024-04-19 | End: 2024-04-19

## 2024-04-19 RX ORDER — FENTANYL CITRATE 50 UG/ML
INJECTION, SOLUTION INTRAMUSCULAR; INTRAVENOUS AS NEEDED
Status: DISCONTINUED | OUTPATIENT
Start: 2024-04-19 | End: 2024-04-19 | Stop reason: SURG

## 2024-04-19 RX ORDER — SODIUM CHLORIDE 9 MG/ML
INJECTION, SOLUTION INTRAVENOUS CONTINUOUS PRN
Status: DISCONTINUED | OUTPATIENT
Start: 2024-04-19 | End: 2024-04-19 | Stop reason: SURG

## 2024-04-19 RX ORDER — ACETAMINOPHEN 500 MG
1000 TABLET ORAL EVERY 8 HOURS
Status: DISCONTINUED | OUTPATIENT
Start: 2024-04-19 | End: 2024-04-24 | Stop reason: HOSPADM

## 2024-04-19 RX ORDER — ONDANSETRON 2 MG/ML
INJECTION INTRAMUSCULAR; INTRAVENOUS
Status: COMPLETED
Start: 2024-04-19 | End: 2024-04-19

## 2024-04-19 RX ORDER — ONDANSETRON 2 MG/ML
4 INJECTION INTRAMUSCULAR; INTRAVENOUS EVERY 6 HOURS PRN
Status: DISCONTINUED | OUTPATIENT
Start: 2024-04-19 | End: 2024-04-24 | Stop reason: HOSPADM

## 2024-04-19 RX ORDER — LABETALOL HYDROCHLORIDE 5 MG/ML
INJECTION, SOLUTION INTRAVENOUS
Status: COMPLETED
Start: 2024-04-19 | End: 2024-04-19

## 2024-04-19 RX ORDER — PROMETHAZINE HYDROCHLORIDE 25 MG/1
25 TABLET ORAL ONCE AS NEEDED
Status: DISCONTINUED | OUTPATIENT
Start: 2024-04-19 | End: 2024-04-19 | Stop reason: HOSPADM

## 2024-04-19 RX ADMIN — ACETAMINOPHEN 1000 MG: 500 TABLET ORAL at 23:58

## 2024-04-19 RX ADMIN — CYCLOBENZAPRINE 5 MG: 10 TABLET, FILM COATED ORAL at 23:58

## 2024-04-19 RX ADMIN — SODIUM CHLORIDE, POTASSIUM CHLORIDE, SODIUM LACTATE AND CALCIUM CHLORIDE 9 ML/HR: 600; 310; 30; 20 INJECTION, SOLUTION INTRAVENOUS at 10:38

## 2024-04-19 RX ADMIN — VECURONIUM BROMIDE 14 MG: 1 INJECTION, POWDER, LYOPHILIZED, FOR SOLUTION INTRAVENOUS at 13:28

## 2024-04-19 RX ADMIN — FENTANYL CITRATE 100 MCG: 50 INJECTION, SOLUTION INTRAMUSCULAR; INTRAVENOUS at 13:55

## 2024-04-19 RX ADMIN — SODIUM CHLORIDE 2000 MG: 900 INJECTION INTRAVENOUS at 17:30

## 2024-04-19 RX ADMIN — Medication 5 MG: at 20:17

## 2024-04-19 RX ADMIN — SODIUM CHLORIDE, POTASSIUM CHLORIDE, SODIUM LACTATE AND CALCIUM CHLORIDE 100 ML/HR: 600; 310; 30; 20 INJECTION, SOLUTION INTRAVENOUS at 23:58

## 2024-04-19 RX ADMIN — Medication: at 21:03

## 2024-04-19 RX ADMIN — HYDRALAZINE HYDROCHLORIDE 5 MG: 20 INJECTION INTRAMUSCULAR; INTRAVENOUS at 19:46

## 2024-04-19 RX ADMIN — ONDANSETRON 4 MG: 2 INJECTION INTRAMUSCULAR; INTRAVENOUS at 20:06

## 2024-04-19 RX ADMIN — BUPIVACAINE HYDROCHLORIDE 60 ML: 2.5 INJECTION, SOLUTION EPIDURAL; INFILTRATION; INTRACAUDAL; PERINEURAL at 13:36

## 2024-04-19 RX ADMIN — VECURONIUM BROMIDE 2 MG: 1 INJECTION, POWDER, LYOPHILIZED, FOR SOLUTION INTRAVENOUS at 16:15

## 2024-04-19 RX ADMIN — FAMOTIDINE 20 MG: 20 TABLET, FILM COATED ORAL at 10:38

## 2024-04-19 RX ADMIN — ALBUMIN (HUMAN): 12.5 INJECTION, SOLUTION INTRAVENOUS at 16:58

## 2024-04-19 RX ADMIN — DROPERIDOL 0.62 MG: 2.5 INJECTION, SOLUTION INTRAMUSCULAR; INTRAVENOUS at 20:02

## 2024-04-19 RX ADMIN — SUGAMMADEX 200 MG: 100 INJECTION, SOLUTION INTRAVENOUS at 19:29

## 2024-04-19 RX ADMIN — Medication 100 MCG: at 15:23

## 2024-04-19 RX ADMIN — FENTANYL CITRATE 50 MCG: 50 INJECTION, SOLUTION INTRAMUSCULAR; INTRAVENOUS at 20:05

## 2024-04-19 RX ADMIN — Medication 5 MG: at 21:18

## 2024-04-19 RX ADMIN — LIDOCAINE HYDROCHLORIDE 0.5 ML: 10 INJECTION, SOLUTION EPIDURAL; INFILTRATION; INTRACAUDAL; PERINEURAL at 10:38

## 2024-04-19 RX ADMIN — SODIUM CHLORIDE 2000 MG: 900 INJECTION INTRAVENOUS at 13:31

## 2024-04-19 RX ADMIN — DEXAMETHASONE SODIUM PHOSPHATE 6 MG: 10 INJECTION, SOLUTION INTRAMUSCULAR; INTRAVENOUS at 13:50

## 2024-04-19 RX ADMIN — PROPOFOL 150 MG: 10 INJECTION, EMULSION INTRAVENOUS at 13:28

## 2024-04-19 RX ADMIN — HYDROMORPHONE HYDROCHLORIDE 0.5 MG: 1 INJECTION, SOLUTION INTRAMUSCULAR; INTRAVENOUS; SUBCUTANEOUS at 20:12

## 2024-04-19 RX ADMIN — ONDANSETRON 4 MG: 2 INJECTION INTRAMUSCULAR; INTRAVENOUS at 11:16

## 2024-04-19 RX ADMIN — DEXAMETHASONE SODIUM PHOSPHATE 4 MG: 10 INJECTION, SOLUTION INTRAMUSCULAR; INTRAVENOUS at 13:36

## 2024-04-19 RX ADMIN — SODIUM CHLORIDE: 9 INJECTION, SOLUTION INTRAVENOUS at 13:50

## 2024-04-19 RX ADMIN — DROPERIDOL 0.62 MG: 2.5 INJECTION, SOLUTION INTRAMUSCULAR; INTRAVENOUS at 21:22

## 2024-04-19 RX ADMIN — LIDOCAINE HYDROCHLORIDE 50 MG: 10 INJECTION, SOLUTION EPIDURAL; INFILTRATION; INTRACAUDAL; PERINEURAL at 13:28

## 2024-04-19 RX ADMIN — HYDROMORPHONE HYDROCHLORIDE 2 MG: 2 INJECTION, SOLUTION INTRAMUSCULAR; INTRAVENOUS; SUBCUTANEOUS at 14:01

## 2024-04-19 RX ADMIN — VECURONIUM BROMIDE 4 MG: 1 INJECTION, POWDER, LYOPHILIZED, FOR SOLUTION INTRAVENOUS at 15:19

## 2024-04-19 RX ADMIN — ESMOLOL HYDROCHLORIDE 100 MG: 10 INJECTION, SOLUTION INTRAVENOUS at 13:28

## 2024-04-19 RX ADMIN — ONDANSETRON 4 MG: 2 INJECTION INTRAMUSCULAR; INTRAVENOUS at 19:24

## 2024-04-19 RX ADMIN — HYDRALAZINE HYDROCHLORIDE 5 MG: 20 INJECTION INTRAMUSCULAR; INTRAVENOUS at 20:03

## 2024-04-19 NOTE — INTERVAL H&P NOTE
[facility] Pre-op    Full history and physical note from office is attached.    VS: /103  HR 84  RR 16  T 97.0  Sat 98%RA    Immunizations:  Influenza:  UTD  Pneumococcal:  UTD  Tetanus:  UTD  Covid : No    LAB Results:  Lab Results   Component Value Date    WBC 4.53 04/02/2024    HGB 11.5 (L) 04/02/2024    HCT 34.5 04/02/2024    MCV 95.3 04/02/2024     04/02/2024    NEUTROABS 3.57 01/10/2024    GLUCOSE 99 04/02/2024    BUN 16 04/02/2024    CREATININE 0.71 04/02/2024     04/02/2024    K 3.9 04/02/2024     04/02/2024    CO2 28.0 04/02/2024    MG 2.1 11/24/2022    CALCIUM 8.7 04/02/2024    ALBUMIN 4.3 04/02/2024    AST 35 (H) 04/02/2024    ALT 15 04/02/2024    BILITOT 0.6 04/02/2024    PTT 28.1 11/22/2022    INR 1.08 04/02/2024     Study Result    Narrative & Impression   PROCEDURE: CT ABDOMEN PELVIS W CONTRAST-     TECHNIQUE: IV contrast enhanced exam     HISTORY: Follow-up metastatic colon cancer; C18.2-Malignant neoplasm of  ascending colon; C18.9-Malignant neoplasm of colon, unspecified;  C78.7-Secondary malignant neoplasm of liver and intrahepatic bile duct.     COMPARISON: 11/22/2023.     FINDINGS:     ABDOMEN: Multiple liver lesions are again noted. Mixed calcified and  hypodense lesion of the anterior liver dome measures 33 x 27 mm, not  significantly changed. Densely calcified lesions of the posterior right  liver are noted. The more medial lesion measures 29 x 28 mm, unchanged.  No new liver lesion is evident. Remaining solid organs are normal. No  bowel obstruction is seen. There is no free fluid. No adenopathy is  present. No acute gallbladder disease is evident.     PELVIS: There is mild wall thickening of the proximal sigmoid colon over  a moderate length segment. Colitis not excluded. Given the lack of focal  nature this is considered unlikely to be neoplastic. Appendix is not  visualized. Uterus and ovaries are unremarkable.     IMPRESSION:  1. Stable partially  calcified liver metastases.  2. Mild/moderate length wall thickening of the proximal sigmoid colon  could represent mild colitis.     Cancer Staging (if applicable)  Cancer Patient: __ yes __no __unknown__N/A; If yes, clinical stage T:__ N:__M:__, stage group or __N/A      Impression: Stage IV (Tx, Nx, N1) colorectal cancer with liver metastasis       Plan: DIAGNOSTIC LAPAROSCOPY EXPLORATORY LAPAROTOMY, INTRAOPERATIVE ULTRASOUND; CHOLECYSTECTOMY LAPAROSCOPIC; POSSIBLE ABLATION; PARTIAL HEPATECTOMY       Genevieve Villaseñor, PIPO   4/19/2024   09:56 EDT

## 2024-04-19 NOTE — ANESTHESIA PROCEDURE NOTES
Peripheral Block      Patient reassessed immediately prior to procedure    Patient location during procedure: OR  Stop time: 4/19/2024 1:36 PM  Reason for block: at surgeon's request and post-op pain management  Performed by  CRNA/CAA: Adonis Laguerre CRNA  Preanesthetic Checklist  Completed: patient identified, IV checked, site marked, risks and benefits discussed, surgical consent, monitors and equipment checked, pre-op evaluation and timeout performed  Prep:  Pt Position: supine  Sterile barriers:cap, gloves, mask and washed/disinfected hands  Prep: ChloraPrep  Patient monitoring: blood pressure monitoring, continuous pulse oximetry and EKG  Procedure    Sedation: yes  Performed under: general  Guidance:ultrasound guided    ULTRASOUND INTERPRETATION.  Using ultrasound guidance a 20 G gauge needle was placed in close proximity to the nerve, at which point, under ultrasound guidance anesthetic was injected in the area of the nerve and spread of the anesthesia was seen on ultrasound in close proximity thereto.  There were no abnormalities seen on ultrasound; a digital image was taken; and the patient tolerated the procedure with no complications. Images:still images obtained, printed/placed on chart    Laterality:Bilateral  Block Type:TAP  Injection Technique:single-shot  Needle Type:short-bevel and echogenic  Needle Gauge:20 G  Resistance on Injection: none    Medications Used: dexamethasone sodium phosphate injection - Injection   4 mg - 4/19/2024 1:36:00 PM  bupivacaine PF (MARCAINE) 0.25 % injection - Injection   60 mL - 4/19/2024 1:36:00 PM      Medications  Comment:Block Injection:  LA dose divided between Right and Left block        Post Assessment  Injection Assessment: negative aspiration for heme, incremental injection and no paresthesia on injection  Patient Tolerance:comfortable throughout block  Complications:no  Additional Notes    Subcostal TAPs    A high-frequency linear transducer, with sterile  "cover, was placed sub-xiphoid to identify Linea Alba, right and left Rectus Abdominus Muscles (PRIYANKA). The transducer was moved either right or left subcostally to identify the PRIYANKA and the Transverse Abdominus Muscle (MARSHALL). The insertion site was prepped in sterile fashion and then localized with 2-5 ml of 1% Lidocaine. Using ultrasound-guidance, a 20-gauge B-Allen 4\" Ultraplex 360 non-stimulating echogenic needle was advanced in plane, from medial to lateral, until the tip of the needle was in the fascial plane between the PRIYANKA and MARSHALL. 1-3ml of preservative free normal saline was used to hydro-dissect the fascial planes. After the fascial plane was verified, the local anesthetic (LA) was injected. The procedure was repeated on the opposite side for bilateral coverage. Aspiration every 5 ml to prevent intravascular injection. Injection was completed with negative aspiration of blood and negative intravascular injection. Injection pressures were normal with minimal resistance. The subcostal approach to the TAP nerve block ideally anesthetizes the intercostal nerves T6-T9.     Mid-Axillary/Lateral TAPs    A high-frequency linear transducer, with sterile cover, was placed in the midaxillary line between the ASIS and costal margin. The External Oblique Muscle (EOM), Internal Oblique Muscle (IOM), Transverse Abdominus Muscle (MARSHALL), and Peritoneum were identified. The insertion site was prepped in sterile fashion and then localized with 2-5 ml of 1% Lidocaine. Using ultrasound-guidance, a 20-gauge B-Allen 4\" Ultraplex 360 non-stimulating echogenic needle was advanced in plane, from medial to lateral, until the tip of the needle was in the fascial plane between the IOM and MARSHALL. 1-3ml of preservative free normal saline was used to hydro-dissect the fascial planes. After the fascial plane was verified, the local anesthetic (LA) was injected. The procedure was repeated on the opposite side for bilateral coverage. Aspiration " every 5 ml to prevent intravascular injection. Injection was completed with negative aspiration of blood and negative intravascular injection. Injection pressures were normal with minimal resistance. Midaxillary TAPs should reach intercostal nerves T10- T11 and the subcostal nerve T12.

## 2024-04-19 NOTE — ANESTHESIA PREPROCEDURE EVALUATION
Anesthesia Evaluation                  Airway   Mallampati: I  TM distance: >3 FB  Neck ROM: full  No difficulty expected  Dental      Pulmonary    Cardiovascular     ECG reviewed    (+) hypertension      Neuro/Psych  (+) psychiatric history Anxiety and Depression  GI/Hepatic/Renal/Endo    (+) liver disease    Musculoskeletal     Abdominal    Substance History      OB/GYN          Other      history of cancer                Anesthesia Plan    ASA 2     general     (taps)    Anesthetic plan, risks, benefits, and alternatives have been provided, discussed and informed consent has been obtained with: patient.    Plan discussed with CRNA.    CODE STATUS:

## 2024-04-19 NOTE — ANESTHESIA PROCEDURE NOTES
Airway  Urgency: elective    Date/Time: 4/19/2024 1:30 PM  Airway not difficult    General Information and Staff    Patient location during procedure: OR  CRNA/CAA: Adonis Laguerre CRNA    Indications and Patient Condition  Indications for airway management: airway protection    Preoxygenated: yes  MILS not maintained throughout  Mask difficulty assessment: 1 - vent by mask    Final Airway Details  Final airway type: endotracheal airway      Successful airway: ETT  Cuffed: yes   Successful intubation technique: direct laryngoscopy  Endotracheal tube insertion site: oral  Blade: Goldy  Blade size: 3  ETT size (mm): 7.5  Cormack-Lehane Classification: grade I - full view of glottis  Placement verified by: chest auscultation and capnometry   Measured from: lips  ETT/EBT  to lips (cm): 21  Number of attempts at approach: 1  Assessment: lips, teeth, and gum same as pre-op and atraumatic intubation    Additional Comments  Negative epigastric sounds, Breath sound equal bilaterally with symmetric chest rise and fall

## 2024-04-19 NOTE — ANESTHESIA POSTPROCEDURE EVALUATION
Patient: Amber Feng    Procedure Summary       Date: 04/19/24 [facility] / [facility]    Anesthesia Start: 1324 Anesthesia Stop: 1944    Procedures:       DIAGNOSTIC LAPAROSCOPY EXPLORATORY LAPAROTOMY, INTRAOPERATIVE ULTRASOUND (Abdomen)      OPEN CHOLECYSTECTOMY (Abdomen)      PARTIAL HEPATECTOMY x 2,  WITH LIVER ABLATION X 2 (Left: Abdomen) Diagnosis:     Surgeons: Jacob Nielson MD Provider: John Paul Duckworth MD    Anesthesia Type: general ASA Status: 2            Anesthesia Type: general    Vitals  Vitals Value Taken Time   /112 04/19/24 1941   Temp 100.5 °F (38.1 °C) 04/19/24 1941   Pulse 93 04/19/24 1946   Resp 16 04/19/24 1941   SpO2 100 % 04/19/24 1946   Vitals shown include unfiled device data.        Post Anesthesia Care and Evaluation    Patient location during evaluation: PACU  Patient participation: complete - patient participated  Level of consciousness: awake and alert  Pain management: adequate    Airway patency: patent  Anesthetic complications: No anesthetic complications  PONV Status: none  Cardiovascular status: hemodynamically stable and acceptable  Respiratory status: nonlabored ventilation, acceptable and nasal cannula  Hydration status: acceptable

## 2024-04-19 NOTE — BRIEF OP NOTE
DIAGNOSTIC LAPAROSCOPY EXPLORATORY LAPAROTOMY, CHOLECYSTECTOMY LAPAROSCOPIC, RESECTION LIVER  Progress Note    Amber Feng  4/19/2024    Pre-op Diagnosis:   Colorectal liver metastases       Post-Op Diagnosis Codes:   same    Procedure/CPT® Codes:    Diagnostic laparoscopy  Exploratory laparotomy  Open cholecystectomy  Intraoperative ultrasound  Partial hepatectomy segment 7 and segment 3  Microwave ablation of masses in segment 5/8 and segment 6    Surgeon(s):  Jacob Nielson MD Shirley, Lawrence A, MD    Anesthesia: General with Block    Staff:   Circulator: Ana Cristina Triplett RN; Irina Ocampo, ROBERT; Guera Escoto, ROBERT; Arlene Polo, ROBERT; Mykel Dooley, ROBERT  Physician Assistant: Isabela Mejia PA  Scrub Person: Raquel Mcnulty; Claire Emery; Guevara Smith  Nursing Assistant: Riri Gonzalez CNA  Assistant: Jeffrey Macedo PA-C  Nurse Extern: Melissa Denny RN Extern  Assistant: Jeffrey Macedo PA-C    Estimated Blood Loss: 200ml    Urine Voided: 410 mL    Specimens:                Specimens       ID Source Type Tests Collected By Collected At Frozen?    A Gallbladder Tissue TISSUE PATHOLOGY EXAM   Jacob Nielson MD 4/19/24 1540 No    Description: GALLBLADDER WALL- FOR PERMANENT    B Liver Tissue TISSUE PATHOLOGY EXAM   Jacob Nielson MD 4/19/24 1715 No    Description: SEGMENT SEVEN MASS- PERMANENT    C Liver Tissue TISSUE PATHOLOGY EXAM   Jacob Nielson MD 4/19/24 1722 No    Description: SEGMENT THREE MASS- PERMANENT                  Drains:   Closed/Suction Drain 1 RLQ Bulb 10 Fr. (Active)       Urethral Catheter Silicone 16 Fr. (Active)       Findings: Diseased and stiff liver likely secondary to irinotecan chemotherapy. Masses identified in locations as determined by preoperative MRI:    Segment 5/8 - ablated  Segment 7 - resected  Segment 6 - ablated    Incidentally discovered lesion in segment 3 - resected    Extensive treatment effect and calcifications at sites of prior  disease. Significant adhesions and inflammatory response along the right coronary ligament and infundibulum of the gallbladder. This significantly impacted and added time to the case. No other lesions identified on survey of the liver.     Complications: none apparent    Assistant: Jeffrey Macedo PA-C, Isabela Mejia PA  was responsible for performing the following activities: Retraction, Suction, and Irrigation and their skilled assistance was necessary for the success of this case.    Jacob Nielson MD     Date: 4/19/2024  Time: 19:33 EDT

## 2024-04-19 NOTE — ANESTHESIA PROCEDURE NOTES
Central Line      Patient reassessed immediately prior to procedure    Patient location during procedure: OR  Start time: 4/19/2024 1:35 PM  Stop Time:4/19/2024 1:40 PM  Indications: vascular access  Staff  Anesthesiologist: John Paul Duckworth MD  Preanesthetic Checklist  Completed: patient identified, IV checked, site marked, risks and benefits discussed, surgical consent, monitors and equipment checked, pre-op evaluation and timeout performed  Central Line Prep  Sterile Tech:cap, gloves, gown, mask and sterile barriers  Prep: chloraprep  Patient monitoring: blood pressure monitoring, continuous pulse oximetry and EKG  Central Line Procedure  Laterality:right  Location:internal jugular  Catheter Type:triple lumen  Catheter Size:7.5 Fr  Guidance:ultrasound guided  PROCEDURE NOTE/ULTRASOUND INTERPRETATION.  Using ultrasound guidance the potential vascular sites for insertion of the catheter were visualized to determine the patency of the vessel to be used for vascular access.  After selecting the appropriate site for insertion, the needle was visualized under ultrasound being inserted into the internal jugular vein, followed by ultrasound confirmation of wire and catheter placement. There were no abnormalities seen on ultrasound; an image was taken; and the patient tolerated the procedure with no complications. Images: still images not obtained  Assessment  Post procedure:biopatch applied, line sutured, occlusive dressing applied and secured with tape  Assessement:blood return through all ports, free fluid flow, chest x-ray ordered and Vinny Test  Complications:no  Patient Tolerance:patient tolerated the procedure well with no apparent complications

## 2024-04-20 PROBLEM — K21.9 GERD WITHOUT ESOPHAGITIS: Status: ACTIVE | Noted: 2024-04-20

## 2024-04-20 PROBLEM — I10 ESSENTIAL HYPERTENSION: Status: ACTIVE | Noted: 2024-04-20

## 2024-04-20 LAB
ALBUMIN SERPL-MCNC: 3.7 G/DL (ref 3.5–5.2)
ALBUMIN/GLOB SERPL: 1.2 G/DL
ALP SERPL-CCNC: 162 U/L (ref 39–117)
ALT SERPL W P-5'-P-CCNC: 601 U/L (ref 1–33)
ANION GAP SERPL CALCULATED.3IONS-SCNC: 15 MMOL/L (ref 5–15)
AST SERPL-CCNC: 1374 U/L (ref 1–32)
BASOPHILS # BLD AUTO: 0.01 10*3/MM3 (ref 0–0.2)
BASOPHILS NFR BLD AUTO: 0.1 % (ref 0–1.5)
BILIRUB SERPL-MCNC: 1.6 MG/DL (ref 0–1.2)
BUN SERPL-MCNC: 14 MG/DL (ref 6–20)
BUN/CREAT SERPL: 20.9 (ref 7–25)
CALCIUM SPEC-SCNC: 8.1 MG/DL (ref 8.6–10.5)
CHLORIDE SERPL-SCNC: 105 MMOL/L (ref 98–107)
CO2 SERPL-SCNC: 19 MMOL/L (ref 22–29)
CREAT SERPL-MCNC: 0.67 MG/DL (ref 0.57–1)
DEPRECATED RDW RBC AUTO: 47.6 FL (ref 37–54)
EGFRCR SERPLBLD CKD-EPI 2021: 104.7 ML/MIN/1.73
EOSINOPHIL # BLD AUTO: 0 10*3/MM3 (ref 0–0.4)
EOSINOPHIL NFR BLD AUTO: 0 % (ref 0.3–6.2)
ERYTHROCYTE [DISTWIDTH] IN BLOOD BY AUTOMATED COUNT: 13.9 % (ref 12.3–15.4)
GLOBULIN UR ELPH-MCNC: 3 GM/DL
GLUCOSE SERPL-MCNC: 165 MG/DL (ref 65–99)
HCT VFR BLD AUTO: 36.4 % (ref 34–46.6)
HGB BLD-MCNC: 12.1 G/DL (ref 12–15.9)
IMM GRANULOCYTES # BLD AUTO: 0.05 10*3/MM3 (ref 0–0.05)
IMM GRANULOCYTES NFR BLD AUTO: 0.5 % (ref 0–0.5)
LYMPHOCYTES # BLD AUTO: 0.78 10*3/MM3 (ref 0.7–3.1)
LYMPHOCYTES NFR BLD AUTO: 7.2 % (ref 19.6–45.3)
MAGNESIUM SERPL-MCNC: 1.9 MG/DL (ref 1.6–2.6)
MCH RBC QN AUTO: 31.2 PG (ref 26.6–33)
MCHC RBC AUTO-ENTMCNC: 33.2 G/DL (ref 31.5–35.7)
MCV RBC AUTO: 93.8 FL (ref 79–97)
MONOCYTES # BLD AUTO: 0.66 10*3/MM3 (ref 0.1–0.9)
MONOCYTES NFR BLD AUTO: 6.1 % (ref 5–12)
NEUTROPHILS NFR BLD AUTO: 86.1 % (ref 42.7–76)
NEUTROPHILS NFR BLD AUTO: 9.33 10*3/MM3 (ref 1.7–7)
NRBC BLD AUTO-RTO: 0 /100 WBC (ref 0–0.2)
PHOSPHATE SERPL-MCNC: 3 MG/DL (ref 2.5–4.5)
PLATELET # BLD AUTO: 149 10*3/MM3 (ref 140–450)
PMV BLD AUTO: 11.1 FL (ref 6–12)
POTASSIUM SERPL-SCNC: 3.9 MMOL/L (ref 3.5–5.2)
PROT SERPL-MCNC: 6.7 G/DL (ref 6–8.5)
RBC # BLD AUTO: 3.88 10*6/MM3 (ref 3.77–5.28)
SODIUM SERPL-SCNC: 139 MMOL/L (ref 136–145)
WBC NRBC COR # BLD AUTO: 10.83 10*3/MM3 (ref 3.4–10.8)

## 2024-04-20 PROCEDURE — 99222 1ST HOSP IP/OBS MODERATE 55: CPT | Performed by: NURSE PRACTITIONER

## 2024-04-20 PROCEDURE — 97161 PT EVAL LOW COMPLEX 20 MIN: CPT | Performed by: PHYSICAL THERAPIST

## 2024-04-20 PROCEDURE — 97116 GAIT TRAINING THERAPY: CPT | Performed by: PHYSICAL THERAPIST

## 2024-04-20 PROCEDURE — 25810000003 LACTATED RINGERS PER 1000 ML: Performed by: STUDENT IN AN ORGANIZED HEALTH CARE EDUCATION/TRAINING PROGRAM

## 2024-04-20 PROCEDURE — 84100 ASSAY OF PHOSPHORUS: CPT | Performed by: STUDENT IN AN ORGANIZED HEALTH CARE EDUCATION/TRAINING PROGRAM

## 2024-04-20 PROCEDURE — 25010000002 KETOROLAC TROMETHAMINE PER 15 MG: Performed by: NURSE PRACTITIONER

## 2024-04-20 PROCEDURE — 83735 ASSAY OF MAGNESIUM: CPT | Performed by: STUDENT IN AN ORGANIZED HEALTH CARE EDUCATION/TRAINING PROGRAM

## 2024-04-20 PROCEDURE — 25010000002 ENOXAPARIN PER 10 MG: Performed by: STUDENT IN AN ORGANIZED HEALTH CARE EDUCATION/TRAINING PROGRAM

## 2024-04-20 PROCEDURE — 80053 COMPREHEN METABOLIC PANEL: CPT | Performed by: STUDENT IN AN ORGANIZED HEALTH CARE EDUCATION/TRAINING PROGRAM

## 2024-04-20 PROCEDURE — 85025 COMPLETE CBC W/AUTO DIFF WBC: CPT | Performed by: STUDENT IN AN ORGANIZED HEALTH CARE EDUCATION/TRAINING PROGRAM

## 2024-04-20 RX ORDER — AMLODIPINE BESYLATE 5 MG/1
5 TABLET ORAL
Status: DISCONTINUED | OUTPATIENT
Start: 2024-04-20 | End: 2024-04-23

## 2024-04-20 RX ORDER — LIDOCAINE 4 G/G
1 PATCH TOPICAL
Status: DISCONTINUED | OUTPATIENT
Start: 2024-04-20 | End: 2024-04-24 | Stop reason: HOSPADM

## 2024-04-20 RX ORDER — LABETALOL HYDROCHLORIDE 5 MG/ML
10 INJECTION, SOLUTION INTRAVENOUS EVERY 6 HOURS PRN
Status: DISCONTINUED | OUTPATIENT
Start: 2024-04-20 | End: 2024-04-22 | Stop reason: ALTCHOICE

## 2024-04-20 RX ORDER — KETOROLAC TROMETHAMINE 30 MG/ML
30 INJECTION, SOLUTION INTRAMUSCULAR; INTRAVENOUS EVERY 6 HOURS PRN
Status: DISCONTINUED | OUTPATIENT
Start: 2024-04-20 | End: 2024-04-24 | Stop reason: HOSPADM

## 2024-04-20 RX ADMIN — ACETAMINOPHEN 1000 MG: 500 TABLET ORAL at 08:06

## 2024-04-20 RX ADMIN — CYCLOBENZAPRINE 5 MG: 10 TABLET, FILM COATED ORAL at 15:39

## 2024-04-20 RX ADMIN — AMLODIPINE BESYLATE 5 MG: 5 TABLET ORAL at 12:08

## 2024-04-20 RX ADMIN — Medication 10 MG: at 21:45

## 2024-04-20 RX ADMIN — PANTOPRAZOLE SODIUM 40 MG: 40 TABLET, DELAYED RELEASE ORAL at 06:20

## 2024-04-20 RX ADMIN — KETOROLAC TROMETHAMINE 30 MG: 30 INJECTION, SOLUTION INTRAMUSCULAR; INTRAVENOUS at 12:08

## 2024-04-20 RX ADMIN — ACETAMINOPHEN 1000 MG: 500 TABLET ORAL at 21:44

## 2024-04-20 RX ADMIN — ENOXAPARIN SODIUM 40 MG: 100 INJECTION SUBCUTANEOUS at 08:06

## 2024-04-20 RX ADMIN — LIDOCAINE 1 PATCH: 4 PATCH TOPICAL at 11:58

## 2024-04-20 RX ADMIN — SODIUM CHLORIDE, POTASSIUM CHLORIDE, SODIUM LACTATE AND CALCIUM CHLORIDE 100 ML/HR: 600; 310; 30; 20 INJECTION, SOLUTION INTRAVENOUS at 15:34

## 2024-04-20 RX ADMIN — CYCLOBENZAPRINE 5 MG: 10 TABLET, FILM COATED ORAL at 08:07

## 2024-04-20 RX ADMIN — ACETAMINOPHEN 1000 MG: 500 TABLET ORAL at 15:39

## 2024-04-20 RX ADMIN — CYCLOBENZAPRINE 5 MG: 10 TABLET, FILM COATED ORAL at 21:44

## 2024-04-20 RX ADMIN — Medication 20 MG: at 02:02

## 2024-04-20 NOTE — THERAPY EVALUATION
"Patient Name: Amber Feng  : 1970    MRN: [id number]                              Today's Date: 2024       Admit Date: 2024    Visit Dx:     ICD-10-CM ICD-9-CM   1. Metastatic colon cancer to liver  C18.9 153.9    C78.7 197.7     Patient Active Problem List   Diagnosis    Cancer of right colon    Metastatic colon cancer to liver    Metastatic cancer    Adenocarcinoma of colon metastatic to liver     Past Medical History:   Diagnosis Date    Anxiety     Cancer     COLON STAGE IV    Ear piercing     Gallstones     History of irritable bowel syndrome     Hypertension     pt states secondary to pain    Seasonal allergies     Tattoos     Wears glasses      Past Surgical History:   Procedure Laterality Date    COLONOSCOPY N/A 2022    Procedure: COLONOSCOPY WITH BIOPSY AND POLYPECTOMY;  Surgeon: Sea Valentin MD;  Location: [facility];  Service: General;  Laterality: N/A;    DILATATION AND CURETTAGE      PORTACATH PLACEMENT N/A 2022    Procedure: INSERTION OF PORTACATH;  Surgeon: Sea Valentin MD;  Location: [facility];  Service: General;  Laterality: N/A;    POSTPARTUM TUBAL LIGATION      TUMOR REMOVAL      pt reports \"tumor removed from uterus\" - states non-cancerous; unsure if fibroid; unsure if removed laparoscopically      General Information       Row Name 2423          Physical Therapy Time and Intention    Document Type evaluation  -LM     Mode of Treatment individual therapy;physical therapy  -LM       Row Name 24          General Information    Patient Profile Reviewed yes  -LM     Prior Level of Function independent:;all household mobility;gait;ADL's;home management;driving  -LM     Existing Precautions/Restrictions fall;oxygen therapy device and L/min;other (see comments)  Abd Incision; BERNARDO Drain; PCA Pump  -LM     Barriers to Rehab none identified  -LM       Row Name 24          Living Environment    People in Home spouse  -LM       " Row Name 04/20/24 0923          Home Main Entrance    Number of Stairs, Main Entrance one  -LM     Stair Railings, Main Entrance none  -LM       Row Name 04/20/24 0923          Stairs Within Home, Primary    Number of Stairs, Within Home, Primary none  -LM       Row Name 04/20/24 0923          Cognition    Orientation Status (Cognition) oriented x 4  -LM       Row Name 04/20/24 0923          Safety Issues, Functional Mobility    Safety Issues Affecting Function (Mobility) safety precaution awareness;sequencing abilities  -LM     Impairments Affecting Function (Mobility) endurance/activity tolerance;pain  -LM               User Key  (r) = Recorded By, (t) = Taken By, (c) = Cosigned By      Initials Name Provider Type    LM Christine Pryor PT Physical Therapist                   Mobility       Row Name 04/20/24 0925          Bed Mobility    Bed Mobility supine-sit;sit-supine  -LM     Supine-Sit [place] (Bed Mobility) moderate assist (50% patient effort);1 person assist;verbal cues  -LM     Sit-Supine [place] (Bed Mobility) moderate assist (50% patient effort);1 person assist;verbal cues  -LM     Comment, (Bed Mobility) HOB elevated and BR used with supine-->sit.  HOB flat and no BR used with sit-->supine.  Cues given for sequencing.  -LM       Row Name 04/20/24 0925          Sit-Stand Transfer    Sit-Stand [place] (Transfers) contact guard;1 person assist  -LM     Assistive Device (Sit-Stand Transfers) walker, front-wheeled  -LM     Comment, (Sit-Stand Transfer) Vc's to push up from bed and reach back when sitting.  -LM       Row Name 04/20/24 0925          Gait/Stairs (Locomotion)    [place] Level (Gait) contact guard;1 person assist  -LM     Assistive Device (Gait) walker, front-wheeled  -LM     Distance in Feet (Gait) 120  -LM     Deviations/Abnormal Patterns (Gait) carlos decreased;gait speed decreased;stride length decreased  -LM     Bilateral Gait Deviations forward flexed posture  -LM      Comment, (Gait/Stairs) Pt ambulates at a slower pace, but no unsteadiness noted.  Ambulated on RA - O2 at 94% post gait.  Vc's for upright posture.  -LM               User Key  (r) = Recorded By, (t) = Taken By, (c) = Cosigned By      Initials Name Provider Type    Christine Heard PT Physical Therapist                   Obj/Interventions       Row Name 04/20/24 0927          Range of Motion Comprehensive    General Range of Motion bilateral lower extremity ROM WFL  -LM       Row Name 04/20/24 0927          Strength Comprehensive (MMT)    General Manual Muscle Testing (MMT) Assessment no strength deficits identified  BLEs  -LM       Row Name 04/20/24 0927          Balance    Balance Assessment sitting static balance;standing static balance;standing dynamic balance  -LM     Static Sitting Balance standby assist  -LM     Position, Sitting Balance unsupported;sitting edge of bed  -LM     Static Standing Balance contact guard  -LM     Dynamic Standing Balance contact guard  -LM     Position/Device Used, Standing Balance supported;walker, front-wheeled  -LM       Row Name 04/20/24 0927          Sensory Assessment (Somatosensory)    Sensory Assessment (Somatosensory) LE sensation intact  -LM               User Key  (r) = Recorded By, (t) = Taken By, (c) = Cosigned By      Initials Name Provider Type    Christine Heard PT Physical Therapist                   Goals/Plan       Row Name 04/20/24 0930          Bed Mobility Goal 1 (PT)    Activity/Assistive Device (Bed Mobility Goal 1, PT) sit to supine/supine to sit  -LM     [place] Level/Cues Needed (Bed Mobility Goal 1, PT) independent  -LM     Time Frame (Bed Mobility Goal 1, PT) long term goal (LTG);10 days  -LM       Row Name 04/20/24 0930          Transfer Goal 1 (PT)    Activity/Assistive Device (Transfer Goal 1, PT) bed-to-chair/chair-to-bed  -LM     [place] Level/Cues Needed (Transfer Goal 1, PT) independent;modified independence  -LM     Time Frame  (Transfer Goal 1, PT) long term goal (LTG);10 days  -LM       Row Name 04/20/24 0930          Gait Training Goal 1 (PT)    Activity/Assistive Device (Gait Training Goal 1, PT) gait (walking locomotion);assistive device use  -LM     [place] Level (Gait Training Goal 1, PT) independent;modified independence  -LM     Distance (Gait Training Goal 1, PT) 500 feet  -LM     Time Frame (Gait Training Goal 1, PT) long term goal (LTG);10 days  -LM       Row Name 04/20/24 0930          Therapy Assessment/Plan (PT)    Planned Therapy Interventions (PT) balance training;bed mobility training;gait training;home exercise program;motor coordination training;neuromuscular re-education;patient/family education;postural re-education;ROM (range of motion);stair training;strengthening;stretching;transfer training  -LM               User Key  (r) = Recorded By, (t) = Taken By, (c) = Cosigned By      Initials Name Provider Type    LM Christine Pryor, PT Physical Therapist                   Clinical Impression       Row Name 04/20/24 0927          Pain    Pretreatment Pain Rating 8/10  -LM     Posttreatment Pain Rating 8/10  -LM     Pain Location incisional  -LM     Pain Location - abdomen  -LM     Pain Intervention(s) Repositioned;Ambulation/increased activity  PCA Pump  -LM       Row Name 04/20/24 0927          Plan of Care Review    Plan of Care Reviewed With patient  -LM     Outcome Evaluation PT evaluation completed.  Pt stood and ambulated 120 feet using rw with CGA.  Pt presents below baseline function d/t pain and decreased activity tolerance.  Recommend home with assist at d/c.  -LM       Row Name 04/20/24 0927          Therapy Assessment/Plan (PT)    Rehab Potential (PT) good, to achieve stated therapy goals  -LM     Criteria for Skilled Interventions Met (PT) yes;meets criteria;skilled treatment is necessary  -LM     Therapy Frequency (PT) daily  -LM       Row Name 04/20/24 0927          Vital Signs    Pre Systolic BP Rehab  172  -LM     Pre Treatment Diastolic BP 89  -LM     Post Systolic BP Rehab 174  -LM     Post Treatment Diastolic   -LM     Pretreatment Heart Rate (beats/min) 82  -LM     Posttreatment Heart Rate (beats/min) 85  -LM     Pre SpO2 (%) 98  -LM     O2 Delivery Pre Treatment supplemental O2  -LM     Intra SpO2 (%) 94  -LM     O2 Delivery Intra Treatment room air  -LM     Post SpO2 (%) 94  -LM     O2 Delivery Post Treatment room air  -LM     Pre Patient Position Supine  -LM     Intra Patient Position Sitting  -LM     Post Patient Position Supine  -LM       Row Name 04/20/24 0927          Positioning and Restraints    Pre-Treatment Position in bed  -LM     Post Treatment Position bed  -LM     In Bed supine;call light within reach;encouraged to call for assist;exit alarm on;SCD pump applied;notified nsg  -LM               User Key  (r) = Recorded By, (t) = Taken By, (c) = Cosigned By      Initials Name Provider Type    LM Christine Pryor, PT Physical Therapist                   Outcome Measures       Row Name 04/20/24 0930 04/19/24 2926       How much help from another person do you currently need...    Turning from your back to your side while in flat bed without using bedrails? 2  -LM 2  -MZ    Moving from lying on back to sitting on the side of a flat bed without bedrails? 2  -LM 2  -MZ    Moving to and from a bed to a chair (including a wheelchair)? 3  -LM 2  -MZ    Standing up from a chair using your arms (e.g., wheelchair, bedside chair)? 3  -LM 2  -MZ    Climbing 3-5 steps with a railing? 3  -LM 2  -MZ    To walk in hospital room? 3  -LM 2  -MZ    AM-PAC 6 Clicks Score (PT) 16  -LM 12  -MZ    Highest Level of Mobility Goal 5 --> Static standing  -LM 4 --> Transfer to chair/commode  -MZ      Row Name 04/20/24 0930          Functional Assessment    Outcome Measure Options AM-PAC 6 Clicks Basic Mobility (PT)  -LM               User Key  (r) = Recorded By, (t) = Taken By, (c) = Cosigned By      Initials Name Provider  Type    LM Christine Pryor, PT Physical Therapist    Stephani Garcia, RN Registered Nurse                                 Physical Therapy Education       Title: PT OT SLP Therapies (In Progress)       Topic: Physical Therapy (In Progress)       Point: Mobility training (Done)       Learning Progress Summary             Patient Acceptance, E, VU,DU by  at 4/20/2024 0931                         Point: Home exercise program (Not Started)       Learner Progress:  Not documented in this visit.              Point: Precautions (Done)       Learning Progress Summary             Patient Acceptance, E, VU,DU by  at 4/20/2024 0931                                         User Key       Initials Effective Dates Name Provider Type Discipline     07/11/23 -  Christine Pryor PT Physical Therapist PT                  PT Recommendation and Plan  Planned Therapy Interventions (PT): balance training, bed mobility training, gait training, home exercise program, motor coordination training, neuromuscular re-education, patient/family education, postural re-education, ROM (range of motion), stair training, strengthening, stretching, transfer training  Plan of Care Reviewed With: patient  Outcome Evaluation: PT evaluation completed.  Pt stood and ambulated 120 feet using rw with CGA.  Pt presents below baseline function d/t pain and decreased activity tolerance.  Recommend home with assist at d/c.     Time Calculation:   PT Evaluation Complexity  History, PT Evaluation Complexity: 3 or more personal factors and/or comorbidities  Examination of Body Systems (PT Eval Complexity): total of 3 or more elements  Clinical Presentation (PT Evaluation Complexity): evolving  Clinical Decision Making (PT Evaluation Complexity): moderate complexity  Overall Complexity (PT Evaluation Complexity): moderate complexity     PT Charges       Row Name 04/20/24 0931             Time Calculation    Start Time 0840  -LM      PT Received On 04/20/24  -       PT Goal Re-Cert Due Date 04/30/24  -LM         Timed Charges    92218 - Gait Training Minutes  10  -LM         Untimed Charges    PT Eval/Re-eval Minutes 46  -LM         Total Minutes    Timed Charges Total Minutes 10  -LM      Untimed Charges Total Minutes 46  -LM       Total Minutes 56  -LM                User Key  (r) = Recorded By, (t) = Taken By, (c) = Cosigned By      Initials Name Provider Type    LM Christine Pryor, PT Physical Therapist                  Therapy Charges for Today       Code Description Service Date Service Provider Modifiers Qty    98860097545 HC GAIT TRAINING EA 15 MIN 4/20/2024 Christine Pryor, PT GP 1    97938904677 HC PT EVAL LOW COMPLEXITY 4 4/20/2024 Christine Pryor, PT GP 1            PT G-Codes  Outcome Measure Options: AM-PAC 6 Clicks Basic Mobility (PT)  AM-PAC 6 Clicks Score (PT): 16  PT Discharge Summary  Anticipated Discharge Disposition (PT): home with assist    Christine Pryor PT  4/20/2024

## 2024-04-20 NOTE — NURSING NOTE
Prior to central line removal, order for the removal of catheter was verified, patient was assessed, necessary materials were gathered and patient was educated regarding procedure .    Patient was positioned Trendelenburg to ensure that the insertion site was at or below the level of the heart.    Hands were washed, clean gloves were applied and central line dressing was gently removed. Catheter exit site was not cultured.     A new pair of clean gloves were then applied. Insertion site was cleansed with 2% Chlorhexidine swab using a circular motion beginning at the insertion site and moving outward for 30 seconds and allowed to dry.     Clamp on line was present and clamped.     Patient was instructed to perform Valsalva maneuver as catheter was withdrawn and hold breath.     The central line was grasped at the insertion site and slowly pulled outward parallel to the skin. Resistance was not met.    After central line was completely removed, a sterile 4x4 gauze pad was used to apply light pressure until bleeding stopped. At that time, petroleum-based gauze and a sterile occlusive dressing was applied to exit site.     Patient was instructed to keep dressing in place for at least 24 hours and to remain in a flat or reclining position for 30 minutes post-removal.     Catheter was inspected after removal and was intact. Tip of central line was not sent for culture. Patient tolerated procedure.

## 2024-04-20 NOTE — PLAN OF CARE
Goal Outcome Evaluation:  Plan of Care Reviewed With: patient           Outcome Evaluation: PT evaluation completed.  Pt stood and ambulated 120 feet using rw with CGA.  Pt presents below baseline function d/t pain and decreased activity tolerance.  Recommend home with assist at d/c.      Anticipated Discharge Disposition (PT): home with assist

## 2024-04-20 NOTE — OP NOTE
OPERATIVE NOTE    Patient Name:  Amber Feng  YOB: 1970  [id number]     Date of Surgery:  4/19/2024     Pre-op Diagnosis: Colorectal liver metastases    Post-op Diagnosis: Colorectal liver metastases      Procedure:   Diagnostic laparoscopy  Exploratory laparotomy  Open cholecystectomy  Intraoperative liver ultrasound  Partial hepatectomies of masses in segments 7 and segment 3  Bracketed microwave ablation of masses in segment 5/8 and segment 6 using the Neuwave system,  5 minutes each, 4x4 cm  Pedicled falciform flap    Surgeon: Jacob Nielson MD    Assistant: Assistant: Jeffrey Macedo PA-C, Isabela Mejia PA-C    Anesthesia: General with Block    Estimated Blood Loss: 200 mL    Complications: None apparent    Implants:    Implant Name Type Inv. Item Serial No.  Lot No. LRB No. Used Action   CLIPAPPLR M/ ENDO LIGACLIP ROT 10MM MD/ - [id number] Implant CLIPAPPLR M/ ENDO LIGACLIP ROT 10MM MD/LG  [employer]  DIV OF J AND J   1 Implanted   HEMOST ABS SURGICEL ORIG 4X8IN STRL - [id number] Implant HEMOST ABS SURGICEL ORIG 4X8IN STRL  [employer]  DIV OF J AND J [id number] N/A 1 Implanted   CLIPAPPLR M/ ENDO LIGACLIP 13IN  - [id number] Implant CLIPAPPLR M/ ENDO LIGACLIP 13IN LG  [employer]  DIV OF J AND J [id number] N/A 1 Implanted   CLIPAPPLR M/ ENDO LIGACLIP 20CLP 11IN MD - [id number] Implant CLIPAPPLR M/ ENDO LIGACLIP 20CLP 11IN MD  [employer]  DIV OF J AND J [id number] N/A 1 Implanted   CLIPAPPLR M/ ENDO LIGACLIP 9 3/8IN SM - [id number] Implant CLIPAPPLR M/ ENDO LIGACLIP 9 3/8IN SM  [employer]  DIV OF J AND J [id number] N/A 1 Implanted   STPLR TISS [id number] STD [id number] STRL 1P/U - [id number] Implant STPLR TISS [id number] STD [id number] STRL 1P/U  [employer]  DIV OF J AND J [id number] N/A 1 Implanted   RELOAD ECHELON FLEX GST 3.8MM GLD - [id number] Implant RELOAD ECHELON FLEX GST 3.8MM GLD  [employer]  DIV OF J AND J [id number] N/A 2 Implanted        Specimen:          Specimens       ID Source Type Tests Collected By Collected At Frozen?    A Gallbladder Tissue TISSUE PATHOLOGY EXAM   Jacob Nielson MD 4/19/24 1540 No    Description: GALLBLADDER WALL- FOR PERMANENT    B Liver Tissue TISSUE PATHOLOGY EXAM   Jacob Nielson MD 4/19/24 1715 No    Description: SEGMENT SEVEN MASS- PERMANENT    C Liver Tissue TISSUE PATHOLOGY EXAM   Jacob Nielson MD 4/19/24 1722 No    Description: SEGMENT THREE MASS- PERMANENT              Findings: Diseased and stiff liver likely secondary to irinotecan chemotherapy. Masses identified in locations correlated to as preoperative MRI:     Segment 5/8 - ablated  Segment 7 - resected  Segment 6 - ablated     Incidentally discovered lesion on surface of segment 3 - resected     Extensive treatment effect and calcifications at sites of prior disease. Significant adhesions and inflammatory response along the right coronary ligament and infundibulum of the gallbladder. This significantly impacted and added time to the case. No other lesions identified on survey of the liver.     Indications:  Mrs. Amber Feng is a 53 year old woman who presented with synchronous Stage IV (TxNxM1) colorectal liver metastases from a right-sided/transverse primary in November of 2020. She underwent upfront FOLFOXIRI for 12 cycles with an excellent response and was placed on maintenance chemotherapy. She did well until recently when ctDNA surveillance returned positive, and follow-up CT showed CRC liver metastases in the right lobe of the liver. Due to this, she was taken to the OR for partial resection or ablation of her liver tumors.    The risks and benefits of the procedure were discussed including, but not limited to: bleeding, infection, injury to adjacent structures, need for re-intervention (operative intervention, drain placement, etc.), and medical complications due to the patient's underlying co-morbidities and the risks of general  anesthesia. The risks of bile leak and recurrence were reviewed which are specific to this procedure.     The patient wishes to proceed and consented for surgery. All of the patient's questions were answered.     Description of Procedure:   After informed consent was obtained, patient identification verified, and pre operative checklist completed, the patient was brought to the Operating Room and placed comfortably in the supine position on the operating table.  The patient was given appropriate antimicrobial prophylaxis.    General anesthesia was administered without complication and the patient was intubated without difficulty.  A Chu catheter and orogastric tube was placed. A central line was placed for CVP monitoring. This was maintained at less than 3 mmHg throughout the liver transection. The patient’s abdomen was prepped and draped in the usual sterile fashion. After an appropriate surgical pause and time out was completed, a Veress needle was inserted in the left upper quadrant. Position was confirmed with saline drop test and low opening pressure. The abdomen was insufflated to 15 mmHg. A 5 mm port was inserted at the umbilicus using the Optiview technique. The abdomen was inspected and no iatrogenic injury was identified. There was no evidence of carcinomatosis, thus the surgery was converted to an open exploratory laparotomy.     A subcostal incision was made two fingerbreadths below the right costal margin connecting to a vertical midline incision in the epigastrium. Dissection was carried through the subcutaneous tissue, fascia, and musculature with cautery to enter the abdominal cavity. The falciform ligament was harvested off the fascia and ligated with 2 0 Silk sutures for a future pedicled flap. The falciform was taken down off the anterior abdominal wall and diaphragm to expose the hepatic vein confluence. A limited number of RUQ adhesions were lysed to allow retractor placement. The Shiv shen  placed.     A liver ultrasound was performed with interpretation of the resulting images by myself. The patient had Type A portal anatomy and Type 1 hepatic arterial anatomy with a normal trifurcation of the hepatic veins. The masses identified on MRI were correlated to the liver on ultrasound which included a mass in segment 5/8 of roughly 1.7 cm, a mass in segment 6, and a small, palpable, surface mass in segment 7. An additional palpable mass was found along the surface of segment 3. My attention was then turned toward mobilizing the liver. The left and right triangular ligaments were divided. The right lobe of the liver was mobilized starting at the lateral edge of the hepatic vein. Attention was then turned to the j[place] and performing a cholecystectomy. The gallbladder was small and involuted presumably due to chronic inflammation and the patient's prior chemotherapy. It was tensely distended with stones and intrahepatic with an obliterated cystic plate. The gallbladder was unroofed along its anterior surface and the gallstones extracted in order to identify the cystic duct. This appeared to be obstructed. The gallbladder was taken down off the liver and divided at the infundibulum with cautery. It was reconstituted with a running 2-0 Silk to close the cystic duct/infundibular junction. Dissection was then carried laterally along the right coronary ligament. There were again dense adhesions in this area due to treatment effect of the tumor in segments 5 and 6. Nevertheless, the coronary ligament was divided and this inflammatory tissue was carefully  from the IVC to complete mobilization of the right liver. The j[place] hepatis was then encircled and a Joyce tourniquet was placed in the event a Absaraka was necessary during transection.    With the liver mobilized, attention was turned toward treatment of the identified lesions. The superficial lesion in segment 7 was incised circumferentially after  placing a retaining stitch with a 4-0 Prolene. Dissection was carried through the liver capsule with cautery and the lesion was removed using a harmonic energy device. This was sent as a permanent specimen as segment 7 mass. Attention was then turned to the segment 3 mass. This was near the periphery of segment 3, thus a wedge of liver was marked out in the area. Dissection was carried out in a similar fashion except deep branches of the segment 3 sectoral branch were just deep to the mass and these were divided with a tan load stapler. Hemostasis was difficult during the wedge resection and multiple areas were controlled with suture ligation. Due to the difficulty with this small resection and the friable nature of the patient's liver, it was decided that the remaining two deeper lesions should be treated with microwave ablation rather than with major liver resection. I was also concerned about the patient's final liver volume if large partial resections were conducted along the right anterior and posterior supplies of the portal vein. At this point, my partner Dr. Andrews joined the case to assist with the final two lesions, and he concurred with the intra-operative plan. Therefore, the Tamion system was used to bracket these lesions with 2 probes using ultrasound guidance. This was conducted for 5 minutes each to ablate a 4 x 4 cm area in the area of these tumors in segments 5/8 and 6 which would allow for at least 1 cm of margin in all directions. This was conducted without complication and the needle tracks were cauterized upon removal and were hemostatic. The liver resection beds were again inspected and hemostasis was achieved, and there was no evidence of bile leak after Valsalva. A 19 Fr chiquis drain was inserted along the line of the transverse incision and was laid across the j[place]ica hepatis terminating at the site of the largest liver resection. Tisseal was applied to both cut edges of divided liver. The  pedicled falciform ligament was laid across the left cut liver edge. The fascia was closed in two layers by first approximating the rectus and midline corner with 0 Vicryl sutures. The posterior and anterior fascial layers were each closed with looped #1 PDS. The subcutaneous tissue was irrigated. The wound was re-approximated with dermal 2-0 Vicryl sutures. It was then dressed with 4x4 gauze and Medipore tape.    All sponge and needle counts were correct times two at the completion of the procedure. The patient recovered from anesthesia, was extubated in the operating room and was transported to the PACU in stable condition.    Assistant: Jeffrey Macedo PA-C, Isabela Mejia PA-C  was responsible for performing the following activities: Retraction, Suction, and Irrigation and their skilled assistance was necessary for the success of this case.    Jacob Nielson MD     Date: 4/19/2024  Time: 21:08 EDT

## 2024-04-20 NOTE — CONSULTS
[facility] Medicine Services  CONSULT NOTE      Patient Name: Amber Feng  : 1970  MRN: [id number]    Primary Care Physician: Evon Bryant APRN  Provider requesting consultation: Jacob Nielson MD    Subjective   Subjective     Reason for Consultation:  HTN/ post op management     HPI:  Amber Feng is a 53 y.o. female with pmh of GERD, HTN (no longer on medication), and stage IV synchronous colorectal cancer diagnosed in  s/p Folrfirinox with good response presents to [facility] for scheduled surgery with Dr. Nielson due to cancer recurrence with liver metastasis. Patient has completed surgery without complication. Patient has noted elevation in BP post operatively and Hospitalist asked to help with management.       Review of Systems   Constitutional:  Positive for appetite change.   HENT: Negative.     Eyes: Negative.    Respiratory: Negative.     Cardiovascular: Negative.    Gastrointestinal:  Positive for abdominal pain. Negative for nausea.   Genitourinary: Negative.    Musculoskeletal: Negative.    Neurological: Negative.    Psychiatric/Behavioral: Negative.         All other systems reviewed and are negative.     Personal History     Past Medical History:   Diagnosis Date    Anxiety     Cancer     COLON STAGE IV    Ear piercing     Gallstones     History of irritable bowel syndrome     Hypertension     pt states secondary to pain    Seasonal allergies     Tattoos     Wears glasses        Past Surgical History:   Procedure Laterality Date    COLONOSCOPY N/A 2022    Procedure: COLONOSCOPY WITH BIOPSY AND POLYPECTOMY;  Surgeon: Sea Valentin MD;  Location: [facility] OR;  Service: General;  Laterality: N/A;    DILATATION AND CURETTAGE      PORTACATH PLACEMENT N/A 2022    Procedure: INSERTION OF PORTACATH;  Surgeon: Sea Valentin MD;  Location: [facility] OR;  Service: General;  Laterality: N/A;    POSTPARTUM TUBAL LIGATION      TUMOR REMOVAL       "pt reports \"tumor removed from uterus\" - states non-cancerous; unsure if fibroid; unsure if removed laparoscopically       Family History:  family history is not on file. Otherwise pertinent FHx was reviewed and unremarkable.     Social History:  reports that she has never smoked. She has never used smokeless tobacco. She reports current alcohol use. She reports that she does not use drugs.    Medications:  Diclofenac Sodium, capecitabine, lidocaine-prilocaine, meloxicam, ondansetron ODT, oxyCODONE, and prochlorperazine    Scheduled Meds:acetaminophen, 1,000 mg, Oral, Q8H  amLODIPine, 5 mg, Oral, Q24H  cyclobenzaprine, 5 mg, Oral, TID  enoxaparin, 40 mg, Subcutaneous, Daily  Lidocaine, 1 patch, Transdermal, Q24H  pantoprazole, 40 mg, Oral, Q AM      Continuous Infusions:HYDROmorphone HCl-NaCl,   lactated ringers, 100 mL/hr, Last Rate: 100 mL/hr (04/19/24 4179)  Pharmacy Consult,   sodium chloride, 30 mL/hr      PRN Meds:.  labetalol    naloxone    ondansetron ODT **OR** ondansetron    Pharmacy Consult    sodium chloride    Allergies   Allergen Reactions    Losartan Nausea Only, Other (See Comments) and Headache     Pt states within an hour after use blood pressure would \"skyrocket\" also nausea - states diastolic pressures in 100's    Valium [Diazepam] Anaphylaxis    Citrus GI Intolerance     Especially oranges       Objective   Objective     Vital Signs:   Temp:  [97.4 °F (36.3 °C)-100.5 °F (38.1 °C)] 97.6 °F (36.4 °C)  Heart Rate:  [] 77  Resp:  [14-20] 18  BP: (139-234)/() 157/95  Flow (L/min):  [2-6] 2    Physical Exam  Constitutional: No acute distress, awake, alert, sitting up in bed eating lunch  HENT: NCAT, mucous membranes moist  Respiratory: Clear to auscultation bilaterally, respiratory effort normal   Cardiovascular: RRR, no murmurs, rubs, or gallops  Gastrointestinal: quiet bs, approp tenderness, dressings and BERNARDO drain intact- serosanguinous drainge  Musculoskeletal: No bilateral ankle " edema  Psychiatric: Appropriate affect, cooperative  Neurologic: Oriented x 3, strength symmetric in all extremities, Cranial Nerves grossly intact to confrontation, speech clear  Skin: No rashes         Result Review:  I have personally reviewed the results from the time of admission and agree with these findings:  [x]  Laboratory  [x]  Radiology  []  EKG/Telemetry   []  Pathology  []  Old records  []  Other:  Most notable findings include:     LAB RESULTS:      Lab 04/20/24  0322 04/19/24  1615   WBC 10.83*  --    HEMOGLOBIN 12.1  --    HEMOGLOBIN, POC  --  12.2   HEMATOCRIT 36.4  --    HEMATOCRIT POC  --  36*   PLATELETS 149  --    NEUTROS ABS 9.33*  --    IMMATURE GRANS (ABS) 0.05  --    LYMPHS ABS 0.78  --    MONOS ABS 0.66  --    EOS ABS 0.00  --    MCV 93.8  --          Lab 04/20/24  0322   SODIUM 139   POTASSIUM 3.9   CHLORIDE 105   CO2 19.0*   ANION GAP 15.0   BUN 14   CREATININE 0.67   EGFR 104.7   GLUCOSE 165*   CALCIUM 8.1*   MAGNESIUM 1.9   PHOSPHORUS 3.0         Lab 04/20/24  0322   TOTAL PROTEIN 6.7   ALBUMIN 3.7   GLOBULIN 3.0   ALT (SGPT) 601*   AST (SGOT) 1,374*   BILIRUBIN 1.6*   ALK PHOS 162*                 Lab 04/19/24  1059   ABO TYPING O   RH TYPING Positive   ANTIBODY SCREEN Negative         Lab 04/19/24  1615   PH, ARTERIAL 7.43     Brief Urine Lab Results  (Last result in the past 365 days)        Color   Clarity   Blood   Leuk Est   Nitrite   Protein   CREAT   Urine HCG        04/19/24 1137               Negative             Microbiology Results (last 10 days)       ** No results found for the last 240 hours. **            US Imaging Surgery    Result Date: 4/19/2024  This procedure was auto-finalized with no dictation required.    Central Line    Result Date: 4/19/2024  Adonis Laguerre, VINNIE     4/19/2024  1:45 PM Central Line Patient reassessed immediately prior to procedure Patient location during procedure: OR Start time: 4/19/2024 1:35 PM Stop Time:4/19/2024 1:40 PM Indications:  vascular access Staff Anesthesiologist: John Paul Duckworth MD Preanesthetic Checklist Completed: patient identified, IV checked, site marked, risks and benefits discussed, surgical consent, monitors and equipment checked, pre-op evaluation and timeout performed Central Line Prep Sterile Tech:cap, gloves, gown, mask and sterile barriers Prep: chloraprep Patient monitoring: blood pressure monitoring, continuous pulse oximetry and EKG Central Line Procedure Laterality:right Location:internal jugular Catheter Type:triple lumen Catheter Size:7.5 Fr Guidance:ultrasound guided PROCEDURE NOTE/ULTRASOUND INTERPRETATION.  Using ultrasound guidance the potential vascular sites for insertion of the catheter were visualized to determine the patency of the vessel to be used for vascular access.  After selecting the appropriate site for insertion, the needle was visualized under ultrasound being inserted into the internal jugular vein, followed by ultrasound confirmation of wire and catheter placement. There were no abnormalities seen on ultrasound; an image was taken; and the patient tolerated the procedure with no complications. Images: still images not obtained Assessment Post procedure:biopatch applied, line sutured, occlusive dressing applied and secured with tape Assessement:blood return through all ports, free fluid flow, chest x-ray ordered and Vinny Test Complications:no Patient Tolerance:patient tolerated the procedure well with no apparent complications     Peripheral Block    Result Date: 4/19/2024  Adonis Laguerre CRNA     4/19/2024  1:58 PM Peripheral Block Patient reassessed immediately prior to procedure Patient location during procedure: OR Stop time: 4/19/2024 1:36 PM Reason for block: at surgeon's request and post-op pain management Performed by CRNA/CAA: Adonis Laguerre CRNA Preanesthetic Checklist Completed: patient identified, IV checked, site marked, risks and benefits discussed, surgical consent, monitors  "and equipment checked, pre-op evaluation and timeout performed Prep: Pt Position: supine Sterile barriers:cap, gloves, mask and washed/disinfected hands Prep: ChloraPrep Patient monitoring: blood pressure monitoring, continuous pulse oximetry and EKG Procedure Sedation: yes Performed under: general Guidance:ultrasound guided ULTRASOUND INTERPRETATION.  Using ultrasound guidance a 20 G gauge needle was placed in close proximity to the nerve, at which point, under ultrasound guidance anesthetic was injected in the area of the nerve and spread of the anesthesia was seen on ultrasound in close proximity thereto.  There were no abnormalities seen on ultrasound; a digital image was taken; and the patient tolerated the procedure with no complications. Images:still images obtained, printed/placed on chart Laterality:Bilateral Block Type:TAP Injection Technique:single-shot Needle Type:short-bevel and echogenic Needle Gauge:20 G Resistance on Injection: none Medications Used: dexamethasone sodium phosphate injection - Injection  4 mg - 4/19/2024 1:36:00 PM bupivacaine PF (MARCAINE) 0.25 % injection - Injection  60 mL - 4/19/2024 1:36:00 PM Medications Comment:Block Injection:  LA dose divided between Right and Left block Post Assessment Injection Assessment: negative aspiration for heme, incremental injection and no paresthesia on injection Patient Tolerance:comfortable throughout block Complications:no Additional Notes Subcostal TAPs A high-frequency linear transducer, with sterile cover, was placed sub-xiphoid to identify Linea Alba, right and left Rectus Abdominus Muscles (PRIYANKA). The transducer was moved either right or left subcostally to identify the PRIYANKA and the Transverse Abdominus Muscle (MARSHALL). The insertion site was prepped in sterile fashion and then localized with 2-5 ml of 1% Lidocaine. Using ultrasound-guidance, a 20-gauge B-Allen 4\" Ultraplex 360 non-stimulating echogenic needle was advanced in plane, from medial " "to lateral, until the tip of the needle was in the fascial plane between the PRIYANKA and MARSHALL. 1-3ml of preservative free normal saline was used to hydro-dissect the fascial planes. After the fascial plane was verified, the local anesthetic (LA) was injected. The procedure was repeated on the opposite side for bilateral coverage. Aspiration every 5 ml to prevent intravascular injection. Injection was completed with negative aspiration of blood and negative intravascular injection. Injection pressures were normal with minimal resistance. The subcostal approach to the TAP nerve block ideally anesthetizes the intercostal nerves T6-T9. Mid-Axillary/Lateral TAPs A high-frequency linear transducer, with sterile cover, was placed in the midaxillary line between the ASIS and costal margin. The External Oblique Muscle (EOM), Internal Oblique Muscle (IOM), Transverse Abdominus Muscle (MARSHALL), and Peritoneum were identified. The insertion site was prepped in sterile fashion and then localized with 2-5 ml of 1% Lidocaine. Using ultrasound-guidance, a 20-gauge B-Allen 4\" Ultraplex 360 non-stimulating echogenic needle was advanced in plane, from medial to lateral, until the tip of the needle was in the fascial plane between the IOM and MARSHALL. 1-3ml of preservative free normal saline was used to hydro-dissect the fascial planes. After the fascial plane was verified, the local anesthetic (LA) was injected. The procedure was repeated on the opposite side for bilateral coverage. Aspiration every 5 ml to prevent intravascular injection. Injection was completed with negative aspiration of blood and negative intravascular injection. Injection pressures were normal with minimal resistance. Midaxillary TAPs should reach intercostal nerves T10- T11 and the subcostal nerve T12.           Assessment & Plan   Assessment & Plan     Active Hospital Problems    Diagnosis  POA    **Adenocarcinoma of colon metastatic to liver [C18.9, C78.7]  Yes    GERD " without esophagitis [K21.9]  Unknown    Essential hypertension [I10]  Unknown      Resolved Hospital Problems   No resolved problems to display.       Metastatic Colon Cancer  --dx 2020 with good response to Folfirinox, but has recurrence with mets to the liver  -- s/p exp lap, open CCY, non-anatomic partial hepatectomies x2 (segments 3/7), and microwave ablation x2 (segments 5/8, 6) per Dr. Nielson on 4/19  -- monitor LFTs- elevation today s/p surgery and ablation not unexpected but will  monitor for continue increase  -- continue dilaudid pca, tylenol, add toradol prn  -- BERNARDO drain in place, nunes per surgery  -- mobilize and IS  -- clears as tolerates  -- continue IVF per surgery  -- surgical dressings to be removed 4/21, ok to shower 4/21    Post operative HTN with history of HTN  -- no longer on medication at home  -- increased pain likely attributing. Add toradol  -- low dose amlodipine and prn labetolol, monitor    GERD  -- PPI        Thank you for allowing [facility] Medicine Service to provide consultative care for your patient, we will continue to follow while clinically appropriate.    Ashely Roy, APRN  04/20/24

## 2024-04-20 NOTE — PROGRESS NOTES
"Patient Name:  Amber Feng  YOB: 1970  [id number]    Surgery Progress Note    Date of visit: 4/20/2024    Subjective   Hypertensive in PACU and overnight necessitating multiple IV labetalol treatments with marginal control. SBP >170 this morning. Patient having pain at incisions, but states this is controlled with current regimen and PCA. Ambulated this AM. No gas or bowel function yet. Has only had sips of ice chips. Burping some this morning.       Objective       /89 (BP Location: Right arm, Patient Position: Lying)   Pulse 81   Temp 98 °F (36.7 °C) (Temporal)   Resp 20   Ht 157.5 cm (62\")   Wt 81.6 kg (180 lb)   SpO2 97%   BMI 32.92 kg/m²     Intake/Output Summary (Last 24 hours) at 4/20/2024 0945  Last data filed at 4/20/2024 0800  Gross per 24 hour   Intake 2461.5 ml   Output 1345 ml   Net 1116.5 ml       CV:  Rhythm regular and rate regular, hypertensive  L:  Comfortable on 2 L NC, no acute distress  Abd:  Soft, appropriately tender to palpation, dressing in place over right subcostal incision, drain in place with ss output, non-bilious, non-distended  Ext:  No cyanosis, clubbing, edema    Recent labs and imaging that are back at this time have been reviewed.     Labs:    Results from last 7 days   Lab Units 04/20/24  0322   WBC 10*3/mm3 10.83*   HEMOGLOBIN g/dL 12.1   HEMATOCRIT % 36.4   PLATELETS 10*3/mm3 149     Results from last 7 days   Lab Units 04/20/24  0322   SODIUM mmol/L 139   POTASSIUM mmol/L 3.9   CHLORIDE mmol/L 105   CO2 mmol/L 19.0*   BUN mg/dL 14   CREATININE mg/dL 0.67   CALCIUM mg/dL 8.1*   BILIRUBIN mg/dL 1.6*   ALK PHOS U/L 162*   ALT (SGPT) U/L 601*   AST (SGOT) U/L 1,374*   GLUCOSE mg/dL 165*     Results from last 7 days   Lab Units 04/20/24  0322   SODIUM mmol/L 139   POTASSIUM mmol/L 3.9   CHLORIDE mmol/L 105   CO2 mmol/L 19.0*   BUN mg/dL 14   CREATININE mg/dL 0.67   GLUCOSE mg/dL 165*   CALCIUM mg/dL 8.1*     Lab Results   Lab Value Date/Time    " LIPASE 69 (H) 02/09/2023 2240    LIPASE 32 11/24/2022 2145    LIPASE 41 10/27/2022 2315    LIPASE 23 10/17/2022 1503            Assessment & Plan     Problem List Items Addressed This Visit       Metastatic colon cancer to liver    Relevant Orders    Tissue Pathology Exam       Mrs. Amber Feng is a 53 year old woman with Stage IV (TxNxM1) synchronous colorectal cancer Dx in 2020 s/p excellent response to FOLFIRINOX but with recent recurrence in the liver now s/p exp lap, open cholecystectomy, non-anatomic partial hepatectomies x 2 (segment 3 and segment 7), and microwave ablation x 2 (segment 5/8, segment 6).    - AST/ALT 1374/601 and t bili 1.6 expected post-ablation and resection. Patient's liver is poor quality due to irinotecan. Will continue to observe. If continues to trend up, will plan to get duplex of liver.  - BERNARDO in place is ss without bilious return, monitor for bile leak from left lateral partial hepatectomy  - Pain: Dilaudid PCA, scheduled tylenol and flexeril, toradol okay to add if needed  - Nutrition: Sips of clears today, advance with return of bowel function, keep fluids at LR@100 until tolerating more PO  - Chu: okay to remove today  - PT/OT consults, ambulate at least 4 times daily, IS multiple times q shift  - Wound Care: dressings okay to be removed 4/21, okay to shower then, no baths/submerging incisions x 2 weeks    Post-op HTN   - inpatient hospitalist consult, appreciate recs    Ppx: lovenox  Dispo: pending pain control and return of bowel function      Jacob Nielson MD  4/20/2024  09:45 EDT

## 2024-04-20 NOTE — PLAN OF CARE
Problem: Pain Acute  Goal: Acceptable Pain Control and Functional Ability  Outcome: Ongoing, Progressing  Intervention: Prevent or Manage Pain  Recent Flowsheet Documentation  Taken 4/20/2024 0400 by Stephani Lugo RN  Medication Review/Management: medications reviewed  Taken 4/19/2024 2238 by Stephani Lugo RN  Medication Review/Management: medications reviewed  Intervention: Develop Pain Management Plan  Recent Flowsheet Documentation  Taken 4/19/2024 2238 by Stephani Lugo RN  Pain Management Interventions: pain pump in use  Intervention: Optimize Psychosocial Wellbeing  Recent Flowsheet Documentation  Taken 4/19/2024 2238 by Stephani Lugo RN  Diversional Activities: television   Goal Outcome Evaluation:  Plan of Care Reviewed With: patient        Progress: no change

## 2024-04-21 LAB
ALBUMIN SERPL-MCNC: 3.3 G/DL (ref 3.5–5.2)
ALBUMIN/GLOB SERPL: 1.1 G/DL
ALP SERPL-CCNC: 148 U/L (ref 39–117)
ALT SERPL W P-5'-P-CCNC: 475 U/L (ref 1–33)
ANION GAP SERPL CALCULATED.3IONS-SCNC: 9 MMOL/L (ref 5–15)
AST SERPL-CCNC: 679 U/L (ref 1–32)
BASOPHILS # BLD AUTO: 0.02 10*3/MM3 (ref 0–0.2)
BASOPHILS NFR BLD AUTO: 0.2 % (ref 0–1.5)
BILIRUB SERPL-MCNC: 1.1 MG/DL (ref 0–1.2)
BUN SERPL-MCNC: 14 MG/DL (ref 6–20)
BUN/CREAT SERPL: 22.6 (ref 7–25)
CALCIUM SPEC-SCNC: 8.3 MG/DL (ref 8.6–10.5)
CHLORIDE SERPL-SCNC: 104 MMOL/L (ref 98–107)
CO2 SERPL-SCNC: 25 MMOL/L (ref 22–29)
CREAT SERPL-MCNC: 0.62 MG/DL (ref 0.57–1)
DEPRECATED RDW RBC AUTO: 49.9 FL (ref 37–54)
EGFRCR SERPLBLD CKD-EPI 2021: 106.6 ML/MIN/1.73
EOSINOPHIL # BLD AUTO: 0.02 10*3/MM3 (ref 0–0.4)
EOSINOPHIL NFR BLD AUTO: 0.2 % (ref 0.3–6.2)
ERYTHROCYTE [DISTWIDTH] IN BLOOD BY AUTOMATED COUNT: 14.1 % (ref 12.3–15.4)
GLOBULIN UR ELPH-MCNC: 3 GM/DL
GLUCOSE SERPL-MCNC: 112 MG/DL (ref 65–99)
HCT VFR BLD AUTO: 31.1 % (ref 34–46.6)
HGB BLD-MCNC: 10.1 G/DL (ref 12–15.9)
IMM GRANULOCYTES # BLD AUTO: 0.05 10*3/MM3 (ref 0–0.05)
IMM GRANULOCYTES NFR BLD AUTO: 0.5 % (ref 0–0.5)
LYMPHOCYTES # BLD AUTO: 1.83 10*3/MM3 (ref 0.7–3.1)
LYMPHOCYTES NFR BLD AUTO: 17.3 % (ref 19.6–45.3)
MAGNESIUM SERPL-MCNC: 1.9 MG/DL (ref 1.6–2.6)
MCH RBC QN AUTO: 31.1 PG (ref 26.6–33)
MCHC RBC AUTO-ENTMCNC: 32.5 G/DL (ref 31.5–35.7)
MCV RBC AUTO: 95.7 FL (ref 79–97)
MONOCYTES # BLD AUTO: 0.79 10*3/MM3 (ref 0.1–0.9)
MONOCYTES NFR BLD AUTO: 7.5 % (ref 5–12)
NEUTROPHILS NFR BLD AUTO: 7.85 10*3/MM3 (ref 1.7–7)
NEUTROPHILS NFR BLD AUTO: 74.3 % (ref 42.7–76)
NRBC BLD AUTO-RTO: 0 /100 WBC (ref 0–0.2)
PHOSPHATE SERPL-MCNC: 2.7 MG/DL (ref 2.5–4.5)
PLATELET # BLD AUTO: 142 10*3/MM3 (ref 140–450)
PMV BLD AUTO: 10.7 FL (ref 6–12)
POTASSIUM SERPL-SCNC: 4.1 MMOL/L (ref 3.5–5.2)
PROT SERPL-MCNC: 6.3 G/DL (ref 6–8.5)
RBC # BLD AUTO: 3.25 10*6/MM3 (ref 3.77–5.28)
SODIUM SERPL-SCNC: 138 MMOL/L (ref 136–145)
WBC NRBC COR # BLD AUTO: 10.56 10*3/MM3 (ref 3.4–10.8)

## 2024-04-21 PROCEDURE — 80053 COMPREHEN METABOLIC PANEL: CPT | Performed by: STUDENT IN AN ORGANIZED HEALTH CARE EDUCATION/TRAINING PROGRAM

## 2024-04-21 PROCEDURE — 25010000002 ENOXAPARIN PER 10 MG: Performed by: STUDENT IN AN ORGANIZED HEALTH CARE EDUCATION/TRAINING PROGRAM

## 2024-04-21 PROCEDURE — 85025 COMPLETE CBC W/AUTO DIFF WBC: CPT | Performed by: STUDENT IN AN ORGANIZED HEALTH CARE EDUCATION/TRAINING PROGRAM

## 2024-04-21 PROCEDURE — 25010000002 METOCLOPRAMIDE PER 10 MG: Performed by: SURGERY

## 2024-04-21 PROCEDURE — 84100 ASSAY OF PHOSPHORUS: CPT | Performed by: STUDENT IN AN ORGANIZED HEALTH CARE EDUCATION/TRAINING PROGRAM

## 2024-04-21 PROCEDURE — 99232 SBSQ HOSP IP/OBS MODERATE 35: CPT | Performed by: INTERNAL MEDICINE

## 2024-04-21 PROCEDURE — 25810000003 LACTATED RINGERS PER 1000 ML: Performed by: STUDENT IN AN ORGANIZED HEALTH CARE EDUCATION/TRAINING PROGRAM

## 2024-04-21 PROCEDURE — 83735 ASSAY OF MAGNESIUM: CPT | Performed by: STUDENT IN AN ORGANIZED HEALTH CARE EDUCATION/TRAINING PROGRAM

## 2024-04-21 PROCEDURE — 63710000001 ONDANSETRON ODT 4 MG TABLET DISPERSIBLE: Performed by: STUDENT IN AN ORGANIZED HEALTH CARE EDUCATION/TRAINING PROGRAM

## 2024-04-21 RX ORDER — CALCIUM CARBONATE 500 MG/1
1 TABLET, CHEWABLE ORAL ONCE
Status: COMPLETED | OUTPATIENT
Start: 2024-04-21 | End: 2024-04-21

## 2024-04-21 RX ORDER — DOCUSATE SODIUM 100 MG/1
100 CAPSULE, LIQUID FILLED ORAL 2 TIMES DAILY
Status: DISCONTINUED | OUTPATIENT
Start: 2024-04-21 | End: 2024-04-22

## 2024-04-21 RX ORDER — METOCLOPRAMIDE HYDROCHLORIDE 5 MG/ML
10 INJECTION INTRAMUSCULAR; INTRAVENOUS EVERY 6 HOURS
Status: DISCONTINUED | OUTPATIENT
Start: 2024-04-21 | End: 2024-04-24 | Stop reason: HOSPADM

## 2024-04-21 RX ORDER — BISACODYL 5 MG/1
10 TABLET, DELAYED RELEASE ORAL DAILY
Status: DISCONTINUED | OUTPATIENT
Start: 2024-04-21 | End: 2024-04-22

## 2024-04-21 RX ORDER — HYDROMORPHONE HCL/0.9% NACL/PF 10 MG/50ML
PATIENT CONTROLLED ANALGESIA SYRINGE INTRAVENOUS CONTINUOUS
Status: DISCONTINUED | OUTPATIENT
Start: 2024-04-21 | End: 2024-04-23

## 2024-04-21 RX ADMIN — CYCLOBENZAPRINE 5 MG: 10 TABLET, FILM COATED ORAL at 20:51

## 2024-04-21 RX ADMIN — ENOXAPARIN SODIUM 40 MG: 100 INJECTION SUBCUTANEOUS at 08:23

## 2024-04-21 RX ADMIN — Medication: at 09:43

## 2024-04-21 RX ADMIN — CALCIUM CARBONATE (ANTACID) CHEW TAB 500 MG 1 TABLET: 500 CHEW TAB at 00:27

## 2024-04-21 RX ADMIN — METOCLOPRAMIDE 10 MG: 5 INJECTION, SOLUTION INTRAMUSCULAR; INTRAVENOUS at 09:42

## 2024-04-21 RX ADMIN — ACETAMINOPHEN 1000 MG: 500 TABLET ORAL at 08:22

## 2024-04-21 RX ADMIN — CYCLOBENZAPRINE 5 MG: 10 TABLET, FILM COATED ORAL at 14:30

## 2024-04-21 RX ADMIN — LIDOCAINE 1 PATCH: 4 PATCH TOPICAL at 08:24

## 2024-04-21 RX ADMIN — PANTOPRAZOLE SODIUM 40 MG: 40 TABLET, DELAYED RELEASE ORAL at 05:33

## 2024-04-21 RX ADMIN — SODIUM CHLORIDE, POTASSIUM CHLORIDE, SODIUM LACTATE AND CALCIUM CHLORIDE 100 ML/HR: 600; 310; 30; 20 INJECTION, SOLUTION INTRAVENOUS at 01:12

## 2024-04-21 RX ADMIN — AMLODIPINE BESYLATE 5 MG: 5 TABLET ORAL at 08:22

## 2024-04-21 RX ADMIN — DOCUSATE SODIUM 100 MG: 100 CAPSULE, LIQUID FILLED ORAL at 20:51

## 2024-04-21 RX ADMIN — BISACODYL 10 MG: 5 TABLET, COATED ORAL at 08:23

## 2024-04-21 RX ADMIN — ONDANSETRON 4 MG: 4 TABLET, ORALLY DISINTEGRATING ORAL at 09:50

## 2024-04-21 RX ADMIN — ACETAMINOPHEN 1000 MG: 500 TABLET ORAL at 22:15

## 2024-04-21 RX ADMIN — ACETAMINOPHEN 1000 MG: 500 TABLET ORAL at 14:30

## 2024-04-21 RX ADMIN — METOCLOPRAMIDE 10 MG: 5 INJECTION, SOLUTION INTRAMUSCULAR; INTRAVENOUS at 14:30

## 2024-04-21 RX ADMIN — METOCLOPRAMIDE 10 MG: 5 INJECTION, SOLUTION INTRAMUSCULAR; INTRAVENOUS at 20:51

## 2024-04-21 RX ADMIN — CYCLOBENZAPRINE 5 MG: 10 TABLET, FILM COATED ORAL at 08:23

## 2024-04-21 RX ADMIN — DOCUSATE SODIUM 100 MG: 100 CAPSULE, LIQUID FILLED ORAL at 08:22

## 2024-04-21 NOTE — PROGRESS NOTES
"Patient Name:  Amber Feng  YOB: 1970  [id number]    Surgery Progress Note    Date of visit: 4/21/2024      Subjective: No acute events.  Overall feels tired.  Has some abdominal pain and soreness which is not significantly changed since yesterday.  Ambulated in the hallway 3 times.  She reports repeated episodes of belching and has had some nausea.  No flatus or bowel movement.          Objective:     /89 (BP Location: Right arm, Patient Position: Lying)   Pulse 97   Temp 96.3 °F (35.7 °C) (Oral)   Resp 18   Ht 157.5 cm (62\")   Wt 81.6 kg (180 lb)   SpO2 93%   BMI 32.92 kg/m²     Intake/Output Summary (Last 24 hours) at 4/21/2024 0813  Last data filed at 4/21/2024 0534  Gross per 24 hour   Intake 1349 ml   Output 1415 ml   Net -66 ml       GEN:   Awake, alert, in no acute distress, resting comfortably in bed   CV:   Regular rate and rhythm  L:  Symmetric expansion, not labored on oxygen by nasal cannula  Abd:  Soft, appropriately tender palpation along incision, right upper quadrant incision is clean dry and intact and nicely approximated, BERNARDO drain with small serosanguineous output, not obviously distended  Ext:  No cyanosis, clubbing, or edema    Recent labs that are back at this time have been reviewed.           Assessment/ Plan:    Ms. Feng is a 53-year-old lady with history of colorectal liver metastasis who is admitted after undergoing operative management of colorectal liver metastasis on April 19    # Colorectal liver metastasis  -Postoperative day 2  -Vital signs are stable  -Labs are appropriate.  White blood cell count is 10.5.  Hemoglobin is 10.1.  Transaminases are elevated as expected following surgery but downtrending today.  Bili 1.1  -Having some nausea and belching.  Will keep at clear liquids today  -Add scheduled bowel regimen  -Continue PCA  -Urine output 1375 mL yesterday.  Will decrease IV fluid rate  -Mobilize      Mark Rose MD  4/21/2024  08:13 " EDT

## 2024-04-21 NOTE — CASE MANAGEMENT/SOCIAL WORK
Discharge Planning Assessment  [facility]     Patient Name: Amber Feng  MRN: [id number]  Today's Date: 4/21/2024    Admit Date: 4/19/2024    Plan: Plan home at d/c   Discharge Needs Assessment       Row Name 04/21/24 1501       Living Environment    People in Home spouse    Name(s) of People in Home  Gregory [phone]    Current Living Arrangements home    Potentially Unsafe Housing Conditions none    Primary Care Provided by self;spouse/significant other    Provides Primary Care For no one, unable/limited ability to care for self    Family Caregiver if Needed spouse    Quality of Family Relationships helpful;involved;supportive    Able to Return to Prior Arrangements yes    Living Arrangement Comments Plan is home       Resource/Environmental Concerns    Resource/Environmental Concerns none    Transportation Concerns other (see comments)       Transition Planning    Patient/Family Anticipates Transition to home with family    Patient/Family Anticipated Services at Transition none    Transportation Anticipated family or friend will provide       Discharge Needs Assessment    Equipment Currently Used at Home none    Concerns to be Addressed discharge planning    Anticipated Changes Related to Illness none    Equipment Needed After Discharge none    Discharge Coordination/Progress Plan home                   Discharge Plan       Row Name 04/21/24 1503       Plan    Plan Plan home at d/c    Patient/Family in Agreement with Plan yes    Plan Comments I spoke with patient and  at the . Patient lives in [place] with . Patient is independent, no dme, no home 02 and no HH. Plan is home at discharge and she has transportation.    Final Discharge Disposition Code 01 - home or self-care                  Continued Care and Services - Admitted Since 4/19/2024    No active coordination exists for this encounter.       Selected Continued Care - Episodes Includes continued care and service  providers with selected services from the active episodes listed below      Oncology Episode start date: 6/8/2023   There are no active outsourced providers for this episode.                    Demographic Summary       Row Name 04/21/24 1458       General Information    Admission Type inpatient    Referral Source admission list    Reason for Consult discharge planning    Preferred Language English       Contact Information    Permission Granted to Share Info With     Contact Information Obtained for     Contact Information Comments PCP: PIPO Be                   Functional Status       Row Name 04/21/24 1501       Employment/    Employment Status disabled      Row Name 04/21/24 1458       Functional Status    Usual Activity Tolerance moderate    Current Activity Tolerance moderate       Functional Status, IADL    Medications independent    Meal Preparation assistive person    Housekeeping assistive person    Laundry assistive person    Shopping assistive person    IADL Comments Has coverage for meds       Mental Status Summary    Recent Changes in Mental Status/Cognitive Functioning unable to assess                   Psychosocial    No documentation.                  Abuse/Neglect    No documentation.                  Legal    No documentation.                  Substance Abuse    No documentation.                  Patient Forms    No documentation.                     Alla Uribe RN

## 2024-04-21 NOTE — PROGRESS NOTES
[facility] Medicine Services  PROGRESS NOTE    Patient Name: Amber Feng  : 1970  MRN: [id number]    Date of Admission: 2024  Primary Care Physician: Evon Bryant, PIPO    Subjective   Subjective     CC:  Med management     HPI:  No acute events. States she walked yesterday. Pain fair. + Nausea      Objective   Objective     Vital Signs:   Temp:  [96.3 °F (35.7 °C)-98.2 °F (36.8 °C)] 98.2 °F (36.8 °C)  Heart Rate:  [76-97] 92  Resp:  [17-18] 18  BP: (130-168)/() 131/92     Physical Exam:  Constitutional: No acute distress, awake, alert   HENT: NCAT, mucous membranes moist  Respiratory: Clear to auscultation bilaterally, respiratory effort normal   Cardiovascular: RRR, no murmurs, rubs, or gallops  Gastrointestinal: Positive bowel sounds, soft, tender  Musculoskeletal: No bilateral ankle edema  Psychiatric: Appropriate affect, cooperative  Neurologic: Oriented x 3, strength symmetric in all extremities, Cranial Nerves grossly intact to confrontation, speech clear  Skin: No rashes      Results Reviewed:  LAB RESULTS:      Lab 24  0322 24  1615   WBC 10.56 10.83*  --    HEMOGLOBIN 10.1* 12.1  --    HEMOGLOBIN, POC  --   --  12.2   HEMATOCRIT 31.1* 36.4  --    HEMATOCRIT POC  --   --  36*   PLATELETS 142 149  --    NEUTROS ABS 7.85* 9.33*  --    IMMATURE GRANS (ABS) 0.05 0.05  --    LYMPHS ABS 1.83 0.78  --    MONOS ABS 0.79 0.66  --    EOS ABS 0.02 0.00  --    MCV 95.7 93.8  --          Lab 24  0347 24  0322   SODIUM 138 139   POTASSIUM 4.1 3.9   CHLORIDE 104 105   CO2 25.0 19.0*   ANION GAP 9.0 15.0   BUN 14 14   CREATININE 0.62 0.67   EGFR 106.6 104.7   GLUCOSE 112* 165*   CALCIUM 8.3* 8.1*   MAGNESIUM 1.9 1.9   PHOSPHORUS 2.7 3.0         Lab 247 24  0322   TOTAL PROTEIN 6.3 6.7   ALBUMIN 3.3* 3.7   GLOBULIN 3.0 3.0   ALT (SGPT) 475* 601*   AST (SGOT) 679* 1,374*   BILIRUBIN 1.1 1.6*   ALK PHOS 148* 162*                  Lab 04/19/24  1059   ABO TYPING O   RH TYPING Positive   ANTIBODY SCREEN Negative         Lab 04/19/24  1615   PH, ARTERIAL 7.43     Brief Urine Lab Results  (Last result in the past 365 days)        Color   Clarity   Blood   Leuk Est   Nitrite   Protein   CREAT   Urine HCG        04/19/24 1137               Negative               Microbiology Results Abnormal       None            No radiology results from the last 24 hrs        Current medications:  Scheduled Meds:acetaminophen, 1,000 mg, Oral, Q8H  amLODIPine, 5 mg, Oral, Q24H  bisacodyl, 10 mg, Oral, Daily  cyclobenzaprine, 5 mg, Oral, TID  docusate sodium, 100 mg, Oral, BID  enoxaparin, 40 mg, Subcutaneous, Daily  Lidocaine, 1 patch, Transdermal, Q24H  metoclopramide, 10 mg, Intravenous, Q6H  pantoprazole, 40 mg, Oral, Q AM      Continuous Infusions:HYDROmorphone HCl-NaCl,   lactated ringers, 50 mL/hr, Last Rate: 50 mL/hr (04/21/24 0945)  Pharmacy Consult,   sodium chloride, 30 mL/hr      PRN Meds:.  ketorolac    labetalol    naloxone    ondansetron ODT **OR** ondansetron    Pharmacy Consult    promethazine    sodium chloride    Assessment & Plan   Assessment & Plan     Active Hospital Problems    Diagnosis  POA    **Adenocarcinoma of colon metastatic to liver [C18.9, C78.7]  Yes    GERD without esophagitis [K21.9]  Unknown    Essential hypertension [I10]  Unknown      Resolved Hospital Problems   No resolved problems to display.        Brief Hospital Course to date:  Amber Feng is a 53 y.o. female with pmh of GERD, HTN (no longer on medication), and stage IV synchronous colorectal cancer diagnosed in 2020 s/p Folrfirinox with good response presents to [facility] for scheduled surgery with Dr. Nielson due to cancer recurrence with liver metastasis. Medicine consulted for HTN    Metastatic Colon Cancer  --dx 2020 with good response to Folfirinox, but has recurrence with mets to the liver  -- s/p exp lap, open CCY, non-anatomic partial  hepatectomies x2 (segments 3/7), and microwave ablation x2 (segments 5/8, 6) per Dr. Nielson on 4/19  -- LFTs trending down  -- continue dilaudid pca, tylenol, add toradol prn per surgery  - increase in nausea -- reglan started per surgery  -- BERNARDO drain in place, nunes per surgery  -- mobilize and IS  -- clears as tolerates  -- continue IVF per surgery  -- surgical dressings to be removed 4/21, ok to shower 4/21     Post operative HTN with history of HTN  -- no longer on medication at home  -- increased pain likely attributing  - Improved control on amlodipine 5 mg daily. PRN labetalol      GERD  -- PPI    Expected Discharge Location and Transportation: per primary team  Expected Discharge   Expected discharge date/ time has not been documented.     DVT prophylaxis:  Medical and mechanical DVT prophylaxis orders are present.         AM-PAC 6 Clicks Score (PT): 17 (04/20/24 2000)    CODE STATUS:   Code Status and Medical Interventions:   Ordered at: 04/19/24 8689     Level Of Support Discussed With:    Patient     Code Status (Patient has no pulse and is not breathing):    CPR (Attempt to Resuscitate)     Medical Interventions (Patient has pulse or is breathing):    Full Support       Magda Barrett,   04/21/24

## 2024-04-21 NOTE — PLAN OF CARE
Problem: Pain Acute  Goal: Acceptable Pain Control and Functional Ability  Outcome: Ongoing, Progressing  Intervention: Prevent or Manage Pain  Recent Flowsheet Documentation  Taken 4/21/2024 0400 by Stephani Lugo RN  Medication Review/Management: medications reviewed  Taken 4/20/2024 2000 by Stephani Lugo RN  Medication Review/Management: medications reviewed  Intervention: Optimize Psychosocial Wellbeing  Recent Flowsheet Documentation  Taken 4/20/2024 2000 by Stephani Lugo RN  Diversional Activities: television     Problem: Fall Injury Risk  Goal: Absence of Fall and Fall-Related Injury  Intervention: Promote Injury-Free Environment  Recent Flowsheet Documentation  Taken 4/21/2024 0400 by Stephani Lugo RN  Safety Promotion/Fall Prevention: safety round/check completed  Taken 4/21/2024 0000 by Stephani Lugo RN  Safety Promotion/Fall Prevention:   activity supervised   assistive device/personal items within reach   clutter free environment maintained   fall prevention program maintained   nonskid shoes/slippers when out of bed   safety round/check completed  Taken 4/20/2024 2000 by Stephani Lugo RN  Safety Promotion/Fall Prevention:   activity supervised   assistive device/personal items within reach   clutter free environment maintained   fall prevention program maintained   nonskid shoes/slippers when out of bed   safety round/check completed   Goal Outcome Evaluation:  Plan of Care Reviewed With: patient        Progress: improving

## 2024-04-22 LAB
ALBUMIN SERPL-MCNC: 3.3 G/DL (ref 3.5–5.2)
ALBUMIN/GLOB SERPL: 1 G/DL
ALP SERPL-CCNC: 162 U/L (ref 39–117)
ALT SERPL W P-5'-P-CCNC: 349 U/L (ref 1–33)
ANION GAP SERPL CALCULATED.3IONS-SCNC: 7 MMOL/L (ref 5–15)
AST SERPL-CCNC: 361 U/L (ref 1–32)
BILIRUB SERPL-MCNC: 1.8 MG/DL (ref 0–1.2)
BUN SERPL-MCNC: 9 MG/DL (ref 6–20)
BUN/CREAT SERPL: 14.3 (ref 7–25)
CALCIUM SPEC-SCNC: 8.5 MG/DL (ref 8.6–10.5)
CHLORIDE SERPL-SCNC: 104 MMOL/L (ref 98–107)
CO2 SERPL-SCNC: 29 MMOL/L (ref 22–29)
CREAT SERPL-MCNC: 0.63 MG/DL (ref 0.57–1)
DEPRECATED RDW RBC AUTO: 49 FL (ref 37–54)
EGFRCR SERPLBLD CKD-EPI 2021: 106.2 ML/MIN/1.73
ERYTHROCYTE [DISTWIDTH] IN BLOOD BY AUTOMATED COUNT: 14 % (ref 12.3–15.4)
GLOBULIN UR ELPH-MCNC: 3.2 GM/DL
GLUCOSE SERPL-MCNC: 105 MG/DL (ref 65–99)
HCT VFR BLD AUTO: 29.7 % (ref 34–46.6)
HGB BLD-MCNC: 9.8 G/DL (ref 12–15.9)
MCH RBC QN AUTO: 31.5 PG (ref 26.6–33)
MCHC RBC AUTO-ENTMCNC: 33 G/DL (ref 31.5–35.7)
MCV RBC AUTO: 95.5 FL (ref 79–97)
PLATELET # BLD AUTO: 117 10*3/MM3 (ref 140–450)
PMV BLD AUTO: 10.6 FL (ref 6–12)
POTASSIUM SERPL-SCNC: 4 MMOL/L (ref 3.5–5.2)
PROT SERPL-MCNC: 6.5 G/DL (ref 6–8.5)
RBC # BLD AUTO: 3.11 10*6/MM3 (ref 3.77–5.28)
SODIUM SERPL-SCNC: 140 MMOL/L (ref 136–145)
WBC NRBC COR # BLD AUTO: 7.57 10*3/MM3 (ref 3.4–10.8)

## 2024-04-22 PROCEDURE — 25010000002 ENOXAPARIN PER 10 MG: Performed by: STUDENT IN AN ORGANIZED HEALTH CARE EDUCATION/TRAINING PROGRAM

## 2024-04-22 PROCEDURE — 99232 SBSQ HOSP IP/OBS MODERATE 35: CPT | Performed by: INTERNAL MEDICINE

## 2024-04-22 PROCEDURE — 99222 1ST HOSP IP/OBS MODERATE 55: CPT | Performed by: INTERNAL MEDICINE

## 2024-04-22 PROCEDURE — 25010000002 METOCLOPRAMIDE PER 10 MG: Performed by: SURGERY

## 2024-04-22 PROCEDURE — 85027 COMPLETE CBC AUTOMATED: CPT | Performed by: INTERNAL MEDICINE

## 2024-04-22 PROCEDURE — 80053 COMPREHEN METABOLIC PANEL: CPT | Performed by: INTERNAL MEDICINE

## 2024-04-22 RX ORDER — BISACODYL 10 MG
10 SUPPOSITORY, RECTAL RECTAL DAILY PRN
Status: DISCONTINUED | OUTPATIENT
Start: 2024-04-22 | End: 2024-04-24 | Stop reason: HOSPADM

## 2024-04-22 RX ORDER — AMOXICILLIN 250 MG
1 CAPSULE ORAL 2 TIMES DAILY PRN
Status: DISCONTINUED | OUTPATIENT
Start: 2024-04-22 | End: 2024-04-24 | Stop reason: HOSPADM

## 2024-04-22 RX ORDER — POLYETHYLENE GLYCOL 3350 17 G/17G
17 POWDER, FOR SOLUTION ORAL DAILY
Status: DISCONTINUED | OUTPATIENT
Start: 2024-04-22 | End: 2024-04-24 | Stop reason: HOSPADM

## 2024-04-22 RX ORDER — METOPROLOL TARTRATE 1 MG/ML
2.5 INJECTION, SOLUTION INTRAVENOUS EVERY 6 HOURS PRN
Status: DISCONTINUED | OUTPATIENT
Start: 2024-04-22 | End: 2024-04-24 | Stop reason: HOSPADM

## 2024-04-22 RX ADMIN — ENOXAPARIN SODIUM 40 MG: 100 INJECTION SUBCUTANEOUS at 08:28

## 2024-04-22 RX ADMIN — METOPROLOL TARTRATE 2.5 MG: 5 INJECTION INTRAVENOUS at 14:51

## 2024-04-22 RX ADMIN — AMLODIPINE BESYLATE 5 MG: 5 TABLET ORAL at 08:29

## 2024-04-22 RX ADMIN — POLYETHYLENE GLYCOL 3350 17 G: 17 POWDER, FOR SOLUTION ORAL at 08:29

## 2024-04-22 RX ADMIN — PANTOPRAZOLE SODIUM 40 MG: 40 TABLET, DELAYED RELEASE ORAL at 05:12

## 2024-04-22 RX ADMIN — Medication: at 06:36

## 2024-04-22 RX ADMIN — METOCLOPRAMIDE 10 MG: 5 INJECTION, SOLUTION INTRAMUSCULAR; INTRAVENOUS at 21:01

## 2024-04-22 RX ADMIN — ACETAMINOPHEN 1000 MG: 500 TABLET ORAL at 14:50

## 2024-04-22 RX ADMIN — ACETAMINOPHEN 1000 MG: 500 TABLET ORAL at 08:28

## 2024-04-22 RX ADMIN — CYCLOBENZAPRINE 5 MG: 10 TABLET, FILM COATED ORAL at 21:02

## 2024-04-22 RX ADMIN — METOCLOPRAMIDE 10 MG: 5 INJECTION, SOLUTION INTRAMUSCULAR; INTRAVENOUS at 08:28

## 2024-04-22 RX ADMIN — CYCLOBENZAPRINE 5 MG: 10 TABLET, FILM COATED ORAL at 08:28

## 2024-04-22 RX ADMIN — METOCLOPRAMIDE 10 MG: 5 INJECTION, SOLUTION INTRAMUSCULAR; INTRAVENOUS at 03:07

## 2024-04-22 RX ADMIN — CYCLOBENZAPRINE 5 MG: 10 TABLET, FILM COATED ORAL at 14:50

## 2024-04-22 RX ADMIN — METOCLOPRAMIDE 10 MG: 5 INJECTION, SOLUTION INTRAMUSCULAR; INTRAVENOUS at 14:51

## 2024-04-22 RX ADMIN — LIDOCAINE 1 PATCH: 4 PATCH TOPICAL at 08:29

## 2024-04-22 RX ADMIN — ACETAMINOPHEN 1000 MG: 500 TABLET ORAL at 22:58

## 2024-04-22 RX ADMIN — METOPROLOL TARTRATE 2.5 MG: 5 INJECTION INTRAVENOUS at 23:02

## 2024-04-22 NOTE — PROGRESS NOTES
"Patient Name:  Amber Feng  YOB: 1970  [id number]    Surgery Progress Note    Date of visit: 4/22/2024    Subjective   Nauseated yesterday but improved this morning. Still having significant incisional pain but controlled with PCA. Ambulating around the room but less in the halls. Passed a small amount of gas last night but no BM yet.       Objective       /88 (BP Location: Right arm, Patient Position: Lying)   Pulse 108   Temp 96.8 °F (36 °C) (Oral)   Resp 18   Ht 157.5 cm (62\")   Wt 81.6 kg (180 lb)   SpO2 99%   BMI 32.92 kg/m²     Intake/Output Summary (Last 24 hours) at 4/22/2024 0803  Last data filed at 4/22/2024 0304  Gross per 24 hour   Intake 490.5 ml   Output 20 ml   Net 470.5 ml       CV:  Rhythm regular and rate regular, hypertensive  L:  Comfortable on 2 L NC, no acute distress  Abd:  Soft, appropriately tender to palpation, dressing in place over right subcostal incision, drain in place with ss output, non-bilious, non-distended  Ext:  No cyanosis, clubbing, edema    Recent labs and imaging that are back at this time have been reviewed.     Labs:    Results from last 7 days   Lab Units 04/22/24  0503   WBC 10*3/mm3 7.57   HEMOGLOBIN g/dL 9.8*   HEMATOCRIT % 29.7*   PLATELETS 10*3/mm3 117*     Results from last 7 days   Lab Units 04/22/24  0503   SODIUM mmol/L 140   POTASSIUM mmol/L 4.0   CHLORIDE mmol/L 104   CO2 mmol/L 29.0   BUN mg/dL 9   CREATININE mg/dL 0.63   CALCIUM mg/dL 8.5*   BILIRUBIN mg/dL 1.8*   ALK PHOS U/L 162*   ALT (SGPT) U/L 349*   AST (SGOT) U/L 361*   GLUCOSE mg/dL 105*     Results from last 7 days   Lab Units 04/22/24  0503   SODIUM mmol/L 140   POTASSIUM mmol/L 4.0   CHLORIDE mmol/L 104   CO2 mmol/L 29.0   BUN mg/dL 9   CREATININE mg/dL 0.63   GLUCOSE mg/dL 105*   CALCIUM mg/dL 8.5*     Lab Results   Lab Value Date/Time    LIPASE 69 (H) 02/09/2023 2240    LIPASE 32 11/24/2022 2145    LIPASE 41 10/27/2022 2315    LIPASE 23 10/17/2022 1503        "     Assessment & Plan     Problem List Items Addressed This Visit       Metastatic colon cancer to liver    Relevant Orders    Tissue Pathology Exam       Mrs. Amber Feng is a 53 year old woman with Stage IV (TxNxM1) synchronous colorectal cancer Dx in 2020 s/p excellent response to FOLFIRINOX but with recent recurrence in the liver now s/p exp lap, open cholecystectomy, non-anatomic partial hepatectomies x 2 (segment 3 and segment 7), and microwave ablation x 2 (segment 5/8, segment 6).    - AST//349 and t bili 1.8 expected post-ablation and resection. Patient's liver is poor quality due to irinotecan. Will continue to observe.   - BERNARDO in place is ss without bilious return, monitor for bile leak from left lateral partial hepatectomy  - Pain: Dilaudid PCA, scheduled tylenol and flexeril, toradol okay to add if needed  - Nutrition: CLD, advance with return of bowel function, keep fluids at LR@50 until tolerating more PO  - Started dulcolax suppository, miralax, Senna S today  - PT/OT consults, ambulate at least 4 times daily, IS multiple times q shift  - Wound Care: okay to shower, no baths/submerging incisions x 2 weeks    Post-op HTN   - Amlodipine 5 mg daily with better control  - inpatient hospitalist consult, appreciate recs    Ppx: lovenox  Dispo: pending pain control and return of bowel function      Jacob Nielson MD  4/22/2024  08:03 EDT

## 2024-04-22 NOTE — PROGRESS NOTES
[facility] Medicine Services  PROGRESS NOTE    Patient Name: Amber Feng  : 1970  MRN: [id number]    Date of Admission: 2024  Primary Care Physician: Evon Bryant, PIPO    Subjective   Subjective     CC:  Med management    HPI:  No acute events. Pain control still an issue. Walked today. Tolerated diet.       Objective   Objective     Vital Signs:   Temp:  [96.8 °F (36 °C)-99.4 °F (37.4 °C)] 96.8 °F (36 °C)  Heart Rate:  [100-112] 112  Resp:  [18] 18  BP: (140-163)/() 163/100  Flow (L/min):  [1] 1     Physical Exam:  Constitutional: No acute distress  HENT: NCAT, mucous membranes moist  Respiratory: Clear to auscultation bilaterally, respiratory effort normal   Cardiovascular: RRR, no murmurs, rubs, or gallops  Gastrointestinal: Positive bowel sounds, soft, diffuse tenderness  Musculoskeletal: No bilateral ankle edema  Psychiatric:  flat affect, cooperative  Neurologic: Oriented x 3, strength symmetric in all extremities, Cranial Nerves grossly intact to confrontation, speech clear  Skin: No rashes      Results Reviewed:  LAB RESULTS:      Lab 24  0503 24  0347 24  0322 24  1615   WBC 7.57 10.56 10.83*  --    HEMOGLOBIN 9.8* 10.1* 12.1  --    HEMOGLOBIN, POC  --   --   --  12.2   HEMATOCRIT 29.7* 31.1* 36.4  --    HEMATOCRIT POC  --   --   --  36*   PLATELETS 117* 142 149  --    NEUTROS ABS  --  7.85* 9.33*  --    IMMATURE GRANS (ABS)  --  0.05 0.05  --    LYMPHS ABS  --  1.83 0.78  --    MONOS ABS  --  0.79 0.66  --    EOS ABS  --  0.02 0.00  --    MCV 95.5 95.7 93.8  --          Lab 24  0503 24  0347 24  0322   SODIUM 140 138 139   POTASSIUM 4.0 4.1 3.9   CHLORIDE 104 104 105   CO2 29.0 25.0 19.0*   ANION GAP 7.0 9.0 15.0   BUN 9 14 14   CREATININE 0.63 0.62 0.67   EGFR 106.2 106.6 104.7   GLUCOSE 105* 112* 165*   CALCIUM 8.5* 8.3* 8.1*   MAGNESIUM  --  1.9 1.9   PHOSPHORUS  --  2.7 3.0         Lab 24  0502  04/21/24  0347 04/20/24  0322   TOTAL PROTEIN 6.5 6.3 6.7   ALBUMIN 3.3* 3.3* 3.7   GLOBULIN 3.2 3.0 3.0   ALT (SGPT) 349* 475* 601*   AST (SGOT) 361* 679* 1,374*   BILIRUBIN 1.8* 1.1 1.6*   ALK PHOS 162* 148* 162*                 Lab 04/19/24  1059   ABO TYPING O   RH TYPING Positive   ANTIBODY SCREEN Negative         Lab 04/19/24  1615   PH, ARTERIAL 7.43     Brief Urine Lab Results  (Last result in the past 365 days)        Color   Clarity   Blood   Leuk Est   Nitrite   Protein   CREAT   Urine HCG        04/19/24 1137               Negative               Microbiology Results Abnormal       None            No radiology results from the last 24 hrs        Current medications:  Scheduled Meds:acetaminophen, 1,000 mg, Oral, Q8H  amLODIPine, 5 mg, Oral, Q24H  cyclobenzaprine, 5 mg, Oral, TID  enoxaparin, 40 mg, Subcutaneous, Daily  Lidocaine, 1 patch, Transdermal, Q24H  metoclopramide, 10 mg, Intravenous, Q6H  pantoprazole, 40 mg, Oral, Q AM  polyethylene glycol, 17 g, Oral, Daily      Continuous Infusions:HYDROmorphone HCl-NaCl,   lactated ringers, 50 mL/hr, Last Rate: 50 mL/hr (04/21/24 0945)  Pharmacy Consult,   sodium chloride, 30 mL/hr      PRN Meds:.  bisacodyl    ketorolac    metoprolol tartrate    naloxone    ondansetron ODT **OR** ondansetron    Pharmacy Consult    promethazine    senna-docusate sodium    sodium chloride    Assessment & Plan   Assessment & Plan     Active Hospital Problems    Diagnosis  POA    **Adenocarcinoma of colon metastatic to liver [C18.9, C78.7]  Yes    GERD without esophagitis [K21.9]  Unknown    Essential hypertension [I10]  Unknown      Resolved Hospital Problems   No resolved problems to display.        Brief Hospital Course to date:  Amber Feng is a 53 y.o. female with pmh of GERD, HTN (no longer on medication), and stage IV synchronous colorectal cancer diagnosed in 2020 s/p Folrfirinox with good response presents to [facility] for scheduled surgery with Dr. Nielson due to  cancer recurrence with liver metastasis. Medicine consulted for HTN     Metastatic Colon Cancer  --dx 2020 with good response to Folfirinox, but has recurrence with mets to the liver  -- s/p exp lap, open CCY, non-anatomic partial hepatectomies x2 (segments 3/7), and microwave ablation x2 (segments 5/8, 6) per Dr. Nielson on 4/19  -- LFTs improved   -- dilaudid pca, tylenol, add toradol prn per surgery  - increase in nausea -- reglan started per surgery  -- mobilize and IS     Post operative HTN with history of HTN  -- no longer on medication at home  - Improved control on amlodipine 5 mg daily. PRN metoprolol     GERD  -- PPI    Expected Discharge Location and Transportation: per primary team  Expected Discharge   Expected Discharge Date: 4/23/2024; Expected Discharge Time:      DVT prophylaxis:  Medical and mechanical DVT prophylaxis orders are present.         AM-PAC 6 Clicks Score (PT): 17 (04/22/24 0800)    CODE STATUS:   Code Status and Medical Interventions:   Ordered at: 04/19/24 2720     Level Of Support Discussed With:    Patient     Code Status (Patient has no pulse and is not breathing):    CPR (Attempt to Resuscitate)     Medical Interventions (Patient has pulse or is breathing):    Full Support       Magda Barrett DO  04/22/24

## 2024-04-22 NOTE — PLAN OF CARE
Problem: Adult Inpatient Plan of Care  Goal: Absence of Hospital-Acquired Illness or Injury  Intervention: Prevent and Manage VTE (Venous Thromboembolism) Risk  Recent Flowsheet Documentation  Taken 4/22/2024 0200 by Stephani Lugo RN  Activity Management: activity minimized  Taken 4/22/2024 0000 by Stephani Lugo RN  Activity Management: activity minimized  Taken 4/21/2024 2200 by Stephani Lugo RN  Activity Management: activity minimized  Taken 4/21/2024 2000 by Stephani Lugo RN  Activity Management:   activity encouraged   ambulated to bathroom  VTE Prevention/Management:   bilateral   sequential compression devices off  Range of Motion: active ROM (range of motion) encouraged     Problem: Pain Acute  Goal: Acceptable Pain Control and Functional Ability  Intervention: Prevent or Manage Pain  Recent Flowsheet Documentation  Taken 4/22/2024 0200 by Stephani Lugo RN  Medication Review/Management: medications reviewed  Taken 4/21/2024 2000 by Stephani Lugo RN  Medication Review/Management: medications reviewed   Goal Outcome Evaluation:  Plan of Care Reviewed With: patient        Progress: improving                                   03-Jun-2021 12:59

## 2024-04-22 NOTE — CONSULTS
Subjective     CHIEF COMPLAINT: Metastatic colon cancer    HISTORY OF PRESENT ILLNESS:  The patient is a 53 y.o. female, referred by Jacob Nielson MD for metastatic colon cancer.  The patient was electively admitted to hospital for hepatic metastectomies on April 19, 2024.  The patient had 2 lesions removed #2 lesions ablated.  She had no immediate postoperative complications.  I was consulted to further assist in her care.  When I saw the patient today she is laying comfortable in bed.  She is complaining of abdominal pain.  Denies any fever or chills and not the sweats.      REVIEW OF SYSTEMS:  A 14 point review of systems was performed and is negative except as noted above.    Past Medical History:   Diagnosis Date    Anxiety     Cancer     COLON STAGE IV    Ear piercing     Gallstones     History of irritable bowel syndrome     Hypertension     pt states secondary to pain    Seasonal allergies     Tattoos     Wears glasses        No current facility-administered medications on file prior to encounter.     Current Outpatient Medications on File Prior to Encounter   Medication Sig Dispense Refill    ondansetron ODT (ZOFRAN-ODT) 8 MG disintegrating tablet DISSOLVE 1 TABLET IN MOUTH EVERY 8 HOURS AS NEEDED FOR NAUSEA FOR VOMITING 30 tablet 0    capecitabine (XELODA) 500 MG chemo tablet Take 2 tablets by mouth 2 (Two) Times a Day in the AM and PM with food on days 1-14, then off 7 days in a 21-day cycle. 56 tablet 11    Diclofenac Sodium (VOLTAREN) 1 % gel gel Apply 4 g topically to the appropriate area as directed 3 (Three) Times a Day. 100 g 1    lidocaine-prilocaine (EMLA) 2.5-2.5 % cream Apply 1 application topically to the appropriate area as directed As Needed (45-60 minutes prior to port access.  Cover with saran/plastic wrap.). 30 g 3    meloxicam (Mobic) 7.5 MG tablet Take 1 tablet by mouth Daily As Needed for Moderate Pain. 20 tablet 1    oxyCODONE (ROXICODONE) 5 MG immediate release tablet Take 1 tablet  "by mouth 2 (Two) Times a Day As Needed for Moderate Pain. 60 tablet 0    prochlorperazine (COMPAZINE) 10 MG tablet TAKE 1 TABLET BY MOUTH EVERY 6 HOURS AS NEEDED FOR NAUSEA FOR VOMITING 30 tablet 2       Allergies   Allergen Reactions    Losartan Nausea Only, Other (See Comments) and Headache     Pt states within an hour after use blood pressure would \"skyrocket\" also nausea - states diastolic pressures in 100's    Valium [Diazepam] Anaphylaxis    Citrus GI Intolerance     Especially oranges       Past Surgical History:   Procedure Laterality Date    COLONOSCOPY N/A 12/8/2022    Procedure: COLONOSCOPY WITH BIOPSY AND POLYPECTOMY;  Surgeon: Sea Valentin MD;  Location: [facility] OR;  Service: General;  Laterality: N/A;    DILATATION AND CURETTAGE  2013    PORTACATH PLACEMENT N/A 12/8/2022    Procedure: INSERTION OF PORTACATH;  Surgeon: Sea Valentin MD;  Location: [facility] OR;  Service: General;  Laterality: N/A;    POSTPARTUM TUBAL LIGATION      TUMOR REMOVAL  2013    pt reports \"tumor removed from uterus\" - states non-cancerous; unsure if fibroid; unsure if removed laparoscopically       OB History   No obstetric history on file.       Social History     Socioeconomic History    Marital status:    Tobacco Use    Smoking status: Never    Smokeless tobacco: Never   Vaping Use    Vaping status: Never Used   Substance and Sexual Activity    Alcohol use: Yes     Comment: rarely    Drug use: Never    Sexual activity: Defer       History reviewed. No pertinent family history.    Objective     Vitals:    04/21/24 2049 04/21/24 2322 04/22/24 0313 04/22/24 0825   BP: 140/91 142/88 150/88 149/98   BP Location: Right arm Right arm Right arm Left arm   Patient Position: Lying Lying Lying Lying   Pulse: 100 101 108 109   Resp: 18 18 18 18   Temp: 99.4 °F (37.4 °C) 99.1 °F (37.3 °C) 96.8 °F (36 °C)    TempSrc: Oral Oral Oral    SpO2: 93% 91% 99% 91%   Weight:       Height:                            ECOG Performance " Status: 2 - Symptomatic, <50% confined to bed  General: well appearing female in no acute distress  Neuro/Psych: A&O x 3, gait steady, appropriate affect, strength 5/5 in all muscle groups  HEENT: sclerae anicteric, oropharynx clear  Lymphatics: no cervical, supraclavicular, or axillary adenopathy  Cardiovascular: regular rate and rhythm, no murmurs  Lungs: clear to auscultation bilaterally  Abdomen: soft, nontender, nondistended.  No palpable organomegaly  Extremities: no lower extremity edema  Skin: no rashes, lesions, bruising, or petechiae      Admission on 04/19/2024   Component Date Value Ref Range Status    HCG, Urine, QL 04/19/2024 Negative  Negative Final    Lot Number 04/19/2024 [id number]   Final    Internal Positive Control 04/19/2024 Passed  Positive, Passed Final    Internal Negative Control 04/19/2024 Passed  Negative, Passed Final    Expiration Date 04/19/2024 01/29/2024   Final    ABO Type 04/19/2024 O   Final    RH type 04/19/2024 Positive   Final    Antibody Screen 04/19/2024 Negative   Final    T&S Expiration Date 04/19/2024 4/22/2024 11:59:59 PM   Final    Ionized Calcium 04/19/2024 1.15 (L)  1.20 - 1.32 mmol/L Final    POC Potassium 04/19/2024 4.4  3.5 - 4.9 mmol/L Final    Sodium 04/19/2024 137 (L)  138 - 146 mmol/L Final    Total CO2 04/19/2024 21 (L)  24 - 29 mmol/L Final    Hemoglobin 04/19/2024 12.2  12.0 - 17.0 g/dL Final    Hematocrit 04/19/2024 36 (L)  38 - 51 % Final    pCO2, Arterial 04/19/2024 30.5 (L)  35 - 45 mm Hg Final    pO2, Arterial 04/19/2024 274 (H)  80 - 105 mmHg Final    Serial Number: [id number]:  [id number]    Base Excess 04/19/2024 -4.0000  -5 - 5 mmol/L Final    O2 Saturation, Arterial 04/19/2024 100 (H)  95 - 98 % Final    pH, Arterial 04/19/2024 7.43  7.35 - 7.6 pH units Final    HCO3, Arterial 04/19/2024 20.2 (L)  22 - 26 mmol/L Final    Glucose 04/19/2024 181 (H)  70 - 130 mg/dL Final    Glucose 04/20/2024 165 (H)  65 - 99 mg/dL Final    BUN 04/20/2024 14  6 - 20  mg/dL Final    Creatinine 04/20/2024 0.67  0.57 - 1.00 mg/dL Final    Sodium 04/20/2024 139  136 - 145 mmol/L Final    Potassium 04/20/2024 3.9  3.5 - 5.2 mmol/L Final    Chloride 04/20/2024 105  98 - 107 mmol/L Final    CO2 04/20/2024 19.0 (L)  22.0 - 29.0 mmol/L Final    Calcium 04/20/2024 8.1 (L)  8.6 - 10.5 mg/dL Final    Total Protein 04/20/2024 6.7  6.0 - 8.5 g/dL Final    Albumin 04/20/2024 3.7  3.5 - 5.2 g/dL Final    ALT (SGPT) 04/20/2024 601 (H)  1 - 33 U/L Final    AST (SGOT) 04/20/2024 1,374 (H)  1 - 32 U/L Final    Alkaline Phosphatase 04/20/2024 162 (H)  39 - 117 U/L Final    Total Bilirubin 04/20/2024 1.6 (H)  0.0 - 1.2 mg/dL Final    Globulin 04/20/2024 3.0  gm/dL Final    Calculated Result    A/G Ratio 04/20/2024 1.2  g/dL Final    BUN/Creatinine Ratio 04/20/2024 20.9  7.0 - 25.0 Final    Anion Gap 04/20/2024 15.0  5.0 - 15.0 mmol/L Final    eGFR 04/20/2024 104.7  >60.0 mL/min/1.73 Final    Phosphorus 04/20/2024 3.0  2.5 - 4.5 mg/dL Final    Magnesium 04/20/2024 1.9  1.6 - 2.6 mg/dL Final    WBC 04/20/2024 10.83 (H)  3.40 - 10.80 10*3/mm3 Final    RBC 04/20/2024 3.88  3.77 - 5.28 10*6/mm3 Final    Hemoglobin 04/20/2024 12.1  12.0 - 15.9 g/dL Final    Hematocrit 04/20/2024 36.4  34.0 - 46.6 % Final    MCV 04/20/2024 93.8  79.0 - 97.0 fL Final    MCH 04/20/2024 31.2  26.6 - 33.0 pg Final    MCHC 04/20/2024 33.2  31.5 - 35.7 g/dL Final    RDW 04/20/2024 13.9  12.3 - 15.4 % Final    RDW-SD 04/20/2024 47.6  37.0 - 54.0 fl Final    MPV 04/20/2024 11.1  6.0 - 12.0 fL Final    Platelets 04/20/2024 149  140 - 450 10*3/mm3 Final    Neutrophil % 04/20/2024 86.1 (H)  42.7 - 76.0 % Final    Lymphocyte % 04/20/2024 7.2 (L)  19.6 - 45.3 % Final    Monocyte % 04/20/2024 6.1  5.0 - 12.0 % Final    Eosinophil % 04/20/2024 0.0 (L)  0.3 - 6.2 % Final    Basophil % 04/20/2024 0.1  0.0 - 1.5 % Final    Immature Grans % 04/20/2024 0.5  0.0 - 0.5 % Final    Neutrophils, Absolute 04/20/2024 9.33 (H)  1.70 - 7.00 10*3/mm3  Final    Lymphocytes, Absolute 04/20/2024 0.78  0.70 - 3.10 10*3/mm3 Final    Monocytes, Absolute 04/20/2024 0.66  0.10 - 0.90 10*3/mm3 Final    Eosinophils, Absolute 04/20/2024 0.00  0.00 - 0.40 10*3/mm3 Final    Basophils, Absolute 04/20/2024 0.01  0.00 - 0.20 10*3/mm3 Final    Immature Grans, Absolute 04/20/2024 0.05  0.00 - 0.05 10*3/mm3 Final    nRBC 04/20/2024 0.0  0.0 - 0.2 /100 WBC Final    Glucose 04/21/2024 112 (H)  65 - 99 mg/dL Final    BUN 04/21/2024 14  6 - 20 mg/dL Final    Creatinine 04/21/2024 0.62  0.57 - 1.00 mg/dL Final    Sodium 04/21/2024 138  136 - 145 mmol/L Final    Potassium 04/21/2024 4.1  3.5 - 5.2 mmol/L Final    Chloride 04/21/2024 104  98 - 107 mmol/L Final    CO2 04/21/2024 25.0  22.0 - 29.0 mmol/L Final    Calcium 04/21/2024 8.3 (L)  8.6 - 10.5 mg/dL Final    Total Protein 04/21/2024 6.3  6.0 - 8.5 g/dL Final    Albumin 04/21/2024 3.3 (L)  3.5 - 5.2 g/dL Final    ALT (SGPT) 04/21/2024 475 (H)  1 - 33 U/L Final    AST (SGOT) 04/21/2024 679 (H)  1 - 32 U/L Final    Alkaline Phosphatase 04/21/2024 148 (H)  39 - 117 U/L Final    Total Bilirubin 04/21/2024 1.1  0.0 - 1.2 mg/dL Final    Globulin 04/21/2024 3.0  gm/dL Final    Calculated Result    A/G Ratio 04/21/2024 1.1  g/dL Final    BUN/Creatinine Ratio 04/21/2024 22.6  7.0 - 25.0 Final    Anion Gap 04/21/2024 9.0  5.0 - 15.0 mmol/L Final    eGFR 04/21/2024 106.6  >60.0 mL/min/1.73 Final    Magnesium 04/21/2024 1.9  1.6 - 2.6 mg/dL Final    Phosphorus 04/21/2024 2.7  2.5 - 4.5 mg/dL Final    WBC 04/21/2024 10.56  3.40 - 10.80 10*3/mm3 Final    RBC 04/21/2024 3.25 (L)  3.77 - 5.28 10*6/mm3 Final    Hemoglobin 04/21/2024 10.1 (L)  12.0 - 15.9 g/dL Final    Hematocrit 04/21/2024 31.1 (L)  34.0 - 46.6 % Final    MCV 04/21/2024 95.7  79.0 - 97.0 fL Final    MCH 04/21/2024 31.1  26.6 - 33.0 pg Final    MCHC 04/21/2024 32.5  31.5 - 35.7 g/dL Final    RDW 04/21/2024 14.1  12.3 - 15.4 % Final    RDW-SD 04/21/2024 49.9  37.0 - 54.0 fl Final     MPV 04/21/2024 10.7  6.0 - 12.0 fL Final    Platelets 04/21/2024 142  140 - 450 10*3/mm3 Final    Neutrophil % 04/21/2024 74.3  42.7 - 76.0 % Final    Lymphocyte % 04/21/2024 17.3 (L)  19.6 - 45.3 % Final    Monocyte % 04/21/2024 7.5  5.0 - 12.0 % Final    Eosinophil % 04/21/2024 0.2 (L)  0.3 - 6.2 % Final    Basophil % 04/21/2024 0.2  0.0 - 1.5 % Final    Immature Grans % 04/21/2024 0.5  0.0 - 0.5 % Final    Neutrophils, Absolute 04/21/2024 7.85 (H)  1.70 - 7.00 10*3/mm3 Final    Lymphocytes, Absolute 04/21/2024 1.83  0.70 - 3.10 10*3/mm3 Final    Monocytes, Absolute 04/21/2024 0.79  0.10 - 0.90 10*3/mm3 Final    Eosinophils, Absolute 04/21/2024 0.02  0.00 - 0.40 10*3/mm3 Final    Basophils, Absolute 04/21/2024 0.02  0.00 - 0.20 10*3/mm3 Final    Immature Grans, Absolute 04/21/2024 0.05  0.00 - 0.05 10*3/mm3 Final    nRBC 04/21/2024 0.0  0.0 - 0.2 /100 WBC Final    Glucose 04/22/2024 105 (H)  65 - 99 mg/dL Final    BUN 04/22/2024 9  6 - 20 mg/dL Final    Creatinine 04/22/2024 0.63  0.57 - 1.00 mg/dL Final    Sodium 04/22/2024 140  136 - 145 mmol/L Final    Potassium 04/22/2024 4.0  3.5 - 5.2 mmol/L Final    Chloride 04/22/2024 104  98 - 107 mmol/L Final    CO2 04/22/2024 29.0  22.0 - 29.0 mmol/L Final    Calcium 04/22/2024 8.5 (L)  8.6 - 10.5 mg/dL Final    Total Protein 04/22/2024 6.5  6.0 - 8.5 g/dL Final    Albumin 04/22/2024 3.3 (L)  3.5 - 5.2 g/dL Final    ALT (SGPT) 04/22/2024 349 (H)  1 - 33 U/L Final    AST (SGOT) 04/22/2024 361 (H)  1 - 32 U/L Final    Alkaline Phosphatase 04/22/2024 162 (H)  39 - 117 U/L Final    Total Bilirubin 04/22/2024 1.8 (H)  0.0 - 1.2 mg/dL Final    Globulin 04/22/2024 3.2  gm/dL Final    Calculated Result    A/G Ratio 04/22/2024 1.0  g/dL Final    BUN/Creatinine Ratio 04/22/2024 14.3  7.0 - 25.0 Final    Anion Gap 04/22/2024 7.0  5.0 - 15.0 mmol/L Final    eGFR 04/22/2024 106.2  >60.0 mL/min/1.73 Final    WBC 04/22/2024 7.57  3.40 - 10.80 10*3/mm3 Final    RBC 04/22/2024 3.11 (L)   3.77 - 5.28 10*6/mm3 Final    Hemoglobin 04/22/2024 9.8 (L)  12.0 - 15.9 g/dL Final    Hematocrit 04/22/2024 29.7 (L)  34.0 - 46.6 % Final    MCV 04/22/2024 95.5  79.0 - 97.0 fL Final    MCH 04/22/2024 31.5  26.6 - 33.0 pg Final    MCHC 04/22/2024 33.0  31.5 - 35.7 g/dL Final    RDW 04/22/2024 14.0  12.3 - 15.4 % Final    RDW-SD 04/22/2024 49.0  37.0 - 54.0 fl Final    MPV 04/22/2024 10.6  6.0 - 12.0 fL Final    Platelets 04/22/2024 117 (L)  140 - 450 10*3/mm3 Final        No results found.    ASSESSMENT 53-year-old female with metastatic right-sided colon cancer    PLAN  1.  Metastatic right right-sided colon cancer:  A.  I reviewed the patient's chart including admission note blood results and imaging reports.  B.  I discussed the case with Dr. Nielson over the phone to coordinate patient care.  C.  I will follow-up on the patient circulating tumor DNA.  I it goes back to normal will do watchful waiting on the other hand if he remains positive I will give her 3 months of chemotherapy.  D.  She will follow-up with me 2 weeks after discharge to go over the final pathology report.    2.  Postoperative pain: Continue opioids    3.  Constipation: Advance diet gradually as tolerated.      4.  Postoperative anemia: Hemoglobin stable 9.8 today.    5.  Elevated liver enzymes: Total bilirubin 1.8  . Postoperative and post ablation.  We will repeat as an outpatient.    Ibna Lambert MD    4/22/2024

## 2024-04-22 NOTE — CASE MANAGEMENT/SOCIAL WORK
Continued Stay Note   [place]     Patient Name: Amber Feng  MRN: [id number]  Today's Date: 4/22/2024    Admit Date: 4/19/2024    Plan: Home with spouse   Discharge Plan       Row Name 04/22/24 1135       Plan    Plan Home with spouse    Patient/Family in Agreement with Plan yes    Plan Comments Discussed patient in MDR. Home when pain controlled. CM spoke to patient and spouse at bedside. PT recommended home with assist.  Her plan is home with spouse to transport when medically ready. Currently, Ms. Feng denies any discharge needs. CM will continue to follow.    Final Discharge Disposition Code 01 - home or self-care                   Discharge Codes    No documentation.                 Expected Discharge Date and Time       Expected Discharge Date Expected Discharge Time    Apr 23, 2024               Sofy Cornejo RN

## 2024-04-23 LAB
ALBUMIN SERPL-MCNC: 3.1 G/DL (ref 3.5–5.2)
ALBUMIN/GLOB SERPL: 1 G/DL
ALP SERPL-CCNC: 173 U/L (ref 39–117)
ALT SERPL W P-5'-P-CCNC: 224 U/L (ref 1–33)
ANION GAP SERPL CALCULATED.3IONS-SCNC: 14 MMOL/L (ref 5–15)
AST SERPL-CCNC: 166 U/L (ref 1–32)
BASOPHILS # BLD AUTO: 0.02 10*3/MM3 (ref 0–0.2)
BASOPHILS NFR BLD AUTO: 0.2 % (ref 0–1.5)
BILIRUB SERPL-MCNC: 1.7 MG/DL (ref 0–1.2)
BUN SERPL-MCNC: 9 MG/DL (ref 6–20)
BUN/CREAT SERPL: 15.8 (ref 7–25)
CALCIUM SPEC-SCNC: 8.2 MG/DL (ref 8.6–10.5)
CHLORIDE SERPL-SCNC: 97 MMOL/L (ref 98–107)
CO2 SERPL-SCNC: 24 MMOL/L (ref 22–29)
CREAT SERPL-MCNC: 0.57 MG/DL (ref 0.57–1)
DEPRECATED RDW RBC AUTO: 47.5 FL (ref 37–54)
EGFRCR SERPLBLD CKD-EPI 2021: 108.8 ML/MIN/1.73
EOSINOPHIL # BLD AUTO: 0.06 10*3/MM3 (ref 0–0.4)
EOSINOPHIL NFR BLD AUTO: 0.7 % (ref 0.3–6.2)
ERYTHROCYTE [DISTWIDTH] IN BLOOD BY AUTOMATED COUNT: 14 % (ref 12.3–15.4)
GLOBULIN UR ELPH-MCNC: 3.1 GM/DL
GLUCOSE SERPL-MCNC: 98 MG/DL (ref 65–99)
HCT VFR BLD AUTO: 29.3 % (ref 34–46.6)
HGB BLD-MCNC: 9.6 G/DL (ref 12–15.9)
IMM GRANULOCYTES # BLD AUTO: 0.06 10*3/MM3 (ref 0–0.05)
IMM GRANULOCYTES NFR BLD AUTO: 0.7 % (ref 0–0.5)
LARGE PLATELETS: NORMAL
LYMPHOCYTES # BLD AUTO: 1.24 10*3/MM3 (ref 0.7–3.1)
LYMPHOCYTES NFR BLD AUTO: 13.9 % (ref 19.6–45.3)
MAGNESIUM SERPL-MCNC: 1.9 MG/DL (ref 1.6–2.6)
MCH RBC QN AUTO: 30.9 PG (ref 26.6–33)
MCHC RBC AUTO-ENTMCNC: 32.8 G/DL (ref 31.5–35.7)
MCV RBC AUTO: 94.2 FL (ref 79–97)
MONOCYTES # BLD AUTO: 0.86 10*3/MM3 (ref 0.1–0.9)
MONOCYTES NFR BLD AUTO: 9.6 % (ref 5–12)
NEUTROPHILS NFR BLD AUTO: 6.69 10*3/MM3 (ref 1.7–7)
NEUTROPHILS NFR BLD AUTO: 74.9 % (ref 42.7–76)
NRBC BLD AUTO-RTO: 0.2 /100 WBC (ref 0–0.2)
PHOSPHATE SERPL-MCNC: 3.8 MG/DL (ref 2.5–4.5)
PLATELET # BLD AUTO: 138 10*3/MM3 (ref 140–450)
PMV BLD AUTO: 10.5 FL (ref 6–12)
POTASSIUM SERPL-SCNC: 3.7 MMOL/L (ref 3.5–5.2)
PROT SERPL-MCNC: 6.2 G/DL (ref 6–8.5)
RBC # BLD AUTO: 3.11 10*6/MM3 (ref 3.77–5.28)
RBC MORPH BLD: NORMAL
SMALL PLATELETS BLD QL SMEAR: ADEQUATE
SODIUM SERPL-SCNC: 135 MMOL/L (ref 136–145)
WBC MORPH BLD: NORMAL
WBC NRBC COR # BLD AUTO: 8.93 10*3/MM3 (ref 3.4–10.8)

## 2024-04-23 PROCEDURE — 25010000002 METOCLOPRAMIDE PER 10 MG: Performed by: SURGERY

## 2024-04-23 PROCEDURE — 83735 ASSAY OF MAGNESIUM: CPT | Performed by: STUDENT IN AN ORGANIZED HEALTH CARE EDUCATION/TRAINING PROGRAM

## 2024-04-23 PROCEDURE — 85025 COMPLETE CBC W/AUTO DIFF WBC: CPT | Performed by: STUDENT IN AN ORGANIZED HEALTH CARE EDUCATION/TRAINING PROGRAM

## 2024-04-23 PROCEDURE — 84100 ASSAY OF PHOSPHORUS: CPT | Performed by: STUDENT IN AN ORGANIZED HEALTH CARE EDUCATION/TRAINING PROGRAM

## 2024-04-23 PROCEDURE — 99232 SBSQ HOSP IP/OBS MODERATE 35: CPT | Performed by: INTERNAL MEDICINE

## 2024-04-23 PROCEDURE — 97530 THERAPEUTIC ACTIVITIES: CPT

## 2024-04-23 PROCEDURE — 25010000002 KETOROLAC TROMETHAMINE PER 15 MG: Performed by: NURSE PRACTITIONER

## 2024-04-23 PROCEDURE — 25010000002 ENOXAPARIN PER 10 MG: Performed by: STUDENT IN AN ORGANIZED HEALTH CARE EDUCATION/TRAINING PROGRAM

## 2024-04-23 PROCEDURE — 97116 GAIT TRAINING THERAPY: CPT

## 2024-04-23 PROCEDURE — 85007 BL SMEAR W/DIFF WBC COUNT: CPT | Performed by: STUDENT IN AN ORGANIZED HEALTH CARE EDUCATION/TRAINING PROGRAM

## 2024-04-23 PROCEDURE — 80053 COMPREHEN METABOLIC PANEL: CPT | Performed by: STUDENT IN AN ORGANIZED HEALTH CARE EDUCATION/TRAINING PROGRAM

## 2024-04-23 PROCEDURE — 25810000003 LACTATED RINGERS PER 1000 ML: Performed by: SURGERY

## 2024-04-23 RX ORDER — TRAMADOL HYDROCHLORIDE 50 MG/1
50 TABLET ORAL EVERY 6 HOURS PRN
Status: DISCONTINUED | OUTPATIENT
Start: 2024-04-23 | End: 2024-04-24 | Stop reason: HOSPADM

## 2024-04-23 RX ORDER — AMLODIPINE BESYLATE 10 MG/1
10 TABLET ORAL
Status: DISCONTINUED | OUTPATIENT
Start: 2024-04-23 | End: 2024-04-24 | Stop reason: HOSPADM

## 2024-04-23 RX ADMIN — KETOROLAC TROMETHAMINE 30 MG: 30 INJECTION, SOLUTION INTRAMUSCULAR; INTRAVENOUS at 23:53

## 2024-04-23 RX ADMIN — LIDOCAINE 1 PATCH: 4 PATCH TOPICAL at 08:29

## 2024-04-23 RX ADMIN — CYCLOBENZAPRINE 5 MG: 10 TABLET, FILM COATED ORAL at 19:57

## 2024-04-23 RX ADMIN — AMLODIPINE BESYLATE 10 MG: 10 TABLET ORAL at 08:28

## 2024-04-23 RX ADMIN — CYCLOBENZAPRINE 5 MG: 10 TABLET, FILM COATED ORAL at 08:29

## 2024-04-23 RX ADMIN — TRAMADOL HYDROCHLORIDE 50 MG: 50 TABLET ORAL at 12:54

## 2024-04-23 RX ADMIN — METOCLOPRAMIDE 10 MG: 5 INJECTION, SOLUTION INTRAMUSCULAR; INTRAVENOUS at 08:29

## 2024-04-23 RX ADMIN — KETOROLAC TROMETHAMINE 30 MG: 30 INJECTION, SOLUTION INTRAMUSCULAR; INTRAVENOUS at 03:21

## 2024-04-23 RX ADMIN — ACETAMINOPHEN 1000 MG: 500 TABLET ORAL at 08:29

## 2024-04-23 RX ADMIN — ACETAMINOPHEN 1000 MG: 500 TABLET ORAL at 14:35

## 2024-04-23 RX ADMIN — ENOXAPARIN SODIUM 40 MG: 100 INJECTION SUBCUTANEOUS at 08:29

## 2024-04-23 RX ADMIN — METOCLOPRAMIDE 10 MG: 5 INJECTION, SOLUTION INTRAMUSCULAR; INTRAVENOUS at 03:21

## 2024-04-23 RX ADMIN — METOCLOPRAMIDE 10 MG: 5 INJECTION, SOLUTION INTRAMUSCULAR; INTRAVENOUS at 19:57

## 2024-04-23 RX ADMIN — POLYETHYLENE GLYCOL 3350 17 G: 17 POWDER, FOR SOLUTION ORAL at 08:30

## 2024-04-23 RX ADMIN — SODIUM CHLORIDE, POTASSIUM CHLORIDE, SODIUM LACTATE AND CALCIUM CHLORIDE 50 ML/HR: 600; 310; 30; 20 INJECTION, SOLUTION INTRAVENOUS at 08:32

## 2024-04-23 RX ADMIN — CYCLOBENZAPRINE 5 MG: 10 TABLET, FILM COATED ORAL at 14:35

## 2024-04-23 RX ADMIN — METOCLOPRAMIDE 10 MG: 5 INJECTION, SOLUTION INTRAMUSCULAR; INTRAVENOUS at 14:36

## 2024-04-23 RX ADMIN — PANTOPRAZOLE SODIUM 40 MG: 40 TABLET, DELAYED RELEASE ORAL at 05:47

## 2024-04-23 NOTE — THERAPY TREATMENT NOTE
"Patient Name: Amber Feng  : 1970    MRN: [id number]                              Today's Date: 2024       Admit Date: 2024    Visit Dx:     ICD-10-CM ICD-9-CM   1. Metastatic colon cancer to liver  C18.9 153.9    C78.7 197.7     Patient Active Problem List   Diagnosis    Cancer of right colon    Metastatic colon cancer to liver    Metastatic cancer    Adenocarcinoma of colon metastatic to liver    GERD without esophagitis    Essential hypertension     Past Medical History:   Diagnosis Date    Anxiety     Cancer     COLON STAGE IV    Ear piercing     Gallstones     History of irritable bowel syndrome     Hypertension     pt states secondary to pain    Seasonal allergies     Tattoos     Wears glasses      Past Surgical History:   Procedure Laterality Date    COLONOSCOPY N/A 2022    Procedure: COLONOSCOPY WITH BIOPSY AND POLYPECTOMY;  Surgeon: Sea Valentin MD;  Location: [facility] OR;  Service: General;  Laterality: N/A;    DILATATION AND CURETTAGE      PORTACATH PLACEMENT N/A 2022    Procedure: INSERTION OF PORTACATH;  Surgeon: Sea Valentin MD;  Location: [facility] OR;  Service: General;  Laterality: N/A;    POSTPARTUM TUBAL LIGATION      TUMOR REMOVAL      pt reports \"tumor removed from uterus\" - states non-cancerous; unsure if fibroid; unsure if removed laparoscopically      General Information       Row Name 24 1416          Physical Therapy Time and Intention    Document Type therapy note (daily note)  -AS     Mode of Treatment physical therapy  -AS       Row Name 24 1416          General Information    Patient Profile Reviewed yes  -AS     Existing Precautions/Restrictions fall;oxygen therapy device and L/min;other (see comments)  abdominal incision, BERNARDO drain  -AS     Barriers to Rehab none identified  -AS       Row Name 24 1416          Cognition    Orientation Status (Cognition) oriented x 4  -AS       Row Name 24 1416          Safety Issues, " Functional Mobility    Safety Issues Affecting Function (Mobility) safety precaution awareness  -AS     Impairments Affecting Function (Mobility) endurance/activity tolerance;pain  -AS     Comment, Safety Issues/Impairments (Mobility) alert and following commands  -AS               User Key  (r) = Recorded By, (t) = Taken By, (c) = Cosigned By      Initials Name Provider Type    AS Afshan Meneses PTA Physical Therapist Assistant                   Mobility       Row Name 04/23/24 1417          Bed Mobility    Supine-Sit Baldwin (Bed Mobility) modified independence  -AS     Sit-Supine Baldwin (Bed Mobility) modified independence  -AS     Assistive Device (Bed Mobility) head of bed elevated;bed rails  -AS     Comment, (Bed Mobility) line management only, no physical assist needed  -AS       Row Name 04/23/24 1417          Transfers    Comment, (Transfers) cues for hand placement  -AS       Row Name 04/23/24 1417          Bed-Chair Transfer    Bed-Chair Baldwin (Transfers) other (see comments)  -AS     Comment, (Bed-Chair Transfer) requested back to bed  -AS       Row Name 04/23/24 1417          Sit-Stand Transfer    Sit-Stand Baldwin (Transfers) contact guard;1 person assist  -AS     Assistive Device (Sit-Stand Transfers) walker, front-wheeled  -AS       Row Name 04/23/24 1417          Gait/Stairs (Locomotion)    Baldwin Level (Gait) verbal cues;contact guard;1 person assist  -AS     Assistive Device (Gait) walker, front-wheeled  -AS     Distance in Feet (Gait) 350  -AS     Deviations/Abnormal Patterns (Gait) carlos decreased;gait speed decreased;stride length decreased;bilateral deviations  -AS     Bilateral Gait Deviations forward flexed posture  -AS     Comment, (Gait/Stairs) patient ambulated 350' with CGA x1 and rolling walker for support, ambulated on RA > 94%. Cues for upright posture and staying close to rolling walker.  -AS               User Key  (r) = Recorded By, (t) = Taken  By, (c) = Cosigned By      Initials Name Provider Type    AS Afshan Meneses PTA Physical Therapist Assistant                   Obj/Interventions       Row Name 04/23/24 1420          Motor Skills    Therapeutic Exercise knee;ankle  -AS       Row Name 04/23/24 1420          Knee (Therapeutic Exercise)    Knee (Therapeutic Exercise) strengthening exercise  -AS     Knee Strengthening (Therapeutic Exercise) bilateral;marching while seated;LAQ (long arc quad);sitting;10 repetitions  -AS       Row Name 04/23/24 1420          Ankle (Therapeutic Exercise)    Ankle (Therapeutic Exercise) AROM (active range of motion)  -AS     Ankle AROM (Therapeutic Exercise) bilateral;dorsiflexion;plantarflexion;sitting;10 repetitions  -AS       [facility] Name 04/23/24 1420          Balance    Dynamic Standing Balance verbal cues;contact guard;1-person assist  -AS     Position/Device Used, Standing Balance supported;walker, front-wheeled  -AS     Comment, Balance CGA for safety  -AS               User Key  (r) = Recorded By, (t) = Taken By, (c) = Cosigned By      Initials Name Provider Type    AS Afshan Meneses PTA Physical Therapist Assistant                   Goals/Plan    No documentation.                  Clinical Impression       Row Name 04/23/24 1421          Pain    Pretreatment Pain Rating 3/10  -AS     Posttreatment Pain Rating 3/10  -AS     Pain Location incisional  -AS     Pain Location - abdomen  -AS     Pain Intervention(s) Repositioned;Ambulation/increased activity  -AS       Row Name 04/23/24 1421          Plan of Care Review    Plan of Care Reviewed With patient  -AS     Progress improving  -AS     Outcome Evaluation patient ambulated 350' with CGA x1 and rolling walker for support, ambulated on RA > 94%. Cues for upright posture and staying close to rolling walker. Recommend home with asist at D/C.  -AS       [facility] Name 04/23/24 1421          Positioning and Restraints    Pre-Treatment Position in bed  -AS     Post  Treatment Position bed  -AS     In Bed supine;call light within reach;encouraged to call for assist;with family/caregiver  -AS               User Key  (r) = Recorded By, (t) = Taken By, (c) = Cosigned By      Initials Name Provider Type    AS Afshan Meneses PTA Physical Therapist Assistant                   Outcome Measures       Row Name 04/23/24 1421          How much help from another person do you currently need...    Turning from your back to your side while in flat bed without using bedrails? 4  -AS     Moving from lying on back to sitting on the side of a flat bed without bedrails? 4  -AS     Moving to and from a bed to a chair (including a wheelchair)? 4  -AS     Standing up from a chair using your arms (e.g., wheelchair, bedside chair)? 3  -AS     Climbing 3-5 steps with a railing? 3  -AS     To walk in hospital room? 3  -AS     AM-[facility] 6 Clicks Score (PT) 21  -AS     Highest Level of Mobility Goal 6 --> Walk 10 steps or more  -AS       Row Name 04/23/24 1421          Functional Assessment    Outcome Measure Options AM-PAC 6 Clicks Basic Mobility (PT)  -AS               User Key  (r) = Recorded By, (t) = Taken By, (c) = Cosigned By      Initials Name Provider Type    AS Afshan Meneses PTA Physical Therapist Assistant                                 Physical Therapy Education       Title: PT OT SLP Therapies (In Progress)       Topic: Physical Therapy (In Progress)       Point: Mobility training (In Progress)       Learning Progress Summary             Patient Acceptance, E, NR by AS at 4/23/2024 1422    Acceptance, E, VU,DU by JE at 4/20/2024 0931                         Point: Home exercise program (In Progress)       Learning Progress Summary             Patient Acceptance, E, NR by AS at 4/23/2024 1422                         Point: Precautions (In Progress)       Learning Progress Summary             Patient Acceptance, E, NR by AS at 4/23/2024 1422    Acceptance, E, VU,DU by JE at 4/20/2024  0931                                         User Key       Initials Effective Dates Name Provider Type Discipline    AS 04/28/23 -  Afshan Meneses PTA Physical Therapist Assistant PT    LM 07/11/23 -  Christine Pryor PT Physical Therapist PT                  PT Recommendation and Plan     Plan of Care Reviewed With: patient  Progress: improving  Outcome Evaluation: patient ambulated 350' with CGA x1 and rolling walker for support, ambulated on RA > 94%. Cues for upright posture and staying close to rolling walker. Recommend home with asist at D/C.     Time Calculation:         PT Charges       Row Name 04/23/24 1422             Time Calculation    Start Time 1350  -AS      PT Received On 04/23/24  -AS      PT Goal Re-Cert Due Date 04/30/24  -AS         Timed Charges    65255 - Gait Training Minutes  15  -AS      59210 - PT Therapeutic Activity Minutes 8  -AS         Total Minutes    Timed Charges Total Minutes 23  -AS       Total Minutes 23  -AS                User Key  (r) = Recorded By, (t) = Taken By, (c) = Cosigned By      Initials Name Provider Type    AS Afshan Meneses PTA Physical Therapist Assistant                  Therapy Charges for Today       Code Description Service Date Service Provider Modifiers Qty    79972382889 HC GAIT TRAINING EA 15 MIN 4/23/2024 Asfhan Meneses PTA GP 1    02303500861 HC PT THERAPEUTIC ACT EA 15 MIN 4/23/2024 Afshan Meneses PTA GP 1            PT G-Codes  Outcome Measure Options: AM-PAC 6 Clicks Basic Mobility (PT)  AM-PAC 6 Clicks Score (PT): 21       Afshan Meneses PTA  4/23/2024

## 2024-04-23 NOTE — CASE MANAGEMENT/SOCIAL WORK
Continued Stay Note   [place]     Patient Name: Amber Feng  MRN: [id number]  Today's Date: 4/23/2024    Admit Date: 4/19/2024    Plan: Home with spouse   Discharge Plan       Row Name 04/23/24 8082       Plan    Plan Home with spouse    Patient/Family in Agreement with Plan yes    Plan Comments Discussed patient in MDR. Patient is approaching medical readiness. PT recommends home with assist. Her plan is home with spouse to transport. Patient denies any discharge needs at this time. CM will continue to follow.    Final Discharge Disposition Code 01 - home or self-care                   Discharge Codes    No documentation.                 Expected Discharge Date and Time       Expected Discharge Date Expected Discharge Time    Apr 23, 2024               Sofy Cornejo RN

## 2024-04-23 NOTE — PROGRESS NOTES
"Patient Name:  Amber Feng  YOB: 1970  [id number]    Surgery Progress Note    Date of visit: 4/23/2024    Subjective   Improving with good pain control and not using PCA. Tolerating broths without nausea. Has had BM. Ambulating the hallways       Objective       /80 (BP Location: Left arm, Patient Position: Lying)   Pulse 104   Temp 96.7 °F (35.9 °C) (Oral)   Resp 17   Ht 157.5 cm (62\")   Wt 81.6 kg (180 lb)   SpO2 94%   BMI 32.92 kg/m²     Intake/Output Summary (Last 24 hours) at 4/23/2024 0933  Last data filed at 4/22/2024 1600  Gross per 24 hour   Intake --   Output 30 ml   Net -30 ml       CV:  Rhythm regular and rate regular, hypertensive  L:  Comfortable on 2 L NC, no acute distress  Abd:  Soft, appropriately tender to palpation, dressing in place over right subcostal incision, drain in place with ss output, non-bilious, non-distended  Ext:  No cyanosis, clubbing, edema    Recent labs and imaging that are back at this time have been reviewed.     Labs:    Results from last 7 days   Lab Units 04/23/24  0443   WBC 10*3/mm3 8.93   HEMOGLOBIN g/dL 9.6*   HEMATOCRIT % 29.3*   PLATELETS 10*3/mm3 138*     Results from last 7 days   Lab Units 04/23/24  0443   SODIUM mmol/L 135*   POTASSIUM mmol/L 3.7   CHLORIDE mmol/L 97*   CO2 mmol/L 24.0   BUN mg/dL 9   CREATININE mg/dL 0.57   CALCIUM mg/dL 8.2*   BILIRUBIN mg/dL 1.7*   ALK PHOS U/L 173*   ALT (SGPT) U/L 224*   AST (SGOT) U/L 166*   GLUCOSE mg/dL 98     Results from last 7 days   Lab Units 04/23/24  0443   SODIUM mmol/L 135*   POTASSIUM mmol/L 3.7   CHLORIDE mmol/L 97*   CO2 mmol/L 24.0   BUN mg/dL 9   CREATININE mg/dL 0.57   GLUCOSE mg/dL 98   CALCIUM mg/dL 8.2*     Lab Results   Lab Value Date/Time    LIPASE 69 (H) 02/09/2023 2240    LIPASE 32 11/24/2022 2145    LIPASE 41 10/27/2022 2315    LIPASE 23 10/17/2022 1503            Assessment & Plan     Problem List Items Addressed This Visit       Metastatic colon cancer to liver    " Relevant Orders    Tissue Pathology Exam       Mrs. Amber Feng is a 53 year old woman with Stage IV (TxNxM1) synchronous colorectal cancer Dx in 2020 s/p excellent response to FOLFIRINOX but with recent recurrence in the liver now s/p exp lap, open cholecystectomy, non-anatomic partial hepatectomies x 2 (segment 3 and segment 7), and microwave ablation x 2 (segment 5/8, segment 6).    - AST//224 and t bili 1.7 expected post-ablation and resection. Patient's liver is poor quality due to irinotecan. Will continue to observe.   - BERNARDO in place is ss without bilious return, monitor for bile leak from left lateral partial hepatectomy, likely d/c prior to discharge home  - Pain: scheduled tylenol and flexeril, toradol okay to add if needed, added tramadol, stopped PCA  - Nutrition: Regular diet, SLIV  - Started dulcolax suppository, miralax, Senna S today  - PT/OT consults, ambulate at least 4 times daily, IS multiple times q shift  - Wound Care: okay to shower, no baths/submerging incisions x 2 weeks    Post-op HTN   - Amlodipine 10 mg daily with better control  - inpatient hospitalist consult, appreciate recs    Ppx: lovenox  Dispo: pending pain control and return of bowel function, anticipate 4/24      Jacob Nielson MD  4/23/2024  09:33 EDT

## 2024-04-23 NOTE — PLAN OF CARE
Goal Outcome Evaluation:  Plan of Care Reviewed With: patient        Progress: improving     VSS, lopressor given x1 for , pt was able to pass flatus, complaint of pain in back - pt states it is from sleeping in the bed and not procedural.

## 2024-04-23 NOTE — PROGRESS NOTES
[facility]  PROGRESS NOTE    Patient Name: Amber Feng  : 1970  MRN: [id number]    Date of Admission: 2024  Primary Care Physician: vEon Bryant, PIPO    Subjective   Subjective     CC:  Med management     HPI:  No acute events. States she feels maybe a bit better. Still with abd pain. Has been ambulating. Reviewed BP med changes.       Objective   Objective     Vital Signs:   Temp:  [96.7 °F (35.9 °C)-100.4 °F (38 °C)] 96.7 °F (35.9 °C)  Heart Rate:  [103-128] 104  Resp:  [16-18] 17  BP: (137-165)/() 150/80  Flow (L/min):  [1] 1     Physical Exam:  Constitutional: No acute distress, awake, alert  HENT: NCAT, mucous membranes moist  Respiratory: Clear to auscultation bilaterally, respiratory effort normal   Cardiovascular: RRR, no murmurs, rubs, or gallops  Gastrointestinal: Positive bowel sounds, soft, tender  Musculoskeletal: No bilateral ankle edema  Psychiatric: Appropriate affect, cooperative  Neurologic: Oriented x 3, strength symmetric in all extremities, Cranial Nerves grossly intact to confrontation, speech clear  Skin: No rashes      Results Reviewed:  LAB RESULTS:      Lab 24  0443 24  0503 24  0347 24  0322 24  1615   WBC 8.93 7.57 10.56 10.83*  --    HEMOGLOBIN 9.6* 9.8* 10.1* 12.1  --    HEMOGLOBIN, POC  --   --   --   --  12.2   HEMATOCRIT 29.3* 29.7* 31.1* 36.4  --    HEMATOCRIT POC  --   --   --   --  36*   PLATELETS 138* 117* 142 149  --    NEUTROS ABS 6.69  --  7.85* 9.33*  --    IMMATURE GRANS (ABS) 0.06*  --  0.05 0.05  --    LYMPHS ABS 1.24  --  1.83 0.78  --    MONOS ABS 0.86  --  0.79 0.66  --    EOS ABS 0.06  --  0.02 0.00  --    MCV 94.2 95.5 95.7 93.8  --          Lab 24  0443 24  0503 24  0347 24  0322   SODIUM 135* 140 138 139   POTASSIUM 3.7 4.0 4.1 3.9   CHLORIDE 97* 104 104 105   CO2 24.0 29.0 25.0 19.0*   ANION GAP 14.0 7.0 9.0 15.0   BUN 9 9 14 14    CREATININE 0.57 0.63 0.62 0.67   EGFR 108.8 106.2 106.6 104.7   GLUCOSE 98 105* 112* 165*   CALCIUM 8.2* 8.5* 8.3* 8.1*   MAGNESIUM 1.9  --  1.9 1.9   PHOSPHORUS 3.8  --  2.7 3.0         Lab 04/23/24  0443 04/22/24  0503 04/21/24  0347 04/20/24  0322   TOTAL PROTEIN 6.2 6.5 6.3 6.7   ALBUMIN 3.1* 3.3* 3.3* 3.7   GLOBULIN 3.1 3.2 3.0 3.0   ALT (SGPT) 224* 349* 475* 601*   AST (SGOT) 166* 361* 679* 1,374*   BILIRUBIN 1.7* 1.8* 1.1 1.6*   ALK PHOS 173* 162* 148* 162*                 Lab 04/19/24  1059   ABO TYPING O   RH TYPING Positive   ANTIBODY SCREEN Negative         Lab 04/19/24  1615   PH, ARTERIAL 7.43     Brief Urine Lab Results  (Last result in the past 365 days)        Color   Clarity   Blood   Leuk Est   Nitrite   Protein   CREAT   Urine HCG        04/19/24 1137               Negative               Microbiology Results Abnormal       None            No radiology results from the last 24 hrs        Current medications:  Scheduled Meds:acetaminophen, 1,000 mg, Oral, Q8H  amLODIPine, 10 mg, Oral, Q24H  cyclobenzaprine, 5 mg, Oral, TID  enoxaparin, 40 mg, Subcutaneous, Daily  Lidocaine, 1 patch, Transdermal, Q24H  metoclopramide, 10 mg, Intravenous, Q6H  pantoprazole, 40 mg, Oral, Q AM  polyethylene glycol, 17 g, Oral, Daily      Continuous Infusions:   PRN Meds:.  bisacodyl    ketorolac    metoprolol tartrate    naloxone    ondansetron ODT **OR** ondansetron    promethazine    senna-docusate sodium    traMADol    Assessment & Plan   Assessment & Plan     Active Hospital Problems    Diagnosis  POA    **Adenocarcinoma of colon metastatic to liver [C18.9, C78.7]  Yes    GERD without esophagitis [K21.9]  Unknown    Essential hypertension [I10]  Unknown      Resolved Hospital Problems   No resolved problems to display.        Brief Hospital Course to date:  Amber Feng is a 53 y.o. female with pmh of GERD, HTN (no longer on medication), and stage IV synchronous colorectal cancer diagnosed in 2020 s/p  Folrfirinox with good response presents to [facility] for scheduled surgery with Dr. Nielson due to cancer recurrence with liver metastasis. Medicine consulted for HTN     Metastatic Colon Cancer  -- dx 2020 with good response to Folfirinox, but has recurrence with mets to the liver  -- s/p exp lap, open CCY, non-anatomic partial hepatectomies x2 (segments 3/7), and microwave ablation x2 (segments 5/8, 6) per Dr. Nielson on 4/19  -- LFTs improved   -- Pain management/nausea per surgery  - Mobilize. IS.      Post operative HTN with history of HTN  -- no longer on medication at home  - Increase amlodipine to 10 mg daily. Continue PRN metoprolol     GERD  -- PPI    Expected Discharge Location and Transportation: per primary team  Expected Discharge   Expected Discharge Date: 4/23/2024; Expected Discharge Time:      DVT prophylaxis:  Medical and mechanical DVT prophylaxis orders are present.         AM-PAC 6 Clicks Score (PT): 17 (04/22/24 2000)    CODE STATUS:   Code Status and Medical Interventions:   Ordered at: 04/19/24 8235     Level Of Support Discussed With:    Patient     Code Status (Patient has no pulse and is not breathing):    CPR (Attempt to Resuscitate)     Medical Interventions (Patient has pulse or is breathing):    Full Support       Magda Barrett,   04/23/24

## 2024-04-23 NOTE — PLAN OF CARE
Goal Outcome Evaluation:  Plan of Care Reviewed With: patient        Progress: improving  Outcome Evaluation: patient ambulated 350' with CGA x1 and rolling walker for support, ambulated on RA > 94%. Cues for upright posture and staying close to rolling walker. Recommend home with asist at D/C.

## 2024-04-23 NOTE — PROGRESS NOTES
DATE OF VISIT: 4/23/2024    Chief Complaint: Followup for metastatic right-sided colon cancer     SUBJECTIVE: Amber is laying comfortable in bed.  She is feeling better.  She was able to have a bowel movement today.  She is off pain pump.    REVIEW OF SYSTEMS: All the other 9 systems are reviewed by me and negative  except what is mentioned in HPI.     Past History:  Medical History: has a past medical history of Anxiety, Cancer, Ear piercing, Gallstones, History of irritable bowel syndrome, Hypertension, Seasonal allergies, Tattoos, and Wears glasses.   Surgical History: has a past surgical history that includes postpartum tubal ligation; Dilation and curettage of uterus (2013); Tumor removal (2013); Portacath placement (N/A, 12/8/2022); and Colonoscopy (N/A, 12/8/2022).   Family History: family history is not on file.   Social History: reports that she has never smoked. She has never used smokeless tobacco. She reports current alcohol use. She reports that she does not use drugs.    Medications Prior to Admission   Medication Sig Dispense Refill Last Dose    ondansetron ODT (ZOFRAN-ODT) 8 MG disintegrating tablet DISSOLVE 1 TABLET IN MOUTH EVERY 8 HOURS AS NEEDED FOR NAUSEA FOR VOMITING 30 tablet 0 4/19/2024    capecitabine (XELODA) 500 MG chemo tablet Take 2 tablets by mouth 2 (Two) Times a Day in the AM and PM with food on days 1-14, then off 7 days in a 21-day cycle. 56 tablet 11     Diclofenac Sodium (VOLTAREN) 1 % gel gel Apply 4 g topically to the appropriate area as directed 3 (Three) Times a Day. 100 g 1 Unknown    lidocaine-prilocaine (EMLA) 2.5-2.5 % cream Apply 1 application topically to the appropriate area as directed As Needed (45-60 minutes prior to port access.  Cover with saran/plastic wrap.). 30 g 3 Unknown    meloxicam (Mobic) 7.5 MG tablet Take 1 tablet by mouth Daily As Needed for Moderate Pain. 20 tablet 1 Unknown    oxyCODONE (ROXICODONE) 5 MG immediate release tablet Take 1 tablet by mouth  2 (Two) Times a Day As Needed for Moderate Pain. 60 tablet 0 Unknown    prochlorperazine (COMPAZINE) 10 MG tablet TAKE 1 TABLET BY MOUTH EVERY 6 HOURS AS NEEDED FOR NAUSEA FOR VOMITING 30 tablet 2 Unknown      Allergies: Losartan, Valium [diazepam], and Citrus     Current Facility-Administered Medications:     acetaminophen (TYLENOL) tablet 1,000 mg, 1,000 mg, Oral, Q8H, Jacob Nielson MD, 1,000 mg at 04/22/24 2258    amLODIPine (NORVASC) tablet 5 mg, 5 mg, Oral, Q24H, sAhely Roy, APRN, 5 mg at 04/22/24 0829    bisacodyl (DULCOLAX) suppository 10 mg, 10 mg, Rectal, Daily PRN, Jacob Nielson MD    cyclobenzaprine (FLEXERIL) tablet 5 mg, 5 mg, Oral, TID, Jacob Nielson MD, 5 mg at 04/22/24 2102    Enoxaparin Sodium (LOVENOX) syringe 40 mg, 40 mg, Subcutaneous, Daily, Jacob Nielson MD, 40 mg at 04/22/24 0828    HYDROmorphone (DILAUDID) PCA 0.2 mg/ml 50 mL syringe, , Intravenous, Continuous, Mark Rose MD, New Bag at 04/22/24 0636    ketorolac (TORADOL) injection 30 mg, 30 mg, Intravenous, Q6H PRN, Ashely Roy APRN, 30 mg at 04/23/24 0321    lactated ringers infusion, 50 mL/hr, Intravenous, Continuous, Mark Rose MD, Last Rate: 50 mL/hr at 04/21/24 0945, 50 mL/hr at 04/21/24 0945    Lidocaine 4 % 1 patch, 1 patch, Transdermal, Q24H, Jacob Nielson MD, 1 patch at 04/22/24 0829    metoclopramide (REGLAN) injection 10 mg, 10 mg, Intravenous, Q6H, Mark Rose MD, 10 mg at 04/23/24 0321    metoprolol tartrate (LOPRESSOR) injection 2.5 mg, 2.5 mg, Intravenous, Q6H PRN, Magda Barrett DO, 2.5 mg at 04/22/24 2302    naloxone (NARCAN) injection 0.1 mg, 0.1 mg, Intravenous, Q5 Min PRN, Jacob Nielson MD    ondansetron ODT (ZOFRAN-ODT) disintegrating tablet 4 mg, 4 mg, Oral, Q6H PRN, 4 mg at 04/21/24 0950 **OR** ondansetron (ZOFRAN) injection 4 mg, 4 mg, Intravenous, Q6H PRN, Jacob Nielson MD    pantoprazole (PROTONIX) EC tablet 40 mg, 40 mg, Oral, Q AM, Jacob Nielson MD, 40 mg at  "04/23/24 0547    Pharmacy consult -- d/c all separate PRN opioids while patient has hydromorphone PCA order active, , Does not apply, Continuous PRN, Jacob Nielson MD    polyethylene glycol (MIRALAX) packet 17 g, 17 g, Oral, Daily, Jacob Nielson MD, 17 g at 04/22/24 0829    promethazine (PHENERGAN) 12.5 mg in sodium chloride 0.9 % 50 mL, 12.5 mg, Intravenous, Q6H PRN, Mark Rose MD    sennosides-docusate (PERICOLACE) 8.6-50 MG per tablet 1 tablet, 1 tablet, Oral, BID PRN, Jacob Nielson MD    sodium chloride 0.9 % infusion, 30 mL/hr, Intravenous, Continuous PRN, Jacob Nielson MD    PHYSICAL EXAMINATION:   /80 (BP Location: Left arm, Patient Position: Lying)   Pulse 108   Temp 98.9 °F (37.2 °C) (Oral)   Resp 16   Ht 157.5 cm (62\")   Wt 81.6 kg (180 lb)   SpO2 97%   BMI 32.92 kg/m²                ECOG Performance Status: 2 - Symptomatic, <50% confined to bed  GENERAL: Age appropriate. No acute distress.   NEURO/PSYCH: A&O x 3, strength 5/5 in all muscle groups  HEENT: Head atraumatic, normocephalic.   NECK: Supple. No JVD. No lymphadenopathy.   LUNGS: Clear to auscultation bilaterally. No wheezing. No rhonchi.   HEART: Regular rate and rhythm. S1, S2, no murmurs.   ABDOMEN: Soft, nontender, nondistended. Bowel sounds positive. No  hepatosplenomegaly.   EXTREMITIES: No clubbing, cyanosis, or edema.   SKIN: No rashes. No purpura.       Admission on 04/19/2024   Component Date Value Ref Range Status    HCG, Urine, QL 04/19/2024 Negative  Negative Final    Lot Number 04/19/2024 [id number]   Final    Internal Positive Control 04/19/2024 Passed  Positive, Passed Final    Internal Negative Control 04/19/2024 Passed  Negative, Passed Final    Expiration Date 04/19/2024 01/29/2024   Final    ABO Type 04/19/2024 O   Final    RH type 04/19/2024 Positive   Final    Antibody Screen 04/19/2024 Negative   Final    T&S Expiration Date 04/19/2024 4/22/2024 11:59:59 PM   Final    Ionized Calcium 04/19/2024 1.15 " (L)  1.20 - 1.32 mmol/L Final    POC Potassium 04/19/2024 4.4  3.5 - 4.9 mmol/L Final    Sodium 04/19/2024 137 (L)  138 - 146 mmol/L Final    Total CO2 04/19/2024 21 (L)  24 - 29 mmol/L Final    Hemoglobin 04/19/2024 12.2  12.0 - 17.0 g/dL Final    Hematocrit 04/19/2024 36 (L)  38 - 51 % Final    pCO2, Arterial 04/19/2024 30.5 (L)  35 - 45 mm Hg Final    pO2, Arterial 04/19/2024 274 (H)  80 - 105 mmHg Final    Serial Number: [id number]:  [id number]    Base Excess 04/19/2024 -4.0000  -5 - 5 mmol/L Final    O2 Saturation, Arterial 04/19/2024 100 (H)  95 - 98 % Final    pH, Arterial 04/19/2024 7.43  7.35 - 7.6 pH units Final    HCO3, Arterial 04/19/2024 20.2 (L)  22 - 26 mmol/L Final    Glucose 04/19/2024 181 (H)  70 - 130 mg/dL Final    Glucose 04/20/2024 165 (H)  65 - 99 mg/dL Final    BUN 04/20/2024 14  6 - 20 mg/dL Final    Creatinine 04/20/2024 0.67  0.57 - 1.00 mg/dL Final    Sodium 04/20/2024 139  136 - 145 mmol/L Final    Potassium 04/20/2024 3.9  3.5 - 5.2 mmol/L Final    Chloride 04/20/2024 105  98 - 107 mmol/L Final    CO2 04/20/2024 19.0 (L)  22.0 - 29.0 mmol/L Final    Calcium 04/20/2024 8.1 (L)  8.6 - 10.5 mg/dL Final    Total Protein 04/20/2024 6.7  6.0 - 8.5 g/dL Final    Albumin 04/20/2024 3.7  3.5 - 5.2 g/dL Final    ALT (SGPT) 04/20/2024 601 (H)  1 - 33 U/L Final    AST (SGOT) 04/20/2024 1,374 (H)  1 - 32 U/L Final    Alkaline Phosphatase 04/20/2024 162 (H)  39 - 117 U/L Final    Total Bilirubin 04/20/2024 1.6 (H)  0.0 - 1.2 mg/dL Final    Globulin 04/20/2024 3.0  gm/dL Final    Calculated Result    A/G Ratio 04/20/2024 1.2  g/dL Final    BUN/Creatinine Ratio 04/20/2024 20.9  7.0 - 25.0 Final    Anion Gap 04/20/2024 15.0  5.0 - 15.0 mmol/L Final    eGFR 04/20/2024 104.7  >60.0 mL/min/1.73 Final    Phosphorus 04/20/2024 3.0  2.5 - 4.5 mg/dL Final    Magnesium 04/20/2024 1.9  1.6 - 2.6 mg/dL Final    WBC 04/20/2024 10.83 (H)  3.40 - 10.80 10*3/mm3 Final    RBC 04/20/2024 3.88  3.77 - 5.28 10*6/mm3  Final    Hemoglobin 04/20/2024 12.1  12.0 - 15.9 g/dL Final    Hematocrit 04/20/2024 36.4  34.0 - 46.6 % Final    MCV 04/20/2024 93.8  79.0 - 97.0 fL Final    MCH 04/20/2024 31.2  26.6 - 33.0 pg Final    MCHC 04/20/2024 33.2  31.5 - 35.7 g/dL Final    RDW 04/20/2024 13.9  12.3 - 15.4 % Final    RDW-SD 04/20/2024 47.6  37.0 - 54.0 fl Final    MPV 04/20/2024 11.1  6.0 - 12.0 fL Final    Platelets 04/20/2024 149  140 - 450 10*3/mm3 Final    Neutrophil % 04/20/2024 86.1 (H)  42.7 - 76.0 % Final    Lymphocyte % 04/20/2024 7.2 (L)  19.6 - 45.3 % Final    Monocyte % 04/20/2024 6.1  5.0 - 12.0 % Final    Eosinophil % 04/20/2024 0.0 (L)  0.3 - 6.2 % Final    Basophil % 04/20/2024 0.1  0.0 - 1.5 % Final    Immature Grans % 04/20/2024 0.5  0.0 - 0.5 % Final    Neutrophils, Absolute 04/20/2024 9.33 (H)  1.70 - 7.00 10*3/mm3 Final    Lymphocytes, Absolute 04/20/2024 0.78  0.70 - 3.10 10*3/mm3 Final    Monocytes, Absolute 04/20/2024 0.66  0.10 - 0.90 10*3/mm3 Final    Eosinophils, Absolute 04/20/2024 0.00  0.00 - 0.40 10*3/mm3 Final    Basophils, Absolute 04/20/2024 0.01  0.00 - 0.20 10*3/mm3 Final    Immature Grans, Absolute 04/20/2024 0.05  0.00 - 0.05 10*3/mm3 Final    nRBC 04/20/2024 0.0  0.0 - 0.2 /100 WBC Final    Glucose 04/21/2024 112 (H)  65 - 99 mg/dL Final    BUN 04/21/2024 14  6 - 20 mg/dL Final    Creatinine 04/21/2024 0.62  0.57 - 1.00 mg/dL Final    Sodium 04/21/2024 138  136 - 145 mmol/L Final    Potassium 04/21/2024 4.1  3.5 - 5.2 mmol/L Final    Chloride 04/21/2024 104  98 - 107 mmol/L Final    CO2 04/21/2024 25.0  22.0 - 29.0 mmol/L Final    Calcium 04/21/2024 8.3 (L)  8.6 - 10.5 mg/dL Final    Total Protein 04/21/2024 6.3  6.0 - 8.5 g/dL Final    Albumin 04/21/2024 3.3 (L)  3.5 - 5.2 g/dL Final    ALT (SGPT) 04/21/2024 475 (H)  1 - 33 U/L Final    AST (SGOT) 04/21/2024 679 (H)  1 - 32 U/L Final    Alkaline Phosphatase 04/21/2024 148 (H)  39 - 117 U/L Final    Total Bilirubin 04/21/2024 1.1  0.0 - 1.2 mg/dL  Final    Globulin 04/21/2024 3.0  gm/dL Final    Calculated Result    A/G Ratio 04/21/2024 1.1  g/dL Final    BUN/Creatinine Ratio 04/21/2024 22.6  7.0 - 25.0 Final    Anion Gap 04/21/2024 9.0  5.0 - 15.0 mmol/L Final    eGFR 04/21/2024 106.6  >60.0 mL/min/1.73 Final    Magnesium 04/21/2024 1.9  1.6 - 2.6 mg/dL Final    Phosphorus 04/21/2024 2.7  2.5 - 4.5 mg/dL Final    WBC 04/21/2024 10.56  3.40 - 10.80 10*3/mm3 Final    RBC 04/21/2024 3.25 (L)  3.77 - 5.28 10*6/mm3 Final    Hemoglobin 04/21/2024 10.1 (L)  12.0 - 15.9 g/dL Final    Hematocrit 04/21/2024 31.1 (L)  34.0 - 46.6 % Final    MCV 04/21/2024 95.7  79.0 - 97.0 fL Final    MCH 04/21/2024 31.1  26.6 - 33.0 pg Final    MCHC 04/21/2024 32.5  31.5 - 35.7 g/dL Final    RDW 04/21/2024 14.1  12.3 - 15.4 % Final    RDW-SD 04/21/2024 49.9  37.0 - 54.0 fl Final    MPV 04/21/2024 10.7  6.0 - 12.0 fL Final    Platelets 04/21/2024 142  140 - 450 10*3/mm3 Final    Neutrophil % 04/21/2024 74.3  42.7 - 76.0 % Final    Lymphocyte % 04/21/2024 17.3 (L)  19.6 - 45.3 % Final    Monocyte % 04/21/2024 7.5  5.0 - 12.0 % Final    Eosinophil % 04/21/2024 0.2 (L)  0.3 - 6.2 % Final    Basophil % 04/21/2024 0.2  0.0 - 1.5 % Final    Immature Grans % 04/21/2024 0.5  0.0 - 0.5 % Final    Neutrophils, Absolute 04/21/2024 7.85 (H)  1.70 - 7.00 10*3/mm3 Final    Lymphocytes, Absolute 04/21/2024 1.83  0.70 - 3.10 10*3/mm3 Final    Monocytes, Absolute 04/21/2024 0.79  0.10 - 0.90 10*3/mm3 Final    Eosinophils, Absolute 04/21/2024 0.02  0.00 - 0.40 10*3/mm3 Final    Basophils, Absolute 04/21/2024 0.02  0.00 - 0.20 10*3/mm3 Final    Immature Grans, Absolute 04/21/2024 0.05  0.00 - 0.05 10*3/mm3 Final    nRBC 04/21/2024 0.0  0.0 - 0.2 /100 WBC Final    Glucose 04/22/2024 105 (H)  65 - 99 mg/dL Final    BUN 04/22/2024 9  6 - 20 mg/dL Final    Creatinine 04/22/2024 0.63  0.57 - 1.00 mg/dL Final    Sodium 04/22/2024 140  136 - 145 mmol/L Final    Potassium 04/22/2024 4.0  3.5 - 5.2 mmol/L Final     Chloride 04/22/2024 104  98 - 107 mmol/L Final    CO2 04/22/2024 29.0  22.0 - 29.0 mmol/L Final    Calcium 04/22/2024 8.5 (L)  8.6 - 10.5 mg/dL Final    Total Protein 04/22/2024 6.5  6.0 - 8.5 g/dL Final    Albumin 04/22/2024 3.3 (L)  3.5 - 5.2 g/dL Final    ALT (SGPT) 04/22/2024 349 (H)  1 - 33 U/L Final    AST (SGOT) 04/22/2024 361 (H)  1 - 32 U/L Final    Alkaline Phosphatase 04/22/2024 162 (H)  39 - 117 U/L Final    Total Bilirubin 04/22/2024 1.8 (H)  0.0 - 1.2 mg/dL Final    Globulin 04/22/2024 3.2  gm/dL Final    Calculated Result    A/G Ratio 04/22/2024 1.0  g/dL Final    BUN/Creatinine Ratio 04/22/2024 14.3  7.0 - 25.0 Final    Anion Gap 04/22/2024 7.0  5.0 - 15.0 mmol/L Final    eGFR 04/22/2024 106.2  >60.0 mL/min/1.73 Final    WBC 04/22/2024 7.57  3.40 - 10.80 10*3/mm3 Final    RBC 04/22/2024 3.11 (L)  3.77 - 5.28 10*6/mm3 Final    Hemoglobin 04/22/2024 9.8 (L)  12.0 - 15.9 g/dL Final    Hematocrit 04/22/2024 29.7 (L)  34.0 - 46.6 % Final    MCV 04/22/2024 95.5  79.0 - 97.0 fL Final    MCH 04/22/2024 31.5  26.6 - 33.0 pg Final    MCHC 04/22/2024 33.0  31.5 - 35.7 g/dL Final    RDW 04/22/2024 14.0  12.3 - 15.4 % Final    RDW-SD 04/22/2024 49.0  37.0 - 54.0 fl Final    MPV 04/22/2024 10.6  6.0 - 12.0 fL Final    Platelets 04/22/2024 117 (L)  140 - 450 10*3/mm3 Final    Phosphorus 04/23/2024 3.8  2.5 - 4.5 mg/dL Final    Magnesium 04/23/2024 1.9  1.6 - 2.6 mg/dL Final    Glucose 04/23/2024 98  65 - 99 mg/dL Final    BUN 04/23/2024 9  6 - 20 mg/dL Final    Creatinine 04/23/2024 0.57  0.57 - 1.00 mg/dL Final    Sodium 04/23/2024 135 (L)  136 - 145 mmol/L Final    Potassium 04/23/2024 3.7  3.5 - 5.2 mmol/L Final    Slight hemolysis detected by analyzer. Result may be falsely elevated.    Chloride 04/23/2024 97 (L)  98 - 107 mmol/L Final    CO2 04/23/2024 24.0  22.0 - 29.0 mmol/L Final    Calcium 04/23/2024 8.2 (L)  8.6 - 10.5 mg/dL Final    Total Protein 04/23/2024 6.2  6.0 - 8.5 g/dL Final    Albumin  04/23/2024 3.1 (L)  3.5 - 5.2 g/dL Final    ALT (SGPT) 04/23/2024 224 (H)  1 - 33 U/L Final    AST (SGOT) 04/23/2024 166 (H)  1 - 32 U/L Final    Alkaline Phosphatase 04/23/2024 173 (H)  39 - 117 U/L Final    Total Bilirubin 04/23/2024 1.7 (H)  0.0 - 1.2 mg/dL Final    Globulin 04/23/2024 3.1  gm/dL Final    Calculated Result    A/G Ratio 04/23/2024 1.0  g/dL Final    BUN/Creatinine Ratio 04/23/2024 15.8  7.0 - 25.0 Final    Anion Gap 04/23/2024 14.0  5.0 - 15.0 mmol/L Final    eGFR 04/23/2024 108.8  >60.0 mL/min/1.73 Final    WBC 04/23/2024 8.93  3.40 - 10.80 10*3/mm3 Final    RBC 04/23/2024 3.11 (L)  3.77 - 5.28 10*6/mm3 Final    Hemoglobin 04/23/2024 9.6 (L)  12.0 - 15.9 g/dL Final    Hematocrit 04/23/2024 29.3 (L)  34.0 - 46.6 % Final    MCV 04/23/2024 94.2  79.0 - 97.0 fL Final    MCH 04/23/2024 30.9  26.6 - 33.0 pg Final    MCHC 04/23/2024 32.8  31.5 - 35.7 g/dL Final    RDW 04/23/2024 14.0  12.3 - 15.4 % Final    RDW-SD 04/23/2024 47.5  37.0 - 54.0 fl Final    MPV 04/23/2024 10.5  6.0 - 12.0 fL Final    Platelets 04/23/2024 138 (L)  140 - 450 10*3/mm3 Final    Neutrophil % 04/23/2024 74.9  42.7 - 76.0 % Final    Lymphocyte % 04/23/2024 13.9 (L)  19.6 - 45.3 % Final    Monocyte % 04/23/2024 9.6  5.0 - 12.0 % Final    Eosinophil % 04/23/2024 0.7  0.3 - 6.2 % Final    Basophil % 04/23/2024 0.2  0.0 - 1.5 % Final    Immature Grans % 04/23/2024 0.7 (H)  0.0 - 0.5 % Final    Neutrophils, Absolute 04/23/2024 6.69  1.70 - 7.00 10*3/mm3 Final    Lymphocytes, Absolute 04/23/2024 1.24  0.70 - 3.10 10*3/mm3 Final    Monocytes, Absolute 04/23/2024 0.86  0.10 - 0.90 10*3/mm3 Final    Eosinophils, Absolute 04/23/2024 0.06  0.00 - 0.40 10*3/mm3 Final    Basophils, Absolute 04/23/2024 0.02  0.00 - 0.20 10*3/mm3 Final    Immature Grans, Absolute 04/23/2024 0.06 (H)  0.00 - 0.05 10*3/mm3 Final    nRBC 04/23/2024 0.2  0.0 - 0.2 /100 WBC Final    RBC Morphology 04/23/2024 Normal  Normal Final    WBC Morphology 04/23/2024 Normal   Normal Final    Platelet Estimate 04/23/2024 Adequate  Normal Final    Large Platelets 04/23/2024 Slight/1+  None Seen Final       No results found.    ASSESSMENT: The patient is a very pleasant 53 y.o. female  with metastatic right-sided colon cancer      PLAN:  1.  Metastatic right-sided colon cancer: She will follow-up with [facility] office a few weeks after discharge.  We will repeat her circulating tumor DNA at that point.  2.  Liver metastases: Status post resection as well as ablation done by Dr. Nielson April 19, 2024.  3.  Normocytic anemia: Hemoglobin 9.6 today.  Essentially stable.  4.  Postoperative pain: Try to wean off to oral regimen.    Okay to discharge patient home from oncology perspective.  She will follow-up with [facility] office in 2 weeks.    Iban Lambert MD  4/23/2024

## 2024-04-24 ENCOUNTER — TRANSCRIBE ORDERS (OUTPATIENT)
Dept: ADMINISTRATIVE | Facility: HOSPITAL | Age: 54
End: 2024-04-24
Payer: COMMERCIAL

## 2024-04-24 ENCOUNTER — READMISSION MANAGEMENT (OUTPATIENT)
Dept: CALL CENTER | Facility: HOSPITAL | Age: 54
End: 2024-04-24
Payer: COMMERCIAL

## 2024-04-24 VITALS
WEIGHT: 180 LBS | RESPIRATION RATE: 19 BRPM | TEMPERATURE: 98.3 F | BODY MASS INDEX: 33.13 KG/M2 | HEIGHT: 62 IN | DIASTOLIC BLOOD PRESSURE: 94 MMHG | OXYGEN SATURATION: 95 % | SYSTOLIC BLOOD PRESSURE: 162 MMHG | HEART RATE: 115 BPM

## 2024-04-24 DIAGNOSIS — C78.7 METASTATIC COLON CANCER TO LIVER: Primary | ICD-10-CM

## 2024-04-24 DIAGNOSIS — C18.9 METASTATIC COLON CANCER TO LIVER: Primary | ICD-10-CM

## 2024-04-24 LAB
CYTO UR: NORMAL
LAB AP CASE REPORT: NORMAL
LAB AP CLINICAL INFORMATION: NORMAL
LAB AP DIAGNOSIS COMMENT: NORMAL
PATH REPORT.FINAL DX SPEC: NORMAL
PATH REPORT.GROSS SPEC: NORMAL

## 2024-04-24 PROCEDURE — 25010000002 METOCLOPRAMIDE PER 10 MG: Performed by: SURGERY

## 2024-04-24 PROCEDURE — 99231 SBSQ HOSP IP/OBS SF/LOW 25: CPT | Performed by: PHYSICIAN ASSISTANT

## 2024-04-24 PROCEDURE — 25010000002 ENOXAPARIN PER 10 MG: Performed by: STUDENT IN AN ORGANIZED HEALTH CARE EDUCATION/TRAINING PROGRAM

## 2024-04-24 RX ORDER — NALOXONE HYDROCHLORIDE 4 MG/.1ML
SPRAY NASAL
Qty: 2 EACH | Refills: 0 | Status: SHIPPED | OUTPATIENT
Start: 2024-04-24

## 2024-04-24 RX ORDER — ENOXAPARIN SODIUM 100 MG/ML
40 INJECTION SUBCUTANEOUS DAILY
Qty: 9.6 ML | Refills: 0 | Status: SHIPPED | OUTPATIENT
Start: 2024-04-25 | End: 2024-05-19

## 2024-04-24 RX ORDER — OXYCODONE HYDROCHLORIDE 5 MG/1
5 TABLET ORAL 2 TIMES DAILY PRN
Qty: 20 TABLET | Refills: 0 | Status: SHIPPED | OUTPATIENT
Start: 2024-04-24

## 2024-04-24 RX ORDER — ONDANSETRON 8 MG/1
8 TABLET, ORALLY DISINTEGRATING ORAL EVERY 8 HOURS PRN
Qty: 30 TABLET | Refills: 0 | Status: SHIPPED | OUTPATIENT
Start: 2024-04-24

## 2024-04-24 RX ORDER — AMLODIPINE BESYLATE 10 MG/1
5 TABLET ORAL
Qty: 45 TABLET | Refills: 1 | Status: SHIPPED | OUTPATIENT
Start: 2024-04-25 | End: 2024-04-30 | Stop reason: SDUPTHER

## 2024-04-24 RX ADMIN — LIDOCAINE 1 PATCH: 4 PATCH TOPICAL at 08:07

## 2024-04-24 RX ADMIN — METOCLOPRAMIDE 10 MG: 5 INJECTION, SOLUTION INTRAMUSCULAR; INTRAVENOUS at 04:03

## 2024-04-24 RX ADMIN — ENOXAPARIN SODIUM 40 MG: 100 INJECTION SUBCUTANEOUS at 08:07

## 2024-04-24 RX ADMIN — CYCLOBENZAPRINE 5 MG: 10 TABLET, FILM COATED ORAL at 08:07

## 2024-04-24 RX ADMIN — PANTOPRAZOLE SODIUM 40 MG: 40 TABLET, DELAYED RELEASE ORAL at 04:03

## 2024-04-24 RX ADMIN — ACETAMINOPHEN 1000 MG: 500 TABLET ORAL at 08:06

## 2024-04-24 RX ADMIN — AMLODIPINE BESYLATE 10 MG: 10 TABLET ORAL at 08:07

## 2024-04-24 RX ADMIN — METOCLOPRAMIDE 10 MG: 5 INJECTION, SOLUTION INTRAMUSCULAR; INTRAVENOUS at 08:15

## 2024-04-24 NOTE — OUTREACH NOTE
Prep Survey      Flowsheet Row Responses   [facility] patient discharged from? [place]   Is LACE score < 7 ? No   Eligibility [facility]   Date of Admission 04/19/24   Date of Discharge 04/24/24   Discharge Disposition Home or Self Care   Discharge diagnosis DIAGNOSTIC LAPAROSCOPY EXPLORATORY LAPAROTOMY, INTRAOPERATIVE ULTRASOUND N/A General with Block  OPEN CHOLECYSTECTOMY N/A General  PARTIAL HEPATECTOMY x 2,  WITH LIVER ABLATION X 2 Left   Does the patient have one of the following disease processes/diagnoses(primary or secondary)? General Surgery   Does the patient have Home health ordered? No   Is there a DME ordered? No   Prep survey completed? Yes            SHI DUNBAR - Registered Nurse

## 2024-04-24 NOTE — CASE MANAGEMENT/SOCIAL WORK
Case Management Discharge Note      Final Note: Patient is discharging today. Spoke to patient and spouse at bedside.  PT recommends home with assist. Her plan is home with spouse to transport. Patient denies any discharge needs.         Selected Continued Care - Admitted Since 4/19/2024       Destination    No services have been selected for the patient.                Durable Medical Equipment    No services have been selected for the patient.                Dialysis/Infusion    No services have been selected for the patient.                Home Medical Care    No services have been selected for the patient.                Therapy    No services have been selected for the patient.                Community Resources    No services have been selected for the patient.                Community & DME    No services have been selected for the patient.                    Selected Continued Care - Episodes Includes continued care and service providers with selected services from the active episodes listed below      Oncology Episode start date: 6/8/2023   There are no active outsourced providers for this episode.                 Transportation Services  Private: Car    Final Discharge Disposition Code: 01 - home or self-care

## 2024-04-24 NOTE — DISCHARGE INSTRUCTIONS
[facility]:  DISCHARGE INSTRUCTIONS  Dr. Jacob Nielson    DIET  Okay to resume a regular diet.      PAIN MANAGEMENT  Pain is normal after surgery, but should be able to be managed.     Recommend alternatin mg acetaminophen (Tylenol) every 6 hours*   600-800 mg ibuprofen (Motrin, Advil, etc.) every 6 hours  *Do not take more than 4000 mg of Tylenol in a single day.     If you received a prescription for pain medication after surgery, use this for breakthrough moderate or severe pain if not covered by acetaminophen or ibuprofen.  Okay to use warm and cool compresses.     Bowel Regimen  Prescribed pain medications may cause constipation. Any over-the-counter bowel regimen medications will help with these symptoms.     Recommended regimen:  Miralax 17g in zero sugar Gatorade/Powerade once daily  Senna laxative once to twice daily  Metamucil or other fiber supplement daily  If still constipated - milk of Magnesia or okay to try suppositories or enemas unless directed otherwise by surgeon      DISCHARGE ACTIVITY  Activity is as tolerated.   Okay to walk the night of surgery. Recommend walking at least 4 times daily to prevent complications  No excessive twisting/bending/lifting greater than 20 lbs for 6 weeks.  May return to more strenuous exercise as pain and lifting restrictions allow. Would avoid strenuous activity for at least 1-2 weeks to allow incision to heal.    Driving  You may not drive within 24 hour of receiving narcotic pain medication.   Okay to drive when not taking narcotic pain medication and your incision is not causing limitations with your reaction speed to traffic.    Return to work/school  You may return to work/school in 1-2 weeks with the above restrictions.  You may return to work/school without restrictions in 4 weeks or when your pain/activity allows.    WOUND CARE    Shower/Bathing  You may shower after 24 hours from the surgical procedure.   No tub baths, do not  submerge the incision underwater in a tub bath etc.      Wound Dressing  If dressed with adhesive glue, okay to follow shower/bathing instructions above. Keep site clean and dry.    If dressed with gauze bandages and steri strips. Okay to remove the outer bandage immediately after exiting your first shower and remove the Steri-Strips if still in place after 7 days.    Please call our office, [phone], if you develop increased pain, redness, yellow/purulent discharge, or fevers/chills as this may indicate an infection    Drain Care   If you have a drain in place, please follow the below instructions  - When emptying drain, please record the amount in milliliters  - Bring the drain output amounts to your clinic visits  - If the drain falls out or comes loose, please do not attempt to reinsert the drain  - DO NOT flush the drain with anything unless recommended by your doctor  - If any questions or concerns, please contact your treatment team      FOLLOW-UP  You will be scheduled a follow-up appointment 1-2 weeks after discharge.   [facility] will call you to schedule.    If you do not hear from anyone to schedule, please call [phone] to ask for an appointment 2 weeks from your surgery or discharge date.        CONCERNING SIGNS AND SYMPTOMS  1. Fevers/chills/flu-like symptoms  2. Increasing pain at the surgical site  3. Redness around incisions  4. Purulent drainage from incisions  5. Any other symptoms that are concerning to you    If you develop any of the signs or symptoms above, please call our office ([phone]) or after-hours line (). If unable to reach us, or symptoms are severe, please go to the ER for evaluation.

## 2024-04-24 NOTE — CASE MANAGEMENT/SOCIAL WORK
Continued Stay Note   [facility]     Patient Name: Amber Feng  MRN: [id number]  Today's Date: 4/24/2024    Admit Date: 4/19/2024    Plan: Home with spouse   Discharge Plan       Row Name 04/24/24 0925       Plan    Plan Home with spouse    Patient/Family in Agreement with Plan yes    Plan Comments Spoke to patient and spouse at bedside today. Possible discharge today. PT recommends home with assist. Her plan is home with spouse to transport. Patient denies any discharge needs. CM will continue to follow.    Final Discharge Disposition Code 01 - home or self-care                   Discharge Codes    No documentation.                 Expected Discharge Date and Time       Expected Discharge Date Expected Discharge Time    Apr 24, 2024               Sofy Cornejo RN

## 2024-04-24 NOTE — DISCHARGE SUMMARY
Patient Name:  Amber Feng  YOB: 1970  [id number]    Date of Discharge:  4/24/24    Discharge Diagnosis: Colorectal cancer liver metastases    Presenting Problem/History of Present Illness  Active Hospital Problems    Diagnosis  POA    **Adenocarcinoma of colon metastatic to liver [C18.9, C78.7]  Yes    GERD without esophagitis [K21.9]  Unknown    Essential hypertension [I10]  Unknown      Resolved Hospital Problems   No resolved problems to display.      Mrs. Amber Feng is a 53 year old woman Stage IV (TxNxM1) synchronous colorectal liver metastases with a right-sided/transverse primary. This was initially diagnosed in November 2020 and was treated with FOLFOXIRI with an excellent response. Recently, she had increased ctDNA indicating a recurrence and imaging demonstrated three locations in the liver which were deemed resectable. There was no evidence of disease elsewhere.     Hospital Course  Mrs. Amber Feng was taken to the OR on 4/19/2024 for an exp lap, open cholecystectomy, non-anatomic partial hepatectomies x 2 (segment 3 and segment 7), and microwave ablation x 2 (segment 5/8, segment 6). Her liver was found to be significantly diseased due to her history of 12c of irinotecan, thus 2 of the 3 areas were ablated as liver resection was deemed to be too high-risk intraoperatively. She tolerated the procedure well, but had significant hypertension in the PACU necessitating labetalol and hydralazine treatments. The medicine service was consulted the following morning regarding PO regimen recommendations and she was started on amlodipine which was eventually increased to 10 mg daily .Her pain was controlled with a PCA. She had bowel function on POD 3 and her diet was slowly advanced after that time. She was able to work with PT. Her drain remained non-bilious and was removed before discharge. She initially had a bump in transaminases and total bilirubin which trended back down to normal  which was secondary to the liver ablations. At the time of discharge, she was tolerating a diet, had good pain control, and was ambulating the halls, thus she was discharged home. She was discharged on amlodipine 5 mg daily with instructions to follow-up with her PCP.     Procedures Performed  Diagnostic laparoscopy  Exploratory laparotomy  Open cholecystectomy  Intraoperative liver ultrasound  Partial hepatectomies of masses in segments 7 and segment 3  Bracketed microwave ablation of masses in segment 5/8 and segment 6 using the Neuwave system,  5 minutes each, 4x4 cm  Pedicled falciform flap       Consults:   Consults       Date and Time Order Name Status Description    4/20/2024  9:45 AM Inpatient Hospitalist Consult Completed             Pertinent Test Results:  reviewed    Condition on Discharge:  stable, improved    Vital Signs  Temp:  [97.9 °F (36.6 °C)-98.8 °F (37.1 °C)] 98.3 °F (36.8 °C)  Heart Rate:  [105-116] 115  Resp:  [18-19] 19  BP: (140-162)/(76-94) 162/94    Physical Exam:  General: Alert, oriented x 3, well-appearing, no acute distress  HEENT: Normocephalic, atraumatic, sclerae anicteric, external ears normal   Cardiovascular: Regular rate and rhythm  Pulmonary: Breathing comfortably on room air, no respiratory distress  Gastrointestinal: soft, appropriately tender, incision is c/d/I, drain removed without drainage from drain site  Extremity: No clubbing, cyanosis, edema  Neuro: Cognitively intact, CN grossly intact, no focal deficits  Psych: Appropriate mood and affect      Discharge Disposition  Home or Self Care    Discharge Medications     Discharge Medications        New Medications        Instructions Start Date   amLODIPine 10 MG tablet  Commonly known as: NORVASC   5 mg, Oral, Every 24 Hours Scheduled   Start Date: April 25, 2024     Enoxaparin Sodium 40 MG/0.4ML solution prefilled syringe syringe  Commonly known as: LOVENOX   40 mg, Subcutaneous, Daily   Start Date: April 25, 2024      naloxone 4 MG/0.1ML nasal spray  Commonly known as: NARCAN   Call 911. Don't prime. [place] in 1 nostril for overdose. Repeat in 2-3 minutes in other nostril if no or minimal breathing/responsiveness.             Continue These Medications        Instructions Start Date   capecitabine 500 MG chemo tablet  Commonly known as: XELODA   Take 2 tablets by mouth 2 (Two) Times a Day in the AM and PM with food on days 1-14, then off 7 days in a 21-day cycle.      Diclofenac Sodium 1 % gel gel  Commonly known as: VOLTAREN   4 g, Topical, 3 Times Daily      lidocaine-prilocaine 2.5-2.5 % cream  Commonly known as: EMLA   1 application , Topical, As Needed      meloxicam 7.5 MG tablet  Commonly known as: Mobic   7.5 mg, Oral, Daily PRN      ondansetron ODT 8 MG disintegrating tablet  Commonly known as: ZOFRAN-ODT   DISSOLVE 1 TABLET IN MOUTH EVERY 8 HOURS AS NEEDED FOR NAUSEA FOR VOMITING      oxyCODONE 5 MG immediate release tablet  Commonly known as: ROXICODONE   5 mg, Oral, 2 Times Daily PRN      prochlorperazine 10 MG tablet  Commonly known as: COMPAZINE   TAKE 1 TABLET BY MOUTH EVERY 6 HOURS AS NEEDED FOR NAUSEA FOR VOMITING               Discharge Diet:   Diet Instructions       Diet: Regular/House Diet; Thin (IDDSI 0)      Discharge Diet: Regular/House Diet    Fluid Consistency: Thin (IDDSI 0)            Activity at Discharge:     Follow-up Appointments  Future Appointments   Date Time Provider Department Center   5/8/2024  1:45 PM Iban Lambert MD [facility]     Additional Instructions for the Follow-ups that You Need to Schedule       Discharge Follow-up with Specified Provider: Jacob Nielson MD; 2 Weeks   As directed      To: Jacob Nielson MD   Follow Up: 2 Weeks   Follow Up Details: CT abdomen liver protocol prior to appointment                Test Results Pending at Discharge  Pending Labs       Order Current Status    Tissue Pathology Exam In process             Jacob Nielson MD   04/24/24  11:14  EDT    Time: Discharge 30 min

## 2024-04-24 NOTE — PROGRESS NOTES
[facility] Medicine Services  PROGRESS NOTE    Patient Name: Amber Feng  : 1970  MRN: [id number]    Date of Admission: 2024  Primary Care Physician: Evon Bryant, PIPO    Subjective     CC:  med management, HTN    HPI:  Sitting on side of bed. Feeling well with no new complaints. Home today.     Objective     Vital Signs:   Temp:  [97.9 °F (36.6 °C)-98.8 °F (37.1 °C)] 98.3 °F (36.8 °C)  Heart Rate:  [105-116] 115  Resp:  [18-19] 19  BP: (140-162)/(82-94) 162/94     Physical Exam:  Constitutional: No acute distress, awake, alert and conversational. Sitting on side of bed   HENT: NCAT, mucous membranes moist  Respiratory: Clear to auscultation bilaterally, respiratory effort normal   Cardiovascular: RRR, no murmurs, rubs, or gallops  Gastrointestinal: Positive bowel sounds, soft, appropriately tender. Abdominal incision c/d/i. BERNARDO drain out   Musculoskeletal: No bilateral ankle edema  Psychiatric: Appropriate affect, cooperative  Neurologic: Oriented x 3, moves all extremities spontaneously without focal deficits, speech clear  Skin: No rashes    Results Reviewed:  LAB RESULTS:      Lab 24  0443 24  0503 24  0347 24  0322 24  1615   WBC 8.93 7.57 10.56 10.83*  --    HEMOGLOBIN 9.6* 9.8* 10.1* 12.1  --    HEMOGLOBIN, POC  --   --   --   --  12.2   HEMATOCRIT 29.3* 29.7* 31.1* 36.4  --    HEMATOCRIT POC  --   --   --   --  36*   PLATELETS 138* 117* 142 149  --    NEUTROS ABS 6.69  --  7.85* 9.33*  --    IMMATURE GRANS (ABS) 0.06*  --  0.05 0.05  --    LYMPHS ABS 1.24  --  1.83 0.78  --    MONOS ABS 0.86  --  0.79 0.66  --    EOS ABS 0.06  --  0.02 0.00  --    MCV 94.2 95.5 95.7 93.8  --          Lab 24  0443 24  0503 24  0347 24  0322   SODIUM 135* 140 138 139   POTASSIUM 3.7 4.0 4.1 3.9   CHLORIDE 97* 104 104 105   CO2 24.0 29.0 25.0 19.0*   ANION GAP 14.0 7.0 9.0 15.0   BUN 9 9 14 14   CREATININE 0.57 0.63 0.62 0.67    EGFR 108.8 106.2 106.6 104.7   GLUCOSE 98 105* 112* 165*   CALCIUM 8.2* 8.5* 8.3* 8.1*   MAGNESIUM 1.9  --  1.9 1.9   PHOSPHORUS 3.8  --  2.7 3.0         Lab 04/23/24  0443 04/22/24  0503 04/21/24  0347 04/20/24  0322   TOTAL PROTEIN 6.2 6.5 6.3 6.7   ALBUMIN 3.1* 3.3* 3.3* 3.7   GLOBULIN 3.1 3.2 3.0 3.0   ALT (SGPT) 224* 349* 475* 601*   AST (SGOT) 166* 361* 679* 1,374*   BILIRUBIN 1.7* 1.8* 1.1 1.6*   ALK PHOS 173* 162* 148* 162*                 Lab 04/19/24  1059   ABO TYPING O   RH TYPING Positive   ANTIBODY SCREEN Negative         Lab 04/19/24  1615   PH, ARTERIAL 7.43     Brief Urine Lab Results  (Last result in the past 365 days)        Color   Clarity   Blood   Leuk Est   Nitrite   Protein   CREAT   Urine HCG        04/19/24 1137               Negative               Microbiology Results Abnormal       None            No radiology results from the last 24 hrs        Current medications:  Scheduled Meds:acetaminophen, 1,000 mg, Oral, Q8H  amLODIPine, 10 mg, Oral, Q24H  cyclobenzaprine, 5 mg, Oral, TID  enoxaparin, 40 mg, Subcutaneous, Daily  Lidocaine, 1 patch, Transdermal, Q24H  metoclopramide, 10 mg, Intravenous, Q6H  pantoprazole, 40 mg, Oral, Q AM  polyethylene glycol, 17 g, Oral, Daily      Continuous Infusions:   PRN Meds:.  bisacodyl    ketorolac    metoprolol tartrate    naloxone    ondansetron ODT **OR** ondansetron    promethazine    senna-docusate sodium    traMADol    Assessment & Plan   Assessment & Plan     Active Hospital Problems    Diagnosis  POA    **Adenocarcinoma of colon metastatic to liver [C18.9, C78.7]  Yes    GERD without esophagitis [K21.9]  Unknown    Essential hypertension [I10]  Unknown      Resolved Hospital Problems   No resolved problems to display.        Brief Hospital Course to date:  Amber Feng is a 53 y.o. female with pmh of GERD, HTN (no longer on medication), and stage IV synchronous colorectal cancer diagnosed in 2020 s/p Folrfirinox with good response presents  to [facility] for scheduled surgery with Dr. Nielson due to cancer recurrence with liver metastasis. Medicine consulted for HTN     Metastatic Colon Cancer  -- dx 2020 with good response to Folfirinox, but has recurrence with mets to the liver  -- s/p exp lap, open CCY, non-anatomic partial hepatectomies x2 (segments 3 & 7), and microwave ablation x2 (segments 5/8 & 6) per Dr. Nielson on 4/19  -- LFTs trending down  -- Pain management/nausea per surgery  - Mobilize. IS.      Post operative HTN with history of HTN  - DC on Amlodipine 5mg daily - needs recheck with PCP in 1 week  - Encouraged to check BP at home and bring a log to PCP office for review     GERD  -- PPI    Expected Discharge Location and Transportation: per primary team  Expected Discharge   Expected Discharge Date: 4/24/2024; Expected Discharge Time: 12:00 PM     DVT prophylaxis:  Medical and mechanical DVT prophylaxis orders are present.    AM-PAC 6 Clicks Score (PT): 21 (04/23/24 1954)    CODE STATUS:   Code Status and Medical Interventions:   Ordered at: 04/19/24 6111     Level Of Support Discussed With:    Patient     Code Status (Patient has no pulse and is not breathing):    CPR (Attempt to Resuscitate)     Medical Interventions (Patient has pulse or is breathing):    Full Support       Vera Milan PA-C  04/24/24

## 2024-04-25 ENCOUNTER — TRANSITIONAL CARE MANAGEMENT TELEPHONE ENCOUNTER (OUTPATIENT)
Dept: CALL CENTER | Facility: HOSPITAL | Age: 54
End: 2024-04-25
Payer: COMMERCIAL

## 2024-04-25 NOTE — OUTREACH NOTE
Call Center TCM Note      Flowsheet Row Responses   [facility] [place] patient discharged from? [place]   Does the patient have one of the following disease processes/diagnoses(primary or secondary)? General Surgery   TCM attempt successful? Yes   Call start time 1521   Call end time 1523   Discharge diagnosis DIAGNOSTIC LAPAROSCOPY EXPLORATORY LAPAROTOMY, INTRAOPERATIVE ULTRASOUND N/A General with Block  OPEN CHOLECYSTECTOMY N/A General  PARTIAL HEPATECTOMY x 2,  WITH LIVER ABLATION X 2 Left   Meds reviewed with patient/caregiver? Yes   Is the patient having any side effects they believe may be caused by any medication additions or changes? No   Does the patient have all medications related to this admission filled (includes all antibiotics, pain medications, etc.) Yes   Is the patient taking all medications as directed (includes completed medication regime)? Yes   Comments Hospital f/u with Erica Chu PA-C at PCP office on 4/30   Does the patient have an appointment with their PCP within 7-14 days of discharge? Yes   Has home health visited the patient within 72 hours of discharge? N/A   Psychosocial issues? No   Did the patient receive a copy of their discharge instructions? Yes   Nursing interventions Reviewed instructions with patient   What is the patient's perception of their health status since discharge? Improving   Nursing interventions Nurse provided patient education   Is the patient /caregiver able to teach back basic post-op care? Continue use of incentive spirometry at least 1 week post discharge, Practice 'cough and deep breath', Drive as instructed by MD in discharge instructions, Take showers only when approved by MD-sponge bathe until then, No tub bath, swimming, or hot tub until instructed by MD, Keep incision areas clean,dry and protected, Lifting as instructed by MD in discharge instructions, Do not remove steri-strips   Is the patient/caregiver able to teach back signs and symptoms of  incisional infection? Pus or odor from incision, Incisional warmth, Increased drainage or bleeding, Fever, Increased redness, swelling or pain at the incisonal site   Is the patient/caregiver able to teach back steps to recovery at home? Set small, achievable goals for return to baseline health, Rest and rebuild strength, gradually increase activity   Is the patient/caregiver able to teach back the hierarchy of who to call/visit for symptoms/problems? PCP, Specialist, Home health nurse, Urgent Care, ED, 911 Yes   TCM call completed? Yes   Wrap up additional comments Doing well, no questions, confirmed hospital f/u appt with PCP office for 4/30.   Call end time 1523   Would this patient benefit from a Referral to [facility] Social Work? No   Is the patient interested in additional calls from an ambulatory ? No            Elive Delacruz RN    4/25/2024, 15:23 EDT

## 2024-04-25 NOTE — OUTREACH NOTE
Call Center TCM Note      Flowsheet Row Responses   [facility] patient discharged from? [place]   Does the patient have one of the following disease processes/diagnoses(primary or secondary)? General Surgery   TCM attempt successful? No   Unsuccessful attempts Attempt 1            Elvie Delacruz RN    4/25/2024, 13:17 EDT

## 2024-04-30 ENCOUNTER — OFFICE VISIT (OUTPATIENT)
Dept: INTERNAL MEDICINE | Facility: CLINIC | Age: 54
End: 2024-04-30
Payer: COMMERCIAL

## 2024-04-30 VITALS
BODY MASS INDEX: 32.2 KG/M2 | HEART RATE: 101 BPM | DIASTOLIC BLOOD PRESSURE: 77 MMHG | OXYGEN SATURATION: 100 % | TEMPERATURE: 97.6 F | SYSTOLIC BLOOD PRESSURE: 121 MMHG | HEIGHT: 62 IN | WEIGHT: 175 LBS

## 2024-04-30 DIAGNOSIS — D64.9 POSTOPERATIVE ANEMIA: ICD-10-CM

## 2024-04-30 DIAGNOSIS — C78.7 METASTATIC COLON CANCER TO LIVER: ICD-10-CM

## 2024-04-30 DIAGNOSIS — I10 ESSENTIAL HYPERTENSION: ICD-10-CM

## 2024-04-30 DIAGNOSIS — K21.9 GERD WITHOUT ESOPHAGITIS: ICD-10-CM

## 2024-04-30 DIAGNOSIS — Z09 HOSPITAL DISCHARGE FOLLOW-UP: Primary | ICD-10-CM

## 2024-04-30 DIAGNOSIS — C18.9 METASTATIC COLON CANCER TO LIVER: ICD-10-CM

## 2024-04-30 PROCEDURE — 99495 TRANSJ CARE MGMT MOD F2F 14D: CPT | Performed by: PHYSICIAN ASSISTANT

## 2024-04-30 RX ORDER — AMLODIPINE BESYLATE 5 MG/1
5 TABLET ORAL
Qty: 90 TABLET | Refills: 1 | Status: SHIPPED | OUTPATIENT
Start: 2024-04-30

## 2024-04-30 RX ORDER — OMEPRAZOLE 40 MG/1
40 CAPSULE, DELAYED RELEASE ORAL DAILY
Qty: 30 CAPSULE | Refills: 1 | Status: SHIPPED | OUTPATIENT
Start: 2024-04-30

## 2024-04-30 NOTE — PROGRESS NOTES
Transitional Care Follow Up Visit  Subjective     Amber Feng is a 53 y.o. female who presents for a transitional care management visit.    Within 48 business hours after discharge our office contacted her via telephone to coordinate her care and needs.      I reviewed and discussed the details of that call along with the discharge summary, hospital problems, inpatient lab results, inpatient diagnostic studies, and consultation reports with Amber.     Current outpatient and discharge medications have been reconciled for the patient.  Reviewed by: Erica Chu PA-C          4/24/2024     6:43 PM   Date of TCM Phone Call   [facility]   Date of Admission 4/19/2024   Date of Discharge 4/24/2024   Discharge Disposition Home or Self Care     Risk for Readmission (LACE) Score: 13 (4/24/2024  6:00 AM)      History of Present Illness   Course During Hospital Stay: Colon cancer initially diagnosed November 2020 with recent recurrence including metastases to the liver.  Underwent exploratory laparotomy with open cholecystectomy, hepatectomies x 2 and microwave ablation x 2 on 4/19/2024 with Dr. Nielson.  Tolerated surgery well with exception of significant hypertension in the PACU requiring treatment with labetalol and hydralazine.  During admission she was transitioned to amlodipine 5 mg daily which was then increased to 10 mg daily before finally lowering back to 5 mg daily.  Pain was controlled with PCA.  Surgical wound drain was removed prior to discharge.  Discharged home on 4/24/2024.    Following hospital discharge she has been taking amlodipine 5 mg daily as prescribed.  Home blood pressure readings are running around 122/85.  She has been using her incentive spirometer around 3 times daily and has been ambulating frequently at home and in her neighborhood.  She has had some indigestion after eating certain foods including salad.    She will follow-up with oncology on 5/8/2024.          The following portions of the patient's history were reviewed and updated as appropriate: allergies, current medications, past family history, past medical history, past social history, past surgical history, and problem list.    Review of Systems    Objective   Physical Exam  Vitals and nursing note reviewed.   Constitutional:       General: She is not in acute distress.     Appearance: She is well-developed. She is not ill-appearing, toxic-appearing or diaphoretic.   HENT:      Head: Normocephalic and atraumatic.      Right Ear: External ear normal.      Left Ear: External ear normal.   Eyes:      General: No scleral icterus.     Extraocular Movements: Extraocular movements intact.      Conjunctiva/sclera: Conjunctivae normal.      Pupils: Pupils are equal, round, and reactive to light.   Cardiovascular:      Rate and Rhythm: Normal rate and regular rhythm.      Heart sounds: Normal heart sounds. No murmur heard.     No friction rub. No gallop.   Pulmonary:      Effort: Pulmonary effort is normal. No tachypnea, bradypnea, accessory muscle usage, prolonged expiration, respiratory distress or retractions.      Breath sounds: Normal breath sounds. No stridor, decreased air movement or transmitted upper airway sounds. No decreased breath sounds, wheezing, rhonchi or rales.   Chest:      Chest wall: No tenderness.   Abdominal:      General: A surgical scar is present. Bowel sounds are normal.      Palpations: Abdomen is soft.      Tenderness: There is generalized abdominal tenderness.       Musculoskeletal:         General: No tenderness or deformity. Normal range of motion.      Cervical back: Normal range of motion and neck supple. No tenderness.      Right lower leg: No edema.      Left lower leg: No edema.   Lymphadenopathy:      Cervical: No cervical adenopathy.   Skin:     General: Skin is warm and dry.      Capillary Refill: Capillary refill takes less than 2 seconds.      Findings: Wound (surgical  incisions clean and healing well with no surrounding erythema and no discharge) present. No rash.   Neurological:      Mental Status: She is alert and oriented to person, place, and time.      Cranial Nerves: No cranial nerve deficit.      Sensory: No sensory deficit.      Motor: No abnormal muscle tone.      Coordination: Coordination normal.      Deep Tendon Reflexes: Reflexes normal.   Psychiatric:         Mood and Affect: Mood normal.         Behavior: Behavior normal.         Thought Content: Thought content normal.         Judgment: Judgment normal.           Assessment & Plan   Diagnoses and all orders for this visit:    1. Hospital discharge follow-up (Primary)    2. Metastatic colon cancer to liver  -     CBC & Differential  -     Comprehensive metabolic panel    3. Essential hypertension  Assessment & Plan:  Stable, continue amlodipine 5 mg daily.     Orders:  -     amLODIPine (NORVASC) 5 MG tablet; Take 1 tablet by mouth Daily.  Dispense: 90 tablet; Refill: 1    4. GERD without esophagitis  -     omeprazole (priLOSEC) 40 MG capsule; Take 1 capsule by mouth Daily.  Dispense: 30 capsule; Refill: 1    5. Postoperative anemia  -     CBC & Differential    Complete ordered labs in 1 week to reassess postoperative anemia.  Follow-up with oncology on 5/8/2024 as scheduled.  Begin omeprazole daily for management of indigestion.    Transitional Care Management Certification  I certify that the following are true:  Communication was made within 2 business days of discharge.  Complexity of Medical Decision Making is moderate.  Face to face visit occurred within 7 days.

## 2024-05-07 ENCOUNTER — HOSPITAL ENCOUNTER (OUTPATIENT)
Dept: CT IMAGING | Facility: HOSPITAL | Age: 54
Discharge: HOME | End: 2024-05-07
Admitting: STUDENT IN AN ORGANIZED HEALTH CARE EDUCATION/TRAINING PROGRAM
Payer: COMMERCIAL

## 2024-05-07 ENCOUNTER — SPECIALTY PHARMACY (OUTPATIENT)
Dept: ONCOLOGY | Facility: HOSPITAL | Age: 54
End: 2024-05-07
Payer: COMMERCIAL

## 2024-05-07 DIAGNOSIS — C78.7 METASTATIC COLON CANCER TO LIVER: ICD-10-CM

## 2024-05-07 DIAGNOSIS — C18.9 METASTATIC COLON CANCER TO LIVER: ICD-10-CM

## 2024-05-07 LAB
ALBUMIN SERPL-MCNC: 4.2 G/DL (ref 3.5–5.2)
ALBUMIN/GLOB SERPL: 1.4 G/DL
ALP SERPL-CCNC: 255 U/L (ref 39–117)
ALT SERPL-CCNC: 42 U/L (ref 1–33)
AST SERPL-CCNC: 42 U/L (ref 1–32)
BASOPHILS # BLD AUTO: 0.03 10*3/MM3 (ref 0–0.2)
BASOPHILS NFR BLD AUTO: 0.6 % (ref 0–1.5)
BILIRUB SERPL-MCNC: 0.4 MG/DL (ref 0–1.2)
BUN SERPL-MCNC: 15 MG/DL (ref 6–20)
BUN/CREAT SERPL: 18.8 (ref 7–25)
CALCIUM SERPL-MCNC: 9.5 MG/DL (ref 8.6–10.5)
CHLORIDE SERPL-SCNC: 103 MMOL/L (ref 98–107)
CO2 SERPL-SCNC: 25 MMOL/L (ref 22–29)
CREAT SERPL-MCNC: 0.8 MG/DL (ref 0.57–1)
EGFRCR SERPLBLD CKD-EPI 2021: 88.2 ML/MIN/1.73
EOSINOPHIL # BLD AUTO: 0.21 10*3/MM3 (ref 0–0.4)
EOSINOPHIL NFR BLD AUTO: 4 % (ref 0.3–6.2)
ERYTHROCYTE [DISTWIDTH] IN BLOOD BY AUTOMATED COUNT: 13.3 % (ref 12.3–15.4)
GLOBULIN SER CALC-MCNC: 3 GM/DL
GLUCOSE SERPL-MCNC: 101 MG/DL (ref 65–99)
HCT VFR BLD AUTO: 32.8 % (ref 34–46.6)
HGB BLD-MCNC: 10.6 G/DL (ref 12–15.9)
IMM GRANULOCYTES # BLD AUTO: 0.01 10*3/MM3 (ref 0–0.05)
IMM GRANULOCYTES NFR BLD AUTO: 0.2 % (ref 0–0.5)
LYMPHOCYTES # BLD AUTO: 1.62 10*3/MM3 (ref 0.7–3.1)
LYMPHOCYTES NFR BLD AUTO: 30.7 % (ref 19.6–45.3)
MCH RBC QN AUTO: 29.1 PG (ref 26.6–33)
MCHC RBC AUTO-ENTMCNC: 32.3 G/DL (ref 31.5–35.7)
MCV RBC AUTO: 90.1 FL (ref 79–97)
MONOCYTES # BLD AUTO: 0.62 10*3/MM3 (ref 0.1–0.9)
MONOCYTES NFR BLD AUTO: 11.7 % (ref 5–12)
NEUTROPHILS # BLD AUTO: 2.79 10*3/MM3 (ref 1.7–7)
NEUTROPHILS NFR BLD AUTO: 52.8 % (ref 42.7–76)
NRBC BLD AUTO-RTO: 0 /100 WBC (ref 0–0.2)
PLATELET # BLD AUTO: 358 10*3/MM3 (ref 140–450)
POTASSIUM SERPL-SCNC: 4.5 MMOL/L (ref 3.5–5.2)
PROT SERPL-MCNC: 7.2 G/DL (ref 6–8.5)
RBC # BLD AUTO: 3.64 10*6/MM3 (ref 3.77–5.28)
SODIUM SERPL-SCNC: 139 MMOL/L (ref 136–145)
WBC # BLD AUTO: 5.28 10*3/MM3 (ref 3.4–10.8)

## 2024-05-07 PROCEDURE — 25510000001 IOPAMIDOL PER 1 ML: Performed by: STUDENT IN AN ORGANIZED HEALTH CARE EDUCATION/TRAINING PROGRAM

## 2024-05-07 PROCEDURE — 74170 CT ABD WO CNTRST FLWD CNTRST: CPT

## 2024-05-07 RX ORDER — CAPECITABINE 500 MG/1
1000 TABLET, FILM COATED ORAL 2 TIMES DAILY
Qty: 56 TABLET | Refills: 11 | Status: SHIPPED | OUTPATIENT
Start: 2024-05-07 | End: 2024-05-08 | Stop reason: ALTCHOICE

## 2024-05-07 RX ADMIN — IOPAMIDOL 95 ML: 755 INJECTION, SOLUTION INTRAVENOUS at 09:33

## 2024-05-08 ENCOUNTER — OFFICE VISIT (OUTPATIENT)
Dept: ONCOLOGY | Facility: CLINIC | Age: 54
End: 2024-05-08
Payer: COMMERCIAL

## 2024-05-08 ENCOUNTER — SPECIALTY PHARMACY (OUTPATIENT)
Dept: ONCOLOGY | Facility: HOSPITAL | Age: 54
End: 2024-05-08
Payer: COMMERCIAL

## 2024-05-08 ENCOUNTER — LAB (OUTPATIENT)
Dept: ONCOLOGY | Facility: HOSPITAL | Age: 54
End: 2024-05-08
Payer: COMMERCIAL

## 2024-05-08 VITALS
RESPIRATION RATE: 16 BRPM | HEIGHT: 62 IN | WEIGHT: 175 LBS | BODY MASS INDEX: 32.2 KG/M2 | TEMPERATURE: 97.8 F | SYSTOLIC BLOOD PRESSURE: 143 MMHG | OXYGEN SATURATION: 99 % | HEART RATE: 91 BPM | DIASTOLIC BLOOD PRESSURE: 71 MMHG

## 2024-05-08 DIAGNOSIS — C18.2 CANCER OF RIGHT COLON: Primary | ICD-10-CM

## 2024-05-08 PROCEDURE — 99214 OFFICE O/P EST MOD 30 MIN: CPT | Performed by: INTERNAL MEDICINE

## 2024-05-08 PROCEDURE — 36415 COLL VENOUS BLD VENIPUNCTURE: CPT

## 2024-05-17 ENCOUNTER — TELEPHONE (OUTPATIENT)
Dept: ONCOLOGY | Facility: CLINIC | Age: 54
End: 2024-05-17
Payer: COMMERCIAL

## 2024-05-17 NOTE — TELEPHONE ENCOUNTER
[facility] received a fax that requires your attention. The document has been indexed to the patient’s chart for your review.      Reason for sending: POSITIVE GINA     Documents Description: COLORECTAL PATH    Name of Sender: [facility]     Date Indexed: 5.8.24     Notes (if needed): INDEXED UNDER PATH 5.8.24  THANK YOU.

## 2024-06-05 ENCOUNTER — INFUSION (OUTPATIENT)
Dept: ONCOLOGY | Facility: HOSPITAL | Age: 54
End: 2024-06-05
Payer: COMMERCIAL

## 2024-06-05 ENCOUNTER — OFFICE VISIT (OUTPATIENT)
Dept: ONCOLOGY | Facility: CLINIC | Age: 54
End: 2024-06-05
Payer: COMMERCIAL

## 2024-06-05 VITALS
HEART RATE: 77 BPM | SYSTOLIC BLOOD PRESSURE: 166 MMHG | WEIGHT: 177.7 LBS | TEMPERATURE: 97.8 F | HEIGHT: 62 IN | BODY MASS INDEX: 32.7 KG/M2 | OXYGEN SATURATION: 99 % | DIASTOLIC BLOOD PRESSURE: 74 MMHG | RESPIRATION RATE: 16 BRPM

## 2024-06-05 DIAGNOSIS — C18.2 CANCER OF RIGHT COLON: Primary | ICD-10-CM

## 2024-06-05 PROCEDURE — 25010000002 HEPARIN LOCK FLUSH PER 10 UNITS: Performed by: NURSE PRACTITIONER

## 2024-06-05 PROCEDURE — 96523 IRRIG DRUG DELIVERY DEVICE: CPT

## 2024-06-05 RX ORDER — SODIUM CHLORIDE 0.9 % (FLUSH) 0.9 %
10 SYRINGE (ML) INJECTION AS NEEDED
Status: DISCONTINUED | OUTPATIENT
Start: 2024-06-05 | End: 2024-06-05 | Stop reason: HOSPADM

## 2024-06-05 RX ORDER — SODIUM CHLORIDE 0.9 % (FLUSH) 0.9 %
10 SYRINGE (ML) INJECTION AS NEEDED
OUTPATIENT
Start: 2024-06-05

## 2024-06-05 RX ORDER — HEPARIN SODIUM (PORCINE) LOCK FLUSH IV SOLN 100 UNIT/ML 100 UNIT/ML
500 SOLUTION INTRAVENOUS AS NEEDED
OUTPATIENT
Start: 2024-06-05

## 2024-06-05 RX ORDER — HEPARIN SODIUM (PORCINE) LOCK FLUSH IV SOLN 100 UNIT/ML 100 UNIT/ML
500 SOLUTION INTRAVENOUS AS NEEDED
Status: DISCONTINUED | OUTPATIENT
Start: 2024-06-05 | End: 2024-06-05 | Stop reason: HOSPADM

## 2024-06-05 RX ADMIN — HEPARIN 500 UNITS: 100 SYRINGE at 14:59

## 2024-06-05 RX ADMIN — Medication 10 ML: at 14:59

## 2024-06-05 NOTE — PROGRESS NOTES
DATE OF VISIT: 6/5/2024    REASON FOR VISIT: Followup for metastatic right-sided colon cancer     PROBLEM LIST:  1. Metastatic colon cancer TX NX M1 a stage Perry:  A.  Presented with abdominal pain and jaundice  B.  CT abdomen pelvis done November 04, 2022 confirmed diffuse liver metastases.  C.  Diagnosed after CT-guided liver biopsy done on November 22, 2022 confirming adenocarcinoma CK20 positive CK7 negative.  D.  Colonoscopy done December 08, 2020 to confirm transverse colon mass however biopsy revealed adenoma.  E.  Started palliative chemotherapy with dose adjusted FOLFOXIRI December 14, 2022, status post 12 cycles completed May 2023.  F.  Started maintenance Xeloda 1000 mg twice daily 1 week on 1 week off June 2023.  Stopped April 2024 secondary to progressive disease in the liver.  G.  Status post segment 7 and segment 3 liver met resection with 2 other lesions ablation done by Dr. Nielson April 19, 2024.  2.  Elevated liver enzymes:  A.  Induced by metastatic disease  3.  Cancer-related pain  4.  Anxiety  5.  Hypertension    HISTORY OF PRESENT ILLNESS: The patient is a very pleasant 53 y.o. female with past medical history significant for metastatic right-sided colon cancer diagnosed November 22, 2022.  Started on palliative chemotherapy with dose adjusted FOLFOXIRI.  She completed 12 cycles May 2023.  She was started on maintenance Xeloda 1000 mg twice daily June 2023.  The patient is here today for scheduled follow-up visit.    SUBJECTIVE: Amber is here today with her .  She is anxious about the blood work results.  She is doing fairly well recovering from her previous surgery.    Past History:  Medical History: has a past medical history of Anxiety, Cancer, Ear piercing, Gallstones, History of irritable bowel syndrome, Hypertension, Seasonal allergies, Tattoos, and Wears glasses.   Surgical History: has a past surgical history that includes postpartum tubal ligation; Dilation and curettage of  uterus (2013); Tumor removal (2013); Portacath placement (N/A, 12/8/2022); Colonoscopy (N/A, 12/8/2022); diagnostic laparoscopy exploratory laparotomy (N/A, 4/19/2024); Cholecystectomy (N/A, 4/19/2024); and Liver resection (Left, 4/19/2024).   Family History: family history is not on file.   Social History: reports that she has never smoked. She has never used smokeless tobacco. She reports current alcohol use. She reports that she does not use drugs.    (Not in a hospital admission)     Allergies: Losartan, Valium [diazepam], and Citrus     Review of Systems   Constitutional:  Positive for fatigue.   Gastrointestinal:  Positive for abdominal pain.   Musculoskeletal:  Positive for arthralgias.   Psychiatric/Behavioral:  The patient is nervous/anxious.        PHYSICAL EXAMINATION:   There were no vitals taken for this visit.   There were no vitals filed for this visit.                          ECOG Performance Status: 1 - Symptomatic but completely ambulatory      General Appearance:      alert, cooperative, no apparent distress and appears stated age   Lungs:   Clear to auscultation bilaterally; respirations regular, even, and unlabored bilaterally   Heart:  Regular rate and rhythm, no murmurs appreciated   Abdomen:   Soft, non-tender, and non-distended                 No visits with results within 2 Week(s) from this visit.   Latest known visit with results is:   Office Visit on 04/30/2024   Component Date Value Ref Range Status    WBC 05/07/2024 5.28  3.40 - 10.80 10*3/mm3 Final    RBC 05/07/2024 3.64 (L)  3.77 - 5.28 10*6/mm3 Final    Hemoglobin 05/07/2024 10.6 (L)  12.0 - 15.9 g/dL Final    Hematocrit 05/07/2024 32.8 (L)  34.0 - 46.6 % Final    MCV 05/07/2024 90.1  79.0 - 97.0 fL Final    MCH 05/07/2024 29.1  26.6 - 33.0 pg Final    MCHC 05/07/2024 32.3  31.5 - 35.7 g/dL Final    RDW 05/07/2024 13.3  12.3 - 15.4 % Final    Platelets 05/07/2024 358  140 - 450 10*3/mm3 Final    Neutrophil Rel % 05/07/2024 52.8   42.7 - 76.0 % Final    Lymphocyte Rel % 05/07/2024 30.7  19.6 - 45.3 % Final    Monocyte Rel % 05/07/2024 11.7  5.0 - 12.0 % Final    Eosinophil Rel % 05/07/2024 4.0  0.3 - 6.2 % Final    Basophil Rel % 05/07/2024 0.6  0.0 - 1.5 % Final    Neutrophils Absolute 05/07/2024 2.79  1.70 - 7.00 10*3/mm3 Final    Lymphocytes Absolute 05/07/2024 1.62  0.70 - 3.10 10*3/mm3 Final    Monocytes Absolute 05/07/2024 0.62  0.10 - 0.90 10*3/mm3 Final    Eosinophils Absolute 05/07/2024 0.21  0.00 - 0.40 10*3/mm3 Final    Basophils Absolute 05/07/2024 0.03  0.00 - 0.20 10*3/mm3 Final    Immature Granulocyte Rel % 05/07/2024 0.2  0.0 - 0.5 % Final    Immature Grans Absolute 05/07/2024 0.01  0.00 - 0.05 10*3/mm3 Final    nRBC 05/07/2024 0.0  0.0 - 0.2 /100 WBC Final    Glucose 05/07/2024 101 (H)  65 - 99 mg/dL Final    BUN 05/07/2024 15  6 - 20 mg/dL Final    Creatinine 05/07/2024 0.80  0.57 - 1.00 mg/dL Final    EGFR Result 05/07/2024 88.2  >60.0 mL/min/1.73 Final    Comment: GFR Normal >60  Chronic Kidney Disease <60  Kidney Failure <15      BUN/Creatinine Ratio 05/07/2024 18.8  7.0 - 25.0 Final    Sodium 05/07/2024 139  136 - 145 mmol/L Final    Potassium 05/07/2024 4.5  3.5 - 5.2 mmol/L Final    Chloride 05/07/2024 103  98 - 107 mmol/L Final    Total CO2 05/07/2024 25.0  22.0 - 29.0 mmol/L Final    Calcium 05/07/2024 9.5  8.6 - 10.5 mg/dL Final    Total Protein 05/07/2024 7.2  6.0 - 8.5 g/dL Final    Albumin 05/07/2024 4.2  3.5 - 5.2 g/dL Final    Globulin 05/07/2024 3.0  gm/dL Final    A/G Ratio 05/07/2024 1.4  g/dL Final    Total Bilirubin 05/07/2024 0.4  0.0 - 1.2 mg/dL Final    Alkaline Phosphatase 05/07/2024 255 (H)  39 - 117 U/L Final    AST (SGOT) 05/07/2024 42 (H)  1 - 32 U/L Final    ALT (SGPT) 05/07/2024 42 (H)  1 - 33 U/L Final        CT Abdomen With & Without Contrast    Result Date: 5/8/2024  Narrative: CT ABDOMEN W WO CONTRAST Date of Exam: 5/7/2024 9:15 AM EDT Indication: C18.9. Comparison: MRI abdomen 3/15/2024,  PET/CT 3/8/2024, CT abdomen pelvis 2/20/2024. Technique: Axial CT images were obtained of the abdomen before and after the uneventful intravenous administration of 95 mL Isovue-370. Reconstructed coronal and sagittal images were also obtained. Automated exposure control and iterative construction methods were used. Findings: Lower thorax: Trace right pleural effusion. No focal consolidation. Liver: Changes of interval partial hepatectomy/resection of metastatic lesions in hepatic segment 3 and segment 7. No evidence of residual masslike enhancement. Changes of interval microwave ablation of metastatic lesions in segment 5/8 and segment 6. Expected mixed intermediate density within the ablation zones without evidence of residual masslike enhancement. Similar appearance of the treated calcified metastases in segment 5/8, segment 6, and central liver. Gallbladder and bile ducts: Gallbladder is surgically absent. No biliary ductal dilatation. Pancreas: No pancreatic duct dilation. No surrounding inflammation. Spleen: Enlarged measuring 15 cm in craniocaudal, unchanged. Adrenal glands: No discrete adrenal nodule. Kidneys: Symmetric in size and enhancement. No hydronephrosis. Normal excretion of contrast on delayed phase. Stomach and bowel: No evidence of bowel obstruction. Colonic diverticulosis. Normal appendix. Lymph nodes: No pathologically enlarged lymph nodes. Vessels: No abdominal aortic aneurysm. Major vasculature is patent. Peritoneum and retroperitoneum: Postsurgical changes of pedicled falciform ligament flap. Expected postsurgical mesenteric stranding without organized fluid collection. Soft tissues: Expected postsurgical changes of the anterior abdominal wall extending along the right lower quadrant with ill-defined stranding/trace fluid and punctate subcutaneous gas. No organized subcutaneous fluid collection. Osseous structures: No acute or suspicious osseous lesions. L5-S1 degenerative disc disease.      Impression: Impression: Interval partial hepatectomies in segments 3 and 7 as well as microwave ablation in segments 5/8 and 6 for hepatic metastatic lesions. No evidence of residual masslike enhancement at the treated sites to suggest residual disease on this initial postoperative exam. Unchanged appearance of the previously treated calcified hepatic metastases. No evidence of postoperative complication or acute process otherwise. Electronically Signed: Sal Bronson MD  5/8/2024 12:50 PM EDT  Workstation ID: [id number]       ASSESSMENT: The patient is a very pleasant 53 y.o. female  with right-sided metastatic colon cancer      PLAN:    1.  Right-sided metastatic colon cancer:  A.  The patient is status post segment 7 and segment 3 mass excision with 2 other lesions ablation done by Dr. Nielson April 19, 2024.  B.  I did go over the pathology report segment 7 was malignant adenocarcinoma with a clear margins segment 3 lesion was benign.  C.  I did go over her Signatera result and fortunately her CT DNA continue to increase level of 74 on May 8, 2024.  D.  At this time I will start the patient on chemotherapy with FOLFIRI plus Avastin.  The patient is not interested in this since she has a trip to [place] and she rather to be on treatment that may not make her sick and does not cause her loss.  I will start the patient on Lonsurf with Avastin.  I will do 25% dose reduction on Lonsurf secondary to her previous chemotherapy exposure.  Will plan to do this for 3 months and repeat scans as well as keep monitoring her CT DNA.  E.  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  While the patient may not benefit from immunotherapy however she will be a candidate for EGFR inhibitors down the road.    2.  Elevated liver enzymes:  A.  Induced by liver metastases  B. I will repeat labs today.    3.  Chemotherapy-induced nausea:  A.  I will  continue the patient on Zofran as needed for    4.  Chemotherapy-induced diarrhea:  A.  I will continue the patient on Imodium as needed    5. Cancer-related pain:  A.  I will continue the patient oxycodone 5 mg twice a day.     6.  Anxiety:  A.  I will continue the patient on Ativan 1 mg nightly as needed.       7.  Hypertension:  A.  She will continue losartan.  B.  I reviewed with the patient this will interfere with our management since chemotherapy typically can cause hypotension we will have to monitor blood pressure closely.    8.  Dehydration:  A.  I will continue 1 L of IV fluid as needed    9.  Chemotherapy-induced anemia:  A.  I discussed with the patient her CBC result from yesterday hemoglobin low at 10.6.    B.  I will continue monitor her CBC prior to each infusion.  C.  I will transfuse as needed for hemoglobin less than 8.      10.  Chemotherapy-induced heartburn:  A.  I will continue pantoprazole 40 mg twice a day.    B.  I will continue Carafate 1 g every 6 hours    11.  Treatment related diarrhea  A.  Continue Imodium and Lomotil as directed.  Education sheet was provided to the patient.      12.  Treatment induced peripheral neuropathy:   A.  We will continue gabapentin 100 mg 3 times daily.    FOLLOW UP: 3 weeks with cycle # 1 day 15.    Total time of patient care including preparation of patient chart prior to arrival, face-to-face with patient and following visit spent in reviewing records, lab results, imaging studies, discussion with patient, and documentation/charting was 40 minutes         Iban Lambert MD  6/5/2024

## 2024-06-06 ENCOUNTER — SPECIALTY PHARMACY (OUTPATIENT)
Facility: HOSPITAL | Age: 54
End: 2024-06-06
Payer: COMMERCIAL

## 2024-06-06 ENCOUNTER — HOSPITAL ENCOUNTER (OUTPATIENT)
Facility: HOSPITAL | Age: 54
Discharge: HOME | End: 2024-06-06
Payer: COMMERCIAL

## 2024-06-06 DIAGNOSIS — C18.9 METASTATIC COLON CANCER TO LIVER: Primary | ICD-10-CM

## 2024-06-06 DIAGNOSIS — C78.7 METASTATIC COLON CANCER TO LIVER: Primary | ICD-10-CM

## 2024-06-06 NOTE — PROGRESS NOTES
[facility]  [address] [place] [address]  [phone]      CHEMOTHERAPY EDUCATION    NAME:  Amber Feng      : 1970           DATE: 24    Medication Education Sheets: (select all that apply)  Cetuximab, Fluorouracil, Irinotecan, and Leucovorin    Other Education Sheets: (select all that apply)  CINV, Diarrhea, EGFR Rash, and Symptom Tracker Sheet and CHARLA Information    Chemotherapy Regimen:   OP COLORECTAL FOLFIRI + Cetuximab Q2W (Irinotecan / Leucovorin / Fluorouracil / Cetuximab) every 14 days  Will start FOLFIRI portion after her planned vacation per patient preference.      TOPICS EDUCATION PROVIDED COMMENTS   ANEMIA:  role of RBC, cause, s/s, ways to manage, role of transfusion [x] Reviewed the role of RBC and the use of transfusions if hemoglobin decreases too much.  Patient to notify us if she experiences shortness of breath, dizziness, or palpitations.  Also let patient know she could feel more tired than usual and to try to stay active, but rest if she needs to.    THROMBOCYTOPENIA:  role of platelet, cause, s/s, ways to prevent bleeding, things to avoid, when to seek help [x] Reviewed the role of platelets in blood clotting and when to call clinic (bloody nose that bleeds for 5 minutes despite pressure, a cut that won't stop bleeding despite pressure, gums that bleed excessively with brushing or flossing). Recommended using an electric razor, soft bristle toothbrush, and blowing the nose gently.    NEUTROPENIA:  role of WBC, cause, infection precautions, s/s of infection, when to call MD [x] Reviewed the role of WBC, good infection prevention practices, and when to call the clinic (temperature 100.4F, sore throat, burning urination, etc)   NUTRITION & APPETITE CHANGES:  importance of maintaining healthy diet & weight, ways to manage to improve intake, dietary consult, exercise regimen, electrolyte and/or blood glucose abnormalities [x]  Decreased Appetite: Discussed the risk of decreased appetite. Recommended eating smaller, more frequent meals. Instructed the patient to contact the clinic if losing weight or having difficulty eating enough to maintain energy level., Electrolyte Abnormalities:  Explained that the oncology therapy may lead to abnormalities in electrolytes, specifically: low magnesium and calcium   DIARRHEA:  causes, s/s of dehydration, ways to manage, dietary changes, when to call MD [x] Chemotherapy : Discussed the risk of diarrhea. Instructed patient on use OTC loperamide with diarrhea onset, but emphasized the importance of calling the clinic if 4-6 episodes in 24 hours not relieved by OTC loperamide.   CONSTIPATION:  causes, ways to manage, dietary changes, when to call MD [x] Provided supplementary handout with instructions for use of docusate and other OTC therapies to manage constipation.  Instructed patient to call us if medications aren't working.    NAUSEA & VOMITING:  cause, use of antiemetics, dietary changes, when to call MD [x] Emetic risk: Moderate  Premeds: Dexamethasone and Palonosetron  Scheduled home meds: none  PRN home meds: Ondansetron  Pharmacy home meds sent to: Patient has at home    Instructed the patient to take a dose of the PRN medication at the first onset of nausea and if it's not working to call us for additional medications.  Also provided non-drug measures to mitigate nausea.   MOUTH SORES:  causes, oral care, ways to manage [x] Mouth sores can be prevented by making a mouth wash mixture of salt, baking soda, and water. The patient was instructed to swish and spit four times daily after meals and before bedtime. Also recommended using a soft bristle toothbrush and avoiding alcohol-containing OTC mouthwashes.    ALOPECIA:  cause, ways to manage, resources [x] Discussed the possibility of hair loss with the patient. Informed patient that she could request a prescription for a wig if desired and most  of the cost is usually covered by insurance. Recommended covering the head with a hat and/or protecting the skin on the head with SPF 30 or higher.    NERVOUS SYSTEM CHANGES:  causes, s/s, neuropathies, cognitive changes, ways to manage []    PAIN:  causes, ways to manage [x] Chemo: Discussed muscle and joint aches/pains with chemotherapy, and recommended the use of OTC pain relief with ibuprofen or acetaminophen if needed. and EMLA Cream: Discussed rx for EMLA cream, and the benefit of use 45-60 minutes prior to access of port for lab draws / infusions. Rx sent to [facility] in [place]   SKIN & NAIL CHANGES:  cause, s/s, ways to manage [x] Chemotherapy: Informed the patient of the possibility for dark or white lines on finger and toenails during treatment, and that nails may become brittle and even fall off in extreme cases. This will likely reverse after treatment concludes. , EGFR Rash: Explained the rash may appear as circular splotches of redness surrounding small pimple-like marks across the upper body (chest, arms, hands, neck, face, back). Discussed prevention and protection strategies (moisturizers, sunscreen) and treatment (doxycycline or minocycline, topical steroids). A supplementary handout with this information was also given to the patient.    ORGAN TOXICITIES:  cause, s/s, need for diagnostic tests, labs, when to notify MD [x] Discussed potential effects on organ systems, monitoring, diagnostic tests, labs, and when to notify the clinic. Discussed the signs/symptoms of the following: cardiotoxicity, hepatotoxicity, lung changes, nephrotoxicity, and skin changes.   INFUSION RELATED REACTIONS or INJECTION-SITE REACTION:  Cause, s/s, anaphylaxis, monitoring, etc. [x] Premeds:  Solu-medrol, Acetaminophen, Diphenhydramine, and Famotidine    Discussed the risk of an infusion reaction and symptoms such as: fever, chills, dizziness, itchiness or rash, flushing, trouble breathing, wheezing, sudden back pain, or  feeling faint.  Instructed the patient to notify her nurse if she starts feeling weird at any point during her infusion.    Discussed the rate of infusion will be slower with the initial dose and can be increased for subsequent doses if the patient tolerated the first dose without a reaction, 1st dose will be given over 2 hours, and Future doses will be given over 1 hour    Monitoring will occur for 60 minutes after each dose    Reviewed how infusion reactions are managed.   SURVIVORSHIP:  distress, distress assessment, secondary malignancies, early/late effects, follow-up, social issues, social support []    MISCELLANEOUS:  drug interactions, administration, labs, etc. [x] Discussed chemotherapy schedule, lab draws, infusion times, and total expected visit time.   DDIs: No significant DDIs  Lab draws: On or before day 1 of each cycle, no sooner than 3 days early.   INFERTILITY & SEXUALITY:    causes, fertility preservation options, sexuality changes, ways to manage, importance of birth control [x] IV Oncology Therapy: Reviewed safe sex practices and the importance of minimizing exposure to body fluids for 48 hours after each dose of IV oncology therapy., The patient is not of childbearing potential.   HOME CARE:  storing of oral chemo, how to manage bodily fluids [x] IV - Counseled on management of soiled linens and proper flush technique.  Discussed how to manage all the side effects at home and advised when to contact the MD office     Medications:  Prior to Admission medications    Medication Sig Start Date End Date Taking? Authorizing Provider   amLODIPine (NORVASC) 5 MG tablet Take 1 tablet by mouth Daily. 4/30/24   Erica Chu PA-C   Diclofenac Sodium (VOLTAREN) 1 % gel gel Apply 4 g topically to the appropriate area as directed 3 (Three) Times a Day. 3/28/24   Saji Overton PA-C   Enoxaparin Sodium (LOVENOX) 40 MG/0.4ML solution prefilled syringe syringe Inject 0.4 mL under the skin into  the appropriate area as directed Daily for 24 days. Indications: Prevention of Unwanted Clot in Veins 4/25/24 5/19/24  Jacob Nielson MD   lidocaine-prilocaine (EMLA) 2.5-2.5 % cream Apply 1 application topically to the appropriate area as directed As Needed (45-60 minutes prior to port access.  Cover with saran/plastic wrap.). 5/12/23   Iban Lambert MD   meloxicam (Mobic) 7.5 MG tablet Take 1 tablet by mouth Daily As Needed for Moderate Pain. 3/7/24   Evon Bryant APRN   naloxone (NARCAN) 4 MG/0.1ML nasal spray Call 911. Don't prime. [place] in 1 nostril for overdose. Repeat in 2-3 minutes in other nostril if no or minimal breathing/responsiveness. 4/24/24   Jacob Nielson MD   omeprazole (priLOSEC) 40 MG capsule Take 1 capsule by mouth Daily. 4/30/24   Erica Chu PA-C   ondansetron ODT (ZOFRAN-ODT) 8 MG disintegrating tablet Place 1 tablet on the tongue Every 8 (Eight) Hours As Needed for Nausea or Vomiting. 4/24/24   Vera Milan PA-C   oxyCODONE (ROXICODONE) 5 MG immediate release tablet Take 1 tablet by mouth 2 (Two) Times a Day As Needed for Moderate Pain. 4/24/24   Jacob Nielson MD   prochlorperazine (COMPAZINE) 10 MG tablet TAKE 1 TABLET BY MOUTH EVERY 6 HOURS AS NEEDED FOR NAUSEA FOR VOMITING 3/14/24   Ana Cristina Cano APRN   capecitabine (XELODA) 500 MG chemo tablet Take 2 tablets by mouth 2 (Two) Times a Day in the AM and PM with food on days 1-14, then off 7 days in a 21-day cycle.  Patient not taking: Reported on 5/8/2024 5/7/24 5/8/24  Iban Lambert MD   trifluridine-tipiracil (LONSURF) 20-8.19 MG per tablet Take 2 tablets by mouth Daily With Breakfast & Dinner. Take 2 tabs with Breakfast and 2 tabs with Dinner Day 1-5 and 8-12 of each 28-day cycle 6/11/24 6/6/24  Iban Lambert MD       Notes: All questions and concerns were addressed. Provided a personalized treatment calendar to patient (includes treatment and lab schedule). Provided patient with contact information  for the pharmacist and clinic while instructing them to call if any questions or concerns arise. Informed consent for treatment was obtained. Patient was receptive to information and expressed understanding.     Stephani Ortega PharmD, [facility]  Clinical Oncology Pharmacy Specialist  Phone: [phone]      6/6/2024  14:48 EDT   11 y.o with left ankle laceration without signs of infection

## 2024-06-06 NOTE — PROGRESS NOTES
Oral Chemotherapy - New Referral    Received a referral from Dr. Lambert    Treatment Plan: Lonsurf (trifluridine-tipiracil) + bevacizumab Days 1 and 15 of each 28-day cycle  Start date of treatment planned for: 6/12  Indication: metastatic right-sided colon cancer  Relevant past treatments:   -FOLFOXIRI x 12 cycles (12/22-5/23)  -Maintenance capecitabine (11/23-4/24) - Stopped April 2024 secondary to progressive disease in the liver  -Status post segment 7 and segment 3 liver met resection with 2 other lesions ablation done by Dr. Nielson April 19, 2024  Is the therapy appropriate based on treatment guidelines and FDA labeling?: no - in the [facility] trial, patients had to have received an anti-VEGF (Dr. Lambert recommended FOLFIRI plus Avastin.  The patient is not interested in this since she has a trip to [place] and she rather to be on treatment that may not make her sick and does not cause her loss.)  Therapeutic Goals: Continue treatment until progression or intolerable toxicity  Patient can self-administer oral medications: Yes    Drug-Drug Interactions: The current medication list was reviewed and there are no relevant drug-drug interactions with the specialty medication.  Medication Allergies: The patient has no relevant allergies as it relates to their oral specialty medication  Review of Labs/Dose Adjustments: The patient's most recent labs were reviewed and all are WNL to start treatment at this dose.   AST/ALT slightly elevated but dose adjustment not needed.  Dr. Lambert wants to do a 25% dose reduction secondary to her previous chemotherapy exposure.  35mg/m2 x 1.82 = 63.7mg - 75% of this dose is 47.7 mg. (Rounding to 40 mg to use 2 of the 20mg tablets.)    A prescription was released to [facility] for   Drug: Lonsurf (trifluridine-tipiracil)  Strength: 20-8.19 mg  Directions: Take 2 tablets by mouth twice a day Days 1-5 and 8-12 of each 28-day cycle  Quantity: 40  Refills:  11    Pharmacy education is scheduled for 6/6., CCA and consent will be signed at that time.    Stephani Ortega PharmD, [facility]  Clinical Oncology Pharmacy Specialist  Phone: [phone]      6/6/2024  07:11 EDT

## 2024-06-06 NOTE — PROGRESS NOTES
Oral Chemotherapy - Double Check    Drug: Lonsurf (trifluridine/tipiracil)  Dose calculation: 35 mg/m2/dose with 25% dose reduction per Dr. Lambert (35 mg/m2/dose x 1.82 m2 = 63.7 mg x 0.25 = 15.9 mg). 63.7 mg - 15.9 mg = 47.8 mg/dose rounded down to 40 mg per dose to use 20 mg/8.19 mg tablets.  Strength: 20 mg/8.19 mg  Directions: Take 2 tablets by mouth twice daily with breakfast and dinner on Days 1-5 and on Days 8-12 of each 28-day cycle.  QTY: 40 tablets  RF:11    Released to pharmacy: [facility]    Completed independent double check on medication order/RX.    Anna Romero, PharmD  [facility]ist  [phone]    6/6/2024  07:33 EDT

## 2024-06-11 DIAGNOSIS — C18.2 CANCER OF RIGHT COLON: Primary | ICD-10-CM

## 2024-06-25 NOTE — PROGRESS NOTES
DATE OF VISIT: 6/26/2024    REASON FOR VISIT: Followup for metastatic right-sided colon cancer     PROBLEM LIST:  1. Metastatic colon cancer TX NX M1 a stage [place]:  A.  Presented with abdominal pain and jaundice  B.  CT abdomen pelvis done November 04, 2022 confirmed diffuse liver metastases.  C.  Diagnosed after CT-guided liver biopsy done on November 22, 2022 confirming adenocarcinoma CK20 positive CK7 negative.  D.  Colonoscopy done December 08, 2020 to confirm transverse colon mass however biopsy revealed adenoma.  E.  Started palliative chemotherapy with dose adjusted FOLFOXIRI December 14, 2022, status post 12 cycles completed May 2023.  F.  Started maintenance Xeloda 1000 mg twice daily 1 week on 1 week off June 2023.  Stopped April 2024 secondary to progressive disease in the liver.  G.  Status post segment 7 and segment 3 liver met resection with 2 other lesions ablation done by Dr. Nielson April 19, 2024.  H.  Will start FOLFIRI with Erbitux July 24, 2024.  2.  Elevated liver enzymes:  A.  Induced by metastatic disease  3.  Cancer-related pain  4.  Anxiety  5.  Hypertension    HISTORY OF PRESENT ILLNESS: The patient is a very pleasant 53 y.o. female with past medical history significant for metastatic right-sided colon cancer diagnosed November 22, 2022.  Started on palliative chemotherapy with dose adjusted FOLFOXIRI.  She completed 12 cycles May 2023.  She was started on maintenance Xeloda 1000 mg twice daily June 2023.  The patient is here today for scheduled follow-up visit with treatment.    SUBJECTIVE: Amber is here today with her .  She is not interested treatment until she comes back from her trip next month.  Overall she is doing fair.    Past History:  Medical History: has a past medical history of Anxiety, Cancer, Ear piercing, Gallstones, History of irritable bowel syndrome, Hypertension, Seasonal allergies, Tattoos, and Wears glasses.   Surgical History: has a past surgical history that  "includes postpartum tubal ligation; Dilation and curettage of uterus (2013); Tumor removal (2013); Portacath placement (N/A, 12/8/2022); Colonoscopy (N/A, 12/8/2022); diagnostic laparoscopy exploratory laparotomy (N/A, 4/19/2024); Cholecystectomy (N/A, 4/19/2024); and Liver resection (Left, 4/19/2024).   Family History: family history is not on file.   Social History: reports that she has never smoked. She has never used smokeless tobacco. She reports current alcohol use. She reports that she does not use drugs.    (Not in a hospital admission)     Allergies: Losartan, Valium [diazepam], and Citrus     Review of Systems   Constitutional:  Positive for fatigue.   Gastrointestinal:  Positive for abdominal pain.   Musculoskeletal:  Positive for arthralgias.   Psychiatric/Behavioral:  The patient is nervous/anxious.        PHYSICAL EXAMINATION:   /89   Pulse 95   Temp 98 °F (36.7 °C)   Resp 16   Ht 157.5 cm (62.01\")   Wt 80.1 kg (176 lb 9.6 oz)   SpO2 99%   BMI 32.29 kg/m²    Pain Score    06/26/24 0938   PainSc: 0-No pain                            ECOG Performance Status: 1 - Symptomatic but completely ambulatory      General Appearance:      alert, cooperative, no apparent distress and appears stated age   Lungs:   Clear to auscultation bilaterally; respirations regular, even, and unlabored bilaterally   Heart:  Regular rate and rhythm, no murmurs appreciated   Abdomen:   Soft, non-tender, and non-distended                 No visits with results within 2 Week(s) from this visit.   Latest known visit with results is:   Office Visit on 04/30/2024   Component Date Value Ref Range Status    WBC 05/07/2024 5.28  3.40 - 10.80 10*3/mm3 Final    RBC 05/07/2024 3.64 (L)  3.77 - 5.28 10*6/mm3 Final    Hemoglobin 05/07/2024 10.6 (L)  12.0 - 15.9 g/dL Final    Hematocrit 05/07/2024 32.8 (L)  34.0 - 46.6 % Final    MCV 05/07/2024 90.1  79.0 - 97.0 fL Final    MCH 05/07/2024 29.1  26.6 - 33.0 pg Final    MCHC " 05/07/2024 32.3  31.5 - 35.7 g/dL Final    RDW 05/07/2024 13.3  12.3 - 15.4 % Final    Platelets 05/07/2024 358  140 - 450 10*3/mm3 Final    Neutrophil Rel % 05/07/2024 52.8  42.7 - 76.0 % Final    Lymphocyte Rel % 05/07/2024 30.7  19.6 - 45.3 % Final    Monocyte Rel % 05/07/2024 11.7  5.0 - 12.0 % Final    Eosinophil Rel % 05/07/2024 4.0  0.3 - 6.2 % Final    Basophil Rel % 05/07/2024 0.6  0.0 - 1.5 % Final    Neutrophils Absolute 05/07/2024 2.79  1.70 - 7.00 10*3/mm3 Final    Lymphocytes Absolute 05/07/2024 1.62  0.70 - 3.10 10*3/mm3 Final    Monocytes Absolute 05/07/2024 0.62  0.10 - 0.90 10*3/mm3 Final    Eosinophils Absolute 05/07/2024 0.21  0.00 - 0.40 10*3/mm3 Final    Basophils Absolute 05/07/2024 0.03  0.00 - 0.20 10*3/mm3 Final    Immature Granulocyte Rel % 05/07/2024 0.2  0.0 - 0.5 % Final    Immature Grans Absolute 05/07/2024 0.01  0.00 - 0.05 10*3/mm3 Final    nRBC 05/07/2024 0.0  0.0 - 0.2 /100 WBC Final    Glucose 05/07/2024 101 (H)  65 - 99 mg/dL Final    BUN 05/07/2024 15  6 - 20 mg/dL Final    Creatinine 05/07/2024 0.80  0.57 - 1.00 mg/dL Final    EGFR Result 05/07/2024 88.2  >60.0 mL/min/1.73 Final    Comment: GFR Normal >60  Chronic Kidney Disease <60  Kidney Failure <15      BUN/Creatinine Ratio 05/07/2024 18.8  7.0 - 25.0 Final    Sodium 05/07/2024 139  136 - 145 mmol/L Final    Potassium 05/07/2024 4.5  3.5 - 5.2 mmol/L Final    Chloride 05/07/2024 103  98 - 107 mmol/L Final    Total CO2 05/07/2024 25.0  22.0 - 29.0 mmol/L Final    Calcium 05/07/2024 9.5  8.6 - 10.5 mg/dL Final    Total Protein 05/07/2024 7.2  6.0 - 8.5 g/dL Final    Albumin 05/07/2024 4.2  3.5 - 5.2 g/dL Final    Globulin 05/07/2024 3.0  gm/dL Final    A/G Ratio 05/07/2024 1.4  g/dL Final    Total Bilirubin 05/07/2024 0.4  0.0 - 1.2 mg/dL Final    Alkaline Phosphatase 05/07/2024 255 (H)  39 - 117 U/L Final    AST (SGOT) 05/07/2024 42 (H)  1 - 32 U/L Final    ALT (SGPT) 05/07/2024 42 (H)  1 - 33 U/L Final        No results  found.      ASSESSMENT: The patient is a very pleasant 53 y.o. female  with right-sided metastatic colon cancer      PLAN:    1.  Right-sided metastatic colon cancer:  A.  I will proceed with treatment using FOLFIRI plus cetuximab cycle #1 on July 24, 2024.  B.  The patient would like to hold off on chemotherapy until she comes back from her July trip.    C.  She will follow with us in 4 weeks for cycle #1.  D.  I will monitor the patient blood work including blood counts kidney function liver function and electrolytes.  E.  I will repeat her Signatera with every other cycle starting today.  F.  I will repeat her scans prior to cycle #7.    2.  Elevated liver enzymes:  A.  Induced by liver metastases  B. I will repeat labs today.    3.  Chemotherapy-induced nausea:  A.  I will continue the patient on Zofran as needed for    4.  Chemotherapy-induced diarrhea:  A.  I will continue the patient on Imodium as needed    5. Cancer-related pain:  A.  I will continue the patient oxycodone 5 mg twice a day.     6.  Anxiety:  A.  I will continue the patient on Ativan 1 mg nightly as needed.       7.  Hypertension:  A.  She will continue losartan.  B.  I reviewed with the patient this will interfere with our management since chemotherapy typically can cause hypotension we will have to monitor blood pressure closely.    8.  Dehydration:  A.  I will continue 1 L of IV fluid as needed    9.  Chemotherapy-induced anemia:  A.  I discussed with the patient her CBC result from yesterday hemoglobin low at 10.6.    B.  I will continue monitor her CBC prior to each infusion.  C.  I will transfuse as needed for hemoglobin less than 8.      10.  Chemotherapy-induced heartburn:  A.  I will continue pantoprazole 40 mg twice a day.    B.  I will continue Carafate 1 g every 6 hours    11.  Treatment related diarrhea  A.  Continue Imodium and Lomotil as directed.  Education sheet was provided to the patient.      12.  Treatment induced  peripheral neuropathy:   A.  We will continue gabapentin 100 mg 3 times daily.    FOLLOW UP: 4 weeks with cycle #1.    Iban Lambert MD  6/26/2024

## 2024-06-26 ENCOUNTER — OFFICE VISIT (OUTPATIENT)
Dept: ONCOLOGY | Facility: CLINIC | Age: 54
End: 2024-06-26
Payer: COMMERCIAL

## 2024-06-26 VITALS
RESPIRATION RATE: 16 BRPM | BODY MASS INDEX: 32.5 KG/M2 | SYSTOLIC BLOOD PRESSURE: 158 MMHG | TEMPERATURE: 98 F | WEIGHT: 176.6 LBS | OXYGEN SATURATION: 99 % | HEIGHT: 62 IN | HEART RATE: 95 BPM | DIASTOLIC BLOOD PRESSURE: 89 MMHG

## 2024-06-26 DIAGNOSIS — C18.2 CANCER OF RIGHT COLON: Primary | ICD-10-CM

## 2024-06-26 DIAGNOSIS — C78.7 METASTATIC COLON CANCER TO LIVER: ICD-10-CM

## 2024-06-26 DIAGNOSIS — C18.9 METASTATIC COLON CANCER TO LIVER: ICD-10-CM

## 2024-06-26 PROCEDURE — 99214 OFFICE O/P EST MOD 30 MIN: CPT | Performed by: INTERNAL MEDICINE

## 2024-06-26 RX ORDER — MEPERIDINE HYDROCHLORIDE 25 MG/ML
25 INJECTION INTRAMUSCULAR; INTRAVENOUS; SUBCUTANEOUS
OUTPATIENT
Start: 2024-07-24

## 2024-06-26 RX ORDER — FAMOTIDINE 10 MG/ML
20 INJECTION, SOLUTION INTRAVENOUS ONCE
Status: CANCELLED | OUTPATIENT
Start: 2024-07-24

## 2024-06-26 RX ORDER — PALONOSETRON 0.05 MG/ML
0.25 INJECTION, SOLUTION INTRAVENOUS ONCE
OUTPATIENT
Start: 2024-07-24

## 2024-06-26 RX ORDER — ACETAMINOPHEN 325 MG/1
650 TABLET ORAL ONCE
Status: CANCELLED | OUTPATIENT
Start: 2024-07-24

## 2024-06-26 RX ORDER — MEPERIDINE HYDROCHLORIDE 25 MG/ML
25 INJECTION INTRAMUSCULAR; INTRAVENOUS; SUBCUTANEOUS
Status: CANCELLED | OUTPATIENT
Start: 2024-07-24

## 2024-06-26 RX ORDER — SODIUM CHLORIDE 9 MG/ML
20 INJECTION, SOLUTION INTRAVENOUS ONCE
OUTPATIENT
Start: 2024-07-24

## 2024-06-26 RX ORDER — ATROPINE SULFATE 1 MG/ML
0.25 INJECTION, SOLUTION INTRAMUSCULAR; INTRAVENOUS; SUBCUTANEOUS
OUTPATIENT
Start: 2024-07-24

## 2024-06-26 RX ORDER — SODIUM CHLORIDE 9 MG/ML
20 INJECTION, SOLUTION INTRAVENOUS ONCE
Status: CANCELLED | OUTPATIENT
Start: 2024-07-24

## 2024-06-26 RX ORDER — METHYLPREDNISOLONE SODIUM SUCCINATE 125 MG/2ML
125 INJECTION, POWDER, LYOPHILIZED, FOR SOLUTION INTRAMUSCULAR; INTRAVENOUS ONCE
OUTPATIENT
Start: 2024-07-24

## 2024-06-26 RX ORDER — PALONOSETRON 0.05 MG/ML
0.25 INJECTION, SOLUTION INTRAVENOUS ONCE
OUTPATIENT
Start: 2024-08-07

## 2024-06-26 RX ORDER — FAMOTIDINE 10 MG/ML
20 INJECTION, SOLUTION INTRAVENOUS AS NEEDED
OUTPATIENT
Start: 2024-07-24

## 2024-06-26 RX ORDER — METHYLPREDNISOLONE SODIUM SUCCINATE 125 MG/2ML
125 INJECTION, POWDER, LYOPHILIZED, FOR SOLUTION INTRAMUSCULAR; INTRAVENOUS ONCE
OUTPATIENT
Start: 2024-08-07

## 2024-06-26 RX ORDER — DIPHENHYDRAMINE HYDROCHLORIDE 50 MG/ML
50 INJECTION INTRAMUSCULAR; INTRAVENOUS AS NEEDED
OUTPATIENT
Start: 2024-07-24

## 2024-06-26 RX ORDER — FLUOROURACIL 50 MG/ML
400 INJECTION, SOLUTION INTRAVENOUS ONCE
OUTPATIENT
Start: 2024-07-24

## 2024-06-26 RX ORDER — DIPHENHYDRAMINE HYDROCHLORIDE 50 MG/ML
50 INJECTION INTRAMUSCULAR; INTRAVENOUS AS NEEDED
Status: CANCELLED | OUTPATIENT
Start: 2024-07-24

## 2024-06-26 RX ORDER — FAMOTIDINE 10 MG/ML
20 INJECTION, SOLUTION INTRAVENOUS AS NEEDED
Status: CANCELLED | OUTPATIENT
Start: 2024-07-24

## 2024-06-26 RX ORDER — METHYLPREDNISOLONE SODIUM SUCCINATE 125 MG/2ML
125 INJECTION, POWDER, LYOPHILIZED, FOR SOLUTION INTRAMUSCULAR; INTRAVENOUS ONCE
Status: CANCELLED | OUTPATIENT
Start: 2024-07-24

## 2024-06-26 RX ORDER — SODIUM CHLORIDE 9 MG/ML
20 INJECTION, SOLUTION INTRAVENOUS ONCE
OUTPATIENT
Start: 2024-08-07

## 2024-06-26 RX ORDER — ACETAMINOPHEN 325 MG/1
650 TABLET ORAL ONCE
OUTPATIENT
Start: 2024-08-07

## 2024-06-26 RX ORDER — ACETAMINOPHEN 325 MG/1
650 TABLET ORAL ONCE
OUTPATIENT
Start: 2024-07-24

## 2024-06-26 RX ORDER — FAMOTIDINE 10 MG/ML
20 INJECTION, SOLUTION INTRAVENOUS AS NEEDED
OUTPATIENT
Start: 2024-08-07

## 2024-06-26 RX ORDER — ATROPINE SULFATE 1 MG/ML
0.25 INJECTION, SOLUTION INTRAMUSCULAR; INTRAVENOUS; SUBCUTANEOUS
OUTPATIENT
Start: 2024-08-07

## 2024-06-26 RX ORDER — DIPHENHYDRAMINE HYDROCHLORIDE 50 MG/ML
50 INJECTION INTRAMUSCULAR; INTRAVENOUS AS NEEDED
OUTPATIENT
Start: 2024-08-07

## 2024-06-26 RX ORDER — FLUOROURACIL 50 MG/ML
400 INJECTION, SOLUTION INTRAVENOUS ONCE
OUTPATIENT
Start: 2024-08-07

## 2024-06-26 RX ORDER — FAMOTIDINE 10 MG/ML
20 INJECTION, SOLUTION INTRAVENOUS ONCE
OUTPATIENT
Start: 2024-07-24

## 2024-06-26 RX ORDER — FAMOTIDINE 10 MG/ML
20 INJECTION, SOLUTION INTRAVENOUS ONCE
OUTPATIENT
Start: 2024-08-07

## 2024-06-26 RX ORDER — MEPERIDINE HYDROCHLORIDE 25 MG/ML
25 INJECTION INTRAMUSCULAR; INTRAVENOUS; SUBCUTANEOUS
OUTPATIENT
Start: 2024-08-07

## 2024-07-01 DIAGNOSIS — K21.9 GERD WITHOUT ESOPHAGITIS: ICD-10-CM

## 2024-07-01 RX ORDER — OMEPRAZOLE 40 MG/1
40 CAPSULE, DELAYED RELEASE ORAL DAILY
Qty: 30 CAPSULE | Refills: 0 | Status: SHIPPED | OUTPATIENT
Start: 2024-07-01

## 2024-07-02 ENCOUNTER — TRANSCRIBE ORDERS (OUTPATIENT)
Dept: ADMINISTRATIVE | Facility: HOSPITAL | Age: 54
End: 2024-07-02
Payer: COMMERCIAL

## 2024-07-02 DIAGNOSIS — C18.9 METASTATIC COLON CANCER TO LIVER: Primary | ICD-10-CM

## 2024-07-02 DIAGNOSIS — C78.7 METASTATIC COLON CANCER TO LIVER: Primary | ICD-10-CM

## 2024-07-11 ENCOUNTER — TRANSCRIBE ORDERS (OUTPATIENT)
Dept: ADMINISTRATIVE | Facility: HOSPITAL | Age: 54
End: 2024-07-11
Payer: COMMERCIAL

## 2024-07-11 DIAGNOSIS — C18.9 MALIGNANT NEOPLASM OF COLON, UNSPECIFIED PART OF COLON: Primary | ICD-10-CM

## 2024-07-22 ENCOUNTER — APPOINTMENT (OUTPATIENT)
Dept: CT IMAGING | Facility: HOSPITAL | Age: 54
End: 2024-07-22
Payer: COMMERCIAL

## 2024-07-22 ENCOUNTER — HOSPITAL ENCOUNTER (OUTPATIENT)
Dept: CT IMAGING | Facility: HOSPITAL | Age: 54
Discharge: HOME | End: 2024-07-22
Admitting: STUDENT IN AN ORGANIZED HEALTH CARE EDUCATION/TRAINING PROGRAM
Payer: COMMERCIAL

## 2024-07-22 DIAGNOSIS — C18.9 METASTATIC COLON CANCER TO LIVER: ICD-10-CM

## 2024-07-22 DIAGNOSIS — C78.7 METASTATIC COLON CANCER TO LIVER: ICD-10-CM

## 2024-07-22 PROCEDURE — 74178 CT ABD&PLV WO CNTR FLWD CNTR: CPT

## 2024-07-22 PROCEDURE — 71260 CT THORAX DX C+: CPT

## 2024-07-22 PROCEDURE — 25510000001 IOPAMIDOL PER 1 ML: Performed by: STUDENT IN AN ORGANIZED HEALTH CARE EDUCATION/TRAINING PROGRAM

## 2024-07-22 RX ADMIN — IOPAMIDOL 98 ML: 755 INJECTION, SOLUTION INTRAVENOUS at 14:12

## 2024-07-29 DIAGNOSIS — K21.9 GERD WITHOUT ESOPHAGITIS: ICD-10-CM

## 2024-07-29 RX ORDER — OMEPRAZOLE 40 MG/1
40 CAPSULE, DELAYED RELEASE ORAL DAILY
Qty: 30 CAPSULE | Refills: 2 | Status: SHIPPED | OUTPATIENT
Start: 2024-07-29

## 2024-07-29 NOTE — TELEPHONE ENCOUNTER
Rx Refill Note  Requested Prescriptions     Pending Prescriptions Disp Refills    omeprazole (priLOSEC) 40 MG capsule [Pharmacy Med Name: Omeprazole 40 MG Oral Capsule Delayed Release] 30 capsule 0     Sig: Take 1 capsule by mouth once daily      Last office visit with prescribing clinician: 4/30/2024   Last telemedicine visit with prescribing clinician: Visit date not found   Next office visit with prescribing clinician: Visit date not found       Zulay George MA  07/29/24, 09:38 EDT

## 2024-07-30 ENCOUNTER — TELEPHONE (OUTPATIENT)
Dept: ONCOLOGY | Facility: CLINIC | Age: 54
End: 2024-07-30
Payer: COMMERCIAL

## 2024-07-30 NOTE — TELEPHONE ENCOUNTER
RN called patient, no answer. LVM to see if she could come in tomorrow to see Dr. Lambert to discuss her chemotherapy. Left call back number to call back to discuss or make appt.

## 2024-08-07 ENCOUNTER — OFFICE VISIT (OUTPATIENT)
Dept: ONCOLOGY | Facility: CLINIC | Age: 54
End: 2024-08-07
Payer: COMMERCIAL

## 2024-08-07 VITALS
RESPIRATION RATE: 16 BRPM | WEIGHT: 173 LBS | SYSTOLIC BLOOD PRESSURE: 143 MMHG | OXYGEN SATURATION: 99 % | TEMPERATURE: 97.8 F | HEIGHT: 62 IN | HEART RATE: 81 BPM | BODY MASS INDEX: 31.83 KG/M2 | DIASTOLIC BLOOD PRESSURE: 91 MMHG

## 2024-08-07 DIAGNOSIS — K52.1 DIARRHEA DUE TO DRUG: ICD-10-CM

## 2024-08-07 DIAGNOSIS — C18.2 CANCER OF RIGHT COLON: Primary | ICD-10-CM

## 2024-08-07 DIAGNOSIS — R11.2 NAUSEA AND VOMITING, UNSPECIFIED VOMITING TYPE: ICD-10-CM

## 2024-08-07 DIAGNOSIS — C78.7 METASTATIC COLON CANCER TO LIVER: ICD-10-CM

## 2024-08-07 DIAGNOSIS — C18.9 METASTATIC COLON CANCER TO LIVER: ICD-10-CM

## 2024-08-07 RX ORDER — ONDANSETRON 8 MG/1
8 TABLET, ORALLY DISINTEGRATING ORAL EVERY 8 HOURS PRN
Qty: 30 TABLET | Refills: 5 | Status: SHIPPED | OUTPATIENT
Start: 2024-08-07

## 2024-08-07 NOTE — PROGRESS NOTES
DATE OF VISIT: 8/7/2024    REASON FOR VISIT: Followup for metastatic right-sided colon cancer     PROBLEM LIST:  1. Metastatic colon cancer TX NX M1 a stage [place]:  A.  Presented with abdominal pain and jaundice  B.  CT abdomen pelvis done November 04, 2022 confirmed diffuse liver metastases.  C.  Diagnosed after CT-guided liver biopsy done on November 22, 2022 confirming adenocarcinoma CK20 positive CK7 negative.  D.  Colonoscopy done December 08, 2020 to confirm transverse colon mass however biopsy revealed adenoma.  E.  Started palliative chemotherapy with dose adjusted FOLFOXIRI December 14, 2022, status post 12 cycles completed May 2023.  F.  Started maintenance Xeloda 1000 mg twice daily 1 week on 1 week off June 2023.  Stopped April 2024 secondary to progressive disease in the liver.  G.  Status post segment 7 and segment 3 liver met resection with 2 other lesions ablation done by Dr. Nielson April 19, 2024.  H.  Will start FOLFIRI with Erbitux August 19, 2024.  2.  Elevated liver enzymes:  A.  Induced by metastatic disease  3.  Cancer-related pain  4.  Anxiety  5.  Hypertension    HISTORY OF PRESENT ILLNESS: The patient is a very pleasant 53 y.o. female with past medical history significant for metastatic right-sided colon cancer diagnosed November 22, 2022.  Started on palliative chemotherapy with dose adjusted FOLFOXIRI.  She completed 12 cycles May 2023.  She was started on maintenance Xeloda 1000 mg twice daily June 2023.  The patient is here today for scheduled follow-up visit with treatment.    SUBJECTIVE: Amber is here today with her .  She has delayed treatment multiple times.  She met with Dr. Nielson with follow-up scan that did confirm large liver mass new from before.  She was aware about this new finding.    Past History:  Medical History: has a past medical history of Anxiety, Cancer, Ear piercing, Gallstones, History of irritable bowel syndrome, Hypertension, Seasonal allergies, Tattoos,  "and Wears glasses.   Surgical History: has a past surgical history that includes postpartum tubal ligation; Dilation and curettage of uterus (2013); Tumor removal (2013); Portacath placement (N/A, 12/8/2022); Colonoscopy (N/A, 12/8/2022); diagnostic laparoscopy exploratory laparotomy (N/A, 4/19/2024); Cholecystectomy (N/A, 4/19/2024); and Liver resection (Left, 4/19/2024).   Family History: family history is not on file.   Social History: reports that she has never smoked. She has never used smokeless tobacco. She reports current alcohol use. She reports that she does not use drugs.    (Not in a hospital admission)     Allergies: Losartan, Valium [diazepam], and Citrus     Review of Systems   Constitutional:  Positive for fatigue.   Gastrointestinal:  Positive for abdominal pain.   Musculoskeletal:  Positive for arthralgias.   Psychiatric/Behavioral:  The patient is nervous/anxious.        PHYSICAL EXAMINATION:   /91   Pulse 81   Temp 97.8 °F (36.6 °C)   Resp 16   Ht 157.5 cm (62.01\")   Wt 78.5 kg (173 lb)   SpO2 99%   BMI 31.63 kg/m²    Pain Score    08/07/24 1433   PainSc: 0-No pain                            ECOG Performance Status: 1 - Symptomatic but completely ambulatory      General Appearance:      alert, cooperative, no apparent distress and appears stated age   Lungs:   Clear to auscultation bilaterally; respirations regular, even, and unlabored bilaterally   Heart:  Regular rate and rhythm, no murmurs appreciated   Abdomen:   Soft, non-tender, and non-distended                 No visits with results within 2 Week(s) from this visit.   Latest known visit with results is:   Office Visit on 04/30/2024   Component Date Value Ref Range Status    WBC 05/07/2024 5.28  3.40 - 10.80 10*3/mm3 Final    RBC 05/07/2024 3.64 (L)  3.77 - 5.28 10*6/mm3 Final    Hemoglobin 05/07/2024 10.6 (L)  12.0 - 15.9 g/dL Final    Hematocrit 05/07/2024 32.8 (L)  34.0 - 46.6 % Final    MCV 05/07/2024 90.1  79.0 - 97.0 fL " Final    MCH 05/07/2024 29.1  26.6 - 33.0 pg Final    MCHC 05/07/2024 32.3  31.5 - 35.7 g/dL Final    RDW 05/07/2024 13.3  12.3 - 15.4 % Final    Platelets 05/07/2024 358  140 - 450 10*3/mm3 Final    Neutrophil Rel % 05/07/2024 52.8  42.7 - 76.0 % Final    Lymphocyte Rel % 05/07/2024 30.7  19.6 - 45.3 % Final    Monocyte Rel % 05/07/2024 11.7  5.0 - 12.0 % Final    Eosinophil Rel % 05/07/2024 4.0  0.3 - 6.2 % Final    Basophil Rel % 05/07/2024 0.6  0.0 - 1.5 % Final    Neutrophils Absolute 05/07/2024 2.79  1.70 - 7.00 10*3/mm3 Final    Lymphocytes Absolute 05/07/2024 1.62  0.70 - 3.10 10*3/mm3 Final    Monocytes Absolute 05/07/2024 0.62  0.10 - 0.90 10*3/mm3 Final    Eosinophils Absolute 05/07/2024 0.21  0.00 - 0.40 10*3/mm3 Final    Basophils Absolute 05/07/2024 0.03  0.00 - 0.20 10*3/mm3 Final    Immature Granulocyte Rel % 05/07/2024 0.2  0.0 - 0.5 % Final    Immature Grans Absolute 05/07/2024 0.01  0.00 - 0.05 10*3/mm3 Final    nRBC 05/07/2024 0.0  0.0 - 0.2 /100 WBC Final    Glucose 05/07/2024 101 (H)  65 - 99 mg/dL Final    BUN 05/07/2024 15  6 - 20 mg/dL Final    Creatinine 05/07/2024 0.80  0.57 - 1.00 mg/dL Final    EGFR Result 05/07/2024 88.2  >60.0 mL/min/1.73 Final    Comment: GFR Normal >60  Chronic Kidney Disease <60  Kidney Failure <15      BUN/Creatinine Ratio 05/07/2024 18.8  7.0 - 25.0 Final    Sodium 05/07/2024 139  136 - 145 mmol/L Final    Potassium 05/07/2024 4.5  3.5 - 5.2 mmol/L Final    Chloride 05/07/2024 103  98 - 107 mmol/L Final    Total CO2 05/07/2024 25.0  22.0 - 29.0 mmol/L Final    Calcium 05/07/2024 9.5  8.6 - 10.5 mg/dL Final    Total Protein 05/07/2024 7.2  6.0 - 8.5 g/dL Final    Albumin 05/07/2024 4.2  3.5 - 5.2 g/dL Final    Globulin 05/07/2024 3.0  gm/dL Final    A/G Ratio 05/07/2024 1.4  g/dL Final    Total Bilirubin 05/07/2024 0.4  0.0 - 1.2 mg/dL Final    Alkaline Phosphatase 05/07/2024 255 (H)  39 - 117 U/L Final    AST (SGOT) 05/07/2024 42 (H)  1 - 32 U/L Final    ALT  (SGPT) 05/07/2024 42 (H)  1 - 33 U/L Final        CT Chest With Contrast Diagnostic    Result Date: 7/25/2024  Narrative: CT CHEST W CONTRAST DIAGNOSTIC, CT ABD W WO CONTRAST PELVIS W CONTRAST Date of Exam: 7/22/2024 2:01 PM EDT Indication: C18.9. Comparison: PET/CT 3/8/2024, MR abdomen 3/15/2024, CT chest abdomen pelvis 2/20/2024 Technique: Axial CT images were obtained of the chest after the uneventful intravenous administration of 98 mL Isovue-370.  Reconstructed coronal and sagittal images were also obtained. Automated exposure control and iterative construction methods were used. Axial CT images were obtained of the abdomen and pelvis before and after the uneventful intravenous administration of above contrast. Imaging of the abdomen was performed in the arterial, venous, and delayed phases; imaging of the pelvis was performed in  the venous and delayed phases. Sagittal and coronal reconstructions were performed.  Automated exposure control and iterative reconstruction methods were used. Findings: Per the electronic medical record, since prior imaging the patient underwent partial hepatectomies of masses in segments 7 and segment 3 and bracketed microwave ablation of masses in segment 5/8 and segment 6. Chest: The catheter of a right chest port terminates in the mid SVC. Unremarkable appearance of the great vessels. Unremarkable appearance of the cardiac chambers. No pericardial effusion. No coronary artery calcification. Homogeneous attenuation of the partially imaged thyroid gland. No bulky or suspicious thoracic lymph nodes. Unremarkable appearance of the chest wall soft tissues. Normal appearance of the airways. The lungs are clear. No discrete/suspicious pulmonary nodule. No pleural effusion or pneumothorax. No acute or suspicious bony findings. Abdomen and pelvis: Interval surgical changes of nonanatomic partial hepatectomies of segment 7 and segment 3. There are new rounded and wedge-shaped hypodensities  in segment 5/8 (series 6 image 33) and segment 6 (series 6 image 34), compatible with interval ablation. There is a new or enlarged liver lesion in segment 7/6 (series 6 image 27) measuring approximately 5.4 x 4.6 x 5.1 cm (AP X TV X CC) which appears heterogeneously hypodense with mild heterogeneous internal and peripheral enhancement, with microlobulated contour; this contacts/partially surrounds the superior half of the segment 6 ablation site (series 6 image 30, series 8 image 69). No definite additional new or enlarging liver metastases are identified. There is a small vague hypodensity in the inferior right hepatic lobe in segment 6/5 (series 6 image 45), likely similar in appearance compared to prior CT from 2/20/2024, without corresponding hypermetabolism on 3/8/2024 PET/CT, possibly reflecting prior treatment site. Redemonstration of scattered clusters of dystrophic parenchymal calcifications in segment 6, 8/5, and in the central liver just superior to the caudate, presumably sites of previously treated disease. There is conventional hepatic arterial anatomy. Portal veins are patent. There has been interval cholecystectomy. Normal appearance of the bile ducts. Stable mild splenomegaly measuring 15 cm in craniocaudal length. Normal appearance of the pancreas. Unremarkable appearance of the adrenal glands. Unremarkable appearance of the kidneys, ureters, decompressed bladder, uterus, and adnexa. Unremarkable appearance of the GI tract. No ascites. No lymphadenopathy. Some thin irregular/nodular soft tissue density in the anterior midline omental fat (series 6 image 55, series 9 image 55) is subjacent to the supraumbilical midline abdominal wall incision scar, favor some omental scarring rather than metastatic omental  deposit; there are no additional discrete or suspicious omental or peritoneal soft tissue densities to suggest peritoneal carcinomatosis. Grossly unremarkable appearance of the vasculature. No  acute or suspicious bony findings.     Impression: Impression: Interval surgical changes of partial hepatectomies and microwave ablations of liver metastases. There is a new large 5.4 cm lesion within segment 7/6 which partially contacts the segment 6 ablation site, compatible with new metastasis or interval growth of residual metastatic lesion; hepatic abscess is considered less likely although clinical correlation is required. No new additional liver lesions are identified. No additional sites of metastatic disease in the chest, abdomen, and pelvis. Some irregular soft tissue density in the anterior central omentum subjacent to the abdominal wall surgical scar likely reflects postoperative scarring rather than omental lesion. Electronically Signed: José Miguel Ruelas MD  7/25/2024 9:17 AM EDT  Workstation ID: [id number]    CT Abd With & Without Contrast Pelvis With Contrast    Result Date: 7/25/2024  Narrative: CT CHEST W CONTRAST DIAGNOSTIC, CT ABD W WO CONTRAST PELVIS W CONTRAST Date of Exam: 7/22/2024 2:01 PM EDT Indication: C18.9. Comparison: PET/CT 3/8/2024, MR abdomen 3/15/2024, CT chest abdomen pelvis 2/20/2024 Technique: Axial CT images were obtained of the chest after the uneventful intravenous administration of 98 mL Isovue-370.  Reconstructed coronal and sagittal images were also obtained. Automated exposure control and iterative construction methods were used. Axial CT images were obtained of the abdomen and pelvis before and after the uneventful intravenous administration of above contrast. Imaging of the abdomen was performed in the arterial, venous, and delayed phases; imaging of the pelvis was performed in  the venous and delayed phases. Sagittal and coronal reconstructions were performed.  Automated exposure control and iterative reconstruction methods were used. Findings: Per the electronic medical record, since prior imaging the patient underwent partial hepatectomies of masses in segments 7 and  segment 3 and bracketed microwave ablation of masses in segment 5/8 and segment 6. Chest: The catheter of a right chest port terminates in the mid SVC. Unremarkable appearance of the great vessels. Unremarkable appearance of the cardiac chambers. No pericardial effusion. No coronary artery calcification. Homogeneous attenuation of the partially imaged thyroid gland. No bulky or suspicious thoracic lymph nodes. Unremarkable appearance of the chest wall soft tissues. Normal appearance of the airways. The lungs are clear. No discrete/suspicious pulmonary nodule. No pleural effusion or pneumothorax. No acute or suspicious bony findings. Abdomen and pelvis: Interval surgical changes of nonanatomic partial hepatectomies of segment 7 and segment 3. There are new rounded and wedge-shaped hypodensities in segment 5/8 (series 6 image 33) and segment 6 (series 6 image 34), compatible with interval ablation. There is a new or enlarged liver lesion in segment 7/6 (series 6 image 27) measuring approximately 5.4 x 4.6 x 5.1 cm (AP X TV X CC) which appears heterogeneously hypodense with mild heterogeneous internal and peripheral enhancement, with microlobulated contour; this contacts/partially surrounds the superior half of the segment 6 ablation site (series 6 image 30, series 8 image 69). No definite additional new or enlarging liver metastases are identified. There is a small vague hypodensity in the inferior right hepatic lobe in segment 6/5 (series 6 image 45), likely similar in appearance compared to prior CT from 2/20/2024, without corresponding hypermetabolism on 3/8/2024 PET/CT, possibly reflecting prior treatment site. Redemonstration of scattered clusters of dystrophic parenchymal calcifications in segment 6, 8/5, and in the central liver just superior to the caudate, presumably sites of previously treated disease. There is conventional hepatic arterial anatomy. Portal veins are patent. There has been interval  cholecystectomy. Normal appearance of the bile ducts. Stable mild splenomegaly measuring 15 cm in craniocaudal length. Normal appearance of the pancreas. Unremarkable appearance of the adrenal glands. Unremarkable appearance of the kidneys, ureters, decompressed bladder, uterus, and adnexa. Unremarkable appearance of the GI tract. No ascites. No lymphadenopathy. Some thin irregular/nodular soft tissue density in the anterior midline omental fat (series 6 image 55, series 9 image 55) is subjacent to the supraumbilical midline abdominal wall incision scar, favor some omental scarring rather than metastatic omental  deposit; there are no additional discrete or suspicious omental or peritoneal soft tissue densities to suggest peritoneal carcinomatosis. Grossly unremarkable appearance of the vasculature. No acute or suspicious bony findings.     Impression: Impression: Interval surgical changes of partial hepatectomies and microwave ablations of liver metastases. There is a new large 5.4 cm lesion within segment 7/6 which partially contacts the segment 6 ablation site, compatible with new metastasis or interval growth of residual metastatic lesion; hepatic abscess is considered less likely although clinical correlation is required. No new additional liver lesions are identified. No additional sites of metastatic disease in the chest, abdomen, and pelvis. Some irregular soft tissue density in the anterior central omentum subjacent to the abdominal wall surgical scar likely reflects postoperative scarring rather than omental lesion. Electronically Signed: José Miguel Ruelas MD  7/25/2024 9:17 AM EDT  Workstation ID: [id number]       ASSESSMENT: The patient is a very pleasant 53 y.o. female  with right-sided metastatic colon cancer      PLAN:    1.  Right-sided metastatic colon cancer:  A.  I will proceed with treatment using FOLFIRI plus cetuximab cycle #1 on August 19, 2024.  B.  The patient was offered an appointment next  week but she wants to wait till the week after.  C.  I explained to the patient that likely delaying the therapy she is affecting her outcome.  I try to treat the patient since May 2024 after having her rising CT DNA result.  The patient has not been agreeable.  I explained to her today that if she does not want treatment I will never force her to do it but if she wants my honest advice and follow guidelines she has been needing therapy since May 2024.  D.  I will monitor the patient blood work including blood counts kidney function liver function and electrolytes.  E.  I will repeat her Signatera with every other cycle starting today.  F.  I will repeat her scans prior to cycle #7.    2.  Elevated liver enzymes:  A.  Induced by liver metastases  B. I will repeat labs today.    3.  Chemotherapy-induced nausea:  A.  I will continue the patient on Zofran as needed for    4.  Chemotherapy-induced diarrhea:  A.  I will continue the patient on Imodium as needed    5. Cancer-related pain:  A.  I will continue the patient oxycodone 5 mg twice a day.     6.  Anxiety:  A.  I will continue the patient on Ativan 1 mg nightly as needed.       7.  Hypertension:  A.  She will continue losartan.  B.  I reviewed with the patient this will interfere with our management since chemotherapy typically can cause hypotension we will have to monitor blood pressure closely.    8.  Dehydration:  A.  I will continue 1 L of IV fluid as needed    9.  Chemotherapy-induced anemia:  A.  I discussed with the patient her CBC result from yesterday hemoglobin low at 10.6.    B.  I will continue monitor her CBC prior to each infusion.  C.  I will transfuse as needed for hemoglobin less than 8.      10.  Chemotherapy-induced heartburn:  A.  I will continue pantoprazole 40 mg twice a day.    B.  I will continue Carafate 1 g every 6 hours    11.  Treatment related diarrhea  A.  Continue Imodium and Lomotil as directed.  Education sheet was provided to the  patient.      12.  Treatment induced peripheral neuropathy:   A.  We will continue gabapentin 100 mg 3 times daily.    FOLLOW UP: 3 weeks with cycle #2.    Total time of patient care including preparation of patient chart prior to arrival, face-to-face with patient and following visit spent in reviewing records, lab results, imaging studies, discussion with patient, and documentation/charting was 40 minutes     Iban Lambert MD  8/7/2024

## 2024-08-09 NOTE — PROGRESS NOTES
Patient: Amber Feng    YOB: 1970    Date: 08/12/2024    Primary Care Provider: Evon Bryant APRN    Chief Complaint   Patient presents with    Hepatic Cancer       SUBJECTIVE:    History of present illness:  The patient is in the office today for evaluation and treatment of liver cancer.  Patient very concerned about undergoing repeat chemotherapy for a persistent nodule in the liver.  This is documented to be metastatic colon carcinoma.  She had responded well in the past to chemotherapy and now has been recommended to undergo chemotherapy again.  She wanted to be reassured that she does not did have a liver lesion that she does indeed need further chemotherapy.    The following portions of the patient's history were reviewed and updated as appropriate: allergies, current medications, past family history, past medical history, past social history, past surgical history and problem list.      Review of Systems   Constitutional:  Negative for chills, fever and unexpected weight change.   HENT:  Negative for hearing loss, trouble swallowing and voice change.    Eyes:  Negative for visual disturbance.   Respiratory:  Negative for apnea, cough, chest tightness, shortness of breath and wheezing.    Cardiovascular:  Negative for chest pain, palpitations and leg swelling.   Gastrointestinal:  Negative for abdominal distention, abdominal pain, anal bleeding, blood in stool, constipation, diarrhea, nausea, rectal pain and vomiting.   Endocrine: Negative for cold intolerance and heat intolerance.   Genitourinary:  Negative for difficulty urinating, dysuria and flank pain.   Musculoskeletal:  Negative for back pain and gait problem.   Skin:  Negative for color change, rash and wound.   Neurological:  Negative for dizziness, syncope, speech difficulty, weakness, light-headedness, numbness and headaches.   Hematological:  Negative for adenopathy. Does not bruise/bleed easily.   Psychiatric/Behavioral:   "Negative for confusion. The patient is not nervous/anxious.          Allergies:  Allergies   Allergen Reactions    Losartan Nausea Only, Other (See Comments) and Headache     Pt states within an hour after use blood pressure would \"skyrocket\" also nausea - states diastolic pressures in 100's    Valium [Diazepam] Anaphylaxis    Citrus GI Intolerance     Especially oranges       Medications:    Current Outpatient Medications:     amLODIPine (NORVASC) 5 MG tablet, Take 1 tablet by mouth Daily., Disp: 90 tablet, Rfl: 1    Diclofenac Sodium (VOLTAREN) 1 % gel gel, Apply 4 g topically to the appropriate area as directed 3 (Three) Times a Day., Disp: 100 g, Rfl: 1    lidocaine-prilocaine (EMLA) 2.5-2.5 % cream, Apply 1 application topically to the appropriate area as directed As Needed (45-60 minutes prior to port access.  Cover with saran/plastic wrap.)., Disp: 30 g, Rfl: 3    meloxicam (Mobic) 7.5 MG tablet, Take 1 tablet by mouth Daily As Needed for Moderate Pain., Disp: 20 tablet, Rfl: 1    naloxone (NARCAN) 4 MG/0.1ML nasal spray, Call 911. Don't prime. [place] in 1 nostril for overdose. Repeat in 2-3 minutes in other nostril if no or minimal breathing/responsiveness., Disp: 2 each, Rfl: 0    omeprazole (priLOSEC) 40 MG capsule, Take 1 capsule by mouth once daily, Disp: 30 capsule, Rfl: 2    ondansetron (ZOFRAN) 8 MG tablet, TAKE ONE TABLET BY MOUTH AS NEEDED do not exceed THREE TABLETS PER day, Disp: , Rfl:     ondansetron ODT (ZOFRAN-ODT) 8 MG disintegrating tablet, Place 1 tablet on the tongue Every 8 (Eight) Hours As Needed for Nausea or Vomiting., Disp: 30 tablet, Rfl: 5    oxyCODONE (ROXICODONE) 5 MG immediate release tablet, Take 1 tablet by mouth 2 (Two) Times a Day As Needed for Moderate Pain., Disp: 20 tablet, Rfl: 0    prochlorperazine (COMPAZINE) 10 MG tablet, TAKE 1 TABLET BY MOUTH EVERY 6 HOURS AS NEEDED FOR NAUSEA FOR VOMITING, Disp: 30 tablet, Rfl: 2    History:  Past Medical History:   Diagnosis Date    " "Anxiety     Cancer     COLON STAGE IV    Ear piercing     Gallstones     History of irritable bowel syndrome     Hypertension     pt states secondary to pain    Seasonal allergies     Tattoos     Wears glasses        Past Surgical History:   Procedure Laterality Date    CHOLECYSTECTOMY N/A 4/19/2024    Procedure: OPEN CHOLECYSTECTOMY;  Surgeon: Jacob Nielson MD;  Location:  [facility];  Service: General;  Laterality: N/A;    COLONOSCOPY N/A 12/8/2022    Procedure: COLONOSCOPY WITH BIOPSY AND POLYPECTOMY;  Surgeon: Sea Valentin MD;  Location:  [facility];  Service: General;  Laterality: N/A;    DIAGNOSTIC LAPAROSCOPY EXPLORATORY LAPAROTOMY N/A 4/19/2024    Procedure: DIAGNOSTIC LAPAROSCOPY EXPLORATORY LAPAROTOMY, INTRAOPERATIVE ULTRASOUND;  Surgeon: Jacob Nielson MD;  Location:  [facility];  Service: General;  Laterality: N/A;    DILATATION AND CURETTAGE  2013    LIVER RESECTION Left 4/19/2024    Procedure: PARTIAL HEPATECTOMY x 2,  WITH LIVER ABLATION X 2;  Surgeon: Jacob Nielson MD;  Location:  [facility];  Service: General;  Laterality: Left;  First and second ablation: 5 minutes  Size: 4 x 4 cm  Device(s) used: two PR15XT's    PORTACATH PLACEMENT N/A 12/8/2022    Procedure: INSERTION OF PORTACATH;  Surgeon: Sea Valentin MD;  Location:  [facility];  Service: General;  Laterality: N/A;    POSTPARTUM TUBAL LIGATION      TUMOR REMOVAL  2013    pt reports \"tumor removed from uterus\" - states non-cancerous; unsure if fibroid; unsure if removed laparoscopically       History reviewed. No pertinent family history.    Social History     Tobacco Use    Smoking status: Never    Smokeless tobacco: Never   Vaping Use    Vaping status: Never Used   Substance Use Topics    Alcohol use: Yes     Comment: rarely    Drug use: Never        OBJECTIVE:    Vital Signs:   Vitals:    08/12/24 1317   BP: 138/72   Pulse: 79   Temp: 97.8 °F (36.6 °C)   SpO2: 97%   Weight: 80 kg (176 lb 6.4 oz)   Height: 157.5 cm (62.01\")       Physical " Exam:       Abdomen-soft, nondistended, liver enlarged.     Results Review:   I reviewed the patient's new clinical results.    Review of Systems was reviewed and confirmed as accurate as documented by the MA.    ASSESSMENT/PLAN:    1. Colon carcinoma metastatic to liver        I had a long discussion with the patient and her .  Answered all her questions.  I again reassured them that they really need to undergo chemotherapy as recommended by Dr. Lambert.  It appears to be her only chance to try and overcome this new liver lesion.  She has undergone ablation and liver resection in the past.  Also reassured that there indeed is a liver lesion that was seen on the most recent CT scan of abdomen and pelvis.  Patient agreeable and the, wishes to see oncology again and resume chemotherapy.  Also instructed her to ask for anxiety medications that may help her through the process.          Electronically signed by Sea Valentin MD  08/12/24

## 2024-08-12 ENCOUNTER — OFFICE VISIT (OUTPATIENT)
Dept: SURGERY | Facility: CLINIC | Age: 54
End: 2024-08-12
Payer: COMMERCIAL

## 2024-08-12 VITALS
HEART RATE: 79 BPM | TEMPERATURE: 97.8 F | SYSTOLIC BLOOD PRESSURE: 138 MMHG | HEIGHT: 62 IN | BODY MASS INDEX: 32.46 KG/M2 | OXYGEN SATURATION: 97 % | DIASTOLIC BLOOD PRESSURE: 72 MMHG | WEIGHT: 176.4 LBS

## 2024-08-12 DIAGNOSIS — C78.7 COLON CARCINOMA METASTATIC TO LIVER: Primary | ICD-10-CM

## 2024-08-12 DIAGNOSIS — C18.9 COLON CARCINOMA METASTATIC TO LIVER: Primary | ICD-10-CM

## 2024-08-12 PROCEDURE — 99213 OFFICE O/P EST LOW 20 MIN: CPT | Performed by: SURGERY

## 2024-08-12 RX ORDER — ONDANSETRON HYDROCHLORIDE 8 MG/1
TABLET, FILM COATED ORAL
COMMUNITY
Start: 2024-08-08

## 2024-08-15 RX ORDER — SODIUM, POTASSIUM,MAG SULFATES 17.5-3.13G
1 SOLUTION, RECONSTITUTED, ORAL ORAL TAKE AS DIRECTED
Qty: 354 ML | Refills: 0 | Status: SHIPPED | OUTPATIENT
Start: 2024-08-15

## 2024-08-19 ENCOUNTER — INFUSION (OUTPATIENT)
Dept: ONCOLOGY | Facility: HOSPITAL | Age: 54
End: 2024-08-19
Payer: COMMERCIAL

## 2024-08-19 VITALS
SYSTOLIC BLOOD PRESSURE: 118 MMHG | DIASTOLIC BLOOD PRESSURE: 68 MMHG | TEMPERATURE: 96.3 F | BODY MASS INDEX: 31.4 KG/M2 | HEART RATE: 77 BPM | WEIGHT: 171.7 LBS

## 2024-08-19 DIAGNOSIS — C18.9 METASTATIC COLON CANCER TO LIVER: Primary | ICD-10-CM

## 2024-08-19 DIAGNOSIS — C78.7 METASTATIC COLON CANCER TO LIVER: Primary | ICD-10-CM

## 2024-08-19 LAB
ALBUMIN SERPL-MCNC: 3.9 G/DL (ref 3.5–5.2)
ALBUMIN/GLOB SERPL: 1 G/DL
ALP SERPL-CCNC: 348 U/L (ref 39–117)
ALT SERPL W P-5'-P-CCNC: 98 U/L (ref 1–33)
ANION GAP SERPL CALCULATED.3IONS-SCNC: 9.3 MMOL/L (ref 5–15)
AST SERPL-CCNC: 92 U/L (ref 1–32)
BASOPHILS # BLD AUTO: 0.03 10*3/MM3 (ref 0–0.2)
BASOPHILS NFR BLD AUTO: 0.6 % (ref 0–1.5)
BILIRUB SERPL-MCNC: 0.5 MG/DL (ref 0–1.2)
BUN SERPL-MCNC: 13 MG/DL (ref 6–20)
BUN/CREAT SERPL: 16 (ref 7–25)
CALCIUM SPEC-SCNC: 9.1 MG/DL (ref 8.6–10.5)
CHLORIDE SERPL-SCNC: 106 MMOL/L (ref 98–107)
CO2 SERPL-SCNC: 24.7 MMOL/L (ref 22–29)
CREAT SERPL-MCNC: 0.81 MG/DL (ref 0.57–1)
DEPRECATED RDW RBC AUTO: 41.3 FL (ref 37–54)
EGFRCR SERPLBLD CKD-EPI 2021: 86.9 ML/MIN/1.73
EOSINOPHIL # BLD AUTO: 0.43 10*3/MM3 (ref 0–0.4)
EOSINOPHIL NFR BLD AUTO: 9 % (ref 0.3–6.2)
ERYTHROCYTE [DISTWIDTH] IN BLOOD BY AUTOMATED COUNT: 14.2 % (ref 12.3–15.4)
GLOBULIN UR ELPH-MCNC: 3.9 GM/DL
GLUCOSE SERPL-MCNC: 106 MG/DL (ref 65–99)
HCT VFR BLD AUTO: 35.1 % (ref 34–46.6)
HGB BLD-MCNC: 11.3 G/DL (ref 12–15.9)
IMM GRANULOCYTES # BLD AUTO: 0.01 10*3/MM3 (ref 0–0.05)
IMM GRANULOCYTES NFR BLD AUTO: 0.2 % (ref 0–0.5)
LYMPHOCYTES # BLD AUTO: 1.59 10*3/MM3 (ref 0.7–3.1)
LYMPHOCYTES NFR BLD AUTO: 33.4 % (ref 19.6–45.3)
MAGNESIUM SERPL-MCNC: 2 MG/DL (ref 1.6–2.6)
MCH RBC QN AUTO: 26 PG (ref 26.6–33)
MCHC RBC AUTO-ENTMCNC: 32.2 G/DL (ref 31.5–35.7)
MCV RBC AUTO: 80.7 FL (ref 79–97)
MONOCYTES # BLD AUTO: 0.39 10*3/MM3 (ref 0.1–0.9)
MONOCYTES NFR BLD AUTO: 8.2 % (ref 5–12)
NEUTROPHILS NFR BLD AUTO: 2.31 10*3/MM3 (ref 1.7–7)
NEUTROPHILS NFR BLD AUTO: 48.6 % (ref 42.7–76)
NRBC BLD AUTO-RTO: 0 /100 WBC (ref 0–0.2)
PLATELET # BLD AUTO: 185 10*3/MM3 (ref 140–450)
PMV BLD AUTO: 10.1 FL (ref 6–12)
POTASSIUM SERPL-SCNC: 3.8 MMOL/L (ref 3.5–5.2)
PROT SERPL-MCNC: 7.8 G/DL (ref 6–8.5)
RBC # BLD AUTO: 4.35 10*6/MM3 (ref 3.77–5.28)
SODIUM SERPL-SCNC: 140 MMOL/L (ref 136–145)
WBC NRBC COR # BLD AUTO: 4.76 10*3/MM3 (ref 3.4–10.8)

## 2024-08-19 PROCEDURE — 96413 CHEMO IV INFUSION 1 HR: CPT

## 2024-08-19 PROCEDURE — 25810000003 SODIUM CHLORIDE 0.9 % SOLUTION 250 ML FLEX CONT: Performed by: INTERNAL MEDICINE

## 2024-08-19 PROCEDURE — 96411 CHEMO IV PUSH ADDL DRUG: CPT

## 2024-08-19 PROCEDURE — 96417 CHEMO IV INFUS EACH ADDL SEQ: CPT

## 2024-08-19 PROCEDURE — 25010000002 LEUCOVORIN CALCIUM PER 50MG: Performed by: INTERNAL MEDICINE

## 2024-08-19 PROCEDURE — 0 DEXTROSE 5 % SOLUTION 500 ML FLEX CONT: Performed by: INTERNAL MEDICINE

## 2024-08-19 PROCEDURE — 83735 ASSAY OF MAGNESIUM: CPT

## 2024-08-19 PROCEDURE — 0 DEXTROSE 5 % SOLUTION 250 ML FLEX CONT: Performed by: INTERNAL MEDICINE

## 2024-08-19 PROCEDURE — 25010000002 FLUOROURACIL PER 500 MG: Performed by: INTERNAL MEDICINE

## 2024-08-19 PROCEDURE — 25010000002 IRINOTECAN PER 20 MG: Performed by: INTERNAL MEDICINE

## 2024-08-19 PROCEDURE — 25010000002 PALONOSETRON 0.25 MG/5ML SOLUTION PREFILLED SYRINGE: Performed by: INTERNAL MEDICINE

## 2024-08-19 PROCEDURE — 96368 THER/DIAG CONCURRENT INF: CPT

## 2024-08-19 PROCEDURE — 85025 COMPLETE CBC W/AUTO DIFF WBC: CPT

## 2024-08-19 PROCEDURE — 96375 TX/PRO/DX INJ NEW DRUG ADDON: CPT

## 2024-08-19 PROCEDURE — 96415 CHEMO IV INFUSION ADDL HR: CPT

## 2024-08-19 PROCEDURE — 25010000002 METHYLPREDNISOLONE PER 125 MG: Performed by: INTERNAL MEDICINE

## 2024-08-19 PROCEDURE — 25010000002 CETUXIMAB PER 10 MG: Performed by: INTERNAL MEDICINE

## 2024-08-19 PROCEDURE — G0498 CHEMO EXTEND IV INFUS W/PUMP: HCPCS

## 2024-08-19 PROCEDURE — 25010000002 DIPHENHYDRAMINE PER 50 MG: Performed by: INTERNAL MEDICINE

## 2024-08-19 PROCEDURE — 96367 TX/PROPH/DG ADDL SEQ IV INF: CPT

## 2024-08-19 PROCEDURE — 80053 COMPREHEN METABOLIC PANEL: CPT

## 2024-08-19 PROCEDURE — 96376 TX/PRO/DX INJ SAME DRUG ADON: CPT

## 2024-08-19 RX ORDER — FAMOTIDINE 10 MG/ML
20 INJECTION, SOLUTION INTRAVENOUS AS NEEDED
Status: DISCONTINUED | OUTPATIENT
Start: 2024-08-19 | End: 2024-08-19 | Stop reason: HOSPADM

## 2024-08-19 RX ORDER — FLUOROURACIL 50 MG/ML
400 INJECTION, SOLUTION INTRAVENOUS ONCE
Status: COMPLETED | OUTPATIENT
Start: 2024-08-19 | End: 2024-08-19

## 2024-08-19 RX ORDER — MEPERIDINE HYDROCHLORIDE 25 MG/ML
25 INJECTION INTRAMUSCULAR; INTRAVENOUS; SUBCUTANEOUS
Status: DISCONTINUED | OUTPATIENT
Start: 2024-08-19 | End: 2024-08-19 | Stop reason: HOSPADM

## 2024-08-19 RX ORDER — ACETAMINOPHEN 325 MG/1
650 TABLET ORAL ONCE
Status: COMPLETED | OUTPATIENT
Start: 2024-08-19 | End: 2024-08-19

## 2024-08-19 RX ORDER — METHYLPREDNISOLONE SODIUM SUCCINATE 125 MG/2ML
125 INJECTION, POWDER, LYOPHILIZED, FOR SOLUTION INTRAMUSCULAR; INTRAVENOUS ONCE
Status: COMPLETED | OUTPATIENT
Start: 2024-08-19 | End: 2024-08-19

## 2024-08-19 RX ORDER — SODIUM CHLORIDE 9 MG/ML
20 INJECTION, SOLUTION INTRAVENOUS ONCE
Status: DISCONTINUED | OUTPATIENT
Start: 2024-08-19 | End: 2024-08-19 | Stop reason: HOSPADM

## 2024-08-19 RX ORDER — DIPHENHYDRAMINE HYDROCHLORIDE 50 MG/ML
50 INJECTION INTRAMUSCULAR; INTRAVENOUS AS NEEDED
Status: DISCONTINUED | OUTPATIENT
Start: 2024-08-19 | End: 2024-08-19 | Stop reason: HOSPADM

## 2024-08-19 RX ORDER — PROCHLORPERAZINE EDISYLATE 5 MG/ML
10 INJECTION INTRAMUSCULAR; INTRAVENOUS ONCE
Status: DISCONTINUED | OUTPATIENT
Start: 2024-08-19 | End: 2024-08-19 | Stop reason: HOSPADM

## 2024-08-19 RX ORDER — FAMOTIDINE 10 MG/ML
20 INJECTION, SOLUTION INTRAVENOUS ONCE
Status: COMPLETED | OUTPATIENT
Start: 2024-08-19 | End: 2024-08-19

## 2024-08-19 RX ORDER — PALONOSETRON 0.05 MG/ML
0.25 INJECTION, SOLUTION INTRAVENOUS ONCE
Status: COMPLETED | OUTPATIENT
Start: 2024-08-19 | End: 2024-08-19

## 2024-08-19 RX ADMIN — LEUCOVORIN CALCIUM 730 MG: 10 INJECTION INTRAMUSCULAR; INTRAVENOUS at 13:10

## 2024-08-19 RX ADMIN — PALONOSETRON 0.25 MG: 0.25 INJECTION, SOLUTION INTRAVENOUS at 09:31

## 2024-08-19 RX ADMIN — DIPHENHYDRAMINE HYDROCHLORIDE 50 MG: 50 INJECTION, SOLUTION INTRAMUSCULAR; INTRAVENOUS at 09:44

## 2024-08-19 RX ADMIN — ACETAMINOPHEN 650 MG: 325 TABLET, FILM COATED ORAL at 09:32

## 2024-08-19 RX ADMIN — ATROPINE SULFATE 0.25 MG: 0.4 INJECTION, SOLUTION INTRAVENOUS at 13:05

## 2024-08-19 RX ADMIN — FAMOTIDINE 20 MG: 10 INJECTION INTRAVENOUS at 09:33

## 2024-08-19 RX ADMIN — METHYLPREDNISOLONE SODIUM SUCCINATE 125 MG: 125 INJECTION, POWDER, FOR SOLUTION INTRAMUSCULAR; INTRAVENOUS at 09:33

## 2024-08-19 RX ADMIN — FLUOROURACIL 730 MG: 50 INJECTION, SOLUTION INTRAVENOUS at 14:55

## 2024-08-19 RX ADMIN — ATROPINE SULFATE 0.25 MG: 0.4 INJECTION, SOLUTION INTRAVENOUS at 14:51

## 2024-08-19 RX ADMIN — IRINOTECAN HYDROCHLORIDE 330 MG: 20 INJECTION, SOLUTION INTRAVENOUS at 13:10

## 2024-08-19 RX ADMIN — CETUXIMAB 910 MG: 2 SOLUTION INTRAVENOUS at 10:12

## 2024-08-19 RX ADMIN — FLUOROURACIL 4370 MG: 50 INJECTION, SOLUTION INTRAVENOUS at 14:55

## 2024-08-21 ENCOUNTER — INFUSION (OUTPATIENT)
Dept: ONCOLOGY | Facility: HOSPITAL | Age: 54
End: 2024-08-21
Payer: COMMERCIAL

## 2024-08-21 DIAGNOSIS — C18.9 METASTATIC COLON CANCER TO LIVER: ICD-10-CM

## 2024-08-21 DIAGNOSIS — C18.2 CANCER OF RIGHT COLON: Primary | ICD-10-CM

## 2024-08-21 DIAGNOSIS — C78.7 METASTATIC COLON CANCER TO LIVER: ICD-10-CM

## 2024-08-21 PROCEDURE — 25010000002 HEPARIN LOCK FLUSH PER 10 UNITS: Performed by: NURSE PRACTITIONER

## 2024-08-21 PROCEDURE — 96523 IRRIG DRUG DELIVERY DEVICE: CPT

## 2024-08-21 RX ORDER — SODIUM CHLORIDE 0.9 % (FLUSH) 0.9 %
10 SYRINGE (ML) INJECTION AS NEEDED
Status: DISCONTINUED | OUTPATIENT
Start: 2024-08-21 | End: 2024-08-21 | Stop reason: HOSPADM

## 2024-08-21 RX ORDER — HEPARIN SODIUM (PORCINE) LOCK FLUSH IV SOLN 100 UNIT/ML 100 UNIT/ML
500 SOLUTION INTRAVENOUS AS NEEDED
Status: DISCONTINUED | OUTPATIENT
Start: 2024-08-21 | End: 2024-08-21 | Stop reason: HOSPADM

## 2024-08-21 RX ORDER — HEPARIN SODIUM (PORCINE) LOCK FLUSH IV SOLN 100 UNIT/ML 100 UNIT/ML
500 SOLUTION INTRAVENOUS AS NEEDED
OUTPATIENT
Start: 2024-08-21

## 2024-08-21 RX ORDER — SODIUM CHLORIDE 0.9 % (FLUSH) 0.9 %
10 SYRINGE (ML) INJECTION AS NEEDED
OUTPATIENT
Start: 2024-08-21

## 2024-08-21 RX ADMIN — HEPARIN 500 UNITS: 100 SYRINGE at 13:26

## 2024-08-21 RX ADMIN — Medication 10 ML: at 13:26

## 2024-08-26 ENCOUNTER — TELEPHONE (OUTPATIENT)
Age: 54
End: 2024-08-26
Payer: COMMERCIAL

## 2024-08-26 RX ORDER — DOXYCYCLINE HYCLATE 100 MG
100 TABLET ORAL 2 TIMES DAILY
Qty: 14 TABLET | Refills: 0 | Status: SHIPPED | OUTPATIENT
Start: 2024-08-26

## 2024-08-26 RX ORDER — PROMETHAZINE HYDROCHLORIDE 25 MG/1
25 TABLET ORAL EVERY 6 HOURS PRN
Qty: 45 TABLET | Refills: 5 | Status: SHIPPED | OUTPATIENT
Start: 2024-08-26

## 2024-08-26 NOTE — TELEPHONE ENCOUNTER
RN received call from patient that she is very sick to her stomach after starting chemotherapy and has developed a rash all over her face. RN will discuss with MD and call patient back.

## 2024-08-26 NOTE — TELEPHONE ENCOUNTER
MD advised that patient can be prescribed doxycycline 100 mg BID x 7 days and phenergan. RN called patient to let her know that we are sending in an antibiotic and additional nausea meds. Patient v/u and appreciation.

## 2024-08-30 ENCOUNTER — TELEPHONE (OUTPATIENT)
Age: 54
End: 2024-08-30
Payer: COMMERCIAL

## 2024-08-30 RX ORDER — HYDROCORTISONE VALERATE CREAM 2 MG/G
1 CREAM TOPICAL 2 TIMES DAILY
Qty: 1 G | Refills: 0 | Status: SHIPPED | OUTPATIENT
Start: 2024-08-30

## 2024-08-30 NOTE — TELEPHONE ENCOUNTER
"Patient stated that her lips were red before starting antibiotic and that they are now edematous.  She stated that around her eyes on the outside are \"wet.\"   She stated her face is still burning.  She stated the only relief of burning she gets is using a large amount of vaseline.  She stated she is using ordered zofran for nausea and it is helping, she is keeping down fluids and is eating.  Patient denied any SOA.    Dr. Lambert notified and patient contacted back and advised per MD to continue Doxycycline and that a steroid cream will be sent to her pharmacy for the rash and to contact this office with no improvement or worsening.  Patient verbalized understanding.  Prescription sent to Dr. Lambert to sign per his request.  "

## 2024-08-30 NOTE — TELEPHONE ENCOUNTER
----- Message from Jaylen VELEZ sent at 8/30/2024 11:10 AM EDT -----  Regarding: PHONE CALL  DR. ORTA PT.     PT CALLED IN STATING THE ANTIBIOTIC IS NOT WORKING. SHE SAID HER LIPS ARE SWELLED AND HER FACE IS BURNING. SHE WANTS CALL BACK ASAP     THANKS   SAMIRA

## 2024-09-04 ENCOUNTER — HOSPITAL ENCOUNTER (OUTPATIENT)
Facility: HOSPITAL | Age: 54
Discharge: HOME | End: 2024-09-04
Admitting: INTERNAL MEDICINE
Payer: COMMERCIAL

## 2024-09-04 ENCOUNTER — OFFICE VISIT (OUTPATIENT)
Dept: ONCOLOGY | Facility: CLINIC | Age: 54
End: 2024-09-04
Payer: COMMERCIAL

## 2024-09-04 VITALS
TEMPERATURE: 97.3 F | WEIGHT: 168 LBS | RESPIRATION RATE: 16 BRPM | HEART RATE: 83 BPM | OXYGEN SATURATION: 100 % | BODY MASS INDEX: 30.91 KG/M2 | SYSTOLIC BLOOD PRESSURE: 136 MMHG | DIASTOLIC BLOOD PRESSURE: 67 MMHG | HEIGHT: 62 IN

## 2024-09-04 DIAGNOSIS — C78.7 METASTATIC COLON CANCER TO LIVER: ICD-10-CM

## 2024-09-04 DIAGNOSIS — C18.2 CANCER OF RIGHT COLON: Primary | ICD-10-CM

## 2024-09-04 DIAGNOSIS — C18.9 METASTATIC COLON CANCER TO LIVER: ICD-10-CM

## 2024-09-04 LAB
ALBUMIN SERPL-MCNC: 4 G/DL (ref 3.5–5.2)
ALBUMIN/GLOB SERPL: 1.1 G/DL
ALP SERPL-CCNC: 308 U/L (ref 39–117)
ALT SERPL W P-5'-P-CCNC: 63 U/L (ref 1–33)
ANION GAP SERPL CALCULATED.3IONS-SCNC: 9.7 MMOL/L (ref 5–15)
AST SERPL-CCNC: 59 U/L (ref 1–32)
BASOPHILS # BLD AUTO: 0.01 10*3/MM3 (ref 0–0.2)
BASOPHILS NFR BLD AUTO: 0.5 % (ref 0–1.5)
BILIRUB SERPL-MCNC: 0.4 MG/DL (ref 0–1.2)
BUN SERPL-MCNC: 9 MG/DL (ref 6–20)
BUN/CREAT SERPL: 13.8 (ref 7–25)
CALCIUM SPEC-SCNC: 8.8 MG/DL (ref 8.6–10.5)
CHLORIDE SERPL-SCNC: 104 MMOL/L (ref 98–107)
CO2 SERPL-SCNC: 26.3 MMOL/L (ref 22–29)
CREAT SERPL-MCNC: 0.65 MG/DL (ref 0.57–1)
DEPRECATED RDW RBC AUTO: 43 FL (ref 37–54)
EGFRCR SERPLBLD CKD-EPI 2021: 104.8 ML/MIN/1.73
EOSINOPHIL # BLD AUTO: 0.05 10*3/MM3 (ref 0–0.4)
EOSINOPHIL NFR BLD AUTO: 2.3 % (ref 0.3–6.2)
ERYTHROCYTE [DISTWIDTH] IN BLOOD BY AUTOMATED COUNT: 15.1 % (ref 12.3–15.4)
GLOBULIN UR ELPH-MCNC: 3.5 GM/DL
GLUCOSE SERPL-MCNC: 98 MG/DL (ref 65–99)
HCT VFR BLD AUTO: 35.7 % (ref 34–46.6)
HGB BLD-MCNC: 11.6 G/DL (ref 12–15.9)
IMM GRANULOCYTES # BLD AUTO: 0 10*3/MM3 (ref 0–0.05)
IMM GRANULOCYTES NFR BLD AUTO: 0 % (ref 0–0.5)
LYMPHOCYTES # BLD AUTO: 1.12 10*3/MM3 (ref 0.7–3.1)
LYMPHOCYTES NFR BLD AUTO: 50.9 % (ref 19.6–45.3)
MAGNESIUM SERPL-MCNC: 1.8 MG/DL (ref 1.6–2.6)
MCH RBC QN AUTO: 26.2 PG (ref 26.6–33)
MCHC RBC AUTO-ENTMCNC: 32.5 G/DL (ref 31.5–35.7)
MCV RBC AUTO: 80.6 FL (ref 79–97)
MONOCYTES # BLD AUTO: 0.3 10*3/MM3 (ref 0.1–0.9)
MONOCYTES NFR BLD AUTO: 13.6 % (ref 5–12)
NEUTROPHILS NFR BLD AUTO: 0.72 10*3/MM3 (ref 1.7–7)
NEUTROPHILS NFR BLD AUTO: 32.7 % (ref 42.7–76)
NRBC BLD AUTO-RTO: 0 /100 WBC (ref 0–0.2)
PLATELET # BLD AUTO: 211 10*3/MM3 (ref 140–450)
PMV BLD AUTO: 9.1 FL (ref 6–12)
POTASSIUM SERPL-SCNC: 4 MMOL/L (ref 3.5–5.2)
PROT SERPL-MCNC: 7.5 G/DL (ref 6–8.5)
RBC # BLD AUTO: 4.43 10*6/MM3 (ref 3.77–5.28)
SODIUM SERPL-SCNC: 140 MMOL/L (ref 136–145)
WBC NRBC COR # BLD AUTO: 2.2 10*3/MM3 (ref 3.4–10.8)

## 2024-09-04 PROCEDURE — 36591 DRAW BLOOD OFF VENOUS DEVICE: CPT

## 2024-09-04 PROCEDURE — 85025 COMPLETE CBC W/AUTO DIFF WBC: CPT | Performed by: INTERNAL MEDICINE

## 2024-09-04 PROCEDURE — 25010000002 HEPARIN LOCK FLUSH PER 10 UNITS: Performed by: NURSE PRACTITIONER

## 2024-09-04 PROCEDURE — 80053 COMPREHEN METABOLIC PANEL: CPT | Performed by: INTERNAL MEDICINE

## 2024-09-04 PROCEDURE — 99215 OFFICE O/P EST HI 40 MIN: CPT | Performed by: INTERNAL MEDICINE

## 2024-09-04 PROCEDURE — 83735 ASSAY OF MAGNESIUM: CPT | Performed by: INTERNAL MEDICINE

## 2024-09-04 RX ORDER — FAMOTIDINE 10 MG/ML
20 INJECTION, SOLUTION INTRAVENOUS ONCE
OUTPATIENT
Start: 2024-09-25

## 2024-09-04 RX ORDER — ATROPINE SULFATE 1 MG/ML
0.25 INJECTION, SOLUTION INTRAMUSCULAR; INTRAVENOUS; SUBCUTANEOUS
OUTPATIENT
Start: 2024-09-25

## 2024-09-04 RX ORDER — SODIUM CHLORIDE 9 MG/ML
20 INJECTION, SOLUTION INTRAVENOUS ONCE
OUTPATIENT
Start: 2024-09-25

## 2024-09-04 RX ORDER — PALONOSETRON 0.05 MG/ML
0.25 INJECTION, SOLUTION INTRAVENOUS ONCE
OUTPATIENT
Start: 2024-09-25

## 2024-09-04 RX ORDER — DOXYCYCLINE 100 MG/1
100 CAPSULE ORAL 2 TIMES DAILY
Qty: 14 CAPSULE | Refills: 0 | Status: SHIPPED | OUTPATIENT
Start: 2024-09-04

## 2024-09-04 RX ORDER — METHYLPREDNISOLONE SODIUM SUCCINATE 125 MG/2ML
125 INJECTION, POWDER, LYOPHILIZED, FOR SOLUTION INTRAMUSCULAR; INTRAVENOUS ONCE
OUTPATIENT
Start: 2024-09-25

## 2024-09-04 RX ORDER — FAMOTIDINE 10 MG/ML
20 INJECTION, SOLUTION INTRAVENOUS AS NEEDED
OUTPATIENT
Start: 2024-09-25

## 2024-09-04 RX ORDER — HEPARIN SODIUM (PORCINE) LOCK FLUSH IV SOLN 100 UNIT/ML 100 UNIT/ML
500 SOLUTION INTRAVENOUS AS NEEDED
OUTPATIENT
Start: 2024-09-04

## 2024-09-04 RX ORDER — ACETAMINOPHEN 325 MG/1
650 TABLET ORAL ONCE
OUTPATIENT
Start: 2024-09-25

## 2024-09-04 RX ORDER — DIPHENHYDRAMINE HYDROCHLORIDE 50 MG/ML
50 INJECTION INTRAMUSCULAR; INTRAVENOUS AS NEEDED
OUTPATIENT
Start: 2024-09-25

## 2024-09-04 RX ORDER — SODIUM CHLORIDE 0.9 % (FLUSH) 0.9 %
10 SYRINGE (ML) INJECTION AS NEEDED
OUTPATIENT
Start: 2024-09-04

## 2024-09-04 RX ORDER — MEPERIDINE HYDROCHLORIDE 25 MG/ML
25 INJECTION INTRAMUSCULAR; INTRAVENOUS; SUBCUTANEOUS
OUTPATIENT
Start: 2024-09-25

## 2024-09-04 RX ORDER — HEPARIN SODIUM (PORCINE) LOCK FLUSH IV SOLN 100 UNIT/ML 100 UNIT/ML
500 SOLUTION INTRAVENOUS AS NEEDED
Status: DISCONTINUED | OUTPATIENT
Start: 2024-09-04 | End: 2024-09-05 | Stop reason: HOSPADM

## 2024-09-04 RX ORDER — FLUOROURACIL 50 MG/ML
400 INJECTION, SOLUTION INTRAVENOUS ONCE
Status: CANCELLED | OUTPATIENT
Start: 2024-09-25

## 2024-09-04 RX ORDER — SODIUM CHLORIDE 0.9 % (FLUSH) 0.9 %
10 SYRINGE (ML) INJECTION AS NEEDED
Status: DISCONTINUED | OUTPATIENT
Start: 2024-09-04 | End: 2024-09-05 | Stop reason: HOSPADM

## 2024-09-04 RX ADMIN — HEPARIN 500 UNITS: 100 SYRINGE at 11:27

## 2024-09-04 RX ADMIN — Medication 10 ML: at 11:27

## 2024-09-04 NOTE — PROGRESS NOTES
DATE OF VISIT: 9/4/2024    REASON FOR VISIT: Followup for metastatic right-sided colon cancer     PROBLEM LIST:  1. Metastatic colon cancer TX NX M1 a stage Perry:  A.  Presented with abdominal pain and jaundice  B.  CT abdomen pelvis done November 04, 2022 confirmed diffuse liver metastases.  C.  Diagnosed after CT-guided liver biopsy done on November 22, 2022 confirming adenocarcinoma CK20 positive CK7 negative.  D.  Colonoscopy done December 08, 2020 to confirm transverse colon mass however biopsy revealed adenoma.  E.  Started palliative chemotherapy with dose adjusted FOLFOXIRI December 14, 2022, status post 12 cycles completed May 2023.  F.  Started maintenance Xeloda 1000 mg twice daily 1 week on 1 week off June 2023.  Stopped April 2024 secondary to progressive disease in the liver.  G.  Status post segment 7 and segment 3 liver met resection with 2 other lesions ablation done by Dr. Nielson April 19, 2024.  H.  Started FOLFIRI with Erbitux August 19, 2024.  Status post 1 cycle.  2.  Elevated liver enzymes:  A.  Induced by metastatic disease  3.  Cancer-related pain  4.  Anxiety  5.  Hypertension    HISTORY OF PRESENT ILLNESS: The patient is a very pleasant 54 y.o. female with past medical history significant for metastatic right-sided colon cancer diagnosed November 22, 2022.  Started on palliative chemotherapy with dose adjusted FOLFOXIRI.  She completed 12 cycles May 2023.  She was started on maintenance Xeloda 1000 mg twice daily June 2023.  The patient is here today for scheduled follow-up visit with treatment.    SUBJECTIVE: Amber is here today with her .  She July treatment poorly mainly because of dermatitis and mucositis.    Past History:  Medical History: has a past medical history of Anxiety, Cancer, Ear piercing, Gallstones, History of irritable bowel syndrome, Hypertension, Seasonal allergies, Tattoos, and Wears glasses.   Surgical History: has a past surgical history that includes  "postpartum tubal ligation; Dilation and curettage of uterus (2013); Tumor removal (2013); Portacath placement (N/A, 12/8/2022); Colonoscopy (N/A, 12/8/2022); diagnostic laparoscopy exploratory laparotomy (N/A, 4/19/2024); Cholecystectomy (N/A, 4/19/2024); and Liver resection (Left, 4/19/2024).   Family History: family history is not on file.   Social History: reports that she has never smoked. She has never used smokeless tobacco. She reports current alcohol use. She reports that she does not use drugs.    (Not in a hospital admission)     Allergies: Losartan, Valium [diazepam], and Citrus     Review of Systems   Constitutional:  Positive for fatigue.   HENT:  Positive for mouth sores.    Gastrointestinal:  Positive for abdominal pain.   Musculoskeletal:  Positive for arthralgias.   Skin:         Facial acne like rash.   Psychiatric/Behavioral:  The patient is nervous/anxious.        PHYSICAL EXAMINATION:   /67   Pulse 83   Temp 97.3 °F (36.3 °C)   Resp 16   Ht 157.5 cm (62.01\")   Wt 76.2 kg (168 lb)   SpO2 100%   BMI 30.72 kg/m²    Pain Score    09/04/24 0904   PainSc: 10-Worst pain ever  Comment: Lips                              ECOG Performance Status: 1 - Symptomatic but completely ambulatory      General Appearance:      alert, cooperative, no apparent distress and appears stated age   Lungs:   Clear to auscultation bilaterally; respirations regular, even, and unlabored bilaterally   Heart:  Regular rate and rhythm, no murmurs appreciated   Abdomen:   Soft, non-tender, and non-distended                 No visits with results within 2 Week(s) from this visit.   Latest known visit with results is:   Infusion on 08/19/2024   Component Date Value Ref Range Status    Glucose 08/19/2024 106 (H)  65 - 99 mg/dL Final    BUN 08/19/2024 13  6 - 20 mg/dL Final    Creatinine 08/19/2024 0.81  0.57 - 1.00 mg/dL Final    Sodium 08/19/2024 140  136 - 145 mmol/L Final    Potassium 08/19/2024 3.8  3.5 - 5.2 mmol/L " Final    Chloride 08/19/2024 106  98 - 107 mmol/L Final    CO2 08/19/2024 24.7  22.0 - 29.0 mmol/L Final    Calcium 08/19/2024 9.1  8.6 - 10.5 mg/dL Final    Total Protein 08/19/2024 7.8  6.0 - 8.5 g/dL Final    Albumin 08/19/2024 3.9  3.5 - 5.2 g/dL Final    ALT (SGPT) 08/19/2024 98 (H)  1 - 33 U/L Final    AST (SGOT) 08/19/2024 92 (H)  1 - 32 U/L Final    Alkaline Phosphatase 08/19/2024 348 (H)  39 - 117 U/L Final    Total Bilirubin 08/19/2024 0.5  0.0 - 1.2 mg/dL Final    Globulin 08/19/2024 3.9  gm/dL Final    A/G Ratio 08/19/2024 1.0  g/dL Final    BUN/Creatinine Ratio 08/19/2024 16.0  7.0 - 25.0 Final    Anion Gap 08/19/2024 9.3  5.0 - 15.0 mmol/L Final    eGFR 08/19/2024 86.9  >60.0 mL/min/1.73 Final    Magnesium 08/19/2024 2.0  1.6 - 2.6 mg/dL Final    WBC 08/19/2024 4.76  3.40 - 10.80 10*3/mm3 Final    RBC 08/19/2024 4.35  3.77 - 5.28 10*6/mm3 Final    Hemoglobin 08/19/2024 11.3 (L)  12.0 - 15.9 g/dL Final    Hematocrit 08/19/2024 35.1  34.0 - 46.6 % Final    MCV 08/19/2024 80.7  79.0 - 97.0 fL Final    MCH 08/19/2024 26.0 (L)  26.6 - 33.0 pg Final    MCHC 08/19/2024 32.2  31.5 - 35.7 g/dL Final    RDW 08/19/2024 14.2  12.3 - 15.4 % Final    RDW-SD 08/19/2024 41.3  37.0 - 54.0 fl Final    MPV 08/19/2024 10.1  6.0 - 12.0 fL Final    Platelets 08/19/2024 185  140 - 450 10*3/mm3 Final    Neutrophil % 08/19/2024 48.6  42.7 - 76.0 % Final    Lymphocyte % 08/19/2024 33.4  19.6 - 45.3 % Final    Monocyte % 08/19/2024 8.2  5.0 - 12.0 % Final    Eosinophil % 08/19/2024 9.0 (H)  0.3 - 6.2 % Final    Basophil % 08/19/2024 0.6  0.0 - 1.5 % Final    Immature Grans % 08/19/2024 0.2  0.0 - 0.5 % Final    Neutrophils, Absolute 08/19/2024 2.31  1.70 - 7.00 10*3/mm3 Final    Lymphocytes, Absolute 08/19/2024 1.59  0.70 - 3.10 10*3/mm3 Final    Monocytes, Absolute 08/19/2024 0.39  0.10 - 0.90 10*3/mm3 Final    Eosinophils, Absolute 08/19/2024 0.43 (H)  0.00 - 0.40 10*3/mm3 Final    Basophils, Absolute 08/19/2024 0.03  0.00 -  0.20 10*3/mm3 Final    Immature Grans, Absolute 08/19/2024 0.01  0.00 - 0.05 10*3/mm3 Final    nRBC 08/19/2024 0.0  0.0 - 0.2 /100 WBC Final        No results found.      ASSESSMENT: The patient is a very pleasant 54 y.o. female  with right-sided metastatic colon cancer      PLAN:    1.  Right-sided metastatic colon cancer:  A.  I will proceed with treatment using FOLFIRI plus cetuximab cycle #2.  B.  She will follow-up with us in 2 weeks of cycle #3.  C.  We will consider maintenance therapy after maximum response.  D.  I will monitor the patient blood work including blood counts kidney function liver function and electrolytes.  E.  I will repeat her Signatera with every other cycle starting today.  F.  I will repeat her scans prior to cycle #7.    2.  Elevated liver enzymes:  A.  Induced by liver metastases  B. I will repeat labs today.    3.  Chemotherapy-induced nausea:  A.  I will continue the patient on Zofran as needed for    4.  Chemotherapy-induced diarrhea:  A.  I will continue the patient on Imodium as needed    5. Cancer-related pain:  A.  I will continue the patient oxycodone 5 mg twice a day.     6.  Anxiety:  A.  I will continue the patient on Ativan 1 mg nightly as needed.       7.  Hypertension:  A.  She will continue losartan.  B.  I reviewed with the patient this will interfere with our management since chemotherapy typically can cause hypotension we will have to monitor blood pressure closely.    8.  Dehydration:  A.  I will continue 1 L of IV fluid as needed    9.  Chemotherapy-induced anemia:  A.  I discussed with the patient her CBC result from yesterday hemoglobin low at 10.6.    B.  I will continue monitor her CBC prior to each infusion.  C.  I will transfuse as needed for hemoglobin less than 8.      10.  Chemotherapy-induced heartburn:  A.  I will continue pantoprazole 40 mg twice a day.    B.  I will continue Carafate 1 g every 6 hours    11.  Treatment related diarrhea  A.  Continue  Imodium and Lomotil as directed.  Education sheet was provided to the patient.    12.  Treatment induced peripheral neuropathy:   A.  We will continue gabapentin 100 mg 3 times daily.    13.  Treatment induced dermatitis, severe:  A.  I will continue hydrocortisone cream.  B.  I will continue doxycycline 100 mg twice a day.  C.  I will lower Erbitux dose by 20%.    14.  Treatment induced mucositis:  A.  I will add Magic mouthwash.    FOLLOW UP: 2 weeks with cycle #3.    Iban Lambert MD  9/4/2024

## 2024-09-09 ENCOUNTER — HOSPITAL ENCOUNTER (OUTPATIENT)
Facility: HOSPITAL | Age: 54
Discharge: HOME | End: 2024-09-09
Admitting: INTERNAL MEDICINE
Payer: COMMERCIAL

## 2024-09-09 DIAGNOSIS — C18.9 METASTATIC COLON CANCER TO LIVER: Primary | ICD-10-CM

## 2024-09-09 DIAGNOSIS — C78.7 METASTATIC COLON CANCER TO LIVER: Primary | ICD-10-CM

## 2024-09-09 LAB
ALBUMIN SERPL-MCNC: 4 G/DL (ref 3.5–5.2)
ALBUMIN/GLOB SERPL: 1.1 G/DL
ALP SERPL-CCNC: 328 U/L (ref 39–117)
ALT SERPL W P-5'-P-CCNC: 42 U/L (ref 1–33)
ANION GAP SERPL CALCULATED.3IONS-SCNC: 14 MMOL/L (ref 5–15)
AST SERPL-CCNC: 39 U/L (ref 1–32)
BASOPHILS # BLD AUTO: 0.02 10*3/MM3 (ref 0–0.2)
BASOPHILS NFR BLD AUTO: 0.6 % (ref 0–1.5)
BILIRUB SERPL-MCNC: 0.3 MG/DL (ref 0–1.2)
BUN SERPL-MCNC: 9 MG/DL (ref 6–20)
BUN/CREAT SERPL: 13 (ref 7–25)
CALCIUM SPEC-SCNC: 9.3 MG/DL (ref 8.6–10.5)
CHLORIDE SERPL-SCNC: 102 MMOL/L (ref 98–107)
CO2 SERPL-SCNC: 24 MMOL/L (ref 22–29)
CREAT SERPL-MCNC: 0.69 MG/DL (ref 0.57–1)
DEPRECATED RDW RBC AUTO: 44.6 FL (ref 37–54)
EGFRCR SERPLBLD CKD-EPI 2021: 103.3 ML/MIN/1.73
EOSINOPHIL # BLD AUTO: 0.07 10*3/MM3 (ref 0–0.4)
EOSINOPHIL NFR BLD AUTO: 2.2 % (ref 0.3–6.2)
ERYTHROCYTE [DISTWIDTH] IN BLOOD BY AUTOMATED COUNT: 15.9 % (ref 12.3–15.4)
GLOBULIN UR ELPH-MCNC: 3.6 GM/DL
GLUCOSE SERPL-MCNC: 100 MG/DL (ref 65–99)
HCT VFR BLD AUTO: 37 % (ref 34–46.6)
HGB BLD-MCNC: 12.1 G/DL (ref 12–15.9)
IMM GRANULOCYTES # BLD AUTO: 0.01 10*3/MM3 (ref 0–0.05)
IMM GRANULOCYTES NFR BLD AUTO: 0.3 % (ref 0–0.5)
LYMPHOCYTES # BLD AUTO: 1.44 10*3/MM3 (ref 0.7–3.1)
LYMPHOCYTES NFR BLD AUTO: 44.4 % (ref 19.6–45.3)
MAGNESIUM SERPL-MCNC: 2 MG/DL (ref 1.6–2.6)
MCH RBC QN AUTO: 26.1 PG (ref 26.6–33)
MCHC RBC AUTO-ENTMCNC: 32.7 G/DL (ref 31.5–35.7)
MCV RBC AUTO: 79.7 FL (ref 79–97)
MONOCYTES # BLD AUTO: 0.4 10*3/MM3 (ref 0.1–0.9)
MONOCYTES NFR BLD AUTO: 12.3 % (ref 5–12)
NEUTROPHILS NFR BLD AUTO: 1.3 10*3/MM3 (ref 1.7–7)
NEUTROPHILS NFR BLD AUTO: 40.2 % (ref 42.7–76)
NRBC BLD AUTO-RTO: 0 /100 WBC (ref 0–0.2)
PLATELET # BLD AUTO: 262 10*3/MM3 (ref 140–450)
PMV BLD AUTO: 9.7 FL (ref 6–12)
POTASSIUM SERPL-SCNC: 3.7 MMOL/L (ref 3.5–5.2)
PROT SERPL-MCNC: 7.6 G/DL (ref 6–8.5)
RBC # BLD AUTO: 4.64 10*6/MM3 (ref 3.77–5.28)
SODIUM SERPL-SCNC: 140 MMOL/L (ref 136–145)
WBC NRBC COR # BLD AUTO: 3.24 10*3/MM3 (ref 3.4–10.8)

## 2024-09-09 PROCEDURE — 0 DEXTROSE 5 % SOLUTION 500 ML FLEX CONT: Performed by: NURSE PRACTITIONER

## 2024-09-09 PROCEDURE — 96415 CHEMO IV INFUSION ADDL HR: CPT

## 2024-09-09 PROCEDURE — 96413 CHEMO IV INFUSION 1 HR: CPT

## 2024-09-09 PROCEDURE — 25010000002 METHYLPREDNISOLONE PER 125 MG: Performed by: INTERNAL MEDICINE

## 2024-09-09 PROCEDURE — 25810000003 SODIUM CHLORIDE 0.9 % SOLUTION: Performed by: INTERNAL MEDICINE

## 2024-09-09 PROCEDURE — 85025 COMPLETE CBC W/AUTO DIFF WBC: CPT | Performed by: INTERNAL MEDICINE

## 2024-09-09 PROCEDURE — 80053 COMPREHEN METABOLIC PANEL: CPT | Performed by: INTERNAL MEDICINE

## 2024-09-09 PROCEDURE — 25010000002 DIPHENHYDRAMINE PER 50 MG: Performed by: INTERNAL MEDICINE

## 2024-09-09 PROCEDURE — 96368 THER/DIAG CONCURRENT INF: CPT

## 2024-09-09 PROCEDURE — 83735 ASSAY OF MAGNESIUM: CPT | Performed by: INTERNAL MEDICINE

## 2024-09-09 PROCEDURE — 96375 TX/PRO/DX INJ NEW DRUG ADDON: CPT

## 2024-09-09 PROCEDURE — 96366 THER/PROPH/DIAG IV INF ADDON: CPT

## 2024-09-09 PROCEDURE — 25010000002 PALONOSETRON 0.25 MG/5ML SOLUTION PREFILLED SYRINGE: Performed by: INTERNAL MEDICINE

## 2024-09-09 PROCEDURE — 25810000003 SODIUM CHLORIDE 0.9 % SOLUTION 250 ML FLEX CONT: Performed by: NURSE PRACTITIONER

## 2024-09-09 PROCEDURE — G0498 CHEMO EXTEND IV INFUS W/PUMP: HCPCS

## 2024-09-09 PROCEDURE — 25010000002 FLUOROURACIL PER 500 MG: Performed by: NURSE PRACTITIONER

## 2024-09-09 PROCEDURE — 96411 CHEMO IV PUSH ADDL DRUG: CPT

## 2024-09-09 PROCEDURE — 25010000002 LEUCOVORIN CALCIUM PER 50MG: Performed by: NURSE PRACTITIONER

## 2024-09-09 PROCEDURE — 0 DEXTROSE 5 % SOLUTION 250 ML FLEX CONT: Performed by: NURSE PRACTITIONER

## 2024-09-09 PROCEDURE — 25010000002 CETUXIMAB PER 10 MG: Performed by: INTERNAL MEDICINE

## 2024-09-09 PROCEDURE — 96417 CHEMO IV INFUS EACH ADDL SEQ: CPT

## 2024-09-09 PROCEDURE — 25010000002 IRINOTECAN PER 20 MG: Performed by: NURSE PRACTITIONER

## 2024-09-09 RX ORDER — FAMOTIDINE 10 MG/ML
20 INJECTION, SOLUTION INTRAVENOUS ONCE
Status: COMPLETED | OUTPATIENT
Start: 2024-09-09 | End: 2024-09-09

## 2024-09-09 RX ORDER — FAMOTIDINE 10 MG/ML
20 INJECTION, SOLUTION INTRAVENOUS AS NEEDED
Status: DISCONTINUED | OUTPATIENT
Start: 2024-09-09 | End: 2024-09-10 | Stop reason: HOSPADM

## 2024-09-09 RX ORDER — MEPERIDINE HYDROCHLORIDE 25 MG/ML
25 INJECTION INTRAMUSCULAR; INTRAVENOUS; SUBCUTANEOUS
Status: DISCONTINUED | OUTPATIENT
Start: 2024-09-09 | End: 2024-09-10 | Stop reason: HOSPADM

## 2024-09-09 RX ORDER — METHYLPREDNISOLONE SODIUM SUCCINATE 125 MG/2ML
125 INJECTION, POWDER, LYOPHILIZED, FOR SOLUTION INTRAMUSCULAR; INTRAVENOUS ONCE
Status: COMPLETED | OUTPATIENT
Start: 2024-09-09 | End: 2024-09-09

## 2024-09-09 RX ORDER — DIPHENHYDRAMINE HYDROCHLORIDE 50 MG/ML
50 INJECTION INTRAMUSCULAR; INTRAVENOUS AS NEEDED
Status: DISCONTINUED | OUTPATIENT
Start: 2024-09-09 | End: 2024-09-10 | Stop reason: HOSPADM

## 2024-09-09 RX ORDER — FLUOROURACIL 50 MG/ML
300 INJECTION, SOLUTION INTRAVENOUS ONCE
Status: COMPLETED | OUTPATIENT
Start: 2024-09-09 | End: 2024-09-09

## 2024-09-09 RX ORDER — SODIUM CHLORIDE 9 MG/ML
20 INJECTION, SOLUTION INTRAVENOUS ONCE
Status: COMPLETED | OUTPATIENT
Start: 2024-09-09 | End: 2024-09-09

## 2024-09-09 RX ORDER — PALONOSETRON 0.05 MG/ML
0.25 INJECTION, SOLUTION INTRAVENOUS ONCE
Status: COMPLETED | OUTPATIENT
Start: 2024-09-09 | End: 2024-09-09

## 2024-09-09 RX ORDER — FLUOROURACIL 50 MG/ML
300 INJECTION, SOLUTION INTRAVENOUS ONCE
Status: CANCELLED | OUTPATIENT
Start: 2024-09-09

## 2024-09-09 RX ORDER — ACETAMINOPHEN 325 MG/1
650 TABLET ORAL ONCE
Status: COMPLETED | OUTPATIENT
Start: 2024-09-09 | End: 2024-09-09

## 2024-09-09 RX ADMIN — FAMOTIDINE 20 MG: 10 INJECTION INTRAVENOUS at 10:37

## 2024-09-09 RX ADMIN — DIPHENHYDRAMINE HYDROCHLORIDE 50 MG: 50 INJECTION, SOLUTION INTRAMUSCULAR; INTRAVENOUS at 10:40

## 2024-09-09 RX ADMIN — CETUXIMAB 730 MG: 2 SOLUTION INTRAVENOUS at 11:12

## 2024-09-09 RX ADMIN — FLUOROURACIL 3280 MG: 50 INJECTION, SOLUTION INTRAVENOUS at 15:16

## 2024-09-09 RX ADMIN — FLUOROURACIL 550 MG: 50 INJECTION, SOLUTION INTRAVENOUS at 15:16

## 2024-09-09 RX ADMIN — ACETAMINOPHEN 650 MG: 325 TABLET, FILM COATED ORAL at 10:35

## 2024-09-09 RX ADMIN — LEUCOVORIN CALCIUM 550 MG: 10 INJECTION INTRAMUSCULAR; INTRAVENOUS at 13:25

## 2024-09-09 RX ADMIN — PALONOSETRON 0.25 MG: 0.25 INJECTION, SOLUTION INTRAVENOUS at 10:33

## 2024-09-09 RX ADMIN — METHYLPREDNISOLONE SODIUM SUCCINATE 125 MG: 125 INJECTION, POWDER, FOR SOLUTION INTRAMUSCULAR; INTRAVENOUS at 10:35

## 2024-09-09 RX ADMIN — ATROPINE SULFATE 0.25 MG: 0.4 INJECTION, SOLUTION INTRAVENOUS at 13:20

## 2024-09-09 RX ADMIN — SODIUM CHLORIDE 20 ML/HR: 9 INJECTION, SOLUTION INTRAVENOUS at 15:15

## 2024-09-09 RX ADMIN — IRINOTECAN HYDROCHLORIDE 240 MG: 20 INJECTION, SOLUTION INTRAVENOUS at 13:25

## 2024-09-11 ENCOUNTER — HOSPITAL ENCOUNTER (OUTPATIENT)
Facility: HOSPITAL | Age: 54
Discharge: HOME | End: 2024-09-11
Admitting: INTERNAL MEDICINE
Payer: COMMERCIAL

## 2024-09-11 VITALS
SYSTOLIC BLOOD PRESSURE: 126 MMHG | DIASTOLIC BLOOD PRESSURE: 89 MMHG | TEMPERATURE: 97.9 F | HEART RATE: 87 BPM | OXYGEN SATURATION: 96 % | RESPIRATION RATE: 12 BRPM

## 2024-09-11 DIAGNOSIS — C18.2 CANCER OF RIGHT COLON: ICD-10-CM

## 2024-09-11 DIAGNOSIS — C78.7 METASTATIC COLON CANCER TO LIVER: Primary | ICD-10-CM

## 2024-09-11 DIAGNOSIS — C18.9 METASTATIC COLON CANCER TO LIVER: Primary | ICD-10-CM

## 2024-09-11 PROCEDURE — 96523 IRRIG DRUG DELIVERY DEVICE: CPT

## 2024-09-11 PROCEDURE — 25010000002 HEPARIN LOCK FLUSH PER 10 UNITS: Performed by: NURSE PRACTITIONER

## 2024-09-11 RX ORDER — SODIUM CHLORIDE 0.9 % (FLUSH) 0.9 %
10 SYRINGE (ML) INJECTION AS NEEDED
Status: DISCONTINUED | OUTPATIENT
Start: 2024-09-11 | End: 2024-09-12 | Stop reason: HOSPADM

## 2024-09-11 RX ORDER — HEPARIN SODIUM (PORCINE) LOCK FLUSH IV SOLN 100 UNIT/ML 100 UNIT/ML
500 SOLUTION INTRAVENOUS AS NEEDED
OUTPATIENT
Start: 2024-09-11

## 2024-09-11 RX ORDER — HEPARIN SODIUM (PORCINE) LOCK FLUSH IV SOLN 100 UNIT/ML 100 UNIT/ML
500 SOLUTION INTRAVENOUS AS NEEDED
Status: DISCONTINUED | OUTPATIENT
Start: 2024-09-11 | End: 2024-09-12 | Stop reason: HOSPADM

## 2024-09-11 RX ORDER — SODIUM CHLORIDE 0.9 % (FLUSH) 0.9 %
10 SYRINGE (ML) INJECTION AS NEEDED
OUTPATIENT
Start: 2024-09-11

## 2024-09-11 RX ADMIN — Medication 10 ML: at 14:00

## 2024-09-11 RX ADMIN — HEPARIN 500 UNITS: 100 SYRINGE at 14:01

## 2024-09-12 ENCOUNTER — TELEMEDICINE (OUTPATIENT)
Dept: INTERNAL MEDICINE | Facility: CLINIC | Age: 54
End: 2024-09-12
Payer: COMMERCIAL

## 2024-09-12 DIAGNOSIS — C78.7 METASTATIC COLON CANCER TO LIVER: Primary | ICD-10-CM

## 2024-09-12 DIAGNOSIS — C18.9 METASTATIC COLON CANCER TO LIVER: Primary | ICD-10-CM

## 2024-09-12 PROCEDURE — 99213 OFFICE O/P EST LOW 20 MIN: CPT | Performed by: NURSE PRACTITIONER

## 2024-09-12 NOTE — PROGRESS NOTES
Patient has chosen to receive care through a telehealth visit.  Does the patient consent to use a video/audio device for their medical care today: Yes  The visit included audio and video interaction: Yes  Location of patient: home  Location of Provider: office  Active Parties:  Evon Bryant (APRN), Janette Sandoval (NATALIE), and patient.    Subjective    Amber Feng is a 54 y.o. female here for:  Chief Complaint   Patient presents with    referral     To [facility]       History of Present Illness     Patient would like a referral to [facility] in [facility] for second opinion of her metastatic colon cancer to liver. She has family members and friends that have went/currently going there. She was improving but tumor is back, size of lime. She is having side effects from the new medications prescribed by her oncologist; facial swelling, blisters on face and in mouth. This has resolved she has prescription creams and mouth washes to use prn. Had to skip the round of chemo due to white count being low. Low energy. Doesn't want to get out of bed most days on this new chemo regimen.     During today's visit, I reviewed the documented allergies, medications, chief complaint, and pertinent vitals.  I have confirmed with the patient that there have been no changes since this information was discussed with my clinical team member.    The following portions of the patient's history were reviewed and updated as appropriate: allergies, current medications, past family history, past medical history, past social history, past surgical history and problem list.    Review of Systems   Constitutional:  Positive for fatigue.   Skin:  Positive for rash.   Psychiatric/Behavioral:  The patient is nervous/anxious.    All other systems reviewed and are negative.      Unable to obtain vital signs at home      Objective   Nursing note reviewed.  General: healthy appearing, no distress.  HENT: no facial  deformities, hearing is within normal limits   Eyes: EOM intact, no obvious nystagmus.  Neck: phonation normal. No stridor  Pulm: Normal work of breathing  Neuro: A&O x 3. No abnormal movements.  Skin: No rashes appreciated to face. No visible cyanosis  Psych: Mood and affect appropriate. Insight normal. Judgement appropriate. Behavior normal. Memory normal.       Problem List Items Addressed This Visit          Hematology and Neoplasia    Metastatic colon cancer to liver - Primary    Relevant Orders    Ambulatory Referral to Oncology (Completed)     Referral to [facility] in [place], [place]      Evon Bryant, APRN

## 2024-09-13 LAB
NTRA SIGNATERA MTM READOUT: 3.57 MTM/ML
NTRA SIGNATERA TEST RESULT: POSITIVE

## 2024-09-23 ENCOUNTER — HOSPITAL ENCOUNTER (OUTPATIENT)
Facility: HOSPITAL | Age: 54
Discharge: HOME | End: 2024-09-23
Admitting: INTERNAL MEDICINE
Payer: COMMERCIAL

## 2024-09-23 VITALS
OXYGEN SATURATION: 97 % | RESPIRATION RATE: 16 BRPM | HEART RATE: 83 BPM | BODY MASS INDEX: 30.75 KG/M2 | TEMPERATURE: 98.2 F | HEIGHT: 62 IN | WEIGHT: 167.1 LBS | SYSTOLIC BLOOD PRESSURE: 136 MMHG | DIASTOLIC BLOOD PRESSURE: 89 MMHG

## 2024-09-23 DIAGNOSIS — C78.7 METASTATIC COLON CANCER TO LIVER: Primary | ICD-10-CM

## 2024-09-23 DIAGNOSIS — C18.9 METASTATIC COLON CANCER TO LIVER: Primary | ICD-10-CM

## 2024-09-23 LAB
ALBUMIN SERPL-MCNC: 3.7 G/DL (ref 3.5–5.2)
ALBUMIN/GLOB SERPL: 1.2 G/DL
ALP SERPL-CCNC: 262 U/L (ref 39–117)
ALT SERPL W P-5'-P-CCNC: 48 U/L (ref 1–33)
ANION GAP SERPL CALCULATED.3IONS-SCNC: 11.8 MMOL/L (ref 5–15)
AST SERPL-CCNC: 58 U/L (ref 1–32)
BASOPHILS # BLD AUTO: 0.01 10*3/MM3 (ref 0–0.2)
BASOPHILS NFR BLD AUTO: 0.3 % (ref 0–1.5)
BILIRUB SERPL-MCNC: 0.4 MG/DL (ref 0–1.2)
BUN SERPL-MCNC: 9 MG/DL (ref 6–20)
BUN/CREAT SERPL: 13.4 (ref 7–25)
CALCIUM SPEC-SCNC: 9 MG/DL (ref 8.6–10.5)
CHLORIDE SERPL-SCNC: 104 MMOL/L (ref 98–107)
CO2 SERPL-SCNC: 23.2 MMOL/L (ref 22–29)
CREAT SERPL-MCNC: 0.67 MG/DL (ref 0.57–1)
DEPRECATED RDW RBC AUTO: 45.1 FL (ref 37–54)
EGFRCR SERPLBLD CKD-EPI 2021: 104 ML/MIN/1.73
EOSINOPHIL # BLD AUTO: 0.06 10*3/MM3 (ref 0–0.4)
EOSINOPHIL NFR BLD AUTO: 1.6 % (ref 0.3–6.2)
ERYTHROCYTE [DISTWIDTH] IN BLOOD BY AUTOMATED COUNT: 15.9 % (ref 12.3–15.4)
GLOBULIN UR ELPH-MCNC: 3.1 GM/DL
GLUCOSE SERPL-MCNC: 101 MG/DL (ref 65–99)
HCT VFR BLD AUTO: 35.8 % (ref 34–46.6)
HGB BLD-MCNC: 11.7 G/DL (ref 12–15.9)
IMM GRANULOCYTES # BLD AUTO: 0 10*3/MM3 (ref 0–0.05)
IMM GRANULOCYTES NFR BLD AUTO: 0 % (ref 0–0.5)
LYMPHOCYTES # BLD AUTO: 1.59 10*3/MM3 (ref 0.7–3.1)
LYMPHOCYTES NFR BLD AUTO: 42.1 % (ref 19.6–45.3)
MAGNESIUM SERPL-MCNC: 1.6 MG/DL (ref 1.6–2.6)
MCH RBC QN AUTO: 26 PG (ref 26.6–33)
MCHC RBC AUTO-ENTMCNC: 32.7 G/DL (ref 31.5–35.7)
MCV RBC AUTO: 79.6 FL (ref 79–97)
MONOCYTES # BLD AUTO: 0.37 10*3/MM3 (ref 0.1–0.9)
MONOCYTES NFR BLD AUTO: 9.8 % (ref 5–12)
NEUTROPHILS NFR BLD AUTO: 1.75 10*3/MM3 (ref 1.7–7)
NEUTROPHILS NFR BLD AUTO: 46.2 % (ref 42.7–76)
NRBC BLD AUTO-RTO: 0 /100 WBC (ref 0–0.2)
PLATELET # BLD AUTO: 231 10*3/MM3 (ref 140–450)
PMV BLD AUTO: 9.3 FL (ref 6–12)
POTASSIUM SERPL-SCNC: 3.5 MMOL/L (ref 3.5–5.2)
PROT SERPL-MCNC: 6.8 G/DL (ref 6–8.5)
RBC # BLD AUTO: 4.5 10*6/MM3 (ref 3.77–5.28)
SODIUM SERPL-SCNC: 139 MMOL/L (ref 136–145)
WBC NRBC COR # BLD AUTO: 3.78 10*3/MM3 (ref 3.4–10.8)

## 2024-09-23 PROCEDURE — 25010000002 ALTEPLASE 2 MG RECONSTITUTED SOLUTION: Performed by: INTERNAL MEDICINE

## 2024-09-23 PROCEDURE — 96416 CHEMO PROLONG INFUSE W/PUMP: CPT

## 2024-09-23 PROCEDURE — G0498 CHEMO EXTEND IV INFUS W/PUMP: HCPCS

## 2024-09-23 PROCEDURE — 25010000002 LEUCOVORIN 500 MG RECONSTITUTED SOLUTION 1 EACH VIAL: Performed by: INTERNAL MEDICINE

## 2024-09-23 PROCEDURE — 96375 TX/PRO/DX INJ NEW DRUG ADDON: CPT

## 2024-09-23 PROCEDURE — 83735 ASSAY OF MAGNESIUM: CPT | Performed by: INTERNAL MEDICINE

## 2024-09-23 PROCEDURE — 96368 THER/DIAG CONCURRENT INF: CPT

## 2024-09-23 PROCEDURE — 0 DEXTROSE 5 % SOLUTION 500 ML FLEX CONT: Performed by: INTERNAL MEDICINE

## 2024-09-23 PROCEDURE — 25810000003 SODIUM CHLORIDE 0.9 % SOLUTION 250 ML FLEX CONT: Performed by: INTERNAL MEDICINE

## 2024-09-23 PROCEDURE — 96413 CHEMO IV INFUSION 1 HR: CPT

## 2024-09-23 PROCEDURE — 96411 CHEMO IV PUSH ADDL DRUG: CPT

## 2024-09-23 PROCEDURE — 25010000002 METHYLPREDNISOLONE PER 125 MG: Performed by: INTERNAL MEDICINE

## 2024-09-23 PROCEDURE — 25010000002 IRINOTECAN PER 20 MG: Performed by: INTERNAL MEDICINE

## 2024-09-23 PROCEDURE — 96417 CHEMO IV INFUS EACH ADDL SEQ: CPT

## 2024-09-23 PROCEDURE — 80053 COMPREHEN METABOLIC PANEL: CPT | Performed by: INTERNAL MEDICINE

## 2024-09-23 PROCEDURE — 25010000002 PALONOSETRON 0.25 MG/5ML SOLUTION PREFILLED SYRINGE: Performed by: INTERNAL MEDICINE

## 2024-09-23 PROCEDURE — 36593 DECLOT VASCULAR DEVICE: CPT

## 2024-09-23 PROCEDURE — 85025 COMPLETE CBC W/AUTO DIFF WBC: CPT | Performed by: INTERNAL MEDICINE

## 2024-09-23 PROCEDURE — 0 DEXTROSE 5 % SOLUTION 250 ML FLEX CONT: Performed by: INTERNAL MEDICINE

## 2024-09-23 PROCEDURE — 25010000002 FLUOROURACIL PER 500 MG: Performed by: INTERNAL MEDICINE

## 2024-09-23 PROCEDURE — 25010000002 LEUCOVORIN 200 MG RECONSTITUTED SOLUTION 1 EACH VIAL: Performed by: INTERNAL MEDICINE

## 2024-09-23 PROCEDURE — 96415 CHEMO IV INFUSION ADDL HR: CPT

## 2024-09-23 RX ORDER — ACETAMINOPHEN 325 MG/1
650 TABLET ORAL ONCE
OUTPATIENT
Start: 2024-10-09

## 2024-09-23 RX ORDER — FLUOROURACIL 50 MG/ML
300 INJECTION, SOLUTION INTRAVENOUS ONCE
OUTPATIENT
Start: 2024-10-09

## 2024-09-23 RX ORDER — MEPERIDINE HYDROCHLORIDE 25 MG/ML
25 INJECTION INTRAMUSCULAR; INTRAVENOUS; SUBCUTANEOUS
Status: DISCONTINUED | OUTPATIENT
Start: 2024-09-23 | End: 2024-09-24 | Stop reason: HOSPADM

## 2024-09-23 RX ORDER — PALONOSETRON 0.05 MG/ML
0.25 INJECTION, SOLUTION INTRAVENOUS ONCE
OUTPATIENT
Start: 2024-10-09

## 2024-09-23 RX ORDER — DIPHENHYDRAMINE HYDROCHLORIDE 50 MG/ML
50 INJECTION INTRAMUSCULAR; INTRAVENOUS AS NEEDED
Status: DISCONTINUED | OUTPATIENT
Start: 2024-09-23 | End: 2024-09-24 | Stop reason: HOSPADM

## 2024-09-23 RX ORDER — FAMOTIDINE 10 MG/ML
20 INJECTION, SOLUTION INTRAVENOUS AS NEEDED
Status: DISCONTINUED | OUTPATIENT
Start: 2024-09-23 | End: 2024-09-24 | Stop reason: HOSPADM

## 2024-09-23 RX ORDER — WATER 10 ML/10ML
INJECTION INTRAMUSCULAR; INTRAVENOUS; SUBCUTANEOUS
Status: COMPLETED
Start: 2024-09-23 | End: 2024-09-23

## 2024-09-23 RX ORDER — METHYLPREDNISOLONE SODIUM SUCCINATE 125 MG/2ML
125 INJECTION, POWDER, LYOPHILIZED, FOR SOLUTION INTRAMUSCULAR; INTRAVENOUS ONCE
OUTPATIENT
Start: 2024-10-09

## 2024-09-23 RX ORDER — FLUOROURACIL 50 MG/ML
300 INJECTION, SOLUTION INTRAVENOUS ONCE
Status: CANCELLED | OUTPATIENT
Start: 2024-09-25

## 2024-09-23 RX ORDER — FAMOTIDINE 10 MG/ML
20 INJECTION, SOLUTION INTRAVENOUS ONCE
OUTPATIENT
Start: 2024-10-09

## 2024-09-23 RX ORDER — FLUOROURACIL 50 MG/ML
300 INJECTION, SOLUTION INTRAVENOUS ONCE
Status: COMPLETED | OUTPATIENT
Start: 2024-09-23 | End: 2024-09-23

## 2024-09-23 RX ORDER — METHYLPREDNISOLONE SODIUM SUCCINATE 125 MG/2ML
125 INJECTION, POWDER, LYOPHILIZED, FOR SOLUTION INTRAMUSCULAR; INTRAVENOUS ONCE
Status: COMPLETED | OUTPATIENT
Start: 2024-09-23 | End: 2024-09-23

## 2024-09-23 RX ORDER — DIPHENHYDRAMINE HYDROCHLORIDE 50 MG/ML
50 INJECTION INTRAMUSCULAR; INTRAVENOUS AS NEEDED
OUTPATIENT
Start: 2024-10-09

## 2024-09-23 RX ORDER — FAMOTIDINE 10 MG/ML
20 INJECTION, SOLUTION INTRAVENOUS AS NEEDED
OUTPATIENT
Start: 2024-10-09

## 2024-09-23 RX ORDER — SODIUM CHLORIDE 9 MG/ML
20 INJECTION, SOLUTION INTRAVENOUS ONCE
OUTPATIENT
Start: 2024-10-09

## 2024-09-23 RX ORDER — ATROPINE SULFATE 1 MG/ML
0.25 INJECTION, SOLUTION INTRAMUSCULAR; INTRAVENOUS; SUBCUTANEOUS
OUTPATIENT
Start: 2024-10-09

## 2024-09-23 RX ORDER — ACETAMINOPHEN 325 MG/1
650 TABLET ORAL ONCE
Status: COMPLETED | OUTPATIENT
Start: 2024-09-23 | End: 2024-09-23

## 2024-09-23 RX ORDER — PALONOSETRON 0.05 MG/ML
0.25 INJECTION, SOLUTION INTRAVENOUS ONCE
Status: COMPLETED | OUTPATIENT
Start: 2024-09-23 | End: 2024-09-23

## 2024-09-23 RX ORDER — MEPERIDINE HYDROCHLORIDE 25 MG/ML
25 INJECTION INTRAMUSCULAR; INTRAVENOUS; SUBCUTANEOUS
OUTPATIENT
Start: 2024-10-09

## 2024-09-23 RX ORDER — FAMOTIDINE 10 MG/ML
20 INJECTION, SOLUTION INTRAVENOUS ONCE
Status: COMPLETED | OUTPATIENT
Start: 2024-09-23 | End: 2024-09-23

## 2024-09-23 RX ORDER — SODIUM CHLORIDE 9 MG/ML
20 INJECTION, SOLUTION INTRAVENOUS ONCE
Status: DISCONTINUED | OUTPATIENT
Start: 2024-09-23 | End: 2024-09-24 | Stop reason: HOSPADM

## 2024-09-23 RX ADMIN — ALTEPLASE: 2.2 INJECTION, POWDER, LYOPHILIZED, FOR SOLUTION INTRAVENOUS at 10:30

## 2024-09-23 RX ADMIN — FAMOTIDINE 20 MG: 10 INJECTION INTRAVENOUS at 11:31

## 2024-09-23 RX ADMIN — METHYLPREDNISOLONE SODIUM SUCCINATE 125 MG: 125 INJECTION, POWDER, FOR SOLUTION INTRAMUSCULAR; INTRAVENOUS at 11:31

## 2024-09-23 RX ADMIN — ACETAMINOPHEN 650 MG: 325 TABLET, FILM COATED ORAL at 11:31

## 2024-09-23 RX ADMIN — LEUCOVORIN CALCIUM 550 MG: 500 INJECTION, POWDER, LYOPHILIZED, FOR SOLUTION INTRAMUSCULAR; INTRAVENOUS at 11:50

## 2024-09-23 RX ADMIN — IRINOTECAN HYDROCHLORIDE 240 MG: 20 INJECTION INTRAVENOUS at 11:51

## 2024-09-23 RX ADMIN — PALONOSETRON 0.25 MG: 0.25 INJECTION, SOLUTION INTRAVENOUS at 11:31

## 2024-09-23 RX ADMIN — FLUOROURACIL 3280 MG: 50 INJECTION, SOLUTION INTRAVENOUS at 13:45

## 2024-09-23 RX ADMIN — WATER 10 ML: 1 INJECTION INTRAMUSCULAR; INTRAVENOUS; SUBCUTANEOUS at 10:30

## 2024-09-23 RX ADMIN — FLUOROURACIL 550 MG: 50 INJECTION, SOLUTION INTRAVENOUS at 13:44

## 2024-09-23 RX ADMIN — ATROPINE SULFATE 0.25 MG: 0.4 INJECTION, SOLUTION INTRAVENOUS at 11:55

## 2024-09-25 ENCOUNTER — OFFICE VISIT (OUTPATIENT)
Dept: ONCOLOGY | Facility: CLINIC | Age: 54
End: 2024-09-25
Payer: COMMERCIAL

## 2024-09-25 ENCOUNTER — HOSPITAL ENCOUNTER (OUTPATIENT)
Facility: HOSPITAL | Age: 54
Discharge: HOME | End: 2024-09-25
Admitting: INTERNAL MEDICINE
Payer: COMMERCIAL

## 2024-09-25 VITALS
HEART RATE: 84 BPM | SYSTOLIC BLOOD PRESSURE: 147 MMHG | BODY MASS INDEX: 31.03 KG/M2 | TEMPERATURE: 97.3 F | HEIGHT: 62 IN | DIASTOLIC BLOOD PRESSURE: 79 MMHG | RESPIRATION RATE: 16 BRPM | WEIGHT: 168.6 LBS | OXYGEN SATURATION: 99 %

## 2024-09-25 DIAGNOSIS — C78.7 METASTATIC COLON CANCER TO LIVER: Primary | ICD-10-CM

## 2024-09-25 DIAGNOSIS — C18.9 METASTATIC COLON CANCER TO LIVER: Primary | ICD-10-CM

## 2024-09-25 DIAGNOSIS — C18.2 CANCER OF RIGHT COLON: Primary | ICD-10-CM

## 2024-09-25 DIAGNOSIS — C18.9 METASTATIC COLON CANCER TO LIVER: ICD-10-CM

## 2024-09-25 DIAGNOSIS — C18.2 CANCER OF RIGHT COLON: ICD-10-CM

## 2024-09-25 DIAGNOSIS — C78.7 METASTATIC COLON CANCER TO LIVER: ICD-10-CM

## 2024-09-25 PROCEDURE — 96523 IRRIG DRUG DELIVERY DEVICE: CPT

## 2024-09-25 PROCEDURE — 99215 OFFICE O/P EST HI 40 MIN: CPT | Performed by: INTERNAL MEDICINE

## 2024-09-25 PROCEDURE — 25010000002 HEPARIN LOCK FLUSH PER 10 UNITS

## 2024-09-25 RX ORDER — HEPARIN SODIUM (PORCINE) LOCK FLUSH IV SOLN 100 UNIT/ML 100 UNIT/ML
500 SOLUTION INTRAVENOUS AS NEEDED
OUTPATIENT
Start: 2024-09-25

## 2024-09-25 RX ORDER — HEPARIN SODIUM (PORCINE) LOCK FLUSH IV SOLN 100 UNIT/ML 100 UNIT/ML
500 SOLUTION INTRAVENOUS AS NEEDED
Status: DISCONTINUED | OUTPATIENT
Start: 2024-09-25 | End: 2024-09-26 | Stop reason: HOSPADM

## 2024-09-25 RX ORDER — SODIUM CHLORIDE 9 MG/ML
20 INJECTION, SOLUTION INTRAVENOUS ONCE
OUTPATIENT
Start: 2024-10-23

## 2024-09-25 RX ORDER — PALONOSETRON 0.05 MG/ML
0.25 INJECTION, SOLUTION INTRAVENOUS ONCE
OUTPATIENT
Start: 2024-10-23

## 2024-09-25 RX ORDER — FLUOROURACIL 50 MG/ML
300 INJECTION, SOLUTION INTRAVENOUS ONCE
OUTPATIENT
Start: 2024-10-23

## 2024-09-25 RX ORDER — METHYLPREDNISOLONE SODIUM SUCCINATE 125 MG/2ML
125 INJECTION, POWDER, LYOPHILIZED, FOR SOLUTION INTRAMUSCULAR; INTRAVENOUS ONCE
OUTPATIENT
Start: 2024-10-23

## 2024-09-25 RX ORDER — ATROPINE SULFATE 1 MG/ML
0.25 INJECTION, SOLUTION INTRAMUSCULAR; INTRAVENOUS; SUBCUTANEOUS
OUTPATIENT
Start: 2024-10-23

## 2024-09-25 RX ORDER — FAMOTIDINE 10 MG/ML
20 INJECTION, SOLUTION INTRAVENOUS AS NEEDED
OUTPATIENT
Start: 2024-10-23

## 2024-09-25 RX ORDER — SODIUM CHLORIDE 0.9 % (FLUSH) 0.9 %
10 SYRINGE (ML) INJECTION AS NEEDED
OUTPATIENT
Start: 2024-09-25

## 2024-09-25 RX ORDER — ACETAMINOPHEN 325 MG/1
650 TABLET ORAL ONCE
OUTPATIENT
Start: 2024-10-23

## 2024-09-25 RX ORDER — FAMOTIDINE 10 MG/ML
20 INJECTION, SOLUTION INTRAVENOUS ONCE
OUTPATIENT
Start: 2024-10-23

## 2024-09-25 RX ORDER — DIPHENHYDRAMINE HYDROCHLORIDE 50 MG/ML
50 INJECTION INTRAMUSCULAR; INTRAVENOUS AS NEEDED
OUTPATIENT
Start: 2024-10-23

## 2024-09-25 RX ORDER — HEPARIN SODIUM (PORCINE) LOCK FLUSH IV SOLN 100 UNIT/ML 100 UNIT/ML
SOLUTION INTRAVENOUS
Status: COMPLETED
Start: 2024-09-25 | End: 2024-09-25

## 2024-09-25 RX ORDER — SODIUM CHLORIDE 0.9 % (FLUSH) 0.9 %
10 SYRINGE (ML) INJECTION AS NEEDED
Status: DISCONTINUED | OUTPATIENT
Start: 2024-09-25 | End: 2024-09-26 | Stop reason: HOSPADM

## 2024-09-25 RX ORDER — MEPERIDINE HYDROCHLORIDE 25 MG/ML
25 INJECTION INTRAMUSCULAR; INTRAVENOUS; SUBCUTANEOUS
OUTPATIENT
Start: 2024-10-23

## 2024-09-25 RX ADMIN — Medication 10 ML: at 14:55

## 2024-09-25 RX ADMIN — HEPARIN SODIUM (PORCINE) LOCK FLUSH IV SOLN 100 UNIT/ML 500 UNITS: 100 SOLUTION at 14:55

## 2024-09-25 RX ADMIN — HEPARIN 500 UNITS: 100 SYRINGE at 14:55

## 2024-10-03 ENCOUNTER — OFFICE VISIT (OUTPATIENT)
Dept: ONCOLOGY | Facility: CLINIC | Age: 54
End: 2024-10-03
Payer: COMMERCIAL

## 2024-10-03 VITALS
HEIGHT: 62 IN | HEART RATE: 95 BPM | TEMPERATURE: 97.7 F | SYSTOLIC BLOOD PRESSURE: 142 MMHG | DIASTOLIC BLOOD PRESSURE: 81 MMHG | OXYGEN SATURATION: 100 % | WEIGHT: 163.9 LBS | RESPIRATION RATE: 16 BRPM | BODY MASS INDEX: 30.16 KG/M2

## 2024-10-03 DIAGNOSIS — R21 RASH: ICD-10-CM

## 2024-10-03 DIAGNOSIS — C18.2 CANCER OF RIGHT COLON: Primary | ICD-10-CM

## 2024-10-03 DIAGNOSIS — C18.9 METASTATIC COLON CANCER TO LIVER: ICD-10-CM

## 2024-10-03 DIAGNOSIS — C78.7 METASTATIC COLON CANCER TO LIVER: ICD-10-CM

## 2024-10-03 LAB
CYTO UR: NORMAL
LAB AP CASE REPORT: NORMAL
LAB AP CLINICAL INFORMATION: NORMAL
LAB AP DIAGNOSIS COMMENT: NORMAL
LAB AP OUTSIDE REPORT, ADDENDUM: NORMAL
PATH REPORT.FINAL DX SPEC: NORMAL
PATH REPORT.GROSS SPEC: NORMAL

## 2024-10-03 RX ORDER — PREDNISONE 10 MG/1
10 TABLET ORAL DAILY
Qty: 84 TABLET | Refills: 0 | Status: SHIPPED | OUTPATIENT
Start: 2024-10-03

## 2024-10-03 RX ORDER — DOXYCYCLINE HYCLATE 100 MG
100 TABLET ORAL 2 TIMES DAILY
Qty: 28 TABLET | Refills: 3 | Status: SHIPPED | OUTPATIENT
Start: 2024-10-03

## 2024-10-03 NOTE — PROGRESS NOTES
DATE OF VISIT: 10/3/2024    REASON FOR VISIT: Followup for metastatic right-sided colon cancer     PROBLEM LIST:  1. Metastatic colon cancer TX NX M1 a stage Perry:  A.  Presented with abdominal pain and jaundice  B.  CT abdomen pelvis done November 04, 2022 confirmed diffuse liver metastases.  C.  Diagnosed after CT-guided liver biopsy done on November 22, 2022 confirming adenocarcinoma CK20 positive CK7 negative.  D.  Colonoscopy done December 08, 2020 to confirm transverse colon mass however biopsy revealed adenoma.  E.  Started palliative chemotherapy with dose adjusted FOLFOXIRI December 14, 2022, status post 12 cycles completed May 2023.  F.  Started maintenance Xeloda 1000 mg twice daily 1 week on 1 week off June 2023.  Stopped April 2024 secondary to progressive disease in the liver.  G.  Status post segment 7 and segment 3 liver met resection with 2 other lesions ablation done by Dr. Nielson April 19, 2024.  H.  Started FOLFIRI with Vectibix August 19, 2024.  Status post 3 cycles.  2.  Elevated liver enzymes:  A.  Induced by metastatic disease  3.  Cancer-related pain  4.  Anxiety  5.  Hypertension    HISTORY OF PRESENT ILLNESS: The patient is a very pleasant 54 y.o. female with past medical history significant for metastatic right-sided colon cancer diagnosed November 22, 2022.  Started on palliative chemotherapy with dose adjusted FOLFOXIRI.  She completed 12 cycles May 2023.  She was started on maintenance Xeloda 1000 mg twice daily June 2023.  The patient is here today for scheduled follow-up visit with treatment.    SUBJECTIVE: Amber is here today by herself.  She is not interested in taking Erbitux anymore.  Her skin reactions has continued to worsen since her last visit.  She is interested in hearing Vectibix but would like to wait until her scan is less painful.          Past History:  Medical History: has a past medical history of Anxiety, Cancer, Ear piercing, Gallstones, History of irritable bowel  "syndrome, Hypertension, Seasonal allergies, Tattoos, and Wears glasses.   Surgical History: has a past surgical history that includes postpartum tubal ligation; Dilation and curettage of uterus (2013); Tumor removal (2013); Portacath placement (N/A, 12/8/2022); Colonoscopy (N/A, 12/8/2022); diagnostic laparoscopy exploratory laparotomy (N/A, 4/19/2024); Cholecystectomy (N/A, 4/19/2024); and Liver resection (Left, 4/19/2024).   Family History: family history is not on file.   Social History: reports that she has never smoked. She has never used smokeless tobacco. She reports current alcohol use. She reports that she does not use drugs.    (Not in a hospital admission)     Allergies: Losartan, Valium [diazepam], and Citrus     Review of Systems   Constitutional:  Positive for fatigue.   HENT:  Positive for mouth sores.    Gastrointestinal:  Positive for abdominal pain.   Musculoskeletal:  Positive for arthralgias.   Skin:         Facial acne like rash.   Psychiatric/Behavioral:  The patient is nervous/anxious.        PHYSICAL EXAMINATION:   /81   Pulse 95   Temp 97.7 °F (36.5 °C)   Resp 16   Ht 157.5 cm (62.01\")   Wt 74.3 kg (163 lb 14.4 oz)   SpO2 100%   BMI 29.97 kg/m²    Pain Score    10/03/24 1314   PainSc: 10-Worst pain ever                        ECOG Performance Status: 1 - Symptomatic but completely ambulatory      General Appearance:      alert, cooperative, no apparent distress and appears stated age   Lungs:   Clear to auscultation bilaterally; respirations regular, even, and unlabored bilaterally   Heart:  Regular rate and rhythm, no murmurs appreciated   Abdomen:   Soft, non-tender, and non-distended                 Hospital Outpatient Visit on 09/23/2024   Component Date Value Ref Range Status    Glucose 09/23/2024 101 (H)  65 - 99 mg/dL Final    BUN 09/23/2024 9  6 - 20 mg/dL Final    Creatinine 09/23/2024 0.67  0.57 - 1.00 mg/dL Final    Sodium 09/23/2024 139  136 - 145 mmol/L Final    " Potassium 09/23/2024 3.5  3.5 - 5.2 mmol/L Final    Chloride 09/23/2024 104  98 - 107 mmol/L Final    CO2 09/23/2024 23.2  22.0 - 29.0 mmol/L Final    Calcium 09/23/2024 9.0  8.6 - 10.5 mg/dL Final    Total Protein 09/23/2024 6.8  6.0 - 8.5 g/dL Final    Albumin 09/23/2024 3.7  3.5 - 5.2 g/dL Final    ALT (SGPT) 09/23/2024 48 (H)  1 - 33 U/L Final    AST (SGOT) 09/23/2024 58 (H)  1 - 32 U/L Final    Alkaline Phosphatase 09/23/2024 262 (H)  39 - 117 U/L Final    Total Bilirubin 09/23/2024 0.4  0.0 - 1.2 mg/dL Final    Globulin 09/23/2024 3.1  gm/dL Final    A/G Ratio 09/23/2024 1.2  g/dL Final    BUN/Creatinine Ratio 09/23/2024 13.4  7.0 - 25.0 Final    Anion Gap 09/23/2024 11.8  5.0 - 15.0 mmol/L Final    eGFR 09/23/2024 104.0  >60.0 mL/min/1.73 Final    Magnesium 09/23/2024 1.6  1.6 - 2.6 mg/dL Final    WBC 09/23/2024 3.78  3.40 - 10.80 10*3/mm3 Final    RBC 09/23/2024 4.50  3.77 - 5.28 10*6/mm3 Final    Hemoglobin 09/23/2024 11.7 (L)  12.0 - 15.9 g/dL Final    Hematocrit 09/23/2024 35.8  34.0 - 46.6 % Final    MCV 09/23/2024 79.6  79.0 - 97.0 fL Final    MCH 09/23/2024 26.0 (L)  26.6 - 33.0 pg Final    MCHC 09/23/2024 32.7  31.5 - 35.7 g/dL Final    RDW 09/23/2024 15.9 (H)  12.3 - 15.4 % Final    RDW-SD 09/23/2024 45.1  37.0 - 54.0 fl Final    MPV 09/23/2024 9.3  6.0 - 12.0 fL Final    Platelets 09/23/2024 231  140 - 450 10*3/mm3 Final    Neutrophil % 09/23/2024 46.2  42.7 - 76.0 % Final    Lymphocyte % 09/23/2024 42.1  19.6 - 45.3 % Final    Monocyte % 09/23/2024 9.8  5.0 - 12.0 % Final    Eosinophil % 09/23/2024 1.6  0.3 - 6.2 % Final    Basophil % 09/23/2024 0.3  0.0 - 1.5 % Final    Immature Grans % 09/23/2024 0.0  0.0 - 0.5 % Final    Neutrophils, Absolute 09/23/2024 1.75  1.70 - 7.00 10*3/mm3 Final    Lymphocytes, Absolute 09/23/2024 1.59  0.70 - 3.10 10*3/mm3 Final    Monocytes, Absolute 09/23/2024 0.37  0.10 - 0.90 10*3/mm3 Final    Eosinophils, Absolute 09/23/2024 0.06  0.00 - 0.40 10*3/mm3 Final     Basophils, Absolute 09/23/2024 0.01  0.00 - 0.20 10*3/mm3 Final    Immature Grans, Absolute 09/23/2024 0.00  0.00 - 0.05 10*3/mm3 Final    nRBC 09/23/2024 0.0  0.0 - 0.2 /100 WBC Final        No results found.        TOPICS EDUCATION PROVIDED   ANEMIA:  role of RBC, cause, s/s, ways to manage, role of transfusion [x]   THROMBOCYTOPENIA:  role of platelet, cause, s/s, ways to prevent bleeding, things to avoid, when to seek help [x]   NEUTROPENIA:  role of WBC, cause, infection precautions, s/s of infection, when to call MD [x]   NUTRITION & APPETITE CHANGES:  importance of maintaining healthy diet & weight, ways to manage to improve intake, dietary consult, exercise regimen [x]   DIARRHEA:  causes, s/s of dehydration, ways to manage, dietary changes, when to call MD [x]   CONSTIPATION:  causes, ways to manage, dietary changes, when to call MD [x]   NAUSEA & VOMITING:  cause, use of antiemetics, dietary changes, when to call MD [x]   MOUTH SORES:  causes, oral care, ways to manage [x]   ALOPECIA:  cause, ways to manage, resources [x]   INFERTILITY & SEXUALITY:  causes, fertility preservation options, sexuality changes, ways to manage, importance of birth control [x]   NERVOUS SYSTEM CHANGES:  causes, s/s, neuropathies, cognitive changes, ways to manage [x]   PAIN:  causes, ways to manage [x]   SKIN & NAIL CHANGES:  cause, s/s, ways to manage [x]   ORGAN TOXICITIES:  cause, s/s, need for diagnostic tests, labs, when to notify MD [x]   SURVIVORSHIP:  distress, distress assessment, secondary malignancies, early/late effects, follow-up, social issues, social support [x]   HOME CARE:  use of spill kits, storing of PO chemo, how to manage bodily fluids [x]   MISCELLANEOUS:  drug interactions, administration, vesicant, et [x]           ASSESSMENT: The patient is a very pleasant 54 y.o. female  with right-sided metastatic colon cancer      PLAN:    1.  Right-sided metastatic colon cancer:  A.  She has had a worsening skin  reaction since her last visit.  It is now covering her entire face with significant swelling of her lips and tongue.  We will hold treatment using FOLFIRI with Vectibix cycle 4 on Monday.  We will plan for her to follow up in a week and a half for evaluation of rash.  If this is resolved we will plan to proceed with cycle #4.   B.  She will follow-up with us in 2 weeks of cycle #4.   C.  We will consider maintenance therapy after maximum response.  D.  I will continue to monitor the patient blood work including blood counts kidney function liver function and electrolytes.  E.  I will repeat her Signatera with every other cycle.  F.  I will repeat her scans prior to cycle #7.    2.  Elevated liver enzymes:  A.  Induced by liver metastases  B. I will repeat labs on return    3.  Chemotherapy-induced nausea:  A.  I will continue the patient on Zofran as needed for    4.  Chemotherapy-induced diarrhea:  A.  I will continue the patient on Imodium as needed    5. Cancer-related pain:  A.  I will continue the patient oxycodone 5 mg twice a day.     6.  Anxiety:  A.  I will continue the patient on Ativan 1 mg nightly as needed.       7.  Hypertension:  A.  She will continue losartan.  B.  I reviewed with the patient this will interfere with our management since chemotherapy typically can cause hypotension we will have to monitor blood pressure closely.    8.  Dehydration:  A.  I will continue 1 L of IV fluid as needed    9.  Chemotherapy-induced anemia:  A.  I discussed with the patient her CBC result from September 23, 2024 hemoglobin low but improved at 11.7  B.  I will continue monitor her CBC prior to each infusion.  C.  I will transfuse as needed for hemoglobin less than 8.      10.  Chemotherapy-induced heartburn:  A.  I will continue pantoprazole 40 mg twice a day.    B.  I will continue Carafate 1 g every 6 hours    11.  Treatment related diarrhea  A.  Continue Imodium and Lomotil as directed.  Education sheet was  provided to the patient.    12.  Treatment induced peripheral neuropathy:   A.  We will continue gabapentin 100 mg 3 times daily.    13.  Treatment induced dermatitis, severe:  A.  Erbitux was permanently discontinued due to severe treatment induced dermatitis.  This is worsened over the past week since her visit with Dr. Lambert.  We will hold treatment on Monday and have her follow-up in a week and a half.    B.  I will start patient on Vectibix at 4 mg/kg in 2 weeks and if she is doing well we will consider increasing the dose to full dose at 6 mg/kg.  C.  I will continue hydrocortisone cream.  D.  I will continue doxycycline 100 mg twice a day.  I will refill today.  E.  I discussed the case with Dr. Mcallister.  We will start the patient on prednisone 60 mg with a 10 mg taper over 4 days.    14.  Treatment induced mucositis:  A.  I will continue Magic mouthwash.    FOLLOW UP: 2 weeks with cycle #4.    Total time of patient care including time prior to, face to face with patient, and following visit spent in reviewing records, lab results, imaging studies, discussion with patient, and documentation/charting was > 50 minutes            Ana Cristina Cano, APRN  10/3/2024

## 2024-10-18 DIAGNOSIS — I10 ESSENTIAL HYPERTENSION: ICD-10-CM

## 2024-10-18 RX ORDER — AMLODIPINE BESYLATE 5 MG/1
5 TABLET ORAL DAILY
Qty: 90 TABLET | Refills: 0 | Status: SHIPPED | OUTPATIENT
Start: 2024-10-18

## 2024-10-21 ENCOUNTER — HOSPITAL ENCOUNTER (OUTPATIENT)
Facility: HOSPITAL | Age: 54
Discharge: HOME | End: 2024-10-21
Admitting: INTERNAL MEDICINE
Payer: COMMERCIAL

## 2024-10-21 VITALS
HEART RATE: 87 BPM | OXYGEN SATURATION: 96 % | BODY MASS INDEX: 32.2 KG/M2 | WEIGHT: 176.1 LBS | DIASTOLIC BLOOD PRESSURE: 66 MMHG | SYSTOLIC BLOOD PRESSURE: 111 MMHG | RESPIRATION RATE: 16 BRPM | TEMPERATURE: 98.4 F

## 2024-10-21 DIAGNOSIS — C18.9 METASTATIC COLON CANCER TO LIVER: Primary | ICD-10-CM

## 2024-10-21 DIAGNOSIS — C78.7 METASTATIC COLON CANCER TO LIVER: Primary | ICD-10-CM

## 2024-10-21 LAB
ALBUMIN SERPL-MCNC: 3.7 G/DL (ref 3.5–5.2)
ALBUMIN/GLOB SERPL: 1.2 G/DL
ALP SERPL-CCNC: 239 U/L (ref 39–117)
ALT SERPL W P-5'-P-CCNC: 94 U/L (ref 1–33)
ANION GAP SERPL CALCULATED.3IONS-SCNC: 9 MMOL/L (ref 5–15)
AST SERPL-CCNC: 43 U/L (ref 1–32)
BASOPHILS # BLD AUTO: 0.03 10*3/MM3 (ref 0–0.2)
BASOPHILS NFR BLD AUTO: 0.4 % (ref 0–1.5)
BILIRUB SERPL-MCNC: 0.5 MG/DL (ref 0–1.2)
BUN SERPL-MCNC: 11 MG/DL (ref 6–20)
BUN/CREAT SERPL: 12.2 (ref 7–25)
CALCIUM SPEC-SCNC: 9 MG/DL (ref 8.6–10.5)
CHLORIDE SERPL-SCNC: 106 MMOL/L (ref 98–107)
CO2 SERPL-SCNC: 24 MMOL/L (ref 22–29)
CREAT SERPL-MCNC: 0.9 MG/DL (ref 0.57–1)
DEPRECATED RDW RBC AUTO: 57.8 FL (ref 37–54)
EGFRCR SERPLBLD CKD-EPI 2021: 76.1 ML/MIN/1.73
EOSINOPHIL # BLD AUTO: 0.2 10*3/MM3 (ref 0–0.4)
EOSINOPHIL NFR BLD AUTO: 2.9 % (ref 0.3–6.2)
ERYTHROCYTE [DISTWIDTH] IN BLOOD BY AUTOMATED COUNT: 19.2 % (ref 12.3–15.4)
GLOBULIN UR ELPH-MCNC: 3 GM/DL
GLUCOSE SERPL-MCNC: 113 MG/DL (ref 65–99)
HCT VFR BLD AUTO: 36 % (ref 34–46.6)
HGB BLD-MCNC: 11.3 G/DL (ref 12–15.9)
IMM GRANULOCYTES # BLD AUTO: 0.02 10*3/MM3 (ref 0–0.05)
IMM GRANULOCYTES NFR BLD AUTO: 0.3 % (ref 0–0.5)
LYMPHOCYTES # BLD AUTO: 1.92 10*3/MM3 (ref 0.7–3.1)
LYMPHOCYTES NFR BLD AUTO: 28.2 % (ref 19.6–45.3)
MAGNESIUM SERPL-MCNC: 1.7 MG/DL (ref 1.6–2.6)
MCH RBC QN AUTO: 26.4 PG (ref 26.6–33)
MCHC RBC AUTO-ENTMCNC: 31.4 G/DL (ref 31.5–35.7)
MCV RBC AUTO: 84.1 FL (ref 79–97)
MONOCYTES # BLD AUTO: 0.65 10*3/MM3 (ref 0.1–0.9)
MONOCYTES NFR BLD AUTO: 9.5 % (ref 5–12)
NEUTROPHILS NFR BLD AUTO: 3.99 10*3/MM3 (ref 1.7–7)
NEUTROPHILS NFR BLD AUTO: 58.7 % (ref 42.7–76)
NRBC BLD AUTO-RTO: 0 /100 WBC (ref 0–0.2)
PLATELET # BLD AUTO: 192 10*3/MM3 (ref 140–450)
PMV BLD AUTO: 10.7 FL (ref 6–12)
POTASSIUM SERPL-SCNC: 4.4 MMOL/L (ref 3.5–5.2)
PROT SERPL-MCNC: 6.7 G/DL (ref 6–8.5)
RBC # BLD AUTO: 4.28 10*6/MM3 (ref 3.77–5.28)
SODIUM SERPL-SCNC: 139 MMOL/L (ref 136–145)
WBC NRBC COR # BLD AUTO: 6.81 10*3/MM3 (ref 3.4–10.8)

## 2024-10-21 PROCEDURE — 96411 CHEMO IV PUSH ADDL DRUG: CPT

## 2024-10-21 PROCEDURE — 25010000002 METHYLPREDNISOLONE PER 125 MG: Performed by: INTERNAL MEDICINE

## 2024-10-21 PROCEDURE — 25010000002 IRINOTECAN PER 20 MG: Performed by: INTERNAL MEDICINE

## 2024-10-21 PROCEDURE — 96375 TX/PRO/DX INJ NEW DRUG ADDON: CPT

## 2024-10-21 PROCEDURE — 96368 THER/DIAG CONCURRENT INF: CPT

## 2024-10-21 PROCEDURE — 80053 COMPREHEN METABOLIC PANEL: CPT | Performed by: INTERNAL MEDICINE

## 2024-10-21 PROCEDURE — 96376 TX/PRO/DX INJ SAME DRUG ADON: CPT

## 2024-10-21 PROCEDURE — 25010000002 LEUCOVORIN CALCIUM PER 50MG: Performed by: INTERNAL MEDICINE

## 2024-10-21 PROCEDURE — 85025 COMPLETE CBC W/AUTO DIFF WBC: CPT | Performed by: INTERNAL MEDICINE

## 2024-10-21 PROCEDURE — 96417 CHEMO IV INFUS EACH ADDL SEQ: CPT

## 2024-10-21 PROCEDURE — 25810000003 SODIUM CHLORIDE 0.9 % SOLUTION 250 ML FLEX CONT: Performed by: INTERNAL MEDICINE

## 2024-10-21 PROCEDURE — 25010000002 PALONOSETRON 0.25 MG/5ML SOLUTION PREFILLED SYRINGE: Performed by: INTERNAL MEDICINE

## 2024-10-21 PROCEDURE — 96413 CHEMO IV INFUSION 1 HR: CPT

## 2024-10-21 PROCEDURE — 96366 THER/PROPH/DIAG IV INF ADDON: CPT

## 2024-10-21 PROCEDURE — 96415 CHEMO IV INFUSION ADDL HR: CPT

## 2024-10-21 PROCEDURE — 0 DEXTROSE 5 % SOLUTION 500 ML FLEX CONT: Performed by: INTERNAL MEDICINE

## 2024-10-21 PROCEDURE — 25010000002 PANITUMUMAB 400 MG/20ML SOLUTION 20 ML VIAL: Performed by: INTERNAL MEDICINE

## 2024-10-21 PROCEDURE — 83735 ASSAY OF MAGNESIUM: CPT | Performed by: INTERNAL MEDICINE

## 2024-10-21 PROCEDURE — 25010000002 FLUOROURACIL PER 500 MG: Performed by: INTERNAL MEDICINE

## 2024-10-21 PROCEDURE — 0 DEXTROSE 5 % SOLUTION 250 ML FLEX CONT: Performed by: INTERNAL MEDICINE

## 2024-10-21 PROCEDURE — G0498 CHEMO EXTEND IV INFUS W/PUMP: HCPCS

## 2024-10-21 RX ORDER — DIPHENHYDRAMINE HYDROCHLORIDE 50 MG/ML
50 INJECTION INTRAMUSCULAR; INTRAVENOUS AS NEEDED
Status: DISCONTINUED | OUTPATIENT
Start: 2024-10-21 | End: 2024-10-22 | Stop reason: HOSPADM

## 2024-10-21 RX ORDER — SODIUM CHLORIDE 9 MG/ML
20 INJECTION, SOLUTION INTRAVENOUS ONCE
Status: DISCONTINUED | OUTPATIENT
Start: 2024-10-21 | End: 2024-10-22 | Stop reason: HOSPADM

## 2024-10-21 RX ORDER — METHYLPREDNISOLONE SODIUM SUCCINATE 125 MG/2ML
125 INJECTION, POWDER, LYOPHILIZED, FOR SOLUTION INTRAMUSCULAR; INTRAVENOUS ONCE
Status: COMPLETED | OUTPATIENT
Start: 2024-10-21 | End: 2024-10-21

## 2024-10-21 RX ORDER — PALONOSETRON 0.05 MG/ML
0.25 INJECTION, SOLUTION INTRAVENOUS ONCE
Status: COMPLETED | OUTPATIENT
Start: 2024-10-21 | End: 2024-10-21

## 2024-10-21 RX ORDER — FAMOTIDINE 10 MG/ML
20 INJECTION, SOLUTION INTRAVENOUS ONCE
Status: COMPLETED | OUTPATIENT
Start: 2024-10-21 | End: 2024-10-21

## 2024-10-21 RX ORDER — DIPHENHYDRAMINE HYDROCHLORIDE 50 MG/ML
50 INJECTION INTRAMUSCULAR; INTRAVENOUS ONCE
Status: DISCONTINUED | OUTPATIENT
Start: 2024-10-21 | End: 2024-10-22 | Stop reason: HOSPADM

## 2024-10-21 RX ORDER — FLUOROURACIL 50 MG/ML
300 INJECTION, SOLUTION INTRAVENOUS ONCE
Status: COMPLETED | OUTPATIENT
Start: 2024-10-21 | End: 2024-10-21

## 2024-10-21 RX ORDER — ACETAMINOPHEN 325 MG/1
650 TABLET ORAL ONCE
Status: COMPLETED | OUTPATIENT
Start: 2024-10-21 | End: 2024-10-21

## 2024-10-21 RX ORDER — FAMOTIDINE 10 MG/ML
20 INJECTION, SOLUTION INTRAVENOUS AS NEEDED
Status: DISCONTINUED | OUTPATIENT
Start: 2024-10-21 | End: 2024-10-22 | Stop reason: HOSPADM

## 2024-10-21 RX ORDER — MEPERIDINE HYDROCHLORIDE 25 MG/ML
25 INJECTION INTRAMUSCULAR; INTRAVENOUS; SUBCUTANEOUS
Status: DISCONTINUED | OUTPATIENT
Start: 2024-10-21 | End: 2024-10-22 | Stop reason: HOSPADM

## 2024-10-21 RX ADMIN — ATROPINE SULFATE 0.25 MG: 0.4 INJECTION, SOLUTION INTRAVENOUS at 15:24

## 2024-10-21 RX ADMIN — FAMOTIDINE 20 MG: 10 INJECTION INTRAVENOUS at 11:57

## 2024-10-21 RX ADMIN — LEUCOVORIN CALCIUM 550 MG: 10 INJECTION INTRAMUSCULAR; INTRAVENOUS at 14:04

## 2024-10-21 RX ADMIN — METHYLPREDNISOLONE SODIUM SUCCINATE 125 MG: 125 INJECTION, POWDER, FOR SOLUTION INTRAMUSCULAR; INTRAVENOUS at 11:53

## 2024-10-21 RX ADMIN — FLUOROURACIL 3280 MG: 50 INJECTION, SOLUTION INTRAVENOUS at 15:38

## 2024-10-21 RX ADMIN — FLUOROURACIL 550 MG: 50 INJECTION, SOLUTION INTRAVENOUS at 15:38

## 2024-10-21 RX ADMIN — ATROPINE SULFATE 0.25 MG: 0.4 INJECTION, SOLUTION INTRAVENOUS at 14:00

## 2024-10-21 RX ADMIN — IRINOTECAN HYDROCHLORIDE 240 MG: 20 INJECTION, SOLUTION INTRAVENOUS at 14:04

## 2024-10-21 RX ADMIN — ACETAMINOPHEN 650 MG: 325 TABLET, FILM COATED ORAL at 11:50

## 2024-10-21 RX ADMIN — PANITUMUMAB 320 MG: 400 SOLUTION INTRAVENOUS at 12:52

## 2024-10-21 RX ADMIN — PALONOSETRON 0.25 MG: 0.25 INJECTION, SOLUTION INTRAVENOUS at 11:51

## 2024-10-23 ENCOUNTER — HOSPITAL ENCOUNTER (OUTPATIENT)
Facility: HOSPITAL | Age: 54
Discharge: HOME | End: 2024-10-23
Admitting: INTERNAL MEDICINE
Payer: COMMERCIAL

## 2024-10-23 ENCOUNTER — OFFICE VISIT (OUTPATIENT)
Dept: ONCOLOGY | Facility: CLINIC | Age: 54
End: 2024-10-23
Payer: COMMERCIAL

## 2024-10-23 VITALS
RESPIRATION RATE: 16 BRPM | OXYGEN SATURATION: 99 % | TEMPERATURE: 97.7 F | WEIGHT: 172.8 LBS | SYSTOLIC BLOOD PRESSURE: 153 MMHG | DIASTOLIC BLOOD PRESSURE: 91 MMHG | HEART RATE: 95 BPM | BODY MASS INDEX: 31.8 KG/M2 | HEIGHT: 62 IN

## 2024-10-23 DIAGNOSIS — C18.2 CANCER OF RIGHT COLON: Primary | ICD-10-CM

## 2024-10-23 DIAGNOSIS — C78.7 METASTATIC COLON CANCER TO LIVER: Primary | ICD-10-CM

## 2024-10-23 DIAGNOSIS — C18.2 CANCER OF RIGHT COLON: ICD-10-CM

## 2024-10-23 DIAGNOSIS — C18.9 METASTATIC COLON CANCER TO LIVER: Primary | ICD-10-CM

## 2024-10-23 PROCEDURE — 96360 HYDRATION IV INFUSION INIT: CPT

## 2024-10-23 PROCEDURE — 25810000003 SODIUM CHLORIDE 0.9 % SOLUTION: Performed by: INTERNAL MEDICINE

## 2024-10-23 PROCEDURE — 25010000002 HEPARIN LOCK FLUSH PER 10 UNITS: Performed by: NURSE PRACTITIONER

## 2024-10-23 RX ORDER — SODIUM CHLORIDE 9 MG/ML
1000 INJECTION, SOLUTION INTRAVENOUS CONTINUOUS
Status: ACTIVE | OUTPATIENT
Start: 2024-10-23 | End: 2024-10-23

## 2024-10-23 RX ORDER — SODIUM CHLORIDE 9 MG/ML
1000 INJECTION, SOLUTION INTRAVENOUS CONTINUOUS
Status: CANCELLED
Start: 2024-10-23

## 2024-10-23 RX ORDER — DIPHENOXYLATE HCL/ATROPINE 2.5-.025MG
1 TABLET ORAL 4 TIMES DAILY PRN
Qty: 45 TABLET | Refills: 4 | Status: SHIPPED | OUTPATIENT
Start: 2024-10-23

## 2024-10-23 RX ORDER — HEPARIN SODIUM (PORCINE) LOCK FLUSH IV SOLN 100 UNIT/ML 100 UNIT/ML
500 SOLUTION INTRAVENOUS AS NEEDED
OUTPATIENT
Start: 2024-10-23

## 2024-10-23 RX ORDER — SODIUM CHLORIDE 0.9 % (FLUSH) 0.9 %
10 SYRINGE (ML) INJECTION AS NEEDED
Status: DISCONTINUED | OUTPATIENT
Start: 2024-10-23 | End: 2024-10-24 | Stop reason: HOSPADM

## 2024-10-23 RX ORDER — HEPARIN SODIUM (PORCINE) LOCK FLUSH IV SOLN 100 UNIT/ML 100 UNIT/ML
500 SOLUTION INTRAVENOUS AS NEEDED
Status: DISCONTINUED | OUTPATIENT
Start: 2024-10-23 | End: 2024-10-24 | Stop reason: HOSPADM

## 2024-10-23 RX ORDER — SODIUM CHLORIDE 0.9 % (FLUSH) 0.9 %
10 SYRINGE (ML) INJECTION AS NEEDED
OUTPATIENT
Start: 2024-10-23

## 2024-10-23 RX ADMIN — Medication 10 ML: at 15:38

## 2024-10-23 RX ADMIN — SODIUM CHLORIDE 1000 ML: 9 INJECTION, SOLUTION INTRAVENOUS at 14:50

## 2024-10-23 RX ADMIN — HEPARIN 500 UNITS: 100 SYRINGE at 15:38

## 2024-10-23 NOTE — PROGRESS NOTES
DATE OF VISIT: 10/23/2024    REASON FOR VISIT: Followup for metastatic right-sided colon cancer     PROBLEM LIST:  1. Metastatic colon cancer TX NX M1 a stage Perry:  A.  Presented with abdominal pain and jaundice  B.  CT abdomen pelvis done November 04, 2022 confirmed diffuse liver metastases.  C.  Diagnosed after CT-guided liver biopsy done on November 22, 2022 confirming adenocarcinoma CK20 positive CK7 negative.  D.  Colonoscopy done December 08, 2020 to confirm transverse colon mass however biopsy revealed adenoma.  E.  Started palliative chemotherapy with dose adjusted FOLFOXIRI December 14, 2022, status post 12 cycles completed May 2023.  F.  Started maintenance Xeloda 1000 mg twice daily 1 week on 1 week off June 2023.  Stopped April 2024 secondary to progressive disease in the liver.  G.  Status post segment 7 and segment 3 liver met resection with 2 other lesions ablation done by Dr. Nielson April 19, 2024.  H.  Started FOLFIRI with Vectibix August 19, 2024.  Status post 3 cycles.  2.  Elevated liver enzymes:  A.  Induced by metastatic disease  3.  Cancer-related pain  4.  Anxiety  5.  Hypertension    HISTORY OF PRESENT ILLNESS: The patient is a very pleasant 54 y.o. female with past medical history significant for metastatic right-sided colon cancer diagnosed November 22, 2022.  Started on palliative chemotherapy with dose adjusted FOLFOXIRI.  She completed 12 cycles May 2023.  She was started on maintenance Xeloda 1000 mg twice daily June 2023.  The patient is here today for scheduled follow-up visit with treatment.    SUBJECTIVE: Amber is here today with her .  She is complaining of fatigue.  So far she has been able to tolerate Vectibix.  No fever chills or night sweats.    Past History:  Medical History: has a past medical history of Anxiety, Cancer, Ear piercing, Gallstones, History of irritable bowel syndrome, Hypertension, Seasonal allergies, Tattoos, and Wears glasses.   Surgical History: has  "a past surgical history that includes postpartum tubal ligation; Dilation and curettage of uterus (2013); Tumor removal (2013); Portacath placement (N/A, 12/8/2022); Colonoscopy (N/A, 12/8/2022); diagnostic laparoscopy exploratory laparotomy (N/A, 4/19/2024); Cholecystectomy (N/A, 4/19/2024); and Liver resection (Left, 4/19/2024).   Family History: family history is not on file.   Social History: reports that she has never smoked. She has never used smokeless tobacco. She reports current alcohol use. She reports that she does not use drugs.    (Not in a hospital admission)     Allergies: Losartan, Valium [diazepam], and Citrus     Review of Systems   Constitutional:  Positive for fatigue.   HENT:  Positive for mouth sores.    Gastrointestinal:  Positive for abdominal pain.   Musculoskeletal:  Positive for arthralgias.   Skin:         Facial acne like rash.   Psychiatric/Behavioral:  The patient is nervous/anxious.        PHYSICAL EXAMINATION:   /91   Pulse 95   Temp 97.7 °F (36.5 °C)   Resp 16   Ht 157.5 cm (62.01\")   Wt 78.4 kg (172 lb 12.8 oz)   SpO2 99%   BMI 31.60 kg/m²    Pain Score    10/23/24 1355   PainSc: 0-No pain                        ECOG Performance Status: 1 - Symptomatic but completely ambulatory      General Appearance:      alert, cooperative, no apparent distress and appears stated age   Lungs:   Clear to auscultation bilaterally; respirations regular, even, and unlabored bilaterally   Heart:  Regular rate and rhythm, no murmurs appreciated   Abdomen:   Soft, non-tender, and non-distended                 Hospital Outpatient Visit on 10/21/2024   Component Date Value Ref Range Status    Glucose 10/21/2024 113 (H)  65 - 99 mg/dL Final    BUN 10/21/2024 11  6 - 20 mg/dL Final    Creatinine 10/21/2024 0.90  0.57 - 1.00 mg/dL Final    Sodium 10/21/2024 139  136 - 145 mmol/L Final    Potassium 10/21/2024 4.4  3.5 - 5.2 mmol/L Final    Chloride 10/21/2024 106  98 - 107 mmol/L Final    CO2 " 10/21/2024 24.0  22.0 - 29.0 mmol/L Final    Calcium 10/21/2024 9.0  8.6 - 10.5 mg/dL Final    Total Protein 10/21/2024 6.7  6.0 - 8.5 g/dL Final    Albumin 10/21/2024 3.7  3.5 - 5.2 g/dL Final    ALT (SGPT) 10/21/2024 94 (H)  1 - 33 U/L Final    AST (SGOT) 10/21/2024 43 (H)  1 - 32 U/L Final    Alkaline Phosphatase 10/21/2024 239 (H)  39 - 117 U/L Final    Total Bilirubin 10/21/2024 0.5  0.0 - 1.2 mg/dL Final    Globulin 10/21/2024 3.0  gm/dL Final    A/G Ratio 10/21/2024 1.2  g/dL Final    BUN/Creatinine Ratio 10/21/2024 12.2  7.0 - 25.0 Final    Anion Gap 10/21/2024 9.0  5.0 - 15.0 mmol/L Final    eGFR 10/21/2024 76.1  >60.0 mL/min/1.73 Final    Magnesium 10/21/2024 1.7  1.6 - 2.6 mg/dL Final    WBC 10/21/2024 6.81  3.40 - 10.80 10*3/mm3 Final    RBC 10/21/2024 4.28  3.77 - 5.28 10*6/mm3 Final    Hemoglobin 10/21/2024 11.3 (L)  12.0 - 15.9 g/dL Final    Hematocrit 10/21/2024 36.0  34.0 - 46.6 % Final    MCV 10/21/2024 84.1  79.0 - 97.0 fL Final    MCH 10/21/2024 26.4 (L)  26.6 - 33.0 pg Final    MCHC 10/21/2024 31.4 (L)  31.5 - 35.7 g/dL Final    RDW 10/21/2024 19.2 (H)  12.3 - 15.4 % Final    RDW-SD 10/21/2024 57.8 (H)  37.0 - 54.0 fl Final    MPV 10/21/2024 10.7  6.0 - 12.0 fL Final    Platelets 10/21/2024 192  140 - 450 10*3/mm3 Final    Neutrophil % 10/21/2024 58.7  42.7 - 76.0 % Final    Lymphocyte % 10/21/2024 28.2  19.6 - 45.3 % Final    Monocyte % 10/21/2024 9.5  5.0 - 12.0 % Final    Eosinophil % 10/21/2024 2.9  0.3 - 6.2 % Final    Basophil % 10/21/2024 0.4  0.0 - 1.5 % Final    Immature Grans % 10/21/2024 0.3  0.0 - 0.5 % Final    Neutrophils, Absolute 10/21/2024 3.99  1.70 - 7.00 10*3/mm3 Final    Lymphocytes, Absolute 10/21/2024 1.92  0.70 - 3.10 10*3/mm3 Final    Monocytes, Absolute 10/21/2024 0.65  0.10 - 0.90 10*3/mm3 Final    Eosinophils, Absolute 10/21/2024 0.20  0.00 - 0.40 10*3/mm3 Final    Basophils, Absolute 10/21/2024 0.03  0.00 - 0.20 10*3/mm3 Final    Immature Grans, Absolute 10/21/2024  0.02  0.00 - 0.05 10*3/mm3 Final    nRBC 10/21/2024 0.0  0.0 - 0.2 /100 WBC Final        No results found.    ASSESSMENT: The patient is a very pleasant 54 y.o. female  with right-sided metastatic colon cancer      PLAN:    1.  Right-sided metastatic colon cancer:  A.  I will proceed with FOLFIRI plus Vectibix cycle #4.  B.  She will follow-up with us in 2 weeks for cycle #5.     C.  We will consider maintenance therapy after maximum response.  D.  I will continue to monitor the patient blood work including blood counts kidney function liver function and electrolytes.  E.  I will repeat her Signatera with every other cycle.  F.  I will repeat her scans prior to cycle #7.    2.  Elevated liver enzymes:  A.  Induced by liver metastases  B. I will repeat labs on return    3.  Chemotherapy-induced nausea:  A.  I will continue the patient on Zofran as needed for    4.  Chemotherapy-induced diarrhea:  A.  I will continue the patient on Imodium as needed    5. Cancer-related pain:  A.  I will continue the patient oxycodone 5 mg twice a day.     6.  Anxiety:  A.  I will continue the patient on Ativan 1 mg nightly as needed.       7.  Hypertension:  A.  She will continue losartan.  B.  I reviewed with the patient this will interfere with our management since chemotherapy typically can cause hypotension we will have to monitor blood pressure closely.    8.  Dehydration:  A.  I will continue 1 L of IV fluid as needed    9.  Chemotherapy-induced anemia:  A.  I discussed with the patient her CBC result from September 23, 2024 hemoglobin low but improved at 11.7  B.  I will continue monitor her CBC prior to each infusion.  C.  I will transfuse as needed for hemoglobin less than 8.      10.  Chemotherapy-induced heartburn:  A.  I will continue pantoprazole 40 mg twice a day.    B.  I will continue Carafate 1 g every 6 hours    11.  Treatment related diarrhea  A.  Continue Imodium as directed.  B.  I will add Lomotil as  needed.      12.  Treatment induced peripheral neuropathy:   A.  We will continue gabapentin 100 mg 3 times daily.    13.  Treatment induced dermatitis, severe:  A.  Erbitux was permanently discontinued due to severe treatment induced dermatitis.    B.  I will start patient on Vectibix at 4 mg/kg in 2 weeks and if she is doing well we will consider increasing the dose to full dose at 6 mg/kg.  C.  I will continue hydrocortisone cream.  D.  I will continue doxycycline 100 mg twice a day.  I will refill today.    14.  Treatment induced mucositis:  A.  I will continue Magic mouthwash.    FOLLOW UP: 2 weeks with cycle #4.    Total time of patient care including preparation of patient chart prior to arrival, face-to-face with patient and following visit spent in reviewing records, lab results, imaging studies, discussion with patient, and documentation/charting was 40 minutes       Iban Lambert MD  10/23/2024

## 2024-11-01 ENCOUNTER — OFFICE VISIT (OUTPATIENT)
Dept: ONCOLOGY | Facility: CLINIC | Age: 54
End: 2024-11-01
Payer: COMMERCIAL

## 2024-11-01 VITALS
HEART RATE: 79 BPM | TEMPERATURE: 98 F | SYSTOLIC BLOOD PRESSURE: 136 MMHG | DIASTOLIC BLOOD PRESSURE: 74 MMHG | WEIGHT: 171.4 LBS | OXYGEN SATURATION: 95 % | RESPIRATION RATE: 16 BRPM | BODY MASS INDEX: 31.54 KG/M2 | HEIGHT: 62 IN

## 2024-11-01 DIAGNOSIS — K52.1 DIARRHEA DUE TO DRUG: ICD-10-CM

## 2024-11-01 DIAGNOSIS — C78.7 METASTATIC COLON CANCER TO LIVER: Primary | ICD-10-CM

## 2024-11-01 DIAGNOSIS — C18.9 METASTATIC COLON CANCER TO LIVER: Primary | ICD-10-CM

## 2024-11-01 PROCEDURE — 99214 OFFICE O/P EST MOD 30 MIN: CPT | Performed by: NURSE PRACTITIONER

## 2024-11-01 RX ORDER — DIPHENHYDRAMINE HYDROCHLORIDE 50 MG/ML
50 INJECTION INTRAMUSCULAR; INTRAVENOUS ONCE
Start: 2024-11-05

## 2024-11-01 RX ORDER — DIPHENHYDRAMINE HYDROCHLORIDE 50 MG/ML
50 INJECTION INTRAMUSCULAR; INTRAVENOUS ONCE
Start: 2024-11-13

## 2024-11-01 NOTE — PROGRESS NOTES
DATE OF VISIT: 11/1/2024    REASON FOR VISIT: Followup for metastatic right-sided colon cancer     PROBLEM LIST:  1. Metastatic colon cancer TX NX M1 a stage Perry:  A.  Presented with abdominal pain and jaundice  B.  CT abdomen pelvis done November 04, 2022 confirmed diffuse liver metastases.  C.  Diagnosed after CT-guided liver biopsy done on November 22, 2022 confirming adenocarcinoma CK20 positive CK7 negative.  D.  Colonoscopy done December 08, 2020 to confirm transverse colon mass however biopsy revealed adenoma.  E.  Started palliative chemotherapy with dose adjusted FOLFOXIRI December 14, 2022, status post 12 cycles completed May 2023.  F.  Started maintenance Xeloda 1000 mg twice daily 1 week on 1 week off June 2023.  Stopped April 2024 secondary to progressive disease in the liver.  G.  Status post segment 7 and segment 3 liver met resection with 2 other lesions ablation done by Dr. Nielson April 19, 2024.  H.  Started FOLFIRI with Vectibix August 19, 2024.  Status post 3 cycles.  2.  Elevated liver enzymes:  A.  Induced by metastatic disease  3.  Cancer-related pain  4.  Anxiety  5.  Hypertension    HISTORY OF PRESENT ILLNESS: The patient is a very pleasant 54 y.o. female with past medical history significant for metastatic right-sided colon cancer diagnosed November 22, 2022.  Started on palliative chemotherapy with dose adjusted FOLFOXIRI.  She completed 12 cycles May 2023.  She was started on maintenance Xeloda 1000 mg twice daily June 2023.  The patient is here today for scheduled follow-up visit with treatment.    SUBJECTIVE: Amber is here today by herself.  She had a much better cycle last time.  She is tolerating Vectibix with some side effects.  She has a light rash that she has been using doxycycline daily and that has made a significant difference. She had some mucositis as well. No fever chills or night sweats.    Past History:  Medical History: has a past medical history of Anxiety, Cancer, Ear  "piercing, Gallstones, History of irritable bowel syndrome, Hypertension, Seasonal allergies, Tattoos, and Wears glasses.   Surgical History: has a past surgical history that includes postpartum tubal ligation; Dilation and curettage of uterus (2013); Tumor removal (2013); Portacath placement (N/A, 12/8/2022); Colonoscopy (N/A, 12/8/2022); diagnostic laparoscopy exploratory laparotomy (N/A, 4/19/2024); Cholecystectomy (N/A, 4/19/2024); and Liver resection (Left, 4/19/2024).   Family History: family history is not on file.   Social History: reports that she has never smoked. She has never used smokeless tobacco. She reports current alcohol use. She reports that she does not use drugs.    (Not in a hospital admission)     Allergies: Losartan, Valium [diazepam], and Citrus     Review of Systems   Constitutional:  Positive for fatigue.   HENT:  Positive for mouth sores.    Gastrointestinal:  Positive for abdominal pain.   Musculoskeletal:  Positive for arthralgias.   Skin:         Facial acne like rash.   Psychiatric/Behavioral:  The patient is nervous/anxious.        PHYSICAL EXAMINATION:   /74   Pulse 79   Temp 98 °F (36.7 °C)   Resp 16   Ht 157.5 cm (62.01\")   Wt 77.7 kg (171 lb 6.4 oz)   SpO2 95%   BMI 31.34 kg/m²    Pain Score    11/01/24 1348   PainSc: 0-No pain                        ECOG Performance Status: 1 - Symptomatic but completely ambulatory      General Appearance:      alert, cooperative, no apparent distress and appears stated age   Lungs:   Clear to auscultation bilaterally; respirations regular, even, and unlabored bilaterally   Heart:  Regular rate and rhythm, no murmurs appreciated   Abdomen:   Soft, non-tender, and non-distended                 Hospital Outpatient Visit on 10/21/2024   Component Date Value Ref Range Status    Glucose 10/21/2024 113 (H)  65 - 99 mg/dL Final    BUN 10/21/2024 11  6 - 20 mg/dL Final    Creatinine 10/21/2024 0.90  0.57 - 1.00 mg/dL Final    Sodium " 10/21/2024 139  136 - 145 mmol/L Final    Potassium 10/21/2024 4.4  3.5 - 5.2 mmol/L Final    Chloride 10/21/2024 106  98 - 107 mmol/L Final    CO2 10/21/2024 24.0  22.0 - 29.0 mmol/L Final    Calcium 10/21/2024 9.0  8.6 - 10.5 mg/dL Final    Total Protein 10/21/2024 6.7  6.0 - 8.5 g/dL Final    Albumin 10/21/2024 3.7  3.5 - 5.2 g/dL Final    ALT (SGPT) 10/21/2024 94 (H)  1 - 33 U/L Final    AST (SGOT) 10/21/2024 43 (H)  1 - 32 U/L Final    Alkaline Phosphatase 10/21/2024 239 (H)  39 - 117 U/L Final    Total Bilirubin 10/21/2024 0.5  0.0 - 1.2 mg/dL Final    Globulin 10/21/2024 3.0  gm/dL Final    A/G Ratio 10/21/2024 1.2  g/dL Final    BUN/Creatinine Ratio 10/21/2024 12.2  7.0 - 25.0 Final    Anion Gap 10/21/2024 9.0  5.0 - 15.0 mmol/L Final    eGFR 10/21/2024 76.1  >60.0 mL/min/1.73 Final    Magnesium 10/21/2024 1.7  1.6 - 2.6 mg/dL Final    WBC 10/21/2024 6.81  3.40 - 10.80 10*3/mm3 Final    RBC 10/21/2024 4.28  3.77 - 5.28 10*6/mm3 Final    Hemoglobin 10/21/2024 11.3 (L)  12.0 - 15.9 g/dL Final    Hematocrit 10/21/2024 36.0  34.0 - 46.6 % Final    MCV 10/21/2024 84.1  79.0 - 97.0 fL Final    MCH 10/21/2024 26.4 (L)  26.6 - 33.0 pg Final    MCHC 10/21/2024 31.4 (L)  31.5 - 35.7 g/dL Final    RDW 10/21/2024 19.2 (H)  12.3 - 15.4 % Final    RDW-SD 10/21/2024 57.8 (H)  37.0 - 54.0 fl Final    MPV 10/21/2024 10.7  6.0 - 12.0 fL Final    Platelets 10/21/2024 192  140 - 450 10*3/mm3 Final    Neutrophil % 10/21/2024 58.7  42.7 - 76.0 % Final    Lymphocyte % 10/21/2024 28.2  19.6 - 45.3 % Final    Monocyte % 10/21/2024 9.5  5.0 - 12.0 % Final    Eosinophil % 10/21/2024 2.9  0.3 - 6.2 % Final    Basophil % 10/21/2024 0.4  0.0 - 1.5 % Final    Immature Grans % 10/21/2024 0.3  0.0 - 0.5 % Final    Neutrophils, Absolute 10/21/2024 3.99  1.70 - 7.00 10*3/mm3 Final    Lymphocytes, Absolute 10/21/2024 1.92  0.70 - 3.10 10*3/mm3 Final    Monocytes, Absolute 10/21/2024 0.65  0.10 - 0.90 10*3/mm3 Final    Eosinophils, Absolute  10/21/2024 0.20  0.00 - 0.40 10*3/mm3 Final    Basophils, Absolute 10/21/2024 0.03  0.00 - 0.20 10*3/mm3 Final    Immature Grans, Absolute 10/21/2024 0.02  0.00 - 0.05 10*3/mm3 Final    nRBC 10/21/2024 0.0  0.0 - 0.2 /100 WBC Final        No results found.    ASSESSMENT: The patient is a very pleasant 54 y.o. female  with right-sided metastatic colon cancer      PLAN:    1.  Right-sided metastatic colon cancer:  A.  I will proceed with FOLFIRI plus Vectibix cycle #5.  B.  She will follow-up with us in 2 weeks for cycle #6.     C.  We will consider maintenance therapy after maximum response.  D.  I will continue to monitor the patient blood work including blood counts kidney function liver function and electrolytes.  E.  I will repeat her Signatera with every other cycle.  F.  I will repeat her scans prior to cycle #7.    2.  Elevated liver enzymes:  A.  Induced by liver metastases  B. I will repeat labs on return    3.  Chemotherapy-induced nausea:  A.  I will continue the patient on Zofran as needed for    4.  Chemotherapy-induced diarrhea:  A.  I will continue the patient on Imodium as needed    5. Cancer-related pain:  A.  I will continue the patient oxycodone 5 mg twice a day.     6.  Anxiety:  A.  I will continue the patient on Ativan 1 mg nightly as needed.       7.  Hypertension:  A.  She will continue losartan.  B.  I reviewed with the patient this will interfere with our management since chemotherapy typically can cause hypotension we will have to monitor blood pressure closely.    8.  Dehydration:  A.  I will continue 1 L of IV fluid as needed    9.  Chemotherapy-induced anemia:  A.  I discussed with the patient her CBC result from September 23, 2024 hemoglobin low but improved at 11.7  B.  I will continue monitor her CBC prior to each infusion.  C.  I will transfuse as needed for hemoglobin less than 8.      10.  Chemotherapy-induced heartburn:  A.  I will continue pantoprazole 40 mg twice a day.    B.  I  will continue Carafate 1 g every 6 hours    11.  Treatment related diarrhea  A.  Continue Imodium as directed.  B.  I will continue Lomotil as needed.      12.  Treatment induced peripheral neuropathy:   A.  We will continue gabapentin 100 mg 3 times daily.    13.  Treatment induced dermatitis, severe:  A.  Erbitux was permanently discontinued due to severe treatment induced dermatitis.    B.  I will continue patient on Vectibix at 4 mg/kg in 2 weeks and if she is doing well we will consider increasing the dose to full dose at 6 mg/kg.  C.  I will continue hydrocortisone cream.  D.  I will continue doxycycline 100 mg twice a day.  I will refill today.    14.  Treatment induced mucositis:  A.  I will continue Magic mouthwash.    FOLLOW UP: 2 weeks with cycle #6.    Total time of patient care including time prior to, face to face with patient, and following visit spent in reviewing records, lab results, imaging studies, discussion with patient, and documentation/charting was > 40 minutes        Ana Cristina Cano, APRN  11/1/2024

## 2024-11-04 ENCOUNTER — DOCUMENTATION (OUTPATIENT)
Dept: SOCIAL WORK | Facility: HOSPITAL | Age: 54
End: 2024-11-04

## 2024-11-04 ENCOUNTER — HOSPITAL ENCOUNTER (OUTPATIENT)
Facility: HOSPITAL | Age: 54
Discharge: HOME | End: 2024-11-04
Admitting: INTERNAL MEDICINE

## 2024-11-04 VITALS
HEART RATE: 94 BPM | OXYGEN SATURATION: 97 % | DIASTOLIC BLOOD PRESSURE: 78 MMHG | TEMPERATURE: 97.7 F | SYSTOLIC BLOOD PRESSURE: 137 MMHG

## 2024-11-04 DIAGNOSIS — C18.9 ADENOCARCINOMA OF COLON METASTATIC TO LIVER: Primary | ICD-10-CM

## 2024-11-04 DIAGNOSIS — C78.7 ADENOCARCINOMA OF COLON METASTATIC TO LIVER: Primary | ICD-10-CM

## 2024-11-04 DIAGNOSIS — C18.2 CANCER OF RIGHT COLON: ICD-10-CM

## 2024-11-04 PROCEDURE — 25010000002 HEPARIN LOCK FLUSH PER 10 UNITS: Performed by: NURSE PRACTITIONER

## 2024-11-04 PROCEDURE — 96523 IRRIG DRUG DELIVERY DEVICE: CPT

## 2024-11-04 RX ORDER — HEPARIN SODIUM (PORCINE) LOCK FLUSH IV SOLN 100 UNIT/ML 100 UNIT/ML
500 SOLUTION INTRAVENOUS AS NEEDED
OUTPATIENT
Start: 2024-11-04

## 2024-11-04 RX ORDER — SODIUM CHLORIDE 0.9 % (FLUSH) 0.9 %
10 SYRINGE (ML) INJECTION AS NEEDED
OUTPATIENT
Start: 2024-11-04

## 2024-11-04 RX ORDER — HEPARIN SODIUM (PORCINE) LOCK FLUSH IV SOLN 100 UNIT/ML 100 UNIT/ML
500 SOLUTION INTRAVENOUS AS NEEDED
Status: DISCONTINUED | OUTPATIENT
Start: 2024-11-04 | End: 2024-11-05 | Stop reason: HOSPADM

## 2024-11-04 RX ORDER — SODIUM CHLORIDE 0.9 % (FLUSH) 0.9 %
10 SYRINGE (ML) INJECTION AS NEEDED
Status: DISCONTINUED | OUTPATIENT
Start: 2024-11-04 | End: 2024-11-05 | Stop reason: HOSPADM

## 2024-11-04 RX ADMIN — HEPARIN 500 UNITS: 100 SYRINGE at 09:52

## 2024-11-04 NOTE — PROGRESS NOTES
While accessing patients port the patient stated that her  was let go from his job and her medicaid . Patient stated the lady at the  was calling someone about financial assistance. RN called and notified clinic nurse, emailed auth team, and called Preethi the . Patient was rescheduled for  waiting on insurance.

## 2024-11-04 NOTE — PROGRESS NOTES
Case Management/Social Work    Patient Name:  Amber Feng  YOB: 1970  MRN: [id number]  Admit Date:  (Not on file)        NOEL received call from RN in oncology stating pts insurance  and needs assistance with medicaid before can start treatment. NOEL left message with Mel/[employer] to see if could screen patient prior to leaving today. NOEL waiting for return call back.     10:29 EST NOEL spoke with Mle who will meet with pt and screen her for medicaid today. NOEL updated RN in oncology.       Electronically signed by:  RIINA Roberto  24 09:52 EST

## 2024-11-13 ENCOUNTER — HOSPITAL ENCOUNTER (OUTPATIENT)
Facility: HOSPITAL | Age: 54
Discharge: HOME | End: 2024-11-13
Admitting: INTERNAL MEDICINE
Payer: MEDICAID

## 2024-11-13 ENCOUNTER — OFFICE VISIT (OUTPATIENT)
Dept: ONCOLOGY | Facility: CLINIC | Age: 54
End: 2024-11-13
Payer: MEDICAID

## 2024-11-13 VITALS
TEMPERATURE: 98 F | SYSTOLIC BLOOD PRESSURE: 153 MMHG | HEART RATE: 85 BPM | RESPIRATION RATE: 18 BRPM | BODY MASS INDEX: 32.91 KG/M2 | DIASTOLIC BLOOD PRESSURE: 89 MMHG | OXYGEN SATURATION: 96 % | WEIGHT: 180 LBS

## 2024-11-13 VITALS
HEART RATE: 78 BPM | BODY MASS INDEX: 32.66 KG/M2 | OXYGEN SATURATION: 97 % | DIASTOLIC BLOOD PRESSURE: 94 MMHG | SYSTOLIC BLOOD PRESSURE: 151 MMHG | TEMPERATURE: 97.9 F | WEIGHT: 178.6 LBS

## 2024-11-13 DIAGNOSIS — C78.7 METASTATIC COLON CANCER TO LIVER: Primary | ICD-10-CM

## 2024-11-13 DIAGNOSIS — C18.2 CANCER OF RIGHT COLON: ICD-10-CM

## 2024-11-13 DIAGNOSIS — R11.2 NAUSEA AND VOMITING, UNSPECIFIED VOMITING TYPE: ICD-10-CM

## 2024-11-13 DIAGNOSIS — K52.1 DIARRHEA DUE TO DRUG: ICD-10-CM

## 2024-11-13 DIAGNOSIS — C18.9 METASTATIC COLON CANCER TO LIVER: Primary | ICD-10-CM

## 2024-11-13 LAB
ALBUMIN SERPL-MCNC: 3.9 G/DL (ref 3.5–5.2)
ALBUMIN/GLOB SERPL: 1.2 G/DL
ALP SERPL-CCNC: 236 U/L (ref 39–117)
ALT SERPL W P-5'-P-CCNC: 29 U/L (ref 1–33)
ANION GAP SERPL CALCULATED.3IONS-SCNC: 10.9 MMOL/L (ref 5–15)
AST SERPL-CCNC: 28 U/L (ref 1–32)
BASOPHILS # BLD AUTO: 0.02 10*3/MM3 (ref 0–0.2)
BASOPHILS NFR BLD AUTO: 0.5 % (ref 0–1.5)
BILIRUB SERPL-MCNC: 0.5 MG/DL (ref 0–1.2)
BUN SERPL-MCNC: 8 MG/DL (ref 6–20)
BUN/CREAT SERPL: 10.3 (ref 7–25)
CALCIUM SPEC-SCNC: 9 MG/DL (ref 8.6–10.5)
CHLORIDE SERPL-SCNC: 106 MMOL/L (ref 98–107)
CO2 SERPL-SCNC: 24.1 MMOL/L (ref 22–29)
CREAT SERPL-MCNC: 0.78 MG/DL (ref 0.57–1)
DEPRECATED RDW RBC AUTO: 55.8 FL (ref 37–54)
EGFRCR SERPLBLD CKD-EPI 2021: 90.4 ML/MIN/1.73
EOSINOPHIL # BLD AUTO: 0.06 10*3/MM3 (ref 0–0.4)
EOSINOPHIL NFR BLD AUTO: 1.4 % (ref 0.3–6.2)
ERYTHROCYTE [DISTWIDTH] IN BLOOD BY AUTOMATED COUNT: 18.1 % (ref 12.3–15.4)
GLOBULIN UR ELPH-MCNC: 3.2 GM/DL
GLUCOSE SERPL-MCNC: 97 MG/DL (ref 65–99)
HCT VFR BLD AUTO: 36.7 % (ref 34–46.6)
HGB BLD-MCNC: 11.7 G/DL (ref 12–15.9)
IMM GRANULOCYTES # BLD AUTO: 0.03 10*3/MM3 (ref 0–0.05)
IMM GRANULOCYTES NFR BLD AUTO: 0.7 % (ref 0–0.5)
LYMPHOCYTES # BLD AUTO: 1.59 10*3/MM3 (ref 0.7–3.1)
LYMPHOCYTES NFR BLD AUTO: 37.1 % (ref 19.6–45.3)
MAGNESIUM SERPL-MCNC: 1.7 MG/DL (ref 1.6–2.6)
MCH RBC QN AUTO: 27 PG (ref 26.6–33)
MCHC RBC AUTO-ENTMCNC: 31.9 G/DL (ref 31.5–35.7)
MCV RBC AUTO: 84.8 FL (ref 79–97)
MONOCYTES # BLD AUTO: 0.32 10*3/MM3 (ref 0.1–0.9)
MONOCYTES NFR BLD AUTO: 7.5 % (ref 5–12)
NEUTROPHILS NFR BLD AUTO: 2.26 10*3/MM3 (ref 1.7–7)
NEUTROPHILS NFR BLD AUTO: 52.8 % (ref 42.7–76)
NRBC BLD AUTO-RTO: 0 /100 WBC (ref 0–0.2)
PLATELET # BLD AUTO: 216 10*3/MM3 (ref 140–450)
PMV BLD AUTO: 9.9 FL (ref 6–12)
POTASSIUM SERPL-SCNC: 3.8 MMOL/L (ref 3.5–5.2)
PROT SERPL-MCNC: 7.1 G/DL (ref 6–8.5)
RBC # BLD AUTO: 4.33 10*6/MM3 (ref 3.77–5.28)
SODIUM SERPL-SCNC: 141 MMOL/L (ref 136–145)
WBC NRBC COR # BLD AUTO: 4.28 10*3/MM3 (ref 3.4–10.8)

## 2024-11-13 PROCEDURE — 80053 COMPREHEN METABOLIC PANEL: CPT | Performed by: INTERNAL MEDICINE

## 2024-11-13 PROCEDURE — 96417 CHEMO IV INFUS EACH ADDL SEQ: CPT

## 2024-11-13 PROCEDURE — 96409 CHEMO IV PUSH SNGL DRUG: CPT

## 2024-11-13 PROCEDURE — 25010000003 DEXTROSE 5 % SOLUTION 250 ML FLEX CONT: Performed by: INTERNAL MEDICINE

## 2024-11-13 PROCEDURE — 25810000003 SODIUM CHLORIDE 0.9 % SOLUTION 250 ML FLEX CONT: Performed by: INTERNAL MEDICINE

## 2024-11-13 PROCEDURE — 96413 CHEMO IV INFUSION 1 HR: CPT

## 2024-11-13 PROCEDURE — 85025 COMPLETE CBC W/AUTO DIFF WBC: CPT | Performed by: INTERNAL MEDICINE

## 2024-11-13 PROCEDURE — 96368 THER/DIAG CONCURRENT INF: CPT

## 2024-11-13 PROCEDURE — 25010000002 ALTEPLASE 2 MG RECONSTITUTED SOLUTION: Performed by: INTERNAL MEDICINE

## 2024-11-13 PROCEDURE — 25010000002 METHYLPREDNISOLONE PER 125 MG: Performed by: INTERNAL MEDICINE

## 2024-11-13 PROCEDURE — 25010000002 DIPHENHYDRAMINE PER 50 MG

## 2024-11-13 PROCEDURE — 96367 TX/PROPH/DG ADDL SEQ IV INF: CPT

## 2024-11-13 PROCEDURE — 96416 CHEMO PROLONG INFUSE W/PUMP: CPT

## 2024-11-13 PROCEDURE — 36593 DECLOT VASCULAR DEVICE: CPT

## 2024-11-13 PROCEDURE — 25010000002 FLUOROURACIL PER 500 MG: Performed by: INTERNAL MEDICINE

## 2024-11-13 PROCEDURE — 96376 TX/PRO/DX INJ SAME DRUG ADON: CPT

## 2024-11-13 PROCEDURE — 25010000002 PALONOSETRON 0.25 MG/5ML SOLUTION PREFILLED SYRINGE: Performed by: INTERNAL MEDICINE

## 2024-11-13 PROCEDURE — 96411 CHEMO IV PUSH ADDL DRUG: CPT

## 2024-11-13 PROCEDURE — 96415 CHEMO IV INFUSION ADDL HR: CPT

## 2024-11-13 PROCEDURE — 25010000002 LEUCOVORIN CALCIUM PER 50MG: Performed by: INTERNAL MEDICINE

## 2024-11-13 PROCEDURE — 83735 ASSAY OF MAGNESIUM: CPT | Performed by: INTERNAL MEDICINE

## 2024-11-13 PROCEDURE — G0498 CHEMO EXTEND IV INFUS W/PUMP: HCPCS

## 2024-11-13 PROCEDURE — 96375 TX/PRO/DX INJ NEW DRUG ADDON: CPT

## 2024-11-13 PROCEDURE — 25010000002 PANITUMUMAB 400 MG/20ML SOLUTION 20 ML VIAL: Performed by: INTERNAL MEDICINE

## 2024-11-13 PROCEDURE — 25010000002 IRINOTECAN PER 20 MG: Performed by: INTERNAL MEDICINE

## 2024-11-13 PROCEDURE — 25010000003 DEXTROSE 5 % SOLUTION 500 ML FLEX CONT: Performed by: INTERNAL MEDICINE

## 2024-11-13 RX ORDER — FAMOTIDINE 10 MG/ML
20 INJECTION, SOLUTION INTRAVENOUS ONCE
Status: COMPLETED | OUTPATIENT
Start: 2024-11-13 | End: 2024-11-13

## 2024-11-13 RX ORDER — ONDANSETRON 8 MG/1
8 TABLET, ORALLY DISINTEGRATING ORAL EVERY 8 HOURS PRN
Qty: 30 TABLET | Refills: 5 | Status: SHIPPED | OUTPATIENT
Start: 2024-11-13

## 2024-11-13 RX ORDER — DIPHENHYDRAMINE HYDROCHLORIDE 50 MG/ML
50 INJECTION INTRAMUSCULAR; INTRAVENOUS ONCE
Status: COMPLETED | OUTPATIENT
Start: 2024-11-13 | End: 2024-11-13

## 2024-11-13 RX ORDER — WATER 10 ML/10ML
INJECTION INTRAMUSCULAR; INTRAVENOUS; SUBCUTANEOUS
Status: COMPLETED
Start: 2024-11-13 | End: 2024-11-13

## 2024-11-13 RX ORDER — SODIUM CHLORIDE 9 MG/ML
20 INJECTION, SOLUTION INTRAVENOUS ONCE
Status: DISCONTINUED | OUTPATIENT
Start: 2024-11-13 | End: 2024-11-14 | Stop reason: HOSPADM

## 2024-11-13 RX ORDER — FLUOROURACIL 50 MG/ML
300 INJECTION, SOLUTION INTRAVENOUS ONCE
Status: COMPLETED | OUTPATIENT
Start: 2024-11-13 | End: 2024-11-13

## 2024-11-13 RX ORDER — ACETAMINOPHEN 325 MG/1
650 TABLET ORAL ONCE
Status: COMPLETED | OUTPATIENT
Start: 2024-11-13 | End: 2024-11-13

## 2024-11-13 RX ORDER — METHYLPREDNISOLONE SODIUM SUCCINATE 125 MG/2ML
125 INJECTION, POWDER, LYOPHILIZED, FOR SOLUTION INTRAMUSCULAR; INTRAVENOUS ONCE
Status: COMPLETED | OUTPATIENT
Start: 2024-11-13 | End: 2024-11-13

## 2024-11-13 RX ORDER — PROCHLORPERAZINE MALEATE 10 MG
10 TABLET ORAL EVERY 6 HOURS PRN
Qty: 30 TABLET | Refills: 2 | Status: SHIPPED | OUTPATIENT
Start: 2024-11-13

## 2024-11-13 RX ORDER — PALONOSETRON 0.05 MG/ML
0.25 INJECTION, SOLUTION INTRAVENOUS ONCE
Status: COMPLETED | OUTPATIENT
Start: 2024-11-13 | End: 2024-11-13

## 2024-11-13 RX ADMIN — FAMOTIDINE 20 MG: 10 INJECTION, SOLUTION INTRAVENOUS at 11:14

## 2024-11-13 RX ADMIN — ACETAMINOPHEN 650 MG: 325 TABLET, FILM COATED ORAL at 11:13

## 2024-11-13 RX ADMIN — FLUOROURACIL 3280 MG: 50 INJECTION, SOLUTION INTRAVENOUS at 14:48

## 2024-11-13 RX ADMIN — ALTEPLASE: 2.2 INJECTION, POWDER, LYOPHILIZED, FOR SOLUTION INTRAVENOUS at 10:06

## 2024-11-13 RX ADMIN — PALONOSETRON 0.25 MG: 0.25 INJECTION, SOLUTION INTRAVENOUS at 11:12

## 2024-11-13 RX ADMIN — ATROPINE SULFATE 0.25 MG: 0.4 INJECTION, SOLUTION INTRAVENOUS at 14:46

## 2024-11-13 RX ADMIN — METHYLPREDNISOLONE SODIUM SUCCINATE 125 MG: 125 INJECTION, POWDER, FOR SOLUTION INTRAMUSCULAR; INTRAVENOUS at 11:16

## 2024-11-13 RX ADMIN — ATROPINE SULFATE 0.25 MG: 0.4 INJECTION, SOLUTION INTRAVENOUS at 12:52

## 2024-11-13 RX ADMIN — DIPHENHYDRAMINE HYDROCHLORIDE 25 MG: 50 INJECTION, SOLUTION INTRAMUSCULAR; INTRAVENOUS at 11:10

## 2024-11-13 RX ADMIN — FLUOROURACIL 550 MG: 50 INJECTION, SOLUTION INTRAVENOUS at 14:47

## 2024-11-13 RX ADMIN — LEUCOVORIN CALCIUM 550 MG: 10 INJECTION INTRAMUSCULAR; INTRAVENOUS at 12:53

## 2024-11-13 RX ADMIN — WATER 10 ML: 1 INJECTION INTRAMUSCULAR; INTRAVENOUS; SUBCUTANEOUS at 10:06

## 2024-11-13 RX ADMIN — SODIUM CHLORIDE 320 MG: 9 INJECTION, SOLUTION INTRAVENOUS at 11:39

## 2024-11-13 RX ADMIN — IRINOTECAN HYDROCHLORIDE 240 MG: 20 INJECTION, SOLUTION INTRAVENOUS at 12:53

## 2024-11-13 NOTE — PROGRESS NOTES
DATE OF VISIT: 11/13/2024    REASON FOR VISIT: Followup for metastatic right-sided colon cancer     PROBLEM LIST:  1. Metastatic colon cancer TX NX M1 a stage Perry:  A.  Presented with abdominal pain and jaundice  B.  CT abdomen pelvis done November 04, 2022 confirmed diffuse liver metastases.  C.  Diagnosed after CT-guided liver biopsy done on November 22, 2022 confirming adenocarcinoma CK20 positive CK7 negative.  D.  Colonoscopy done December 08, 2020 to confirm transverse colon mass however biopsy revealed adenoma.  E.  Started palliative chemotherapy with dose adjusted FOLFOXIRI December 14, 2022, status post 12 cycles completed May 2023.  F.  Started maintenance Xeloda 1000 mg twice daily 1 week on 1 week off June 2023.  Stopped April 2024 secondary to progressive disease in the liver.  G.  Status post segment 7 and segment 3 liver met resection with 2 other lesions ablation done by Dr. Nielson April 19, 2024.  H.  Started FOLFIRI with Vectibix August 19, 2024.  Status post 4 cycles.  2.  Elevated liver enzymes:  A.  Induced by metastatic disease  3.  Cancer-related pain  4.  Anxiety  5.  Hypertension    HISTORY OF PRESENT ILLNESS: The patient is a very pleasant 54 y.o. female with past medical history significant for metastatic right-sided colon cancer diagnosed November 22, 2022.  Started on palliative chemotherapy with dose adjusted FOLFOXIRI.  She completed 12 cycles May 2023.  She was started on maintenance Xeloda 1000 mg twice daily June 2023.  The patient is here today for scheduled follow-up visit with treatment.    SUBJECTIVE: Amber is here today with her .  Vectibix has been easier on her than her buttocks.  She is still having multiple side effects.  She is requesting something stronger than Zofran for nausea.    Past History:  Medical History: has a past medical history of Anxiety, Cancer, Ear piercing, Gallstones, History of irritable bowel syndrome, Hypertension, Seasonal allergies, Tattoos,  and Wears glasses.   Surgical History: has a past surgical history that includes postpartum tubal ligation; Dilation and curettage of uterus (2013); Tumor removal (2013); Portacath placement (N/A, 12/8/2022); Colonoscopy (N/A, 12/8/2022); diagnostic laparoscopy exploratory laparotomy (N/A, 4/19/2024); Cholecystectomy (N/A, 4/19/2024); and Liver resection (Left, 4/19/2024).   Family History: family history is not on file.   Social History: reports that she has never smoked. She has never used smokeless tobacco. She reports current alcohol use. She reports that she does not use drugs.    (Not in a hospital admission)     Allergies: Losartan, Valium [diazepam], and Citrus     Review of Systems   Constitutional:  Positive for fatigue.   HENT:  Positive for mouth sores.    Gastrointestinal:  Positive for abdominal pain.   Musculoskeletal:  Positive for arthralgias.   Skin:         Facial acne like rash.   Psychiatric/Behavioral:  The patient is nervous/anxious.        PHYSICAL EXAMINATION:   There were no vitals taken for this visit.   There were no vitals filed for this visit.                       ECOG Performance Status: 1 - Symptomatic but completely ambulatory      General Appearance:      alert, cooperative, no apparent distress and appears stated age   Lungs:   Clear to auscultation bilaterally; respirations regular, even, and unlabored bilaterally   Heart:  Regular rate and rhythm, no murmurs appreciated   Abdomen:   Soft, non-tender, and non-distended                 Hospital Outpatient Visit on 11/13/2024   Component Date Value Ref Range Status    Glucose 11/13/2024 97  65 - 99 mg/dL Final    BUN 11/13/2024 8  6 - 20 mg/dL Final    Creatinine 11/13/2024 0.78  0.57 - 1.00 mg/dL Final    Sodium 11/13/2024 141  136 - 145 mmol/L Final    Potassium 11/13/2024 3.8  3.5 - 5.2 mmol/L Final    Chloride 11/13/2024 106  98 - 107 mmol/L Final    CO2 11/13/2024 24.1  22.0 - 29.0 mmol/L Final    Calcium 11/13/2024 9.0  8.6 -  10.5 mg/dL Final    Total Protein 11/13/2024 7.1  6.0 - 8.5 g/dL Final    Albumin 11/13/2024 3.9  3.5 - 5.2 g/dL Final    ALT (SGPT) 11/13/2024 29  1 - 33 U/L Final    AST (SGOT) 11/13/2024 28  1 - 32 U/L Final    Alkaline Phosphatase 11/13/2024 236 (H)  39 - 117 U/L Final    Total Bilirubin 11/13/2024 0.5  0.0 - 1.2 mg/dL Final    Globulin 11/13/2024 3.2  gm/dL Final    A/G Ratio 11/13/2024 1.2  g/dL Final    BUN/Creatinine Ratio 11/13/2024 10.3  7.0 - 25.0 Final    Anion Gap 11/13/2024 10.9  5.0 - 15.0 mmol/L Final    eGFR 11/13/2024 90.4  >60.0 mL/min/1.73 Final    Magnesium 11/13/2024 1.7  1.6 - 2.6 mg/dL Final    WBC 11/13/2024 4.28  3.40 - 10.80 10*3/mm3 Final    RBC 11/13/2024 4.33  3.77 - 5.28 10*6/mm3 Final    Hemoglobin 11/13/2024 11.7 (L)  12.0 - 15.9 g/dL Final    Hematocrit 11/13/2024 36.7  34.0 - 46.6 % Final    MCV 11/13/2024 84.8  79.0 - 97.0 fL Final    MCH 11/13/2024 27.0  26.6 - 33.0 pg Final    MCHC 11/13/2024 31.9  31.5 - 35.7 g/dL Final    RDW 11/13/2024 18.1 (H)  12.3 - 15.4 % Final    RDW-SD 11/13/2024 55.8 (H)  37.0 - 54.0 fl Final    MPV 11/13/2024 9.9  6.0 - 12.0 fL Final    Platelets 11/13/2024 216  140 - 450 10*3/mm3 Final    Neutrophil % 11/13/2024 52.8  42.7 - 76.0 % Final    Lymphocyte % 11/13/2024 37.1  19.6 - 45.3 % Final    Monocyte % 11/13/2024 7.5  5.0 - 12.0 % Final    Eosinophil % 11/13/2024 1.4  0.3 - 6.2 % Final    Basophil % 11/13/2024 0.5  0.0 - 1.5 % Final    Immature Grans % 11/13/2024 0.7 (H)  0.0 - 0.5 % Final    Neutrophils, Absolute 11/13/2024 2.26  1.70 - 7.00 10*3/mm3 Final    Lymphocytes, Absolute 11/13/2024 1.59  0.70 - 3.10 10*3/mm3 Final    Monocytes, Absolute 11/13/2024 0.32  0.10 - 0.90 10*3/mm3 Final    Eosinophils, Absolute 11/13/2024 0.06  0.00 - 0.40 10*3/mm3 Final    Basophils, Absolute 11/13/2024 0.02  0.00 - 0.20 10*3/mm3 Final    Immature Grans, Absolute 11/13/2024 0.03  0.00 - 0.05 10*3/mm3 Final    nRBC 11/13/2024 0.0  0.0 - 0.2 /100 WBC Final         No results found.    ASSESSMENT: The patient is a very pleasant 54 y.o. female  with right-sided metastatic colon cancer      PLAN:    1.  Right-sided metastatic colon cancer:  A.  I will proceed with FOLFIRI plus Vectibix cycle #5.  B.  She will follow-up with us in 2 weeks for cycle #6.     C.  We will consider maintenance therapy after maximum response.  D.  I will continue to monitor the patient blood work including blood counts kidney function liver function and electrolytes.  E.  I will repeat her Signatera with every other cycle.  F.  I will repeat her scans prior to cycle #7.      2.  Elevated liver enzymes:  A.  Induced by liver metastases  B. I will repeat labs on return    3.  Chemotherapy-induced nausea:  A.  I will continue the patient on Zofran as needed for nausea however I will switch it to the dissolvable ODT tablets.  B.  I will add Compazine 10 mg every 8 hours as needed.    4.  Chemotherapy-induced diarrhea:  A.  I will continue the patient on Imodium as needed    5. Cancer-related pain:  A.  I will continue the patient oxycodone 5 mg twice a day.     6.  Anxiety:  A.  I will continue the patient on Ativan 1 mg nightly as needed.       7.  Hypertension:  A.  She will continue losartan.  B.  I reviewed with the patient this will interfere with our management since chemotherapy typically can cause hypotension we will have to monitor blood pressure closely.    8.  Dehydration:  A.  I will continue 1 L of IV fluid as needed    9.  Chemotherapy-induced anemia:  A.  I discussed with the patient her CBC result from September 23, 2024 hemoglobin low but improved at 11.7  B.  I will continue monitor her CBC prior to each infusion.  C.  I will transfuse as needed for hemoglobin less than 8.      10.  Chemotherapy-induced heartburn:  A.  I will continue pantoprazole 40 mg twice a day.    B.  I will continue Carafate 1 g every 6 hours    11.  Treatment related diarrhea  A.  Continue Imodium as  directed.  B.  I will continue Lomotil as needed.      12.  Treatment induced peripheral neuropathy:   A.  We will continue gabapentin 100 mg 3 times daily.    13.  Treatment induced dermatitis, severe:  A.  Erbitux was permanently discontinued due to severe treatment induced dermatitis.    B.  I will continue patient on Vectibix at 4 mg/kg in 2 weeks and if she is doing well we will consider increasing the dose to full dose at 6 mg/kg.  C.  I will continue hydrocortisone cream.  D.  I will continue doxycycline 100 mg twice a day.     14.  Treatment induced mucositis:  A.  I will continue Magic mouthwash.    FOLLOW UP: 2 weeks with cycle #6.    Total time of patient care including preparation of patient chart prior to arrival, face-to-face with patient and following visit spent in reviewing records, lab results, imaging studies, discussion with patient, and documentation/charting was 40 minutes           Iban Lambert MD  11/13/2024

## 2024-11-15 ENCOUNTER — HOSPITAL ENCOUNTER (OUTPATIENT)
Facility: HOSPITAL | Age: 54
Discharge: HOME | End: 2024-11-15
Payer: MEDICAID

## 2024-11-15 DIAGNOSIS — C18.9 METASTATIC COLON CANCER TO LIVER: Primary | ICD-10-CM

## 2024-11-15 DIAGNOSIS — C78.7 METASTATIC COLON CANCER TO LIVER: Primary | ICD-10-CM

## 2024-11-15 DIAGNOSIS — C18.2 CANCER OF RIGHT COLON: ICD-10-CM

## 2024-11-15 PROCEDURE — 25010000002 DEXAMETHASONE PER 1 MG: Performed by: NURSE PRACTITIONER

## 2024-11-15 PROCEDURE — 25010000002 HEPARIN LOCK FLUSH PER 10 UNITS: Performed by: NURSE PRACTITIONER

## 2024-11-15 PROCEDURE — 25810000003 SODIUM CHLORIDE 0.9 % SOLUTION: Performed by: NURSE PRACTITIONER

## 2024-11-15 PROCEDURE — 96361 HYDRATE IV INFUSION ADD-ON: CPT

## 2024-11-15 PROCEDURE — 96375 TX/PRO/DX INJ NEW DRUG ADDON: CPT

## 2024-11-15 PROCEDURE — 96374 THER/PROPH/DIAG INJ IV PUSH: CPT

## 2024-11-15 PROCEDURE — 25010000002 PROCHLORPERAZINE 10 MG/2ML SOLUTION: Performed by: NURSE PRACTITIONER

## 2024-11-15 RX ORDER — SODIUM CHLORIDE 0.9 % (FLUSH) 0.9 %
10 SYRINGE (ML) INJECTION AS NEEDED
Status: DISCONTINUED | OUTPATIENT
Start: 2024-11-15 | End: 2024-11-16 | Stop reason: HOSPADM

## 2024-11-15 RX ORDER — HEPARIN SODIUM (PORCINE) LOCK FLUSH IV SOLN 100 UNIT/ML 100 UNIT/ML
500 SOLUTION INTRAVENOUS AS NEEDED
Status: DISCONTINUED | OUTPATIENT
Start: 2024-11-15 | End: 2024-11-16 | Stop reason: HOSPADM

## 2024-11-15 RX ORDER — PROCHLORPERAZINE EDISYLATE 5 MG/ML
10 INJECTION INTRAMUSCULAR; INTRAVENOUS ONCE
Status: COMPLETED | OUTPATIENT
Start: 2024-11-15 | End: 2024-11-15

## 2024-11-15 RX ORDER — SODIUM CHLORIDE 0.9 % (FLUSH) 0.9 %
10 SYRINGE (ML) INJECTION AS NEEDED
OUTPATIENT
Start: 2024-11-15

## 2024-11-15 RX ORDER — HEPARIN SODIUM (PORCINE) LOCK FLUSH IV SOLN 100 UNIT/ML 100 UNIT/ML
500 SOLUTION INTRAVENOUS AS NEEDED
OUTPATIENT
Start: 2024-11-15

## 2024-11-15 RX ORDER — DEXAMETHASONE SODIUM PHOSPHATE 4 MG/ML
8 INJECTION, SOLUTION INTRA-ARTICULAR; INTRALESIONAL; INTRAMUSCULAR; INTRAVENOUS; SOFT TISSUE ONCE
Status: COMPLETED | OUTPATIENT
Start: 2024-11-15 | End: 2024-11-15

## 2024-11-15 RX ADMIN — DEXAMETHASONE SODIUM PHOSPHATE 8 MG: 4 INJECTION, SOLUTION INTRA-ARTICULAR; INTRALESIONAL; INTRAMUSCULAR; INTRAVENOUS; SOFT TISSUE at 15:09

## 2024-11-15 RX ADMIN — PROCHLORPERAZINE EDISYLATE 10 MG: 5 INJECTION INTRAMUSCULAR; INTRAVENOUS at 15:09

## 2024-11-15 RX ADMIN — HEPARIN 500 UNITS: 100 SYRINGE at 16:10

## 2024-11-15 RX ADMIN — SODIUM CHLORIDE 1000 ML: 9 INJECTION, SOLUTION INTRAVENOUS at 15:07

## 2024-11-15 RX ADMIN — Medication 10 ML: at 16:10

## 2024-11-22 LAB
NTRA SIGNATERA MTM READOUT: 6.29 MTM/ML
NTRA SIGNATERA TEST RESULT: POSITIVE

## 2024-11-27 ENCOUNTER — OFFICE VISIT (OUTPATIENT)
Dept: ONCOLOGY | Facility: CLINIC | Age: 54
End: 2024-11-27
Payer: MEDICAID

## 2024-11-27 VITALS
HEIGHT: 62 IN | OXYGEN SATURATION: 97 % | WEIGHT: 174.3 LBS | HEART RATE: 98 BPM | RESPIRATION RATE: 18 BRPM | BODY MASS INDEX: 32.07 KG/M2 | DIASTOLIC BLOOD PRESSURE: 84 MMHG | SYSTOLIC BLOOD PRESSURE: 132 MMHG | TEMPERATURE: 97.3 F

## 2024-11-27 DIAGNOSIS — C78.7 METASTATIC COLON CANCER TO LIVER: ICD-10-CM

## 2024-11-27 DIAGNOSIS — C18.2 CANCER OF RIGHT COLON: Primary | ICD-10-CM

## 2024-11-27 DIAGNOSIS — C18.9 METASTATIC COLON CANCER TO LIVER: ICD-10-CM

## 2024-11-27 PROCEDURE — 1126F AMNT PAIN NOTED NONE PRSNT: CPT | Performed by: INTERNAL MEDICINE

## 2024-11-27 PROCEDURE — 3079F DIAST BP 80-89 MM HG: CPT | Performed by: INTERNAL MEDICINE

## 2024-11-27 PROCEDURE — 3075F SYST BP GE 130 - 139MM HG: CPT | Performed by: INTERNAL MEDICINE

## 2024-11-27 PROCEDURE — 99214 OFFICE O/P EST MOD 30 MIN: CPT | Performed by: INTERNAL MEDICINE

## 2024-11-27 RX ORDER — FLUOROURACIL 50 MG/ML
300 INJECTION, SOLUTION INTRAVENOUS ONCE
Status: CANCELLED | OUTPATIENT
Start: 2024-11-27

## 2024-11-27 RX ORDER — ATROPINE SULFATE 1 MG/ML
0.25 INJECTION, SOLUTION INTRAMUSCULAR; INTRAVENOUS; SUBCUTANEOUS
Status: CANCELLED | OUTPATIENT
Start: 2024-11-27

## 2024-11-27 RX ORDER — FAMOTIDINE 10 MG/ML
20 INJECTION, SOLUTION INTRAVENOUS ONCE
Status: CANCELLED | OUTPATIENT
Start: 2024-11-27

## 2024-11-27 RX ORDER — FAMOTIDINE 10 MG/ML
20 INJECTION, SOLUTION INTRAVENOUS AS NEEDED
Status: CANCELLED | OUTPATIENT
Start: 2024-11-27

## 2024-11-27 RX ORDER — DIPHENHYDRAMINE HYDROCHLORIDE 50 MG/ML
50 INJECTION INTRAMUSCULAR; INTRAVENOUS ONCE
Status: CANCELLED
Start: 2024-11-27

## 2024-11-27 RX ORDER — ACETAMINOPHEN 325 MG/1
650 TABLET ORAL ONCE
Status: CANCELLED | OUTPATIENT
Start: 2024-11-27

## 2024-11-27 RX ORDER — DIPHENHYDRAMINE HYDROCHLORIDE 50 MG/ML
50 INJECTION INTRAMUSCULAR; INTRAVENOUS AS NEEDED
Status: CANCELLED | OUTPATIENT
Start: 2024-11-27

## 2024-11-27 RX ORDER — SODIUM CHLORIDE 9 MG/ML
20 INJECTION, SOLUTION INTRAVENOUS ONCE
Status: CANCELLED | OUTPATIENT
Start: 2024-11-27

## 2024-11-27 RX ORDER — PALONOSETRON 0.05 MG/ML
0.25 INJECTION, SOLUTION INTRAVENOUS ONCE
Status: CANCELLED | OUTPATIENT
Start: 2024-11-27

## 2024-11-27 RX ORDER — HYDROCORTISONE SODIUM SUCCINATE 100 MG/2ML
100 INJECTION INTRAMUSCULAR; INTRAVENOUS AS NEEDED
Status: CANCELLED | OUTPATIENT
Start: 2024-11-27

## 2024-11-27 RX ORDER — METHYLPREDNISOLONE SODIUM SUCCINATE 125 MG/2ML
125 INJECTION INTRAMUSCULAR; INTRAVENOUS ONCE
Status: CANCELLED | OUTPATIENT
Start: 2024-11-27

## 2024-11-27 RX ORDER — MEPERIDINE HYDROCHLORIDE 25 MG/ML
25 INJECTION INTRAMUSCULAR; INTRAVENOUS; SUBCUTANEOUS
Status: CANCELLED | OUTPATIENT
Start: 2024-11-27

## 2024-11-27 NOTE — PROGRESS NOTES
DATE OF VISIT: 11/27/2024    REASON FOR VISIT: Followup for metastatic right-sided colon cancer     PROBLEM LIST:  1. Metastatic colon cancer TX NX M1 a stage Perry:  A.  Presented with abdominal pain and jaundice  B.  CT abdomen pelvis done November 04, 2022 confirmed diffuse liver metastases.  C.  Diagnosed after CT-guided liver biopsy done on November 22, 2022 confirming adenocarcinoma CK20 positive CK7 negative.  D.  Colonoscopy done December 08, 2020 to confirm transverse colon mass however biopsy revealed adenoma.  E.  Started palliative chemotherapy with dose adjusted FOLFOXIRI December 14, 2022, status post 12 cycles completed May 2023.  F.  Started maintenance Xeloda 1000 mg twice daily 1 week on 1 week off June 2023.  Stopped April 2024 secondary to progressive disease in the liver.  G.  Status post segment 7 and segment 3 liver met resection with 2 other lesions ablation done by Dr. Nielson April 19, 2024.  H.  Started FOLFIRI with Vectibix August 19, 2024.  Status post 5 cycles.  2.  Elevated liver enzymes:  A.  Induced by metastatic disease  3.  Cancer-related pain  4.  Anxiety  5.  Hypertension    HISTORY OF PRESENT ILLNESS: The patient is a very pleasant 54 y.o. female with past medical history significant for metastatic right-sided colon cancer diagnosed November 22, 2022.  Started on palliative chemotherapy with dose adjusted FOLFOXIRI.  She completed 12 cycles May 2023.  She was started on maintenance Xeloda 1000 mg twice daily June 2023.  Progressive disease with worsening PET scan as well as rising CT DNA.  She was started on FOLFIRI with Vectibix August 2024.  The patient is here today for scheduled follow-up visit with treatment cycle #6.    SUBJECTIVE: Amber is here today with her .  She is not interested increasing her dose of Vectibix.  She still has on and off dermatitis mainly around her mouth.    Past History:  Medical History: has a past medical history of Anxiety, Cancer, Ear  piercing, Gallstones, History of irritable bowel syndrome, Hypertension, Seasonal allergies, Tattoos, and Wears glasses.   Surgical History: has a past surgical history that includes postpartum tubal ligation; Dilation and curettage of uterus (2013); Tumor removal (2013); Portacath placement (N/A, 12/8/2022); Colonoscopy (N/A, 12/8/2022); diagnostic laparoscopy exploratory laparotomy (N/A, 4/19/2024); Cholecystectomy (N/A, 4/19/2024); and Liver resection (Left, 4/19/2024).   Family History: family history is not on file.   Social History: reports that she has never smoked. She has never used smokeless tobacco. She reports current alcohol use. She reports that she does not use drugs.    (Not in a hospital admission)     Allergies: Losartan, Valium [diazepam], and Citrus     Review of Systems   Constitutional:  Positive for fatigue.   HENT:  Positive for mouth sores.    Gastrointestinal:  Positive for abdominal pain.   Musculoskeletal:  Positive for arthralgias.   Skin:         Facial acne like rash.   Psychiatric/Behavioral:  The patient is nervous/anxious.        PHYSICAL EXAMINATION:   There were no vitals taken for this visit.   There were no vitals filed for this visit.                       ECOG Performance Status: 1 - Symptomatic but completely ambulatory      General Appearance:      alert, cooperative, no apparent distress and appears stated age   Lungs:   Clear to auscultation bilaterally; respirations regular, even, and unlabored bilaterally   Heart:  Regular rate and rhythm, no murmurs appreciated   Abdomen:   Soft, non-tender, and non-distended                 No visits with results within 2 Week(s) from this visit.   Latest known visit with results is:   Hospital Outpatient Visit on 11/13/2024   Component Date Value Ref Range Status    Glucose 11/13/2024 97  65 - 99 mg/dL Final    BUN 11/13/2024 8  6 - 20 mg/dL Final    Creatinine 11/13/2024 0.78  0.57 - 1.00 mg/dL Final    Sodium 11/13/2024 141  136 -  145 mmol/L Final    Potassium 11/13/2024 3.8  3.5 - 5.2 mmol/L Final    Chloride 11/13/2024 106  98 - 107 mmol/L Final    CO2 11/13/2024 24.1  22.0 - 29.0 mmol/L Final    Calcium 11/13/2024 9.0  8.6 - 10.5 mg/dL Final    Total Protein 11/13/2024 7.1  6.0 - 8.5 g/dL Final    Albumin 11/13/2024 3.9  3.5 - 5.2 g/dL Final    ALT (SGPT) 11/13/2024 29  1 - 33 U/L Final    AST (SGOT) 11/13/2024 28  1 - 32 U/L Final    Alkaline Phosphatase 11/13/2024 236 (H)  39 - 117 U/L Final    Total Bilirubin 11/13/2024 0.5  0.0 - 1.2 mg/dL Final    Globulin 11/13/2024 3.2  gm/dL Final    A/G Ratio 11/13/2024 1.2  g/dL Final    BUN/Creatinine Ratio 11/13/2024 10.3  7.0 - 25.0 Final    Anion Gap 11/13/2024 10.9  5.0 - 15.0 mmol/L Final    eGFR 11/13/2024 90.4  >60.0 mL/min/1.73 Final    Magnesium 11/13/2024 1.7  1.6 - 2.6 mg/dL Final    WBC 11/13/2024 4.28  3.40 - 10.80 10*3/mm3 Final    RBC 11/13/2024 4.33  3.77 - 5.28 10*6/mm3 Final    Hemoglobin 11/13/2024 11.7 (L)  12.0 - 15.9 g/dL Final    Hematocrit 11/13/2024 36.7  34.0 - 46.6 % Final    MCV 11/13/2024 84.8  79.0 - 97.0 fL Final    MCH 11/13/2024 27.0  26.6 - 33.0 pg Final    MCHC 11/13/2024 31.9  31.5 - 35.7 g/dL Final    RDW 11/13/2024 18.1 (H)  12.3 - 15.4 % Final    RDW-SD 11/13/2024 55.8 (H)  37.0 - 54.0 fl Final    MPV 11/13/2024 9.9  6.0 - 12.0 fL Final    Platelets 11/13/2024 216  140 - 450 10*3/mm3 Final    Neutrophil % 11/13/2024 52.8  42.7 - 76.0 % Final    Lymphocyte % 11/13/2024 37.1  19.6 - 45.3 % Final    Monocyte % 11/13/2024 7.5  5.0 - 12.0 % Final    Eosinophil % 11/13/2024 1.4  0.3 - 6.2 % Final    Basophil % 11/13/2024 0.5  0.0 - 1.5 % Final    Immature Grans % 11/13/2024 0.7 (H)  0.0 - 0.5 % Final    Neutrophils, Absolute 11/13/2024 2.26  1.70 - 7.00 10*3/mm3 Final    Lymphocytes, Absolute 11/13/2024 1.59  0.70 - 3.10 10*3/mm3 Final    Monocytes, Absolute 11/13/2024 0.32  0.10 - 0.90 10*3/mm3 Final    Eosinophils, Absolute 11/13/2024 0.06  0.00 - 0.40 10*3/mm3  Final    Basophils, Absolute 11/13/2024 0.02  0.00 - 0.20 10*3/mm3 Final    Immature Grans, Absolute 11/13/2024 0.03  0.00 - 0.05 10*3/mm3 Final    nRBC 11/13/2024 0.0  0.0 - 0.2 /100 WBC Final        No results found.    ASSESSMENT: The patient is a very pleasant 54 y.o. female  with right-sided metastatic colon cancer      PLAN:    1.  Right-sided metastatic colon cancer:  A.  I will proceed with FOLFIRI plus Vectibix cycle #6.  B.  She will follow-up with us in 2 weeks for cycle #7.     C.  We will consider maintenance therapy after maximum response.  D.  I will continue to monitor the patient blood work including blood counts kidney function liver function and electrolytes.  E.  I will repeat her Signatera with every other cycle.  Schedule the patient that her last CT DNA did increase slightly.  F.  I will repeat her scans prior to cycle #7.  Will be ordered for prior to return.    2.  Elevated liver enzymes:  A.  Induced by liver metastases  B. I will repeat labs on return    3.  Chemotherapy-induced nausea:  A.  I will continue the patient on Zofran as needed for nausea however I will switch it to the dissolvable ODT tablets.  B.  I will add Compazine 10 mg every 8 hours as needed.    4.  Chemotherapy-induced diarrhea:  A.  I will continue the patient on Imodium as needed    5. Cancer-related pain:  A.  I will continue the patient oxycodone 5 mg twice a day.     6.  Anxiety:  A.  I will continue the patient on Ativan 1 mg nightly as needed.       7.  Hypertension:  A.  She will continue losartan.  B.  I reviewed with the patient this will interfere with our management since chemotherapy typically can cause hypotension we will have to monitor blood pressure closely.    8.  Dehydration:  A.  I will continue 1 L of IV fluid as needed    9.  Chemotherapy-induced anemia:  A.  I discussed with the patient her CBC result from September 23, 2024 hemoglobin low but improved at 11.7  B.  I will continue monitor her CBC  prior to each infusion.  C.  I will transfuse as needed for hemoglobin less than 8.      10.  Chemotherapy-induced heartburn:  A.  I will continue pantoprazole 40 mg twice a day.    B.  I will continue Carafate 1 g every 6 hours    11.  Treatment related diarrhea  A.  Continue Imodium as directed.  B.  I will continue Lomotil as needed.      12.  Treatment induced peripheral neuropathy:   A.  We will continue gabapentin 100 mg 3 times daily.    13.  Treatment induced dermatitis, severe:  A.  Erbitux was permanently discontinued due to severe treatment induced dermatitis.    B.  I will continue patient on Vectibix at 4 mg/kg in 2 weeks and if she is doing well we will consider increasing the dose to full dose at 6 mg/kg.  C.  I will continue hydrocortisone cream.  D.  I will continue doxycycline 100 mg twice a day.     14.  Treatment induced mucositis:  A.  I will continue Magic mouthwash.    FOLLOW UP: 2 weeks with cycle #7.      Iban Lambert MD  11/27/2024

## 2024-12-02 ENCOUNTER — HOSPITAL ENCOUNTER (OUTPATIENT)
Facility: HOSPITAL | Age: 54
Discharge: HOME | End: 2024-12-02
Admitting: INTERNAL MEDICINE

## 2024-12-02 VITALS
HEIGHT: 62 IN | WEIGHT: 177 LBS | BODY MASS INDEX: 32.57 KG/M2 | RESPIRATION RATE: 16 BRPM | DIASTOLIC BLOOD PRESSURE: 90 MMHG | OXYGEN SATURATION: 98 % | HEART RATE: 84 BPM | SYSTOLIC BLOOD PRESSURE: 130 MMHG | TEMPERATURE: 98.3 F

## 2024-12-02 DIAGNOSIS — C18.2 CANCER OF RIGHT COLON: ICD-10-CM

## 2024-12-02 DIAGNOSIS — C78.7 METASTATIC COLON CANCER TO LIVER: Primary | ICD-10-CM

## 2024-12-02 DIAGNOSIS — C18.9 METASTATIC COLON CANCER TO LIVER: Primary | ICD-10-CM

## 2024-12-02 LAB
ALBUMIN SERPL-MCNC: 3.8 G/DL (ref 3.5–5.2)
ALBUMIN/GLOB SERPL: 1.3 G/DL
ALP SERPL-CCNC: 254 U/L (ref 39–117)
ALT SERPL W P-5'-P-CCNC: 52 U/L (ref 1–33)
ANION GAP SERPL CALCULATED.3IONS-SCNC: 10.4 MMOL/L (ref 5–15)
AST SERPL-CCNC: 40 U/L (ref 1–32)
BASOPHILS # BLD AUTO: 0.02 10*3/MM3 (ref 0–0.2)
BASOPHILS NFR BLD AUTO: 0.6 % (ref 0–1.5)
BILIRUB SERPL-MCNC: 0.4 MG/DL (ref 0–1.2)
BUN SERPL-MCNC: 10 MG/DL (ref 6–20)
BUN/CREAT SERPL: 12.5 (ref 7–25)
CALCIUM SPEC-SCNC: 8.7 MG/DL (ref 8.6–10.5)
CHLORIDE SERPL-SCNC: 107 MMOL/L (ref 98–107)
CO2 SERPL-SCNC: 23.6 MMOL/L (ref 22–29)
CREAT SERPL-MCNC: 0.8 MG/DL (ref 0.57–1)
DEPRECATED RDW RBC AUTO: 53.5 FL (ref 37–54)
EGFRCR SERPLBLD CKD-EPI 2021: 87.7 ML/MIN/1.73
EOSINOPHIL # BLD AUTO: 0.05 10*3/MM3 (ref 0–0.4)
EOSINOPHIL NFR BLD AUTO: 1.6 % (ref 0.3–6.2)
ERYTHROCYTE [DISTWIDTH] IN BLOOD BY AUTOMATED COUNT: 16.9 % (ref 12.3–15.4)
GLOBULIN UR ELPH-MCNC: 2.9 GM/DL
GLUCOSE SERPL-MCNC: 103 MG/DL (ref 65–99)
HCT VFR BLD AUTO: 36.8 % (ref 34–46.6)
HGB BLD-MCNC: 11.7 G/DL (ref 12–15.9)
IMM GRANULOCYTES # BLD AUTO: 0.01 10*3/MM3 (ref 0–0.05)
IMM GRANULOCYTES NFR BLD AUTO: 0.3 % (ref 0–0.5)
LYMPHOCYTES # BLD AUTO: 1.55 10*3/MM3 (ref 0.7–3.1)
LYMPHOCYTES NFR BLD AUTO: 48.4 % (ref 19.6–45.3)
MAGNESIUM SERPL-MCNC: 1.8 MG/DL (ref 1.6–2.6)
MCH RBC QN AUTO: 27.5 PG (ref 26.6–33)
MCHC RBC AUTO-ENTMCNC: 31.8 G/DL (ref 31.5–35.7)
MCV RBC AUTO: 86.6 FL (ref 79–97)
MONOCYTES # BLD AUTO: 0.46 10*3/MM3 (ref 0.1–0.9)
MONOCYTES NFR BLD AUTO: 14.4 % (ref 5–12)
NEUTROPHILS NFR BLD AUTO: 1.11 10*3/MM3 (ref 1.7–7)
NEUTROPHILS NFR BLD AUTO: 34.7 % (ref 42.7–76)
NRBC BLD AUTO-RTO: 0 /100 WBC (ref 0–0.2)
PLATELET # BLD AUTO: 236 10*3/MM3 (ref 140–450)
PMV BLD AUTO: 10.1 FL (ref 6–12)
POTASSIUM SERPL-SCNC: 3.7 MMOL/L (ref 3.5–5.2)
PROT SERPL-MCNC: 6.7 G/DL (ref 6–8.5)
RBC # BLD AUTO: 4.25 10*6/MM3 (ref 3.77–5.28)
SODIUM SERPL-SCNC: 141 MMOL/L (ref 136–145)
WBC NRBC COR # BLD AUTO: 3.2 10*3/MM3 (ref 3.4–10.8)

## 2024-12-02 PROCEDURE — 96416 CHEMO PROLONG INFUSE W/PUMP: CPT

## 2024-12-02 PROCEDURE — 25010000002 FLUOROURACIL PER 500 MG: Performed by: INTERNAL MEDICINE

## 2024-12-02 PROCEDURE — 96409 CHEMO IV PUSH SNGL DRUG: CPT

## 2024-12-02 PROCEDURE — 85025 COMPLETE CBC W/AUTO DIFF WBC: CPT | Performed by: INTERNAL MEDICINE

## 2024-12-02 PROCEDURE — 96368 THER/DIAG CONCURRENT INF: CPT

## 2024-12-02 PROCEDURE — 25010000002 IRINOTECAN PER 20 MG: Performed by: INTERNAL MEDICINE

## 2024-12-02 PROCEDURE — 25810000003 SODIUM CHLORIDE 0.9 % SOLUTION: Performed by: INTERNAL MEDICINE

## 2024-12-02 PROCEDURE — 96417 CHEMO IV INFUS EACH ADDL SEQ: CPT

## 2024-12-02 PROCEDURE — 96375 TX/PRO/DX INJ NEW DRUG ADDON: CPT

## 2024-12-02 PROCEDURE — 25010000002 LEUCOVORIN CALCIUM PER 50MG: Performed by: INTERNAL MEDICINE

## 2024-12-02 PROCEDURE — 25010000002 PANITUMUMAB 400 MG/20ML SOLUTION 20 ML VIAL: Performed by: INTERNAL MEDICINE

## 2024-12-02 PROCEDURE — 83735 ASSAY OF MAGNESIUM: CPT | Performed by: INTERNAL MEDICINE

## 2024-12-02 PROCEDURE — 25010000002 PALONOSETRON 0.25 MG/5ML SOLUTION PREFILLED SYRINGE: Performed by: INTERNAL MEDICINE

## 2024-12-02 PROCEDURE — 96413 CHEMO IV INFUSION 1 HR: CPT

## 2024-12-02 PROCEDURE — 96549 UNLISTED CHEMOTHERAPY PX: CPT

## 2024-12-02 PROCEDURE — 25010000002 METHYLPREDNISOLONE PER 125 MG: Performed by: INTERNAL MEDICINE

## 2024-12-02 PROCEDURE — 80053 COMPREHEN METABOLIC PANEL: CPT | Performed by: INTERNAL MEDICINE

## 2024-12-02 PROCEDURE — 25010000003 DEXTROSE 5 % SOLUTION 500 ML FLEX CONT: Performed by: INTERNAL MEDICINE

## 2024-12-02 PROCEDURE — G0498 CHEMO EXTEND IV INFUS W/PUMP: HCPCS

## 2024-12-02 PROCEDURE — 25010000003 DEXTROSE 5 % SOLUTION 250 ML FLEX CONT: Performed by: INTERNAL MEDICINE

## 2024-12-02 PROCEDURE — 96411 CHEMO IV PUSH ADDL DRUG: CPT

## 2024-12-02 PROCEDURE — 25810000003 SODIUM CHLORIDE 0.9 % SOLUTION 500 ML FLEX CONT: Performed by: INTERNAL MEDICINE

## 2024-12-02 RX ORDER — MEPERIDINE HYDROCHLORIDE 25 MG/ML
25 INJECTION INTRAMUSCULAR; INTRAVENOUS; SUBCUTANEOUS
Status: DISCONTINUED | OUTPATIENT
Start: 2024-12-02 | End: 2024-12-03 | Stop reason: HOSPADM

## 2024-12-02 RX ORDER — DIPHENHYDRAMINE HYDROCHLORIDE 50 MG/ML
50 INJECTION INTRAMUSCULAR; INTRAVENOUS AS NEEDED
Status: DISCONTINUED | OUTPATIENT
Start: 2024-12-02 | End: 2024-12-03 | Stop reason: HOSPADM

## 2024-12-02 RX ORDER — ACETAMINOPHEN 325 MG/1
650 TABLET ORAL ONCE
Status: COMPLETED | OUTPATIENT
Start: 2024-12-02 | End: 2024-12-02

## 2024-12-02 RX ORDER — PALONOSETRON 0.05 MG/ML
0.25 INJECTION, SOLUTION INTRAVENOUS ONCE
Status: COMPLETED | OUTPATIENT
Start: 2024-12-02 | End: 2024-12-02

## 2024-12-02 RX ORDER — SODIUM CHLORIDE 9 MG/ML
20 INJECTION, SOLUTION INTRAVENOUS ONCE
Status: COMPLETED | OUTPATIENT
Start: 2024-12-02 | End: 2024-12-02

## 2024-12-02 RX ORDER — FAMOTIDINE 10 MG/ML
20 INJECTION, SOLUTION INTRAVENOUS ONCE
Status: COMPLETED | OUTPATIENT
Start: 2024-12-02 | End: 2024-12-02

## 2024-12-02 RX ORDER — FAMOTIDINE 10 MG/ML
20 INJECTION, SOLUTION INTRAVENOUS AS NEEDED
Status: DISCONTINUED | OUTPATIENT
Start: 2024-12-02 | End: 2024-12-03 | Stop reason: HOSPADM

## 2024-12-02 RX ORDER — HYDROCORTISONE SODIUM SUCCINATE 100 MG/2ML
100 INJECTION INTRAMUSCULAR; INTRAVENOUS AS NEEDED
Status: DISCONTINUED | OUTPATIENT
Start: 2024-12-02 | End: 2024-12-03 | Stop reason: HOSPADM

## 2024-12-02 RX ORDER — FLUOROURACIL 50 MG/ML
300 INJECTION, SOLUTION INTRAVENOUS ONCE
Status: COMPLETED | OUTPATIENT
Start: 2024-12-02 | End: 2024-12-02

## 2024-12-02 RX ORDER — METHYLPREDNISOLONE SODIUM SUCCINATE 125 MG/2ML
125 INJECTION INTRAMUSCULAR; INTRAVENOUS ONCE
Status: COMPLETED | OUTPATIENT
Start: 2024-12-02 | End: 2024-12-02

## 2024-12-02 RX ORDER — DIPHENHYDRAMINE HYDROCHLORIDE 50 MG/ML
50 INJECTION INTRAMUSCULAR; INTRAVENOUS ONCE
Status: DISCONTINUED | OUTPATIENT
Start: 2024-12-02 | End: 2024-12-03 | Stop reason: HOSPADM

## 2024-12-02 RX ADMIN — ACETAMINOPHEN 650 MG: 325 TABLET, FILM COATED ORAL at 09:48

## 2024-12-02 RX ADMIN — FAMOTIDINE 20 MG: 10 INJECTION, SOLUTION INTRAVENOUS at 09:48

## 2024-12-02 RX ADMIN — PANITUMUMAB 320 MG: 400 SOLUTION INTRAVENOUS at 10:07

## 2024-12-02 RX ADMIN — PALONOSETRON 0.25 MG: 0.25 INJECTION, SOLUTION INTRAVENOUS at 09:48

## 2024-12-02 RX ADMIN — ATROPINE SULFATE 0.25 MG: 0.4 INJECTION, SOLUTION INTRAVENOUS at 11:15

## 2024-12-02 RX ADMIN — FLUOROURACIL 550 MG: 50 INJECTION, SOLUTION INTRAVENOUS at 13:12

## 2024-12-02 RX ADMIN — FLUOROURACIL 3280 MG: 50 INJECTION, SOLUTION INTRAVENOUS at 13:12

## 2024-12-02 RX ADMIN — SODIUM CHLORIDE 20 ML/HR: 9 INJECTION, SOLUTION INTRAVENOUS at 13:12

## 2024-12-02 RX ADMIN — LEUCOVORIN CALCIUM 550 MG: 10 INJECTION INTRAMUSCULAR; INTRAVENOUS at 11:18

## 2024-12-02 RX ADMIN — IRINOTECAN HYDROCHLORIDE 240 MG: 20 INJECTION, SOLUTION INTRAVENOUS at 11:18

## 2024-12-02 RX ADMIN — METHYLPREDNISOLONE SODIUM SUCCINATE 125 MG: 125 INJECTION, POWDER, FOR SOLUTION INTRAMUSCULAR; INTRAVENOUS at 09:49

## 2024-12-04 ENCOUNTER — HOSPITAL ENCOUNTER (OUTPATIENT)
Facility: HOSPITAL | Age: 54
Discharge: HOME | End: 2024-12-04
Admitting: INTERNAL MEDICINE
Payer: MEDICAID

## 2024-12-04 ENCOUNTER — TELEPHONE (OUTPATIENT)
Dept: ONCOLOGY | Facility: CLINIC | Age: 54
End: 2024-12-04

## 2024-12-04 VITALS — OXYGEN SATURATION: 97 % | HEART RATE: 92 BPM | SYSTOLIC BLOOD PRESSURE: 134 MMHG | DIASTOLIC BLOOD PRESSURE: 94 MMHG

## 2024-12-04 DIAGNOSIS — C18.2 CANCER OF RIGHT COLON: ICD-10-CM

## 2024-12-04 DIAGNOSIS — C18.9 METASTATIC COLON CANCER TO LIVER: Primary | ICD-10-CM

## 2024-12-04 DIAGNOSIS — C78.7 METASTATIC COLON CANCER TO LIVER: Primary | ICD-10-CM

## 2024-12-04 PROCEDURE — 96523 IRRIG DRUG DELIVERY DEVICE: CPT

## 2024-12-04 PROCEDURE — 25010000002 HEPARIN LOCK FLUSH PER 10 UNITS: Performed by: NURSE PRACTITIONER

## 2024-12-04 RX ORDER — SODIUM CHLORIDE 0.9 % (FLUSH) 0.9 %
10 SYRINGE (ML) INJECTION AS NEEDED
OUTPATIENT
Start: 2024-12-04

## 2024-12-04 RX ORDER — HEPARIN SODIUM (PORCINE) LOCK FLUSH IV SOLN 100 UNIT/ML 100 UNIT/ML
500 SOLUTION INTRAVENOUS AS NEEDED
OUTPATIENT
Start: 2024-12-04

## 2024-12-04 RX ORDER — SODIUM CHLORIDE 0.9 % (FLUSH) 0.9 %
10 SYRINGE (ML) INJECTION AS NEEDED
Status: DISCONTINUED | OUTPATIENT
Start: 2024-12-04 | End: 2024-12-05 | Stop reason: HOSPADM

## 2024-12-04 RX ORDER — HEPARIN SODIUM (PORCINE) LOCK FLUSH IV SOLN 100 UNIT/ML 100 UNIT/ML
500 SOLUTION INTRAVENOUS AS NEEDED
Status: DISCONTINUED | OUTPATIENT
Start: 2024-12-04 | End: 2024-12-05 | Stop reason: HOSPADM

## 2024-12-04 RX ADMIN — Medication 10 ML: at 15:09

## 2024-12-04 RX ADMIN — HEPARIN 500 UNITS: 100 SYRINGE at 15:09

## 2024-12-04 NOTE — TELEPHONE ENCOUNTER
Called patient to let her know that Dr. Iban Lambert has signed the disability paperwork, Paperwork requires a MALLIKA to be able to be sent in. Called patient to let them know that she would need to fill out the MALLIKA to take with her and have sent in with the disability paperwork.     Patient has no further questions

## 2024-12-09 ENCOUNTER — HOSPITAL ENCOUNTER (OUTPATIENT)
Dept: CT IMAGING | Facility: HOSPITAL | Age: 54
Discharge: HOME | End: 2024-12-09
Admitting: INTERNAL MEDICINE
Payer: MEDICAID

## 2024-12-09 ENCOUNTER — OFFICE VISIT (OUTPATIENT)
Dept: ONCOLOGY | Facility: CLINIC | Age: 54
End: 2024-12-09
Payer: MEDICAID

## 2024-12-09 VITALS
HEIGHT: 62 IN | OXYGEN SATURATION: 98 % | WEIGHT: 172.9 LBS | SYSTOLIC BLOOD PRESSURE: 136 MMHG | HEART RATE: 79 BPM | DIASTOLIC BLOOD PRESSURE: 85 MMHG | BODY MASS INDEX: 31.82 KG/M2 | RESPIRATION RATE: 16 BRPM | TEMPERATURE: 97.3 F

## 2024-12-09 DIAGNOSIS — C78.7 METASTATIC COLON CANCER TO LIVER: ICD-10-CM

## 2024-12-09 DIAGNOSIS — C18.9 METASTATIC COLON CANCER TO LIVER: ICD-10-CM

## 2024-12-09 DIAGNOSIS — C18.2 CANCER OF RIGHT COLON: Primary | ICD-10-CM

## 2024-12-09 PROCEDURE — 71260 CT THORAX DX C+: CPT

## 2024-12-09 PROCEDURE — 25510000001 IOPAMIDOL 61 % SOLUTION: Performed by: INTERNAL MEDICINE

## 2024-12-09 PROCEDURE — 74177 CT ABD & PELVIS W/CONTRAST: CPT

## 2024-12-09 PROCEDURE — 99214 OFFICE O/P EST MOD 30 MIN: CPT | Performed by: NURSE PRACTITIONER

## 2024-12-09 PROCEDURE — 3079F DIAST BP 80-89 MM HG: CPT | Performed by: NURSE PRACTITIONER

## 2024-12-09 PROCEDURE — 3075F SYST BP GE 130 - 139MM HG: CPT | Performed by: NURSE PRACTITIONER

## 2024-12-09 PROCEDURE — 1126F AMNT PAIN NOTED NONE PRSNT: CPT | Performed by: NURSE PRACTITIONER

## 2024-12-09 RX ORDER — HYDROCORTISONE 25 MG/G
1 CREAM TOPICAL 2 TIMES DAILY
Qty: 3.5 G | Refills: 0 | Status: SHIPPED | OUTPATIENT
Start: 2024-12-09

## 2024-12-09 RX ORDER — IOPAMIDOL 612 MG/ML
100 INJECTION, SOLUTION INTRAVASCULAR
Status: COMPLETED | OUTPATIENT
Start: 2024-12-09 | End: 2024-12-09

## 2024-12-09 RX ADMIN — IOPAMIDOL 100 ML: 612 INJECTION, SOLUTION INTRAVENOUS at 10:13

## 2024-12-09 NOTE — PROGRESS NOTES
DATE OF VISIT: 12/9/2024    REASON FOR VISIT: Followup for metastatic right-sided colon cancer     PROBLEM LIST:  1. Metastatic colon cancer TX NX M1 a stage Perry:  A.  Presented with abdominal pain and jaundice  B.  CT abdomen pelvis done November 04, 2022 confirmed diffuse liver metastases.  C.  Diagnosed after CT-guided liver biopsy done on November 22, 2022 confirming adenocarcinoma CK20 positive CK7 negative.  D.  Colonoscopy done December 08, 2020 to confirm transverse colon mass however biopsy revealed adenoma.  E.  Started palliative chemotherapy with dose adjusted FOLFOXIRI December 14, 2022, status post 12 cycles completed May 2023.  F.  Started maintenance Xeloda 1000 mg twice daily 1 week on 1 week off June 2023.  Stopped April 2024 secondary to progressive disease in the liver.  G.  Status post segment 7 and segment 3 liver met resection with 2 other lesions ablation done by Dr. Nielson April 19, 2024.  H.  Started FOLFIRI with Vectibix August 19, 2024.  Status post 5 cycles.  2.  Elevated liver enzymes:  A.  Induced by metastatic disease  3.  Cancer-related pain  4.  Anxiety  5.  Hypertension    HISTORY OF PRESENT ILLNESS: The patient is a very pleasant 54 y.o. female with past medical history significant for metastatic right-sided colon cancer diagnosed November 22, 2022.  Started on palliative chemotherapy with dose adjusted FOLFOXIRI.  She completed 12 cycles May 2023.  She was started on maintenance Xeloda 1000 mg twice daily June 2023.  Progressive disease with worsening PET scan as well as rising CT DNA.  She was started on FOLFIRI with Vectibix August 2024.  The patient is here today for scheduled follow-up visit with treatment cycle acute visit..    SUBJECTIVE: Amber is here today with her .  She has had worsening dermatitis and has stop by the office for evaluation of rash.  She reports having it on her chest and a few spots on her arms. Her mouth has a small sore in it - she is using MMW  "but not 4 times daily. She is wandering if she should have prednisone.       Past History:  Medical History: has a past medical history of Anxiety, Cancer, Ear piercing, Gallstones, History of irritable bowel syndrome, Hypertension, Seasonal allergies, Tattoos, and Wears glasses.   Surgical History: has a past surgical history that includes postpartum tubal ligation; Dilation and curettage of uterus (2013); Tumor removal (2013); Portacath placement (N/A, 12/8/2022); Colonoscopy (N/A, 12/8/2022); diagnostic laparoscopy exploratory laparotomy (N/A, 4/19/2024); Cholecystectomy (N/A, 4/19/2024); and Liver resection (Left, 4/19/2024).   Family History: family history is not on file.   Social History: reports that she has never smoked. She has never used smokeless tobacco. She reports current alcohol use. She reports that she does not use drugs.    (Not in a hospital admission)     Allergies: Losartan, Valium [diazepam], and Citrus     Review of Systems   Constitutional:  Positive for fatigue.   HENT:  Positive for mouth sores.    Gastrointestinal:  Positive for abdominal pain.   Musculoskeletal:  Positive for arthralgias.   Skin:  Positive for rash.        Facial and chest acne like rash.   Psychiatric/Behavioral:  The patient is nervous/anxious.        PHYSICAL EXAMINATION:   /85   Pulse 79   Temp 97.3 °F (36.3 °C)   Resp 16   Ht 157.5 cm (62.01\")   Wt 78.4 kg (172 lb 14.4 oz)   SpO2 98%   BMI 31.62 kg/m²    Pain Score    12/09/24 1154   PainSc: 0-No pain                          ECOG Performance Status: 1 - Symptomatic but completely ambulatory      General Appearance:      alert, cooperative, no apparent distress and appears stated age   Lungs:   Clear to auscultation bilaterally; respirations regular, even, and unlabored bilaterally   Heart:  Regular rate and rhythm, no murmurs appreciated   Abdomen:   Soft, non-tender, and non-distended                 Hospital Outpatient Visit on 12/02/2024   Component " Date Value Ref Range Status    Glucose 12/02/2024 103 (H)  65 - 99 mg/dL Final    BUN 12/02/2024 10  6 - 20 mg/dL Final    Creatinine 12/02/2024 0.80  0.57 - 1.00 mg/dL Final    Sodium 12/02/2024 141  136 - 145 mmol/L Final    Potassium 12/02/2024 3.7  3.5 - 5.2 mmol/L Final    Chloride 12/02/2024 107  98 - 107 mmol/L Final    CO2 12/02/2024 23.6  22.0 - 29.0 mmol/L Final    Calcium 12/02/2024 8.7  8.6 - 10.5 mg/dL Final    Total Protein 12/02/2024 6.7  6.0 - 8.5 g/dL Final    Albumin 12/02/2024 3.8  3.5 - 5.2 g/dL Final    ALT (SGPT) 12/02/2024 52 (H)  1 - 33 U/L Final    AST (SGOT) 12/02/2024 40 (H)  1 - 32 U/L Final    Alkaline Phosphatase 12/02/2024 254 (H)  39 - 117 U/L Final    Total Bilirubin 12/02/2024 0.4  0.0 - 1.2 mg/dL Final    Globulin 12/02/2024 2.9  gm/dL Final    A/G Ratio 12/02/2024 1.3  g/dL Final    BUN/Creatinine Ratio 12/02/2024 12.5  7.0 - 25.0 Final    Anion Gap 12/02/2024 10.4  5.0 - 15.0 mmol/L Final    eGFR 12/02/2024 87.7  >60.0 mL/min/1.73 Final    Magnesium 12/02/2024 1.8  1.6 - 2.6 mg/dL Final    WBC 12/02/2024 3.20 (L)  3.40 - 10.80 10*3/mm3 Final    RBC 12/02/2024 4.25  3.77 - 5.28 10*6/mm3 Final    Hemoglobin 12/02/2024 11.7 (L)  12.0 - 15.9 g/dL Final    Hematocrit 12/02/2024 36.8  34.0 - 46.6 % Final    MCV 12/02/2024 86.6  79.0 - 97.0 fL Final    MCH 12/02/2024 27.5  26.6 - 33.0 pg Final    MCHC 12/02/2024 31.8  31.5 - 35.7 g/dL Final    RDW 12/02/2024 16.9 (H)  12.3 - 15.4 % Final    RDW-SD 12/02/2024 53.5  37.0 - 54.0 fl Final    MPV 12/02/2024 10.1  6.0 - 12.0 fL Final    Platelets 12/02/2024 236  140 - 450 10*3/mm3 Final    Neutrophil % 12/02/2024 34.7 (L)  42.7 - 76.0 % Final    Lymphocyte % 12/02/2024 48.4 (H)  19.6 - 45.3 % Final    Monocyte % 12/02/2024 14.4 (H)  5.0 - 12.0 % Final    Eosinophil % 12/02/2024 1.6  0.3 - 6.2 % Final    Basophil % 12/02/2024 0.6  0.0 - 1.5 % Final    Immature Grans % 12/02/2024 0.3  0.0 - 0.5 % Final    Neutrophils, Absolute 12/02/2024 1.11  (L)  1.70 - 7.00 10*3/mm3 Final    Lymphocytes, Absolute 12/02/2024 1.55  0.70 - 3.10 10*3/mm3 Final    Monocytes, Absolute 12/02/2024 0.46  0.10 - 0.90 10*3/mm3 Final    Eosinophils, Absolute 12/02/2024 0.05  0.00 - 0.40 10*3/mm3 Final    Basophils, Absolute 12/02/2024 0.02  0.00 - 0.20 10*3/mm3 Final    Immature Grans, Absolute 12/02/2024 0.01  0.00 - 0.05 10*3/mm3 Final    nRBC 12/02/2024 0.0  0.0 - 0.2 /100 WBC Final        CT Abdomen Pelvis With Contrast    Result Date: 12/9/2024  Narrative: PROCEDURE: CT ABDOMEN PELVIS W CONTRAST-  HISTORY:  Metastatic colon cancer; C18.9-Malignant neoplasm of colon, unspecified; C78.7-Secondary malignant neoplasm of liver and intrahepatic bile duct  COMPARISON: July 2024.  TECHNIQUE: Multiple axial CT images were obtained from the lung bases through the pubic symphysis following the administration of Isovue 300 contrast.  FINDINGS:  ABDOMEN: The lung bases are clear. The heart is normal in size. Previously-described hypodense lesion in the posterior right lobe of the liver measuring up to 7 cm in transverse diameter has significantly decreased in size now measuring 2.3 cm in diameter. Anterior to this is a predominantly calcified lesion most consistent with a liver metastasis treated with microwave ablation. This is stable from the prior exam. One of the lesions laterally in the right lobe is partially calcified consistent with treated metastasis, similar to the prior exam. Postsurgical changes noted in the lateral left lobe of the liver. No new hepatic mass is identified. The spleen is unremarkable. No adrenal mass is present.  The pancreas is atrophic. The kidneys are normal. The aorta is normal in caliber. There is no free fluid or adenopathy. The abdominal portions of the GI tract are unremarkable with no evidence of obstruction.  PELVIS: The appendix is normal. Uterus is midline. The urinary bladder is mostly collapsed. There is no significant free fluid or adenopathy.  There is no bony destructive lesion.      Impression: Significant interval decrease in size of the right hepatic metastasis posteriorly consistent with posttreatment change.  Otherwise, stable exam.   CTDI: 7.52 mGy DLP: 501.28 mGy.cm   This study was performed with techniques to keep radiation doses as low as reasonably achievable (ALARA). Individualized dose reduction techniques using automated exposure control or adjustment of mA and/or kV according to the patient size were employed.      Images were reviewed, interpreted, and dictated by Dr. Christine Dale MD Transcribed by Alee Fischer PA-C.  This report was signed and finalized on 12/9/2024 11:34 AM by Christine Dale MD.       ASSESSMENT: The patient is a very pleasant 54 y.o. female  with right-sided metastatic colon cancer      PLAN:     Treatment induced dermatitis, severe:  A.  Erbitux was permanently discontinued due to severe treatment induced dermatitis.    B.  I will continue patient on Vectibix at 4 mg/kg in 1 week.  Patient is not interested in full dose at 6 mg/kg at this time.    C.  I will change hydrocortisone cream to without lidocaine due to intolerance. I will send in prescription today.  D.  I will continue doxycycline 100 mg twice a day.     2. Treatment induced mucositis:  A.  I will continue Magic mouthwash. I will refill today.  I encouraged the patient to use 4 times daily.    3.  Right-sided metastatic colon cancer:  A.  I will proceed with FOLFIRI plus Vectibix cycle #6.  B.  She will follow-up with us in 2 weeks for cycle #7.     C.  We will consider maintenance therapy after maximum response.  D.  I will continue to monitor the patient blood work including blood counts kidney function liver function and electrolytes.  E.  I will repeat her Signatera with every other cycle.  Schedule the patient that her last CT DNA did increase slightly.  F.  I will repeat her scans prior to cycle #7.  Will be ordered for prior to return.    4.   Elevated liver enzymes:  A.  Induced by liver metastases  B. I will repeat labs on return    5.  Chemotherapy-induced nausea:  A.  I will continue the patient on Zofran as needed for nausea however I will switch it to the dissolvable ODT tablets.  B.  I will continue Compazine 10 mg every 8 hours as needed.    6. Cancer-related pain:  A.  I will continue the patient oxycodone 5 mg twice a day.     7.  Anxiety:  A.  I will continue the patient on Ativan 1 mg nightly as needed.       8.  Treatment related diarrhea  A.  Continue Imodium as directed.  B.  I will continue Lomotil as needed.      9.  Treatment induced peripheral neuropathy:   A.  We will continue gabapentin 100 mg 3 times daily.        FOLLOW UP: 1 week with cycle #7.      Ana Cristina Cano, APRN  12/9/2024   Dr. Kirk EOAE (evoked otoacoustic emission)

## 2024-12-11 ENCOUNTER — OFFICE VISIT (OUTPATIENT)
Dept: ONCOLOGY | Facility: CLINIC | Age: 54
End: 2024-12-11
Payer: MEDICAID

## 2024-12-11 VITALS
RESPIRATION RATE: 16 BRPM | OXYGEN SATURATION: 98 % | TEMPERATURE: 98.4 F | DIASTOLIC BLOOD PRESSURE: 70 MMHG | SYSTOLIC BLOOD PRESSURE: 121 MMHG | HEART RATE: 105 BPM | WEIGHT: 176 LBS | HEIGHT: 62 IN | BODY MASS INDEX: 32.39 KG/M2

## 2024-12-11 DIAGNOSIS — C18.2 CANCER OF RIGHT COLON: Primary | ICD-10-CM

## 2024-12-11 DIAGNOSIS — C78.7 METASTATIC COLON CANCER TO LIVER: ICD-10-CM

## 2024-12-11 DIAGNOSIS — C18.9 METASTATIC COLON CANCER TO LIVER: ICD-10-CM

## 2024-12-11 RX ORDER — DIPHENHYDRAMINE HYDROCHLORIDE 50 MG/ML
50 INJECTION INTRAMUSCULAR; INTRAVENOUS ONCE
Start: 2024-12-16

## 2024-12-11 RX ORDER — DIPHENHYDRAMINE HYDROCHLORIDE 50 MG/ML
50 INJECTION INTRAMUSCULAR; INTRAVENOUS AS NEEDED
OUTPATIENT
Start: 2024-12-16

## 2024-12-11 RX ORDER — FAMOTIDINE 10 MG/ML
20 INJECTION, SOLUTION INTRAVENOUS ONCE
OUTPATIENT
Start: 2024-12-16

## 2024-12-11 RX ORDER — MEPERIDINE HYDROCHLORIDE 25 MG/ML
25 INJECTION INTRAMUSCULAR; INTRAVENOUS; SUBCUTANEOUS
OUTPATIENT
Start: 2024-12-16

## 2024-12-11 RX ORDER — METHYLPREDNISOLONE SODIUM SUCCINATE 125 MG/2ML
125 INJECTION INTRAMUSCULAR; INTRAVENOUS ONCE
OUTPATIENT
Start: 2024-12-16

## 2024-12-11 RX ORDER — ATROPINE SULFATE 1 MG/ML
0.25 INJECTION, SOLUTION INTRAMUSCULAR; INTRAVENOUS; SUBCUTANEOUS
OUTPATIENT
Start: 2024-12-16

## 2024-12-11 RX ORDER — PALONOSETRON 0.05 MG/ML
0.25 INJECTION, SOLUTION INTRAVENOUS ONCE
OUTPATIENT
Start: 2024-12-16

## 2024-12-11 RX ORDER — FLUOROURACIL 50 MG/ML
300 INJECTION, SOLUTION INTRAVENOUS ONCE
OUTPATIENT
Start: 2024-12-16

## 2024-12-11 RX ORDER — SODIUM CHLORIDE 9 MG/ML
20 INJECTION, SOLUTION INTRAVENOUS ONCE
OUTPATIENT
Start: 2024-12-16

## 2024-12-11 RX ORDER — ACETAMINOPHEN 325 MG/1
650 TABLET ORAL ONCE
OUTPATIENT
Start: 2024-12-16

## 2024-12-11 RX ORDER — FAMOTIDINE 10 MG/ML
20 INJECTION, SOLUTION INTRAVENOUS AS NEEDED
OUTPATIENT
Start: 2024-12-16

## 2024-12-11 RX ORDER — HYDROCORTISONE SODIUM SUCCINATE 100 MG/2ML
100 INJECTION INTRAMUSCULAR; INTRAVENOUS AS NEEDED
OUTPATIENT
Start: 2024-12-16

## 2024-12-11 NOTE — PROGRESS NOTES
DATE OF VISIT: 12/11/2024    REASON FOR VISIT: Followup for metastatic right-sided colon cancer     PROBLEM LIST:  1. Metastatic colon cancer TX NX M1 a stage [place]:  A.  Presented with abdominal pain and jaundice  B.  CT abdomen pelvis done November 04, 2022 confirmed diffuse liver metastases.  C.  Diagnosed after CT-guided liver biopsy done on November 22, 2022 confirming adenocarcinoma CK20 positive CK7 negative.  D.  Colonoscopy done December 08, 2020 to confirm transverse colon mass however biopsy revealed adenoma.  E.  Started palliative chemotherapy with dose adjusted FOLFOXIRI December 14, 2022, status post 12 cycles completed May 2023.  F.  Started maintenance Xeloda 1000 mg twice daily 1 week on 1 week off June 2023.  Stopped April 2024 secondary to progressive disease in the liver.  G.  Status post segment 7 and segment 3 liver met resection with 2 other lesions ablation done by Dr. Nielson April 19, 2024.  H.  Started FOLFIRI with Vectibix August 19, 2024.  Status post 6 cycles.  2.  Elevated liver enzymes:  A.  Induced by metastatic disease  3.  Cancer-related pain  4.  Anxiety  5.  Hypertension    HISTORY OF PRESENT ILLNESS: The patient is a very pleasant 54 y.o. female with past medical history significant for metastatic right-sided colon cancer diagnosed November 22, 2022.  Started on palliative chemotherapy with dose adjusted FOLFOXIRI.  She completed 12 cycles May 2023.  She was started on maintenance Xeloda 1000 mg twice daily June 2023.  Progressive disease with worsening PET scan as well as rising CT DNA.  She was started on FOLFIRI with Vectibix August 2024.  The patient is here today for scheduled follow-up visit with treatment cycle #7.    SUBJECTIVE: Amber is here today with her .  She is anxious about the scan results.  Her skin rash has improved.    Past History:  Medical History: has a past medical history of Anxiety, Cancer, Ear piercing, Gallstones, History of irritable bowel  "syndrome, Hypertension, Seasonal allergies, Tattoos, and Wears glasses.   Surgical History: has a past surgical history that includes postpartum tubal ligation; Dilation and curettage of uterus (2013); Tumor removal (2013); Portacath placement (N/A, 12/8/2022); Colonoscopy (N/A, 12/8/2022); diagnostic laparoscopy exploratory laparotomy (N/A, 4/19/2024); Cholecystectomy (N/A, 4/19/2024); and Liver resection (Left, 4/19/2024).   Family History: family history is not on file.   Social History: reports that she has never smoked. She has never used smokeless tobacco. She reports current alcohol use. She reports that she does not use drugs.    (Not in a hospital admission)     Allergies: Losartan, Valium [diazepam], and Citrus     Review of Systems   Constitutional:  Positive for fatigue.   HENT:  Positive for mouth sores.    Gastrointestinal:  Positive for abdominal pain.   Musculoskeletal:  Positive for arthralgias.   Skin:         Facial acne like rash.   Psychiatric/Behavioral:  The patient is nervous/anxious.        PHYSICAL EXAMINATION:   /70   Pulse 105   Temp 98.4 °F (36.9 °C)   Resp 16   Ht 157.5 cm (62.01\")   Wt 79.8 kg (176 lb)   SpO2 98%   BMI 32.18 kg/m²    Pain Score    12/11/24 0947   PainSc: 10-Worst pain ever  Comment: feet                          ECOG Performance Status: 1 - Symptomatic but completely ambulatory      General Appearance:      alert, cooperative, no apparent distress and appears stated age   Lungs:   Clear to auscultation bilaterally; respirations regular, even, and unlabored bilaterally   Heart:  Regular rate and rhythm, no murmurs appreciated   Abdomen:   Soft, non-tender, and non-distended                 Hospital Outpatient Visit on 12/02/2024   Component Date Value Ref Range Status    Glucose 12/02/2024 103 (H)  65 - 99 mg/dL Final    BUN 12/02/2024 10  6 - 20 mg/dL Final    Creatinine 12/02/2024 0.80  0.57 - 1.00 mg/dL Final    Sodium 12/02/2024 141  136 - 145 mmol/L " Final    Potassium 12/02/2024 3.7  3.5 - 5.2 mmol/L Final    Chloride 12/02/2024 107  98 - 107 mmol/L Final    CO2 12/02/2024 23.6  22.0 - 29.0 mmol/L Final    Calcium 12/02/2024 8.7  8.6 - 10.5 mg/dL Final    Total Protein 12/02/2024 6.7  6.0 - 8.5 g/dL Final    Albumin 12/02/2024 3.8  3.5 - 5.2 g/dL Final    ALT (SGPT) 12/02/2024 52 (H)  1 - 33 U/L Final    AST (SGOT) 12/02/2024 40 (H)  1 - 32 U/L Final    Alkaline Phosphatase 12/02/2024 254 (H)  39 - 117 U/L Final    Total Bilirubin 12/02/2024 0.4  0.0 - 1.2 mg/dL Final    Globulin 12/02/2024 2.9  gm/dL Final    A/G Ratio 12/02/2024 1.3  g/dL Final    BUN/Creatinine Ratio 12/02/2024 12.5  7.0 - 25.0 Final    Anion Gap 12/02/2024 10.4  5.0 - 15.0 mmol/L Final    eGFR 12/02/2024 87.7  >60.0 mL/min/1.73 Final    Magnesium 12/02/2024 1.8  1.6 - 2.6 mg/dL Final    WBC 12/02/2024 3.20 (L)  3.40 - 10.80 10*3/mm3 Final    RBC 12/02/2024 4.25  3.77 - 5.28 10*6/mm3 Final    Hemoglobin 12/02/2024 11.7 (L)  12.0 - 15.9 g/dL Final    Hematocrit 12/02/2024 36.8  34.0 - 46.6 % Final    MCV 12/02/2024 86.6  79.0 - 97.0 fL Final    MCH 12/02/2024 27.5  26.6 - 33.0 pg Final    MCHC 12/02/2024 31.8  31.5 - 35.7 g/dL Final    RDW 12/02/2024 16.9 (H)  12.3 - 15.4 % Final    RDW-SD 12/02/2024 53.5  37.0 - 54.0 fl Final    MPV 12/02/2024 10.1  6.0 - 12.0 fL Final    Platelets 12/02/2024 236  140 - 450 10*3/mm3 Final    Neutrophil % 12/02/2024 34.7 (L)  42.7 - 76.0 % Final    Lymphocyte % 12/02/2024 48.4 (H)  19.6 - 45.3 % Final    Monocyte % 12/02/2024 14.4 (H)  5.0 - 12.0 % Final    Eosinophil % 12/02/2024 1.6  0.3 - 6.2 % Final    Basophil % 12/02/2024 0.6  0.0 - 1.5 % Final    Immature Grans % 12/02/2024 0.3  0.0 - 0.5 % Final    Neutrophils, Absolute 12/02/2024 1.11 (L)  1.70 - 7.00 10*3/mm3 Final    Lymphocytes, Absolute 12/02/2024 1.55  0.70 - 3.10 10*3/mm3 Final    Monocytes, Absolute 12/02/2024 0.46  0.10 - 0.90 10*3/mm3 Final    Eosinophils, Absolute 12/02/2024 0.05  0.00 -  0.40 10*3/mm3 Final    Basophils, Absolute 12/02/2024 0.02  0.00 - 0.20 10*3/mm3 Final    Immature Grans, Absolute 12/02/2024 0.01  0.00 - 0.05 10*3/mm3 Final    nRBC 12/02/2024 0.0  0.0 - 0.2 /100 WBC Final        CT Chest With Contrast Diagnostic    Result Date: 12/9/2024  Narrative: PROCEDURE: CT CHEST W CONTRAST DIAGNOSTIC-  HISTORY: Metastatic colon cancer; C18.9-Malignant neoplasm of colon, unspecified; C78.7-Secondary malignant neoplasm of liver and intrahepatic bile duct  COMPARISON: July 22, 2024.  PROCEDURE: Multiple axial CT images were obtained from the thoracic inlet through the upper abdomen following the administration of intravenous contrast.  FINDINGS: There is a stable right subclavian port. There is no suspicious axillary or mediastinal adenopathy. The heart is normal in size. The aorta is normal in caliber. There is no pericardial or pleural effusion. Lung windows reveal no suspicious infiltrate or nodules. There is evidence of old calcified granulomatous disease. Bone windows reveal no bony destructive lesions.      Impression: No acute process.   This study was performed with techniques to keep radiation doses as low as reasonably achievable (ALARA). Individualized dose reduction techniques using automated exposure control or adjustment of mA and/or kV according to the patient size were employed.    CTDI: 4.7 mGy DLP:      Images were reviewed, interpreted, and dictated by Dr. Christine Dale MD Transcribed by Alee Fischer PA-C.  This report was signed and finalized on 12/9/2024 12:52 PM by Christine Dale MD.      CT Abdomen Pelvis With Contrast    Result Date: 12/9/2024  Narrative: PROCEDURE: CT ABDOMEN PELVIS W CONTRAST-  HISTORY:  Metastatic colon cancer; C18.9-Malignant neoplasm of colon, unspecified; C78.7-Secondary malignant neoplasm of liver and intrahepatic bile duct  COMPARISON: July 2024.  TECHNIQUE: Multiple axial CT images were obtained from the lung bases through the pubic symphysis  following the administration of Isovue 300 contrast.  FINDINGS:  ABDOMEN: The lung bases are clear. The heart is normal in size. Previously-described hypodense lesion in the posterior right lobe of the liver measuring up to 7 cm in transverse diameter has significantly decreased in size now measuring 2.3 cm in diameter. Anterior to this is a predominantly calcified lesion most consistent with a liver metastasis treated with microwave ablation. This is stable from the prior exam. One of the lesions laterally in the right lobe is partially calcified consistent with treated metastasis, similar to the prior exam. Postsurgical changes noted in the lateral left lobe of the liver. No new hepatic mass is identified. The spleen is unremarkable. No adrenal mass is present.  The pancreas is atrophic. The kidneys are normal. The aorta is normal in caliber. There is no free fluid or adenopathy. The abdominal portions of the GI tract are unremarkable with no evidence of obstruction.  PELVIS: The appendix is normal. Uterus is midline. The urinary bladder is mostly collapsed. There is no significant free fluid or adenopathy. There is no bony destructive lesion.      Impression: Significant interval decrease in size of the right hepatic metastasis posteriorly consistent with posttreatment change.  Otherwise, stable exam.   CTDI: 7.52 mGy DLP: 501.28 mGy.cm   This study was performed with techniques to keep radiation doses as low as reasonably achievable (ALARA). Individualized dose reduction techniques using automated exposure control or adjustment of mA and/or kV according to the patient size were employed.      Images were reviewed, interpreted, and dictated by Dr. Christine Dale MD Transcribed by Alee Fischer PA-C.  This report was signed and finalized on 12/9/2024 11:34 AM by Christine Dale MD.       ASSESSMENT: The patient is a very pleasant 54 y.o. female  with right-sided metastatic colon cancer      PLAN:    1.  Right-sided  metastatic colon cancer:  A.  I will proceed with FOLFIRI plus Vectibix cycle #7.  B.  She will follow-up with us in 2 weeks for cycle #8.     C.  We will consider maintenance therapy after maximum response.  D.  I will continue to monitor the patient blood work including blood counts kidney function liver function and electrolytes.  E.  I will repeat her Signatera with every other cycle.  Schedule the patient that her last CT DNA did increase slightly.  F.  I did go over the scan results with the patient and reassured her it did look better.  I will do 3 months follow-up scan.  Should be after cycle #12.  Will consider maintenance therapy at that point.    2.  Elevated liver enzymes:  A.  Induced by liver metastases  B. I will repeat labs on return    3.  Chemotherapy-induced nausea, severe:  A.  I will continue IV fluid on day 1 as well as day 4.  B.  I will continue Zofran 8 mg and Compazine 10 mg every 8 hours as needed.    4.  Chemotherapy-induced diarrhea:  A.  I will continue the patient on Imodium as needed    5. Cancer-related pain:  A.  I will continue the patient oxycodone 5 mg twice a day.     6.  Anxiety:  A.  I will continue the patient on Ativan 1 mg nightly as needed.       7.  Hypertension:  A.  She will continue losartan.  B.  I reviewed with the patient this will interfere with our management since chemotherapy typically can cause hypotension we will have to monitor blood pressure closely.    8.  Dehydration:  A.  I will continue 1 L of IV fluid as needed    9.  Chemotherapy-induced anemia:  A.  I will follow-up on CBC from today.  Her last hemoglobin has been stable around 11.7.  B.  I will continue monitor her CBC prior to each infusion.  C.  I will transfuse as needed for hemoglobin less than 8.      10.  Chemotherapy-induced heartburn:  A.  I will continue pantoprazole 40 mg twice a day.    B.  I will continue Carafate 1 g every 6 hours    11.  Treatment related diarrhea  A.  Continue Imodium  as directed.  B.  I will continue Lomotil as needed.      12.  Treatment induced peripheral neuropathy:   A.  We will continue gabapentin 100 mg 3 times daily.    13.  Treatment induced dermatitis, severe:  A.  Erbitux was permanently discontinued due to severe treatment induced dermatitis.    B.  I will continue patient on Vectibix at 4 mg/kg in 2 weeks and if she is doing well we will consider increasing the dose to full dose at 6 mg/kg.  C.  I will continue hydrocortisone cream.  D.  I will continue doxycycline 100 mg twice a day.     14.  Treatment induced mucositis:  A.  I will continue Magic mouthwash.    FOLLOW UP: 2 weeks with cycle #8.      Iban Lambert MD  12/11/2024

## 2024-12-12 LAB
NTRA SIGNATERA MTM READOUT: 0.51 MTM/ML
NTRA SIGNATERA TEST RESULT: POSITIVE

## 2024-12-31 ENCOUNTER — HOSPITAL ENCOUNTER (OUTPATIENT)
Facility: HOSPITAL | Age: 54
Discharge: HOME | End: 2024-12-31
Admitting: INTERNAL MEDICINE
Payer: COMMERCIAL

## 2024-12-31 VITALS
BODY MASS INDEX: 31.67 KG/M2 | HEART RATE: 103 BPM | TEMPERATURE: 98 F | WEIGHT: 173.2 LBS | DIASTOLIC BLOOD PRESSURE: 74 MMHG | RESPIRATION RATE: 18 BRPM | SYSTOLIC BLOOD PRESSURE: 116 MMHG | OXYGEN SATURATION: 96 %

## 2024-12-31 DIAGNOSIS — C78.7 METASTATIC COLON CANCER TO LIVER: ICD-10-CM

## 2024-12-31 DIAGNOSIS — C18.9 METASTATIC COLON CANCER TO LIVER: ICD-10-CM

## 2024-12-31 DIAGNOSIS — C18.2 CANCER OF RIGHT COLON: Primary | ICD-10-CM

## 2024-12-31 LAB
ALBUMIN SERPL-MCNC: 3.6 G/DL (ref 3.5–5.2)
ALBUMIN/GLOB SERPL: 1.1 G/DL
ALP SERPL-CCNC: 200 U/L (ref 39–117)
ALT SERPL W P-5'-P-CCNC: 21 U/L (ref 1–33)
ANION GAP SERPL CALCULATED.3IONS-SCNC: 8.5 MMOL/L (ref 5–15)
AST SERPL-CCNC: 22 U/L (ref 1–32)
BASOPHILS # BLD AUTO: 0.03 10*3/MM3 (ref 0–0.2)
BASOPHILS NFR BLD AUTO: 0.5 % (ref 0–1.5)
BILIRUB SERPL-MCNC: 0.3 MG/DL (ref 0–1.2)
BUN SERPL-MCNC: 12 MG/DL (ref 6–20)
BUN/CREAT SERPL: 16.7 (ref 7–25)
CALCIUM SPEC-SCNC: 8.7 MG/DL (ref 8.6–10.5)
CHLORIDE SERPL-SCNC: 110 MMOL/L (ref 98–107)
CO2 SERPL-SCNC: 22.5 MMOL/L (ref 22–29)
CREAT SERPL-MCNC: 0.72 MG/DL (ref 0.57–1)
DEPRECATED RDW RBC AUTO: 49.4 FL (ref 37–54)
EGFRCR SERPLBLD CKD-EPI 2021: 99.5 ML/MIN/1.73
EOSINOPHIL # BLD AUTO: 0.08 10*3/MM3 (ref 0–0.4)
EOSINOPHIL NFR BLD AUTO: 1.3 % (ref 0.3–6.2)
ERYTHROCYTE [DISTWIDTH] IN BLOOD BY AUTOMATED COUNT: 15.9 % (ref 12.3–15.4)
GLOBULIN UR ELPH-MCNC: 3.2 GM/DL
GLUCOSE SERPL-MCNC: 102 MG/DL (ref 65–99)
HCT VFR BLD AUTO: 37.4 % (ref 34–46.6)
HGB BLD-MCNC: 11.7 G/DL (ref 12–15.9)
IMM GRANULOCYTES # BLD AUTO: 0.02 10*3/MM3 (ref 0–0.05)
IMM GRANULOCYTES NFR BLD AUTO: 0.3 % (ref 0–0.5)
LYMPHOCYTES # BLD AUTO: 1.67 10*3/MM3 (ref 0.7–3.1)
LYMPHOCYTES NFR BLD AUTO: 27.9 % (ref 19.6–45.3)
MAGNESIUM SERPL-MCNC: 1.8 MG/DL (ref 1.6–2.6)
MCH RBC QN AUTO: 26.8 PG (ref 26.6–33)
MCHC RBC AUTO-ENTMCNC: 31.3 G/DL (ref 31.5–35.7)
MCV RBC AUTO: 85.8 FL (ref 79–97)
MONOCYTES # BLD AUTO: 0.52 10*3/MM3 (ref 0.1–0.9)
MONOCYTES NFR BLD AUTO: 8.7 % (ref 5–12)
NEUTROPHILS NFR BLD AUTO: 3.66 10*3/MM3 (ref 1.7–7)
NEUTROPHILS NFR BLD AUTO: 61.3 % (ref 42.7–76)
NRBC BLD AUTO-RTO: 0 /100 WBC (ref 0–0.2)
PLATELET # BLD AUTO: 242 10*3/MM3 (ref 140–450)
PMV BLD AUTO: 10.1 FL (ref 6–12)
POTASSIUM SERPL-SCNC: 4.4 MMOL/L (ref 3.5–5.2)
PROT SERPL-MCNC: 6.8 G/DL (ref 6–8.5)
RBC # BLD AUTO: 4.36 10*6/MM3 (ref 3.77–5.28)
SODIUM SERPL-SCNC: 141 MMOL/L (ref 136–145)
WBC NRBC COR # BLD AUTO: 5.98 10*3/MM3 (ref 3.4–10.8)

## 2024-12-31 PROCEDURE — 85025 COMPLETE CBC W/AUTO DIFF WBC: CPT | Performed by: INTERNAL MEDICINE

## 2024-12-31 PROCEDURE — 25810000003 SODIUM CHLORIDE 0.9 % SOLUTION 250 ML FLEX CONT: Performed by: INTERNAL MEDICINE

## 2024-12-31 PROCEDURE — 83735 ASSAY OF MAGNESIUM: CPT | Performed by: INTERNAL MEDICINE

## 2024-12-31 PROCEDURE — 25010000002 FLUOROURACIL PER 500 MG: Performed by: INTERNAL MEDICINE

## 2024-12-31 PROCEDURE — 36593 DECLOT VASCULAR DEVICE: CPT

## 2024-12-31 PROCEDURE — 25010000002 PALONOSETRON 0.25 MG/5ML SOLUTION PREFILLED SYRINGE: Performed by: INTERNAL MEDICINE

## 2024-12-31 PROCEDURE — 96376 TX/PRO/DX INJ SAME DRUG ADON: CPT

## 2024-12-31 PROCEDURE — 25010000003 DEXTROSE 5 % SOLUTION 250 ML FLEX CONT: Performed by: INTERNAL MEDICINE

## 2024-12-31 PROCEDURE — 96375 TX/PRO/DX INJ NEW DRUG ADDON: CPT

## 2024-12-31 PROCEDURE — 96416 CHEMO PROLONG INFUSE W/PUMP: CPT

## 2024-12-31 PROCEDURE — 25010000002 METHYLPREDNISOLONE PER 125 MG: Performed by: INTERNAL MEDICINE

## 2024-12-31 PROCEDURE — 96417 CHEMO IV INFUS EACH ADDL SEQ: CPT

## 2024-12-31 PROCEDURE — 96413 CHEMO IV INFUSION 1 HR: CPT

## 2024-12-31 PROCEDURE — 25010000002 LEUCOVORIN CALCIUM PER 50MG: Performed by: INTERNAL MEDICINE

## 2024-12-31 PROCEDURE — 25010000002 PANITUMUMAB 400 MG/20ML SOLUTION 20 ML VIAL: Performed by: INTERNAL MEDICINE

## 2024-12-31 PROCEDURE — 96415 CHEMO IV INFUSION ADDL HR: CPT

## 2024-12-31 PROCEDURE — 96368 THER/DIAG CONCURRENT INF: CPT

## 2024-12-31 PROCEDURE — 25010000002 ALTEPLASE 2 MG RECONSTITUTED SOLUTION: Performed by: INTERNAL MEDICINE

## 2024-12-31 PROCEDURE — 25010000003 DEXTROSE 5 % SOLUTION 500 ML FLEX CONT: Performed by: INTERNAL MEDICINE

## 2024-12-31 PROCEDURE — 25010000002 IRINOTECAN PER 20 MG: Performed by: INTERNAL MEDICINE

## 2024-12-31 PROCEDURE — G0498 CHEMO EXTEND IV INFUS W/PUMP: HCPCS

## 2024-12-31 PROCEDURE — 80053 COMPREHEN METABOLIC PANEL: CPT | Performed by: INTERNAL MEDICINE

## 2024-12-31 PROCEDURE — 96411 CHEMO IV PUSH ADDL DRUG: CPT

## 2024-12-31 RX ORDER — SODIUM CHLORIDE 9 MG/ML
20 INJECTION, SOLUTION INTRAVENOUS ONCE
Status: DISCONTINUED | OUTPATIENT
Start: 2024-12-31 | End: 2025-01-01 | Stop reason: HOSPADM

## 2024-12-31 RX ORDER — HEPARIN SODIUM (PORCINE) LOCK FLUSH IV SOLN 100 UNIT/ML 100 UNIT/ML
500 SOLUTION INTRAVENOUS AS NEEDED
Status: DISCONTINUED | OUTPATIENT
Start: 2024-12-31 | End: 2025-01-01 | Stop reason: HOSPADM

## 2024-12-31 RX ORDER — WATER 10 ML/10ML
INJECTION INTRAMUSCULAR; INTRAVENOUS; SUBCUTANEOUS
Status: COMPLETED
Start: 2024-12-31 | End: 2024-12-31

## 2024-12-31 RX ORDER — SODIUM CHLORIDE 0.9 % (FLUSH) 0.9 %
10 SYRINGE (ML) INJECTION AS NEEDED
Status: CANCELLED | OUTPATIENT
Start: 2024-12-31

## 2024-12-31 RX ORDER — ACETAMINOPHEN 325 MG/1
650 TABLET ORAL ONCE
Status: COMPLETED | OUTPATIENT
Start: 2024-12-31 | End: 2024-12-31

## 2024-12-31 RX ORDER — METHYLPREDNISOLONE SODIUM SUCCINATE 125 MG/2ML
125 INJECTION INTRAMUSCULAR; INTRAVENOUS ONCE
Status: COMPLETED | OUTPATIENT
Start: 2024-12-31 | End: 2024-12-31

## 2024-12-31 RX ORDER — HEPARIN SODIUM (PORCINE) LOCK FLUSH IV SOLN 100 UNIT/ML 100 UNIT/ML
500 SOLUTION INTRAVENOUS AS NEEDED
Status: CANCELLED | OUTPATIENT
Start: 2024-12-31

## 2024-12-31 RX ORDER — DIPHENHYDRAMINE HYDROCHLORIDE 50 MG/ML
50 INJECTION INTRAMUSCULAR; INTRAVENOUS ONCE
Status: DISCONTINUED | OUTPATIENT
Start: 2024-12-31 | End: 2025-01-01 | Stop reason: HOSPADM

## 2024-12-31 RX ORDER — SODIUM CHLORIDE 0.9 % (FLUSH) 0.9 %
10 SYRINGE (ML) INJECTION AS NEEDED
Status: DISCONTINUED | OUTPATIENT
Start: 2024-12-31 | End: 2025-01-01 | Stop reason: HOSPADM

## 2024-12-31 RX ORDER — FLUOROURACIL 50 MG/ML
300 INJECTION, SOLUTION INTRAVENOUS ONCE
Status: COMPLETED | OUTPATIENT
Start: 2024-12-31 | End: 2024-12-31

## 2024-12-31 RX ORDER — ATROPINE SULFATE 0.4 MG/ML
0.25 INJECTION, SOLUTION INTRAMUSCULAR; INTRAVENOUS; SUBCUTANEOUS
Status: COMPLETED | OUTPATIENT
Start: 2024-12-31 | End: 2024-12-31

## 2024-12-31 RX ORDER — PALONOSETRON 0.05 MG/ML
0.25 INJECTION, SOLUTION INTRAVENOUS ONCE
Status: COMPLETED | OUTPATIENT
Start: 2024-12-31 | End: 2024-12-31

## 2024-12-31 RX ORDER — FAMOTIDINE 10 MG/ML
20 INJECTION, SOLUTION INTRAVENOUS ONCE
Status: COMPLETED | OUTPATIENT
Start: 2024-12-31 | End: 2024-12-31

## 2024-12-31 RX ADMIN — METHYLPREDNISOLONE SODIUM SUCCINATE 125 MG: 125 INJECTION, POWDER, FOR SOLUTION INTRAMUSCULAR; INTRAVENOUS at 10:44

## 2024-12-31 RX ADMIN — SODIUM CHLORIDE 3280 MG: 9 INJECTION, SOLUTION INTRAVENOUS at 14:21

## 2024-12-31 RX ADMIN — DEXTROSE MONOHYDRATE 240 MG: 50 INJECTION, SOLUTION INTRAVENOUS at 12:43

## 2024-12-31 RX ADMIN — ATROPINE SULFATE 0.25 MG: 0.4 INJECTION, SOLUTION INTRAVENOUS at 12:39

## 2024-12-31 RX ADMIN — FLUOROURACIL 550 MG: 50 INJECTION, SOLUTION INTRAVENOUS at 14:20

## 2024-12-31 RX ADMIN — ALTEPLASE: 2.2 INJECTION, POWDER, LYOPHILIZED, FOR SOLUTION INTRAVENOUS at 09:31

## 2024-12-31 RX ADMIN — WATER 10 ML: 1 INJECTION INTRAMUSCULAR; INTRAVENOUS; SUBCUTANEOUS at 09:30

## 2024-12-31 RX ADMIN — SODIUM CHLORIDE 320 MG: 9 INJECTION, SOLUTION INTRAVENOUS at 11:19

## 2024-12-31 RX ADMIN — FAMOTIDINE 20 MG: 10 INJECTION, SOLUTION INTRAVENOUS at 10:45

## 2024-12-31 RX ADMIN — LEUCOVORIN CALCIUM 550 MG: 10 INJECTION INTRAMUSCULAR; INTRAVENOUS at 12:43

## 2024-12-31 RX ADMIN — ATROPINE SULFATE 0.25 MG: 0.4 INJECTION, SOLUTION INTRAVENOUS at 14:20

## 2024-12-31 RX ADMIN — PALONOSETRON 0.25 MG: 0.25 INJECTION, SOLUTION INTRAVENOUS at 10:43

## 2024-12-31 RX ADMIN — ACETAMINOPHEN 650 MG: 325 TABLET, FILM COATED ORAL at 10:44

## 2025-01-02 ENCOUNTER — OFFICE VISIT (OUTPATIENT)
Dept: ONCOLOGY | Facility: CLINIC | Age: 55
End: 2025-01-02
Payer: COMMERCIAL

## 2025-01-02 ENCOUNTER — HOSPITAL ENCOUNTER (OUTPATIENT)
Facility: HOSPITAL | Age: 55
Discharge: HOME | End: 2025-01-02
Admitting: INTERNAL MEDICINE
Payer: COMMERCIAL

## 2025-01-02 VITALS
BODY MASS INDEX: 33.29 KG/M2 | DIASTOLIC BLOOD PRESSURE: 89 MMHG | WEIGHT: 180.9 LBS | RESPIRATION RATE: 16 BRPM | TEMPERATURE: 98.2 F | HEART RATE: 94 BPM | OXYGEN SATURATION: 98 % | HEIGHT: 62 IN | SYSTOLIC BLOOD PRESSURE: 154 MMHG

## 2025-01-02 DIAGNOSIS — R21 RASH: ICD-10-CM

## 2025-01-02 DIAGNOSIS — C78.7 ADENOCARCINOMA OF COLON METASTATIC TO LIVER: ICD-10-CM

## 2025-01-02 DIAGNOSIS — K52.1 DIARRHEA DUE TO DRUG: ICD-10-CM

## 2025-01-02 DIAGNOSIS — C18.9 METASTATIC COLON CANCER TO LIVER: ICD-10-CM

## 2025-01-02 DIAGNOSIS — C18.2 CANCER OF RIGHT COLON: ICD-10-CM

## 2025-01-02 DIAGNOSIS — C78.7 METASTATIC COLON CANCER TO LIVER: Primary | ICD-10-CM

## 2025-01-02 DIAGNOSIS — C18.9 METASTATIC COLON CANCER TO LIVER: Primary | ICD-10-CM

## 2025-01-02 DIAGNOSIS — R11.2 NAUSEA AND VOMITING, UNSPECIFIED VOMITING TYPE: ICD-10-CM

## 2025-01-02 DIAGNOSIS — C18.9 ADENOCARCINOMA OF COLON METASTATIC TO LIVER: ICD-10-CM

## 2025-01-02 DIAGNOSIS — C78.7 METASTATIC COLON CANCER TO LIVER: ICD-10-CM

## 2025-01-02 DIAGNOSIS — C18.2 CANCER OF RIGHT COLON: Primary | ICD-10-CM

## 2025-01-02 PROCEDURE — 99214 OFFICE O/P EST MOD 30 MIN: CPT | Performed by: NURSE PRACTITIONER

## 2025-01-02 PROCEDURE — 96523 IRRIG DRUG DELIVERY DEVICE: CPT

## 2025-01-02 PROCEDURE — 3077F SYST BP >= 140 MM HG: CPT | Performed by: NURSE PRACTITIONER

## 2025-01-02 PROCEDURE — 3079F DIAST BP 80-89 MM HG: CPT | Performed by: NURSE PRACTITIONER

## 2025-01-02 PROCEDURE — 1126F AMNT PAIN NOTED NONE PRSNT: CPT | Performed by: NURSE PRACTITIONER

## 2025-01-02 PROCEDURE — 25010000002 HEPARIN LOCK FLUSH PER 10 UNITS: Performed by: NURSE PRACTITIONER

## 2025-01-02 RX ORDER — HEPARIN SODIUM (PORCINE) LOCK FLUSH IV SOLN 100 UNIT/ML 100 UNIT/ML
500 SOLUTION INTRAVENOUS AS NEEDED
Status: DISCONTINUED | OUTPATIENT
Start: 2025-01-02 | End: 2025-01-03 | Stop reason: HOSPADM

## 2025-01-02 RX ORDER — HEPARIN SODIUM (PORCINE) LOCK FLUSH IV SOLN 100 UNIT/ML 100 UNIT/ML
500 SOLUTION INTRAVENOUS AS NEEDED
OUTPATIENT
Start: 2025-01-02

## 2025-01-02 RX ORDER — SODIUM CHLORIDE 0.9 % (FLUSH) 0.9 %
10 SYRINGE (ML) INJECTION AS NEEDED
Status: DISCONTINUED | OUTPATIENT
Start: 2025-01-02 | End: 2025-01-03 | Stop reason: HOSPADM

## 2025-01-02 RX ORDER — SODIUM CHLORIDE 0.9 % (FLUSH) 0.9 %
10 SYRINGE (ML) INJECTION AS NEEDED
OUTPATIENT
Start: 2025-01-02

## 2025-01-02 RX ADMIN — HEPARIN 500 UNITS: 100 SYRINGE at 15:00

## 2025-01-02 RX ADMIN — Medication 10 ML: at 15:00

## 2025-01-02 NOTE — PROGRESS NOTES
DATE OF VISIT: 1/2/2025    REASON FOR VISIT: Followup for metastatic right-sided colon cancer     PROBLEM LIST:  1. Metastatic colon cancer TX NX M1 a stage Perry:  A.  Presented with abdominal pain and jaundice  B.  CT abdomen pelvis done November 04, 2022 confirmed diffuse liver metastases.  C.  Diagnosed after CT-guided liver biopsy done on November 22, 2022 confirming adenocarcinoma CK20 positive CK7 negative.  D.  Colonoscopy done December 08, 2020 to confirm transverse colon mass however biopsy revealed adenoma.  E.  Started palliative chemotherapy with dose adjusted FOLFOXIRI December 14, 2022, status post 12 cycles completed May 2023.  F.  Started maintenance Xeloda 1000 mg twice daily 1 week on 1 week off June 2023.  Stopped April 2024 secondary to progressive disease in the liver.  G.  Status post segment 7 and segment 3 liver met resection with 2 other lesions ablation done by Dr. Nielson April 19, 2024.  H.  Started FOLFIRI with Vectibix August 19, 2024.  Status post 6 cycles.  2.  Elevated liver enzymes:  A.  Induced by metastatic disease  3.  Cancer-related pain  4.  Anxiety  5.  Hypertension    HISTORY OF PRESENT ILLNESS: The patient is a very pleasant 54 y.o. female with past medical history significant for metastatic right-sided colon cancer diagnosed November 22, 2022.  Started on palliative chemotherapy with dose adjusted FOLFOXIRI.  She completed 12 cycles May 2023.  She was started on maintenance Xeloda 1000 mg twice daily June 2023.  Progressive disease with worsening PET scan as well as rising CT DNA.  She was started on FOLFIRI with Vectibix August 2024.  The patient is here today for scheduled follow-up visit with treatment cycle #7 day 3.    SUBJECTIVE: Amber is here today with her .  Overall she is doing well.  She had to delay her last treatment due to insurance.  Her skin has significantly improved.  She only has a few spots on her arms her chest and feet have completely resolved.  "        Past History:  Medical History: has a past medical history of Anxiety, Cancer, Ear piercing, Gallstones, History of irritable bowel syndrome, Hypertension, Seasonal allergies, Tattoos, and Wears glasses.   Surgical History: has a past surgical history that includes postpartum tubal ligation; Dilation and curettage of uterus (2013); Tumor removal (2013); Portacath placement (N/A, 12/8/2022); Colonoscopy (N/A, 12/8/2022); diagnostic laparoscopy exploratory laparotomy (N/A, 4/19/2024); Cholecystectomy (N/A, 4/19/2024); and Liver resection (Left, 4/19/2024).   Family History: family history is not on file.   Social History: reports that she has never smoked. She has never used smokeless tobacco. She reports current alcohol use. She reports that she does not use drugs.    (Not in a hospital admission)     Allergies: Losartan, Valium [diazepam], and Citrus     Review of Systems   Constitutional:  Positive for fatigue.   Gastrointestinal:  Positive for abdominal pain and diarrhea.   Musculoskeletal:  Positive for arthralgias.   Skin:         Facial acne like rash.   Psychiatric/Behavioral:  The patient is nervous/anxious.        PHYSICAL EXAMINATION:   /89   Pulse 94   Temp 98.2 °F (36.8 °C)   Resp 16   Ht 157.5 cm (62.01\")   Wt 82.1 kg (180 lb 14.4 oz)   SpO2 98%   BMI 33.08 kg/m²    Pain Score    01/02/25 1421   PainSc: 0-No pain                          ECOG Performance Status: 1 - Symptomatic but completely ambulatory      General Appearance:      alert, cooperative, no apparent distress and appears stated age   Lungs:   Clear to auscultation bilaterally; respirations regular, even, and unlabored bilaterally   Heart:  Regular rate and rhythm, no murmurs appreciated   Abdomen:   Soft, non-tender, and non-distended                 Hospital Outpatient Visit on 12/31/2024   Component Date Value Ref Range Status    Glucose 12/31/2024 102 (H)  65 - 99 mg/dL Final    BUN 12/31/2024 12  6 - 20 mg/dL Final "    Creatinine 12/31/2024 0.72  0.57 - 1.00 mg/dL Final    Sodium 12/31/2024 141  136 - 145 mmol/L Final    Potassium 12/31/2024 4.4  3.5 - 5.2 mmol/L Final    Chloride 12/31/2024 110 (H)  98 - 107 mmol/L Final    CO2 12/31/2024 22.5  22.0 - 29.0 mmol/L Final    Calcium 12/31/2024 8.7  8.6 - 10.5 mg/dL Final    Total Protein 12/31/2024 6.8  6.0 - 8.5 g/dL Final    Albumin 12/31/2024 3.6  3.5 - 5.2 g/dL Final    ALT (SGPT) 12/31/2024 21  1 - 33 U/L Final    AST (SGOT) 12/31/2024 22  1 - 32 U/L Final    Alkaline Phosphatase 12/31/2024 200 (H)  39 - 117 U/L Final    Total Bilirubin 12/31/2024 0.3  0.0 - 1.2 mg/dL Final    Globulin 12/31/2024 3.2  gm/dL Final    A/G Ratio 12/31/2024 1.1  g/dL Final    BUN/Creatinine Ratio 12/31/2024 16.7  7.0 - 25.0 Final    Anion Gap 12/31/2024 8.5  5.0 - 15.0 mmol/L Final    eGFR 12/31/2024 99.5  >60.0 mL/min/1.73 Final    Magnesium 12/31/2024 1.8  1.6 - 2.6 mg/dL Final    WBC 12/31/2024 5.98  3.40 - 10.80 10*3/mm3 Final    RBC 12/31/2024 4.36  3.77 - 5.28 10*6/mm3 Final    Hemoglobin 12/31/2024 11.7 (L)  12.0 - 15.9 g/dL Final    Hematocrit 12/31/2024 37.4  34.0 - 46.6 % Final    MCV 12/31/2024 85.8  79.0 - 97.0 fL Final    MCH 12/31/2024 26.8  26.6 - 33.0 pg Final    MCHC 12/31/2024 31.3 (L)  31.5 - 35.7 g/dL Final    RDW 12/31/2024 15.9 (H)  12.3 - 15.4 % Final    RDW-SD 12/31/2024 49.4  37.0 - 54.0 fl Final    MPV 12/31/2024 10.1  6.0 - 12.0 fL Final    Platelets 12/31/2024 242  140 - 450 10*3/mm3 Final    Neutrophil % 12/31/2024 61.3  42.7 - 76.0 % Final    Lymphocyte % 12/31/2024 27.9  19.6 - 45.3 % Final    Monocyte % 12/31/2024 8.7  5.0 - 12.0 % Final    Eosinophil % 12/31/2024 1.3  0.3 - 6.2 % Final    Basophil % 12/31/2024 0.5  0.0 - 1.5 % Final    Immature Grans % 12/31/2024 0.3  0.0 - 0.5 % Final    Neutrophils, Absolute 12/31/2024 3.66  1.70 - 7.00 10*3/mm3 Final    Lymphocytes, Absolute 12/31/2024 1.67  0.70 - 3.10 10*3/mm3 Final    Monocytes, Absolute 12/31/2024 0.52   0.10 - 0.90 10*3/mm3 Final    Eosinophils, Absolute 12/31/2024 0.08  0.00 - 0.40 10*3/mm3 Final    Basophils, Absolute 12/31/2024 0.03  0.00 - 0.20 10*3/mm3 Final    Immature Grans, Absolute 12/31/2024 0.02  0.00 - 0.05 10*3/mm3 Final    nRBC 12/31/2024 0.0  0.0 - 0.2 /100 WBC Final        CT Chest With Contrast Diagnostic    Result Date: 12/9/2024  Narrative: PROCEDURE: CT CHEST W CONTRAST DIAGNOSTIC-  HISTORY: Metastatic colon cancer; C18.9-Malignant neoplasm of colon, unspecified; C78.7-Secondary malignant neoplasm of liver and intrahepatic bile duct  COMPARISON: July 22, 2024.  PROCEDURE: Multiple axial CT images were obtained from the thoracic inlet through the upper abdomen following the administration of intravenous contrast.  FINDINGS: There is a stable right subclavian port. There is no suspicious axillary or mediastinal adenopathy. The heart is normal in size. The aorta is normal in caliber. There is no pericardial or pleural effusion. Lung windows reveal no suspicious infiltrate or nodules. There is evidence of old calcified granulomatous disease. Bone windows reveal no bony destructive lesions.      Impression: No acute process.   This study was performed with techniques to keep radiation doses as low as reasonably achievable (ALARA). Individualized dose reduction techniques using automated exposure control or adjustment of mA and/or kV according to the patient size were employed.    CTDI: 4.7 mGy DLP:      Images were reviewed, interpreted, and dictated by Dr. Christine aDle MD Transcribed by Alee Fischer PA-C.  This report was signed and finalized on 12/9/2024 12:52 PM by Christine Dale MD.      CT Abdomen Pelvis With Contrast    Result Date: 12/9/2024  Narrative: PROCEDURE: CT ABDOMEN PELVIS W CONTRAST-  HISTORY:  Metastatic colon cancer; C18.9-Malignant neoplasm of colon, unspecified; C78.7-Secondary malignant neoplasm of liver and intrahepatic bile duct  COMPARISON: July 2024.  TECHNIQUE: Multiple  axial CT images were obtained from the lung bases through the pubic symphysis following the administration of Isovue 300 contrast.  FINDINGS:  ABDOMEN: The lung bases are clear. The heart is normal in size. Previously-described hypodense lesion in the posterior right lobe of the liver measuring up to 7 cm in transverse diameter has significantly decreased in size now measuring 2.3 cm in diameter. Anterior to this is a predominantly calcified lesion most consistent with a liver metastasis treated with microwave ablation. This is stable from the prior exam. One of the lesions laterally in the right lobe is partially calcified consistent with treated metastasis, similar to the prior exam. Postsurgical changes noted in the lateral left lobe of the liver. No new hepatic mass is identified. The spleen is unremarkable. No adrenal mass is present.  The pancreas is atrophic. The kidneys are normal. The aorta is normal in caliber. There is no free fluid or adenopathy. The abdominal portions of the GI tract are unremarkable with no evidence of obstruction.  PELVIS: The appendix is normal. Uterus is midline. The urinary bladder is mostly collapsed. There is no significant free fluid or adenopathy. There is no bony destructive lesion.      Impression: Significant interval decrease in size of the right hepatic metastasis posteriorly consistent with posttreatment change.  Otherwise, stable exam.   CTDI: 7.52 mGy DLP: 501.28 mGy.cm   This study was performed with techniques to keep radiation doses as low as reasonably achievable (ALARA). Individualized dose reduction techniques using automated exposure control or adjustment of mA and/or kV according to the patient size were employed.      Images were reviewed, interpreted, and dictated by Dr. Christine Dale MD Transcribed by Alee Fischer PA-C.  This report was signed and finalized on 12/9/2024 11:34 AM by Christine Dale MD.       ASSESSMENT: The patient is a very pleasant 54 y.o.  female  with right-sided metastatic colon cancer      PLAN:    1.  Right-sided metastatic colon cancer:  A.  I will proceed with FOLFIRI plus Vectibix cycle #7 day 3.  B.  She will follow-up with us in 2 weeks for cycle #8.     C.  We will consider maintenance therapy after maximum response.  D.  I will continue to monitor the patient blood work including blood counts kidney function liver function and electrolytes.  E.  I will repeat her Signatera with every other cycle.  I discussed with the patient that last CT DNA decreased significantly.   F.  I will do 3 months follow-up scan.  Should be after cycle #12.  Will consider maintenance therapy at that point.    2.  Elevated liver enzymes:  A.  Induced by liver metastases  B. I will repeat labs on return    3.  Chemotherapy-induced nausea, severe:  A.  I will continue IV fluid on day 1 and as needed on day for 3.  B.  I will continue Zofran 8 mg and Compazine 10 mg every 8 hours as needed.    4.  Chemotherapy-induced diarrhea:  A.  I will continue the patient on Imodium as needed    5. Cancer-related pain:  A.  I will continue the patient oxycodone 5 mg twice a day.     6.  Anxiety:  A.  I will continue the patient on Ativan 1 mg nightly as needed.       7.  Hypertension:  A.  She will continue losartan.  B.  I reviewed with the patient this will interfere with our management since chemotherapy typically can cause hypotension we will have to monitor blood pressure closely.    8.  Dehydration:  A.  I will continue 1 L of IV fluid as needed    9.  Chemotherapy-induced anemia:  A.  I discussed with the patient labs from 12/31/2024 showed stable hemoglobin at 11.7.      B.  I will continue monitor her CBC prior to each infusion.  C.  I will transfuse as needed for hemoglobin less than 8.      10.  Chemotherapy-induced heartburn:  A.  I will continue pantoprazole 40 mg twice a day.        11.  Treatment related diarrhea  A.  Continue Imodium as directed.  B.  I will  continue Lomotil as needed.      12.  Treatment induced peripheral neuropathy:   A.  We will continue gabapentin 100 mg 3 times daily.    13.  Treatment induced dermatitis, severe:  A.  Erbitux was permanently discontinued due to severe treatment induced dermatitis.    B.  I will continue patient on Vectibix at 4 mg/kg in 2 weeks and if she is doing well we will consider increasing the dose to full dose at 6 mg/kg.  C.  I will continue hydrocortisone cream.  D.  I will continue doxycycline 100 mg twice a day.     14.  Treatment induced mucositis:  A.  I will continue Magic mouthwash.    FOLLOW UP: 2 weeks with cycle #8.    Above information has been reviewed and is correct as of 1/2/2025      Ana Cristina Cano, APRN  1/2/2025

## 2025-01-09 DIAGNOSIS — C18.9 METASTATIC COLON CANCER TO LIVER: Primary | ICD-10-CM

## 2025-01-09 DIAGNOSIS — C78.7 METASTATIC COLON CANCER TO LIVER: Primary | ICD-10-CM

## 2025-01-09 RX ORDER — DIPHENHYDRAMINE HYDROCHLORIDE 50 MG/ML
50 INJECTION INTRAMUSCULAR; INTRAVENOUS ONCE
Start: 2025-01-13

## 2025-01-09 RX ORDER — HYDROCORTISONE SODIUM SUCCINATE 100 MG/2ML
100 INJECTION INTRAMUSCULAR; INTRAVENOUS AS NEEDED
OUTPATIENT
Start: 2025-01-13

## 2025-01-09 RX ORDER — FLUOROURACIL 50 MG/ML
300 INJECTION, SOLUTION INTRAVENOUS ONCE
OUTPATIENT
Start: 2025-01-13

## 2025-01-09 RX ORDER — FAMOTIDINE 10 MG/ML
20 INJECTION, SOLUTION INTRAVENOUS AS NEEDED
OUTPATIENT
Start: 2025-01-13

## 2025-01-09 RX ORDER — SODIUM CHLORIDE 9 MG/ML
20 INJECTION, SOLUTION INTRAVENOUS ONCE
OUTPATIENT
Start: 2025-01-13

## 2025-01-09 RX ORDER — PALONOSETRON 0.05 MG/ML
0.25 INJECTION, SOLUTION INTRAVENOUS ONCE
OUTPATIENT
Start: 2025-01-13

## 2025-01-09 RX ORDER — MEPERIDINE HYDROCHLORIDE 25 MG/ML
25 INJECTION INTRAMUSCULAR; INTRAVENOUS; SUBCUTANEOUS
OUTPATIENT
Start: 2025-01-13

## 2025-01-09 RX ORDER — METHYLPREDNISOLONE SODIUM SUCCINATE 125 MG/2ML
125 INJECTION INTRAMUSCULAR; INTRAVENOUS ONCE
OUTPATIENT
Start: 2025-01-13

## 2025-01-09 RX ORDER — FAMOTIDINE 10 MG/ML
20 INJECTION, SOLUTION INTRAVENOUS ONCE
OUTPATIENT
Start: 2025-01-13

## 2025-01-09 RX ORDER — ACETAMINOPHEN 325 MG/1
650 TABLET ORAL ONCE
OUTPATIENT
Start: 2025-01-13

## 2025-01-09 RX ORDER — ATROPINE SULFATE 1 MG/ML
0.25 INJECTION, SOLUTION INTRAMUSCULAR; INTRAVENOUS; SUBCUTANEOUS
OUTPATIENT
Start: 2025-01-13

## 2025-01-09 RX ORDER — DIPHENHYDRAMINE HYDROCHLORIDE 50 MG/ML
50 INJECTION INTRAMUSCULAR; INTRAVENOUS AS NEEDED
OUTPATIENT
Start: 2025-01-13

## 2025-01-13 DIAGNOSIS — I10 ESSENTIAL HYPERTENSION: ICD-10-CM

## 2025-01-13 RX ORDER — AMLODIPINE BESYLATE 5 MG/1
5 TABLET ORAL DAILY
Qty: 90 TABLET | Refills: 0 | Status: SHIPPED | OUTPATIENT
Start: 2025-01-13

## 2025-01-14 ENCOUNTER — TELEPHONE (OUTPATIENT)
Dept: ONCOLOGY | Facility: CLINIC | Age: 55
End: 2025-01-14
Payer: COMMERCIAL

## 2025-01-14 NOTE — TELEPHONE ENCOUNTER
----- Message from Jaylen VELEZ sent at 1/14/2025  1:08 PM EST -----  Regarding: COVID +  Contact: [phone]  Pt was calling to let us know she's tested positive for covid. She was asking if you could give her a call.   Thanks   Asim

## 2025-01-15 NOTE — TELEPHONE ENCOUNTER
RN called patient back, no answer. LVM to let her know if she is covid positive then we will have to push out her appointments 10 days. She is able to come back the week of Jan 27th and we will get her rescheduled for that Wednesday. LVM for any further questions.

## 2025-01-29 RX ORDER — METHYLPREDNISOLONE 4 MG/1
TABLET ORAL
Qty: 21 TABLET | Refills: 0 | Status: SHIPPED | OUTPATIENT
Start: 2025-01-29

## 2025-02-04 DIAGNOSIS — K21.9 GERD WITHOUT ESOPHAGITIS: ICD-10-CM

## 2025-02-04 RX ORDER — OMEPRAZOLE 40 MG/1
40 CAPSULE, DELAYED RELEASE ORAL DAILY
Qty: 90 CAPSULE | Refills: 1 | Status: SHIPPED | OUTPATIENT
Start: 2025-02-04

## 2025-02-05 ENCOUNTER — OFFICE VISIT (OUTPATIENT)
Dept: ONCOLOGY | Facility: CLINIC | Age: 55
End: 2025-02-05
Payer: COMMERCIAL

## 2025-02-05 ENCOUNTER — HOSPITAL ENCOUNTER (OUTPATIENT)
Facility: HOSPITAL | Age: 55
Discharge: HOME | End: 2025-02-05
Admitting: INTERNAL MEDICINE
Payer: COMMERCIAL

## 2025-02-05 VITALS
WEIGHT: 184.9 LBS | TEMPERATURE: 98 F | BODY MASS INDEX: 34.03 KG/M2 | OXYGEN SATURATION: 98 % | HEART RATE: 94 BPM | SYSTOLIC BLOOD PRESSURE: 142 MMHG | HEIGHT: 62 IN | DIASTOLIC BLOOD PRESSURE: 92 MMHG | RESPIRATION RATE: 16 BRPM

## 2025-02-05 DIAGNOSIS — C18.2 CANCER OF RIGHT COLON: ICD-10-CM

## 2025-02-05 DIAGNOSIS — C78.7 METASTATIC COLON CANCER TO LIVER: Primary | ICD-10-CM

## 2025-02-05 DIAGNOSIS — C18.9 METASTATIC COLON CANCER TO LIVER: Primary | ICD-10-CM

## 2025-02-05 LAB
ALBUMIN SERPL-MCNC: 3.9 G/DL (ref 3.5–5.2)
ALBUMIN/GLOB SERPL: 1.2 G/DL
ALP SERPL-CCNC: 211 U/L (ref 39–117)
ALT SERPL W P-5'-P-CCNC: 31 U/L (ref 1–33)
ANION GAP SERPL CALCULATED.3IONS-SCNC: 9.6 MMOL/L (ref 5–15)
AST SERPL-CCNC: 35 U/L (ref 1–32)
BASOPHILS # BLD AUTO: 0.04 10*3/MM3 (ref 0–0.2)
BASOPHILS NFR BLD AUTO: 0.7 % (ref 0–1.5)
BILIRUB SERPL-MCNC: 0.4 MG/DL (ref 0–1.2)
BUN SERPL-MCNC: 14 MG/DL (ref 6–20)
BUN/CREAT SERPL: 18.2 (ref 7–25)
CALCIUM SPEC-SCNC: 8.8 MG/DL (ref 8.6–10.5)
CHLORIDE SERPL-SCNC: 104 MMOL/L (ref 98–107)
CO2 SERPL-SCNC: 25.4 MMOL/L (ref 22–29)
CREAT SERPL-MCNC: 0.77 MG/DL (ref 0.57–1)
DEPRECATED RDW RBC AUTO: 47.3 FL (ref 37–54)
EGFRCR SERPLBLD CKD-EPI 2021: 91.8 ML/MIN/1.73
EOSINOPHIL # BLD AUTO: 0.24 10*3/MM3 (ref 0–0.4)
EOSINOPHIL NFR BLD AUTO: 4.4 % (ref 0.3–6.2)
ERYTHROCYTE [DISTWIDTH] IN BLOOD BY AUTOMATED COUNT: 14.9 % (ref 12.3–15.4)
GLOBULIN UR ELPH-MCNC: 3.2 GM/DL
GLUCOSE SERPL-MCNC: 97 MG/DL (ref 65–99)
HCT VFR BLD AUTO: 37.9 % (ref 34–46.6)
HGB BLD-MCNC: 11.8 G/DL (ref 12–15.9)
IMM GRANULOCYTES # BLD AUTO: 0.03 10*3/MM3 (ref 0–0.05)
IMM GRANULOCYTES NFR BLD AUTO: 0.6 % (ref 0–0.5)
LYMPHOCYTES # BLD AUTO: 2.19 10*3/MM3 (ref 0.7–3.1)
LYMPHOCYTES NFR BLD AUTO: 40.3 % (ref 19.6–45.3)
MAGNESIUM SERPL-MCNC: 1.8 MG/DL (ref 1.6–2.6)
MCH RBC QN AUTO: 26.8 PG (ref 26.6–33)
MCHC RBC AUTO-ENTMCNC: 31.1 G/DL (ref 31.5–35.7)
MCV RBC AUTO: 85.9 FL (ref 79–97)
MONOCYTES # BLD AUTO: 0.54 10*3/MM3 (ref 0.1–0.9)
MONOCYTES NFR BLD AUTO: 9.9 % (ref 5–12)
NEUTROPHILS NFR BLD AUTO: 2.4 10*3/MM3 (ref 1.7–7)
NEUTROPHILS NFR BLD AUTO: 44.1 % (ref 42.7–76)
NRBC BLD AUTO-RTO: 0 /100 WBC (ref 0–0.2)
PLATELET # BLD AUTO: 206 10*3/MM3 (ref 140–450)
PMV BLD AUTO: 10.7 FL (ref 6–12)
POTASSIUM SERPL-SCNC: 4.1 MMOL/L (ref 3.5–5.2)
PROT SERPL-MCNC: 7.1 G/DL (ref 6–8.5)
RBC # BLD AUTO: 4.41 10*6/MM3 (ref 3.77–5.28)
SODIUM SERPL-SCNC: 139 MMOL/L (ref 136–145)
WBC NRBC COR # BLD AUTO: 5.44 10*3/MM3 (ref 3.4–10.8)

## 2025-02-05 PROCEDURE — G0498 CHEMO EXTEND IV INFUS W/PUMP: HCPCS

## 2025-02-05 PROCEDURE — 25010000002 LEUCOVORIN 200 MG RECONSTITUTED SOLUTION 1 EACH VIAL: Performed by: NURSE PRACTITIONER

## 2025-02-05 PROCEDURE — 25010000003 DEXTROSE 5 % SOLUTION 250 ML FLEX CONT: Performed by: NURSE PRACTITIONER

## 2025-02-05 PROCEDURE — 25010000003 DEXTROSE 5 % SOLUTION 500 ML FLEX CONT: Performed by: NURSE PRACTITIONER

## 2025-02-05 PROCEDURE — 25010000002 LEUCOVORIN 500 MG RECONSTITUTED SOLUTION 1 EACH VIAL: Performed by: NURSE PRACTITIONER

## 2025-02-05 PROCEDURE — 25010000002 IRINOTECAN PER 20 MG: Performed by: NURSE PRACTITIONER

## 2025-02-05 PROCEDURE — 96368 THER/DIAG CONCURRENT INF: CPT

## 2025-02-05 PROCEDURE — 96413 CHEMO IV INFUSION 1 HR: CPT

## 2025-02-05 PROCEDURE — 96417 CHEMO IV INFUS EACH ADDL SEQ: CPT

## 2025-02-05 PROCEDURE — 85025 COMPLETE CBC W/AUTO DIFF WBC: CPT | Performed by: NURSE PRACTITIONER

## 2025-02-05 PROCEDURE — 25010000002 METHYLPREDNISOLONE PER 125 MG: Performed by: NURSE PRACTITIONER

## 2025-02-05 PROCEDURE — 99214 OFFICE O/P EST MOD 30 MIN: CPT | Performed by: INTERNAL MEDICINE

## 2025-02-05 PROCEDURE — 96375 TX/PRO/DX INJ NEW DRUG ADDON: CPT

## 2025-02-05 PROCEDURE — 3077F SYST BP >= 140 MM HG: CPT | Performed by: INTERNAL MEDICINE

## 2025-02-05 PROCEDURE — 96411 CHEMO IV PUSH ADDL DRUG: CPT

## 2025-02-05 PROCEDURE — 83735 ASSAY OF MAGNESIUM: CPT | Performed by: NURSE PRACTITIONER

## 2025-02-05 PROCEDURE — 25810000003 SODIUM CHLORIDE 0.9 % SOLUTION 250 ML FLEX CONT: Performed by: NURSE PRACTITIONER

## 2025-02-05 PROCEDURE — 25010000002 PALONOSETRON 0.25 MG/5ML SOLUTION PREFILLED SYRINGE: Performed by: NURSE PRACTITIONER

## 2025-02-05 PROCEDURE — 96376 TX/PRO/DX INJ SAME DRUG ADON: CPT

## 2025-02-05 PROCEDURE — 80053 COMPREHEN METABOLIC PANEL: CPT | Performed by: NURSE PRACTITIONER

## 2025-02-05 PROCEDURE — 3080F DIAST BP >= 90 MM HG: CPT | Performed by: INTERNAL MEDICINE

## 2025-02-05 PROCEDURE — 25010000002 FLUOROURACIL PER 500 MG: Performed by: NURSE PRACTITIONER

## 2025-02-05 PROCEDURE — 1126F AMNT PAIN NOTED NONE PRSNT: CPT | Performed by: INTERNAL MEDICINE

## 2025-02-05 PROCEDURE — 96366 THER/PROPH/DIAG IV INF ADDON: CPT

## 2025-02-05 PROCEDURE — 25010000002 PANITUMUMAB PER 10 MG: Performed by: NURSE PRACTITIONER

## 2025-02-05 RX ORDER — METHYLPREDNISOLONE SODIUM SUCCINATE 125 MG/2ML
125 INJECTION INTRAMUSCULAR; INTRAVENOUS ONCE
Status: COMPLETED | OUTPATIENT
Start: 2025-02-05 | End: 2025-02-05

## 2025-02-05 RX ORDER — ACETAMINOPHEN 325 MG/1
650 TABLET ORAL ONCE
Status: COMPLETED | OUTPATIENT
Start: 2025-02-05 | End: 2025-02-05

## 2025-02-05 RX ORDER — FLUOROURACIL 50 MG/ML
300 INJECTION, SOLUTION INTRAVENOUS ONCE
Status: COMPLETED | OUTPATIENT
Start: 2025-02-05 | End: 2025-02-05

## 2025-02-05 RX ORDER — FAMOTIDINE 10 MG/ML
20 INJECTION, SOLUTION INTRAVENOUS ONCE
Status: COMPLETED | OUTPATIENT
Start: 2025-02-05 | End: 2025-02-05

## 2025-02-05 RX ORDER — PALONOSETRON 0.05 MG/ML
0.25 INJECTION, SOLUTION INTRAVENOUS ONCE
Status: COMPLETED | OUTPATIENT
Start: 2025-02-05 | End: 2025-02-05

## 2025-02-05 RX ORDER — DIPHENHYDRAMINE HYDROCHLORIDE 50 MG/ML
50 INJECTION INTRAMUSCULAR; INTRAVENOUS ONCE
Status: DISCONTINUED | OUTPATIENT
Start: 2025-02-05 | End: 2025-02-05

## 2025-02-05 RX ORDER — SODIUM CHLORIDE 9 MG/ML
20 INJECTION, SOLUTION INTRAVENOUS ONCE
Status: DISCONTINUED | OUTPATIENT
Start: 2025-02-05 | End: 2025-02-06 | Stop reason: HOSPADM

## 2025-02-05 RX ADMIN — PANITUMUMAB 320 MG: 100 SOLUTION INTRAVENOUS at 11:12

## 2025-02-05 RX ADMIN — FLUOROURACIL 550 MG: 50 INJECTION, SOLUTION INTRAVENOUS at 14:25

## 2025-02-05 RX ADMIN — DEXTROSE MONOHYDRATE 240 MG: 50 INJECTION, SOLUTION INTRAVENOUS at 12:08

## 2025-02-05 RX ADMIN — LEUCOVORIN CALCIUM 550 MG: 500 INJECTION, POWDER, LYOPHILIZED, FOR SOLUTION INTRAMUSCULAR; INTRAVENOUS at 12:07

## 2025-02-05 RX ADMIN — FAMOTIDINE 20 MG: 10 INJECTION, SOLUTION INTRAVENOUS at 10:50

## 2025-02-05 RX ADMIN — ATROPINE SULFATE 0.25 MG: 0.4 INJECTION, SOLUTION INTRAVENOUS at 13:36

## 2025-02-05 RX ADMIN — METHYLPREDNISOLONE SODIUM SUCCINATE 125 MG: 125 INJECTION, POWDER, FOR SOLUTION INTRAMUSCULAR; INTRAVENOUS at 10:56

## 2025-02-05 RX ADMIN — ACETAMINOPHEN 650 MG: 325 TABLET, FILM COATED ORAL at 10:45

## 2025-02-05 RX ADMIN — ATROPINE SULFATE 0.25 MG: 0.4 INJECTION, SOLUTION INTRAVENOUS at 12:04

## 2025-02-05 RX ADMIN — PALONOSETRON 0.25 MG: 0.25 INJECTION, SOLUTION INTRAVENOUS at 10:50

## 2025-02-05 RX ADMIN — FLUOROURACIL 3280 MG: 50 INJECTION, SOLUTION INTRAVENOUS at 14:34

## 2025-02-05 NOTE — PROGRESS NOTES
DATE OF VISIT: 2/5/2025    REASON FOR VISIT: Followup for metastatic right-sided colon cancer     PROBLEM LIST:  1. Metastatic colon cancer [place] NX M1 a stage Perry:  A.  Presented with abdominal pain and jaundice  B.  CT abdomen pelvis done November 04, 2022 confirmed diffuse liver metastases.  C.  Diagnosed after CT-guided liver biopsy done on November 22, 2022 confirming adenocarcinoma CK20 positive CK7 negative.  D.  Colonoscopy done December 08, 2020 to confirm transverse colon mass however biopsy revealed adenoma.  E.  Started palliative chemotherapy with dose adjusted FOLFOXIRI December 14, 2022, status post 12 cycles completed May 2023.  F.  Started maintenance Xeloda 1000 mg twice daily 1 week on 1 week off June 2023.  Stopped April 2024 secondary to progressive disease in the liver.  G.  Status post segment 7 and segment 3 liver met resection with 2 other lesions ablation done by Dr. Nielson April 19, 2024.  H.  Started FOLFIRI with Vectibix August 19, 2024.  Status post 7 cycles.  2.  Elevated liver enzymes:  A.  Induced by metastatic disease  3.  Cancer-related pain  4.  Anxiety  5.  Hypertension    HISTORY OF PRESENT ILLNESS: The patient is a very pleasant 54 y.o. female with past medical history significant for metastatic right-sided colon cancer diagnosed November 22, 2022.  Started on palliative chemotherapy with dose adjusted FOLFOXIRI.  She completed 12 cycles May 2023.  She was started on maintenance Xeloda 1000 mg twice daily June 2023.  Progressive disease with worsening PET scan as well as rising CT DNA.  She was started on FOLFIRI with Vectibix August 2024.  The patient is here today for scheduled follow-up visit with treatment cycle #8.    SUBJECTIVE: Amber is here today with her .  She has not been treated since December 31, 2024.  The patient has canceled a couple of previous appointments.  She has been diagnosed with COVID and she wanted to wait until she feels better before gets back on  therapy.    Past History:  Medical History: has a past medical history of Anxiety, Cancer, Ear piercing, Gallstones, History of irritable bowel syndrome, Hypertension, Seasonal allergies, Tattoos, and Wears glasses.   Surgical History: has a past surgical history that includes postpartum tubal ligation; Dilation and curettage of uterus (2013); Tumor removal (2013); Portacath placement (N/A, 12/8/2022); Colonoscopy (N/A, 12/8/2022); diagnostic laparoscopy exploratory laparotomy (N/A, 4/19/2024); Cholecystectomy (N/A, 4/19/2024); and Liver resection (Left, 4/19/2024).   Family History: family history is not on file.   Social History: reports that she has never smoked. She has never used smokeless tobacco. She reports current alcohol use. She reports that she does not use drugs.    (Not in a hospital admission)     Allergies: Losartan, Valium [diazepam], and Citrus     Review of Systems   Constitutional:  Positive for fatigue.   Gastrointestinal:  Positive for abdominal pain and diarrhea.   Musculoskeletal:  Positive for arthralgias.   Skin:         Facial acne like rash.   Psychiatric/Behavioral:  The patient is nervous/anxious.        PHYSICAL EXAMINATION:   There were no vitals taken for this visit.   There were no vitals filed for this visit.                         ECOG Performance Status: 1 - Symptomatic but completely ambulatory      General Appearance:      alert, cooperative, no apparent distress and appears stated age   Lungs:   Clear to auscultation bilaterally; respirations regular, even, and unlabored bilaterally   Heart:  Regular rate and rhythm, no murmurs appreciated   Abdomen:   Soft, non-tender, and non-distended                 No visits with results within 2 Week(s) from this visit.   Latest known visit with results is:   Hospital Outpatient Visit on 12/31/2024   Component Date Value Ref Range Status    Glucose 12/31/2024 102 (H)  65 - 99 mg/dL Final    BUN 12/31/2024 12  6 - 20 mg/dL Final     Creatinine 12/31/2024 0.72  0.57 - 1.00 mg/dL Final    Sodium 12/31/2024 141  136 - 145 mmol/L Final    Potassium 12/31/2024 4.4  3.5 - 5.2 mmol/L Final    Chloride 12/31/2024 110 (H)  98 - 107 mmol/L Final    CO2 12/31/2024 22.5  22.0 - 29.0 mmol/L Final    Calcium 12/31/2024 8.7  8.6 - 10.5 mg/dL Final    Total Protein 12/31/2024 6.8  6.0 - 8.5 g/dL Final    Albumin 12/31/2024 3.6  3.5 - 5.2 g/dL Final    ALT (SGPT) 12/31/2024 21  1 - 33 U/L Final    AST (SGOT) 12/31/2024 22  1 - 32 U/L Final    Alkaline Phosphatase 12/31/2024 200 (H)  39 - 117 U/L Final    Total Bilirubin 12/31/2024 0.3  0.0 - 1.2 mg/dL Final    Globulin 12/31/2024 3.2  gm/dL Final    A/G Ratio 12/31/2024 1.1  g/dL Final    BUN/Creatinine Ratio 12/31/2024 16.7  7.0 - 25.0 Final    Anion Gap 12/31/2024 8.5  5.0 - 15.0 mmol/L Final    eGFR 12/31/2024 99.5  >60.0 mL/min/1.73 Final    Magnesium 12/31/2024 1.8  1.6 - 2.6 mg/dL Final    WBC 12/31/2024 5.98  3.40 - 10.80 10*3/mm3 Final    RBC 12/31/2024 4.36  3.77 - 5.28 10*6/mm3 Final    Hemoglobin 12/31/2024 11.7 (L)  12.0 - 15.9 g/dL Final    Hematocrit 12/31/2024 37.4  34.0 - 46.6 % Final    MCV 12/31/2024 85.8  79.0 - 97.0 fL Final    MCH 12/31/2024 26.8  26.6 - 33.0 pg Final    MCHC 12/31/2024 31.3 (L)  31.5 - 35.7 g/dL Final    RDW 12/31/2024 15.9 (H)  12.3 - 15.4 % Final    RDW-SD 12/31/2024 49.4  37.0 - 54.0 fl Final    MPV 12/31/2024 10.1  6.0 - 12.0 fL Final    Platelets 12/31/2024 242  140 - 450 10*3/mm3 Final    Neutrophil % 12/31/2024 61.3  42.7 - 76.0 % Final    Lymphocyte % 12/31/2024 27.9  19.6 - 45.3 % Final    Monocyte % 12/31/2024 8.7  5.0 - 12.0 % Final    Eosinophil % 12/31/2024 1.3  0.3 - 6.2 % Final    Basophil % 12/31/2024 0.5  0.0 - 1.5 % Final    Immature Grans % 12/31/2024 0.3  0.0 - 0.5 % Final    Neutrophils, Absolute 12/31/2024 3.66  1.70 - 7.00 10*3/mm3 Final    Lymphocytes, Absolute 12/31/2024 1.67  0.70 - 3.10 10*3/mm3 Final    Monocytes, Absolute 12/31/2024 0.52  0.10  - 0.90 10*3/mm3 Final    Eosinophils, Absolute 12/31/2024 0.08  0.00 - 0.40 10*3/mm3 Final    Basophils, Absolute 12/31/2024 0.03  0.00 - 0.20 10*3/mm3 Final    Immature Grans, Absolute 12/31/2024 0.02  0.00 - 0.05 10*3/mm3 Final    nRBC 12/31/2024 0.0  0.0 - 0.2 /100 WBC Final        No results found.    ASSESSMENT: The patient is a very pleasant 54 y.o. female  with right-sided metastatic colon cancer      PLAN:    1.  Right-sided metastatic colon cancer:  A.  I will proceed with FOLFIRI plus Vectibix cycle #8.  B.  She will follow-up with us in 2 weeks for cycle #9.     C.  We will consider maintenance therapy after maximum response.  D.  I will continue to monitor the patient blood work including blood counts kidney function liver function and electrolytes.  E.  I will repeat her Signatera with every other cycle.  I discussed with the patient that last CT DNA decreased significantly.   F.  I will do 3 months follow-up scan.  Should be after cycle #12.  Will consider maintenance therapy at that point.  G.  I discussed with the patient but the results from December 31, 2024.  CMP revealed chloride 110.  CBC hemoglobin 11.7.  I will follow-up on results from today.    2.  Elevated liver enzymes:  A.  Induced by liver metastases  B. I will repeat labs on return    3.  Chemotherapy-induced nausea, severe:  A.  I will continue IV fluid on day 1 and as needed on day for 3.  B.  I will continue Zofran 8 mg and Compazine 10 mg every 8 hours as needed.    4.  Chemotherapy-induced diarrhea:  A.  I will continue the patient on Imodium as needed    5. Cancer-related pain:  A.  I will continue the patient oxycodone 5 mg twice a day.     6.  Anxiety:  A.  I will continue the patient on Ativan 1 mg nightly as needed.       7.  Hypertension:  A.  She will continue losartan.  B.  I reviewed with the patient this will interfere with our management since chemotherapy typically can cause hypotension we will have to monitor blood  pressure closely.    8.  Dehydration:  A.  I will continue 1 L of IV fluid as needed    9.  Chemotherapy-induced anemia:  A.  I will follow-up on hemoglobin from today.  Last hemoglobin 11.7.  B.  I will continue monitor her CBC prior to each infusion.  C.  I will transfuse as needed for hemoglobin less than 8.      10.  Chemotherapy-induced heartburn:  A.  I will continue pantoprazole 40 mg twice a day.        11.  Treatment related diarrhea  A.  Continue Imodium as directed.  B.  I will continue Lomotil as needed.      12.  Treatment induced peripheral neuropathy:   A.  We will continue gabapentin 100 mg 3 times daily.    13.  Treatment induced dermatitis, severe:  A.  Erbitux was permanently discontinued due to severe treatment induced dermatitis.    B.  I will continue patient on Vectibix at 4 mg/kg in 2 weeks and if she is doing well we will consider increasing the dose to full dose at 6 mg/kg.  C.  I will continue hydrocortisone cream.  D.  I will continue doxycycline 100 mg twice a day.     14.  Treatment induced mucositis:  A.  I will continue Magic mouthwash.    FOLLOW UP: 2 weeks with cycle #9.    Iban Lambert MD  2/5/2025

## 2025-02-07 ENCOUNTER — HOSPITAL ENCOUNTER (OUTPATIENT)
Facility: HOSPITAL | Age: 55
Discharge: HOME | End: 2025-02-07
Payer: COMMERCIAL

## 2025-02-07 VITALS
SYSTOLIC BLOOD PRESSURE: 128 MMHG | OXYGEN SATURATION: 96 % | TEMPERATURE: 98 F | HEART RATE: 113 BPM | DIASTOLIC BLOOD PRESSURE: 85 MMHG

## 2025-02-07 DIAGNOSIS — C18.2 CANCER OF RIGHT COLON: ICD-10-CM

## 2025-02-07 DIAGNOSIS — C18.9 METASTATIC COLON CANCER TO LIVER: Primary | ICD-10-CM

## 2025-02-07 DIAGNOSIS — C78.7 METASTATIC COLON CANCER TO LIVER: Primary | ICD-10-CM

## 2025-02-07 PROCEDURE — 25010000002 HEPARIN LOCK FLUSH PER 10 UNITS: Performed by: INTERNAL MEDICINE

## 2025-02-07 PROCEDURE — 96523 IRRIG DRUG DELIVERY DEVICE: CPT

## 2025-02-07 RX ORDER — HEPARIN SODIUM (PORCINE) LOCK FLUSH IV SOLN 100 UNIT/ML 100 UNIT/ML
500 SOLUTION INTRAVENOUS AS NEEDED
Status: DISCONTINUED | OUTPATIENT
Start: 2025-02-07 | End: 2025-02-08 | Stop reason: HOSPADM

## 2025-02-07 RX ORDER — SODIUM CHLORIDE 0.9 % (FLUSH) 0.9 %
10 SYRINGE (ML) INJECTION AS NEEDED
Status: DISCONTINUED | OUTPATIENT
Start: 2025-02-07 | End: 2025-02-08 | Stop reason: HOSPADM

## 2025-02-07 RX ADMIN — Medication 10 ML: at 15:16

## 2025-02-07 RX ADMIN — HEPARIN 500 UNITS: 100 SYRINGE at 15:16

## 2025-02-14 LAB
NTRA SIGNATERA MTM READOUT: 143.72 MTM/ML
NTRA SIGNATERA TEST RESULT: POSITIVE

## 2025-02-19 ENCOUNTER — OFFICE VISIT (OUTPATIENT)
Dept: ONCOLOGY | Facility: CLINIC | Age: 55
End: 2025-02-19
Payer: COMMERCIAL

## 2025-02-19 ENCOUNTER — HOSPITAL ENCOUNTER (OUTPATIENT)
Facility: HOSPITAL | Age: 55
Discharge: HOME | End: 2025-02-19
Admitting: INTERNAL MEDICINE
Payer: COMMERCIAL

## 2025-02-19 ENCOUNTER — APPOINTMENT (OUTPATIENT)
Facility: HOSPITAL | Age: 55
End: 2025-02-19
Payer: COMMERCIAL

## 2025-02-19 VITALS
DIASTOLIC BLOOD PRESSURE: 86 MMHG | RESPIRATION RATE: 16 BRPM | HEIGHT: 62 IN | TEMPERATURE: 97.7 F | OXYGEN SATURATION: 99 % | WEIGHT: 184.8 LBS | SYSTOLIC BLOOD PRESSURE: 136 MMHG | HEART RATE: 79 BPM | BODY MASS INDEX: 34.01 KG/M2

## 2025-02-19 DIAGNOSIS — F41.9 ANXIETY: ICD-10-CM

## 2025-02-19 DIAGNOSIS — C18.9 METASTATIC COLON CANCER TO LIVER: Primary | ICD-10-CM

## 2025-02-19 DIAGNOSIS — C78.7 METASTATIC COLON CANCER TO LIVER: Primary | ICD-10-CM

## 2025-02-19 DIAGNOSIS — C78.7 METASTATIC COLON CANCER TO LIVER: ICD-10-CM

## 2025-02-19 DIAGNOSIS — C18.9 METASTATIC COLON CANCER TO LIVER: ICD-10-CM

## 2025-02-19 DIAGNOSIS — J34.89 NASAL LESION: Primary | ICD-10-CM

## 2025-02-19 LAB
ALBUMIN SERPL-MCNC: 3.6 G/DL (ref 3.5–5.2)
ALBUMIN/GLOB SERPL: 1.2 G/DL
ALP SERPL-CCNC: 226 U/L (ref 39–117)
ALT SERPL W P-5'-P-CCNC: 39 U/L (ref 1–33)
ANION GAP SERPL CALCULATED.3IONS-SCNC: 10.4 MMOL/L (ref 5–15)
AST SERPL-CCNC: 37 U/L (ref 1–32)
BASOPHILS # BLD AUTO: 0.02 10*3/MM3 (ref 0–0.2)
BASOPHILS NFR BLD AUTO: 0.7 % (ref 0–1.5)
BILIRUB SERPL-MCNC: 0.3 MG/DL (ref 0–1.2)
BUN SERPL-MCNC: 13 MG/DL (ref 6–20)
BUN/CREAT SERPL: 21.7 (ref 7–25)
CALCIUM SPEC-SCNC: 8.7 MG/DL (ref 8.6–10.5)
CHLORIDE SERPL-SCNC: 108 MMOL/L (ref 98–107)
CO2 SERPL-SCNC: 22.6 MMOL/L (ref 22–29)
CREAT SERPL-MCNC: 0.6 MG/DL (ref 0.57–1)
DEPRECATED RDW RBC AUTO: 41.8 FL (ref 37–54)
EGFRCR SERPLBLD CKD-EPI 2021: 106.8 ML/MIN/1.73
EOSINOPHIL # BLD AUTO: 0.09 10*3/MM3 (ref 0–0.4)
EOSINOPHIL NFR BLD AUTO: 2.9 % (ref 0.3–6.2)
ERYTHROCYTE [DISTWIDTH] IN BLOOD BY AUTOMATED COUNT: 14.3 % (ref 12.3–15.4)
GLOBULIN UR ELPH-MCNC: 3.1 GM/DL
GLUCOSE SERPL-MCNC: 112 MG/DL (ref 65–99)
HCT VFR BLD AUTO: 33.9 % (ref 34–46.6)
HGB BLD-MCNC: 11.2 G/DL (ref 12–15.9)
IMM GRANULOCYTES # BLD AUTO: 0.01 10*3/MM3 (ref 0–0.05)
IMM GRANULOCYTES NFR BLD AUTO: 0.3 % (ref 0–0.5)
LYMPHOCYTES # BLD AUTO: 1.28 10*3/MM3 (ref 0.7–3.1)
LYMPHOCYTES NFR BLD AUTO: 41.8 % (ref 19.6–45.3)
MAGNESIUM SERPL-MCNC: 1.8 MG/DL (ref 1.6–2.6)
MCH RBC QN AUTO: 27.3 PG (ref 26.6–33)
MCHC RBC AUTO-ENTMCNC: 33 G/DL (ref 31.5–35.7)
MCV RBC AUTO: 82.5 FL (ref 79–97)
MONOCYTES # BLD AUTO: 0.26 10*3/MM3 (ref 0.1–0.9)
MONOCYTES NFR BLD AUTO: 8.5 % (ref 5–12)
NEUTROPHILS NFR BLD AUTO: 1.4 10*3/MM3 (ref 1.7–7)
NEUTROPHILS NFR BLD AUTO: 45.8 % (ref 42.7–76)
NRBC BLD AUTO-RTO: 0 /100 WBC (ref 0–0.2)
PLATELET # BLD AUTO: 174 10*3/MM3 (ref 140–450)
PMV BLD AUTO: 10.2 FL (ref 6–12)
POTASSIUM SERPL-SCNC: 3.6 MMOL/L (ref 3.5–5.2)
PROT SERPL-MCNC: 6.7 G/DL (ref 6–8.5)
RBC # BLD AUTO: 4.11 10*6/MM3 (ref 3.77–5.28)
SODIUM SERPL-SCNC: 141 MMOL/L (ref 136–145)
WBC NRBC COR # BLD AUTO: 3.06 10*3/MM3 (ref 3.4–10.8)

## 2025-02-19 PROCEDURE — 25010000002 METHYLPREDNISOLONE PER 125 MG: Performed by: NURSE PRACTITIONER

## 2025-02-19 PROCEDURE — 25010000002 PALONOSETRON 0.25 MG/5ML SOLUTION PREFILLED SYRINGE: Performed by: NURSE PRACTITIONER

## 2025-02-19 PROCEDURE — G0498 CHEMO EXTEND IV INFUS W/PUMP: HCPCS

## 2025-02-19 PROCEDURE — 25010000002 LEUCOVORIN CALCIUM PER 50 MG: Performed by: NURSE PRACTITIONER

## 2025-02-19 PROCEDURE — 25010000003 DEXTROSE 5 % SOLUTION 500 ML FLEX CONT: Performed by: NURSE PRACTITIONER

## 2025-02-19 PROCEDURE — 96411 CHEMO IV PUSH ADDL DRUG: CPT

## 2025-02-19 PROCEDURE — 85025 COMPLETE CBC W/AUTO DIFF WBC: CPT | Performed by: INTERNAL MEDICINE

## 2025-02-19 PROCEDURE — 25010000002 LEUCOVORIN 500 MG RECONSTITUTED SOLUTION 1 EACH VIAL: Performed by: NURSE PRACTITIONER

## 2025-02-19 PROCEDURE — 96375 TX/PRO/DX INJ NEW DRUG ADDON: CPT

## 2025-02-19 PROCEDURE — 96368 THER/DIAG CONCURRENT INF: CPT

## 2025-02-19 PROCEDURE — 25010000003 DEXTROSE 5 % SOLUTION 250 ML FLEX CONT: Performed by: NURSE PRACTITIONER

## 2025-02-19 PROCEDURE — 96415 CHEMO IV INFUSION ADDL HR: CPT

## 2025-02-19 PROCEDURE — 25810000003 SODIUM CHLORIDE 0.9 % SOLUTION 250 ML FLEX CONT: Performed by: NURSE PRACTITIONER

## 2025-02-19 PROCEDURE — 25010000002 LORAZEPAM PER 2 MG: Performed by: NURSE PRACTITIONER

## 2025-02-19 PROCEDURE — 83735 ASSAY OF MAGNESIUM: CPT | Performed by: INTERNAL MEDICINE

## 2025-02-19 PROCEDURE — 96413 CHEMO IV INFUSION 1 HR: CPT

## 2025-02-19 PROCEDURE — 80053 COMPREHEN METABOLIC PANEL: CPT | Performed by: INTERNAL MEDICINE

## 2025-02-19 PROCEDURE — 96416 CHEMO PROLONG INFUSE W/PUMP: CPT

## 2025-02-19 PROCEDURE — 96366 THER/PROPH/DIAG IV INF ADDON: CPT

## 2025-02-19 PROCEDURE — 25010000002 PANITUMUMAB 400 MG/20ML SOLUTION 20 ML VIAL: Performed by: NURSE PRACTITIONER

## 2025-02-19 PROCEDURE — 25010000002 FLUOROURACIL PER 500 MG: Performed by: NURSE PRACTITIONER

## 2025-02-19 PROCEDURE — 96376 TX/PRO/DX INJ SAME DRUG ADON: CPT

## 2025-02-19 PROCEDURE — 25010000002 IRINOTECAN PER 20 MG: Performed by: NURSE PRACTITIONER

## 2025-02-19 PROCEDURE — 96417 CHEMO IV INFUS EACH ADDL SEQ: CPT

## 2025-02-19 RX ORDER — ATROPINE SULFATE 1 MG/ML
0.25 INJECTION, SOLUTION INTRAMUSCULAR; INTRAVENOUS; SUBCUTANEOUS
Status: CANCELLED | OUTPATIENT
Start: 2025-02-19

## 2025-02-19 RX ORDER — FAMOTIDINE 10 MG/ML
20 INJECTION, SOLUTION INTRAVENOUS AS NEEDED
Status: CANCELLED | OUTPATIENT
Start: 2025-02-19

## 2025-02-19 RX ORDER — SODIUM CHLORIDE 9 MG/ML
20 INJECTION, SOLUTION INTRAVENOUS ONCE
Status: CANCELLED | OUTPATIENT
Start: 2025-02-19

## 2025-02-19 RX ORDER — HYDROCORTISONE SODIUM SUCCINATE 100 MG/2ML
100 INJECTION INTRAMUSCULAR; INTRAVENOUS AS NEEDED
Status: CANCELLED | OUTPATIENT
Start: 2025-02-19

## 2025-02-19 RX ORDER — METHYLPREDNISOLONE SODIUM SUCCINATE 125 MG/2ML
125 INJECTION INTRAMUSCULAR; INTRAVENOUS ONCE
Status: COMPLETED | OUTPATIENT
Start: 2025-02-19 | End: 2025-02-19

## 2025-02-19 RX ORDER — SODIUM CHLORIDE 9 MG/ML
20 INJECTION, SOLUTION INTRAVENOUS ONCE
Status: DISCONTINUED | OUTPATIENT
Start: 2025-02-19 | End: 2025-02-20 | Stop reason: HOSPADM

## 2025-02-19 RX ORDER — PALONOSETRON 0.05 MG/ML
0.25 INJECTION, SOLUTION INTRAVENOUS ONCE
Status: COMPLETED | OUTPATIENT
Start: 2025-02-19 | End: 2025-02-19

## 2025-02-19 RX ORDER — LORAZEPAM 1 MG/1
TABLET ORAL
Qty: 10 TABLET | Refills: 1 | Status: SHIPPED | OUTPATIENT
Start: 2025-02-19

## 2025-02-19 RX ORDER — FLUOROURACIL 50 MG/ML
300 INJECTION, SOLUTION INTRAVENOUS ONCE
Status: CANCELLED | OUTPATIENT
Start: 2025-02-19

## 2025-02-19 RX ORDER — ACETAMINOPHEN 325 MG/1
650 TABLET ORAL ONCE
Status: COMPLETED | OUTPATIENT
Start: 2025-02-19 | End: 2025-02-19

## 2025-02-19 RX ORDER — LORAZEPAM 2 MG/ML
1 INJECTION INTRAMUSCULAR ONCE AS NEEDED
Status: COMPLETED | OUTPATIENT
Start: 2025-02-19 | End: 2025-02-19

## 2025-02-19 RX ORDER — METHYLPREDNISOLONE SODIUM SUCCINATE 125 MG/2ML
125 INJECTION INTRAMUSCULAR; INTRAVENOUS ONCE
Status: CANCELLED | OUTPATIENT
Start: 2025-02-19

## 2025-02-19 RX ORDER — MEPERIDINE HYDROCHLORIDE 25 MG/ML
25 INJECTION INTRAMUSCULAR; INTRAVENOUS; SUBCUTANEOUS
Status: CANCELLED | OUTPATIENT
Start: 2025-02-19

## 2025-02-19 RX ORDER — LORAZEPAM 2 MG/ML
1 INJECTION INTRAMUSCULAR ONCE AS NEEDED
Status: CANCELLED
Start: 2025-02-19

## 2025-02-19 RX ORDER — FAMOTIDINE 10 MG/ML
20 INJECTION, SOLUTION INTRAVENOUS ONCE
Status: COMPLETED | OUTPATIENT
Start: 2025-02-19 | End: 2025-02-19

## 2025-02-19 RX ORDER — ESCITALOPRAM OXALATE 5 MG/1
5 TABLET ORAL DAILY
Qty: 30 TABLET | Refills: 3 | Status: SHIPPED | OUTPATIENT
Start: 2025-02-19

## 2025-02-19 RX ORDER — FAMOTIDINE 10 MG/ML
20 INJECTION, SOLUTION INTRAVENOUS ONCE
Status: CANCELLED | OUTPATIENT
Start: 2025-02-19

## 2025-02-19 RX ORDER — FLUOROURACIL 50 MG/ML
300 INJECTION, SOLUTION INTRAVENOUS ONCE
Status: COMPLETED | OUTPATIENT
Start: 2025-02-19 | End: 2025-02-19

## 2025-02-19 RX ORDER — ACETAMINOPHEN 325 MG/1
650 TABLET ORAL ONCE
Status: CANCELLED | OUTPATIENT
Start: 2025-02-19

## 2025-02-19 RX ORDER — DIPHENHYDRAMINE HYDROCHLORIDE 50 MG/ML
50 INJECTION INTRAMUSCULAR; INTRAVENOUS AS NEEDED
Status: CANCELLED | OUTPATIENT
Start: 2025-02-19

## 2025-02-19 RX ORDER — PALONOSETRON 0.05 MG/ML
0.25 INJECTION, SOLUTION INTRAVENOUS ONCE
Status: CANCELLED | OUTPATIENT
Start: 2025-02-19

## 2025-02-19 RX ADMIN — ATROPINE SULFATE 0.25 MG: 0.4 INJECTION, SOLUTION INTRAVENOUS at 12:49

## 2025-02-19 RX ADMIN — DEXTROSE MONOHYDRATE 240 MG: 50 INJECTION, SOLUTION INTRAVENOUS at 12:51

## 2025-02-19 RX ADMIN — ATROPINE SULFATE 0.25 MG: 0.4 INJECTION, SOLUTION INTRAVENOUS at 14:27

## 2025-02-19 RX ADMIN — ACETAMINOPHEN 650 MG: 325 TABLET, FILM COATED ORAL at 11:16

## 2025-02-19 RX ADMIN — PALONOSETRON 0.25 MG: 0.25 INJECTION, SOLUTION INTRAVENOUS at 11:16

## 2025-02-19 RX ADMIN — FLUOROURACIL 550 MG: 50 INJECTION, SOLUTION INTRAVENOUS at 14:29

## 2025-02-19 RX ADMIN — LEUCOVORIN CALCIUM 550 MG: 500 INJECTION, POWDER, LYOPHILIZED, FOR SOLUTION INTRAMUSCULAR; INTRAVENOUS at 12:51

## 2025-02-19 RX ADMIN — FLUOROURACIL 3280 MG: 50 INJECTION, SOLUTION INTRAVENOUS at 14:29

## 2025-02-19 RX ADMIN — PANITUMUMAB 320 MG: 400 SOLUTION INTRAVENOUS at 11:38

## 2025-02-19 RX ADMIN — FAMOTIDINE 20 MG: 10 INJECTION, SOLUTION INTRAVENOUS at 11:18

## 2025-02-19 RX ADMIN — METHYLPREDNISOLONE SODIUM SUCCINATE 125 MG: 125 INJECTION, POWDER, FOR SOLUTION INTRAMUSCULAR; INTRAVENOUS at 11:17

## 2025-02-19 RX ADMIN — LORAZEPAM 1 MG: 2 INJECTION INTRAMUSCULAR; INTRAVENOUS at 11:15

## 2025-02-19 NOTE — PROGRESS NOTES
DATE OF VISIT: 2/19/2025    REASON FOR VISIT: Followup for metastatic right-sided colon cancer     PROBLEM LIST:  1. Metastatic colon cancer TX NX M1 a stage Perry:  A.  Presented with abdominal pain and jaundice  B.  CT abdomen pelvis done November 04, 2022 confirmed diffuse liver metastases.  C.  Diagnosed after CT-guided liver biopsy done on November 22, 2022 confirming adenocarcinoma CK20 positive CK7 negative.  D.  Colonoscopy done December 08, 2020 to confirm transverse colon mass however biopsy revealed adenoma.  E.  Started palliative chemotherapy with dose adjusted FOLFOXIRI December 14, 2022, status post 12 cycles completed May 2023.  F.  Started maintenance Xeloda 1000 mg twice daily 1 week on 1 week off June 2023.  Stopped April 2024 secondary to progressive disease in the liver.  G.  Status post segment 7 and segment 3 liver met resection with 2 other lesions ablation done by Dr. Nielson April 19, 2024.  H.  Started FOLFIRI with Vectibix August 19, 2024.  Status post 8 cycles.  2.  Elevated liver enzymes:  A.  Induced by metastatic disease  3.  Cancer-related pain  4.  Anxiety  5.  Hypertension    HISTORY OF PRESENT ILLNESS: The patient is a very pleasant 54 y.o. female with past medical history significant for metastatic right-sided colon cancer diagnosed November 22, 2022.  Started on palliative chemotherapy with dose adjusted FOLFOXIRI.  She completed 12 cycles May 2023.  She was started on maintenance Xeloda 1000 mg twice daily June 2023.  Progressive disease with worsening PET scan as well as rising CT DNA.  She was started on FOLFIRI with Vectibix August 2024.  The patient is here today for scheduled follow-up visit with treatment cycle #9.    SUBJECTIVE: Amber is here today with her , She was delayed due to COVID for a few treatments. She is concerned about her CT DNA increased significantly. She has been having worsening anxiety related to her cancer as well as before treatments. She has  "been having a lot of vomiting when she thinks about treatment. She also noted some nasal lesions that she used peroxide for and that helped. Otherwise she is doing ok.          Past History:  Medical History: has a past medical history of Anxiety, Cancer, Ear piercing, Gallstones, History of irritable bowel syndrome, Hypertension, Seasonal allergies, Tattoos, and Wears glasses.   Surgical History: has a past surgical history that includes postpartum tubal ligation; Dilation and curettage of uterus (2013); Tumor removal (2013); Portacath placement (N/A, 12/8/2022); Colonoscopy (N/A, 12/8/2022); diagnostic laparoscopy exploratory laparotomy (N/A, 4/19/2024); Cholecystectomy (N/A, 4/19/2024); and Liver resection (Left, 4/19/2024).   Family History: family history is not on file.   Social History: reports that she has never smoked. She has never used smokeless tobacco. She reports current alcohol use. She reports that she does not use drugs.    (Not in a hospital admission)     Allergies: Losartan, Valium [diazepam], and Citrus     Review of Systems   Constitutional:  Positive for fatigue.   Gastrointestinal:  Positive for abdominal pain and diarrhea.   Musculoskeletal:  Positive for arthralgias.   Skin:         Facial acne like rash.   Psychiatric/Behavioral:  The patient is nervous/anxious.        PHYSICAL EXAMINATION:   /86   Pulse 79   Temp 97.7 °F (36.5 °C)   Resp 16   Ht 157.5 cm (62.01\")   Wt 83.8 kg (184 lb 12.8 oz)   SpO2 99%   BMI 33.79 kg/m²    Pain Score    02/19/25 0940   PainSc: 10-Worst pain ever                            ECOG Performance Status: 1 - Symptomatic but completely ambulatory      General Appearance:      alert, cooperative, no apparent distress and appears stated age   Lungs:   Clear to auscultation bilaterally; respirations regular, even, and unlabored bilaterally   Heart:  Regular rate and rhythm, no murmurs appreciated   Abdomen:   Soft, non-tender, and non-distended         "         Hospital Outpatient Visit on 02/19/2025   Component Date Value Ref Range Status    Glucose 02/19/2025 112 (H)  65 - 99 mg/dL Final    BUN 02/19/2025 13  6 - 20 mg/dL Final    Creatinine 02/19/2025 0.60  0.57 - 1.00 mg/dL Final    Sodium 02/19/2025 141  136 - 145 mmol/L Final    Potassium 02/19/2025 3.6  3.5 - 5.2 mmol/L Final    Chloride 02/19/2025 108 (H)  98 - 107 mmol/L Final    CO2 02/19/2025 22.6  22.0 - 29.0 mmol/L Final    Calcium 02/19/2025 8.7  8.6 - 10.5 mg/dL Final    Total Protein 02/19/2025 6.7  6.0 - 8.5 g/dL Final    Albumin 02/19/2025 3.6  3.5 - 5.2 g/dL Final    ALT (SGPT) 02/19/2025 39 (H)  1 - 33 U/L Final    AST (SGOT) 02/19/2025 37 (H)  1 - 32 U/L Final    Alkaline Phosphatase 02/19/2025 226 (H)  39 - 117 U/L Final    Total Bilirubin 02/19/2025 0.3  0.0 - 1.2 mg/dL Final    Globulin 02/19/2025 3.1  gm/dL Final    A/G Ratio 02/19/2025 1.2  g/dL Final    BUN/Creatinine Ratio 02/19/2025 21.7  7.0 - 25.0 Final    Anion Gap 02/19/2025 10.4  5.0 - 15.0 mmol/L Final    eGFR 02/19/2025 106.8  >60.0 mL/min/1.73 Final    Magnesium 02/19/2025 1.8  1.6 - 2.6 mg/dL Final    WBC 02/19/2025 3.06 (L)  3.40 - 10.80 10*3/mm3 Final    RBC 02/19/2025 4.11  3.77 - 5.28 10*6/mm3 Final    Hemoglobin 02/19/2025 11.2 (L)  12.0 - 15.9 g/dL Final    Hematocrit 02/19/2025 33.9 (L)  34.0 - 46.6 % Final    MCV 02/19/2025 82.5  79.0 - 97.0 fL Final    MCH 02/19/2025 27.3  26.6 - 33.0 pg Final    MCHC 02/19/2025 33.0  31.5 - 35.7 g/dL Final    RDW 02/19/2025 14.3  12.3 - 15.4 % Final    RDW-SD 02/19/2025 41.8  37.0 - 54.0 fl Final    MPV 02/19/2025 10.2  6.0 - 12.0 fL Final    Platelets 02/19/2025 174  140 - 450 10*3/mm3 Final    Neutrophil % 02/19/2025 45.8  42.7 - 76.0 % Final    Lymphocyte % 02/19/2025 41.8  19.6 - 45.3 % Final    Monocyte % 02/19/2025 8.5  5.0 - 12.0 % Final    Eosinophil % 02/19/2025 2.9  0.3 - 6.2 % Final    Basophil % 02/19/2025 0.7  0.0 - 1.5 % Final    Immature Grans % 02/19/2025 0.3  0.0 -  0.5 % Final    Neutrophils, Absolute 02/19/2025 1.40 (L)  1.70 - 7.00 10*3/mm3 Final    Lymphocytes, Absolute 02/19/2025 1.28  0.70 - 3.10 10*3/mm3 Final    Monocytes, Absolute 02/19/2025 0.26  0.10 - 0.90 10*3/mm3 Final    Eosinophils, Absolute 02/19/2025 0.09  0.00 - 0.40 10*3/mm3 Final    Basophils, Absolute 02/19/2025 0.02  0.00 - 0.20 10*3/mm3 Final    Immature Grans, Absolute 02/19/2025 0.01  0.00 - 0.05 10*3/mm3 Final    nRBC 02/19/2025 0.0  0.0 - 0.2 /100 WBC Final        No results found.    ASSESSMENT: The patient is a very pleasant 54 y.o. female  with right-sided metastatic colon cancer      PLAN:    1.  Right-sided metastatic colon cancer:  A.  I will proceed with FOLFIRI plus Vectibix cycle #9.  B.  She will follow-up with us in 2 weeks for cycle #10.     C.  We will consider maintenance therapy after maximum response.  D.  I will continue to monitor the patient blood work including blood counts kidney function liver function and electrolytes.  E.  I will repeat her Signatera with every other cycle.  I discussed with the patient that last CT DNA decreased significantly.   F.  I will do 3 months follow-up scan.  Should be after cycle #12.  Will consider maintenance therapy at that point.  G.  I discussed with the patient but the results from December 31, 2024.  CMP revealed chloride 110.  CBC hemoglobin 11.7.  I will follow-up on results from today.    2.  Elevated liver enzymes:  A.  Induced by liver metastases  B. I will repeat labs on return    3.  Chemotherapy-induced nausea, severe:  A.  I will continue IV fluid on day 1 and as needed on day for 3.  B.  I will continue Zofran 8 mg and Compazine 10 mg every 8 hours as needed.    4.  Chemotherapy-induced diarrhea:  A.  I will continue the patient on Imodium as needed    5. Cancer-related pain:  A.  I will continue the patient oxycodone 5 mg twice a day.     6.  Anxiety:  A.  I will continue the patient on Ativan 1 mg nightly as needed.       7.   Hypertension:  A.  She will continue losartan.  B.  I reviewed with the patient this will interfere with our management since chemotherapy typically can cause hypotension we will have to monitor blood pressure closely.    8.  Dehydration:  A.  I will continue 1 L of IV fluid as needed    9.  Chemotherapy-induced anemia:  A.  I will follow-up on hemoglobin from today.  Last hemoglobin 11.7.  B.  I will continue monitor her CBC prior to each infusion.  C.  I will transfuse as needed for hemoglobin less than 8.      10.  Chemotherapy-induced heartburn:  A.  I will continue pantoprazole 40 mg twice a day.        11.  Treatment related diarrhea  A.  Continue Imodium as directed.  B.  I will continue Lomotil as needed.      12.  Treatment induced peripheral neuropathy:   A.  We will continue gabapentin 100 mg 3 times daily.    13.  Treatment induced dermatitis, severe:  A.  Erbitux was permanently discontinued due to severe treatment induced dermatitis.    B.  I will continue patient on Vectibix at 4 mg/kg in 2 weeks and if she is doing well we will consider increasing the dose to full dose at 6 mg/kg.  C.  I will continue hydrocortisone cream.  D.  I will continue doxycycline 100 mg twice a day.     14.  Treatment induced mucositis:  A.  I will continue Magic mouthwash.    15. Nasal lesion.   A. Start mupirocin as needed    16. Anxiety  A. We will start the patient on lexapro. We will adjust as needed. I asked her to call with thoughts of hurting herself or others.   B. We  will also give her ativan to use the day before treatment for anticipatory nausea.    FOLLOW UP: 2 weeks with cycle #10.    Total time of patient care including time prior to, face to face with patient, and following visit spent in reviewing records, lab results, imaging studies, discussion with patient, and documentation/charting was > 45 minutes      Ana Cristina Cano, APRN  2/19/2025

## 2025-02-21 ENCOUNTER — HOSPITAL ENCOUNTER (OUTPATIENT)
Facility: HOSPITAL | Age: 55
Discharge: HOME | End: 2025-02-21
Payer: COMMERCIAL

## 2025-02-21 VITALS
OXYGEN SATURATION: 96 % | TEMPERATURE: 97.5 F | DIASTOLIC BLOOD PRESSURE: 87 MMHG | SYSTOLIC BLOOD PRESSURE: 134 MMHG | HEART RATE: 65 BPM

## 2025-02-21 DIAGNOSIS — C18.9 METASTATIC COLON CANCER TO LIVER: Primary | ICD-10-CM

## 2025-02-21 DIAGNOSIS — C78.7 METASTATIC COLON CANCER TO LIVER: Primary | ICD-10-CM

## 2025-02-21 DIAGNOSIS — C18.2 CANCER OF RIGHT COLON: ICD-10-CM

## 2025-02-21 PROCEDURE — 25010000002 HEPARIN LOCK FLUSH PER 10 UNITS: Performed by: INTERNAL MEDICINE

## 2025-02-21 RX ORDER — HEPARIN SODIUM (PORCINE) LOCK FLUSH IV SOLN 100 UNIT/ML 100 UNIT/ML
500 SOLUTION INTRAVENOUS AS NEEDED
OUTPATIENT
Start: 2025-02-21

## 2025-02-21 RX ORDER — SODIUM CHLORIDE 0.9 % (FLUSH) 0.9 %
10 SYRINGE (ML) INJECTION AS NEEDED
OUTPATIENT
Start: 2025-02-21

## 2025-02-21 RX ORDER — SODIUM CHLORIDE 0.9 % (FLUSH) 0.9 %
10 SYRINGE (ML) INJECTION AS NEEDED
Status: DISCONTINUED | OUTPATIENT
Start: 2025-02-21 | End: 2025-02-22 | Stop reason: HOSPADM

## 2025-02-21 RX ORDER — HEPARIN SODIUM (PORCINE) LOCK FLUSH IV SOLN 100 UNIT/ML 100 UNIT/ML
500 SOLUTION INTRAVENOUS AS NEEDED
Status: DISCONTINUED | OUTPATIENT
Start: 2025-02-21 | End: 2025-02-22 | Stop reason: HOSPADM

## 2025-02-21 RX ADMIN — HEPARIN 500 UNITS: 100 SYRINGE at 13:26

## 2025-02-28 ENCOUNTER — TELEPHONE (OUTPATIENT)
Dept: ONCOLOGY | Facility: CLINIC | Age: 55
End: 2025-02-28
Payer: COMMERCIAL

## 2025-02-28 RX ORDER — PREDNISONE 20 MG/1
20 TABLET ORAL DAILY
Qty: 5 TABLET | Refills: 0 | Status: SHIPPED | OUTPATIENT
Start: 2025-02-28

## 2025-02-28 NOTE — TELEPHONE ENCOUNTER
Patient stated the acne white spot rash that PIPO Chaudhary evaluated is worse. She stated she is not sure if she can use the ointment PIPO prescribed her in the eye.  She stated she has those spots on her eye lids and it has caused edema around her eyes, left > right.  She stated that it is very painful and is causing a headache.  Patient denied vision changes.  Dr. Mcallister, on call provider, notified and per MD, patient advised steroid prescription is being sent to her pharmacy and that she can use that ointment up next to her eye but not in it.  Advised her to contact on call provider over weekend if needed and to let this office know next week if she see's no improvement.  Patient verbalized understanding.  Prescription sent to MD to sign.

## 2025-03-05 ENCOUNTER — HOSPITAL ENCOUNTER (OUTPATIENT)
Facility: HOSPITAL | Age: 55
Discharge: HOME | End: 2025-03-05
Admitting: INTERNAL MEDICINE
Payer: COMMERCIAL

## 2025-03-05 ENCOUNTER — PATIENT OUTREACH (OUTPATIENT)
Facility: HOSPITAL | Age: 55
End: 2025-03-05
Payer: COMMERCIAL

## 2025-03-05 ENCOUNTER — OFFICE VISIT (OUTPATIENT)
Dept: ONCOLOGY | Facility: CLINIC | Age: 55
End: 2025-03-05
Payer: COMMERCIAL

## 2025-03-05 VITALS
HEIGHT: 62 IN | BODY MASS INDEX: 33.84 KG/M2 | TEMPERATURE: 97.5 F | HEART RATE: 81 BPM | SYSTOLIC BLOOD PRESSURE: 143 MMHG | OXYGEN SATURATION: 96 % | DIASTOLIC BLOOD PRESSURE: 85 MMHG | RESPIRATION RATE: 16 BRPM | WEIGHT: 183.9 LBS

## 2025-03-05 DIAGNOSIS — C18.9 METASTATIC COLON CANCER TO LIVER: ICD-10-CM

## 2025-03-05 DIAGNOSIS — C18.2 CANCER OF RIGHT COLON: Primary | ICD-10-CM

## 2025-03-05 DIAGNOSIS — C78.7 METASTATIC COLON CANCER TO LIVER: Primary | ICD-10-CM

## 2025-03-05 DIAGNOSIS — C18.2 CANCER OF RIGHT COLON: ICD-10-CM

## 2025-03-05 DIAGNOSIS — C78.7 METASTATIC COLON CANCER TO LIVER: ICD-10-CM

## 2025-03-05 DIAGNOSIS — R21 RASH: ICD-10-CM

## 2025-03-05 DIAGNOSIS — C18.9 METASTATIC COLON CANCER TO LIVER: Primary | ICD-10-CM

## 2025-03-05 LAB
ALBUMIN SERPL-MCNC: 3.7 G/DL (ref 3.5–5.2)
ALBUMIN/GLOB SERPL: 1.3 G/DL
ALP SERPL-CCNC: 231 U/L (ref 39–117)
ALT SERPL W P-5'-P-CCNC: 63 U/L (ref 1–33)
ANION GAP SERPL CALCULATED.3IONS-SCNC: 11.3 MMOL/L (ref 5–15)
AST SERPL-CCNC: 60 U/L (ref 1–32)
BASOPHILS # BLD AUTO: 0.01 10*3/MM3 (ref 0–0.2)
BASOPHILS NFR BLD AUTO: 0.3 % (ref 0–1.5)
BILIRUB SERPL-MCNC: 0.3 MG/DL (ref 0–1.2)
BUN SERPL-MCNC: 10 MG/DL (ref 6–20)
BUN/CREAT SERPL: 17.2 (ref 7–25)
CALCIUM SPEC-SCNC: 8.6 MG/DL (ref 8.6–10.5)
CHLORIDE SERPL-SCNC: 107 MMOL/L (ref 98–107)
CO2 SERPL-SCNC: 23.7 MMOL/L (ref 22–29)
CREAT SERPL-MCNC: 0.58 MG/DL (ref 0.57–1)
DEPRECATED RDW RBC AUTO: 42.3 FL (ref 37–54)
EGFRCR SERPLBLD CKD-EPI 2021: 107.7 ML/MIN/1.73
EOSINOPHIL # BLD AUTO: 0.04 10*3/MM3 (ref 0–0.4)
EOSINOPHIL NFR BLD AUTO: 1.2 % (ref 0.3–6.2)
ERYTHROCYTE [DISTWIDTH] IN BLOOD BY AUTOMATED COUNT: 15.2 % (ref 12.3–15.4)
GLOBULIN UR ELPH-MCNC: 2.8 GM/DL
GLUCOSE SERPL-MCNC: 102 MG/DL (ref 65–99)
HCT VFR BLD AUTO: 34.9 % (ref 34–46.6)
HGB BLD-MCNC: 11.4 G/DL (ref 12–15.9)
IMM GRANULOCYTES # BLD AUTO: 0.01 10*3/MM3 (ref 0–0.05)
IMM GRANULOCYTES NFR BLD AUTO: 0.3 % (ref 0–0.5)
LYMPHOCYTES # BLD AUTO: 1.5 10*3/MM3 (ref 0.7–3.1)
LYMPHOCYTES NFR BLD AUTO: 44.9 % (ref 19.6–45.3)
MAGNESIUM SERPL-MCNC: 1.7 MG/DL (ref 1.6–2.6)
MCH RBC QN AUTO: 26.8 PG (ref 26.6–33)
MCHC RBC AUTO-ENTMCNC: 32.7 G/DL (ref 31.5–35.7)
MCV RBC AUTO: 82.1 FL (ref 79–97)
MONOCYTES # BLD AUTO: 0.37 10*3/MM3 (ref 0.1–0.9)
MONOCYTES NFR BLD AUTO: 11.1 % (ref 5–12)
NEUTROPHILS NFR BLD AUTO: 1.41 10*3/MM3 (ref 1.7–7)
NEUTROPHILS NFR BLD AUTO: 42.2 % (ref 42.7–76)
NRBC BLD AUTO-RTO: 0 /100 WBC (ref 0–0.2)
PLATELET # BLD AUTO: 233 10*3/MM3 (ref 140–450)
PMV BLD AUTO: 10 FL (ref 6–12)
POTASSIUM SERPL-SCNC: 3.8 MMOL/L (ref 3.5–5.2)
PROT SERPL-MCNC: 6.5 G/DL (ref 6–8.5)
RBC # BLD AUTO: 4.25 10*6/MM3 (ref 3.77–5.28)
SODIUM SERPL-SCNC: 142 MMOL/L (ref 136–145)
WBC NRBC COR # BLD AUTO: 3.34 10*3/MM3 (ref 3.4–10.8)

## 2025-03-05 PROCEDURE — 85025 COMPLETE CBC W/AUTO DIFF WBC: CPT | Performed by: INTERNAL MEDICINE

## 2025-03-05 PROCEDURE — 96375 TX/PRO/DX INJ NEW DRUG ADDON: CPT

## 2025-03-05 PROCEDURE — 25010000002 LEUCOVORIN 200 MG RECONSTITUTED SOLUTION 1 EACH VIAL: Performed by: INTERNAL MEDICINE

## 2025-03-05 PROCEDURE — 83735 ASSAY OF MAGNESIUM: CPT | Performed by: INTERNAL MEDICINE

## 2025-03-05 PROCEDURE — 96376 TX/PRO/DX INJ SAME DRUG ADON: CPT

## 2025-03-05 PROCEDURE — 96368 THER/DIAG CONCURRENT INF: CPT

## 2025-03-05 PROCEDURE — 25010000002 PALONOSETRON 0.25 MG/5ML SOLUTION PREFILLED SYRINGE: Performed by: INTERNAL MEDICINE

## 2025-03-05 PROCEDURE — 25010000002 LEUCOVORIN 500 MG RECONSTITUTED SOLUTION 1 EACH VIAL: Performed by: INTERNAL MEDICINE

## 2025-03-05 PROCEDURE — 96411 CHEMO IV PUSH ADDL DRUG: CPT

## 2025-03-05 PROCEDURE — 96366 THER/PROPH/DIAG IV INF ADDON: CPT

## 2025-03-05 PROCEDURE — 25010000003 DEXTROSE 5 % SOLUTION 250 ML FLEX CONT: Performed by: INTERNAL MEDICINE

## 2025-03-05 PROCEDURE — 80053 COMPREHEN METABOLIC PANEL: CPT | Performed by: INTERNAL MEDICINE

## 2025-03-05 PROCEDURE — 25010000002 METHYLPREDNISOLONE PER 125 MG: Performed by: INTERNAL MEDICINE

## 2025-03-05 PROCEDURE — 96417 CHEMO IV INFUS EACH ADDL SEQ: CPT

## 2025-03-05 PROCEDURE — 25010000003 DEXTROSE 5 % SOLUTION 500 ML FLEX CONT: Performed by: INTERNAL MEDICINE

## 2025-03-05 PROCEDURE — 25010000002 IRINOTECAN PER 20 MG: Performed by: INTERNAL MEDICINE

## 2025-03-05 PROCEDURE — 25010000002 FLUOROURACIL PER 500 MG: Performed by: INTERNAL MEDICINE

## 2025-03-05 PROCEDURE — 25810000003 SODIUM CHLORIDE 0.9 % SOLUTION 250 ML FLEX CONT: Performed by: INTERNAL MEDICINE

## 2025-03-05 PROCEDURE — 96413 CHEMO IV INFUSION 1 HR: CPT

## 2025-03-05 PROCEDURE — 25010000003 ATROPINE PER 0.01 MG: Performed by: INTERNAL MEDICINE

## 2025-03-05 PROCEDURE — 96415 CHEMO IV INFUSION ADDL HR: CPT

## 2025-03-05 PROCEDURE — G0498 CHEMO EXTEND IV INFUS W/PUMP: HCPCS

## 2025-03-05 RX ORDER — METHYLPREDNISOLONE SODIUM SUCCINATE 125 MG/2ML
125 INJECTION INTRAMUSCULAR; INTRAVENOUS ONCE
OUTPATIENT
Start: 2025-03-19

## 2025-03-05 RX ORDER — PALONOSETRON 0.05 MG/ML
0.25 INJECTION, SOLUTION INTRAVENOUS ONCE
Status: COMPLETED | OUTPATIENT
Start: 2025-03-05 | End: 2025-03-05

## 2025-03-05 RX ORDER — DIPHENHYDRAMINE HYDROCHLORIDE 50 MG/ML
50 INJECTION INTRAMUSCULAR; INTRAVENOUS AS NEEDED
OUTPATIENT
Start: 2025-03-19

## 2025-03-05 RX ORDER — ACETAMINOPHEN 325 MG/1
650 TABLET ORAL ONCE
OUTPATIENT
Start: 2025-03-19

## 2025-03-05 RX ORDER — FAMOTIDINE 10 MG/ML
20 INJECTION, SOLUTION INTRAVENOUS AS NEEDED
Status: CANCELLED | OUTPATIENT
Start: 2025-03-05

## 2025-03-05 RX ORDER — PREDNISONE 20 MG/1
20 TABLET ORAL DAILY
Qty: 5 TABLET | Refills: 0 | Status: SHIPPED | OUTPATIENT
Start: 2025-03-05

## 2025-03-05 RX ORDER — FAMOTIDINE 10 MG/ML
20 INJECTION, SOLUTION INTRAVENOUS ONCE
Status: CANCELLED | OUTPATIENT
Start: 2025-03-05

## 2025-03-05 RX ORDER — PALONOSETRON 0.05 MG/ML
0.25 INJECTION, SOLUTION INTRAVENOUS ONCE
Status: CANCELLED | OUTPATIENT
Start: 2025-03-05

## 2025-03-05 RX ORDER — FAMOTIDINE 10 MG/ML
20 INJECTION, SOLUTION INTRAVENOUS AS NEEDED
OUTPATIENT
Start: 2025-03-19

## 2025-03-05 RX ORDER — SODIUM CHLORIDE 9 MG/ML
20 INJECTION, SOLUTION INTRAVENOUS ONCE
Status: DISCONTINUED | OUTPATIENT
Start: 2025-03-05 | End: 2025-03-06 | Stop reason: HOSPADM

## 2025-03-05 RX ORDER — MEPERIDINE HYDROCHLORIDE 25 MG/ML
25 INJECTION INTRAMUSCULAR; INTRAVENOUS; SUBCUTANEOUS
OUTPATIENT
Start: 2025-03-19

## 2025-03-05 RX ORDER — LORAZEPAM 2 MG/ML
1 INJECTION INTRAMUSCULAR ONCE AS NEEDED
Start: 2025-03-19

## 2025-03-05 RX ORDER — ATROPINE SULFATE 1 MG/ML
0.25 INJECTION, SOLUTION INTRAMUSCULAR; INTRAVENOUS; SUBCUTANEOUS
OUTPATIENT
Start: 2025-03-19

## 2025-03-05 RX ORDER — MEPERIDINE HYDROCHLORIDE 25 MG/ML
25 INJECTION INTRAMUSCULAR; INTRAVENOUS; SUBCUTANEOUS
Status: CANCELLED | OUTPATIENT
Start: 2025-03-05

## 2025-03-05 RX ORDER — SODIUM CHLORIDE 9 MG/ML
20 INJECTION, SOLUTION INTRAVENOUS ONCE
OUTPATIENT
Start: 2025-03-19

## 2025-03-05 RX ORDER — SODIUM CHLORIDE 9 MG/ML
20 INJECTION, SOLUTION INTRAVENOUS ONCE
Status: CANCELLED | OUTPATIENT
Start: 2025-03-05

## 2025-03-05 RX ORDER — FAMOTIDINE 10 MG/ML
20 INJECTION, SOLUTION INTRAVENOUS ONCE
Status: COMPLETED | OUTPATIENT
Start: 2025-03-05 | End: 2025-03-05

## 2025-03-05 RX ORDER — ATROPINE SULFATE 1 MG/ML
0.25 INJECTION, SOLUTION INTRAMUSCULAR; INTRAVENOUS; SUBCUTANEOUS
Status: CANCELLED | OUTPATIENT
Start: 2025-03-05

## 2025-03-05 RX ORDER — DIPHENHYDRAMINE HYDROCHLORIDE 50 MG/ML
50 INJECTION INTRAMUSCULAR; INTRAVENOUS AS NEEDED
Status: CANCELLED | OUTPATIENT
Start: 2025-03-05

## 2025-03-05 RX ORDER — LORAZEPAM 2 MG/ML
1 INJECTION INTRAMUSCULAR ONCE AS NEEDED
Status: DISCONTINUED | OUTPATIENT
Start: 2025-03-05 | End: 2025-03-06 | Stop reason: HOSPADM

## 2025-03-05 RX ORDER — HYDROCORTISONE SODIUM SUCCINATE 100 MG/2ML
100 INJECTION INTRAMUSCULAR; INTRAVENOUS AS NEEDED
OUTPATIENT
Start: 2025-03-19

## 2025-03-05 RX ORDER — ACETAMINOPHEN 325 MG/1
650 TABLET ORAL ONCE
Status: CANCELLED | OUTPATIENT
Start: 2025-03-05

## 2025-03-05 RX ORDER — LORAZEPAM 2 MG/ML
1 INJECTION INTRAMUSCULAR ONCE AS NEEDED
Status: CANCELLED
Start: 2025-03-05

## 2025-03-05 RX ORDER — FLUOROURACIL 50 MG/ML
300 INJECTION, SOLUTION INTRAVENOUS ONCE
Status: COMPLETED | OUTPATIENT
Start: 2025-03-05 | End: 2025-03-05

## 2025-03-05 RX ORDER — ACETAMINOPHEN 325 MG/1
650 TABLET ORAL ONCE
Status: COMPLETED | OUTPATIENT
Start: 2025-03-05 | End: 2025-03-05

## 2025-03-05 RX ORDER — HYDROCORTISONE SODIUM SUCCINATE 100 MG/2ML
100 INJECTION INTRAMUSCULAR; INTRAVENOUS AS NEEDED
Status: CANCELLED | OUTPATIENT
Start: 2025-03-05

## 2025-03-05 RX ORDER — PALONOSETRON 0.05 MG/ML
0.25 INJECTION, SOLUTION INTRAVENOUS ONCE
OUTPATIENT
Start: 2025-03-19

## 2025-03-05 RX ORDER — FAMOTIDINE 10 MG/ML
20 INJECTION, SOLUTION INTRAVENOUS ONCE
OUTPATIENT
Start: 2025-03-19

## 2025-03-05 RX ORDER — FLUOROURACIL 50 MG/ML
300 INJECTION, SOLUTION INTRAVENOUS ONCE
Status: CANCELLED | OUTPATIENT
Start: 2025-03-05

## 2025-03-05 RX ORDER — METHYLPREDNISOLONE SODIUM SUCCINATE 125 MG/2ML
125 INJECTION INTRAMUSCULAR; INTRAVENOUS ONCE
Status: COMPLETED | OUTPATIENT
Start: 2025-03-05 | End: 2025-03-05

## 2025-03-05 RX ORDER — METHYLPREDNISOLONE SODIUM SUCCINATE 125 MG/2ML
125 INJECTION INTRAMUSCULAR; INTRAVENOUS ONCE
Status: CANCELLED | OUTPATIENT
Start: 2025-03-05

## 2025-03-05 RX ORDER — FLUOROURACIL 50 MG/ML
300 INJECTION, SOLUTION INTRAVENOUS ONCE
OUTPATIENT
Start: 2025-03-19

## 2025-03-05 RX ADMIN — LEUCOVORIN CALCIUM 550 MG: 500 INJECTION, POWDER, LYOPHILIZED, FOR SOLUTION INTRAMUSCULAR; INTRAVENOUS at 12:15

## 2025-03-05 RX ADMIN — SODIUM CHLORIDE 320 MG: 9 INJECTION, SOLUTION INTRAVENOUS at 10:57

## 2025-03-05 RX ADMIN — FLUOROURACIL 3280 MG: 50 INJECTION, SOLUTION INTRAVENOUS at 14:03

## 2025-03-05 RX ADMIN — FAMOTIDINE 20 MG: 10 INJECTION, SOLUTION INTRAVENOUS at 10:33

## 2025-03-05 RX ADMIN — DEXTROSE MONOHYDRATE 240 MG: 50 INJECTION, SOLUTION INTRAVENOUS at 12:16

## 2025-03-05 RX ADMIN — METHYLPREDNISOLONE SODIUM SUCCINATE 125 MG: 125 INJECTION, POWDER, FOR SOLUTION INTRAMUSCULAR; INTRAVENOUS at 10:29

## 2025-03-05 RX ADMIN — ACETAMINOPHEN 650 MG: 325 TABLET, FILM COATED ORAL at 10:24

## 2025-03-05 RX ADMIN — PALONOSETRON 0.25 MG: 0.25 INJECTION, SOLUTION INTRAVENOUS at 10:23

## 2025-03-05 RX ADMIN — ATROPINE SULFATE 0.25 MG: 0.4 INJECTION, SOLUTION INTRAVENOUS at 13:30

## 2025-03-05 RX ADMIN — ATROPINE SULFATE 0.25 MG: 0.4 INJECTION, SOLUTION INTRAVENOUS at 12:15

## 2025-03-05 RX ADMIN — FLUOROURACIL 550 MG: 50 INJECTION, SOLUTION INTRAVENOUS at 13:56

## 2025-03-05 NOTE — PROGRESS NOTES
DATE OF VISIT: 3/5/2025    REASON FOR VISIT: Followup for metastatic right-sided colon cancer     PROBLEM LIST:  1. Metastatic colon cancer TX NX M1 a stage Perry:  A.  Presented with abdominal pain and jaundice  B.  CT abdomen pelvis done November 04, 2022 confirmed diffuse liver metastases.  C.  Diagnosed after CT-guided liver biopsy done on November 22, 2022 confirming adenocarcinoma CK20 positive CK7 negative.  D.  Colonoscopy done December 08, 2020 to confirm transverse colon mass however biopsy revealed adenoma.  E.  Started palliative chemotherapy with dose adjusted FOLFOXIRI December 14, 2022, status post 12 cycles completed May 2023.  F.  Started maintenance Xeloda 1000 mg twice daily 1 week on 1 week off June 2023.  Stopped April 2024 secondary to progressive disease in the liver.  G.  Status post segment 7 and segment 3 liver met resection with 2 other lesions ablation done by Dr. Nielson April 19, 2024.  H.  Started FOLFIRI with Vectibix August 19, 2024.  Status post 9 cycles.  2.  Elevated liver enzymes:  A.  Induced by metastatic disease  3.  Cancer-related pain  4.  Anxiety  5.  Hypertension    HISTORY OF PRESENT ILLNESS: The patient is a very pleasant 54 y.o. female with past medical history significant for metastatic right-sided colon cancer diagnosed November 22, 2022.  Started on palliative chemotherapy with dose adjusted FOLFOXIRI.  She completed 12 cycles May 2023.  She was started on maintenance Xeloda 1000 mg twice daily June 2023.  Progressive disease with worsening PET scan as well as rising CT DNA.  She was started on FOLFIRI with Vectibix August 2024.  The patient is here today for scheduled follow-up visit with treatment cycle #10.    SUBJECTIVE: Amber is here today with her .  She had skin lesions in her nose and on her face including her eyelashes.  Improved with 5-day course of prednisone.    Past History:  Medical History: has a past medical history of Anxiety, Cancer, Ear  piercing, Gallstones, History of irritable bowel syndrome, Hypertension, Seasonal allergies, Tattoos, and Wears glasses.   Surgical History: has a past surgical history that includes postpartum tubal ligation; Dilation and curettage of uterus (2013); Tumor removal (2013); Portacath placement (N/A, 12/8/2022); Colonoscopy (N/A, 12/8/2022); diagnostic laparoscopy exploratory laparotomy (N/A, 4/19/2024); Cholecystectomy (N/A, 4/19/2024); and Liver resection (Left, 4/19/2024).   Family History: family history is not on file.   Social History: reports that she has never smoked. She has never used smokeless tobacco. She reports current alcohol use. She reports that she does not use drugs.    (Not in a hospital admission)     Allergies: Losartan, Valium [diazepam], and Citrus     Review of Systems   Constitutional:  Positive for fatigue.   Gastrointestinal:  Positive for nausea.   Musculoskeletal:  Positive for arthralgias.   Skin:         Facial acne like rash.   Psychiatric/Behavioral:  The patient is nervous/anxious.        PHYSICAL EXAMINATION:   There were no vitals taken for this visit.   There were no vitals filed for this visit.                           ECOG Performance Status: 1 - Symptomatic but completely ambulatory      General Appearance:      alert, cooperative, no apparent distress and appears stated age   Lungs:   Clear to auscultation bilaterally; respirations regular, even, and unlabored bilaterally   Heart:  Regular rate and rhythm, no murmurs appreciated   Abdomen:   Soft, non-tender, and non-distended                 No visits with results within 2 Week(s) from this visit.   Latest known visit with results is:   Hospital Outpatient Visit on 02/19/2025   Component Date Value Ref Range Status    Glucose 02/19/2025 112 (H)  65 - 99 mg/dL Final    BUN 02/19/2025 13  6 - 20 mg/dL Final    Creatinine 02/19/2025 0.60  0.57 - 1.00 mg/dL Final    Sodium 02/19/2025 141  136 - 145 mmol/L Final    Potassium  02/19/2025 3.6  3.5 - 5.2 mmol/L Final    Chloride 02/19/2025 108 (H)  98 - 107 mmol/L Final    CO2 02/19/2025 22.6  22.0 - 29.0 mmol/L Final    Calcium 02/19/2025 8.7  8.6 - 10.5 mg/dL Final    Total Protein 02/19/2025 6.7  6.0 - 8.5 g/dL Final    Albumin 02/19/2025 3.6  3.5 - 5.2 g/dL Final    ALT (SGPT) 02/19/2025 39 (H)  1 - 33 U/L Final    AST (SGOT) 02/19/2025 37 (H)  1 - 32 U/L Final    Alkaline Phosphatase 02/19/2025 226 (H)  39 - 117 U/L Final    Total Bilirubin 02/19/2025 0.3  0.0 - 1.2 mg/dL Final    Globulin 02/19/2025 3.1  gm/dL Final    A/G Ratio 02/19/2025 1.2  g/dL Final    BUN/Creatinine Ratio 02/19/2025 21.7  7.0 - 25.0 Final    Anion Gap 02/19/2025 10.4  5.0 - 15.0 mmol/L Final    eGFR 02/19/2025 106.8  >60.0 mL/min/1.73 Final    Magnesium 02/19/2025 1.8  1.6 - 2.6 mg/dL Final    WBC 02/19/2025 3.06 (L)  3.40 - 10.80 10*3/mm3 Final    RBC 02/19/2025 4.11  3.77 - 5.28 10*6/mm3 Final    Hemoglobin 02/19/2025 11.2 (L)  12.0 - 15.9 g/dL Final    Hematocrit 02/19/2025 33.9 (L)  34.0 - 46.6 % Final    MCV 02/19/2025 82.5  79.0 - 97.0 fL Final    MCH 02/19/2025 27.3  26.6 - 33.0 pg Final    MCHC 02/19/2025 33.0  31.5 - 35.7 g/dL Final    RDW 02/19/2025 14.3  12.3 - 15.4 % Final    RDW-SD 02/19/2025 41.8  37.0 - 54.0 fl Final    MPV 02/19/2025 10.2  6.0 - 12.0 fL Final    Platelets 02/19/2025 174  140 - 450 10*3/mm3 Final    Neutrophil % 02/19/2025 45.8  42.7 - 76.0 % Final    Lymphocyte % 02/19/2025 41.8  19.6 - 45.3 % Final    Monocyte % 02/19/2025 8.5  5.0 - 12.0 % Final    Eosinophil % 02/19/2025 2.9  0.3 - 6.2 % Final    Basophil % 02/19/2025 0.7  0.0 - 1.5 % Final    Immature Grans % 02/19/2025 0.3  0.0 - 0.5 % Final    Neutrophils, Absolute 02/19/2025 1.40 (L)  1.70 - 7.00 10*3/mm3 Final    Lymphocytes, Absolute 02/19/2025 1.28  0.70 - 3.10 10*3/mm3 Final    Monocytes, Absolute 02/19/2025 0.26  0.10 - 0.90 10*3/mm3 Final    Eosinophils, Absolute 02/19/2025 0.09  0.00 - 0.40 10*3/mm3 Final     Basophils, Absolute 02/19/2025 0.02  0.00 - 0.20 10*3/mm3 Final    Immature Grans, Absolute 02/19/2025 0.01  0.00 - 0.05 10*3/mm3 Final    nRBC 02/19/2025 0.0  0.0 - 0.2 /100 WBC Final        No results found.    ASSESSMENT: The patient is a very pleasant 54 y.o. female  with right-sided metastatic colon cancer      PLAN:    1.  Right-sided metastatic colon cancer:  A.  I will proceed with FOLFIRI plus Vectibix cycle #10.  B.  She will follow-up with us in 2 weeks for cycle #11.     C.  We will consider maintenance therapy after maximum response.  D.  I will continue to monitor the patient blood work including blood counts kidney function liver function and electrolytes.  E.  I will repeat her Signatera with every other cycle.  I discussed with the patient that last CT DNA revealed significant increase.  The patient delayed her follow-up and treatments from December till February.  Will follow-up on the level and hopefully now that she is back on therapy this will decrease.  F.  I will do 3 months follow-up scan.  Should be after cycle #12.  Will consider maintenance therapy at that point.  G.  I discussed with the patient blood results from February 19, 2025.  Hemoglobin 11.2 white cells 3.1 platelets normal.  I will follow-up on labs from today.    2.  Elevated liver enzymes:  A.  Induced by liver metastases  B. I will repeat labs on return    3.  Chemotherapy-induced nausea, severe:  A.  I will continue IV fluid on day 1 and as needed on day for 3.  B.  I will continue Zofran 8 mg and Compazine 10 mg every 8 hours as needed.    4.  Chemotherapy-induced diarrhea:  A.  I will continue the patient on Imodium as needed    5. Cancer-related pain:  A.  I will continue the patient oxycodone 5 mg twice a day.     6.  Anxiety:  A.  I will continue the patient on Ativan 1 mg nightly as needed.       7.  Hypertension:  A.  She will continue losartan.  B.  I reviewed with the patient this will interfere with our management  since chemotherapy typically can cause hypotension we will have to monitor blood pressure closely.    8.  Dehydration:  A.  I will continue 1 L of IV fluid as needed    9.  Chemotherapy-induced anemia:  A.  Hemoglobin 11.2 in February 19, 2025.  B.  I will continue monitor her CBC prior to each infusion.  C.  I will transfuse as needed for hemoglobin less than 8.      10.  Chemotherapy-induced heartburn:  A.  I will continue pantoprazole 40 mg twice a day.        11.  Treatment related diarrhea  A.  Continue Imodium as directed.  B.  I will continue Lomotil as needed.      12.  Treatment induced peripheral neuropathy:   A.  We will continue gabapentin 100 mg 3 times daily.    13.  Treatment induced dermatitis, severe:  A.  Erbitux was permanently discontinued due to severe treatment induced dermatitis.    B.  I will continue patient on Vectibix at 4 mg/kg in 2 weeks and if she is doing well we will consider increasing the dose to full dose at 6 mg/kg.  C.  I will continue hydrocortisone cream.  D.  I will continue doxycycline 100 mg twice a day.     14.  Treatment induced mucositis:  A.  I will continue Magic mouthwash.    15. Nasal lesion.   A. Start mupirocin as needed    16. Anxiety  A. We will start the patient on lexapro. We will adjust as needed. I asked her to call with thoughts of hurting herself or others.   B. We  will also give her ativan to use the day before treatment for anticipatory nausea.    FOLLOW UP: 2 weeks with cycle #11.      Iban Lambert MD  3/5/2025

## 2025-03-07 ENCOUNTER — HOSPITAL ENCOUNTER (OUTPATIENT)
Facility: HOSPITAL | Age: 55
Discharge: HOME | End: 2025-03-07
Payer: COMMERCIAL

## 2025-03-07 DIAGNOSIS — C18.2 CANCER OF RIGHT COLON: ICD-10-CM

## 2025-03-07 DIAGNOSIS — C18.9 METASTATIC COLON CANCER TO LIVER: Primary | ICD-10-CM

## 2025-03-07 DIAGNOSIS — C78.7 METASTATIC COLON CANCER TO LIVER: Primary | ICD-10-CM

## 2025-03-07 PROCEDURE — 96523 IRRIG DRUG DELIVERY DEVICE: CPT

## 2025-03-07 PROCEDURE — 25010000002 HEPARIN LOCK FLUSH PER 10 UNITS: Performed by: INTERNAL MEDICINE

## 2025-03-07 RX ORDER — SODIUM CHLORIDE 0.9 % (FLUSH) 0.9 %
10 SYRINGE (ML) INJECTION AS NEEDED
Status: DISCONTINUED | OUTPATIENT
Start: 2025-03-07 | End: 2025-03-08 | Stop reason: HOSPADM

## 2025-03-07 RX ORDER — HEPARIN SODIUM (PORCINE) LOCK FLUSH IV SOLN 100 UNIT/ML 100 UNIT/ML
500 SOLUTION INTRAVENOUS AS NEEDED
OUTPATIENT
Start: 2025-03-07

## 2025-03-07 RX ORDER — HEPARIN SODIUM (PORCINE) LOCK FLUSH IV SOLN 100 UNIT/ML 100 UNIT/ML
500 SOLUTION INTRAVENOUS AS NEEDED
Status: DISCONTINUED | OUTPATIENT
Start: 2025-03-07 | End: 2025-03-08 | Stop reason: HOSPADM

## 2025-03-07 RX ORDER — SODIUM CHLORIDE 0.9 % (FLUSH) 0.9 %
10 SYRINGE (ML) INJECTION AS NEEDED
OUTPATIENT
Start: 2025-03-07

## 2025-03-07 RX ADMIN — HEPARIN 500 UNITS: 100 SYRINGE at 13:45

## 2025-03-07 RX ADMIN — Medication 10 ML: at 13:45

## 2025-03-19 ENCOUNTER — OFFICE VISIT (OUTPATIENT)
Dept: ONCOLOGY | Facility: CLINIC | Age: 55
End: 2025-03-19
Payer: COMMERCIAL

## 2025-03-19 ENCOUNTER — HOSPITAL ENCOUNTER (OUTPATIENT)
Facility: HOSPITAL | Age: 55
Discharge: HOME | End: 2025-03-19
Payer: COMMERCIAL

## 2025-03-19 VITALS
RESPIRATION RATE: 16 BRPM | SYSTOLIC BLOOD PRESSURE: 134 MMHG | HEIGHT: 62 IN | HEART RATE: 86 BPM | DIASTOLIC BLOOD PRESSURE: 82 MMHG | WEIGHT: 185 LBS | TEMPERATURE: 97.1 F | BODY MASS INDEX: 34.04 KG/M2 | OXYGEN SATURATION: 98 %

## 2025-03-19 DIAGNOSIS — C78.7 METASTATIC COLON CANCER TO LIVER: Primary | ICD-10-CM

## 2025-03-19 DIAGNOSIS — R21 RASH: ICD-10-CM

## 2025-03-19 DIAGNOSIS — C18.9 ADENOCARCINOMA OF COLON METASTATIC TO LIVER: ICD-10-CM

## 2025-03-19 DIAGNOSIS — C18.2 CANCER OF RIGHT COLON: ICD-10-CM

## 2025-03-19 DIAGNOSIS — C18.9 METASTATIC COLON CANCER TO LIVER: Primary | ICD-10-CM

## 2025-03-19 DIAGNOSIS — J34.89 NASAL LESION: ICD-10-CM

## 2025-03-19 DIAGNOSIS — C78.7 ADENOCARCINOMA OF COLON METASTATIC TO LIVER: ICD-10-CM

## 2025-03-19 DIAGNOSIS — F41.9 ANXIETY: ICD-10-CM

## 2025-03-19 LAB
ALBUMIN SERPL-MCNC: 3.5 G/DL (ref 3.5–5.2)
ALBUMIN/GLOB SERPL: 1.2 G/DL
ALP SERPL-CCNC: 189 U/L (ref 39–117)
ALT SERPL W P-5'-P-CCNC: 52 U/L (ref 1–33)
ANION GAP SERPL CALCULATED.3IONS-SCNC: 11.1 MMOL/L (ref 5–15)
AST SERPL-CCNC: 33 U/L (ref 1–32)
BASOPHILS # BLD AUTO: 0.03 10*3/MM3 (ref 0–0.2)
BASOPHILS NFR BLD AUTO: 0.8 % (ref 0–1.5)
BILIRUB SERPL-MCNC: 0.3 MG/DL (ref 0–1.2)
BUN SERPL-MCNC: 6 MG/DL (ref 6–20)
BUN/CREAT SERPL: 10.5 (ref 7–25)
CALCIUM SPEC-SCNC: 8.4 MG/DL (ref 8.6–10.5)
CHLORIDE SERPL-SCNC: 107 MMOL/L (ref 98–107)
CO2 SERPL-SCNC: 23.9 MMOL/L (ref 22–29)
CREAT SERPL-MCNC: 0.57 MG/DL (ref 0.57–1)
DEPRECATED RDW RBC AUTO: 47.9 FL (ref 37–54)
EGFRCR SERPLBLD CKD-EPI 2021: 108.1 ML/MIN/1.73
EOSINOPHIL # BLD AUTO: 0.04 10*3/MM3 (ref 0–0.4)
EOSINOPHIL NFR BLD AUTO: 1 % (ref 0.3–6.2)
ERYTHROCYTE [DISTWIDTH] IN BLOOD BY AUTOMATED COUNT: 17.1 % (ref 12.3–15.4)
GLOBULIN UR ELPH-MCNC: 2.9 GM/DL
GLUCOSE SERPL-MCNC: 106 MG/DL (ref 65–99)
HCT VFR BLD AUTO: 33.3 % (ref 34–46.6)
HGB BLD-MCNC: 10.7 G/DL (ref 12–15.9)
IMM GRANULOCYTES # BLD AUTO: 0.03 10*3/MM3 (ref 0–0.05)
IMM GRANULOCYTES NFR BLD AUTO: 0.8 % (ref 0–0.5)
LYMPHOCYTES # BLD AUTO: 1.2 10*3/MM3 (ref 0.7–3.1)
LYMPHOCYTES NFR BLD AUTO: 31.4 % (ref 19.6–45.3)
MAGNESIUM SERPL-MCNC: 1.2 MG/DL (ref 1.6–2.6)
MCH RBC QN AUTO: 26.6 PG (ref 26.6–33)
MCHC RBC AUTO-ENTMCNC: 32.1 G/DL (ref 31.5–35.7)
MCV RBC AUTO: 82.8 FL (ref 79–97)
MONOCYTES # BLD AUTO: 0.23 10*3/MM3 (ref 0.1–0.9)
MONOCYTES NFR BLD AUTO: 6 % (ref 5–12)
NEUTROPHILS NFR BLD AUTO: 2.29 10*3/MM3 (ref 1.7–7)
NEUTROPHILS NFR BLD AUTO: 60 % (ref 42.7–76)
NRBC BLD AUTO-RTO: 0 /100 WBC (ref 0–0.2)
PLATELET # BLD AUTO: 183 10*3/MM3 (ref 140–450)
PMV BLD AUTO: 9.8 FL (ref 6–12)
POTASSIUM SERPL-SCNC: 3.3 MMOL/L (ref 3.5–5.2)
PROT SERPL-MCNC: 6.4 G/DL (ref 6–8.5)
RBC # BLD AUTO: 4.02 10*6/MM3 (ref 3.77–5.28)
SODIUM SERPL-SCNC: 142 MMOL/L (ref 136–145)
WBC NRBC COR # BLD AUTO: 3.82 10*3/MM3 (ref 3.4–10.8)

## 2025-03-19 PROCEDURE — 25810000003 SODIUM CHLORIDE 0.9 % SOLUTION 250 ML FLEX CONT: Performed by: INTERNAL MEDICINE

## 2025-03-19 PROCEDURE — 25010000002 FLUOROURACIL PER 500 MG: Performed by: INTERNAL MEDICINE

## 2025-03-19 PROCEDURE — 25010000002 PANITUMUMAB 400 MG/20ML SOLUTION 20 ML VIAL: Performed by: INTERNAL MEDICINE

## 2025-03-19 PROCEDURE — 25010000002 METHYLPREDNISOLONE PER 125 MG: Performed by: INTERNAL MEDICINE

## 2025-03-19 PROCEDURE — 85025 COMPLETE CBC W/AUTO DIFF WBC: CPT | Performed by: INTERNAL MEDICINE

## 2025-03-19 PROCEDURE — 25010000002 MAGNESIUM SULFATE PER 500 MG OF MAGNESIUM: Performed by: INTERNAL MEDICINE

## 2025-03-19 PROCEDURE — G0498 CHEMO EXTEND IV INFUS W/PUMP: HCPCS

## 2025-03-19 PROCEDURE — 96415 CHEMO IV INFUSION ADDL HR: CPT

## 2025-03-19 PROCEDURE — 96413 CHEMO IV INFUSION 1 HR: CPT

## 2025-03-19 PROCEDURE — 96367 TX/PROPH/DG ADDL SEQ IV INF: CPT

## 2025-03-19 PROCEDURE — 83735 ASSAY OF MAGNESIUM: CPT | Performed by: INTERNAL MEDICINE

## 2025-03-19 PROCEDURE — 25010000003 DEXTROSE 5 % SOLUTION 500 ML FLEX CONT: Performed by: INTERNAL MEDICINE

## 2025-03-19 PROCEDURE — 25010000002 IRINOTECAN PER 20 MG: Performed by: INTERNAL MEDICINE

## 2025-03-19 PROCEDURE — 25810000003 SODIUM CHLORIDE 0.9 % SOLUTION 1,000 ML FLEX CONT: Performed by: INTERNAL MEDICINE

## 2025-03-19 PROCEDURE — 96375 TX/PRO/DX INJ NEW DRUG ADDON: CPT

## 2025-03-19 PROCEDURE — 25010000002 PALONOSETRON 0.25 MG/5ML SOLUTION PREFILLED SYRINGE: Performed by: INTERNAL MEDICINE

## 2025-03-19 PROCEDURE — 25010000002 LEUCOVORIN CALCIUM PER 50MG: Performed by: INTERNAL MEDICINE

## 2025-03-19 PROCEDURE — 80053 COMPREHEN METABOLIC PANEL: CPT | Performed by: INTERNAL MEDICINE

## 2025-03-19 PROCEDURE — 96368 THER/DIAG CONCURRENT INF: CPT

## 2025-03-19 PROCEDURE — 96417 CHEMO IV INFUS EACH ADDL SEQ: CPT

## 2025-03-19 PROCEDURE — 25010000002 PROCHLORPERAZINE 10 MG/2ML SOLUTION: Performed by: NURSE PRACTITIONER

## 2025-03-19 PROCEDURE — 25010000003 DEXTROSE 5 % SOLUTION 250 ML FLEX CONT: Performed by: INTERNAL MEDICINE

## 2025-03-19 PROCEDURE — 96411 CHEMO IV PUSH ADDL DRUG: CPT

## 2025-03-19 RX ORDER — PROCHLORPERAZINE EDISYLATE 5 MG/ML
10 INJECTION INTRAMUSCULAR; INTRAVENOUS ONCE
Status: CANCELLED
Start: 2025-03-19 | End: 2025-03-19

## 2025-03-19 RX ORDER — METHYLPREDNISOLONE SODIUM SUCCINATE 125 MG/2ML
125 INJECTION INTRAMUSCULAR; INTRAVENOUS ONCE
Status: COMPLETED | OUTPATIENT
Start: 2025-03-19 | End: 2025-03-19

## 2025-03-19 RX ORDER — DIPHENHYDRAMINE HYDROCHLORIDE 50 MG/ML
50 INJECTION INTRAMUSCULAR; INTRAVENOUS AS NEEDED
Status: DISCONTINUED | OUTPATIENT
Start: 2025-03-19 | End: 2025-03-20 | Stop reason: HOSPADM

## 2025-03-19 RX ORDER — FAMOTIDINE 10 MG/ML
20 INJECTION, SOLUTION INTRAVENOUS ONCE
Status: COMPLETED | OUTPATIENT
Start: 2025-03-19 | End: 2025-03-19

## 2025-03-19 RX ORDER — ATROPINE SULFATE 0.4 MG/ML
0.25 INJECTION, SOLUTION INTRAMUSCULAR; INTRAVENOUS; SUBCUTANEOUS
Status: DISCONTINUED | OUTPATIENT
Start: 2025-03-19 | End: 2025-03-20 | Stop reason: HOSPADM

## 2025-03-19 RX ORDER — MEPERIDINE HYDROCHLORIDE 25 MG/ML
25 INJECTION INTRAMUSCULAR; INTRAVENOUS; SUBCUTANEOUS
Status: DISCONTINUED | OUTPATIENT
Start: 2025-03-19 | End: 2025-03-20 | Stop reason: HOSPADM

## 2025-03-19 RX ORDER — PROCHLORPERAZINE EDISYLATE 5 MG/ML
10 INJECTION INTRAMUSCULAR; INTRAVENOUS ONCE
Status: COMPLETED | OUTPATIENT
Start: 2025-03-19 | End: 2025-03-19

## 2025-03-19 RX ORDER — FAMOTIDINE 10 MG/ML
20 INJECTION, SOLUTION INTRAVENOUS AS NEEDED
Status: DISCONTINUED | OUTPATIENT
Start: 2025-03-19 | End: 2025-03-20 | Stop reason: HOSPADM

## 2025-03-19 RX ORDER — FLUOROURACIL 50 MG/ML
300 INJECTION, SOLUTION INTRAVENOUS ONCE
Status: COMPLETED | OUTPATIENT
Start: 2025-03-19 | End: 2025-03-19

## 2025-03-19 RX ORDER — PALONOSETRON 0.05 MG/ML
0.25 INJECTION, SOLUTION INTRAVENOUS ONCE
Status: COMPLETED | OUTPATIENT
Start: 2025-03-19 | End: 2025-03-19

## 2025-03-19 RX ORDER — SODIUM CHLORIDE 9 MG/ML
20 INJECTION, SOLUTION INTRAVENOUS ONCE
Status: DISCONTINUED | OUTPATIENT
Start: 2025-03-19 | End: 2025-03-20 | Stop reason: HOSPADM

## 2025-03-19 RX ORDER — HYDROCORTISONE SODIUM SUCCINATE 100 MG/2ML
100 INJECTION INTRAMUSCULAR; INTRAVENOUS AS NEEDED
Status: DISCONTINUED | OUTPATIENT
Start: 2025-03-19 | End: 2025-03-20 | Stop reason: HOSPADM

## 2025-03-19 RX ORDER — LORAZEPAM 2 MG/ML
1 INJECTION INTRAMUSCULAR ONCE AS NEEDED
Status: DISCONTINUED | OUTPATIENT
Start: 2025-03-19 | End: 2025-03-20 | Stop reason: HOSPADM

## 2025-03-19 RX ORDER — ACETAMINOPHEN 325 MG/1
650 TABLET ORAL ONCE
Status: COMPLETED | OUTPATIENT
Start: 2025-03-19 | End: 2025-03-19

## 2025-03-19 RX ORDER — MAGNESIUM OXIDE 400 MG/1
400 TABLET ORAL 2 TIMES DAILY
Qty: 60 TABLET | Refills: 0 | Status: SHIPPED | OUTPATIENT
Start: 2025-03-19

## 2025-03-19 RX ADMIN — FAMOTIDINE 20 MG: 10 INJECTION, SOLUTION INTRAVENOUS at 10:36

## 2025-03-19 RX ADMIN — ATROPINE SULFATE 0.25 MG: 0.4 INJECTION, SOLUTION INTRAVENOUS at 13:13

## 2025-03-19 RX ADMIN — PANITUMUMAB 320 MG: 400 SOLUTION INTRAVENOUS at 11:53

## 2025-03-19 RX ADMIN — FLUOROURACIL 3280 MG: 50 INJECTION, SOLUTION INTRAVENOUS at 14:54

## 2025-03-19 RX ADMIN — PROCHLORPERAZINE EDISYLATE 10 MG: 5 INJECTION INTRAMUSCULAR; INTRAVENOUS at 11:50

## 2025-03-19 RX ADMIN — ACETAMINOPHEN 650 MG: 325 TABLET, FILM COATED ORAL at 10:36

## 2025-03-19 RX ADMIN — DEXTROSE MONOHYDRATE 550 MG: 50 INJECTION, SOLUTION INTRAVENOUS at 13:15

## 2025-03-19 RX ADMIN — MAGNESIUM SULFATE HEPTAHYDRATE: 500 INJECTION, SOLUTION INTRAMUSCULAR; INTRAVENOUS at 10:16

## 2025-03-19 RX ADMIN — METHYLPREDNISOLONE SODIUM SUCCINATE 125 MG: 125 INJECTION, POWDER, FOR SOLUTION INTRAMUSCULAR; INTRAVENOUS at 10:36

## 2025-03-19 RX ADMIN — FLUOROURACIL 550 MG: 50 INJECTION, SOLUTION INTRAVENOUS at 14:54

## 2025-03-19 RX ADMIN — DEXTROSE MONOHYDRATE 240 MG: 50 INJECTION, SOLUTION INTRAVENOUS at 13:14

## 2025-03-19 RX ADMIN — PALONOSETRON 0.25 MG: 0.25 INJECTION, SOLUTION INTRAVENOUS at 10:36

## 2025-03-19 NOTE — PROGRESS NOTES
DATE OF VISIT: 3/19/2025    REASON FOR VISIT: Followup for metastatic right-sided colon cancer     PROBLEM LIST:  1. Metastatic colon cancer TX NX M1 a stage Perry:  A.  Presented with abdominal pain and jaundice  B.  CT abdomen pelvis done November 04, 2022 confirmed diffuse liver metastases.  C.  Diagnosed after CT-guided liver biopsy done on November 22, 2022 confirming adenocarcinoma CK20 positive CK7 negative.  D.  Colonoscopy done December 08, 2020 to confirm transverse colon mass however biopsy revealed adenoma.  E.  Started palliative chemotherapy with dose adjusted FOLFOXIRI December 14, 2022, status post 12 cycles completed May 2023.  F.  Started maintenance Xeloda 1000 mg twice daily 1 week on 1 week off June 2023.  Stopped April 2024 secondary to progressive disease in the liver.  G.  Status post segment 7 and segment 3 liver met resection with 2 other lesions ablation done by Dr. Nielson April 19, 2024.  H.  Started FOLFIRI with Vectibix August 19, 2024.  Status post 9 cycles.  2.  Elevated liver enzymes:  A.  Induced by metastatic disease  3.  Cancer-related pain  4.  Anxiety  5.  Hypertension    HISTORY OF PRESENT ILLNESS: The patient is a very pleasant 54 y.o. female with past medical history significant for metastatic right-sided colon cancer diagnosed November 22, 2022.  Started on palliative chemotherapy with dose adjusted FOLFOXIRI.  She completed 12 cycles May 2023.  She was started on maintenance Xeloda 1000 mg twice daily June 2023.  Progressive disease with worsening PET scan as well as rising CT DNA.  She was started on FOLFIRI with Vectibix August 2024.  The patient is here today for scheduled follow-up visit with treatment cycle #11.    SUBJECTIVE: Amber is here today with her .  Continues to struggle with some skin lesions.  They have improved.  Overall tolerating treatment fairly well.  She has been anxious.  The Ativan helped.  Has not started on Lexapro yet.  Is thinking about  "starting soon.        Past History:  Medical History: has a past medical history of Anxiety, Cancer, Ear piercing, Gallstones, History of irritable bowel syndrome, Hypertension, Seasonal allergies, Tattoos, and Wears glasses.   Surgical History: has a past surgical history that includes postpartum tubal ligation; Dilation and curettage of uterus (2013); Tumor removal (2013); Portacath placement (N/A, 12/8/2022); Colonoscopy (N/A, 12/8/2022); diagnostic laparoscopy exploratory laparotomy (N/A, 4/19/2024); Cholecystectomy (N/A, 4/19/2024); and Liver resection (Left, 4/19/2024).   Family History: family history is not on file.   Social History: reports that she has never smoked. She has never used smokeless tobacco. She reports current alcohol use. She reports that she does not use drugs.    (Not in a hospital admission)     Allergies: Losartan, Valium [diazepam], and Citrus     Review of Systems   Constitutional:  Positive for fatigue.   Gastrointestinal:  Positive for nausea.   Musculoskeletal:  Positive for arthralgias.   Skin:  Positive for wound.        Facial acne like rash.   Psychiatric/Behavioral:  The patient is nervous/anxious.        PHYSICAL EXAMINATION:   /82   Pulse 86   Temp 97.1 °F (36.2 °C)   Resp 16   Ht 157.5 cm (62.01\")   Wt 83.9 kg (185 lb)   SpO2 98%   BMI 33.83 kg/m²    Pain Score    03/19/25 0827   PainSc: 0-No pain                              ECOG Performance Status: 1 - Symptomatic but completely ambulatory      General Appearance:      alert, cooperative, no apparent distress and appears stated age   Lungs:   Clear to auscultation bilaterally; respirations regular, even, and unlabored bilaterally   Heart:  Regular rate and rhythm, no murmurs appreciated   Abdomen:   Soft, non-tender, and non-distended                 Hospital Outpatient Visit on 03/19/2025   Component Date Value Ref Range Status    WBC 03/19/2025 3.82  3.40 - 10.80 10*3/mm3 Final    RBC 03/19/2025 4.02  3.77 - " 5.28 10*6/mm3 Final    Hemoglobin 03/19/2025 10.7 (L)  12.0 - 15.9 g/dL Final    Hematocrit 03/19/2025 33.3 (L)  34.0 - 46.6 % Final    MCV 03/19/2025 82.8  79.0 - 97.0 fL Final    MCH 03/19/2025 26.6  26.6 - 33.0 pg Final    MCHC 03/19/2025 32.1  31.5 - 35.7 g/dL Final    RDW 03/19/2025 17.1 (H)  12.3 - 15.4 % Final    RDW-SD 03/19/2025 47.9  37.0 - 54.0 fl Final    MPV 03/19/2025 9.8  6.0 - 12.0 fL Final    Platelets 03/19/2025 183  140 - 450 10*3/mm3 Final    Neutrophil % 03/19/2025 60.0  42.7 - 76.0 % Final    Lymphocyte % 03/19/2025 31.4  19.6 - 45.3 % Final    Monocyte % 03/19/2025 6.0  5.0 - 12.0 % Final    Eosinophil % 03/19/2025 1.0  0.3 - 6.2 % Final    Basophil % 03/19/2025 0.8  0.0 - 1.5 % Final    Immature Grans % 03/19/2025 0.8 (H)  0.0 - 0.5 % Final    Neutrophils, Absolute 03/19/2025 2.29  1.70 - 7.00 10*3/mm3 Final    Lymphocytes, Absolute 03/19/2025 1.20  0.70 - 3.10 10*3/mm3 Final    Monocytes, Absolute 03/19/2025 0.23  0.10 - 0.90 10*3/mm3 Final    Eosinophils, Absolute 03/19/2025 0.04  0.00 - 0.40 10*3/mm3 Final    Basophils, Absolute 03/19/2025 0.03  0.00 - 0.20 10*3/mm3 Final    Immature Grans, Absolute 03/19/2025 0.03  0.00 - 0.05 10*3/mm3 Final    nRBC 03/19/2025 0.0  0.0 - 0.2 /100 WBC Final        No results found.    ASSESSMENT: The patient is a very pleasant 54 y.o. female  with right-sided metastatic colon cancer      PLAN:    1.  Right-sided metastatic colon cancer:  A.  I will proceed with FOLFIRI plus Vectibix cycle #11.  B.  She will follow-up with us in 2 weeks for cycle #12.     C.  We will consider maintenance therapy after maximum response.  D.  I will continue to monitor the patient blood work including blood counts kidney function liver function and electrolytes.  E.  I will repeat her Signatera with every other cycle.  March 5, 2025 currently pending.  I discussed with the patient that last CT DNA revealed significant increase.  The patient delayed her follow-up and  treatments from December till February.  Will follow-up on the level and hopefully now that she is back on therapy this will decrease.  F.  I will do 3 months follow-up scan.  Should be after cycle #12.  Will consider maintenance therapy at that point.   JENNIFER DAVIS discussed with the patient blood results from March 19 , 2025.  Hemoglobin 10.7 white cells 3.82 platelets normal.  I will follow-up on labs from today.    2.  Elevated liver enzymes:  A.  Induced by liver metastases  B. I will repeat labs on return    3.  Chemotherapy-induced nausea, severe:  A.  I will continue IV fluid on day 1 and as needed on day for 3.  B.  I will continue Zofran 8 mg and Compazine 10 mg every 8 hours as needed.    4.  Chemotherapy-induced diarrhea:  A.  I will continue the patient on Imodium as needed    5. Cancer-related pain:  A.  I will continue the patient oxycodone 5 mg twice a day.     6.  Anxiety:  A.  I will continue the patient on Ativan 1 mg nightly as needed.       7.  Hypertension:  A.  She will continue losartan.  B.  I reviewed with the patient this will interfere with our management since chemotherapy typically can cause hypotension we will have to monitor blood pressure closely.    8.  Dehydration:  A.  I will continue 1 L of IV fluid as needed    9.  Chemotherapy-induced anemia:  A.  Hemoglobin 10.7 in March 19, 2025  B.  I will continue monitor her CBC prior to each infusion.  C.  I will transfuse as needed for hemoglobin less than 8.      10.  Chemotherapy-induced heartburn:  A.  I will continue pantoprazole 40 mg twice a day.        11.  Treatment related diarrhea  A.  Continue Imodium as directed.  B.  I will continue Lomotil as needed.      12.  Treatment induced peripheral neuropathy:   A.  We will continue gabapentin 100 mg 3 times daily.    13.  Treatment induced dermatitis, severe:  A.  Erbitux was permanently discontinued due to severe treatment induced dermatitis.    B.  I will continue patient on Vectibix at  4 mg/kg in 2 weeks and if she is doing well we will consider increasing the dose to full dose at 6 mg/kg.  C.  I will continue hydrocortisone cream.  D.  I will continue doxycycline 100 mg twice a day.     14.  Treatment induced mucositis:  A.  I will continue Magic mouthwash.    15. Nasal lesion.   A.  Continue mupirocin as needed    16. Anxiety  A.  Encouraged patient to try lexapro. We will adjust as needed. I asked her to call with thoughts of hurting herself or others.   B.  Continue ativan to use the day before treatment for anticipatory nausea.    FOLLOW UP: 2 weeks with cycle #12.  Total time of patient care including time prior to, face to face with patient, and following visit spent in reviewing records, lab results, imaging studies, discussion with patient, and documentation/charting was > 60 minutes      Ana Cristina Cano, APRN  3/19/2025

## 2025-03-20 LAB
NTRA SIGNATERA MTM READOUT: 0.31 MTM/ML
NTRA SIGNATERA TEST RESULT: POSITIVE

## 2025-03-21 ENCOUNTER — HOSPITAL ENCOUNTER (OUTPATIENT)
Facility: HOSPITAL | Age: 55
Discharge: HOME | End: 2025-03-21
Payer: COMMERCIAL

## 2025-03-21 DIAGNOSIS — C18.9 METASTATIC COLON CANCER TO LIVER: Primary | ICD-10-CM

## 2025-03-21 DIAGNOSIS — C18.2 CANCER OF RIGHT COLON: ICD-10-CM

## 2025-03-21 DIAGNOSIS — C78.7 METASTATIC COLON CANCER TO LIVER: Primary | ICD-10-CM

## 2025-03-21 PROCEDURE — 25010000002 HEPARIN LOCK FLUSH PER 10 UNITS: Performed by: INTERNAL MEDICINE

## 2025-03-21 PROCEDURE — 96523 IRRIG DRUG DELIVERY DEVICE: CPT

## 2025-03-21 RX ORDER — HEPARIN SODIUM (PORCINE) LOCK FLUSH IV SOLN 100 UNIT/ML 100 UNIT/ML
500 SOLUTION INTRAVENOUS AS NEEDED
Status: DISCONTINUED | OUTPATIENT
Start: 2025-03-21 | End: 2025-03-22 | Stop reason: HOSPADM

## 2025-03-21 RX ORDER — SODIUM CHLORIDE 0.9 % (FLUSH) 0.9 %
10 SYRINGE (ML) INJECTION AS NEEDED
OUTPATIENT
Start: 2025-03-21

## 2025-03-21 RX ORDER — HEPARIN SODIUM (PORCINE) LOCK FLUSH IV SOLN 100 UNIT/ML 100 UNIT/ML
500 SOLUTION INTRAVENOUS AS NEEDED
OUTPATIENT
Start: 2025-03-21

## 2025-03-21 RX ORDER — SODIUM CHLORIDE 0.9 % (FLUSH) 0.9 %
10 SYRINGE (ML) INJECTION AS NEEDED
Status: DISCONTINUED | OUTPATIENT
Start: 2025-03-21 | End: 2025-03-22 | Stop reason: HOSPADM

## 2025-03-21 RX ADMIN — HEPARIN 500 UNITS: 100 SYRINGE at 14:57

## 2025-03-26 NOTE — PROGRESS NOTES
DATE OF VISIT: 4/2/2025    REASON FOR VISIT: Followup for metastatic right-sided colon cancer     PROBLEM LIST:  1. Metastatic colon cancer TX NX M1 a stage Perry:  A.  Presented with abdominal pain and jaundice  B.  CT abdomen pelvis done November 04, 2022 confirmed diffuse liver metastases.  C.  Diagnosed after CT-guided liver biopsy done on November 22, 2022 confirming adenocarcinoma CK20 positive CK7 negative.  D.  Colonoscopy done December 08, 2020 to confirm transverse colon mass however biopsy revealed adenoma.  E.  Started palliative chemotherapy with dose adjusted FOLFOXIRI December 14, 2022, status post 12 cycles completed May 2023.  F.  Started maintenance Xeloda 1000 mg twice daily 1 week on 1 week off June 2023.  Stopped April 2024 secondary to progressive disease in the liver.  G.  Status post segment 7 and segment 3 liver met resection with 2 other lesions ablation done by Dr. Nielson April 19, 2024.  H.  Started FOLFIRI with Vectibix August 19, 2024.  Status post 11 cycles.  2.  Elevated liver enzymes:  A.  Induced by metastatic disease  3.  Cancer-related pain  4.  Anxiety  5.  Hypertension    HISTORY OF PRESENT ILLNESS: The patient is a very pleasant 54 y.o. female with past medical history significant for metastatic right-sided colon cancer diagnosed November 22, 2022.  Started on palliative chemotherapy with dose adjusted FOLFOXIRI.  She completed 12 cycles May 2023.  She was started on maintenance Xeloda 1000 mg twice daily June 2023.  Progressive disease with worsening PET scan as well as rising CT DNA.  She was started on FOLFIRI with Vectibix August 2024.  The patient is here today for scheduled follow-up visit with treatment cycle #12.    SUBJECTIVE: Amber is here today with her .  All in all she is doing well.  She does have anticipated nausea.    Past History:  Medical History: has a past medical history of Anxiety, Cancer, Ear piercing, Gallstones, History of irritable bowel syndrome,  "Hypertension, Seasonal allergies, Tattoos, and Wears glasses.   Surgical History: has a past surgical history that includes postpartum tubal ligation; Dilation and curettage of uterus (2013); Tumor removal (2013); Portacath placement (N/A, 12/8/2022); Colonoscopy (N/A, 12/8/2022); diagnostic laparoscopy exploratory laparotomy (N/A, 4/19/2024); Cholecystectomy (N/A, 4/19/2024); and Liver resection (Left, 4/19/2024).   Family History: family history is not on file.   Social History: reports that she has never smoked. She has never used smokeless tobacco. She reports current alcohol use. She reports that she does not use drugs.    (Not in a hospital admission)     Allergies: Losartan, Valium [diazepam], and Citrus     Review of Systems   Constitutional:  Positive for fatigue.   Gastrointestinal:  Positive for nausea.   Musculoskeletal:  Positive for arthralgias.   Skin:  Positive for wound.        Facial acne like rash.   Psychiatric/Behavioral:  The patient is nervous/anxious.        PHYSICAL EXAMINATION:   /92   Pulse 80   Temp 97.3 °F (36.3 °C) (Temporal)   Resp 16   Ht 157.5 cm (62.01\")   Wt 82.5 kg (181 lb 14.4 oz)   SpO2 96%   BMI 33.26 kg/m²    Pain Score    04/02/25 0901   PainSc: 2                                 ECOG Performance Status: 1 - Symptomatic but completely ambulatory      General Appearance:      alert, cooperative, no apparent distress and appears stated age   Lungs:   Clear to auscultation bilaterally; respirations regular, even, and unlabored bilaterally   Heart:  Regular rate and rhythm, no murmurs appreciated   Abdomen:   Soft, non-tender, and non-distended                 No visits with results within 2 Week(s) from this visit.   Latest known visit with results is:   Hospital Outpatient Visit on 03/19/2025   Component Date Value Ref Range Status    Glucose 03/19/2025 106 (H)  65 - 99 mg/dL Final    BUN 03/19/2025 6  6 - 20 mg/dL Final    Creatinine 03/19/2025 0.57  0.57 - 1.00 " mg/dL Final    Sodium 03/19/2025 142  136 - 145 mmol/L Final    Potassium 03/19/2025 3.3 (L)  3.5 - 5.2 mmol/L Final    Chloride 03/19/2025 107  98 - 107 mmol/L Final    CO2 03/19/2025 23.9  22.0 - 29.0 mmol/L Final    Calcium 03/19/2025 8.4 (L)  8.6 - 10.5 mg/dL Final    Total Protein 03/19/2025 6.4  6.0 - 8.5 g/dL Final    Albumin 03/19/2025 3.5  3.5 - 5.2 g/dL Final    ALT (SGPT) 03/19/2025 52 (H)  1 - 33 U/L Final    AST (SGOT) 03/19/2025 33 (H)  1 - 32 U/L Final    Alkaline Phosphatase 03/19/2025 189 (H)  39 - 117 U/L Final    Total Bilirubin 03/19/2025 0.3  0.0 - 1.2 mg/dL Final    Globulin 03/19/2025 2.9  gm/dL Final    A/G Ratio 03/19/2025 1.2  g/dL Final    BUN/Creatinine Ratio 03/19/2025 10.5  7.0 - 25.0 Final    Anion Gap 03/19/2025 11.1  5.0 - 15.0 mmol/L Final    eGFR 03/19/2025 108.1  >60.0 mL/min/1.73 Final    Magnesium 03/19/2025 1.2 (L)  1.6 - 2.6 mg/dL Final    WBC 03/19/2025 3.82  3.40 - 10.80 10*3/mm3 Final    RBC 03/19/2025 4.02  3.77 - 5.28 10*6/mm3 Final    Hemoglobin 03/19/2025 10.7 (L)  12.0 - 15.9 g/dL Final    Hematocrit 03/19/2025 33.3 (L)  34.0 - 46.6 % Final    MCV 03/19/2025 82.8  79.0 - 97.0 fL Final    MCH 03/19/2025 26.6  26.6 - 33.0 pg Final    MCHC 03/19/2025 32.1  31.5 - 35.7 g/dL Final    RDW 03/19/2025 17.1 (H)  12.3 - 15.4 % Final    RDW-SD 03/19/2025 47.9  37.0 - 54.0 fl Final    MPV 03/19/2025 9.8  6.0 - 12.0 fL Final    Platelets 03/19/2025 183  140 - 450 10*3/mm3 Final    Neutrophil % 03/19/2025 60.0  42.7 - 76.0 % Final    Lymphocyte % 03/19/2025 31.4  19.6 - 45.3 % Final    Monocyte % 03/19/2025 6.0  5.0 - 12.0 % Final    Eosinophil % 03/19/2025 1.0  0.3 - 6.2 % Final    Basophil % 03/19/2025 0.8  0.0 - 1.5 % Final    Immature Grans % 03/19/2025 0.8 (H)  0.0 - 0.5 % Final    Neutrophils, Absolute 03/19/2025 2.29  1.70 - 7.00 10*3/mm3 Final    Lymphocytes, Absolute 03/19/2025 1.20  0.70 - 3.10 10*3/mm3 Final    Monocytes, Absolute 03/19/2025 0.23  0.10 - 0.90 10*3/mm3  Final    Eosinophils, Absolute 03/19/2025 0.04  0.00 - 0.40 10*3/mm3 Final    Basophils, Absolute 03/19/2025 0.03  0.00 - 0.20 10*3/mm3 Final    Immature Grans, Absolute 03/19/2025 0.03  0.00 - 0.05 10*3/mm3 Final    nRBC 03/19/2025 0.0  0.0 - 0.2 /100 WBC Final        No results found.    ASSESSMENT: The patient is a very pleasant 54 y.o. female  with right-sided metastatic colon cancer      PLAN:    1.  Right-sided metastatic colon cancer:  A.  I will proceed with FOLFIRI plus Vectibix cycle #12.  B.  She will follow-up with us in 2 weeks for maintenance cycle #1.     C.  We will consider maintenance therapy after maximum response.  D.  I will continue to monitor the patient blood work including blood counts kidney function liver function and electrolytes.  E.  I will repeat her Signatera with every other cycle.  March 5, 2025 went down to 0.31.    F.  I will do 3 months follow-up scan.  Should be after cycle #12.  Will consider maintenance therapy at that point.   G.  Discussed with the patient blood results from March 1, 2025 revealed elevated liver enzymes ALT 63 AST 60 alk phos 231.    2.  Elevated liver enzymes:  A.  Induced by liver metastases  B. I will repeat labs on return    3.  Chemotherapy-induced nausea, severe:  A.  I will continue IV fluid on day 1 and as needed on day for 3.  B.  I will continue Zofran 8 mg and Compazine 10 mg every 8 hours as needed.    4.  Chemotherapy-induced diarrhea:  A.  I will continue the patient on Imodium as needed    5. Cancer-related pain:  A.  I will continue the patient oxycodone 5 mg twice a day.     6.  Anxiety:  A.  I will continue the patient on Ativan 1 mg nightly as needed.       7.  Hypertension:  A.  She will continue losartan.  B.  I reviewed with the patient this will interfere with our management since chemotherapy typically can cause hypotension we will have to monitor blood pressure closely.    8.  Dehydration:  A.  I will continue 1 L of IV fluid as  needed    9.  Chemotherapy-induced anemia:  A.  Discussed with the patient hemoglobin 11.4 on March 5 , 2025  B.  I will continue monitor her CBC prior to each infusion.  C.  I will transfuse as needed for hemoglobin less than 8.    10.  Chemotherapy-induced heartburn:  A.  I will continue pantoprazole 40 mg twice a day.      11.  Treatment related diarrhea  A.  Continue Imodium as directed.  B.  I will continue Lomotil as needed.    12.  Treatment induced peripheral neuropathy:   A.  We will continue gabapentin 100 mg 3 times daily.    13.  Treatment induced dermatitis, severe:  A.  Erbitux was permanently discontinued due to severe treatment induced dermatitis.    B.  I will continue patient on Vectibix at 4 mg/kg in 2 weeks and if she is doing well we will consider increasing the dose to full dose at 6 mg/kg.  C.  I will continue hydrocortisone cream.  D.  I will continue doxycycline 100 mg twice a day.     14.  Treatment induced mucositis:  A.  I will continue Magic mouthwash.    15. Nasal lesion.   A.  Continue mupirocin as needed    16. Anxiety  A.  Encouraged patient to try lexapro. We will adjust as needed. I asked her to call with thoughts of hurting herself or others.   B.  Continue ativan to use the day before treatment for anticipatory nausea.    FOLLOW UP: 2 weeks with maintenance cycle #1 with scans.    Iban Lambert MD  4/2/2025

## 2025-04-02 ENCOUNTER — OFFICE VISIT (OUTPATIENT)
Dept: ONCOLOGY | Facility: CLINIC | Age: 55
End: 2025-04-02
Payer: COMMERCIAL

## 2025-04-02 ENCOUNTER — HOSPITAL ENCOUNTER (OUTPATIENT)
Facility: HOSPITAL | Age: 55
Discharge: HOME | End: 2025-04-02
Payer: COMMERCIAL

## 2025-04-02 VITALS
WEIGHT: 181.9 LBS | RESPIRATION RATE: 16 BRPM | OXYGEN SATURATION: 96 % | SYSTOLIC BLOOD PRESSURE: 137 MMHG | HEART RATE: 80 BPM | TEMPERATURE: 97.3 F | HEIGHT: 62 IN | DIASTOLIC BLOOD PRESSURE: 92 MMHG | BODY MASS INDEX: 33.47 KG/M2

## 2025-04-02 DIAGNOSIS — C18.2 CANCER OF RIGHT COLON: ICD-10-CM

## 2025-04-02 DIAGNOSIS — C78.7 METASTATIC COLON CANCER TO LIVER: ICD-10-CM

## 2025-04-02 DIAGNOSIS — C78.7 METASTATIC COLON CANCER TO LIVER: Primary | ICD-10-CM

## 2025-04-02 DIAGNOSIS — C18.2 CANCER OF RIGHT COLON: Primary | ICD-10-CM

## 2025-04-02 DIAGNOSIS — C18.9 METASTATIC COLON CANCER TO LIVER: Primary | ICD-10-CM

## 2025-04-02 DIAGNOSIS — C18.9 METASTATIC COLON CANCER TO LIVER: ICD-10-CM

## 2025-04-02 LAB
ALBUMIN SERPL-MCNC: 3.9 G/DL (ref 3.5–5.2)
ALBUMIN/GLOB SERPL: 1.6 G/DL
ALP SERPL-CCNC: 208 U/L (ref 39–117)
ALT SERPL W P-5'-P-CCNC: 33 U/L (ref 1–33)
ANION GAP SERPL CALCULATED.3IONS-SCNC: 12.6 MMOL/L (ref 5–15)
AST SERPL-CCNC: 30 U/L (ref 1–32)
BASOPHILS # BLD AUTO: 0.02 10*3/MM3 (ref 0–0.2)
BASOPHILS NFR BLD AUTO: 0.5 % (ref 0–1.5)
BILIRUB SERPL-MCNC: 0.6 MG/DL (ref 0–1.2)
BUN SERPL-MCNC: 5 MG/DL (ref 6–20)
BUN/CREAT SERPL: 6.8 (ref 7–25)
CALCIUM SPEC-SCNC: 7.9 MG/DL (ref 8.6–10.5)
CHLORIDE SERPL-SCNC: 104 MMOL/L (ref 98–107)
CO2 SERPL-SCNC: 22.4 MMOL/L (ref 22–29)
CREAT SERPL-MCNC: 0.74 MG/DL (ref 0.57–1)
DEPRECATED RDW RBC AUTO: 49.2 FL (ref 37–54)
EGFRCR SERPLBLD CKD-EPI 2021: 96.3 ML/MIN/1.73
EOSINOPHIL # BLD AUTO: 0.06 10*3/MM3 (ref 0–0.4)
EOSINOPHIL NFR BLD AUTO: 1.4 % (ref 0.3–6.2)
ERYTHROCYTE [DISTWIDTH] IN BLOOD BY AUTOMATED COUNT: 16.9 % (ref 12.3–15.4)
GLOBULIN UR ELPH-MCNC: 2.5 GM/DL
GLUCOSE SERPL-MCNC: 84 MG/DL (ref 65–99)
HCT VFR BLD AUTO: 34.3 % (ref 34–46.6)
HGB BLD-MCNC: 11 G/DL (ref 12–15.9)
IMM GRANULOCYTES # BLD AUTO: 0.01 10*3/MM3 (ref 0–0.05)
IMM GRANULOCYTES NFR BLD AUTO: 0.2 % (ref 0–0.5)
LYMPHOCYTES # BLD AUTO: 1.91 10*3/MM3 (ref 0.7–3.1)
LYMPHOCYTES NFR BLD AUTO: 45.8 % (ref 19.6–45.3)
MAGNESIUM SERPL-MCNC: 1 MG/DL (ref 1.6–2.6)
MCH RBC QN AUTO: 26.3 PG (ref 26.6–33)
MCHC RBC AUTO-ENTMCNC: 32.1 G/DL (ref 31.5–35.7)
MCV RBC AUTO: 81.9 FL (ref 79–97)
MONOCYTES # BLD AUTO: 0.53 10*3/MM3 (ref 0.1–0.9)
MONOCYTES NFR BLD AUTO: 12.7 % (ref 5–12)
NEUTROPHILS NFR BLD AUTO: 1.64 10*3/MM3 (ref 1.7–7)
NEUTROPHILS NFR BLD AUTO: 39.4 % (ref 42.7–76)
NRBC BLD AUTO-RTO: 0 /100 WBC (ref 0–0.2)
PLATELET # BLD AUTO: 216 10*3/MM3 (ref 140–450)
PMV BLD AUTO: 9.8 FL (ref 6–12)
POTASSIUM SERPL-SCNC: 3.1 MMOL/L (ref 3.5–5.2)
PROT SERPL-MCNC: 6.4 G/DL (ref 6–8.5)
RBC # BLD AUTO: 4.19 10*6/MM3 (ref 3.77–5.28)
SODIUM SERPL-SCNC: 139 MMOL/L (ref 136–145)
WBC NRBC COR # BLD AUTO: 4.17 10*3/MM3 (ref 3.4–10.8)

## 2025-04-02 PROCEDURE — 25010000002 ALTEPLASE 2 MG RECONSTITUTED SOLUTION: Performed by: INTERNAL MEDICINE

## 2025-04-02 PROCEDURE — 96416 CHEMO PROLONG INFUSE W/PUMP: CPT

## 2025-04-02 PROCEDURE — 25010000002 IRINOTECAN PER 20 MG: Performed by: INTERNAL MEDICINE

## 2025-04-02 PROCEDURE — 25010000002 PANITUMUMAB 400 MG/20ML SOLUTION 20 ML VIAL: Performed by: INTERNAL MEDICINE

## 2025-04-02 PROCEDURE — 25010000003 DEXTROSE 5 % SOLUTION 500 ML FLEX CONT: Performed by: INTERNAL MEDICINE

## 2025-04-02 PROCEDURE — 3080F DIAST BP >= 90 MM HG: CPT | Performed by: INTERNAL MEDICINE

## 2025-04-02 PROCEDURE — 96375 TX/PRO/DX INJ NEW DRUG ADDON: CPT

## 2025-04-02 PROCEDURE — 99214 OFFICE O/P EST MOD 30 MIN: CPT | Performed by: INTERNAL MEDICINE

## 2025-04-02 PROCEDURE — 96367 TX/PROPH/DG ADDL SEQ IV INF: CPT

## 2025-04-02 PROCEDURE — 96417 CHEMO IV INFUS EACH ADDL SEQ: CPT

## 2025-04-02 PROCEDURE — 1125F AMNT PAIN NOTED PAIN PRSNT: CPT | Performed by: INTERNAL MEDICINE

## 2025-04-02 PROCEDURE — 25010000002 LEUCOVORIN CALCIUM PER 50MG: Performed by: INTERNAL MEDICINE

## 2025-04-02 PROCEDURE — 83735 ASSAY OF MAGNESIUM: CPT | Performed by: INTERNAL MEDICINE

## 2025-04-02 PROCEDURE — 25010000002 POTASSIUM CHLORIDE 10 MEQ/100ML SOLUTION: Performed by: INTERNAL MEDICINE

## 2025-04-02 PROCEDURE — 96376 TX/PRO/DX INJ SAME DRUG ADON: CPT

## 2025-04-02 PROCEDURE — 25010000002 FAMOTIDINE 10 MG/ML SOLUTION: Performed by: INTERNAL MEDICINE

## 2025-04-02 PROCEDURE — 36593 DECLOT VASCULAR DEVICE: CPT

## 2025-04-02 PROCEDURE — 96368 THER/DIAG CONCURRENT INF: CPT

## 2025-04-02 PROCEDURE — 25010000003 DEXTROSE 5 % SOLUTION 250 ML FLEX CONT: Performed by: INTERNAL MEDICINE

## 2025-04-02 PROCEDURE — 25010000002 PALONOSETRON 0.25 MG/5ML SOLUTION PREFILLED SYRINGE: Performed by: INTERNAL MEDICINE

## 2025-04-02 PROCEDURE — 25810000003 SODIUM CHLORIDE 0.9 % SOLUTION 250 ML FLEX CONT: Performed by: INTERNAL MEDICINE

## 2025-04-02 PROCEDURE — G0498 CHEMO EXTEND IV INFUS W/PUMP: HCPCS

## 2025-04-02 PROCEDURE — 96411 CHEMO IV PUSH ADDL DRUG: CPT

## 2025-04-02 PROCEDURE — 3075F SYST BP GE 130 - 139MM HG: CPT | Performed by: INTERNAL MEDICINE

## 2025-04-02 PROCEDURE — 80053 COMPREHEN METABOLIC PANEL: CPT | Performed by: INTERNAL MEDICINE

## 2025-04-02 PROCEDURE — 96413 CHEMO IV INFUSION 1 HR: CPT

## 2025-04-02 PROCEDURE — 25010000002 MAGNESIUM SULFATE 2 GM/50ML SOLUTION: Performed by: INTERNAL MEDICINE

## 2025-04-02 PROCEDURE — 25010000002 FLUOROURACIL PER 500 MG: Performed by: INTERNAL MEDICINE

## 2025-04-02 PROCEDURE — 85025 COMPLETE CBC W/AUTO DIFF WBC: CPT | Performed by: INTERNAL MEDICINE

## 2025-04-02 PROCEDURE — 25010000002 METHYLPREDNISOLONE PER 125 MG: Performed by: INTERNAL MEDICINE

## 2025-04-02 RX ORDER — FLUOROURACIL 50 MG/ML
300 INJECTION, SOLUTION INTRAVENOUS ONCE
Status: COMPLETED | OUTPATIENT
Start: 2025-04-02 | End: 2025-04-02

## 2025-04-02 RX ORDER — FAMOTIDINE 10 MG/ML
20 INJECTION, SOLUTION INTRAVENOUS AS NEEDED
Status: CANCELLED | OUTPATIENT
Start: 2025-04-02

## 2025-04-02 RX ORDER — MEPERIDINE HYDROCHLORIDE 25 MG/ML
25 INJECTION INTRAMUSCULAR; INTRAVENOUS; SUBCUTANEOUS
Status: DISCONTINUED | OUTPATIENT
Start: 2025-04-02 | End: 2025-04-03 | Stop reason: HOSPADM

## 2025-04-02 RX ORDER — MAGNESIUM SULFATE HEPTAHYDRATE 40 MG/ML
2 INJECTION, SOLUTION INTRAVENOUS ONCE
Status: COMPLETED | OUTPATIENT
Start: 2025-04-02 | End: 2025-04-02

## 2025-04-02 RX ORDER — HYDROCORTISONE SODIUM SUCCINATE 100 MG/2ML
100 INJECTION INTRAMUSCULAR; INTRAVENOUS AS NEEDED
Status: CANCELLED | OUTPATIENT
Start: 2025-04-02

## 2025-04-02 RX ORDER — DIPHENHYDRAMINE HYDROCHLORIDE 50 MG/ML
50 INJECTION, SOLUTION INTRAMUSCULAR; INTRAVENOUS AS NEEDED
Status: CANCELLED | OUTPATIENT
Start: 2025-04-02

## 2025-04-02 RX ORDER — HYDROCORTISONE SODIUM SUCCINATE 100 MG/2ML
100 INJECTION INTRAMUSCULAR; INTRAVENOUS AS NEEDED
Status: DISCONTINUED | OUTPATIENT
Start: 2025-04-02 | End: 2025-04-03 | Stop reason: HOSPADM

## 2025-04-02 RX ORDER — ATROPINE SULFATE 1 MG/ML
0.25 INJECTION, SOLUTION INTRAMUSCULAR; INTRAVENOUS; SUBCUTANEOUS
Status: CANCELLED | OUTPATIENT
Start: 2025-04-02

## 2025-04-02 RX ORDER — FLUOROURACIL 50 MG/ML
300 INJECTION, SOLUTION INTRAVENOUS ONCE
Status: CANCELLED | OUTPATIENT
Start: 2025-04-02

## 2025-04-02 RX ORDER — DIPHENHYDRAMINE HYDROCHLORIDE 50 MG/ML
50 INJECTION, SOLUTION INTRAMUSCULAR; INTRAVENOUS AS NEEDED
Status: DISCONTINUED | OUTPATIENT
Start: 2025-04-02 | End: 2025-04-03 | Stop reason: HOSPADM

## 2025-04-02 RX ORDER — POTASSIUM CHLORIDE 1500 MG/1
20 TABLET, EXTENDED RELEASE ORAL DAILY
Qty: 30 TABLET | Refills: 1 | Status: SHIPPED | OUTPATIENT
Start: 2025-04-02

## 2025-04-02 RX ORDER — LORAZEPAM 2 MG/ML
1 INJECTION INTRAMUSCULAR ONCE AS NEEDED
Status: CANCELLED
Start: 2025-04-02

## 2025-04-02 RX ORDER — METHYLPREDNISOLONE SODIUM SUCCINATE 125 MG/2ML
125 INJECTION INTRAMUSCULAR; INTRAVENOUS ONCE
Status: COMPLETED | OUTPATIENT
Start: 2025-04-02 | End: 2025-04-02

## 2025-04-02 RX ORDER — WATER 10 ML/10ML
INJECTION INTRAMUSCULAR; INTRAVENOUS; SUBCUTANEOUS
Status: COMPLETED
Start: 2025-04-02 | End: 2025-04-02

## 2025-04-02 RX ORDER — FAMOTIDINE 10 MG/ML
20 INJECTION, SOLUTION INTRAVENOUS ONCE
Status: COMPLETED | OUTPATIENT
Start: 2025-04-02 | End: 2025-04-02

## 2025-04-02 RX ORDER — FAMOTIDINE 10 MG/ML
20 INJECTION, SOLUTION INTRAVENOUS ONCE
Status: CANCELLED | OUTPATIENT
Start: 2025-04-02

## 2025-04-02 RX ORDER — ACETAMINOPHEN 325 MG/1
650 TABLET ORAL ONCE
Status: CANCELLED | OUTPATIENT
Start: 2025-04-02

## 2025-04-02 RX ORDER — PALONOSETRON 0.05 MG/ML
0.25 INJECTION, SOLUTION INTRAVENOUS ONCE
Status: CANCELLED | OUTPATIENT
Start: 2025-04-02

## 2025-04-02 RX ORDER — SODIUM CHLORIDE 9 MG/ML
20 INJECTION, SOLUTION INTRAVENOUS ONCE
Status: DISCONTINUED | OUTPATIENT
Start: 2025-04-02 | End: 2025-04-03 | Stop reason: HOSPADM

## 2025-04-02 RX ORDER — POTASSIUM CHLORIDE 7.45 MG/ML
10 INJECTION INTRAVENOUS ONCE
Status: CANCELLED | OUTPATIENT
Start: 2025-04-04

## 2025-04-02 RX ORDER — POTASSIUM CHLORIDE 7.45 MG/ML
10 INJECTION INTRAVENOUS ONCE
Status: DISCONTINUED | OUTPATIENT
Start: 2025-04-02 | End: 2025-04-02

## 2025-04-02 RX ORDER — LORAZEPAM 2 MG/ML
1 INJECTION INTRAMUSCULAR ONCE AS NEEDED
Status: DISCONTINUED | OUTPATIENT
Start: 2025-04-02 | End: 2025-04-03 | Stop reason: HOSPADM

## 2025-04-02 RX ORDER — METHYLPREDNISOLONE SODIUM SUCCINATE 125 MG/2ML
125 INJECTION INTRAMUSCULAR; INTRAVENOUS ONCE
Status: CANCELLED | OUTPATIENT
Start: 2025-04-02

## 2025-04-02 RX ORDER — PALONOSETRON 0.05 MG/ML
0.25 INJECTION, SOLUTION INTRAVENOUS ONCE
Status: COMPLETED | OUTPATIENT
Start: 2025-04-02 | End: 2025-04-02

## 2025-04-02 RX ORDER — POTASSIUM CHLORIDE 7.45 MG/ML
10 INJECTION INTRAVENOUS ONCE
Status: COMPLETED | OUTPATIENT
Start: 2025-04-02 | End: 2025-04-02

## 2025-04-02 RX ORDER — POTASSIUM CHLORIDE 7.45 MG/ML
10 INJECTION INTRAVENOUS ONCE
Status: CANCELLED
Start: 2025-04-02

## 2025-04-02 RX ORDER — MEPERIDINE HYDROCHLORIDE 25 MG/ML
25 INJECTION INTRAMUSCULAR; INTRAVENOUS; SUBCUTANEOUS
Status: CANCELLED | OUTPATIENT
Start: 2025-04-02

## 2025-04-02 RX ORDER — SODIUM CHLORIDE 9 MG/ML
20 INJECTION, SOLUTION INTRAVENOUS ONCE
Status: CANCELLED | OUTPATIENT
Start: 2025-04-02

## 2025-04-02 RX ORDER — ACETAMINOPHEN 325 MG/1
650 TABLET ORAL ONCE
Status: COMPLETED | OUTPATIENT
Start: 2025-04-02 | End: 2025-04-02

## 2025-04-02 RX ORDER — FAMOTIDINE 10 MG/ML
20 INJECTION, SOLUTION INTRAVENOUS AS NEEDED
Status: DISCONTINUED | OUTPATIENT
Start: 2025-04-02 | End: 2025-04-03 | Stop reason: HOSPADM

## 2025-04-02 RX ADMIN — DEXTROSE MONOHYDRATE 240 MG: 50 INJECTION, SOLUTION INTRAVENOUS at 14:30

## 2025-04-02 RX ADMIN — PALONOSETRON 0.25 MG: 0.25 INJECTION, SOLUTION INTRAVENOUS at 13:08

## 2025-04-02 RX ADMIN — FLUOROURACIL 550 MG: 50 INJECTION, SOLUTION INTRAVENOUS at 16:09

## 2025-04-02 RX ADMIN — METHYLPREDNISOLONE SODIUM SUCCINATE 125 MG: 125 INJECTION, POWDER, FOR SOLUTION INTRAMUSCULAR; INTRAVENOUS at 13:10

## 2025-04-02 RX ADMIN — SODIUM CHLORIDE 3280 MG: 9 INJECTION, SOLUTION INTRAVENOUS at 16:09

## 2025-04-02 RX ADMIN — ATROPINE SULFATE 0.25 MG: 0.4 INJECTION, SOLUTION INTRAVENOUS at 16:01

## 2025-04-02 RX ADMIN — DEXTROSE MONOHYDRATE 550 MG: 50 INJECTION, SOLUTION INTRAVENOUS at 14:30

## 2025-04-02 RX ADMIN — ACETAMINOPHEN 650 MG: 325 TABLET, FILM COATED ORAL at 13:08

## 2025-04-02 RX ADMIN — ATROPINE SULFATE 0.25 MG: 0.4 INJECTION, SOLUTION INTRAVENOUS at 14:27

## 2025-04-02 RX ADMIN — PANITUMUMAB 320 MG: 400 SOLUTION INTRAVENOUS at 13:12

## 2025-04-02 RX ADMIN — WATER 2.2 ML: 1 INJECTION INTRAMUSCULAR; INTRAVENOUS; SUBCUTANEOUS at 10:12

## 2025-04-02 RX ADMIN — ALTEPLASE: 2.2 INJECTION, POWDER, LYOPHILIZED, FOR SOLUTION INTRAVENOUS at 10:12

## 2025-04-02 RX ADMIN — POTASSIUM CHLORIDE 10 MEQ: 7.46 INJECTION, SOLUTION INTRAVENOUS at 12:02

## 2025-04-02 RX ADMIN — MAGNESIUM SULFATE HEPTAHYDRATE 2 G: 40 INJECTION, SOLUTION INTRAVENOUS at 11:03

## 2025-04-02 RX ADMIN — FAMOTIDINE 20 MG: 10 INJECTION, SOLUTION INTRAVENOUS at 13:11

## 2025-04-04 ENCOUNTER — HOSPITAL ENCOUNTER (OUTPATIENT)
Facility: HOSPITAL | Age: 55
Discharge: HOME | End: 2025-04-04
Payer: COMMERCIAL

## 2025-04-04 ENCOUNTER — TELEPHONE (OUTPATIENT)
Age: 55
End: 2025-04-04
Payer: COMMERCIAL

## 2025-04-04 DIAGNOSIS — C18.2 CANCER OF RIGHT COLON: Primary | ICD-10-CM

## 2025-04-04 DIAGNOSIS — C78.7 METASTATIC COLON CANCER TO LIVER: ICD-10-CM

## 2025-04-04 DIAGNOSIS — C18.9 METASTATIC COLON CANCER TO LIVER: ICD-10-CM

## 2025-04-04 PROCEDURE — 25010000002 HEPARIN LOCK FLUSH PER 10 UNITS: Performed by: INTERNAL MEDICINE

## 2025-04-04 PROCEDURE — 96523 IRRIG DRUG DELIVERY DEVICE: CPT

## 2025-04-04 RX ORDER — SODIUM CHLORIDE 0.9 % (FLUSH) 0.9 %
10 SYRINGE (ML) INJECTION AS NEEDED
OUTPATIENT
Start: 2025-04-04

## 2025-04-04 RX ORDER — HEPARIN SODIUM (PORCINE) LOCK FLUSH IV SOLN 100 UNIT/ML 100 UNIT/ML
500 SOLUTION INTRAVENOUS AS NEEDED
Status: DISCONTINUED | OUTPATIENT
Start: 2025-04-04 | End: 2025-04-05 | Stop reason: HOSPADM

## 2025-04-04 RX ORDER — HEPARIN SODIUM (PORCINE) LOCK FLUSH IV SOLN 100 UNIT/ML 100 UNIT/ML
500 SOLUTION INTRAVENOUS AS NEEDED
OUTPATIENT
Start: 2025-04-04

## 2025-04-04 RX ORDER — POTASSIUM CHLORIDE 7.45 MG/ML
10 INJECTION INTRAVENOUS ONCE
Status: DISCONTINUED | OUTPATIENT
Start: 2025-04-04 | End: 2025-04-05 | Stop reason: HOSPADM

## 2025-04-04 RX ORDER — SODIUM CHLORIDE 0.9 % (FLUSH) 0.9 %
10 SYRINGE (ML) INJECTION AS NEEDED
Status: DISCONTINUED | OUTPATIENT
Start: 2025-04-04 | End: 2025-04-05 | Stop reason: HOSPADM

## 2025-04-04 RX ADMIN — Medication 10 ML: at 13:50

## 2025-04-04 RX ADMIN — HEPARIN 500 UNITS: 100 SYRINGE at 13:50

## 2025-04-04 NOTE — TELEPHONE ENCOUNTER
RN received call from [facility] infusion RN. Patient's bulb did not infuse fully (about 75%). Patient is also refusing to stay for potassium run scheduled today. She is taking PO potassium BID. RN notified MD.

## 2025-04-10 ENCOUNTER — HOSPITAL ENCOUNTER (OUTPATIENT)
Dept: CT IMAGING | Facility: HOSPITAL | Age: 55
Discharge: HOME | End: 2025-04-10
Admitting: INTERNAL MEDICINE
Payer: COMMERCIAL

## 2025-04-10 DIAGNOSIS — C18.2 CANCER OF RIGHT COLON: ICD-10-CM

## 2025-04-10 DIAGNOSIS — C78.7 METASTATIC COLON CANCER TO LIVER: ICD-10-CM

## 2025-04-10 DIAGNOSIS — C18.9 METASTATIC COLON CANCER TO LIVER: ICD-10-CM

## 2025-04-10 PROCEDURE — 74177 CT ABD & PELVIS W/CONTRAST: CPT

## 2025-04-10 PROCEDURE — 71260 CT THORAX DX C+: CPT

## 2025-04-10 PROCEDURE — 25510000001 IOPAMIDOL 61 % SOLUTION: Performed by: INTERNAL MEDICINE

## 2025-04-10 RX ORDER — IOPAMIDOL 612 MG/ML
100 INJECTION, SOLUTION INTRAVASCULAR
Status: COMPLETED | OUTPATIENT
Start: 2025-04-10 | End: 2025-04-10

## 2025-04-10 RX ADMIN — IOPAMIDOL 100 ML: 612 INJECTION, SOLUTION INTRAVENOUS at 19:07

## 2025-04-12 LAB
NTRA SIGNATERA MTM READOUT: 0.16 MTM/ML
NTRA SIGNATERA TEST RESULT: POSITIVE

## 2025-04-15 NOTE — PROGRESS NOTES
DATE OF VISIT: 4/16/2025    REASON FOR VISIT: Followup for metastatic right-sided colon cancer     PROBLEM LIST:  1. Metastatic colon cancer TX NX M1 a stage [place]:  A.  Presented with abdominal pain and jaundice  B.  CT abdomen pelvis done November 04, 2022 confirmed diffuse liver metastases.  C.  Diagnosed after CT-guided liver biopsy done on November 22, 2022 confirming adenocarcinoma CK20 positive CK7 negative.  D.  Colonoscopy done December 08, 2020 to confirm transverse colon mass however biopsy revealed adenoma.  E.  Started palliative chemotherapy with dose adjusted FOLFOXIRI December 14, 2022, status post 12 cycles completed May 2023.  F.  Started maintenance Xeloda 1000 mg twice daily 1 week on 1 week off June 2023.  Stopped April 2024 secondary to progressive disease in the liver.  G.  Status post segment 7 and segment 3 liver met resection with 2 other lesions ablation done by Dr. Nielson April 19, 2024.  H.  Started FOLFIRI with Vectibix August 19, 2024.  Status post 12 cycles.  Completed 12 cycles on April 4, 2025  I.  Started maintenance Vectibix with 5-FU on 4/16/2025  2.  Elevated liver enzymes:  A.  Induced by metastatic disease  3.  Cancer-related pain  4.  Anxiety  5.  Hypertension    HISTORY OF PRESENT ILLNESS: The patient is a very pleasant 54 y.o. female with past medical history significant for metastatic right-sided colon cancer diagnosed November 22, 2022.  Started on palliative chemotherapy with dose adjusted FOLFOXIRI.  She completed 12 cycles May 2023.  She was started on maintenance Xeloda 1000 mg twice daily June 2023.  Progressive disease with worsening PET scan as well as rising CT DNA.  She was started on FOLFIRI with Vectibix August 2024.  The patient is here today for scheduled follow-up visit with maintenance treatment cycle #1.    SUBJECTIVE: Amber is here today with her .  She has some bilateral thigh rash on her upper extremities.  She does not take the doxycycline every  "day because she forgets.  It is better on her face.  She did have 1 episode of nausea last night but that has resolved.  Continues to have anticipatory nausea.  She was not ready to do treatment today and is requesting to be delayed.        Past History:  Medical History: has a past medical history of Anxiety, Cancer, Ear piercing, Gallstones, History of irritable bowel syndrome, Hypertension, Seasonal allergies, Tattoos, and Wears glasses.   Surgical History: has a past surgical history that includes postpartum tubal ligation; Dilation and curettage of uterus (2013); Tumor removal (2013); Portacath placement (N/A, 12/8/2022); Colonoscopy (N/A, 12/8/2022); diagnostic laparoscopy exploratory laparotomy (N/A, 4/19/2024); Cholecystectomy (N/A, 4/19/2024); and Liver resection (Left, 4/19/2024).   Family History: family history is not on file.   Social History: reports that she has never smoked. She has never been exposed to tobacco smoke. She has never used smokeless tobacco. She reports that she does not currently use alcohol. She reports that she does not use drugs.    (Not in a hospital admission)     Allergies: Losartan, Valium [diazepam], and Citrus     Review of Systems   Constitutional:  Positive for fatigue.   Gastrointestinal:  Positive for nausea.   Musculoskeletal:  Positive for arthralgias.   Skin:  Positive for wound.        Facial acne like rash.   Psychiatric/Behavioral:  The patient is nervous/anxious.        PHYSICAL EXAMINATION:   /82   Pulse 84   Temp 97.1 °F (36.2 °C) (Temporal)   Ht 157.5 cm (62.01\")   Wt 82 kg (180 lb 12.8 oz)   SpO2 98%   BMI 33.06 kg/m²    Pain Score    04/16/25 0907   PainSc: 0-No pain                                  ECOG Performance Status: 1 - Symptomatic but completely ambulatory      General Appearance:      alert, cooperative, no apparent distress and appears stated age   Lungs:   Clear to auscultation bilaterally; respirations regular, even, and unlabored " "bilaterally   Heart:  Regular rate and rhythm, no murmurs appreciated   Abdomen:   Soft, non-tender, and non-distended                 No visits with results within 2 Week(s) from this visit.   Latest known visit with results is:   Hospital Outpatient Visit on 04/02/2025   Component Date Value Ref Range Status    SIGNATERA TEST RESULT 04/02/2025 POSITIVE (A)   Final    SIGNATERA MTM READOUT 04/02/2025 0.16 (A)  MTM/ml Final    Comment: The VAF or cfDNA quantification that is used in the MTM/mL calculation is below the analytical measurement range; therefore, the precision of the absolute MTM/mL may be impacted. The \"Signatera Positive\" result is unaffected. Refer to peer-reviewed literature for the MTM/mL calculation methodology and how ctDNA dynamics can reflect changes in disease burden (1,2).    Limitations  Signatera is a personalized, tumor-informed test for the longitudinal detection of circulating tumor DNA (ctDNA). Interval testing is recommended for all patients. Studies have demonstrated that when ctDNA is detected (Signatera Positive) following surgery or definitive treatment, the risk for disease relapse is high without further treatment. Conversely, when ctDNA is not detected, the patient may be considered at lower risk for relapse. For those with multiple timepoints, upward trending ctDNA levels are suggestive of increasing tumor burden (1,2). For a single time point in isolation, the absolute MTM/mL                            value has no known clinical significance and should not be compared across patients. Test results should be interpreted in context of other clinicopathological features. ctDNA detection sensitivity may be limited due to blood collection within two weeks of surgery and while the patient is on therapy. Signatera is a quantitative test and reports in units of mean tumor molecules per   ml (MTM/mL), which is comprised of three measured components (plasma volume, cell free DNA (cfDNA) " concentration, and Variant Allele Frequency (VAF)). The MTM/mL number will be qualified if any measured component falls outside the analytical measurement range for that component. The analytical sensitivity is 95% at the limit of detection (0.3 MTM/mL). Results obtained are specific to the assessed time point. A negative test result does not definitively indicate the absence of cancer. This test is not designed to detect or report germline variation, nor does it infer hereditary cancer risk for the patient.                            Each Signatera assay is designed to a single tumor for a given patient. At this time, multiple personalized Signatera assays cannot be developed for the same patient. This test is designed to detect ctDNA from the assayed tumor only; new primary tumors will not be detected. There is a low risk that a new primary may share a variant that could interfere with the Signatera test.   Testing cannot be performed in patients who are pregnant, have a history of bone marrow transplant, or history of blood transfusion within three months. This test is expected to have limited sensitivity in cancer types such as GIST, renal cell carcinomas, primary brain tumors, and lymphoma due to limited ctDNA shed. 1 Ivory SV, Alejandro SYC, Jose Miguel CAMPO, et al. Personalized circulating tumor DNA analysis as a predictive biomarker in solid tumor patients treated with pembrolizumab. Nature Cancer. 2020;1(9):873-881. 2 Gosia WALDROP, Jenae THURSTON, et al., Circulating Tumor DNA in Stage III Colorectal Cancer, beyond                            Minimal Residual Disease Detection, toward Assessment of Adjuvant Therapy Efficacy and Clinical Behavior of Recurrences. Clin Cancer Res. 2021; 28(3):507-517.  Methodology  FFPE samples are assessed by a pathologist to identify tumor margins and percent tumor content. Tumor DNA is extracted using Qiagen AllPrep. Whole genomic DNA is isolated from peripheral blood using QIAamp DNA  Blood Mini Kit to provide a baseline DNA sequence. Circulating tumor DNA (ctDNA) is extracted from plasma derived from whole blood samples collected in cell-free DNA blood tubes ([employer]) using the QIAsymphony automated or manual extraction method (Qiagen). Using a proprietary algorithm, putative, clonal variants present in the tumor but absent in the germline DNA are identified to design the customized multiplex PCR assay. Whole-exome sequencing is performed on tumor and peripheral blood DNA using the proprietary [employer] NeXT whole-exome sequencing assay. Pathology services are performed at [facility], [address] [place] [address] [address], [address], and whole exome sequencing is performed at [facility] (CLIA ID# [id number]), [address] [place] [address].  Disclaimer  The extraction, library preparation, and sequencing for this test were performed by [employer] [id number] [place] [address] [address] [facility] [address] [address] (CLIA ID [id number]). The data analysis and reporting for this test were performed by [employer] [address]. [address], [place] [address] (CLIA ID [id number]). This test was developed and its performance characteristics determined by [employer]. The test has not been cleared or approved by the U.S. Food and Drug Administration (FDA). CAP accredited, ISO 79647 certified, and CLIA certified. Pathology services and whole exome sequencing for this test were performed by [facility], [address] [place] [address] (CLIA ID [id number]).  2021 [employer] All Rights Reserved.    Glucose 04/02/2025 84  65 - 99 mg/dL Final    BUN 04/02/2025 5 (L)  6 - 20 mg/dL Final    Creatinine 04/02/2025 0.74  0.57 - 1.00 mg/dL Final    Sodium 04/02/2025 139  136 - 145 mmol/L Final    Potassium 04/02/2025 3.1 (L)  3.5 - 5.2 mmol/L Final    Chloride 04/02/2025 104  98 - 107 mmol/L Final    CO2 04/02/2025 22.4   22.0 - 29.0 mmol/L Final    Calcium 04/02/2025 7.9 (L)  8.6 - 10.5 mg/dL Final    Total Protein 04/02/2025 6.4  6.0 - 8.5 g/dL Final    Albumin 04/02/2025 3.9  3.5 - 5.2 g/dL Final    ALT (SGPT) 04/02/2025 33  1 - 33 U/L Final    AST (SGOT) 04/02/2025 30  1 - 32 U/L Final    Alkaline Phosphatase 04/02/2025 208 (H)  39 - 117 U/L Final    Total Bilirubin 04/02/2025 0.6  0.0 - 1.2 mg/dL Final    Globulin 04/02/2025 2.5  gm/dL Final    A/G Ratio 04/02/2025 1.6  g/dL Final    BUN/Creatinine Ratio 04/02/2025 6.8 (L)  7.0 - 25.0 Final    Anion Gap 04/02/2025 12.6  5.0 - 15.0 mmol/L Final    eGFR 04/02/2025 96.3  >60.0 mL/min/1.73 Final    Magnesium 04/02/2025 1.0 (L)  1.6 - 2.6 mg/dL Final    WBC 04/02/2025 4.17  3.40 - 10.80 10*3/mm3 Final    RBC 04/02/2025 4.19  3.77 - 5.28 10*6/mm3 Final    Hemoglobin 04/02/2025 11.0 (L)  12.0 - 15.9 g/dL Final    Hematocrit 04/02/2025 34.3  34.0 - 46.6 % Final    MCV 04/02/2025 81.9  79.0 - 97.0 fL Final    MCH 04/02/2025 26.3 (L)  26.6 - 33.0 pg Final    MCHC 04/02/2025 32.1  31.5 - 35.7 g/dL Final    RDW 04/02/2025 16.9 (H)  12.3 - 15.4 % Final    RDW-SD 04/02/2025 49.2  37.0 - 54.0 fl Final    MPV 04/02/2025 9.8  6.0 - 12.0 fL Final    Platelets 04/02/2025 216  140 - 450 10*3/mm3 Final    Neutrophil % 04/02/2025 39.4 (L)  42.7 - 76.0 % Final    Lymphocyte % 04/02/2025 45.8 (H)  19.6 - 45.3 % Final    Monocyte % 04/02/2025 12.7 (H)  5.0 - 12.0 % Final    Eosinophil % 04/02/2025 1.4  0.3 - 6.2 % Final    Basophil % 04/02/2025 0.5  0.0 - 1.5 % Final    Immature Grans % 04/02/2025 0.2  0.0 - 0.5 % Final    Neutrophils, Absolute 04/02/2025 1.64 (L)  1.70 - 7.00 10*3/mm3 Final    Lymphocytes, Absolute 04/02/2025 1.91  0.70 - 3.10 10*3/mm3 Final    Monocytes, Absolute 04/02/2025 0.53  0.10 - 0.90 10*3/mm3 Final    Eosinophils, Absolute 04/02/2025 0.06  0.00 - 0.40 10*3/mm3 Final    Basophils, Absolute 04/02/2025 0.02  0.00 - 0.20 10*3/mm3 Final    Immature Grans, Absolute 04/02/2025 0.01   0.00 - 0.05 10*3/mm3 Final    nRBC 04/02/2025 0.0  0.0 - 0.2 /100 WBC Final        CT Chest With Contrast Diagnostic  Result Date: 4/11/2025  Narrative: PROCEDURE: CT CHEST, ABDOMEN, PELVIS W CONTRAST-  HISTORY: f/u scans colon cancer; C18.2-Malignant neoplasm of ascending colon; C18.9-Malignant neoplasm of colon, unspecified; C78.7-Secondary malignant neoplasm of liver and intrahepatic bile duct  COMPARISON: 12/9/2024.  PROCEDURE: Axial images were obtained from the thoracic inlet through the pubic symphysis following the administration of Isovue 300 contrast.   FINDINGS:  CHEST: There is no evidence of mediastinal or axillary adenopathy. Heart size is normal. There are no pleural or pericardial effusions. Lung windows reveal no focal opacities or suspicious pulmonary nodules. Bone windows reveal no lytic or destructive lesions.  ABDOMEN: There are 2 hypodense hepatic lesions one with calcification anteriorly and one posteriorly within the right lobe which are stable. There are postsurgical/treatment changes of the liver which are stable.. There is stable mild enlargement of the spleen.. No adrenal mass is present.  The pancreas is normal. The kidneys are unremarkable. The aorta is normal in caliber. There is no free fluid or adenopathy. The abdominal portions of the GI tract are unremarkable.  PELVIS: The appendix is is normal. The uterus is midline. Urinary bladder is collapsed. There is diverticulosis without CT evidence of diverticulitis. There is no significant free fluid or adenopathy. Bone windows reveal no lytic or destructive lesions.      Impression: No acute process.  Stable postsurgical/treatment changes of the liver.    This study was performed with techniques to keep radiation doses as low as reasonably achievable (ALARA). Individualized dose reduction techniques using automated exposure control or adjustment of mA and/or kV according to the patient size were employed.   Images were reviewed,  interpreted, and dictated by Dr. Christine Dale MD Transcribed by José Miguel Murray PA-C.  This report was signed and finalized on 4/11/2025 11:33 AM by Christine Dale MD.      CT Abdomen Pelvis With Contrast  Result Date: 4/11/2025  Narrative: PROCEDURE: CT CHEST, ABDOMEN, PELVIS W CONTRAST-  HISTORY: f/u scans colon cancer; C18.2-Malignant neoplasm of ascending colon; C18.9-Malignant neoplasm of colon, unspecified; C78.7-Secondary malignant neoplasm of liver and intrahepatic bile duct  COMPARISON: 12/9/2024.  PROCEDURE: Axial images were obtained from the thoracic inlet through the pubic symphysis following the administration of Isovue 300 contrast.   FINDINGS:  CHEST: There is no evidence of mediastinal or axillary adenopathy. Heart size is normal. There are no pleural or pericardial effusions. Lung windows reveal no focal opacities or suspicious pulmonary nodules. Bone windows reveal no lytic or destructive lesions.  ABDOMEN: There are 2 hypodense hepatic lesions one with calcification anteriorly and one posteriorly within the right lobe which are stable. There are postsurgical/treatment changes of the liver which are stable.. There is stable mild enlargement of the spleen.. No adrenal mass is present.  The pancreas is normal. The kidneys are unremarkable. The aorta is normal in caliber. There is no free fluid or adenopathy. The abdominal portions of the GI tract are unremarkable.  PELVIS: The appendix is is normal. The uterus is midline. Urinary bladder is collapsed. There is diverticulosis without CT evidence of diverticulitis. There is no significant free fluid or adenopathy. Bone windows reveal no lytic or destructive lesions.      Impression: No acute process.  Stable postsurgical/treatment changes of the liver.    This study was performed with techniques to keep radiation doses as low as reasonably achievable (ALARA). Individualized dose reduction techniques using automated exposure control or adjustment of mA  and/or kV according to the patient size were employed.    Images were reviewed, interpreted, and dictated by Dr. Christine Dale MD Transcribed by José Miguel Murray PA-C.  This report was signed and finalized on 4/11/2025 11:24 AM by Christine Dale MD.        ASSESSMENT: The patient is a very pleasant 54 y.o. female  with right-sided metastatic colon cancer      PLAN:    1.  Right-sided metastatic colon cancer:  A.  Patient was not prepared to start treatment today.  I did recommend she continue with maintenance Vectibix cycle #1 today with 5-FU.  She declines.  We will plan to restart in 1 week.   B.  She will follow-up with us in 3 weeks for maintenance cycle #2.     C.  We will consider maintenance therapy after maximum response.  D.  I will continue to monitor the patient blood work including blood counts kidney function liver function and electrolytes.  E.  I will repeat her Signatera with every other cycle.  April 2 , 2025 went down to 0.16.    F.  I will do 3 months follow-up scan.  This would be due July 2025.  We will start maintenance therapy at this point.    G.  Discussed with the patient blood results from April 2 , 2025 revealed improved liver enzymes with elevated alk phosphatase at 208.      2.  Elevated liver enzymes:  A.  Induced by liver metastases  B. I will repeat labs on return    3.  Chemotherapy-induced nausea, severe:  A.  I will continue IV fluid on day 1 and as needed on day for 3.  B.  I will continue Zofran 8 mg and Compazine 10 mg every 8 hours as needed.    4.  Chemotherapy-induced diarrhea:  A.  I will continue the patient on Imodium as needed    5. Cancer-related pain:  A.  I will continue the patient oxycodone 5 mg twice a day.     6.  Anxiety:  A.  I will continue the patient on Ativan 1 mg nightly as needed.       7.  Hypertension:  A.  She will continue losartan.  B.  I reviewed with the patient this will interfere with our management since chemotherapy typically can cause hypotension we  will have to monitor blood pressure closely.    8.  Dehydration:  A.  I will continue 1 L of IV fluid as needed    9.  Chemotherapy-induced anemia:  A.  Discussed with the patient hemoglobin 11.0 on April 2, 2025  B.  I will continue monitor her CBC prior to each infusion.  C.  I will transfuse as needed for hemoglobin less than 8.    10.  Chemotherapy-induced heartburn:  A.  I will continue pantoprazole 40 mg twice a day.      11.  Treatment related diarrhea  A.  Continue Imodium as directed.  B.  I will continue Lomotil as needed.    12.  Treatment induced peripheral neuropathy:   A.  We will continue gabapentin 100 mg 3 times daily.    13.  Treatment induced dermatitis, severe:  A.  Erbitux was permanently discontinued due to severe treatment induced dermatitis.    B.  For now we will continue patient on Vectibix at 4 mg/kg .  We may consider increasing the dose to full dose at 6 mg/kg.  C.  I will continue hydrocortisone cream.  I will provide patient with HR as the tube is not enough for her bilateral upper extremities.  I advised her to mix with lotion and apply twice daily.  Do not apply to face  D.  I will continue doxycycline 100 mg twice a day.     14.  Treatment induced mucositis:  A.  I will continue Magic mouthwash.    15. Nasal lesion.   A.  Continue mupirocin as needed    16. Anxiety  A.  Encouraged patient to try lexapro. We will adjust as needed. I asked her to call with thoughts of hurting herself or others.   B.  Continue ativan to use the day before treatment for anticipatory nausea.    FOLLOW UP: 3 weeks with maintenance cycle #2.    Total time of patient care including time prior to, face to face with patient, and following visit spent in reviewing records, lab results, imaging studies, discussion with patient, and documentation/charting was > 45 minutes      Above has been updated, reviewed, and accurate as of 4/16/2025    Ana Cristina Cano, APRN  4/16/2025   yes

## 2025-04-16 ENCOUNTER — OFFICE VISIT (OUTPATIENT)
Dept: ONCOLOGY | Facility: CLINIC | Age: 55
End: 2025-04-16
Payer: COMMERCIAL

## 2025-04-16 VITALS
HEIGHT: 62 IN | DIASTOLIC BLOOD PRESSURE: 82 MMHG | WEIGHT: 180.8 LBS | BODY MASS INDEX: 33.27 KG/M2 | TEMPERATURE: 97.1 F | OXYGEN SATURATION: 98 % | SYSTOLIC BLOOD PRESSURE: 126 MMHG | HEART RATE: 84 BPM

## 2025-04-16 DIAGNOSIS — C78.7 METASTATIC COLON CANCER TO LIVER: Primary | ICD-10-CM

## 2025-04-16 DIAGNOSIS — R21 RASH: ICD-10-CM

## 2025-04-16 DIAGNOSIS — C18.9 METASTATIC COLON CANCER TO LIVER: Primary | ICD-10-CM

## 2025-04-16 RX ORDER — SODIUM CHLORIDE 9 MG/ML
20 INJECTION, SOLUTION INTRAVENOUS ONCE
Status: CANCELLED | OUTPATIENT
Start: 2025-04-21

## 2025-04-16 RX ORDER — FLUOROURACIL 50 MG/ML
300 INJECTION, SOLUTION INTRAVENOUS ONCE
Status: CANCELLED | OUTPATIENT
Start: 2025-04-21

## 2025-04-16 RX ORDER — ATROPINE SULFATE 1 MG/ML
0.25 INJECTION, SOLUTION INTRAMUSCULAR; INTRAVENOUS; SUBCUTANEOUS
Status: CANCELLED | OUTPATIENT
Start: 2025-04-21

## 2025-04-16 RX ORDER — DIPHENHYDRAMINE HYDROCHLORIDE 50 MG/ML
50 INJECTION, SOLUTION INTRAMUSCULAR; INTRAVENOUS AS NEEDED
Status: CANCELLED | OUTPATIENT
Start: 2025-04-21

## 2025-04-16 RX ORDER — LORAZEPAM 2 MG/ML
1 INJECTION INTRAMUSCULAR ONCE AS NEEDED
Status: CANCELLED
Start: 2025-04-21

## 2025-04-16 RX ORDER — MEPERIDINE HYDROCHLORIDE 25 MG/ML
25 INJECTION INTRAMUSCULAR; INTRAVENOUS; SUBCUTANEOUS
Status: CANCELLED | OUTPATIENT
Start: 2025-04-21

## 2025-04-16 RX ORDER — FAMOTIDINE 10 MG/ML
20 INJECTION, SOLUTION INTRAVENOUS AS NEEDED
Status: CANCELLED | OUTPATIENT
Start: 2025-04-21

## 2025-04-16 RX ORDER — HYDROCORTISONE 25 MG/G
1 CREAM TOPICAL 2 TIMES DAILY
Qty: 453.6 G | Refills: 1 | Status: SHIPPED | OUTPATIENT
Start: 2025-04-16

## 2025-04-16 RX ORDER — METHYLPREDNISOLONE SODIUM SUCCINATE 125 MG/2ML
125 INJECTION INTRAMUSCULAR; INTRAVENOUS ONCE
Status: CANCELLED | OUTPATIENT
Start: 2025-04-21

## 2025-04-16 RX ORDER — PALONOSETRON 0.05 MG/ML
0.25 INJECTION, SOLUTION INTRAVENOUS ONCE
Status: CANCELLED | OUTPATIENT
Start: 2025-04-21

## 2025-04-16 RX ORDER — HYDROCORTISONE SODIUM SUCCINATE 100 MG/2ML
100 INJECTION INTRAMUSCULAR; INTRAVENOUS AS NEEDED
Status: CANCELLED | OUTPATIENT
Start: 2025-04-21

## 2025-04-16 RX ORDER — FAMOTIDINE 10 MG/ML
20 INJECTION, SOLUTION INTRAVENOUS ONCE
Status: CANCELLED | OUTPATIENT
Start: 2025-04-21

## 2025-04-16 RX ORDER — ACETAMINOPHEN 325 MG/1
650 TABLET ORAL ONCE
Status: CANCELLED | OUTPATIENT
Start: 2025-04-21

## 2025-04-21 ENCOUNTER — HOSPITAL ENCOUNTER (OUTPATIENT)
Facility: HOSPITAL | Age: 55
End: 2025-04-21
Payer: COMMERCIAL

## 2025-04-21 ENCOUNTER — HOSPITAL ENCOUNTER (OUTPATIENT)
Facility: HOSPITAL | Age: 55
Discharge: HOME | End: 2025-04-21
Admitting: NURSE PRACTITIONER
Payer: COMMERCIAL

## 2025-04-21 VITALS
HEIGHT: 62 IN | WEIGHT: 183.8 LBS | TEMPERATURE: 98.1 F | OXYGEN SATURATION: 97 % | BODY MASS INDEX: 33.82 KG/M2 | RESPIRATION RATE: 18 BRPM | HEART RATE: 93 BPM | DIASTOLIC BLOOD PRESSURE: 71 MMHG | SYSTOLIC BLOOD PRESSURE: 112 MMHG

## 2025-04-21 DIAGNOSIS — C78.7 METASTATIC COLON CANCER TO LIVER: Primary | ICD-10-CM

## 2025-04-21 DIAGNOSIS — C18.9 METASTATIC COLON CANCER TO LIVER: Primary | ICD-10-CM

## 2025-04-21 LAB
ALBUMIN SERPL-MCNC: 3.8 G/DL (ref 3.5–5.2)
ALBUMIN/GLOB SERPL: 1.4 G/DL
ALP SERPL-CCNC: 204 U/L (ref 39–117)
ALT SERPL W P-5'-P-CCNC: 26 U/L (ref 1–33)
ANION GAP SERPL CALCULATED.3IONS-SCNC: 11.8 MMOL/L (ref 5–15)
AST SERPL-CCNC: 25 U/L (ref 1–32)
BASOPHILS # BLD AUTO: 0.02 10*3/MM3 (ref 0–0.2)
BASOPHILS NFR BLD AUTO: 0.5 % (ref 0–1.5)
BILIRUB SERPL-MCNC: 0.4 MG/DL (ref 0–1.2)
BUN SERPL-MCNC: 6 MG/DL (ref 6–20)
BUN/CREAT SERPL: 11.1 (ref 7–25)
CALCIUM SPEC-SCNC: 7.9 MG/DL (ref 8.6–10.5)
CHLORIDE SERPL-SCNC: 108 MMOL/L (ref 98–107)
CO2 SERPL-SCNC: 21.2 MMOL/L (ref 22–29)
CREAT SERPL-MCNC: 0.54 MG/DL (ref 0.57–1)
DEPRECATED RDW RBC AUTO: 55.3 FL (ref 37–54)
EGFRCR SERPLBLD CKD-EPI 2021: 109.6 ML/MIN/1.73
EOSINOPHIL # BLD AUTO: 0.05 10*3/MM3 (ref 0–0.4)
EOSINOPHIL NFR BLD AUTO: 1.3 % (ref 0.3–6.2)
ERYTHROCYTE [DISTWIDTH] IN BLOOD BY AUTOMATED COUNT: 18.3 % (ref 12.3–15.4)
GLOBULIN UR ELPH-MCNC: 2.7 GM/DL
GLUCOSE SERPL-MCNC: 104 MG/DL (ref 65–99)
HCT VFR BLD AUTO: 35.6 % (ref 34–46.6)
HGB BLD-MCNC: 11.3 G/DL (ref 12–15.9)
IMM GRANULOCYTES # BLD AUTO: 0.01 10*3/MM3 (ref 0–0.05)
IMM GRANULOCYTES NFR BLD AUTO: 0.3 % (ref 0–0.5)
LYMPHOCYTES # BLD AUTO: 1.64 10*3/MM3 (ref 0.7–3.1)
LYMPHOCYTES NFR BLD AUTO: 44.1 % (ref 19.6–45.3)
MAGNESIUM SERPL-MCNC: 0.9 MG/DL (ref 1.6–2.6)
MCH RBC QN AUTO: 26.7 PG (ref 26.6–33)
MCHC RBC AUTO-ENTMCNC: 31.7 G/DL (ref 31.5–35.7)
MCV RBC AUTO: 84 FL (ref 79–97)
MONOCYTES # BLD AUTO: 0.51 10*3/MM3 (ref 0.1–0.9)
MONOCYTES NFR BLD AUTO: 13.7 % (ref 5–12)
NEUTROPHILS NFR BLD AUTO: 1.49 10*3/MM3 (ref 1.7–7)
NEUTROPHILS NFR BLD AUTO: 40.1 % (ref 42.7–76)
NRBC BLD AUTO-RTO: 0 /100 WBC (ref 0–0.2)
PLATELET # BLD AUTO: 228 10*3/MM3 (ref 140–450)
PMV BLD AUTO: 10 FL (ref 6–12)
POTASSIUM SERPL-SCNC: 3.8 MMOL/L (ref 3.5–5.2)
PROT SERPL-MCNC: 6.5 G/DL (ref 6–8.5)
RBC # BLD AUTO: 4.24 10*6/MM3 (ref 3.77–5.28)
SODIUM SERPL-SCNC: 141 MMOL/L (ref 136–145)
WBC NRBC COR # BLD AUTO: 3.72 10*3/MM3 (ref 3.4–10.8)

## 2025-04-21 PROCEDURE — 25010000002 PALONOSETRON PER 25 MCG: Performed by: NURSE PRACTITIONER

## 2025-04-21 PROCEDURE — G0498 CHEMO EXTEND IV INFUS W/PUMP: HCPCS

## 2025-04-21 PROCEDURE — 25010000002 MAGNESIUM SULFATE 2 GM/50ML SOLUTION: Performed by: INTERNAL MEDICINE

## 2025-04-21 PROCEDURE — 25010000002 METHYLPREDNISOLONE PER 125 MG: Performed by: NURSE PRACTITIONER

## 2025-04-21 PROCEDURE — 25810000003 SODIUM CHLORIDE 0.9 % SOLUTION 250 ML FLEX CONT: Performed by: NURSE PRACTITIONER

## 2025-04-21 PROCEDURE — 25010000002 PANITUMUMAB 400 MG/20ML SOLUTION 20 ML VIAL: Performed by: NURSE PRACTITIONER

## 2025-04-21 PROCEDURE — 83735 ASSAY OF MAGNESIUM: CPT | Performed by: NURSE PRACTITIONER

## 2025-04-21 PROCEDURE — 25010000002 FAMOTIDINE 10 MG/ML SOLUTION: Performed by: NURSE PRACTITIONER

## 2025-04-21 PROCEDURE — 25010000002 LEUCOVORIN CALCIUM PER 50MG: Performed by: NURSE PRACTITIONER

## 2025-04-21 PROCEDURE — 80053 COMPREHEN METABOLIC PANEL: CPT | Performed by: NURSE PRACTITIONER

## 2025-04-21 PROCEDURE — 96367 TX/PROPH/DG ADDL SEQ IV INF: CPT

## 2025-04-21 PROCEDURE — 96375 TX/PRO/DX INJ NEW DRUG ADDON: CPT

## 2025-04-21 PROCEDURE — 96411 CHEMO IV PUSH ADDL DRUG: CPT

## 2025-04-21 PROCEDURE — 85025 COMPLETE CBC W/AUTO DIFF WBC: CPT | Performed by: NURSE PRACTITIONER

## 2025-04-21 PROCEDURE — 96413 CHEMO IV INFUSION 1 HR: CPT

## 2025-04-21 PROCEDURE — 25010000002 FLUOROURACIL PER 500 MG: Performed by: NURSE PRACTITIONER

## 2025-04-21 PROCEDURE — 25010000003 DEXTROSE 5 % SOLUTION 250 ML FLEX CONT: Performed by: NURSE PRACTITIONER

## 2025-04-21 RX ORDER — FLUOROURACIL 50 MG/ML
300 INJECTION, SOLUTION INTRAVENOUS ONCE
Status: COMPLETED | OUTPATIENT
Start: 2025-04-21 | End: 2025-04-21

## 2025-04-21 RX ORDER — MAGNESIUM OXIDE 400 MG/1
400 TABLET ORAL 2 TIMES DAILY
Qty: 60 TABLET | Refills: 0 | Status: SHIPPED | OUTPATIENT
Start: 2025-04-21

## 2025-04-21 RX ORDER — LORAZEPAM 2 MG/ML
1 INJECTION INTRAMUSCULAR ONCE AS NEEDED
Status: DISCONTINUED | OUTPATIENT
Start: 2025-04-21 | End: 2025-04-22 | Stop reason: HOSPADM

## 2025-04-21 RX ORDER — FAMOTIDINE 10 MG/ML
20 INJECTION, SOLUTION INTRAVENOUS ONCE
Status: COMPLETED | OUTPATIENT
Start: 2025-04-21 | End: 2025-04-21

## 2025-04-21 RX ORDER — METHYLPREDNISOLONE SODIUM SUCCINATE 125 MG/2ML
125 INJECTION INTRAMUSCULAR; INTRAVENOUS ONCE
Status: COMPLETED | OUTPATIENT
Start: 2025-04-21 | End: 2025-04-21

## 2025-04-21 RX ORDER — MAGNESIUM SULFATE HEPTAHYDRATE 40 MG/ML
2 INJECTION, SOLUTION INTRAVENOUS ONCE
Status: COMPLETED | OUTPATIENT
Start: 2025-04-21 | End: 2025-04-21

## 2025-04-21 RX ORDER — PALONOSETRON 0.05 MG/ML
0.25 INJECTION, SOLUTION INTRAVENOUS ONCE
Status: COMPLETED | OUTPATIENT
Start: 2025-04-21 | End: 2025-04-21

## 2025-04-21 RX ORDER — ACETAMINOPHEN 325 MG/1
650 TABLET ORAL ONCE
Status: COMPLETED | OUTPATIENT
Start: 2025-04-21 | End: 2025-04-21

## 2025-04-21 RX ADMIN — PALONOSETRON 0.25 MG: 0.05 INJECTION, SOLUTION INTRAVENOUS at 12:18

## 2025-04-21 RX ADMIN — PANITUMUMAB 320 MG: 400 SOLUTION INTRAVENOUS at 13:40

## 2025-04-21 RX ADMIN — ACETAMINOPHEN 650 MG: 325 TABLET, FILM COATED ORAL at 12:18

## 2025-04-21 RX ADMIN — MAGNESIUM SULFATE HEPTAHYDRATE 2 G: 40 INJECTION, SOLUTION INTRAVENOUS at 12:37

## 2025-04-21 RX ADMIN — METHYLPREDNISOLONE SODIUM SUCCINATE 125 MG: 125 INJECTION, POWDER, FOR SOLUTION INTRAMUSCULAR; INTRAVENOUS at 12:18

## 2025-04-21 RX ADMIN — SODIUM CHLORIDE 3280 MG: 9 INJECTION, SOLUTION INTRAVENOUS at 15:25

## 2025-04-21 RX ADMIN — FLUOROURACIL 550 MG: 50 INJECTION, SOLUTION INTRAVENOUS at 15:19

## 2025-04-21 RX ADMIN — FAMOTIDINE 20 MG: 10 INJECTION, SOLUTION INTRAVENOUS at 12:18

## 2025-04-21 RX ADMIN — DEXTROSE MONOHYDRATE 550 MG: 50 INJECTION, SOLUTION INTRAVENOUS at 14:33

## 2025-04-21 NOTE — CODE DOCUMENTATION
IVAD established on first attempt, labs failed to be obtained off site due to sluggish blood return. Peripheral labs drawn on first attempt off R AC. Labs WNL. Treatment provided per tx plan (see EMAR). Pt tolerated tx w/o complications. IVAD kept in place w/ 5FU pump to home d/c, education provided, questions answered. AVS provided. Pt ambulated out of clinic w/o any acute s/sx of distress.

## 2025-04-23 ENCOUNTER — HOSPITAL ENCOUNTER (OUTPATIENT)
Facility: HOSPITAL | Age: 55
Discharge: HOME | End: 2025-04-23
Admitting: INTERNAL MEDICINE
Payer: COMMERCIAL

## 2025-04-23 DIAGNOSIS — C18.2 CANCER OF RIGHT COLON: Primary | ICD-10-CM

## 2025-04-23 PROCEDURE — 96523 IRRIG DRUG DELIVERY DEVICE: CPT

## 2025-04-23 PROCEDURE — 25010000002 HEPARIN LOCK FLUSH PER 10 UNITS: Performed by: INTERNAL MEDICINE

## 2025-04-23 RX ORDER — SODIUM CHLORIDE 0.9 % (FLUSH) 0.9 %
10 SYRINGE (ML) INJECTION AS NEEDED
Status: DISCONTINUED | OUTPATIENT
Start: 2025-04-23 | End: 2025-04-24 | Stop reason: HOSPADM

## 2025-04-23 RX ORDER — HEPARIN SODIUM (PORCINE) LOCK FLUSH IV SOLN 100 UNIT/ML 100 UNIT/ML
500 SOLUTION INTRAVENOUS AS NEEDED
Status: DISCONTINUED | OUTPATIENT
Start: 2025-04-23 | End: 2025-04-24 | Stop reason: HOSPADM

## 2025-04-23 RX ORDER — HEPARIN SODIUM (PORCINE) LOCK FLUSH IV SOLN 100 UNIT/ML 100 UNIT/ML
500 SOLUTION INTRAVENOUS AS NEEDED
OUTPATIENT
Start: 2025-04-23

## 2025-04-23 RX ORDER — SODIUM CHLORIDE 0.9 % (FLUSH) 0.9 %
10 SYRINGE (ML) INJECTION AS NEEDED
OUTPATIENT
Start: 2025-04-23

## 2025-04-23 RX ADMIN — Medication 10 ML: at 15:24

## 2025-04-23 RX ADMIN — HEPARIN 500 UNITS: 100 SYRINGE at 15:24

## 2025-05-06 NOTE — PROGRESS NOTES
DATE OF VISIT: 5/6/2025    REASON FOR VISIT: Followup for metastatic right-sided colon cancer     PROBLEM LIST:  1. Metastatic colon cancer TX NX M1 a stage [place]:  A.  Presented with abdominal pain and jaundice  B.  CT abdomen pelvis done November 04, 2022 confirmed diffuse liver metastases.  C.  Diagnosed after CT-guided liver biopsy done on November 22, 2022 confirming adenocarcinoma CK20 positive CK7 negative.  D.  Colonoscopy done December 08, 2020 to confirm transverse colon mass however biopsy revealed adenoma.  E.  Started palliative chemotherapy with dose adjusted FOLFOXIRI December 14, 2022, status post 12 cycles completed May 2023.  F.  Started maintenance Xeloda 1000 mg twice daily 1 week on 1 week off June 2023.  Stopped April 2024 secondary to progressive disease in the liver.  G.  Status post segment 7 and segment 3 liver met resection with 2 other lesions ablation done by Dr. Nielson April 19, 2024.  H.  Started FOLFIRI with Vectibix August 19, 2024.  Status post 12 cycles.  Completed 12 cycles on April 4, 2025  I.  Started maintenance Vectibix with 5-FU on 4/16/2025  2.  Elevated liver enzymes:  A.  Induced by metastatic disease  3.  Cancer-related pain  4.  Anxiety  5.  Hypertension    HISTORY OF PRESENT ILLNESS: The patient is a very pleasant 54 y.o. female with past medical history significant for metastatic right-sided colon cancer diagnosed November 22, 2022.  Started on palliative chemotherapy with dose adjusted FOLFOXIRI.  She completed 12 cycles May 2023.  She was started on maintenance Xeloda 1000 mg twice daily June 2023.  Progressive disease with worsening PET scan as well as rising CT DNA.  She was started on FOLFIRI with Vectibix August 2024.  The patient is here today for scheduled follow-up visit with maintenance treatment cycle #2.    SUBJECTIVE: ***Amber is here today with her .  She has some bilateral thigh rash on her upper extremities.  She does not take the doxycycline every  day because she forgets.  It is better on her face.  She did have 1 episode of nausea last night but that has resolved.  Continues to have anticipatory nausea.  She was not ready to do treatment today and is requesting to be delayed.        Past History:  Medical History: has a past medical history of Anxiety, Cancer, Ear piercing, Gallstones, History of irritable bowel syndrome, Hypertension, Seasonal allergies, Tattoos, and Wears glasses.   Surgical History: has a past surgical history that includes postpartum tubal ligation; Dilation and curettage of uterus (2013); Tumor removal (2013); Portacath placement (N/A, 12/8/2022); Colonoscopy (N/A, 12/8/2022); diagnostic laparoscopy exploratory laparotomy (N/A, 4/19/2024); Cholecystectomy (N/A, 4/19/2024); and Liver resection (Left, 4/19/2024).   Family History: family history is not on file.   Social History: reports that she has never smoked. She has never been exposed to tobacco smoke. She has never used smokeless tobacco. She reports that she does not currently use alcohol. She reports that she does not use drugs.    (Not in a hospital admission)     Allergies: Losartan, Valium [diazepam], and Citrus     Review of Systems   Constitutional:  Positive for fatigue.   Gastrointestinal:  Positive for nausea.   Musculoskeletal:  Positive for arthralgias.   Skin:  Positive for wound.        Facial acne like rash.   Psychiatric/Behavioral:  The patient is nervous/anxious.        PHYSICAL EXAMINATION:   There were no vitals taken for this visit.   There were no vitals filed for this visit.                                 ECOG Performance Status: 1 - Symptomatic but completely ambulatory      General Appearance:      alert, cooperative, no apparent distress and appears stated age   Lungs:   Clear to auscultation bilaterally; respirations regular, even, and unlabored bilaterally   Heart:  Regular rate and rhythm, no murmurs appreciated   Abdomen:   Soft, non-tender, and  non-distended                 No visits with results within 2 Week(s) from this visit.   Latest known visit with results is:   Hospital Outpatient Visit on 04/21/2025   Component Date Value Ref Range Status    Magnesium 04/21/2025 0.9 (C)  1.6 - 2.6 mg/dL Final    Glucose 04/21/2025 104 (H)  65 - 99 mg/dL Final    BUN 04/21/2025 6  6 - 20 mg/dL Final    Creatinine 04/21/2025 0.54 (L)  0.57 - 1.00 mg/dL Final    Sodium 04/21/2025 141  136 - 145 mmol/L Final    Potassium 04/21/2025 3.8  3.5 - 5.2 mmol/L Final    Chloride 04/21/2025 108 (H)  98 - 107 mmol/L Final    CO2 04/21/2025 21.2 (L)  22.0 - 29.0 mmol/L Final    Calcium 04/21/2025 7.9 (L)  8.6 - 10.5 mg/dL Final    Total Protein 04/21/2025 6.5  6.0 - 8.5 g/dL Final    Albumin 04/21/2025 3.8  3.5 - 5.2 g/dL Final    ALT (SGPT) 04/21/2025 26  1 - 33 U/L Final    AST (SGOT) 04/21/2025 25  1 - 32 U/L Final    Alkaline Phosphatase 04/21/2025 204 (H)  39 - 117 U/L Final    Total Bilirubin 04/21/2025 0.4  0.0 - 1.2 mg/dL Final    Globulin 04/21/2025 2.7  gm/dL Final    A/G Ratio 04/21/2025 1.4  g/dL Final    BUN/Creatinine Ratio 04/21/2025 11.1  7.0 - 25.0 Final    Anion Gap 04/21/2025 11.8  5.0 - 15.0 mmol/L Final    eGFR 04/21/2025 109.6  >60.0 mL/min/1.73 Final    WBC 04/21/2025 3.72  3.40 - 10.80 10*3/mm3 Final    RBC 04/21/2025 4.24  3.77 - 5.28 10*6/mm3 Final    Hemoglobin 04/21/2025 11.3 (L)  12.0 - 15.9 g/dL Final    Hematocrit 04/21/2025 35.6  34.0 - 46.6 % Final    MCV 04/21/2025 84.0  79.0 - 97.0 fL Final    MCH 04/21/2025 26.7  26.6 - 33.0 pg Final    MCHC 04/21/2025 31.7  31.5 - 35.7 g/dL Final    RDW 04/21/2025 18.3 (H)  12.3 - 15.4 % Final    RDW-SD 04/21/2025 55.3 (H)  37.0 - 54.0 fl Final    MPV 04/21/2025 10.0  6.0 - 12.0 fL Final    Platelets 04/21/2025 228  140 - 450 10*3/mm3 Final    Neutrophil % 04/21/2025 40.1 (L)  42.7 - 76.0 % Final    Lymphocyte % 04/21/2025 44.1  19.6 - 45.3 % Final    Monocyte % 04/21/2025 13.7 (H)  5.0 - 12.0 % Final     Eosinophil % 04/21/2025 1.3  0.3 - 6.2 % Final    Basophil % 04/21/2025 0.5  0.0 - 1.5 % Final    Immature Grans % 04/21/2025 0.3  0.0 - 0.5 % Final    Neutrophils, Absolute 04/21/2025 1.49 (L)  1.70 - 7.00 10*3/mm3 Final    Lymphocytes, Absolute 04/21/2025 1.64  0.70 - 3.10 10*3/mm3 Final    Monocytes, Absolute 04/21/2025 0.51  0.10 - 0.90 10*3/mm3 Final    Eosinophils, Absolute 04/21/2025 0.05  0.00 - 0.40 10*3/mm3 Final    Basophils, Absolute 04/21/2025 0.02  0.00 - 0.20 10*3/mm3 Final    Immature Grans, Absolute 04/21/2025 0.01  0.00 - 0.05 10*3/mm3 Final    nRBC 04/21/2025 0.0  0.0 - 0.2 /100 WBC Final        CT Chest With Contrast Diagnostic  Result Date: 4/11/2025  Narrative: PROCEDURE: CT CHEST, ABDOMEN, PELVIS W CONTRAST-  HISTORY: f/u scans colon cancer; C18.2-Malignant neoplasm of ascending colon; C18.9-Malignant neoplasm of colon, unspecified; C78.7-Secondary malignant neoplasm of liver and intrahepatic bile duct  COMPARISON: 12/9/2024.  PROCEDURE: Axial images were obtained from the thoracic inlet through the pubic symphysis following the administration of Isovue 300 contrast.   FINDINGS:  CHEST: There is no evidence of mediastinal or axillary adenopathy. Heart size is normal. There are no pleural or pericardial effusions. Lung windows reveal no focal opacities or suspicious pulmonary nodules. Bone windows reveal no lytic or destructive lesions.  ABDOMEN: There are 2 hypodense hepatic lesions one with calcification anteriorly and one posteriorly within the right lobe which are stable. There are postsurgical/treatment changes of the liver which are stable.. There is stable mild enlargement of the spleen.. No adrenal mass is present.  The pancreas is normal. The kidneys are unremarkable. The aorta is normal in caliber. There is no free fluid or adenopathy. The abdominal portions of the GI tract are unremarkable.  PELVIS: The appendix is is normal. The uterus is midline. Urinary bladder is collapsed. There  is diverticulosis without CT evidence of diverticulitis. There is no significant free fluid or adenopathy. Bone windows reveal no lytic or destructive lesions.      Impression: No acute process.  Stable postsurgical/treatment changes of the liver.    This study was performed with techniques to keep radiation doses as low as reasonably achievable (ALARA). Individualized dose reduction techniques using automated exposure control or adjustment of mA and/or kV according to the patient size were employed.   Images were reviewed, interpreted, and dictated by Dr. Christine Dale MD Transcribed by José Miguel Murray PA-C.  This report was signed and finalized on 4/11/2025 11:33 AM by Christine Dale MD.      CT Abdomen Pelvis With Contrast  Result Date: 4/11/2025  Narrative: PROCEDURE: CT CHEST, ABDOMEN, PELVIS W CONTRAST-  HISTORY: f/u scans colon cancer; C18.2-Malignant neoplasm of ascending colon; C18.9-Malignant neoplasm of colon, unspecified; C78.7-Secondary malignant neoplasm of liver and intrahepatic bile duct  COMPARISON: 12/9/2024.  PROCEDURE: Axial images were obtained from the thoracic inlet through the pubic symphysis following the administration of Isovue 300 contrast.   FINDINGS:  CHEST: There is no evidence of mediastinal or axillary adenopathy. Heart size is normal. There are no pleural or pericardial effusions. Lung windows reveal no focal opacities or suspicious pulmonary nodules. Bone windows reveal no lytic or destructive lesions.  ABDOMEN: There are 2 hypodense hepatic lesions one with calcification anteriorly and one posteriorly within the right lobe which are stable. There are postsurgical/treatment changes of the liver which are stable.. There is stable mild enlargement of the spleen.. No adrenal mass is present.  The pancreas is normal. The kidneys are unremarkable. The aorta is normal in caliber. There is no free fluid or adenopathy. The abdominal portions of the GI tract are unremarkable.  PELVIS: The  appendix is is normal. The uterus is midline. Urinary bladder is collapsed. There is diverticulosis without CT evidence of diverticulitis. There is no significant free fluid or adenopathy. Bone windows reveal no lytic or destructive lesions.      Impression: No acute process.  Stable postsurgical/treatment changes of the liver.    This study was performed with techniques to keep radiation doses as low as reasonably achievable (ALARA). Individualized dose reduction techniques using automated exposure control or adjustment of mA and/or kV according to the patient size were employed.    Images were reviewed, interpreted, and dictated by Dr. Christine Dale MD Transcribed by José Miguel Murray PA-C.  This report was signed and finalized on 4/11/2025 11:24 AM by Christine Dale MD.        ASSESSMENT: The patient is a very pleasant 54 y.o. female  with right-sided metastatic colon cancer      PLAN:    1.  Right-sided metastatic colon cancer:  A.  Patient was not prepared to start treatment today.  I did recommend she continue with maintenance Vectibix cycle #2 today with 5-FU.  She declines.  We will plan to restart in 1 week.   B.  She will follow-up with us in 3 weeks for maintenance cycle #3.     C.  We will consider maintenance therapy after maximum response.  D.  I will continue to monitor the patient blood work including blood counts kidney function liver function and electrolytes.  E.  I will repeat her Signatera with every other cycle.  April 2 , 2025 went down to 0.16.  I will follow-up on today's results.  F.  I will do 3 months follow-up scan.  This would be due July 2025.  We will start maintenance therapy at this point.    G.  Discussed with the patient blood results from April 21, 2025 revealed improved liver enzymes with elevated alk phosphatase at 204.      2.  Elevated liver enzymes:  A.  Induced by liver metastases  B. I will repeat labs on return    3.  Chemotherapy-induced nausea, severe:  A.  I will continue IV  fluid on day 1 and as needed on day for 3.  B.  I will continue Zofran 8 mg and Compazine 10 mg every 8 hours as needed.    4.  Chemotherapy-induced diarrhea:  A.  I will continue the patient on Imodium as needed    5. Cancer-related pain:  A.  I will continue the patient oxycodone 5 mg twice a day.     6.  Anxiety:  A.  I will continue the patient on Ativan 1 mg nightly as needed.       7.  Hypertension:  A.  She will continue losartan.  B.  I reviewed with the patient this will interfere with our management since chemotherapy typically can cause hypotension we will have to monitor blood pressure closely.    8.  Dehydration:  A.  I will continue 1 L of IV fluid as needed    9.  Chemotherapy-induced anemia:  A.  Discussed with the patient hemoglobin 11.0 on April 2, 2025  B.  I will continue monitor her CBC prior to each infusion.  C.  I will transfuse as needed for hemoglobin less than 8.    10.  Chemotherapy-induced heartburn:  A.  I will continue pantoprazole 40 mg twice a day.      11.  Treatment related diarrhea  A.  Continue Imodium as directed.  B.  I will continue Lomotil as needed.    12.  Treatment induced peripheral neuropathy:   A.  We will continue gabapentin 100 mg 3 times daily.    13.  Treatment induced dermatitis, severe:  A.  Erbitux was permanently discontinued due to severe treatment induced dermatitis.    B.  For now we will continue patient on Vectibix at 4 mg/kg .  We may consider increasing the dose to full dose at 6 mg/kg.  C.  I will continue hydrocortisone cream.  I will provide patient with larger dose as the tube is not enough for her bilateral upper extremities.  I advised her to mix with lotion and apply twice daily.  Do not apply to face  D.  I will continue doxycycline 100 mg twice a day.     14.  Treatment induced mucositis:  A.  I will continue Magic mouthwash.    15. Nasal lesion.   A.  Continue mupirocin as needed    16. Anxiety  A.  Encouraged patient to try lexapro. We will  adjust as needed. I asked her to call with thoughts of hurting herself or others.   B.  Continue ativan to use the day before treatment for anticipatory nausea.    FOLLOW UP: 3 weeks with maintenance cycle #3.    Total time of patient care including time prior to, face to face with patient, and following visit spent in reviewing records, lab results, imaging studies, discussion with patient, and documentation/charting was > 45 minutes      Above has been updated, reviewed, and accurate as of 4/16/2025    Ana Cristina Cano, APRN  5/6/2025

## 2025-05-07 ENCOUNTER — HOSPITAL ENCOUNTER (OUTPATIENT)
Facility: HOSPITAL | Age: 55
Discharge: HOME | End: 2025-05-07
Admitting: INTERNAL MEDICINE
Payer: COMMERCIAL

## 2025-05-07 ENCOUNTER — OFFICE VISIT (OUTPATIENT)
Dept: ONCOLOGY | Facility: CLINIC | Age: 55
End: 2025-05-07
Payer: COMMERCIAL

## 2025-05-07 ENCOUNTER — APPOINTMENT (OUTPATIENT)
Facility: HOSPITAL | Age: 55
End: 2025-05-07
Payer: COMMERCIAL

## 2025-05-07 VITALS
DIASTOLIC BLOOD PRESSURE: 84 MMHG | WEIGHT: 178.1 LBS | HEART RATE: 87 BPM | HEIGHT: 62 IN | TEMPERATURE: 97.7 F | BODY MASS INDEX: 32.77 KG/M2 | SYSTOLIC BLOOD PRESSURE: 139 MMHG | OXYGEN SATURATION: 99 %

## 2025-05-07 DIAGNOSIS — C78.7 ADENOCARCINOMA OF COLON METASTATIC TO LIVER: Primary | ICD-10-CM

## 2025-05-07 DIAGNOSIS — C78.7 METASTATIC COLON CANCER TO LIVER: ICD-10-CM

## 2025-05-07 DIAGNOSIS — C18.9 ADENOCARCINOMA OF COLON METASTATIC TO LIVER: Primary | ICD-10-CM

## 2025-05-07 DIAGNOSIS — C18.2 CANCER OF RIGHT COLON: ICD-10-CM

## 2025-05-07 DIAGNOSIS — C18.2 CANCER OF RIGHT COLON: Primary | ICD-10-CM

## 2025-05-07 DIAGNOSIS — C18.9 METASTATIC COLON CANCER TO LIVER: ICD-10-CM

## 2025-05-07 LAB
ALBUMIN SERPL-MCNC: 3.6 G/DL (ref 3.5–5.2)
ALBUMIN/GLOB SERPL: 1.1 G/DL
ALP SERPL-CCNC: 186 U/L (ref 39–117)
ALT SERPL W P-5'-P-CCNC: 23 U/L (ref 1–33)
ANION GAP SERPL CALCULATED.3IONS-SCNC: 12.7 MMOL/L (ref 5–15)
AST SERPL-CCNC: 28 U/L (ref 1–32)
BASOPHILS # BLD AUTO: 0.02 10*3/MM3 (ref 0–0.2)
BASOPHILS NFR BLD AUTO: 0.5 % (ref 0–1.5)
BILIRUB SERPL-MCNC: 0.5 MG/DL (ref 0–1.2)
BUN SERPL-MCNC: 6 MG/DL (ref 6–20)
BUN/CREAT SERPL: 8 (ref 7–25)
CALCIUM SPEC-SCNC: 8.5 MG/DL (ref 8.6–10.5)
CHLORIDE SERPL-SCNC: 104 MMOL/L (ref 98–107)
CO2 SERPL-SCNC: 22.3 MMOL/L (ref 22–29)
CREAT SERPL-MCNC: 0.75 MG/DL (ref 0.57–1)
DEPRECATED RDW RBC AUTO: 54.1 FL (ref 37–54)
EGFRCR SERPLBLD CKD-EPI 2021: 94.7 ML/MIN/1.73
EOSINOPHIL # BLD AUTO: 0.14 10*3/MM3 (ref 0–0.4)
EOSINOPHIL NFR BLD AUTO: 3.3 % (ref 0.3–6.2)
ERYTHROCYTE [DISTWIDTH] IN BLOOD BY AUTOMATED COUNT: 17.8 % (ref 12.3–15.4)
GLOBULIN UR ELPH-MCNC: 3.2 GM/DL
GLUCOSE SERPL-MCNC: 90 MG/DL (ref 65–99)
HCT VFR BLD AUTO: 35.3 % (ref 34–46.6)
HGB BLD-MCNC: 11.2 G/DL (ref 12–15.9)
IMM GRANULOCYTES # BLD AUTO: 0.01 10*3/MM3 (ref 0–0.05)
IMM GRANULOCYTES NFR BLD AUTO: 0.2 % (ref 0–0.5)
LYMPHOCYTES # BLD AUTO: 1.44 10*3/MM3 (ref 0.7–3.1)
LYMPHOCYTES NFR BLD AUTO: 33.6 % (ref 19.6–45.3)
MAGNESIUM SERPL-MCNC: 1.1 MG/DL (ref 1.6–2.6)
MCH RBC QN AUTO: 26.5 PG (ref 26.6–33)
MCHC RBC AUTO-ENTMCNC: 31.7 G/DL (ref 31.5–35.7)
MCV RBC AUTO: 83.6 FL (ref 79–97)
MONOCYTES # BLD AUTO: 0.44 10*3/MM3 (ref 0.1–0.9)
MONOCYTES NFR BLD AUTO: 10.3 % (ref 5–12)
NEUTROPHILS NFR BLD AUTO: 2.23 10*3/MM3 (ref 1.7–7)
NEUTROPHILS NFR BLD AUTO: 52.1 % (ref 42.7–76)
NRBC BLD AUTO-RTO: 0 /100 WBC (ref 0–0.2)
PLATELET # BLD AUTO: 210 10*3/MM3 (ref 140–450)
PMV BLD AUTO: 11.4 FL (ref 6–12)
POTASSIUM SERPL-SCNC: 3.5 MMOL/L (ref 3.5–5.2)
PROT SERPL-MCNC: 6.8 G/DL (ref 6–8.5)
RBC # BLD AUTO: 4.22 10*6/MM3 (ref 3.77–5.28)
SODIUM SERPL-SCNC: 139 MMOL/L (ref 136–145)
WBC NRBC COR # BLD AUTO: 4.28 10*3/MM3 (ref 3.4–10.8)

## 2025-05-07 PROCEDURE — 96413 CHEMO IV INFUSION 1 HR: CPT

## 2025-05-07 PROCEDURE — 1126F AMNT PAIN NOTED NONE PRSNT: CPT | Performed by: INTERNAL MEDICINE

## 2025-05-07 PROCEDURE — 25010000002 FAMOTIDINE 10 MG/ML SOLUTION: Performed by: INTERNAL MEDICINE

## 2025-05-07 PROCEDURE — 99214 OFFICE O/P EST MOD 30 MIN: CPT | Performed by: INTERNAL MEDICINE

## 2025-05-07 PROCEDURE — 25010000002 LEUCOVORIN 500 MG RECONSTITUTED SOLUTION 1 EACH VIAL: Performed by: INTERNAL MEDICINE

## 2025-05-07 PROCEDURE — 25010000002 ALTEPLASE 2 MG RECONSTITUTED SOLUTION: Performed by: NURSE PRACTITIONER

## 2025-05-07 PROCEDURE — 3079F DIAST BP 80-89 MM HG: CPT | Performed by: INTERNAL MEDICINE

## 2025-05-07 PROCEDURE — 25010000002 FLUOROURACIL PER 500 MG: Performed by: INTERNAL MEDICINE

## 2025-05-07 PROCEDURE — 25010000002 PALONOSETRON PER 25 MCG: Performed by: INTERNAL MEDICINE

## 2025-05-07 PROCEDURE — 96366 THER/PROPH/DIAG IV INF ADDON: CPT

## 2025-05-07 PROCEDURE — 96367 TX/PROPH/DG ADDL SEQ IV INF: CPT

## 2025-05-07 PROCEDURE — 96411 CHEMO IV PUSH ADDL DRUG: CPT

## 2025-05-07 PROCEDURE — 83735 ASSAY OF MAGNESIUM: CPT | Performed by: INTERNAL MEDICINE

## 2025-05-07 PROCEDURE — 96375 TX/PRO/DX INJ NEW DRUG ADDON: CPT

## 2025-05-07 PROCEDURE — 25010000002 METHYLPREDNISOLONE PER 125 MG: Performed by: INTERNAL MEDICINE

## 2025-05-07 PROCEDURE — 36593 DECLOT VASCULAR DEVICE: CPT

## 2025-05-07 PROCEDURE — 25010000003 DEXTROSE 5 % SOLUTION 250 ML FLEX CONT: Performed by: INTERNAL MEDICINE

## 2025-05-07 PROCEDURE — 25010000002 PANITUMUMAB 400 MG/20ML SOLUTION 20 ML VIAL: Performed by: INTERNAL MEDICINE

## 2025-05-07 PROCEDURE — 3075F SYST BP GE 130 - 139MM HG: CPT | Performed by: INTERNAL MEDICINE

## 2025-05-07 PROCEDURE — 85025 COMPLETE CBC W/AUTO DIFF WBC: CPT | Performed by: INTERNAL MEDICINE

## 2025-05-07 PROCEDURE — 96409 CHEMO IV PUSH SNGL DRUG: CPT

## 2025-05-07 PROCEDURE — 80053 COMPREHEN METABOLIC PANEL: CPT | Performed by: INTERNAL MEDICINE

## 2025-05-07 PROCEDURE — 25010000002 LEUCOVORIN CALCIUM PER 50 MG: Performed by: INTERNAL MEDICINE

## 2025-05-07 PROCEDURE — 25810000003 SODIUM CHLORIDE 0.9 % SOLUTION 250 ML FLEX CONT: Performed by: INTERNAL MEDICINE

## 2025-05-07 PROCEDURE — G0498 CHEMO EXTEND IV INFUS W/PUMP: HCPCS

## 2025-05-07 RX ORDER — MEPERIDINE HYDROCHLORIDE 25 MG/ML
25 INJECTION INTRAMUSCULAR; INTRAVENOUS; SUBCUTANEOUS
OUTPATIENT
Start: 2025-05-21

## 2025-05-07 RX ORDER — HYDROCORTISONE SODIUM SUCCINATE 100 MG/2ML
100 INJECTION INTRAMUSCULAR; INTRAVENOUS AS NEEDED
Status: CANCELLED | OUTPATIENT
Start: 2025-05-07

## 2025-05-07 RX ORDER — FAMOTIDINE 10 MG/ML
20 INJECTION, SOLUTION INTRAVENOUS ONCE
OUTPATIENT
Start: 2025-05-21

## 2025-05-07 RX ORDER — FLUOROURACIL 50 MG/ML
300 INJECTION, SOLUTION INTRAVENOUS ONCE
OUTPATIENT
Start: 2025-05-21

## 2025-05-07 RX ORDER — ACETAMINOPHEN 325 MG/1
650 TABLET ORAL ONCE
OUTPATIENT
Start: 2025-05-21

## 2025-05-07 RX ORDER — MEPERIDINE HYDROCHLORIDE 25 MG/ML
25 INJECTION INTRAMUSCULAR; INTRAVENOUS; SUBCUTANEOUS
Status: CANCELLED | OUTPATIENT
Start: 2025-05-07

## 2025-05-07 RX ORDER — DIPHENHYDRAMINE HYDROCHLORIDE 50 MG/ML
50 INJECTION, SOLUTION INTRAMUSCULAR; INTRAVENOUS AS NEEDED
Status: CANCELLED | OUTPATIENT
Start: 2025-05-07

## 2025-05-07 RX ORDER — METHYLPREDNISOLONE SODIUM SUCCINATE 125 MG/2ML
125 INJECTION INTRAMUSCULAR; INTRAVENOUS ONCE
Status: COMPLETED | OUTPATIENT
Start: 2025-05-07 | End: 2025-05-07

## 2025-05-07 RX ORDER — PALONOSETRON 0.05 MG/ML
0.25 INJECTION, SOLUTION INTRAVENOUS ONCE
Status: COMPLETED | OUTPATIENT
Start: 2025-05-07 | End: 2025-05-07

## 2025-05-07 RX ORDER — ATROPINE SULFATE 1 MG/ML
0.25 INJECTION, SOLUTION INTRAMUSCULAR; INTRAVENOUS; SUBCUTANEOUS
Status: CANCELLED | OUTPATIENT
Start: 2025-05-07

## 2025-05-07 RX ORDER — SODIUM CHLORIDE 9 MG/ML
20 INJECTION, SOLUTION INTRAVENOUS ONCE
Status: DISCONTINUED | OUTPATIENT
Start: 2025-05-07 | End: 2025-05-08 | Stop reason: HOSPADM

## 2025-05-07 RX ORDER — WATER 10 ML/10ML
INJECTION INTRAMUSCULAR; INTRAVENOUS; SUBCUTANEOUS
Status: DISPENSED
Start: 2025-05-07 | End: 2025-05-07

## 2025-05-07 RX ORDER — METHYLPREDNISOLONE SODIUM SUCCINATE 125 MG/2ML
125 INJECTION INTRAMUSCULAR; INTRAVENOUS ONCE
OUTPATIENT
Start: 2025-05-21

## 2025-05-07 RX ORDER — ACETAMINOPHEN 325 MG/1
650 TABLET ORAL ONCE
Status: COMPLETED | OUTPATIENT
Start: 2025-05-07 | End: 2025-05-07

## 2025-05-07 RX ORDER — FLUOROURACIL 50 MG/ML
300 INJECTION, SOLUTION INTRAVENOUS ONCE
Status: CANCELLED | OUTPATIENT
Start: 2025-05-07

## 2025-05-07 RX ORDER — HYDROCORTISONE SODIUM SUCCINATE 100 MG/2ML
100 INJECTION INTRAMUSCULAR; INTRAVENOUS AS NEEDED
OUTPATIENT
Start: 2025-05-21

## 2025-05-07 RX ORDER — FLUOROURACIL 50 MG/ML
300 INJECTION, SOLUTION INTRAVENOUS ONCE
Status: COMPLETED | OUTPATIENT
Start: 2025-05-07 | End: 2025-05-07

## 2025-05-07 RX ORDER — ATROPINE SULFATE 0.4 MG/ML
0.25 INJECTION, SOLUTION INTRAMUSCULAR; INTRAVENOUS; SUBCUTANEOUS
Status: DISCONTINUED | OUTPATIENT
Start: 2025-05-07 | End: 2025-05-08 | Stop reason: HOSPADM

## 2025-05-07 RX ORDER — LORAZEPAM 2 MG/ML
1 INJECTION INTRAMUSCULAR ONCE AS NEEDED
Status: DISCONTINUED | OUTPATIENT
Start: 2025-05-07 | End: 2025-05-08 | Stop reason: HOSPADM

## 2025-05-07 RX ORDER — ATROPINE SULFATE 1 MG/ML
0.25 INJECTION, SOLUTION INTRAMUSCULAR; INTRAVENOUS; SUBCUTANEOUS
OUTPATIENT
Start: 2025-05-21

## 2025-05-07 RX ORDER — FAMOTIDINE 10 MG/ML
20 INJECTION, SOLUTION INTRAVENOUS ONCE
Status: COMPLETED | OUTPATIENT
Start: 2025-05-07 | End: 2025-05-07

## 2025-05-07 RX ORDER — DIPHENHYDRAMINE HYDROCHLORIDE 50 MG/ML
50 INJECTION, SOLUTION INTRAMUSCULAR; INTRAVENOUS AS NEEDED
OUTPATIENT
Start: 2025-05-21

## 2025-05-07 RX ORDER — LORAZEPAM 2 MG/ML
1 INJECTION INTRAMUSCULAR ONCE AS NEEDED
OUTPATIENT
Start: 2025-05-21

## 2025-05-07 RX ORDER — PALONOSETRON 0.05 MG/ML
0.25 INJECTION, SOLUTION INTRAVENOUS ONCE
Status: CANCELLED | OUTPATIENT
Start: 2025-05-07

## 2025-05-07 RX ORDER — FAMOTIDINE 10 MG/ML
20 INJECTION, SOLUTION INTRAVENOUS AS NEEDED
Status: CANCELLED | OUTPATIENT
Start: 2025-05-07

## 2025-05-07 RX ORDER — FAMOTIDINE 10 MG/ML
20 INJECTION, SOLUTION INTRAVENOUS ONCE
Status: CANCELLED | OUTPATIENT
Start: 2025-05-07

## 2025-05-07 RX ORDER — SODIUM CHLORIDE 9 MG/ML
20 INJECTION, SOLUTION INTRAVENOUS ONCE
OUTPATIENT
Start: 2025-05-21

## 2025-05-07 RX ORDER — PALONOSETRON 0.05 MG/ML
0.25 INJECTION, SOLUTION INTRAVENOUS ONCE
OUTPATIENT
Start: 2025-05-21

## 2025-05-07 RX ORDER — LORAZEPAM 2 MG/ML
1 INJECTION INTRAMUSCULAR ONCE AS NEEDED
Status: CANCELLED | OUTPATIENT
Start: 2025-05-07

## 2025-05-07 RX ORDER — ACETAMINOPHEN 325 MG/1
650 TABLET ORAL ONCE
Status: CANCELLED | OUTPATIENT
Start: 2025-05-07

## 2025-05-07 RX ORDER — FAMOTIDINE 10 MG/ML
20 INJECTION, SOLUTION INTRAVENOUS AS NEEDED
OUTPATIENT
Start: 2025-05-21

## 2025-05-07 RX ORDER — SODIUM CHLORIDE 9 MG/ML
20 INJECTION, SOLUTION INTRAVENOUS ONCE
Status: CANCELLED | OUTPATIENT
Start: 2025-05-07

## 2025-05-07 RX ORDER — METHYLPREDNISOLONE SODIUM SUCCINATE 125 MG/2ML
125 INJECTION INTRAMUSCULAR; INTRAVENOUS ONCE
Status: CANCELLED | OUTPATIENT
Start: 2025-05-07

## 2025-05-07 RX ADMIN — ALTEPLASE: 2.2 INJECTION, POWDER, LYOPHILIZED, FOR SOLUTION INTRAVENOUS at 09:42

## 2025-05-07 RX ADMIN — PANITUMUMAB 320 MG: 400 SOLUTION INTRAVENOUS at 12:39

## 2025-05-07 RX ADMIN — ACETAMINOPHEN 650 MG: 325 TABLET, FILM COATED ORAL at 11:56

## 2025-05-07 RX ADMIN — PALONOSETRON 0.25 MG: 0.05 INJECTION, SOLUTION INTRAVENOUS at 11:53

## 2025-05-07 RX ADMIN — FLUOROURACIL 550 MG: 50 INJECTION, SOLUTION INTRAVENOUS at 14:05

## 2025-05-07 RX ADMIN — SODIUM CHLORIDE 3280 MG: 9 INJECTION, SOLUTION INTRAVENOUS at 14:06

## 2025-05-07 RX ADMIN — LEUCOVORIN CALCIUM 550 MG: 500 INJECTION, POWDER, LYOPHILIZED, FOR SOLUTION INTRAMUSCULAR; INTRAVENOUS at 13:22

## 2025-05-07 RX ADMIN — METHYLPREDNISOLONE SODIUM SUCCINATE 125 MG: 125 INJECTION, POWDER, FOR SOLUTION INTRAMUSCULAR; INTRAVENOUS at 12:00

## 2025-05-07 RX ADMIN — FAMOTIDINE 20 MG: 10 INJECTION, SOLUTION INTRAVENOUS at 11:56

## 2025-05-07 NOTE — PROGRESS NOTES
DATE OF VISIT: 5/7/2025    REASON FOR VISIT: Followup for metastatic right-sided colon cancer     PROBLEM LIST:  1. Metastatic colon cancer [place] NX M1 a stage Perry:  A.  Presented with abdominal pain and jaundice  B.  CT abdomen pelvis done November 04, 2022 confirmed diffuse liver metastases.  C.  Diagnosed after CT-guided liver biopsy done on November 22, 2022 confirming adenocarcinoma CK20 positive CK7 negative.  D.  Colonoscopy done December 08, 2020 to confirm transverse colon mass however biopsy revealed adenoma.  E.  Started palliative chemotherapy with dose adjusted FOLFOXIRI December 14, 2022, status post 12 cycles completed May 2023.  F.  Started maintenance Xeloda 1000 mg twice daily 1 week on 1 week off June 2023.  Stopped April 2024 secondary to progressive disease in the liver.  G.  Status post segment 7 and segment 3 liver met resection with 2 other lesions ablation done by Dr. Nielson April 19, 2024.  H.  Started FOLFIRI with Vectibix August 19, 2024.  Status post 12 cycles.  Completed 12 cycles on April 4, 2025  I.  Started maintenance Vectibix with 5-FU on 4/16/2025  2.  Elevated liver enzymes:  A.  Induced by metastatic disease  3.  Cancer-related pain  4.  Anxiety  5.  Hypertension    HISTORY OF PRESENT ILLNESS: The patient is a very pleasant 54 y.o. female with past medical history significant for metastatic right-sided colon cancer diagnosed November 22, 2022.  Started on palliative chemotherapy with dose adjusted FOLFOXIRI.  She completed 12 cycles May 2023.  She was started on maintenance Xeloda 1000 mg twice daily June 2023.  Progressive disease with worsening PET scan as well as rising CT DNA.  She was started on FOLFIRI with Vectibix August 2024.  The patient is here today for scheduled follow-up visit with maintenance treatment cycle #2.    SUBJECTIVE: Amber is here today with her .  All in all she is doing well.  Denies any fever chills night sweats but she continues to have skin  "rash.    Past History:  Medical History: has a past medical history of Anxiety, Cancer, Ear piercing, Gallstones, History of irritable bowel syndrome, Hypertension, Seasonal allergies, Tattoos, and Wears glasses.   Surgical History: has a past surgical history that includes postpartum tubal ligation; Dilation and curettage of uterus (2013); Tumor removal (2013); Portacath placement (N/A, 12/8/2022); Colonoscopy (N/A, 12/8/2022); diagnostic laparoscopy exploratory laparotomy (N/A, 4/19/2024); Cholecystectomy (N/A, 4/19/2024); and Liver resection (Left, 4/19/2024).   Family History: family history is not on file.   Social History: reports that she has never smoked. She has never been exposed to tobacco smoke. She has never used smokeless tobacco. She reports that she does not currently use alcohol. She reports that she does not use drugs.    (Not in a hospital admission)     Allergies: Losartan, Valium [diazepam], and Citrus     Review of Systems   Constitutional:  Positive for fatigue.   Gastrointestinal:  Positive for nausea.   Musculoskeletal:  Positive for arthralgias.   Skin:  Positive for wound.        Facial acne like rash.   Psychiatric/Behavioral:  The patient is nervous/anxious.        PHYSICAL EXAMINATION:   /84   Pulse 87   Temp 97.7 °F (36.5 °C) (Infrared)   Ht 157.5 cm (62.01\")   Wt 80.8 kg (178 lb 1.6 oz)   SpO2 99%   BMI 32.57 kg/m²    Pain Score    05/07/25 0955   PainSc: 0-No pain                                  ECOG Performance Status: 1 - Symptomatic but completely ambulatory      General Appearance:      alert, cooperative, no apparent distress and appears stated age   Lungs:   Clear to auscultation bilaterally; respirations regular, even, and unlabored bilaterally   Heart:  Regular rate and rhythm, no murmurs appreciated   Abdomen:   Soft, non-tender, and non-distended                 Hospital Outpatient Visit on 05/07/2025   Component Date Value Ref Range Status    Glucose " 05/07/2025 90  65 - 99 mg/dL Final    BUN 05/07/2025 6  6 - 20 mg/dL Final    Creatinine 05/07/2025 0.75  0.57 - 1.00 mg/dL Final    Sodium 05/07/2025 139  136 - 145 mmol/L Final    Potassium 05/07/2025 3.5  3.5 - 5.2 mmol/L Final    Chloride 05/07/2025 104  98 - 107 mmol/L Final    CO2 05/07/2025 22.3  22.0 - 29.0 mmol/L Final    Calcium 05/07/2025 8.5 (L)  8.6 - 10.5 mg/dL Final    Total Protein 05/07/2025 6.8  6.0 - 8.5 g/dL Final    Albumin 05/07/2025 3.6  3.5 - 5.2 g/dL Final    ALT (SGPT) 05/07/2025 23  1 - 33 U/L Final    AST (SGOT) 05/07/2025 28  1 - 32 U/L Final    Alkaline Phosphatase 05/07/2025 186 (H)  39 - 117 U/L Final    Total Bilirubin 05/07/2025 0.5  0.0 - 1.2 mg/dL Final    Globulin 05/07/2025 3.2  gm/dL Final    A/G Ratio 05/07/2025 1.1  g/dL Final    BUN/Creatinine Ratio 05/07/2025 8.0  7.0 - 25.0 Final    Anion Gap 05/07/2025 12.7  5.0 - 15.0 mmol/L Final    eGFR 05/07/2025 94.7  >60.0 mL/min/1.73 Final    WBC 05/07/2025 4.28  3.40 - 10.80 10*3/mm3 Final    RBC 05/07/2025 4.22  3.77 - 5.28 10*6/mm3 Final    Hemoglobin 05/07/2025 11.2 (L)  12.0 - 15.9 g/dL Final    Hematocrit 05/07/2025 35.3  34.0 - 46.6 % Final    MCV 05/07/2025 83.6  79.0 - 97.0 fL Final    MCH 05/07/2025 26.5 (L)  26.6 - 33.0 pg Final    MCHC 05/07/2025 31.7  31.5 - 35.7 g/dL Final    RDW 05/07/2025 17.8 (H)  12.3 - 15.4 % Final    RDW-SD 05/07/2025 54.1 (H)  37.0 - 54.0 fl Final    MPV 05/07/2025 11.4  6.0 - 12.0 fL Final    Platelets 05/07/2025 210  140 - 450 10*3/mm3 Final    Neutrophil % 05/07/2025 52.1  42.7 - 76.0 % Final    Lymphocyte % 05/07/2025 33.6  19.6 - 45.3 % Final    Monocyte % 05/07/2025 10.3  5.0 - 12.0 % Final    Eosinophil % 05/07/2025 3.3  0.3 - 6.2 % Final    Basophil % 05/07/2025 0.5  0.0 - 1.5 % Final    Immature Grans % 05/07/2025 0.2  0.0 - 0.5 % Final    Neutrophils, Absolute 05/07/2025 2.23  1.70 - 7.00 10*3/mm3 Final    Lymphocytes, Absolute 05/07/2025 1.44  0.70 - 3.10 10*3/mm3 Final    Monocytes,  Absolute 05/07/2025 0.44  0.10 - 0.90 10*3/mm3 Final    Eosinophils, Absolute 05/07/2025 0.14  0.00 - 0.40 10*3/mm3 Final    Basophils, Absolute 05/07/2025 0.02  0.00 - 0.20 10*3/mm3 Final    Immature Grans, Absolute 05/07/2025 0.01  0.00 - 0.05 10*3/mm3 Final    nRBC 05/07/2025 0.0  0.0 - 0.2 /100 WBC Final        CT Chest With Contrast Diagnostic  Result Date: 4/11/2025  Narrative: PROCEDURE: CT CHEST, ABDOMEN, PELVIS W CONTRAST-  HISTORY: f/u scans colon cancer; C18.2-Malignant neoplasm of ascending colon; C18.9-Malignant neoplasm of colon, unspecified; C78.7-Secondary malignant neoplasm of liver and intrahepatic bile duct  COMPARISON: 12/9/2024.  PROCEDURE: Axial images were obtained from the thoracic inlet through the pubic symphysis following the administration of Isovue 300 contrast.   FINDINGS:  CHEST: There is no evidence of mediastinal or axillary adenopathy. Heart size is normal. There are no pleural or pericardial effusions. Lung windows reveal no focal opacities or suspicious pulmonary nodules. Bone windows reveal no lytic or destructive lesions.  ABDOMEN: There are 2 hypodense hepatic lesions one with calcification anteriorly and one posteriorly within the right lobe which are stable. There are postsurgical/treatment changes of the liver which are stable.. There is stable mild enlargement of the spleen.. No adrenal mass is present.  The pancreas is normal. The kidneys are unremarkable. The aorta is normal in caliber. There is no free fluid or adenopathy. The abdominal portions of the GI tract are unremarkable.  PELVIS: The appendix is is normal. The uterus is midline. Urinary bladder is collapsed. There is diverticulosis without CT evidence of diverticulitis. There is no significant free fluid or adenopathy. Bone windows reveal no lytic or destructive lesions.      Impression: No acute process.  Stable postsurgical/treatment changes of the liver.    This study was performed with techniques to keep  radiation doses as low as reasonably achievable (ALARA). Individualized dose reduction techniques using automated exposure control or adjustment of mA and/or kV according to the patient size were employed.   Images were reviewed, interpreted, and dictated by Dr. Christine Dale MD Transcribed by José Miguel Murray PA-C.  This report was signed and finalized on 4/11/2025 11:33 AM by Christine Dale MD.      CT Abdomen Pelvis With Contrast  Result Date: 4/11/2025  Narrative: PROCEDURE: CT CHEST, ABDOMEN, PELVIS W CONTRAST-  HISTORY: f/u scans colon cancer; C18.2-Malignant neoplasm of ascending colon; C18.9-Malignant neoplasm of colon, unspecified; C78.7-Secondary malignant neoplasm of liver and intrahepatic bile duct  COMPARISON: 12/9/2024.  PROCEDURE: Axial images were obtained from the thoracic inlet through the pubic symphysis following the administration of Isovue 300 contrast.   FINDINGS:  CHEST: There is no evidence of mediastinal or axillary adenopathy. Heart size is normal. There are no pleural or pericardial effusions. Lung windows reveal no focal opacities or suspicious pulmonary nodules. Bone windows reveal no lytic or destructive lesions.  ABDOMEN: There are 2 hypodense hepatic lesions one with calcification anteriorly and one posteriorly within the right lobe which are stable. There are postsurgical/treatment changes of the liver which are stable.. There is stable mild enlargement of the spleen.. No adrenal mass is present.  The pancreas is normal. The kidneys are unremarkable. The aorta is normal in caliber. There is no free fluid or adenopathy. The abdominal portions of the GI tract are unremarkable.  PELVIS: The appendix is is normal. The uterus is midline. Urinary bladder is collapsed. There is diverticulosis without CT evidence of diverticulitis. There is no significant free fluid or adenopathy. Bone windows reveal no lytic or destructive lesions.      Impression: No acute process.  Stable  postsurgical/treatment changes of the liver.    This study was performed with techniques to keep radiation doses as low as reasonably achievable (ALARA). Individualized dose reduction techniques using automated exposure control or adjustment of mA and/or kV according to the patient size were employed.    Images were reviewed, interpreted, and dictated by Dr. Christine Dale MD Transcribed by José Miguel Murray PA-C.  This report was signed and finalized on 4/11/2025 11:24 AM by Christine Dale MD.        ASSESSMENT: The patient is a very pleasant 54 y.o. female  with right-sided metastatic colon cancer      PLAN:    1.  Right-sided metastatic colon cancer:  A.  I will proceed with treatment as scheduled today Vectibix plus 5-FU maintenance cycle #2.    B.  She will follow-up with us in 2 weeks for maintenance cycle #3.     C.  We will consider maintenance therapy after maximum response.  D.  I will continue to monitor the patient blood work including blood counts kidney function liver function and electrolytes.  E.  I will repeat her Signatera with every other cycle.  April 2 , 2025 went down to 0.16.    F.  I will do 3 months follow-up scan.  This would be due July 2025.    G.  Discussed with the patient blood work results from today revealed hemoglobin 11.2 white cells 4.3 platelets 210.  I will follow-up on the CMP.      2.  Elevated liver enzymes:  A.  Induced by liver metastases  B. I will repeat labs on return    3.  Chemotherapy-induced nausea, severe:  A.  I will continue IV fluid on day 1 and as needed on day for 3.  B.  I will continue Zofran 8 mg and Compazine 10 mg every 8 hours as needed.    4.  Chemotherapy-induced diarrhea:  A.  I will continue the patient on Imodium as needed    5. Cancer-related pain:  A.  I will continue the patient oxycodone 5 mg twice a day.     6.  Anxiety:  A.  I will continue the patient on Ativan 1 mg nightly as needed.       7.  Hypertension:  A.  She will continue losartan.  B.  I  reviewed with the patient this will interfere with our management since chemotherapy typically can cause hypotension we will have to monitor blood pressure closely.    8.  Dehydration:  A.  I will continue 1 L of IV fluid as needed    9.  Chemotherapy-induced anemia:  A.  Discussed with the patient hemoglobin today 11.2.  B.  I will continue monitor her CBC prior to each infusion.  C.  I will transfuse as needed for hemoglobin less than 8.    10.  Chemotherapy-induced heartburn:  A.  I will continue pantoprazole 40 mg twice a day.      11.  Treatment related diarrhea  A.  Continue Imodium as directed.  B.  I will continue Lomotil as needed.    12.  Treatment induced peripheral neuropathy:   A.  We will continue gabapentin 100 mg 3 times daily.    13.  Treatment induced dermatitis, severe:  A.  Erbitux was permanently discontinued due to severe treatment induced dermatitis.    B.  For now we will continue patient on Vectibix at 4 mg/kg .  We may consider increasing the dose to full dose at 6 mg/kg.  C.  I will continue hydrocortisone cream.  I will provide patient with HR as the tube is not enough for her bilateral upper extremities.  I advised her to mix with lotion and apply twice daily.  Do not apply to face  D.  I will continue doxycycline 100 mg twice a day.     14.  Treatment induced mucositis:  A.  I will continue Magic mouthwash.    15. Nasal lesion.   A.  Continue mupirocin as needed    16. Anxiety  A.  Encouraged patient to try lexapro. We will adjust as needed. I asked her to call with thoughts of hurting herself or others.   B.  Continue ativan to use the day before treatment for anticipatory nausea.    FOLLOW UP: 2 weeks with maintenance cycle #3.    Iban Lambert MD  5/7/2025

## 2025-05-09 ENCOUNTER — HOSPITAL ENCOUNTER (OUTPATIENT)
Facility: HOSPITAL | Age: 55
Discharge: HOME | End: 2025-05-09
Payer: COMMERCIAL

## 2025-05-09 DIAGNOSIS — C18.2 CANCER OF RIGHT COLON: ICD-10-CM

## 2025-05-09 DIAGNOSIS — C78.7 METASTATIC COLON CANCER TO LIVER: Primary | ICD-10-CM

## 2025-05-09 DIAGNOSIS — C18.9 METASTATIC COLON CANCER TO LIVER: Primary | ICD-10-CM

## 2025-05-09 PROCEDURE — 25010000002 MAGNESIUM SULFATE 2 GM/50ML SOLUTION: Performed by: INTERNAL MEDICINE

## 2025-05-09 PROCEDURE — 96365 THER/PROPH/DIAG IV INF INIT: CPT

## 2025-05-09 PROCEDURE — 25010000002 HEPARIN LOCK FLUSH PER 10 UNITS: Performed by: INTERNAL MEDICINE

## 2025-05-09 RX ORDER — HEPARIN SODIUM (PORCINE) LOCK FLUSH IV SOLN 100 UNIT/ML 100 UNIT/ML
500 SOLUTION INTRAVENOUS AS NEEDED
OUTPATIENT
Start: 2025-05-09

## 2025-05-09 RX ORDER — MAGNESIUM SULFATE HEPTAHYDRATE 40 MG/ML
2 INJECTION, SOLUTION INTRAVENOUS ONCE
Status: COMPLETED | OUTPATIENT
Start: 2025-05-09 | End: 2025-05-09

## 2025-05-09 RX ORDER — SODIUM CHLORIDE 0.9 % (FLUSH) 0.9 %
10 SYRINGE (ML) INJECTION AS NEEDED
OUTPATIENT
Start: 2025-05-09

## 2025-05-09 RX ORDER — SODIUM CHLORIDE 0.9 % (FLUSH) 0.9 %
10 SYRINGE (ML) INJECTION AS NEEDED
Status: DISCONTINUED | OUTPATIENT
Start: 2025-05-09 | End: 2025-05-10 | Stop reason: HOSPADM

## 2025-05-09 RX ORDER — HEPARIN SODIUM (PORCINE) LOCK FLUSH IV SOLN 100 UNIT/ML 100 UNIT/ML
500 SOLUTION INTRAVENOUS AS NEEDED
Status: DISCONTINUED | OUTPATIENT
Start: 2025-05-09 | End: 2025-05-10 | Stop reason: HOSPADM

## 2025-05-09 RX ADMIN — MAGNESIUM SULFATE HEPTAHYDRATE 2 G: 40 INJECTION, SOLUTION INTRAVENOUS at 13:08

## 2025-05-09 RX ADMIN — HEPARIN 500 UNITS: 100 SYRINGE at 14:04

## 2025-05-19 LAB
NTRA SIGNATERA MTM READOUT: 0 MTM/ML
NTRA SIGNATERA TEST RESULT: NEGATIVE

## 2025-05-20 ENCOUNTER — TELEPHONE (OUTPATIENT)
Dept: ONCOLOGY | Facility: CLINIC | Age: 55
End: 2025-05-20
Payer: MEDICARE

## 2025-05-20 NOTE — TELEPHONE ENCOUNTER
----- Message from Amador SALDIVAR sent at 5/20/2025  3:07 PM EDT -----  Pt  just had shingles and phenomena vaccine and running a fever 102.6 and chillsPatient is feeling fine but wanting to see if it will still be ok to get treated tomorrow where she will be immunocompromised Call to advise thanks

## 2025-05-20 NOTE — TELEPHONE ENCOUNTER
RN discussed with MD. Okay to still get treated tomorrow. RN called patient back and let her know. Patient v/u and appreciation.

## 2025-05-20 NOTE — PROGRESS NOTES
DATE OF VISIT: 5/21/2025    REASON FOR VISIT: Followup for metastatic right-sided colon cancer     PROBLEM LIST:  1. Metastatic colon cancer [place] NX M1 a stage Perry:  A.  Presented with abdominal pain and jaundice  B.  CT abdomen pelvis done November 04, 2022 confirmed diffuse liver metastases.  C.  Diagnosed after CT-guided liver biopsy done on November 22, 2022 confirming adenocarcinoma CK20 positive CK7 negative.  D.  Colonoscopy done December 08, 2020 to confirm transverse colon mass however biopsy revealed adenoma.  E.  Started palliative chemotherapy with dose adjusted FOLFOXIRI December 14, 2022, status post 12 cycles completed May 2023.  F.  Started maintenance Xeloda 1000 mg twice daily 1 week on 1 week off June 2023.  Stopped April 2024 secondary to progressive disease in the liver.  G.  Status post segment 7 and segment 3 liver met resection with 2 other lesions ablation done by Dr. Nielson April 19, 2024.  H.  Started FOLFIRI with Vectibix August 19, 2024.  Status post 12 cycles.  Completed 12 cycles on April 4, 2025  I.  Started maintenance Vectibix with 5-FU on 4/16/2025  2.  Elevated liver enzymes:  A.  Induced by metastatic disease  3.  Cancer-related pain  4.  Anxiety  5.  Hypertension    HISTORY OF PRESENT ILLNESS: The patient is a very pleasant 54 y.o. female with past medical history significant for metastatic right-sided colon cancer diagnosed November 22, 2022.  Started on palliative chemotherapy with dose adjusted FOLFOXIRI.  She completed 12 cycles May 2023.  She was started on maintenance Xeloda 1000 mg twice daily June 2023.  Progressive disease with worsening PET scan as well as rising CT DNA.  She was started on FOLFIRI with Vectibix August 2024.  The patient is here today for scheduled follow-up visit with maintenance treatment cycle #3.    SUBJECTIVE: Amber is here today by herself.  Overall doing fairly well.  Her  had the Pneumovax as well as shingles injections and he has a fever.  " She denies any fever, chills, night sweats.  Overall tolerating treatment well.  She has not been able to see her  Víctor and is anxious.  She continues to have skin rash that is stable.  She is complaining about small cuts on her fingers and feet.  She is using Aquaphor that keeps them stable.  She is losing one of her toenails.    Past History:  Medical History: has a past medical history of Anxiety, Cancer, Ear piercing, Gallstones, History of irritable bowel syndrome, Hypertension, Seasonal allergies, Tattoos, and Wears glasses.   Surgical History: has a past surgical history that includes postpartum tubal ligation; Dilation and curettage of uterus (2013); Tumor removal (2013); Portacath placement (N/A, 12/8/2022); Colonoscopy (N/A, 12/8/2022); diagnostic laparoscopy exploratory laparotomy (N/A, 4/19/2024); Cholecystectomy (N/A, 4/19/2024); and Liver resection (Left, 4/19/2024).   Family History: family history is not on file.   Social History: reports that she has never smoked. She has never been exposed to tobacco smoke. She has never used smokeless tobacco. She reports that she does not currently use alcohol. She reports that she does not use drugs.    (Not in a hospital admission)     Allergies: Losartan, Valium [diazepam], and Citrus     Review of Systems   Constitutional:  Positive for fatigue.   Gastrointestinal:  Positive for nausea.   Musculoskeletal:  Positive for arthralgias.   Skin:  Positive for wound.        Facial acne like rash.   Psychiatric/Behavioral:  The patient is nervous/anxious.        PHYSICAL EXAMINATION:   /94 Comment: LUE  Pulse 83   Temp 97.7 °F (36.5 °C) (Infrared)   Resp 16   Ht 157.5 cm (62\")   Wt 80.7 kg (178 lb)   SpO2 98% Comment: RA  BMI 32.56 kg/m²    Pain Score    05/21/25 0856   PainSc: 0-No pain              ECOG Performance Status: 1 - Symptomatic but completely ambulatory      General Appearance:      alert, cooperative, no apparent distress and appears " stated age   Lungs:   Clear to auscultation bilaterally; respirations regular, even, and unlabored bilaterally   Heart:  Regular rate and rhythm, no murmurs appreciated   Abdomen:   Soft, non-tender, and non-distended                 Hospital Outpatient Visit on 05/21/2025   Component Date Value Ref Range Status    WBC 05/21/2025 5.38  3.40 - 10.80 10*3/mm3 Final    RBC 05/21/2025 4.02  3.77 - 5.28 10*6/mm3 Final    Hemoglobin 05/21/2025 10.8 (L)  12.0 - 15.9 g/dL Final    Hematocrit 05/21/2025 33.7 (L)  34.0 - 46.6 % Final    MCV 05/21/2025 83.8  79.0 - 97.0 fL Final    MCH 05/21/2025 26.9  26.6 - 33.0 pg Final    MCHC 05/21/2025 32.0  31.5 - 35.7 g/dL Final    RDW 05/21/2025 17.3 (H)  12.3 - 15.4 % Final    RDW-SD 05/21/2025 53.2  37.0 - 54.0 fl Final    MPV 05/21/2025 10.5  6.0 - 12.0 fL Final    Platelets 05/21/2025 194  140 - 450 10*3/mm3 Final    Neutrophil % 05/21/2025 48.9  42.7 - 76.0 % Final    Lymphocyte % 05/21/2025 37.5  19.6 - 45.3 % Final    Monocyte % 05/21/2025 10.6  5.0 - 12.0 % Final    Eosinophil % 05/21/2025 2.4  0.3 - 6.2 % Final    Basophil % 05/21/2025 0.4  0.0 - 1.5 % Final    Immature Grans % 05/21/2025 0.2  0.0 - 0.5 % Final    Neutrophils, Absolute 05/21/2025 2.63  1.70 - 7.00 10*3/mm3 Final    Lymphocytes, Absolute 05/21/2025 2.02  0.70 - 3.10 10*3/mm3 Final    Monocytes, Absolute 05/21/2025 0.57  0.10 - 0.90 10*3/mm3 Final    Eosinophils, Absolute 05/21/2025 0.13  0.00 - 0.40 10*3/mm3 Final    Basophils, Absolute 05/21/2025 0.02  0.00 - 0.20 10*3/mm3 Final    Immature Grans, Absolute 05/21/2025 0.01  0.00 - 0.05 10*3/mm3 Final    nRBC 05/21/2025 0.0  0.0 - 0.2 /100 WBC Final        No results found.      ASSESSMENT: The patient is a very pleasant 54 y.o. female  with right-sided metastatic colon cancer      PLAN:    1.  Right-sided metastatic colon cancer:  A.  I will proceed with treatment as scheduled today Vectibix plus 5-FU maintenance cycle #3.    B.  She will follow-up with us in  2 weeks for maintenance cycle #4.     C.  We will consider maintenance therapy after maximum response.  D.  I will continue to monitor the patient blood work including blood counts kidney function liver function and electrolytes.  E.  I will repeat her Signatera with every other cycle.  May 7, 2025 went down to 0.    F.  I will do 3 months follow-up scan.  This would be due July 2025.    G.  Discussed with the patient blood work results from 5/7/2025 revealed hemoglobin 11.2 white cells 4.3 platelets 210.  I will follow-up on the CMP.      2.  Elevated liver enzymes:  A.  Induced by liver metastases  B. I will repeat labs on return    3.  Chemotherapy-induced nausea, severe:  A.  I will continue IV fluid on day 1 and as needed on day for 3.  B.  I will continue Zofran 8 mg and Compazine 10 mg every 8 hours as needed.    4.  Chemotherapy-induced diarrhea:  A.  I will continue the patient on Imodium as needed    5. Cancer-related pain:  A.  I will continue the patient oxycodone 5 mg twice a day.     6.  Anxiety:  A.  I will continue the patient on Ativan 1 mg nightly as needed.       7.  Hypertension:  A.  She will continue losartan.  B.  I reviewed with the patient this will interfere with our management since chemotherapy typically can cause hypotension we will have to monitor blood pressure closely.    8.  Dehydration:  A.  I will continue 1 L of IV fluid as needed    9.  Chemotherapy-induced anemia:  A.  Discussed with the patient hemoglobin today 10.8.  B.  I will continue monitor her CBC prior to each infusion.  C.  I will transfuse as needed for hemoglobin less than 8.    10.  Chemotherapy-induced heartburn:  A.  I will continue pantoprazole 40 mg twice a day.      11.  Treatment related diarrhea  A.  Continue Imodium as directed.  B.  I will continue Lomotil as needed.    12.  Treatment induced peripheral neuropathy:   A.  We will continue gabapentin 100 mg 3 times daily.    13.  Treatment induced dermatitis,  severe:  A.  Erbitux was permanently discontinued due to severe treatment induced dermatitis.    B.  For now we will continue patient on Vectibix at 4 mg/kg .  We may consider increasing the dose to full dose at 6 mg/kg.  C.  I will continue hydrocortisone cream.  I will provide patient with HR as the tube is not enough for her bilateral upper extremities.  I advised her to mix with lotion and apply twice daily.  Do not apply to face  D.  I will continue doxycycline 100 mg twice a day.     14.  Treatment induced mucositis:  A.  I will continue Magic mouthwash.    15. Nasal lesion.   A.  Continue mupirocin as needed    16. Anxiety  A.  Encouraged patient to try lexapro. We will adjust as needed. I asked her to call with thoughts of hurting herself or others.   B.  Continue ativan to use the day before treatment for anticipatory nausea.    17.  Hypomagnesemia  A.  We will continue magnesium as needed    FOLLOW UP: 2 weeks with maintenance cycle #4.    Total time of patient care including time prior to, face to face with patient, and following visit spent in reviewing records, lab results, imaging studies, discussion with patient, and documentation/charting was > 40 minutes      Ana Cristina Cano, APRN  5/21/2025

## 2025-05-21 ENCOUNTER — OFFICE VISIT (OUTPATIENT)
Dept: ONCOLOGY | Facility: CLINIC | Age: 55
End: 2025-05-21
Payer: MEDICARE

## 2025-05-21 ENCOUNTER — HOSPITAL ENCOUNTER (OUTPATIENT)
Facility: HOSPITAL | Age: 55
Discharge: HOME | End: 2025-05-21
Admitting: INTERNAL MEDICINE
Payer: MEDICARE

## 2025-05-21 ENCOUNTER — APPOINTMENT (OUTPATIENT)
Facility: HOSPITAL | Age: 55
End: 2025-05-21
Payer: MEDICARE

## 2025-05-21 VITALS
SYSTOLIC BLOOD PRESSURE: 124 MMHG | BODY MASS INDEX: 32.76 KG/M2 | WEIGHT: 178 LBS | HEART RATE: 83 BPM | DIASTOLIC BLOOD PRESSURE: 94 MMHG | TEMPERATURE: 97.7 F | RESPIRATION RATE: 16 BRPM | HEIGHT: 62 IN | OXYGEN SATURATION: 98 %

## 2025-05-21 DIAGNOSIS — C18.9 METASTATIC COLON CANCER TO LIVER: ICD-10-CM

## 2025-05-21 DIAGNOSIS — C18.9 METASTATIC COLON CANCER TO LIVER: Primary | ICD-10-CM

## 2025-05-21 DIAGNOSIS — C18.9 ADENOCARCINOMA OF COLON METASTATIC TO LIVER: Primary | ICD-10-CM

## 2025-05-21 DIAGNOSIS — C78.7 ADENOCARCINOMA OF COLON METASTATIC TO LIVER: Primary | ICD-10-CM

## 2025-05-21 DIAGNOSIS — C18.2 CANCER OF RIGHT COLON: ICD-10-CM

## 2025-05-21 DIAGNOSIS — C78.7 METASTATIC COLON CANCER TO LIVER: ICD-10-CM

## 2025-05-21 DIAGNOSIS — C78.7 METASTATIC COLON CANCER TO LIVER: Primary | ICD-10-CM

## 2025-05-21 LAB
ALBUMIN SERPL-MCNC: 3.7 G/DL (ref 3.5–5.2)
ALBUMIN/GLOB SERPL: 1.3 G/DL
ALP SERPL-CCNC: 199 U/L (ref 39–117)
ALT SERPL W P-5'-P-CCNC: 32 U/L (ref 1–33)
ANION GAP SERPL CALCULATED.3IONS-SCNC: 9.2 MMOL/L (ref 5–15)
AST SERPL-CCNC: 38 U/L (ref 1–32)
BASOPHILS # BLD AUTO: 0.02 10*3/MM3 (ref 0–0.2)
BASOPHILS NFR BLD AUTO: 0.4 % (ref 0–1.5)
BILIRUB SERPL-MCNC: 0.6 MG/DL (ref 0–1.2)
BUN SERPL-MCNC: 7 MG/DL (ref 6–20)
BUN/CREAT SERPL: 10.4 (ref 7–25)
CALCIUM SPEC-SCNC: 8.2 MG/DL (ref 8.6–10.5)
CHLORIDE SERPL-SCNC: 104 MMOL/L (ref 98–107)
CO2 SERPL-SCNC: 23.8 MMOL/L (ref 22–29)
CREAT SERPL-MCNC: 0.67 MG/DL (ref 0.57–1)
DEPRECATED RDW RBC AUTO: 53.2 FL (ref 37–54)
EGFRCR SERPLBLD CKD-EPI 2021: 104 ML/MIN/1.73
EOSINOPHIL # BLD AUTO: 0.13 10*3/MM3 (ref 0–0.4)
EOSINOPHIL NFR BLD AUTO: 2.4 % (ref 0.3–6.2)
ERYTHROCYTE [DISTWIDTH] IN BLOOD BY AUTOMATED COUNT: 17.3 % (ref 12.3–15.4)
GLOBULIN UR ELPH-MCNC: 2.9 GM/DL
GLUCOSE SERPL-MCNC: 104 MG/DL (ref 65–99)
HCT VFR BLD AUTO: 33.7 % (ref 34–46.6)
HGB BLD-MCNC: 10.8 G/DL (ref 12–15.9)
IMM GRANULOCYTES # BLD AUTO: 0.01 10*3/MM3 (ref 0–0.05)
IMM GRANULOCYTES NFR BLD AUTO: 0.2 % (ref 0–0.5)
LYMPHOCYTES # BLD AUTO: 2.02 10*3/MM3 (ref 0.7–3.1)
LYMPHOCYTES NFR BLD AUTO: 37.5 % (ref 19.6–45.3)
MAGNESIUM SERPL-MCNC: 0.9 MG/DL (ref 1.6–2.6)
MCH RBC QN AUTO: 26.9 PG (ref 26.6–33)
MCHC RBC AUTO-ENTMCNC: 32 G/DL (ref 31.5–35.7)
MCV RBC AUTO: 83.8 FL (ref 79–97)
MONOCYTES # BLD AUTO: 0.57 10*3/MM3 (ref 0.1–0.9)
MONOCYTES NFR BLD AUTO: 10.6 % (ref 5–12)
NEUTROPHILS NFR BLD AUTO: 2.63 10*3/MM3 (ref 1.7–7)
NEUTROPHILS NFR BLD AUTO: 48.9 % (ref 42.7–76)
NRBC BLD AUTO-RTO: 0 /100 WBC (ref 0–0.2)
PLATELET # BLD AUTO: 194 10*3/MM3 (ref 140–450)
PMV BLD AUTO: 10.5 FL (ref 6–12)
POTASSIUM SERPL-SCNC: 3.8 MMOL/L (ref 3.5–5.2)
PROT SERPL-MCNC: 6.6 G/DL (ref 6–8.5)
RBC # BLD AUTO: 4.02 10*6/MM3 (ref 3.77–5.28)
SODIUM SERPL-SCNC: 137 MMOL/L (ref 136–145)
WBC NRBC COR # BLD AUTO: 5.38 10*3/MM3 (ref 3.4–10.8)

## 2025-05-21 PROCEDURE — 25010000002 MAGNESIUM SULFATE 2 GM/50ML SOLUTION: Performed by: NURSE PRACTITIONER

## 2025-05-21 PROCEDURE — 25010000003 DEXTROSE 5 % SOLUTION 250 ML FLEX CONT: Performed by: INTERNAL MEDICINE

## 2025-05-21 PROCEDURE — 96367 TX/PROPH/DG ADDL SEQ IV INF: CPT

## 2025-05-21 PROCEDURE — 96416 CHEMO PROLONG INFUSE W/PUMP: CPT

## 2025-05-21 PROCEDURE — 80053 COMPREHEN METABOLIC PANEL: CPT | Performed by: INTERNAL MEDICINE

## 2025-05-21 PROCEDURE — 25010000002 METHYLPREDNISOLONE PER 125 MG: Performed by: INTERNAL MEDICINE

## 2025-05-21 PROCEDURE — 25010000002 LEUCOVORIN CALCIUM PER 50MG: Performed by: INTERNAL MEDICINE

## 2025-05-21 PROCEDURE — 25810000003 SODIUM CHLORIDE 0.9 % SOLUTION 250 ML FLEX CONT: Performed by: INTERNAL MEDICINE

## 2025-05-21 PROCEDURE — 25010000002 PALONOSETRON PER 25 MCG: Performed by: INTERNAL MEDICINE

## 2025-05-21 PROCEDURE — 85025 COMPLETE CBC W/AUTO DIFF WBC: CPT | Performed by: INTERNAL MEDICINE

## 2025-05-21 PROCEDURE — G0498 CHEMO EXTEND IV INFUS W/PUMP: HCPCS

## 2025-05-21 PROCEDURE — 25810000003 SODIUM CHLORIDE 0.9 % SOLUTION: Performed by: INTERNAL MEDICINE

## 2025-05-21 PROCEDURE — 83735 ASSAY OF MAGNESIUM: CPT | Performed by: INTERNAL MEDICINE

## 2025-05-21 PROCEDURE — 96413 CHEMO IV INFUSION 1 HR: CPT

## 2025-05-21 PROCEDURE — 96411 CHEMO IV PUSH ADDL DRUG: CPT

## 2025-05-21 PROCEDURE — 96375 TX/PRO/DX INJ NEW DRUG ADDON: CPT

## 2025-05-21 PROCEDURE — 25010000002 PANITUMUMAB 400 MG/20ML SOLUTION 20 ML VIAL: Performed by: INTERNAL MEDICINE

## 2025-05-21 PROCEDURE — 25010000002 FLUOROURACIL PER 500 MG: Performed by: INTERNAL MEDICINE

## 2025-05-21 PROCEDURE — 25010000002 FAMOTIDINE 10 MG/ML SOLUTION: Performed by: INTERNAL MEDICINE

## 2025-05-21 RX ORDER — FAMOTIDINE 10 MG/ML
20 INJECTION, SOLUTION INTRAVENOUS ONCE
Status: COMPLETED | OUTPATIENT
Start: 2025-05-21 | End: 2025-05-21

## 2025-05-21 RX ORDER — DIPHENHYDRAMINE HYDROCHLORIDE 50 MG/ML
50 INJECTION, SOLUTION INTRAMUSCULAR; INTRAVENOUS AS NEEDED
Status: DISCONTINUED | OUTPATIENT
Start: 2025-05-21 | End: 2025-05-22 | Stop reason: HOSPADM

## 2025-05-21 RX ORDER — ACETAMINOPHEN 325 MG/1
650 TABLET ORAL ONCE
Status: COMPLETED | OUTPATIENT
Start: 2025-05-21 | End: 2025-05-21

## 2025-05-21 RX ORDER — MEPERIDINE HYDROCHLORIDE 25 MG/ML
25 INJECTION INTRAMUSCULAR; INTRAVENOUS; SUBCUTANEOUS
Status: DISCONTINUED | OUTPATIENT
Start: 2025-05-21 | End: 2025-05-22 | Stop reason: HOSPADM

## 2025-05-21 RX ORDER — METHYLPREDNISOLONE SODIUM SUCCINATE 125 MG/2ML
125 INJECTION INTRAMUSCULAR; INTRAVENOUS ONCE
Status: COMPLETED | OUTPATIENT
Start: 2025-05-21 | End: 2025-05-21

## 2025-05-21 RX ORDER — SODIUM CHLORIDE 9 MG/ML
20 INJECTION, SOLUTION INTRAVENOUS ONCE
Status: COMPLETED | OUTPATIENT
Start: 2025-05-21 | End: 2025-05-21

## 2025-05-21 RX ORDER — FAMOTIDINE 10 MG/ML
20 INJECTION, SOLUTION INTRAVENOUS AS NEEDED
Status: DISCONTINUED | OUTPATIENT
Start: 2025-05-21 | End: 2025-05-22 | Stop reason: HOSPADM

## 2025-05-21 RX ORDER — ATROPINE SULFATE 0.4 MG/ML
0.25 INJECTION, SOLUTION INTRAMUSCULAR; INTRAVENOUS; SUBCUTANEOUS
Status: DISCONTINUED | OUTPATIENT
Start: 2025-05-21 | End: 2025-05-22 | Stop reason: HOSPADM

## 2025-05-21 RX ORDER — PALONOSETRON 0.05 MG/ML
0.25 INJECTION, SOLUTION INTRAVENOUS ONCE
Status: COMPLETED | OUTPATIENT
Start: 2025-05-21 | End: 2025-05-21

## 2025-05-21 RX ORDER — MAGNESIUM SULFATE HEPTAHYDRATE 40 MG/ML
2 INJECTION, SOLUTION INTRAVENOUS ONCE
Status: COMPLETED | OUTPATIENT
Start: 2025-05-21 | End: 2025-05-21

## 2025-05-21 RX ORDER — HYDROCORTISONE SODIUM SUCCINATE 100 MG/2ML
100 INJECTION INTRAMUSCULAR; INTRAVENOUS AS NEEDED
Status: DISCONTINUED | OUTPATIENT
Start: 2025-05-21 | End: 2025-05-22 | Stop reason: HOSPADM

## 2025-05-21 RX ORDER — FLUOROURACIL 50 MG/ML
300 INJECTION, SOLUTION INTRAVENOUS ONCE
Status: COMPLETED | OUTPATIENT
Start: 2025-05-21 | End: 2025-05-21

## 2025-05-21 RX ORDER — LORAZEPAM 2 MG/ML
1 INJECTION INTRAMUSCULAR ONCE AS NEEDED
Status: DISCONTINUED | OUTPATIENT
Start: 2025-05-21 | End: 2025-05-22 | Stop reason: HOSPADM

## 2025-05-21 RX ADMIN — FAMOTIDINE 20 MG: 10 INJECTION, SOLUTION INTRAVENOUS at 11:17

## 2025-05-21 RX ADMIN — LEUCOVORIN CALCIUM 550 MG: 10 INJECTION INTRAMUSCULAR; INTRAVENOUS at 12:07

## 2025-05-21 RX ADMIN — SODIUM CHLORIDE 3280 MG: 9 INJECTION, SOLUTION INTRAVENOUS at 12:43

## 2025-05-21 RX ADMIN — MAGNESIUM SULFATE HEPTAHYDRATE 2 G: 40 INJECTION, SOLUTION INTRAVENOUS at 10:09

## 2025-05-21 RX ADMIN — PALONOSETRON 0.25 MG: 0.05 INJECTION, SOLUTION INTRAVENOUS at 11:13

## 2025-05-21 RX ADMIN — SODIUM CHLORIDE 20 ML/HR: 9 INJECTION, SOLUTION INTRAVENOUS at 12:42

## 2025-05-21 RX ADMIN — ACETAMINOPHEN 650 MG: 325 TABLET, FILM COATED ORAL at 11:12

## 2025-05-21 RX ADMIN — METHYLPREDNISOLONE SODIUM SUCCINATE 125 MG: 125 INJECTION, POWDER, FOR SOLUTION INTRAMUSCULAR; INTRAVENOUS at 11:14

## 2025-05-21 RX ADMIN — ATROPINE SULFATE 0.25 MG: 0.4 INJECTION, SOLUTION INTRAVENOUS at 12:03

## 2025-05-21 RX ADMIN — FLUOROURACIL 550 MG: 50 INJECTION, SOLUTION INTRAVENOUS at 12:43

## 2025-05-21 RX ADMIN — PANITUMUMAB 320 MG: 400 SOLUTION INTRAVENOUS at 11:21

## 2025-05-23 ENCOUNTER — HOSPITAL ENCOUNTER (OUTPATIENT)
Facility: HOSPITAL | Age: 55
Discharge: HOME | End: 2025-05-23
Payer: COMMERCIAL

## 2025-05-23 DIAGNOSIS — C78.7 METASTATIC COLON CANCER TO LIVER: ICD-10-CM

## 2025-05-23 DIAGNOSIS — C18.2 CANCER OF RIGHT COLON: Primary | ICD-10-CM

## 2025-05-23 DIAGNOSIS — C18.9 METASTATIC COLON CANCER TO LIVER: ICD-10-CM

## 2025-05-23 PROCEDURE — 96523 IRRIG DRUG DELIVERY DEVICE: CPT

## 2025-05-23 PROCEDURE — 25010000002 HEPARIN LOCK FLUSH PER 10 UNITS: Performed by: INTERNAL MEDICINE

## 2025-05-23 RX ORDER — SODIUM CHLORIDE 0.9 % (FLUSH) 0.9 %
10 SYRINGE (ML) INJECTION AS NEEDED
OUTPATIENT
Start: 2025-05-23

## 2025-05-23 RX ORDER — HEPARIN SODIUM (PORCINE) LOCK FLUSH IV SOLN 100 UNIT/ML 100 UNIT/ML
500 SOLUTION INTRAVENOUS AS NEEDED
Status: DISCONTINUED | OUTPATIENT
Start: 2025-05-23 | End: 2025-05-24 | Stop reason: HOSPADM

## 2025-05-23 RX ORDER — HEPARIN SODIUM (PORCINE) LOCK FLUSH IV SOLN 100 UNIT/ML 100 UNIT/ML
500 SOLUTION INTRAVENOUS AS NEEDED
OUTPATIENT
Start: 2025-05-23

## 2025-05-23 RX ORDER — SODIUM CHLORIDE 0.9 % (FLUSH) 0.9 %
10 SYRINGE (ML) INJECTION AS NEEDED
Status: DISCONTINUED | OUTPATIENT
Start: 2025-05-23 | End: 2025-05-24 | Stop reason: HOSPADM

## 2025-05-23 RX ADMIN — HEPARIN 500 UNITS: 100 SYRINGE at 14:41

## 2025-05-23 RX ADMIN — Medication 10 ML: at 14:41

## 2025-06-03 ENCOUNTER — TELEPHONE (OUTPATIENT)
Dept: ONCOLOGY | Facility: CLINIC | Age: 55
End: 2025-06-03
Payer: MEDICARE

## 2025-06-03 ENCOUNTER — TELEPHONE (OUTPATIENT)
Dept: ORTHOPEDIC SURGERY | Facility: CLINIC | Age: 55
End: 2025-06-03
Payer: MEDICARE

## 2025-06-03 ENCOUNTER — OFFICE VISIT (OUTPATIENT)
Dept: ORTHOPEDIC SURGERY | Facility: CLINIC | Age: 55
End: 2025-06-03
Payer: MEDICARE

## 2025-06-03 VITALS
SYSTOLIC BLOOD PRESSURE: 134 MMHG | DIASTOLIC BLOOD PRESSURE: 94 MMHG | WEIGHT: 177 LBS | BODY MASS INDEX: 32.57 KG/M2 | HEIGHT: 62 IN

## 2025-06-03 DIAGNOSIS — M25.562 LEFT KNEE PAIN, UNSPECIFIED CHRONICITY: Primary | ICD-10-CM

## 2025-06-03 DIAGNOSIS — M17.0 PRIMARY OSTEOARTHRITIS OF BOTH KNEES: ICD-10-CM

## 2025-06-03 RX ORDER — TRIAMCINOLONE ACETONIDE 40 MG/ML
40 INJECTION, SUSPENSION INTRA-ARTICULAR; INTRAMUSCULAR
Status: COMPLETED | OUTPATIENT
Start: 2025-06-03 | End: 2025-06-03

## 2025-06-03 RX ORDER — LIDOCAINE HYDROCHLORIDE 20 MG/ML
2 INJECTION, SOLUTION INFILTRATION; PERINEURAL
Status: COMPLETED | OUTPATIENT
Start: 2025-06-03 | End: 2025-06-03

## 2025-06-03 RX ADMIN — LIDOCAINE HYDROCHLORIDE 2 ML: 20 INJECTION, SOLUTION INFILTRATION; PERINEURAL at 09:07

## 2025-06-03 RX ADMIN — TRIAMCINOLONE ACETONIDE 40 MG: 40 INJECTION, SUSPENSION INTRA-ARTICULAR; INTRAMUSCULAR at 09:08

## 2025-06-03 RX ADMIN — TRIAMCINOLONE ACETONIDE 40 MG: 40 INJECTION, SUSPENSION INTRA-ARTICULAR; INTRAMUSCULAR at 09:07

## 2025-06-03 RX ADMIN — LIDOCAINE HYDROCHLORIDE 2 ML: 20 INJECTION, SOLUTION INFILTRATION; PERINEURAL at 09:08

## 2025-06-03 NOTE — PROGRESS NOTES
Subjective   Patient ID: Amber Feng is a 54 y.o. right hand dominant female  Pain of the Left Knee (Reports knee pain that is now in both knees. The left knee started one year ago. Progressively getting worse.) and Follow-up of the Right Knee         Patient presents with chronic bilateral knee pain starting approximately 1 year prior.  She states that she did obtain approval from her oncology provider to proceed with knee injections if needed.  Patient would like to be able to exercise but often finds her knee pain limits her ability to do so.        Past Medical History:   Diagnosis Date    Anxiety     Cancer     COLON STAGE IV    Ear piercing     Gallstones     History of irritable bowel syndrome     Hypertension     pt states secondary to pain    Seasonal allergies     Tattoos     Wears glasses         Past Surgical History:   Procedure Laterality Date    CHOLECYSTECTOMY N/A 4/19/2024    Procedure: OPEN CHOLECYSTECTOMY;  Surgeon: Jacob Nielson MD;  Location:  [facility];  Service: General;  Laterality: N/A;    COLONOSCOPY N/A 12/8/2022    Procedure: COLONOSCOPY WITH BIOPSY AND POLYPECTOMY;  Surgeon: Sea Valentin MD;  Location:  [facility];  Service: General;  Laterality: N/A;    DIAGNOSTIC LAPAROSCOPY EXPLORATORY LAPAROTOMY N/A 4/19/2024    Procedure: DIAGNOSTIC LAPAROSCOPY EXPLORATORY LAPAROTOMY, INTRAOPERATIVE ULTRASOUND;  Surgeon: Jacob Nielson MD;  Location:  [facility];  Service: General;  Laterality: N/A;    DILATATION AND CURETTAGE  2013    LIVER RESECTION Left 4/19/2024    Procedure: PARTIAL HEPATECTOMY x 2,  WITH LIVER ABLATION X 2;  Surgeon: Jacob Nielson MD;  Location:  [facility];  Service: General;  Laterality: Left;  First and second ablation: 5 minutes  Size: 4 x 4 cm  Device(s) used: two PR15XT's    PORTACATH PLACEMENT N/A 12/8/2022    Procedure: INSERTION OF PORTACATH;  Surgeon: Sea Valentin MD;  Location:  [facility];  Service: General;  Laterality: N/A;    POSTPARTUM TUBAL LIGATION  "     TUMOR REMOVAL  2013    pt reports \"tumor removed from uterus\" - states non-cancerous; unsure if fibroid; unsure if removed laparoscopically       History reviewed. No pertinent family history.    Social History     Socioeconomic History    Marital status:    Tobacco Use    Smoking status: Never     Passive exposure: Never    Smokeless tobacco: Never   Vaping Use    Vaping status: Never Used   Substance and Sexual Activity    Alcohol use: Not Currently     Comment: rarely    Drug use: Never    Sexual activity: Defer         Current Outpatient Medications:     amLODIPine (NORVASC) 5 MG tablet, Take 1 tablet by mouth once daily, Disp: 90 tablet, Rfl: 0    Diclofenac Sodium (VOLTAREN) 1 % gel gel, Apply 4 g topically to the appropriate area as directed 3 (Three) Times a Day., Disp: 100 g, Rfl: 1    diphenoxylate-atropine (LOMOTIL) 2.5-0.025 MG per tablet, Take 1 tablet by mouth 4 (Four) Times a Day As Needed for Diarrhea., Disp: 45 tablet, Rfl: 4    doxycycline (VIBRAMYICN) 100 MG tablet, Take 1 tablet by mouth 2 (Two) Times a Day., Disp: 28 tablet, Rfl: 3    escitalopram (LEXAPRO) 5 MG tablet, Take 1 tablet by mouth Daily., Disp: 30 tablet, Rfl: 3    hydrocortisone (WESTCORT) 0.2 % cream, Apply 1 Application topically to the appropriate area as directed 2 (Two) Times a Day., Disp: 1 g, Rfl: 0    hydrocortisone 2.5 % cream, Apply 1 Application topically to the appropriate area as directed 2 (Two) Times a Day. Small amount mix with lotion and apply to rash. Do not apply to face., Disp: 453.6 g, Rfl: 1    lidocaine-prilocaine (EMLA) 2.5-2.5 % cream, Apply 1 application topically to the appropriate area as directed As Needed (45-60 minutes prior to port access.  Cover with saran/plastic wrap.)., Disp: 30 g, Rfl: 3    LORazepam (Ativan) 1 MG tablet, The night before chemotherapy and of chemotherapy if needed for anxiety, Disp: 10 tablet, Rfl: 1    Magic Mouthwash Oral Suspension (diphenhydrAMINE HCl - aluminum & " magnesium hydroxide-simethicone - lidocaine - nystatin), Swish and Spit or Swallow 5-10 mL by mouth 4 (Four) Times A Day as needed, Disp: 240 mL, Rfl: 3    magnesium oxide (MAG-OX) 400 MG tablet, Take 1 tablet by mouth 2 (Two) Times a Day., Disp: 60 tablet, Rfl: 0    meloxicam (Mobic) 7.5 MG tablet, Take 1 tablet by mouth Daily As Needed for Moderate Pain., Disp: 20 tablet, Rfl: 1    mupirocin (BACTROBAN) 2 % nasal ointment, Administer 1 Application into the nostril(s) as directed by provider 2 (Two) Times a Day., Disp: 30 g, Rfl: 1    naloxone (NARCAN) 4 MG/0.1ML nasal spray, Call 911. Don't prime. Atkins in 1 nostril for overdose. Repeat in 2-3 minutes in other nostril if no or minimal breathing/responsiveness., Disp: 2 each, Rfl: 0    omeprazole (priLOSEC) 40 MG capsule, Take 1 capsule by mouth once daily, Disp: 90 capsule, Rfl: 1    ondansetron (ZOFRAN) 8 MG tablet, TAKE ONE TABLET BY MOUTH AS NEEDED do not exceed THREE TABLETS PER day, Disp: , Rfl:     ondansetron ODT (ZOFRAN-ODT) 8 MG disintegrating tablet, Place 1 tablet on the tongue Every 8 (Eight) Hours As Needed for Nausea or Vomiting., Disp: 30 tablet, Rfl: 5    potassium chloride (KLOR-CON M20) 20 MEQ CR tablet, Take 1 tablet by mouth Daily., Disp: 30 tablet, Rfl: 1    predniSONE (DELTASONE) 20 MG tablet, Take 1 tablet by mouth Daily., Disp: 5 tablet, Rfl: 0    prochlorperazine (COMPAZINE) 10 MG tablet, Take 1 tablet by mouth Every 6 (Six) Hours As Needed for Nausea or Vomiting., Disp: 30 tablet, Rfl: 2    promethazine (PHENERGAN) 25 MG tablet, Take 1 tablet by mouth Every 6 (Six) Hours As Needed for Nausea or Vomiting., Disp: 45 tablet, Rfl: 5    sodium-potassium-magnesium sulfates (Suprep Bowel Prep Kit) 17.5-3.13-1.6 GM/177ML solution oral solution, Take 1 bottle by mouth Take As Directed. Follow instructions that were mailed to your home. If you didn't receive these call [phone]., Disp: 354 mL, Rfl: 0    Allergies   Allergen Reactions     "Losartan Nausea Only, Other (See Comments) and Headache     Pt states within an hour after use blood pressure would \"skyrocket\" also nausea - states diastolic pressures in 100's    Valium [Diazepam] Anaphylaxis    Citrus GI Intolerance     Especially oranges       Review of Systems   Musculoskeletal:  Positive for arthralgias (bilateral knees).       I have reviewed the medical and surgical history, family history, social history, medications, and/or allergies, and the review of systems of this report.    Objective   /94   Ht 157.5 cm (62\")   Wt 80.3 kg (177 lb)   BMI 32.37 kg/m²    Physical Exam  Vitals and nursing note reviewed.   Constitutional:       Appearance: Normal appearance.   Neurological:      Mental Status: She is alert and oriented to person, place, and time.       Ortho Exam   Extremity DVT signs are negative on physical exam with negative Evette sign, no calf pain, no palpable cords, and no skin tone change   Neurological Exam  Mental Status  Alert. Oriented to person, place, and time.             Assessment & Plan   Independent Review of Radiographic Studies:    X-ray of the left knee 2 view weightbearing performed in the clinic independently reviewed for the evaluation of chronic knee pain.  No comparison films available to review.  No acute fracture or dislocation.  No acute osseous lesion.  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  Large Joint Arthrocentesis: R knee  Date/Time: 6/3/2025 9:07 AM  Consent given by: patient  Site marked: site marked  Timeout: Immediately prior to procedure a time out was called to verify the correct patient, procedure, equipment, support staff and site/side marked as required   Supporting Documentation  Indications: pain   Procedure Details  Location: knee - R knee  Preparation: " Patient was prepped and draped in the usual sterile fashion  Needle size: 23 G  Approach: anterolateral  Medications administered: 2 mL lidocaine 2%; 40 mg triamcinolone acetonide 40 MG/ML  Patient tolerance: patient tolerated the procedure well with no immediate complications      Large Joint Arthrocentesis: L knee  Date/Time: 6/3/2025 9:08 AM  Consent given by: patient  Site marked: site marked  Timeout: Immediately prior to procedure a time out was called to verify the correct patient, procedure, equipment, support staff and site/side marked as required   Supporting Documentation  Indications: pain   Procedure Details  Location: knee - L knee  Preparation: Patient was prepped and draped in the usual sterile fashion  Needle size: 23 G  Approach: anterolateral  Medications administered: 2 mL lidocaine 2%; 40 mg triamcinolone acetonide 40 MG/ML  Patient tolerance: patient tolerated the procedure well with no immediate complications           Diagnoses and all orders for this visit:    1. Left knee pain, unspecified chronicity (Primary)  -     XR Knee 1 or 2 View Left    2. Primary osteoarthritis of both knees    Other orders  -     Large Joint Arthrocentesis: R knee  -     Large Joint Arthrocentesis: L knee       Discussion of orthopedic goals  Regular exercise as tolerated  Risk, benefits, and merits of treatment alternatives reviewed with the patient and questions answered  Ice, heat, and/or modalities as beneficial  Call or notify for any adverse effect from injection therapy    Recommendations/Plan:  Exercise, medications, injections, other patient advice, and return appointment as noted.  Patient is encouraged to call or return for any issues or concerns.    Follow up in 3 months or prn  Of note, we did obtain clearance from Ana Cristina ramirez's office from [place], that we could proceed with bilateral knee cortisone injection while patient is undergoing chemotherapy.  Patient agreeable to call or return sooner for  any concerns.      BMI is >= 30 and <35. (Class 1 Obesity). The following options were offered after discussion;: weight loss educational material (shared in after visit summary)

## 2025-06-03 NOTE — TELEPHONE ENCOUNTER
RN called Muriel gale. Ok for localized steroid knee injections while patient is on chemo. Muriel v/u and xavier.

## 2025-06-03 NOTE — TELEPHONE ENCOUNTER
Muriel with [facility] called patient wants to get steroid knee injections is this okay due to her being on chemo? Please call. [phone]

## 2025-06-03 NOTE — TELEPHONE ENCOUNTER
Patient requesting cortisone injections. Currently taking chemotherapy. Per Wanda at Dr. Newell office patient is okay to take steroid injections for her knees.

## 2025-07-23 ENCOUNTER — APPOINTMENT (OUTPATIENT)
Facility: HOSPITAL | Age: 55
End: 2025-07-23
Payer: MEDICARE

## 2025-07-23 ENCOUNTER — OFFICE VISIT (OUTPATIENT)
Dept: ONCOLOGY | Facility: CLINIC | Age: 55
End: 2025-07-23
Payer: MEDICARE

## 2025-07-23 ENCOUNTER — HOSPITAL ENCOUNTER (OUTPATIENT)
Facility: HOSPITAL | Age: 55
Discharge: HOME | End: 2025-07-23
Admitting: NURSE PRACTITIONER
Payer: MEDICARE

## 2025-07-23 VITALS
TEMPERATURE: 97.3 F | HEIGHT: 62 IN | SYSTOLIC BLOOD PRESSURE: 96 MMHG | WEIGHT: 176 LBS | DIASTOLIC BLOOD PRESSURE: 68 MMHG | OXYGEN SATURATION: 98 % | BODY MASS INDEX: 32.39 KG/M2 | HEART RATE: 69 BPM | RESPIRATION RATE: 16 BRPM

## 2025-07-23 DIAGNOSIS — C78.7 METASTATIC COLON CANCER TO LIVER: Primary | ICD-10-CM

## 2025-07-23 DIAGNOSIS — C18.9 METASTATIC COLON CANCER TO LIVER: ICD-10-CM

## 2025-07-23 DIAGNOSIS — C78.7 METASTATIC COLON CANCER TO LIVER: ICD-10-CM

## 2025-07-23 DIAGNOSIS — C18.2 CANCER OF RIGHT COLON: ICD-10-CM

## 2025-07-23 DIAGNOSIS — C18.9 METASTATIC COLON CANCER TO LIVER: Primary | ICD-10-CM

## 2025-07-23 DIAGNOSIS — C18.2 CANCER OF RIGHT COLON: Primary | ICD-10-CM

## 2025-07-23 LAB
ALBUMIN SERPL-MCNC: 4 G/DL (ref 3.5–5.2)
ALBUMIN/GLOB SERPL: 1.2 G/DL
ALP SERPL-CCNC: 148 U/L (ref 39–117)
ALT SERPL W P-5'-P-CCNC: 17 U/L (ref 1–33)
ANION GAP SERPL CALCULATED.3IONS-SCNC: 11.3 MMOL/L (ref 5–15)
AST SERPL-CCNC: 24 U/L (ref 1–32)
BASOPHILS # BLD AUTO: 0.04 10*3/MM3 (ref 0–0.2)
BASOPHILS NFR BLD AUTO: 0.7 % (ref 0–1.5)
BILIRUB SERPL-MCNC: 0.4 MG/DL (ref 0–1.2)
BUN SERPL-MCNC: 14 MG/DL (ref 6–20)
BUN/CREAT SERPL: 17.1 (ref 7–25)
CALCIUM SPEC-SCNC: 9.3 MG/DL (ref 8.6–10.5)
CHLORIDE SERPL-SCNC: 104 MMOL/L (ref 98–107)
CO2 SERPL-SCNC: 23.7 MMOL/L (ref 22–29)
CREAT SERPL-MCNC: 0.82 MG/DL (ref 0.57–1)
DEPRECATED RDW RBC AUTO: 46.3 FL (ref 37–54)
EGFRCR SERPLBLD CKD-EPI 2021: 85.1 ML/MIN/1.73
EOSINOPHIL # BLD AUTO: 0.27 10*3/MM3 (ref 0–0.4)
EOSINOPHIL NFR BLD AUTO: 4.8 % (ref 0.3–6.2)
ERYTHROCYTE [DISTWIDTH] IN BLOOD BY AUTOMATED COUNT: 15 % (ref 12.3–15.4)
GLOBULIN UR ELPH-MCNC: 3.3 GM/DL
GLUCOSE SERPL-MCNC: 96 MG/DL (ref 65–99)
HCT VFR BLD AUTO: 36.3 % (ref 34–46.6)
HGB BLD-MCNC: 11.5 G/DL (ref 12–15.9)
IMM GRANULOCYTES # BLD AUTO: 0.02 10*3/MM3 (ref 0–0.05)
IMM GRANULOCYTES NFR BLD AUTO: 0.4 % (ref 0–0.5)
LYMPHOCYTES # BLD AUTO: 1.88 10*3/MM3 (ref 0.7–3.1)
LYMPHOCYTES NFR BLD AUTO: 33.5 % (ref 19.6–45.3)
MAGNESIUM SERPL-MCNC: 1.8 MG/DL (ref 1.6–2.6)
MCH RBC QN AUTO: 26.4 PG (ref 26.6–33)
MCHC RBC AUTO-ENTMCNC: 31.7 G/DL (ref 31.5–35.7)
MCV RBC AUTO: 83.4 FL (ref 79–97)
MONOCYTES # BLD AUTO: 0.48 10*3/MM3 (ref 0.1–0.9)
MONOCYTES NFR BLD AUTO: 8.6 % (ref 5–12)
NEUTROPHILS NFR BLD AUTO: 2.92 10*3/MM3 (ref 1.7–7)
NEUTROPHILS NFR BLD AUTO: 52 % (ref 42.7–76)
NRBC BLD AUTO-RTO: 0 /100 WBC (ref 0–0.2)
PLATELET # BLD AUTO: 196 10*3/MM3 (ref 140–450)
PMV BLD AUTO: 10.8 FL (ref 6–12)
POTASSIUM SERPL-SCNC: 3.6 MMOL/L (ref 3.5–5.2)
PROT SERPL-MCNC: 7.3 G/DL (ref 6–8.5)
RBC # BLD AUTO: 4.35 10*6/MM3 (ref 3.77–5.28)
SODIUM SERPL-SCNC: 139 MMOL/L (ref 136–145)
WBC NRBC COR # BLD AUTO: 5.61 10*3/MM3 (ref 3.4–10.8)

## 2025-07-23 PROCEDURE — 25810000003 SODIUM CHLORIDE 0.9 % SOLUTION 250 ML FLEX CONT: Performed by: INTERNAL MEDICINE

## 2025-07-23 PROCEDURE — 99214 OFFICE O/P EST MOD 30 MIN: CPT | Performed by: INTERNAL MEDICINE

## 2025-07-23 PROCEDURE — 3078F DIAST BP <80 MM HG: CPT | Performed by: INTERNAL MEDICINE

## 2025-07-23 PROCEDURE — 96411 CHEMO IV PUSH ADDL DRUG: CPT

## 2025-07-23 PROCEDURE — 96413 CHEMO IV INFUSION 1 HR: CPT

## 2025-07-23 PROCEDURE — 85025 COMPLETE CBC W/AUTO DIFF WBC: CPT | Performed by: NURSE PRACTITIONER

## 2025-07-23 PROCEDURE — 80053 COMPREHEN METABOLIC PANEL: CPT | Performed by: NURSE PRACTITIONER

## 2025-07-23 PROCEDURE — 25010000002 PALONOSETRON 0.25 MG/5ML SOLUTION PREFILLED SYRINGE: Performed by: INTERNAL MEDICINE

## 2025-07-23 PROCEDURE — 25010000003 DEXTROSE 5 % SOLUTION 250 ML FLEX CONT: Performed by: INTERNAL MEDICINE

## 2025-07-23 PROCEDURE — 1126F AMNT PAIN NOTED NONE PRSNT: CPT | Performed by: INTERNAL MEDICINE

## 2025-07-23 PROCEDURE — 25010000002 LEUCOVORIN CALCIUM PER 50MG: Performed by: INTERNAL MEDICINE

## 2025-07-23 PROCEDURE — 25010000002 METHYLPREDNISOLONE PER 125 MG: Performed by: INTERNAL MEDICINE

## 2025-07-23 PROCEDURE — 96367 TX/PROPH/DG ADDL SEQ IV INF: CPT

## 2025-07-23 PROCEDURE — 25010000002 PANITUMUMAB 400 MG/20ML SOLUTION 20 ML VIAL: Performed by: INTERNAL MEDICINE

## 2025-07-23 PROCEDURE — 3074F SYST BP LT 130 MM HG: CPT | Performed by: INTERNAL MEDICINE

## 2025-07-23 PROCEDURE — 83735 ASSAY OF MAGNESIUM: CPT | Performed by: NURSE PRACTITIONER

## 2025-07-23 PROCEDURE — 25010000002 FAMOTIDINE 10 MG/ML SOLUTION: Performed by: INTERNAL MEDICINE

## 2025-07-23 PROCEDURE — 96375 TX/PRO/DX INJ NEW DRUG ADDON: CPT

## 2025-07-23 PROCEDURE — G0498 CHEMO EXTEND IV INFUS W/PUMP: HCPCS

## 2025-07-23 PROCEDURE — 25010000002 FLUOROURACIL PER 500 MG: Performed by: INTERNAL MEDICINE

## 2025-07-23 RX ORDER — PALONOSETRON 0.05 MG/ML
0.25 INJECTION, SOLUTION INTRAVENOUS ONCE
Status: COMPLETED | OUTPATIENT
Start: 2025-07-23 | End: 2025-07-23

## 2025-07-23 RX ORDER — FLUOROURACIL 50 MG/ML
300 INJECTION, SOLUTION INTRAVENOUS ONCE
Status: COMPLETED | OUTPATIENT
Start: 2025-07-23 | End: 2025-07-23

## 2025-07-23 RX ORDER — ATROPINE SULFATE 1 MG/ML
0.25 INJECTION, SOLUTION INTRAMUSCULAR; INTRAVENOUS; SUBCUTANEOUS
Status: CANCELLED | OUTPATIENT
Start: 2025-07-23

## 2025-07-23 RX ORDER — FAMOTIDINE 10 MG/ML
20 INJECTION, SOLUTION INTRAVENOUS ONCE
Status: COMPLETED | OUTPATIENT
Start: 2025-07-23 | End: 2025-07-23

## 2025-07-23 RX ORDER — FAMOTIDINE 10 MG/ML
20 INJECTION, SOLUTION INTRAVENOUS ONCE
Status: CANCELLED | OUTPATIENT
Start: 2025-07-23

## 2025-07-23 RX ORDER — ACETAMINOPHEN 325 MG/1
650 TABLET ORAL ONCE
Status: CANCELLED | OUTPATIENT
Start: 2025-07-23

## 2025-07-23 RX ORDER — DIPHENHYDRAMINE HYDROCHLORIDE 50 MG/ML
50 INJECTION, SOLUTION INTRAMUSCULAR; INTRAVENOUS AS NEEDED
Status: CANCELLED | OUTPATIENT
Start: 2025-07-23

## 2025-07-23 RX ORDER — LORAZEPAM 2 MG/ML
1 INJECTION INTRAMUSCULAR ONCE AS NEEDED
Status: CANCELLED | OUTPATIENT
Start: 2025-07-23

## 2025-07-23 RX ORDER — METHYLPREDNISOLONE SODIUM SUCCINATE 125 MG/2ML
125 INJECTION INTRAMUSCULAR; INTRAVENOUS ONCE
Status: CANCELLED | OUTPATIENT
Start: 2025-07-23

## 2025-07-23 RX ORDER — SODIUM CHLORIDE 9 MG/ML
20 INJECTION, SOLUTION INTRAVENOUS ONCE
Status: DISCONTINUED | OUTPATIENT
Start: 2025-07-23 | End: 2025-07-24 | Stop reason: HOSPADM

## 2025-07-23 RX ORDER — WATER 10 ML/10ML
INJECTION INTRAMUSCULAR; INTRAVENOUS; SUBCUTANEOUS
Status: DISPENSED
Start: 2025-07-23 | End: 2025-07-23

## 2025-07-23 RX ORDER — FAMOTIDINE 10 MG/ML
20 INJECTION, SOLUTION INTRAVENOUS AS NEEDED
Status: CANCELLED | OUTPATIENT
Start: 2025-07-23

## 2025-07-23 RX ORDER — PALONOSETRON 0.05 MG/ML
0.25 INJECTION, SOLUTION INTRAVENOUS ONCE
Status: CANCELLED | OUTPATIENT
Start: 2025-07-23

## 2025-07-23 RX ORDER — FLUOROURACIL 50 MG/ML
300 INJECTION, SOLUTION INTRAVENOUS ONCE
Status: CANCELLED | OUTPATIENT
Start: 2025-07-23

## 2025-07-23 RX ORDER — ACETAMINOPHEN 325 MG/1
650 TABLET ORAL ONCE
Status: COMPLETED | OUTPATIENT
Start: 2025-07-23 | End: 2025-07-23

## 2025-07-23 RX ORDER — ATROPINE SULFATE 0.4 MG/ML
0.25 INJECTION, SOLUTION INTRAMUSCULAR; INTRAVENOUS; SUBCUTANEOUS
Status: DISCONTINUED | OUTPATIENT
Start: 2025-07-23 | End: 2025-07-24 | Stop reason: HOSPADM

## 2025-07-23 RX ORDER — LORAZEPAM 2 MG/ML
1 INJECTION INTRAMUSCULAR ONCE AS NEEDED
Status: DISCONTINUED | OUTPATIENT
Start: 2025-07-23 | End: 2025-07-24 | Stop reason: HOSPADM

## 2025-07-23 RX ORDER — HYDROCORTISONE SODIUM SUCCINATE 100 MG/2ML
100 INJECTION INTRAMUSCULAR; INTRAVENOUS AS NEEDED
Status: CANCELLED | OUTPATIENT
Start: 2025-07-23

## 2025-07-23 RX ORDER — MEPERIDINE HYDROCHLORIDE 25 MG/ML
25 INJECTION INTRAMUSCULAR; INTRAVENOUS; SUBCUTANEOUS
Status: CANCELLED | OUTPATIENT
Start: 2025-07-23

## 2025-07-23 RX ORDER — METHYLPREDNISOLONE SODIUM SUCCINATE 125 MG/2ML
125 INJECTION INTRAMUSCULAR; INTRAVENOUS ONCE
Status: COMPLETED | OUTPATIENT
Start: 2025-07-23 | End: 2025-07-23

## 2025-07-23 RX ORDER — SODIUM CHLORIDE 9 MG/ML
20 INJECTION, SOLUTION INTRAVENOUS ONCE
Status: CANCELLED | OUTPATIENT
Start: 2025-07-23

## 2025-07-23 RX ADMIN — METHYLPREDNISOLONE SODIUM SUCCINATE 125 MG: 125 INJECTION INTRAMUSCULAR; INTRAVENOUS at 11:10

## 2025-07-23 RX ADMIN — FLUOROURACIL 550 MG: 50 INJECTION, SOLUTION INTRAVENOUS at 13:52

## 2025-07-23 RX ADMIN — PANITUMUMAB 320 MG: 400 SOLUTION INTRAVENOUS at 11:41

## 2025-07-23 RX ADMIN — SODIUM CHLORIDE 3280 MG: 9 INJECTION, SOLUTION INTRAVENOUS at 13:56

## 2025-07-23 RX ADMIN — DEXTROSE MONOHYDRATE 550 MG: 50 INJECTION, SOLUTION INTRAVENOUS at 12:42

## 2025-07-23 RX ADMIN — FAMOTIDINE 20 MG: 10 INJECTION, SOLUTION INTRAVENOUS at 11:09

## 2025-07-23 RX ADMIN — PALONOSETRON 0.25 MG: 0.25 INJECTION, SOLUTION INTRAVENOUS at 11:07

## 2025-07-23 RX ADMIN — ACETAMINOPHEN 650 MG: 325 TABLET ORAL at 11:06

## 2025-07-23 NOTE — PROGRESS NOTES
DATE OF VISIT: 7/23/2025    REASON FOR VISIT: Followup for metastatic right-sided colon cancer     PROBLEM LIST:  1. Metastatic colon cancer TX NX M1 a stage Perry:  A.  Presented with abdominal pain and jaundice  B.  CT abdomen pelvis done November 04, 2022 confirmed diffuse liver metastases.  C.  Diagnosed after CT-guided liver biopsy done on November 22, 2022 confirming adenocarcinoma CK20 positive CK7 negative.  D.  Colonoscopy done December 08, 2020 to confirm transverse colon mass however biopsy revealed adenoma.  E.  Started palliative chemotherapy with dose adjusted FOLFOXIRI December 14, 2022, status post 12 cycles completed May 2023.  F.  Started maintenance Xeloda 1000 mg twice daily 1 week on 1 week off June 2023.  Stopped April 2024 secondary to progressive disease in the liver.  G.  Status post segment 7 and segment 3 liver met resection with 2 other lesions ablation done by Dr. Nielson April 19, 2024.  H.  Started FOLFIRI with Vectibix August 19, 2024.  Status post 12 cycles.  Completed 12 cycles on April 4, 2025  I.  Started maintenance Vectibix with 5-FU on 4/16/2025.  Status post 3 cycles  2.  Elevated liver enzymes:  A.  Induced by metastatic disease  3.  Cancer-related pain  4.  Anxiety  5.  Hypertension    HISTORY OF PRESENT ILLNESS: The patient is a very pleasant 54 y.o. female with past medical history significant for metastatic right-sided colon cancer diagnosed November 22, 2022.  Started on palliative chemotherapy with dose adjusted FOLFOXIRI.  She completed 12 cycles May 2023.  She was started on maintenance Xeloda 1000 mg twice daily June 2023.  Progressive disease with worsening PET scan as well as rising CT DNA.  She was started on FOLFIRI with Vectibix August 2024.  The patient is here today for scheduled follow-up visit with maintenance treatment cycle #4.    SUBJECTIVE: Amber is here today with her .  She has lost to follow-up for more than 4 weeks secondary to insurance issues.   "She is excited to get back on therapy.  She is feeling significantly better.  Her dermatitis has resolved.    Past History:  Medical History: has a past medical history of Anxiety, Cancer, Ear piercing, Gallstones, History of irritable bowel syndrome, Hypertension, Seasonal allergies, Tattoos, and Wears glasses.   Surgical History: has a past surgical history that includes postpartum tubal ligation; Dilation and curettage of uterus (2013); Tumor removal (2013); Portacath placement (N/A, 12/8/2022); Colonoscopy (N/A, 12/8/2022); diagnostic laparoscopy exploratory laparotomy (N/A, 4/19/2024); Cholecystectomy (N/A, 4/19/2024); and Liver resection (Left, 4/19/2024).   Family History: family history is not on file.   Social History: reports that she has never smoked. She has never been exposed to tobacco smoke. She has never used smokeless tobacco. She reports that she does not currently use alcohol. She reports that she does not use drugs.    (Not in a hospital admission)     Allergies: Losartan, Valium [diazepam], and Citrus     Review of Systems   Constitutional:  Positive for fatigue.   Gastrointestinal:  Positive for nausea.   Musculoskeletal:  Positive for arthralgias.   Skin:  Positive for wound.        Facial acne like rash.   Psychiatric/Behavioral:  The patient is nervous/anxious.        PHYSICAL EXAMINATION:   BP (!) 216/105   Pulse 69   Temp 97.3 °F (36.3 °C) (Temporal)   Resp 16   Ht 157.5 cm (62.01\")   Wt 79.8 kg (176 lb)   SpO2 98%   BMI 32.18 kg/m²    Pain Score    07/23/25 1020   PainSc: 0-No pain              ECOG Performance Status: 1 - Symptomatic but completely ambulatory      General Appearance:      alert, cooperative, no apparent distress and appears stated age   Lungs:   Clear to auscultation bilaterally; respirations regular, even, and unlabored bilaterally   Heart:  Regular rate and rhythm, no murmurs appreciated   Abdomen:   Soft, non-tender, and non-distended                 Hospital " Outpatient Visit on 07/23/2025   Component Date Value Ref Range Status    Magnesium 07/23/2025 1.8  1.6 - 2.6 mg/dL Final    Glucose 07/23/2025 96  65 - 99 mg/dL Final    BUN 07/23/2025 14.0  6.0 - 20.0 mg/dL Final    Creatinine 07/23/2025 0.82  0.57 - 1.00 mg/dL Final    Sodium 07/23/2025 139  136 - 145 mmol/L Final    Potassium 07/23/2025 3.6  3.5 - 5.2 mmol/L Final    Chloride 07/23/2025 104  98 - 107 mmol/L Final    CO2 07/23/2025 23.7  22.0 - 29.0 mmol/L Final    Calcium 07/23/2025 9.3  8.6 - 10.5 mg/dL Final    Total Protein 07/23/2025 7.3  6.0 - 8.5 g/dL Final    Albumin 07/23/2025 4.0  3.5 - 5.2 g/dL Final    ALT (SGPT) 07/23/2025 17  1 - 33 U/L Final    AST (SGOT) 07/23/2025 24  1 - 32 U/L Final    Alkaline Phosphatase 07/23/2025 148 (H)  39 - 117 U/L Final    Total Bilirubin 07/23/2025 0.4  0.0 - 1.2 mg/dL Final    Globulin 07/23/2025 3.3  gm/dL Final    A/G Ratio 07/23/2025 1.2  g/dL Final    BUN/Creatinine Ratio 07/23/2025 17.1  7.0 - 25.0 Final    Anion Gap 07/23/2025 11.3  5.0 - 15.0 mmol/L Final    eGFR 07/23/2025 85.1  >60.0 mL/min/1.73 Final    WBC 07/23/2025 5.61  3.40 - 10.80 10*3/mm3 Final    RBC 07/23/2025 4.35  3.77 - 5.28 10*6/mm3 Final    Hemoglobin 07/23/2025 11.5 (L)  12.0 - 15.9 g/dL Final    Hematocrit 07/23/2025 36.3  34.0 - 46.6 % Final    MCV 07/23/2025 83.4  79.0 - 97.0 fL Final    MCH 07/23/2025 26.4 (L)  26.6 - 33.0 pg Final    MCHC 07/23/2025 31.7  31.5 - 35.7 g/dL Final    RDW 07/23/2025 15.0  12.3 - 15.4 % Final    RDW-SD 07/23/2025 46.3  37.0 - 54.0 fl Final    MPV 07/23/2025 10.8  6.0 - 12.0 fL Final    Platelets 07/23/2025 196  140 - 450 10*3/mm3 Final    Neutrophil % 07/23/2025 52.0  42.7 - 76.0 % Final    Lymphocyte % 07/23/2025 33.5  19.6 - 45.3 % Final    Monocyte % 07/23/2025 8.6  5.0 - 12.0 % Final    Eosinophil % 07/23/2025 4.8  0.3 - 6.2 % Final    Basophil % 07/23/2025 0.7  0.0 - 1.5 % Final    Immature Grans % 07/23/2025 0.4  0.0 - 0.5 % Final    Neutrophils,  Absolute 07/23/2025 2.92  1.70 - 7.00 10*3/mm3 Final    Lymphocytes, Absolute 07/23/2025 1.88  0.70 - 3.10 10*3/mm3 Final    Monocytes, Absolute 07/23/2025 0.48  0.10 - 0.90 10*3/mm3 Final    Eosinophils, Absolute 07/23/2025 0.27  0.00 - 0.40 10*3/mm3 Final    Basophils, Absolute 07/23/2025 0.04  0.00 - 0.20 10*3/mm3 Final    Immature Grans, Absolute 07/23/2025 0.02  0.00 - 0.05 10*3/mm3 Final    nRBC 07/23/2025 0.0  0.0 - 0.2 /100 WBC Final        No results found.      ASSESSMENT: The patient is a very pleasant 54 y.o. female  with right-sided metastatic colon cancer      PLAN:    1.  Right-sided metastatic colon cancer:  A.  I will proceed with treatment as scheduled today Vectibix plus 5-FU maintenance cycle #3.    B.  She will follow-up with us in 2 weeks for maintenance cycle #4.     C.  We will consider maintenance therapy after maximum response.  D.  I will continue to monitor the patient blood work including blood counts kidney function liver function and electrolytes.  E.  I will repeat her Signatera with every other cycle.  Last level was down to 0.16 May 7, 2025.  This will be checked again today.  F.  I will repeat her scans prior to return.  G.  Discussed with the patient blood work results from today revealed hemoglobin 11.5 normal white cells 4.4 normal creatinine 0.8.    2.  Elevated liver enzymes:  A.  Induced by liver metastases  B. I will repeat labs on return    3.  Chemotherapy-induced nausea, severe:  A.  I will continue IV fluid on day 1 and as needed on day for 3.  B.  I will continue Zofran 8 mg and Compazine 10 mg every 8 hours as needed.    4.  Chemotherapy-induced diarrhea:  A.  I will continue the patient on Imodium as needed    5. Cancer-related pain:  A.  I will continue the patient oxycodone 5 mg twice a day.     6.  Anxiety:  A.  I will continue the patient on Ativan 1 mg nightly as needed.       7.  Hypertension:  A.  She will continue losartan.  B.  I reviewed with the patient  this will interfere with our management since chemotherapy typically can cause hypotension we will have to monitor blood pressure closely.    8.  Dehydration:  A.  I will continue 1 L of IV fluid as needed    9.  Chemotherapy-induced anemia:  A.  Discussed with the patient hemoglobin today 10.8.  B.  I will continue monitor her CBC prior to each infusion.  C.  I will transfuse as needed for hemoglobin less than 8.    10.  Chemotherapy-induced heartburn:  A.  I will continue pantoprazole 40 mg twice a day.      11.  Treatment related diarrhea  A.  Continue Imodium as directed.  B.  I will continue Lomotil as needed.    12.  Treatment induced peripheral neuropathy:   A.  We will continue gabapentin 100 mg 3 times daily.    13.  Treatment induced dermatitis, severe:  A.  Erbitux was permanently discontinued due to severe treatment induced dermatitis.    B.  For now we will continue patient on Vectibix at 4 mg/kg .  We may consider increasing the dose to full dose at 6 mg/kg.  C.  I will continue hydrocortisone cream.  I will provide patient with HR as the tube is not enough for her bilateral upper extremities.  I advised her to mix with lotion and apply twice daily.  Do not apply to face  D.  I will continue doxycycline 100 mg twice a day.     14.  Treatment induced mucositis:  A.  I will continue Magic mouthwash.    15. Nasal lesion.   A.  Continue mupirocin as needed    16. Anxiety  A.  Encouraged patient to try lexapro. We will adjust as needed. I asked her to call with thoughts of hurting herself or others.   B.  Continue ativan to use the day before treatment for anticipatory nausea.    17.  Hypomagnesemia  A.  We will continue magnesium as needed    FOLLOW UP: 2 weeks with maintenance cycle #5.    Iban Lambert MD  7/23/2025

## 2025-07-25 ENCOUNTER — HOSPITAL ENCOUNTER (OUTPATIENT)
Facility: HOSPITAL | Age: 55
Discharge: HOME | End: 2025-07-25
Payer: MEDICARE

## 2025-07-25 DIAGNOSIS — C18.2 CANCER OF RIGHT COLON: Primary | ICD-10-CM

## 2025-07-25 RX ORDER — SODIUM CHLORIDE 0.9 % (FLUSH) 0.9 %
10 SYRINGE (ML) INJECTION AS NEEDED
OUTPATIENT
Start: 2025-07-25

## 2025-07-25 RX ORDER — SODIUM CHLORIDE 0.9 % (FLUSH) 0.9 %
10 SYRINGE (ML) INJECTION AS NEEDED
Status: DISCONTINUED | OUTPATIENT
Start: 2025-07-25 | End: 2025-07-26 | Stop reason: HOSPADM

## 2025-07-25 RX ORDER — HEPARIN SODIUM (PORCINE) LOCK FLUSH IV SOLN 100 UNIT/ML 100 UNIT/ML
500 SOLUTION INTRAVENOUS AS NEEDED
OUTPATIENT
Start: 2025-07-25

## 2025-07-25 RX ORDER — HEPARIN SODIUM (PORCINE) LOCK FLUSH IV SOLN 100 UNIT/ML 100 UNIT/ML
500 SOLUTION INTRAVENOUS AS NEEDED
Status: DISCONTINUED | OUTPATIENT
Start: 2025-07-25 | End: 2025-07-26 | Stop reason: HOSPADM

## 2025-08-01 ENCOUNTER — HOSPITAL ENCOUNTER (OUTPATIENT)
Dept: CT IMAGING | Facility: HOSPITAL | Age: 55
Discharge: HOME | End: 2025-08-01
Payer: MEDICARE

## 2025-08-01 DIAGNOSIS — C78.7 METASTATIC COLON CANCER TO LIVER: ICD-10-CM

## 2025-08-01 DIAGNOSIS — C18.9 METASTATIC COLON CANCER TO LIVER: ICD-10-CM

## 2025-08-01 DIAGNOSIS — C18.2 CANCER OF RIGHT COLON: ICD-10-CM

## 2025-08-01 PROCEDURE — 25510000001 IOPAMIDOL 61 % SOLUTION: Performed by: INTERNAL MEDICINE

## 2025-08-01 PROCEDURE — 71260 CT THORAX DX C+: CPT

## 2025-08-01 PROCEDURE — 74177 CT ABD & PELVIS W/CONTRAST: CPT

## 2025-08-01 RX ORDER — IOPAMIDOL 612 MG/ML
100 INJECTION, SOLUTION INTRAVASCULAR
Status: COMPLETED | OUTPATIENT
Start: 2025-08-01 | End: 2025-08-01

## 2025-08-01 RX ADMIN — IOPAMIDOL 100 ML: 612 INJECTION, SOLUTION INTRAVENOUS at 14:41

## 2025-08-05 ENCOUNTER — HOSPITAL ENCOUNTER (OUTPATIENT)
Facility: HOSPITAL | Age: 55
Discharge: HOME | End: 2025-08-05
Admitting: NURSE PRACTITIONER
Payer: MEDICARE

## 2025-08-05 ENCOUNTER — OFFICE VISIT (OUTPATIENT)
Dept: ONCOLOGY | Facility: CLINIC | Age: 55
End: 2025-08-05
Payer: MEDICARE

## 2025-08-05 VITALS
RESPIRATION RATE: 16 BRPM | OXYGEN SATURATION: 96 % | WEIGHT: 172 LBS | BODY MASS INDEX: 31.65 KG/M2 | SYSTOLIC BLOOD PRESSURE: 157 MMHG | HEIGHT: 62 IN | TEMPERATURE: 97.5 F | HEART RATE: 90 BPM | DIASTOLIC BLOOD PRESSURE: 94 MMHG

## 2025-08-05 DIAGNOSIS — C78.7 METASTATIC COLON CANCER TO LIVER: Primary | ICD-10-CM

## 2025-08-05 DIAGNOSIS — C18.9 METASTATIC COLON CANCER TO LIVER: Primary | ICD-10-CM

## 2025-08-05 LAB
ALBUMIN SERPL-MCNC: 3.7 G/DL (ref 3.5–5.2)
ALBUMIN/GLOB SERPL: 1.2 G/DL
ALP SERPL-CCNC: 154 U/L (ref 39–117)
ALT SERPL W P-5'-P-CCNC: 21 U/L (ref 1–33)
ANION GAP SERPL CALCULATED.3IONS-SCNC: 11.3 MMOL/L (ref 5–15)
AST SERPL-CCNC: 27 U/L (ref 1–32)
BASOPHILS # BLD AUTO: 0.03 10*3/MM3 (ref 0–0.2)
BASOPHILS NFR BLD AUTO: 0.6 % (ref 0–1.5)
BILIRUB SERPL-MCNC: 0.5 MG/DL (ref 0–1.2)
BUN SERPL-MCNC: 10 MG/DL (ref 6–20)
BUN/CREAT SERPL: 14.7 (ref 7–25)
CALCIUM SPEC-SCNC: 9.2 MG/DL (ref 8.6–10.5)
CHLORIDE SERPL-SCNC: 104 MMOL/L (ref 98–107)
CO2 SERPL-SCNC: 22.7 MMOL/L (ref 22–29)
CREAT SERPL-MCNC: 0.68 MG/DL (ref 0.57–1)
DEPRECATED RDW RBC AUTO: 43.8 FL (ref 37–54)
EGFRCR SERPLBLD CKD-EPI 2021: 103.6 ML/MIN/1.73
EOSINOPHIL # BLD AUTO: 0.16 10*3/MM3 (ref 0–0.4)
EOSINOPHIL NFR BLD AUTO: 3 % (ref 0.3–6.2)
ERYTHROCYTE [DISTWIDTH] IN BLOOD BY AUTOMATED COUNT: 15.3 % (ref 12.3–15.4)
GLOBULIN UR ELPH-MCNC: 3.2 GM/DL
GLUCOSE SERPL-MCNC: 98 MG/DL (ref 65–99)
HCT VFR BLD AUTO: 35.9 % (ref 34–46.6)
HGB BLD-MCNC: 11.6 G/DL (ref 12–15.9)
IMM GRANULOCYTES # BLD AUTO: 0.01 10*3/MM3 (ref 0–0.05)
IMM GRANULOCYTES NFR BLD AUTO: 0.2 % (ref 0–0.5)
LYMPHOCYTES # BLD AUTO: 1.68 10*3/MM3 (ref 0.7–3.1)
LYMPHOCYTES NFR BLD AUTO: 31.3 % (ref 19.6–45.3)
MAGNESIUM SERPL-MCNC: 1.7 MG/DL (ref 1.6–2.6)
MCH RBC QN AUTO: 26.3 PG (ref 26.6–33)
MCHC RBC AUTO-ENTMCNC: 32.3 G/DL (ref 31.5–35.7)
MCV RBC AUTO: 81.4 FL (ref 79–97)
MONOCYTES # BLD AUTO: 0.51 10*3/MM3 (ref 0.1–0.9)
MONOCYTES NFR BLD AUTO: 9.5 % (ref 5–12)
NEUTROPHILS NFR BLD AUTO: 2.98 10*3/MM3 (ref 1.7–7)
NEUTROPHILS NFR BLD AUTO: 55.4 % (ref 42.7–76)
NRBC BLD AUTO-RTO: 0 /100 WBC (ref 0–0.2)
PLATELET # BLD AUTO: 197 10*3/MM3 (ref 140–450)
PMV BLD AUTO: 10 FL (ref 6–12)
POTASSIUM SERPL-SCNC: 3.1 MMOL/L (ref 3.5–5.2)
PROT SERPL-MCNC: 6.9 G/DL (ref 6–8.5)
RBC # BLD AUTO: 4.41 10*6/MM3 (ref 3.77–5.28)
SODIUM SERPL-SCNC: 138 MMOL/L (ref 136–145)
WBC NRBC COR # BLD AUTO: 5.37 10*3/MM3 (ref 3.4–10.8)

## 2025-08-05 PROCEDURE — 25010000002 METHYLPREDNISOLONE PER 125 MG: Performed by: NURSE PRACTITIONER

## 2025-08-05 PROCEDURE — 25010000002 LORAZEPAM PER 2 MG: Performed by: NURSE PRACTITIONER

## 2025-08-05 PROCEDURE — 96413 CHEMO IV INFUSION 1 HR: CPT

## 2025-08-05 PROCEDURE — 96367 TX/PROPH/DG ADDL SEQ IV INF: CPT

## 2025-08-05 PROCEDURE — 1159F MED LIST DOCD IN RCRD: CPT | Performed by: NURSE PRACTITIONER

## 2025-08-05 PROCEDURE — 83735 ASSAY OF MAGNESIUM: CPT | Performed by: NURSE PRACTITIONER

## 2025-08-05 PROCEDURE — 1126F AMNT PAIN NOTED NONE PRSNT: CPT | Performed by: NURSE PRACTITIONER

## 2025-08-05 PROCEDURE — 25010000002 PALONOSETRON 0.25 MG/5ML SOLUTION PREFILLED SYRINGE: Performed by: NURSE PRACTITIONER

## 2025-08-05 PROCEDURE — G0498 CHEMO EXTEND IV INFUS W/PUMP: HCPCS

## 2025-08-05 PROCEDURE — 25010000003 DEXTROSE 5 % SOLUTION 250 ML FLEX CONT: Performed by: NURSE PRACTITIONER

## 2025-08-05 PROCEDURE — 80053 COMPREHEN METABOLIC PANEL: CPT | Performed by: NURSE PRACTITIONER

## 2025-08-05 PROCEDURE — 25010000002 FLUOROURACIL PER 500 MG: Performed by: NURSE PRACTITIONER

## 2025-08-05 PROCEDURE — 96375 TX/PRO/DX INJ NEW DRUG ADDON: CPT

## 2025-08-05 PROCEDURE — 25010000002 LEUCOVORIN CALCIUM PER 50MG: Performed by: NURSE PRACTITIONER

## 2025-08-05 PROCEDURE — 96411 CHEMO IV PUSH ADDL DRUG: CPT

## 2025-08-05 PROCEDURE — 99214 OFFICE O/P EST MOD 30 MIN: CPT | Performed by: NURSE PRACTITIONER

## 2025-08-05 PROCEDURE — 3080F DIAST BP >= 90 MM HG: CPT | Performed by: NURSE PRACTITIONER

## 2025-08-05 PROCEDURE — 85025 COMPLETE CBC W/AUTO DIFF WBC: CPT | Performed by: NURSE PRACTITIONER

## 2025-08-05 PROCEDURE — 25810000003 SODIUM CHLORIDE 0.9 % SOLUTION 250 ML FLEX CONT: Performed by: NURSE PRACTITIONER

## 2025-08-05 PROCEDURE — 3077F SYST BP >= 140 MM HG: CPT | Performed by: NURSE PRACTITIONER

## 2025-08-05 PROCEDURE — 1160F RVW MEDS BY RX/DR IN RCRD: CPT | Performed by: NURSE PRACTITIONER

## 2025-08-05 PROCEDURE — 25010000002 FAMOTIDINE 10 MG/ML SOLUTION: Performed by: NURSE PRACTITIONER

## 2025-08-05 RX ORDER — FLUOROURACIL 50 MG/ML
300 INJECTION, SOLUTION INTRAVENOUS ONCE
Status: CANCELLED | OUTPATIENT
Start: 2025-08-05

## 2025-08-05 RX ORDER — FAMOTIDINE 10 MG/ML
20 INJECTION, SOLUTION INTRAVENOUS ONCE
Status: CANCELLED | OUTPATIENT
Start: 2025-08-05

## 2025-08-05 RX ORDER — LORAZEPAM 2 MG/ML
1 INJECTION INTRAMUSCULAR ONCE AS NEEDED
Status: COMPLETED | OUTPATIENT
Start: 2025-08-05 | End: 2025-08-05

## 2025-08-05 RX ORDER — HYDROCORTISONE SODIUM SUCCINATE 100 MG/2ML
100 INJECTION INTRAMUSCULAR; INTRAVENOUS AS NEEDED
Status: CANCELLED | OUTPATIENT
Start: 2025-08-05

## 2025-08-05 RX ORDER — SODIUM CHLORIDE 9 MG/ML
20 INJECTION, SOLUTION INTRAVENOUS ONCE
Status: CANCELLED | OUTPATIENT
Start: 2025-08-05

## 2025-08-05 RX ORDER — ACETAMINOPHEN 325 MG/1
650 TABLET ORAL ONCE
Status: COMPLETED | OUTPATIENT
Start: 2025-08-05 | End: 2025-08-05

## 2025-08-05 RX ORDER — METHYLPREDNISOLONE SODIUM SUCCINATE 125 MG/2ML
125 INJECTION INTRAMUSCULAR; INTRAVENOUS ONCE
Status: COMPLETED | OUTPATIENT
Start: 2025-08-05 | End: 2025-08-05

## 2025-08-05 RX ORDER — WATER 10 ML/10ML
INJECTION INTRAMUSCULAR; INTRAVENOUS; SUBCUTANEOUS
Status: DISPENSED
Start: 2025-08-05 | End: 2025-08-05

## 2025-08-05 RX ORDER — METHYLPREDNISOLONE SODIUM SUCCINATE 125 MG/2ML
125 INJECTION INTRAMUSCULAR; INTRAVENOUS ONCE
Status: CANCELLED | OUTPATIENT
Start: 2025-08-05

## 2025-08-05 RX ORDER — MEPERIDINE HYDROCHLORIDE 25 MG/ML
25 INJECTION INTRAMUSCULAR; INTRAVENOUS; SUBCUTANEOUS
Status: CANCELLED | OUTPATIENT
Start: 2025-08-05

## 2025-08-05 RX ORDER — FAMOTIDINE 10 MG/ML
20 INJECTION, SOLUTION INTRAVENOUS AS NEEDED
Status: CANCELLED | OUTPATIENT
Start: 2025-08-05

## 2025-08-05 RX ORDER — PALONOSETRON 0.05 MG/ML
0.25 INJECTION, SOLUTION INTRAVENOUS ONCE
Status: CANCELLED | OUTPATIENT
Start: 2025-08-05

## 2025-08-05 RX ORDER — FLUOROURACIL 50 MG/ML
300 INJECTION, SOLUTION INTRAVENOUS ONCE
Status: COMPLETED | OUTPATIENT
Start: 2025-08-05 | End: 2025-08-05

## 2025-08-05 RX ORDER — PALONOSETRON 0.05 MG/ML
0.25 INJECTION, SOLUTION INTRAVENOUS ONCE
Status: COMPLETED | OUTPATIENT
Start: 2025-08-05 | End: 2025-08-05

## 2025-08-05 RX ORDER — LORAZEPAM 2 MG/ML
1 INJECTION INTRAMUSCULAR ONCE AS NEEDED
Status: CANCELLED | OUTPATIENT
Start: 2025-08-05

## 2025-08-05 RX ORDER — ATROPINE SULFATE 1 MG/ML
0.25 INJECTION, SOLUTION INTRAMUSCULAR; INTRAVENOUS; SUBCUTANEOUS
Status: CANCELLED | OUTPATIENT
Start: 2025-08-05

## 2025-08-05 RX ORDER — SODIUM CHLORIDE 9 MG/ML
20 INJECTION, SOLUTION INTRAVENOUS ONCE
Status: DISCONTINUED | OUTPATIENT
Start: 2025-08-05 | End: 2025-08-06 | Stop reason: HOSPADM

## 2025-08-05 RX ORDER — ACETAMINOPHEN 325 MG/1
650 TABLET ORAL ONCE
Status: CANCELLED | OUTPATIENT
Start: 2025-08-05

## 2025-08-05 RX ORDER — FAMOTIDINE 10 MG/ML
20 INJECTION, SOLUTION INTRAVENOUS ONCE
Status: COMPLETED | OUTPATIENT
Start: 2025-08-05 | End: 2025-08-05

## 2025-08-05 RX ORDER — DIPHENHYDRAMINE HYDROCHLORIDE 50 MG/ML
50 INJECTION, SOLUTION INTRAMUSCULAR; INTRAVENOUS AS NEEDED
Status: CANCELLED | OUTPATIENT
Start: 2025-08-05

## 2025-08-05 RX ADMIN — FAMOTIDINE 20 MG: 10 INJECTION, SOLUTION INTRAVENOUS at 11:07

## 2025-08-05 RX ADMIN — METHYLPREDNISOLONE SODIUM SUCCINATE 125 MG: 125 INJECTION INTRAMUSCULAR; INTRAVENOUS at 11:03

## 2025-08-05 RX ADMIN — SODIUM CHLORIDE 320 MG: 9 INJECTION, SOLUTION INTRAVENOUS at 11:36

## 2025-08-05 RX ADMIN — LEUCOVORIN CALCIUM 550 MG: 10 INJECTION INTRAMUSCULAR; INTRAVENOUS at 12:30

## 2025-08-05 RX ADMIN — PALONOSETRON 0.25 MG: 0.25 INJECTION, SOLUTION INTRAVENOUS at 11:01

## 2025-08-05 RX ADMIN — LORAZEPAM 1 MG: 2 INJECTION INTRAMUSCULAR; INTRAVENOUS at 11:09

## 2025-08-05 RX ADMIN — SODIUM CHLORIDE 3280 MG: 9 INJECTION, SOLUTION INTRAVENOUS at 13:39

## 2025-08-05 RX ADMIN — FLUOROURACIL 550 MG: 50 INJECTION, SOLUTION INTRAVENOUS at 13:39

## 2025-08-05 RX ADMIN — ACETAMINOPHEN 650 MG: 325 TABLET ORAL at 11:00

## 2025-08-07 ENCOUNTER — HOSPITAL ENCOUNTER (OUTPATIENT)
Facility: HOSPITAL | Age: 55
Discharge: HOME | End: 2025-08-07
Admitting: INTERNAL MEDICINE
Payer: MEDICARE

## 2025-08-07 DIAGNOSIS — R11.2 NAUSEA AND VOMITING, UNSPECIFIED VOMITING TYPE: ICD-10-CM

## 2025-08-07 DIAGNOSIS — C78.7 METASTATIC COLON CANCER TO LIVER: ICD-10-CM

## 2025-08-07 DIAGNOSIS — K52.1 DIARRHEA DUE TO DRUG: ICD-10-CM

## 2025-08-07 DIAGNOSIS — C18.9 METASTATIC COLON CANCER TO LIVER: ICD-10-CM

## 2025-08-07 DIAGNOSIS — C18.2 CANCER OF RIGHT COLON: Primary | ICD-10-CM

## 2025-08-07 PROCEDURE — 96523 IRRIG DRUG DELIVERY DEVICE: CPT

## 2025-08-07 PROCEDURE — 25010000002 HEPARIN LOCK FLUSH PER 10 UNITS: Performed by: INTERNAL MEDICINE

## 2025-08-07 RX ORDER — HEPARIN SODIUM (PORCINE) LOCK FLUSH IV SOLN 100 UNIT/ML 100 UNIT/ML
500 SOLUTION INTRAVENOUS AS NEEDED
OUTPATIENT
Start: 2025-08-07

## 2025-08-07 RX ORDER — SODIUM CHLORIDE 0.9 % (FLUSH) 0.9 %
10 SYRINGE (ML) INJECTION AS NEEDED
Status: DISCONTINUED | OUTPATIENT
Start: 2025-08-07 | End: 2025-08-08 | Stop reason: HOSPADM

## 2025-08-07 RX ORDER — HEPARIN SODIUM (PORCINE) LOCK FLUSH IV SOLN 100 UNIT/ML 100 UNIT/ML
500 SOLUTION INTRAVENOUS AS NEEDED
Status: DISCONTINUED | OUTPATIENT
Start: 2025-08-07 | End: 2025-08-08 | Stop reason: HOSPADM

## 2025-08-07 RX ORDER — ONDANSETRON 8 MG/1
TABLET, ORALLY DISINTEGRATING ORAL
Qty: 30 TABLET | Refills: 0 | Status: SHIPPED | OUTPATIENT
Start: 2025-08-07

## 2025-08-07 RX ORDER — SODIUM CHLORIDE 0.9 % (FLUSH) 0.9 %
10 SYRINGE (ML) INJECTION AS NEEDED
OUTPATIENT
Start: 2025-08-07

## 2025-08-07 RX ADMIN — Medication 10 ML: at 14:17

## 2025-08-07 RX ADMIN — HEPARIN 500 UNITS: 100 SYRINGE at 14:17

## 2025-08-08 LAB
NTRA SIGNATERA MTM READOUT: 18.45 MTM/ML
NTRA SIGNATERA TEST RESULT: POSITIVE

## 2025-08-19 ENCOUNTER — TELEPHONE (OUTPATIENT)
Dept: ONCOLOGY | Facility: CLINIC | Age: 55
End: 2025-08-19
Payer: MEDICARE

## (undated) DEVICE — SUT SILK 3/0 SH 30IN K832H

## (undated) DEVICE — ANTIBACTERIAL UNDYED BRAIDED (POLYGLACTIN 910), SYNTHETIC ABSORBABLE SURGICAL SUTURE: Brand: COATED VICRYL

## (undated) DEVICE — DBD-DRAPE,LAP,CHOLE,W/TROUGHS,STERILE: Brand: MEDLINE

## (undated) DEVICE — SUT VIC 0 UR6 27IN VCP603H

## (undated) DEVICE — ENDOPATH XCEL BLADELESS TROCARS WITH STABILITY SLEEVES: Brand: ENDOPATH XCEL

## (undated) DEVICE — FOGARTY - HYDRAGRIP SURGICAL - CLAMP INSERTS: Brand: FOGARTY SOFTJAW

## (undated) DEVICE — ENDOPATH XCEL UNIVERSAL TROCAR STABLILITY SLEEVES: Brand: ENDOPATH XCEL

## (undated) DEVICE — ENDOPATH PNEUMONEEDLE INSUFFLATION NEEDLES WITH LUER LOCK CONNECTORS 120MM: Brand: ENDOPATH

## (undated) DEVICE — COVER,MAYO STAND,XL,STERILE: Brand: MEDLINE

## (undated) DEVICE — BLANKT WARM UPPR/BDY ARM/OUT 57X196CM

## (undated) DEVICE — MARYLAND JAW LAPAROSCOPIC SEALER/DIVIDER COATED: Brand: LIGASURE

## (undated) DEVICE — LAPAROVUE VISIBILITY SYSTEM LAPAROSCOPIC SOLUTIONS: Brand: LAPAROVUE

## (undated) DEVICE — INTENDED FOR TISSUE SEPARATION, AND OTHER PROCEDURES THAT REQUIRE A SHARP SURGICAL BLADE TO PUNCTURE OR CUT.: Brand: BARD-PARKER ® STAINLESS STEEL BLADES

## (undated) DEVICE — SINGLE PORT MANIFOLD: Brand: NEPTUNE 2

## (undated) DEVICE — PATIENT RETURN ELECTRODE, SINGLE-USE, CONTACT QUALITY MONITORING, ADULT, WITH 9FT CORD, FOR PATIENTS WEIGING OVER 33LBS. (15KG): Brand: MEGADYNE

## (undated) DEVICE — TOWEL,OR,DSP,ST,BLUE,STD,4/PK,20PK/CS: Brand: MEDLINE

## (undated) DEVICE — SYR LUERLOK 20CC BX/50

## (undated) DEVICE — APPL CHLORAPREP TINTED 26ML TEAL

## (undated) DEVICE — DRSNG WND BORDR/ADHS NONADHR/GZ LF 4X4IN STRL

## (undated) DEVICE — SUT PROLN 2/0 PC3 8833H

## (undated) DEVICE — TBG PENCL TELESCP MEGADYNE SMOKE EVAC 10FT

## (undated) DEVICE — PCH INST SURG INVISISHIELD 2PCKT

## (undated) DEVICE — DRAPE,UTILITY,TAPE,15X26,STERILE: Brand: MEDLINE

## (undated) DEVICE — TOTAL TRAY, 16FR 10ML SIL FOLEY, URN: Brand: MEDLINE

## (undated) DEVICE — CLTH CLENS READYCLEANSE PERI CARE PK/5

## (undated) DEVICE — JACKSON-PRATT 100CC BULB RESERVOIR: Brand: CARDINAL HEALTH

## (undated) DEVICE — POOLE SUCTION INSTRUMENT WITH REMOVABLE SHEATH: Brand: POOLE

## (undated) DEVICE — SYR LL TP 10ML STRL

## (undated) DEVICE — SAFESECURE,SECUREMENT,FOLEY CATH,STERILE: Brand: MEDLINE

## (undated) DEVICE — SUT SILK 2/0 30IN A305H

## (undated) DEVICE — LUBE JELLY PK/2.75GM STRL BX/144

## (undated) DEVICE — SHEARS: Brand: ENDO MINI-SHEARS

## (undated) DEVICE — SOL IRR NACL 0.9PCT BT 1000ML

## (undated) DEVICE — NDL HYPO ECLPS SFTY 25G 1 1/2IN

## (undated) DEVICE — GLV SURG BIOGEL LTX PF 7

## (undated) DEVICE — PROVIDES A STERILE INTERFACE BETWEEN THE OPERATING ROOM SURGICAL LAMPS (NON-STERILE) AND THE SURGEON OR NURSE (STERILE).: Brand: STERION®CLAMP COVER FABRIC

## (undated) DEVICE — ENDOCUT SCISSOR TIP, DISPOSABLE: Brand: RENEW

## (undated) DEVICE — DEFOGGER!" ANTI FOG KIT: Brand: DEROYAL

## (undated) DEVICE — UMBILICAL TAPE: Brand: DEROYAL

## (undated) DEVICE — STRIP,CLOSURE,WOUND,MEDI-STRIP,1/2X4: Brand: MEDLINE

## (undated) DEVICE — SUT PDS 3/0 SH 27IN CLR PDP416H

## (undated) DEVICE — MEDI-VAC NON-CONDUCTIVE SUCTION TUBING: Brand: CARDINAL HEALTH

## (undated) DEVICE — [HIGH FLOW INSUFFLATOR,  DO NOT USE IF PACKAGE IS DAMAGED,  KEEP DRY,  KEEP AWAY FROM SUNLIGHT,  PROTECT FROM HEAT AND RADIOACTIVE SOURCES.]: Brand: PNEUMOSURE

## (undated) DEVICE — UNDYED BRAIDED (POLYGLACTIN 910), SYNTHETIC ABSORBABLE SUTURE: Brand: COATED VICRYL

## (undated) DEVICE — SUT VIC 3/0 SH 27IN J416H

## (undated) DEVICE — LAPAROSCOPIC SMOKE FILTRATION SYSTEM: Brand: PALL LAPAROSHIELD® PLUS LAPAROSCOPIC SMOKE FILTRATION SYSTEM

## (undated) DEVICE — 4-PORT MANIFOLD: Brand: NEPTUNE 2

## (undated) DEVICE — GLV SURG SENSICARE W/ALOE PF LF 7.5 STRL

## (undated) DEVICE — Device

## (undated) DEVICE — PK LAP LASR CHOLE 10

## (undated) DEVICE — NDL HYPO ECLPS SFTY 18G 1 1/2IN

## (undated) DEVICE — PK VASC 10

## (undated) DEVICE — CLAVICLE STRAP: Brand: DEROYAL

## (undated) DEVICE — DECANTER BAG 9": Brand: MEDLINE INDUSTRIES, INC.

## (undated) DEVICE — ADHS SKIN PREMIERPRO EXOFIN TOPICAL HI/VISC .5ML

## (undated) DEVICE — SUT ETHLN 2/0 PS 18IN 585H

## (undated) DEVICE — HYBRID TUBING/CAP SET FOR OLYMPUS® SCOPES: Brand: ERBE

## (undated) DEVICE — VLV SXN AIR/H2O ORCAPOD3 1P/U STRL

## (undated) DEVICE — RICH MAJOR PROCEDURE: Brand: MEDLINE INDUSTRIES, INC.

## (undated) DEVICE — DRAPE,INSTRUMENT,MAGNETIC,10X16: Brand: MEDLINE

## (undated) DEVICE — ENDOSCOPY PORT CONNECTOR FOR OLYMPUS® SCOPES: Brand: ERBE

## (undated) DEVICE — JP PERF DRN SIL FLT 10MM FULL: Brand: CARDINAL HEALTH

## (undated) DEVICE — DRP SURG UTIL W/TPE 2/LAYR 15X26IN DISP

## (undated) DEVICE — SUT SILK 3/0 TIES 18IN A184H

## (undated) DEVICE — GOWN SURG ORBIS LVL3 2XL STRL

## (undated) DEVICE — BIPOLAR SEALER 23-301-1 AQM MBS: Brand: AQUAMANTYS™

## (undated) DEVICE — LEX GENERAL ABDOMINAL SPLIT: Brand: MEDLINE INDUSTRIES, INC.

## (undated) DEVICE — NET RESCUENET F/B RETRV

## (undated) DEVICE — SPONGE,DISSECTOR,K,XRAY,9/16"X1/4",STRL: Brand: MEDLINE

## (undated) DEVICE — TRAP FLD MINIVAC MEGADYNE 100ML

## (undated) DEVICE — SNAR POLYP CAPTIVATOR CRSNT 27MM 240CM

## (undated) DEVICE — SUT PDS LP 1 TP1 96IN VIO PDP880GA

## (undated) DEVICE — CVR HNDL LIGHT RIGID

## (undated) DEVICE — SYR CONTRL PRESS/LO FIX/M/LL W/THMB/RNG 10ML

## (undated) DEVICE — SYR LUERLOK 5CC

## (undated) DEVICE — UNDRGLV SURG BIOGEL PUNCTUREINDICATION SZ7 PF STRL

## (undated) DEVICE — CUSA® CLARITY TORQUE WRENCH: Brand: CUSA® CLARITY

## (undated) DEVICE — SUT PROLN 4/0 V5 36IN 8935H

## (undated) DEVICE — SUT MNCRYL PLS ANTIB UD 4/0 PS2 18IN

## (undated) DEVICE — CATHETER,URETHRAL,REDRUBBER,STRL,18FR: Brand: MEDLINE

## (undated) DEVICE — NEEDLE,HYPODERM,SAFETY, 22GX1.5: Brand: MEDLINE